# Patient Record
Sex: FEMALE | Race: WHITE | NOT HISPANIC OR LATINO | Employment: OTHER | URBAN - METROPOLITAN AREA
[De-identification: names, ages, dates, MRNs, and addresses within clinical notes are randomized per-mention and may not be internally consistent; named-entity substitution may affect disease eponyms.]

---

## 2017-01-27 DIAGNOSIS — E11.9 TYPE 2 DIABETES MELLITUS WITHOUT COMPLICATION: ICD-10-CM

## 2017-01-30 DIAGNOSIS — Z79.4 TYPE 2 DIABETES MELLITUS WITHOUT COMPLICATION, WITH LONG-TERM CURRENT USE OF INSULIN: ICD-10-CM

## 2017-01-30 DIAGNOSIS — E11.9 TYPE 2 DIABETES MELLITUS WITHOUT COMPLICATION, WITH LONG-TERM CURRENT USE OF INSULIN: ICD-10-CM

## 2017-01-30 RX ORDER — INSULIN GLARGINE 100 [IU]/ML
23 INJECTION, SOLUTION SUBCUTANEOUS NIGHTLY
Qty: 30 ML | Refills: 3
Start: 2017-01-30 | End: 2017-03-16 | Stop reason: SDUPTHER

## 2017-01-30 RX ORDER — INSULIN GLARGINE 100 [IU]/ML
22 INJECTION, SOLUTION SUBCUTANEOUS NIGHTLY
Qty: 30 ML | Refills: 3
Start: 2017-01-30 | End: 2017-01-30 | Stop reason: SDUPTHER

## 2017-01-30 NOTE — TELEPHONE ENCOUNTER
Glucose log starting 1/23/17    Am Pm  118  122 160  140 148  116 155  135 169  140 168  133 165  120 160

## 2017-01-31 NOTE — TELEPHONE ENCOUNTER
Patient says she is using 21u of Lantus-per verbal order  patient advised to increase to 23u and send in a log in one week. Please sign mar.

## 2017-02-11 DIAGNOSIS — E11.9 TYPE 2 DIABETES MELLITUS WITHOUT COMPLICATION: ICD-10-CM

## 2017-02-13 RX ORDER — SAXAGLIPTIN AND METFORMIN HYDROCHLORIDE 2.5; 1 MG/1; MG/1
TABLET, FILM COATED, EXTENDED RELEASE ORAL
Qty: 180 TABLET | Refills: 0 | Status: SHIPPED | OUTPATIENT
Start: 2017-02-13 | End: 2017-05-06 | Stop reason: SDUPTHER

## 2017-02-15 ENCOUNTER — LAB VISIT (OUTPATIENT)
Dept: LAB | Facility: HOSPITAL | Age: 82
End: 2017-02-15
Attending: FAMILY MEDICINE
Payer: MEDICARE

## 2017-02-15 DIAGNOSIS — E11.9 TYPE 2 DIABETES MELLITUS WITHOUT COMPLICATION: ICD-10-CM

## 2017-02-15 LAB
ANION GAP SERPL CALC-SCNC: 10 MMOL/L
BUN SERPL-MCNC: 26 MG/DL
CALCIUM SERPL-MCNC: 9.5 MG/DL
CHLORIDE SERPL-SCNC: 106 MMOL/L
CO2 SERPL-SCNC: 25 MMOL/L
CREAT SERPL-MCNC: 1 MG/DL
EST. GFR  (AFRICAN AMERICAN): >60 ML/MIN/1.73 M^2
EST. GFR  (NON AFRICAN AMERICAN): 52.6 ML/MIN/1.73 M^2
GLUCOSE SERPL-MCNC: 138 MG/DL
POTASSIUM SERPL-SCNC: 4.2 MMOL/L
SODIUM SERPL-SCNC: 141 MMOL/L

## 2017-02-15 PROCEDURE — 36415 COLL VENOUS BLD VENIPUNCTURE: CPT | Mod: PO

## 2017-02-15 PROCEDURE — 80048 BASIC METABOLIC PNL TOTAL CA: CPT

## 2017-02-15 PROCEDURE — 83036 HEMOGLOBIN GLYCOSYLATED A1C: CPT

## 2017-02-16 DIAGNOSIS — E11.9 TYPE 2 DIABETES MELLITUS WITHOUT COMPLICATION, WITH LONG-TERM CURRENT USE OF INSULIN: ICD-10-CM

## 2017-02-16 DIAGNOSIS — Z79.4 TYPE 2 DIABETES MELLITUS WITHOUT COMPLICATION, WITH LONG-TERM CURRENT USE OF INSULIN: ICD-10-CM

## 2017-02-16 LAB
ESTIMATED AVG GLUCOSE: 192 MG/DL
HBA1C MFR BLD HPLC: 8.3 %
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
## 2017-02-16 NOTE — TELEPHONE ENCOUNTER
----- Message from Chang Christianson MD sent at 2/16/2017  1:38 PM CST -----  Blood sugar control is not much better with an a1c of 8.3.  Is she doing better keeping up the the kombiglyze?  How about the insulin?  Recommend she keep a log for two weeks morning and evening before breakfast dinner and send it in to me.

## 2017-03-16 RX ORDER — INSULIN GLARGINE 100 [IU]/ML
25 INJECTION, SOLUTION SUBCUTANEOUS NIGHTLY
Qty: 30 ML | Refills: 3
Start: 2017-03-16 | End: 2017-04-19 | Stop reason: SDUPTHER

## 2017-03-16 NOTE — TELEPHONE ENCOUNTER
"bp log dropped off  Date Reading Pulse  Sugar  2-3 137/75  2-7 132/75  2-11 136/66  69  2-15 146/77  81  Am 91   147/72  73  Pm 140  2-17 130/65  73  Am 120/ pm 143  2-18     Am 134 / pm 150  2-19 118/64  86  Am 140 / pm 160  2-21 147/72  73  Am 133 / pm 155  2-22 156/75  71  2-23 132/69  71  2-24 119/57  78  3-15     Am 135 / pm 122           2nd page patient stated " I lost a paper from 2/21-March 14        "

## 2017-03-18 DIAGNOSIS — I15.2 HYPERTENSION ASSOCIATED WITH DIABETES: ICD-10-CM

## 2017-03-18 DIAGNOSIS — I10 HYPERTENSION: ICD-10-CM

## 2017-03-18 DIAGNOSIS — E11.59 HYPERTENSION ASSOCIATED WITH DIABETES: ICD-10-CM

## 2017-03-20 RX ORDER — CARVEDILOL 6.25 MG/1
TABLET ORAL
Qty: 180 TABLET | Refills: 0 | Status: SHIPPED | OUTPATIENT
Start: 2017-03-20 | End: 2017-05-25 | Stop reason: SDUPTHER

## 2017-04-04 ENCOUNTER — TELEPHONE (OUTPATIENT)
Dept: FAMILY MEDICINE | Facility: CLINIC | Age: 82
End: 2017-04-04

## 2017-04-04 NOTE — TELEPHONE ENCOUNTER
NO Voicemail for patient, calling to get more information regarding going back to original dosage of insulin because new dosage makes her sleepy.

## 2017-04-04 NOTE — TELEPHONE ENCOUNTER
----- Message from Remedios Hyman sent at 4/4/2017 10:28 AM CDT -----  Contact: self  Higher dosage of insulin is making her sleepy, went back to original dosage.  Please call back at after 4:00 at  [phone].

## 2017-04-04 NOTE — TELEPHONE ENCOUNTER
----- Message from Remedios Hyman sent at 4/4/2017 10:28 AM CDT -----  Contact: self  Higher dosage of insulin is making her sleepy, went back to original dosage.  Please call back at after 4:00 at  [phone].

## 2017-04-12 NOTE — TELEPHONE ENCOUNTER
Patient stated she could not take 25 units. She backed back to 23 units and feels better. She has 2 days of sugar readings. She will bring in log on Monday 4-17-17.

## 2017-04-18 NOTE — TELEPHONE ENCOUNTER
She either needs more lantus-possibly going to 24 as an intermediate step or we need to add some mealtime insulin like novolog.

## 2017-04-19 RX ORDER — INSULIN GLARGINE 100 [IU]/ML
24 INJECTION, SOLUTION SUBCUTANEOUS NIGHTLY
Qty: 30 ML | Refills: 3
Start: 2017-04-19 | End: 2018-02-12 | Stop reason: SDUPTHER

## 2017-04-19 NOTE — TELEPHONE ENCOUNTER
Patient will increase Lantus to 24u - she says she has been on Kombliglyze for years and wonders if it is still working for her.

## 2017-04-21 DIAGNOSIS — E11.9 TYPE 2 DIABETES MELLITUS WITHOUT COMPLICATION: ICD-10-CM

## 2017-04-25 ENCOUNTER — DOCUMENTATION ONLY (OUTPATIENT)
Dept: FAMILY MEDICINE | Facility: CLINIC | Age: 82
End: 2017-04-25

## 2017-04-25 NOTE — PROGRESS NOTES
Pre-Visit Chart Review  For Appointment Scheduled on 04/28/2017    Health Maintenance Due   Topic Date Due    Lipid Panel  04/18/2017    DEXA SCAN  04/17/2017    Foot Exam  05/25/2017

## 2017-04-28 ENCOUNTER — TELEPHONE (OUTPATIENT)
Dept: FAMILY MEDICINE | Facility: CLINIC | Age: 82
End: 2017-04-28

## 2017-04-28 NOTE — TELEPHONE ENCOUNTER
Looks fairly good.  Continue same and repeat a1c in two months.  Order in.  Due for foot exam soon

## 2017-04-28 NOTE — TELEPHONE ENCOUNTER
Glucose log-Lantus at 24u 4/20-4/27    Am Pm  123 124  119 128  116 126  92 132  80 133  116 128  107 140 after playing weekly card game and not eating diabetic diet!  116

## 2017-05-06 DIAGNOSIS — E11.9 TYPE 2 DIABETES MELLITUS WITHOUT COMPLICATION: ICD-10-CM

## 2017-05-08 RX ORDER — SAXAGLIPTIN AND METFORMIN HYDROCHLORIDE 2.5; 1 MG/1; MG/1
TABLET, FILM COATED, EXTENDED RELEASE ORAL
Qty: 180 TABLET | Refills: 0 | Status: SHIPPED | OUTPATIENT
Start: 2017-05-08 | End: 2017-08-16 | Stop reason: SDUPTHER

## 2017-05-12 NOTE — TELEPHONE ENCOUNTER
Lab appt made 7/11/17 with patient. Annual check up and foot exam scheduled 7/18/17. Patient states she has lost 7 lbs and is following strict diabetic diet.

## 2017-05-25 DIAGNOSIS — I15.2 HYPERTENSION ASSOCIATED WITH DIABETES: ICD-10-CM

## 2017-05-25 DIAGNOSIS — I10 HYPERTENSION: ICD-10-CM

## 2017-05-25 DIAGNOSIS — E11.59 HYPERTENSION ASSOCIATED WITH DIABETES: ICD-10-CM

## 2017-05-25 RX ORDER — CARVEDILOL 6.25 MG/1
TABLET ORAL
Qty: 180 TABLET | Refills: 0 | Status: SHIPPED | OUTPATIENT
Start: 2017-05-25 | End: 2018-02-12 | Stop reason: SDUPTHER

## 2017-06-22 ENCOUNTER — DOCUMENTATION ONLY (OUTPATIENT)
Dept: FAMILY MEDICINE | Facility: CLINIC | Age: 82
End: 2017-06-22

## 2017-06-22 NOTE — PROGRESS NOTES
Pre-Visit Chart Review  For Appointment Scheduled on 7-18-17    Health Maintenance Due   Topic Date Due    DEXA SCAN  04/17/2017    Lipid Panel  04/18/2017    Hemoglobin A1c  05/15/2017    Pneumococcal (65+) (2 of 2 - PPSV23) 05/25/2017    Foot Exam  05/25/2017    Eye Exam  06/20/2017

## 2017-06-23 DIAGNOSIS — I10 ESSENTIAL HYPERTENSION: ICD-10-CM

## 2017-06-23 RX ORDER — LISINOPRIL AND HYDROCHLOROTHIAZIDE 10; 12.5 MG/1; MG/1
TABLET ORAL
Qty: 90 TABLET | Refills: 0 | Status: SHIPPED | OUTPATIENT
Start: 2017-06-23 | End: 2017-10-05 | Stop reason: SDUPTHER

## 2017-07-06 RX ORDER — BLOOD SUGAR DIAGNOSTIC
STRIP MISCELLANEOUS
Qty: 100 EACH | Refills: 0 | Status: SHIPPED | OUTPATIENT
Start: 2017-07-06 | End: 2017-10-10 | Stop reason: SDUPTHER

## 2017-07-11 ENCOUNTER — LAB VISIT (OUTPATIENT)
Dept: LAB | Facility: HOSPITAL | Age: 82
End: 2017-07-11
Attending: FAMILY MEDICINE
Payer: MEDICARE

## 2017-07-11 LAB
ESTIMATED AVG GLUCOSE: 154 MG/DL
HBA1C MFR BLD HPLC: 7 %

## 2017-07-11 PROCEDURE — 36415 COLL VENOUS BLD VENIPUNCTURE: CPT | Mod: PO

## 2017-07-11 PROCEDURE — 83036 HEMOGLOBIN GLYCOSYLATED A1C: CPT

## 2017-07-18 ENCOUNTER — HOSPITAL ENCOUNTER (OUTPATIENT)
Dept: RADIOLOGY | Facility: CLINIC | Age: 82
Discharge: HOME | End: 2017-07-18
Attending: FAMILY MEDICINE
Payer: MEDICARE

## 2017-07-18 ENCOUNTER — OFFICE VISIT (OUTPATIENT)
Dept: FAMILY MEDICINE | Facility: CLINIC | Age: 82
End: 2017-07-18
Payer: MEDICARE

## 2017-07-18 VITALS
DIASTOLIC BLOOD PRESSURE: 59 MMHG | RESPIRATION RATE: 16 BRPM | TEMPERATURE: 98 F | SYSTOLIC BLOOD PRESSURE: 111 MMHG | HEART RATE: 80 BPM | WEIGHT: 190.06 LBS | HEIGHT: 67 IN | BODY MASS INDEX: 29.83 KG/M2 | OXYGEN SATURATION: 96 %

## 2017-07-18 DIAGNOSIS — E78.5 HYPERLIPIDEMIA, UNSPECIFIED HYPERLIPIDEMIA TYPE: ICD-10-CM

## 2017-07-18 DIAGNOSIS — Z23 IMMUNIZATION DUE: ICD-10-CM

## 2017-07-18 DIAGNOSIS — E11.69 HYPERLIPIDEMIA ASSOCIATED WITH TYPE 2 DIABETES MELLITUS: ICD-10-CM

## 2017-07-18 DIAGNOSIS — E11.40 TYPE 2 DIABETES, CONTROLLED, WITH NEUROPATHY: ICD-10-CM

## 2017-07-18 DIAGNOSIS — E11.59 HYPERTENSION ASSOCIATED WITH DIABETES: ICD-10-CM

## 2017-07-18 DIAGNOSIS — E03.9 HYPOTHYROIDISM, UNSPECIFIED TYPE: ICD-10-CM

## 2017-07-18 DIAGNOSIS — Z00.00 ANNUAL PHYSICAL EXAM: Primary | ICD-10-CM

## 2017-07-18 DIAGNOSIS — I10 GOOD HYPERTENSION CONTROL: ICD-10-CM

## 2017-07-18 DIAGNOSIS — Z12.31 ENCOUNTER FOR SCREENING MAMMOGRAM FOR MALIGNANT NEOPLASM OF BREAST: ICD-10-CM

## 2017-07-18 DIAGNOSIS — Z78.0 MENOPAUSE: ICD-10-CM

## 2017-07-18 DIAGNOSIS — E78.5 HYPERLIPIDEMIA ASSOCIATED WITH TYPE 2 DIABETES MELLITUS: ICD-10-CM

## 2017-07-18 DIAGNOSIS — I15.2 HYPERTENSION ASSOCIATED WITH DIABETES: ICD-10-CM

## 2017-07-18 PROCEDURE — 77067 SCR MAMMO BI INCL CAD: CPT | Mod: 26,,, | Performed by: RADIOLOGY

## 2017-07-18 PROCEDURE — 99397 PER PM REEVAL EST PAT 65+ YR: CPT | Mod: S$GLB,,, | Performed by: FAMILY MEDICINE

## 2017-07-18 PROCEDURE — 99499 UNLISTED E&M SERVICE: CPT | Mod: S$GLB,,, | Performed by: FAMILY MEDICINE

## 2017-07-18 PROCEDURE — 77080 DXA BONE DENSITY AXIAL: CPT | Mod: 26,,, | Performed by: RADIOLOGY

## 2017-07-18 PROCEDURE — 77063 BREAST TOMOSYNTHESIS BI: CPT | Mod: 26,,, | Performed by: RADIOLOGY

## 2017-07-18 PROCEDURE — 99999 PR PBB SHADOW E&M-EST. PATIENT-LVL IV: CPT | Mod: PBBFAC,,, | Performed by: FAMILY MEDICINE

## 2017-07-18 PROCEDURE — G0009 ADMIN PNEUMOCOCCAL VACCINE: HCPCS | Mod: S$GLB,,, | Performed by: FAMILY MEDICINE

## 2017-07-18 PROCEDURE — 90732 PPSV23 VACC 2 YRS+ SUBQ/IM: CPT | Mod: S$GLB,,, | Performed by: FAMILY MEDICINE

## 2017-07-18 PROCEDURE — 77080 DXA BONE DENSITY AXIAL: CPT | Mod: TC,PO

## 2017-07-18 PROCEDURE — 77067 SCR MAMMO BI INCL CAD: CPT | Mod: TC

## 2017-07-18 NOTE — PATIENT INSTRUCTIONS
Diabetes (General Information)  Diabetes is a long-term health problem. It means your body does not make enough insulin. Or it may mean that your body cannot use the insulin it makes. Insulin is a hormone in your body. It lets blood sugar (glucose) reach the cells in your body. All of your cells need glucose for fuel.  When you have diabetes, the glucose in your blood builds up because it cannot get into the cells. This buildup is called high blood sugar (hyperglycemia).  Your blood sugar level depends on several things. It depends on what kind of food you eat and how much of it you eat. It also depends on how much exercise you get, and how much insulin you have in your body. Eating too much of the wrong kinds of food or not taking diabetes medicine on time can cause high blood sugar. Infections can cause high blood sugar even if you are taking medicines correctly.  These things can also cause low blood sugar:  · Missing meals  · Not eating enough food  · Taking too much diabetes medicine  Diabetes can cause serious problems over time if you do not get treated. These problems include heart disease, stroke, kidney failure, and blindness. They also include nerve pain or loss of feeling in your legs and feet, and gangrene of the feet. By keeping your blood sugar under control you can prevent or delay these problems.  Normal blood sugar levels are 80 to 100 before a meal and less than 180 in the 1 to 2 hours after a meal.  Home care  Follow these guidelines when caring for yourself at home:  · Follow the diet your healthcare provider gives you. Take insulin or other diabetes medicine exactly as told to.  · Watch your blood sugar as you are told to. Keep a log of your results. This will help your provider change your medicines to keep your blood sugar under control.  · Try to reach your ideal weight. You may be able to cut back on or not have to take diabetes medicine if you eat the right foods and get exercise.  · Do  not smoke. Smoking worsens the effects of diabetes on your circulation. You are much more likely to have a heart attack if you have diabetes and you smoke.  · Take good care of your feet. If you have lost feeling in your feet, you may not see an injury or infection. Check your feet and between your toes at least once a week.  · Wear a medical alert bracelet or necklace, or carry a card in your wallet that says you have diabetes. This will help healthcare providers give you the right care if you get very ill and cannot tell them that you have diabetes.  Sick day plan  If you get a cold, the flu, or a bacterial or viral infection, take these steps:  · Look at your diabetes sick plan and call your healthcare provider as you were told to. You may need to call your provider right away if:  ¨ Your blood sugar is above 240 while taking your diabetes medicine  ¨ Your urine ketone levels are above normal or high  ¨ You have been vomiting more than 6 hours  ¨ You have trouble breathing or your breath ha s a fruity smell  ¨ You have a high fever  ¨ You have a fever for several days and you are not getting better  ¨ You get light-headed and are sleepier than usual  · Keep taking your diabetes pills (oral medicine) even if you have been vomiting and are feeling sick. Call your provider right away because you may need insulin to lower your blood sugar until you recover from your illness.  · Keep taking your insulin even if you have been vomiting and are feeling sick. Call your provider right away to ask if you need to change your insulin dose. This will depend on your blood sugar results.  · Check your blood sugar every 2 to 4 hours, or at least 4 times a day.  · Check your ketones often. If you are vomiting and having diarrhea, watch them more often.  · Do not skip meals. Try to eat small meals on a regular schedule. Do this even if you do not feel like eating.  · Drink water or other liquids that do not have caffeine or  calories. This will keep you from getting dehydrated. If you are nauseated or vomiting, takes small sips every 5 minutes. To prevent dehydration try to drink a cup (8 ounces) of fluids every hour while you are awake.  General care  Always bring a source of fast-acting sugar with you in case you have symptoms of low blood sugar (below 70). At the first sign of low blood sugar, eat or drink 15 to 20 grams of fast-acting sugar to raise your blood sugar. Examples are:  · 3 to 4 glucose tablets. You can buy these at most [employer].  · 4 ounces (1/2 cup) of regular (not diet) soft drinks  · 4 ounces (1/2 cup) of any fruit juice  · 8 ounces (1 cup) of milk  · 5 to 6 pieces of hard candy  · 1 tablespoon of honey  Check your blood sugar 15 minutes after treating yourself. If it is still below 70, take 15 to 20 more grams of fast-acting sugar. Test again in 15 minutes. If it returns to normal (70 or above), eat a snack or meal to keep your blood sugar in a safe range. If it stays low, call your doctor or go to an emergency room.  Follow-up care  Follow-up with your healthcare provider, or as advised. For more information about diabetes, visit the American Diabetes Association website at www.diabetes.org or call 080-405-6683.  When to seek medical advice  Call your healthcare provider right away if you have any of these symptoms of high blood sugar:  · Frequent urination  · Dizziness  · Drowsiness  · Thirst  · Headache  · Nausea or vomiting  · Abdominal pain  · Eyesight changes  · Fast breathing  · Confusion or loss of consciousness  Also call your provider right away if you have any of these signs of low blood sugar:  · Fatigue  · Headache  · Shakes  · Excess sweating  · Hunger  · Feeling anxious or restless  · Eyesight changes  · Drowsiness  · Weakness  · Confusion or loss of consciousness  Call 911  Call for emergency help right away if any of these occur:  · Chest pain or shortness of breath  · Dizziness or  fainting  · Weakness of an arm or leg or one side of the face  · Trouble speaking or seeing   Date Last Reviewed: 6/1/2016 © 2000-2016 [employer]. [address] [place] [address], [place] [address]. All rights reserved. This information is not intended as a substitute for professional medical care. Always follow your healthcare professional's instructions.        Weight Management: Getting Started  Healthy bodies come in all shapes and sizes. Not all bodies are made to be thin. For some people, a healthy weight is higher than the average weight listed on weight charts. Your healthcare provider can help you decide on a healthy weight for you.    Reasons to lose weight  Losing weight can help with some health problems, such as high blood pressure, heart disease, diabetes, sleep apnea, and arthritis. You may also feel more energy.  Set your long-term goal  Your goal doesn't even have to be a specific weight. You may decide on a fitness goal (such as being able to walk 10 miles a week), or a health goal (such as lowering your blood pressure). Choose a goal that is measurable and reasonable, so you know when you've reached it. A goal of reaching a BMI of less than 25 is not always reasonable (or possible).   Make an action plan  Habits dont change overnight. Setting your goals too high can leave you feeling discouraged if you cant reach them. Be realistic. Choose one or two small changes you can make now. Set an action plan for how you are going to make these changes. When you can stick to this plan, keep making a few more small changes. Taking small steps will help you stay on the path to success.  Track your progress  Write down your goals. Then, keep a daily record of your progress. Write down what you eat and how active you are. This record lets you look back on how much youve done. It may also help when youre feeling frustrated. Reward yourself for success. Even if you dont reach every goal, give  yourself credit for what you do get done.  Get support  Encouragement from others can help make losing weight easier. Ask your family members and friends for support. They may even want to join you. Also look to your healthcare provider, registered dietitian, and  for help. Your local hospital can give you more information about nutrition, exercise, and weight loss.  Date Last Reviewed: 1/31/2016  © 8337-7522 [employer]. [address] [place] [address], [place] [address]. All rights reserved. This information is not intended as a substitute for professional medical care. Always follow your healthcare professional's instructions.        Walking for Fitness  Fitness walking has something for everyone, even people who are already fit. Walking is one of the safest ways to condition your body aerobically. It can boost energy, help you lose weight, and reduce stress.    Physical benefits  · Walking strengthens your heart and lungs, and tones your muscles.  · When walking, your feet land with less impact than in other sports. This reduces chances of muscle, bone, and joint injury.  · Regular walking improves your cholesterol levels and lowers your risk of heart disease. And it helps you control your blood sugar if you have diabetes.  · Walking is a weight-bearing activity, which helps maintain bone density. This can help prevent osteoporosis.  Personal rewards  · Taking walks can help you relax and manage stress. And fitness walking may make you feel better about yourself.  · Walking can help you sleep better at night and make you less likely to be depressed.  · Regular walking may help maintain your memory as you get older.  · Walking is a great way to spend extra time with friends and family members. Be sure to invite your dog along!  Q&A about fitness walking  Q: Will walking keep me fit?  A: Yes. Regular walking at the right pace gives you all the benefits of other aerobic activities, such as  jogging and swimming.  Q: Will walking help me lose weight and keep it off?  A: Yes. Per mile, walking can burn as many calories as jogging. Your health care provider can help work walking into your weight-loss plan.  Q: Is walking safe for my health?  A: Yes. Walking is safe if you have high blood pressure, diabetes, heart disease, or other conditions. Talk to your health care provider before you start.  Date Last Reviewed: 5/9/2015 © 2000-2016 [employer]. [address] [place] [address], [place] [address]. All rights reserved. This information is not intended as a substitute for professional medical care. Always follow your healthcare professional's instructions.

## 2017-07-18 NOTE — PROGRESS NOTES
Subjective:       Patient ID: Leslie Tolliver is a 83 y.o. female.    Chief Complaint: Annual Exam    83-year-old female coming in for annual exam.  Most of her lab is up-to-date date but she does need a lipid panel and a thyroid level.  Her diabetic labs were recently done with good improvement.  She has a history of hypothyroidism, hypertension, diabetes, and hyperlipidemia along with GERD arthritis and colon polyps.  Colonoscopy is up-to-date with her next recheck due in 2019.  She does have some diabetic neuropathy but has adequate protective sensation.  She does examine her feet regularly.  She is due for an eye exam which she will book.  She is due for a foot exam a DEXA scan and a Pneumovax 23.    Past Medical History:  No date: Arthritis  No date: Colon polyps  No date: Diabetes mellitus type II  No date: GERD (gastroesophageal reflux disease)  No date: Hyperlipidemia  No date: Hypertension  No date: Hypothyroidism    Past Surgical History:  No date: ADENOIDECTOMY  No date: CHOLECYSTECTOMY  5-: COLONOSCOPY      Comment: Dr Holly, 5 year recheck  No date: HEMORRHOID SURGERY  No date: HERNIA REPAIR  No date: HYSTERECTOMY  No date: TONSILLECTOMY    Review of patient's family history indicates:    Diabetes                       Mother                    Heart disease                  Mother                    Glaucoma                       Mother                    Cancer                         Father                    Early death                    Son                         Comment: brain tumor age 3, age 25 mva     No Known Problems              Brother                   No Known Problems              Daughter                  No Known Problems              Son                       No Known Problems              Paternal Uncle            Macular degeneration           Neg Hx                    Retinal detachment             Neg Hx                    Social History    Marital status:      "         Spouse name:                       Years of education:                 Number of children:               Social History Main Topics    Smoking status: Former Smoker                                                                Packs/day: 0.00      Years: 0.00           Types: Cigarettes       Quit date: 3/30/1974    Smokeless tobacco: Never Used                        Alcohol use: No              Drug use: No                Current Outpatient Prescriptions:     aspirin 81 mg Tab, Take 81 mg by mouth once daily. Every day, Disp: , Rfl:     BD INSULIN PEN NEEDLE UF SHORT 31 gauge x 5/16" Ndle, USE WITH LANTUS SOLOSTAR PEN AS NEEDED, Disp: 90 each, Rfl: 3    carvedilol (COREG) 6.25 MG tablet, TAKE 1 TABLET TWICE DAILY WITH MEALS, Disp: 180 tablet, Rfl: 0    cholestyramine-aspartame (PREVALITE) 4 gram PwPk, Take 1 packet by mouth daily as needed., Disp: , Rfl:     CONTOUR TEST STRIPS Strp, USE ONE STRIP THREE TIMES DAILY., Disp: 100 each, Rfl: 0    insulin glargine (LANTUS SOLOSTAR) 100 unit/mL (3 mL) InPn pen, Inject 24 Units into the skin every evening., Disp: 30 mL, Rfl: 3    KOMBIGLYZE XR 2.5-1,000 mg TM24, TAKE 1 TABLET TWICE DAILY, Disp: 180 tablet, Rfl: 0    LANCETS & BLOOD GLUCOSE STRIPS MISC, No Sig Provided, Disp: , Rfl:     lancets (MICROLET LANCET) Misc, USE ONE LANCET AS DIRECTED THREE TIMES DAILY., Disp: 100 each, Rfl: 0    lisinopril-hydrochlorothiazide (PRINZIDE,ZESTORETIC) 10-12.5 mg per tablet, TAKE 1 TABLET EVERY DAY, Disp: 90 tablet, Rfl: 0    multivitamin (ONE DAILY MULTIVITAMIN) per tablet, Take 1 tablet by mouth once daily., Disp: , Rfl:     Zoster Vaccine due on 07/17/1994-declines but will consider doing it at the pharmacy  DEXA SCAN due on 04/17/2017  Lipid Panel due on 04/18/2017  Pneumococcal (65+)(2 of 2 - PPSV23) due on 05/25/2017  Foot Exam due on 05/25/2017  Eye Exam due on 06/20/2017          Review of Systems   Constitutional: Negative for chills, diaphoresis, " fatigue, fever and unexpected weight change.   HENT: Negative for congestion, ear pain, hearing loss, postnasal drip and sinus pressure.    Eyes: Negative for itching and visual disturbance.   Respiratory: Negative for cough, chest tightness, shortness of breath and wheezing.    Cardiovascular: Negative for chest pain, palpitations and leg swelling.   Gastrointestinal: Negative for abdominal pain, blood in stool, constipation, diarrhea, nausea and vomiting.   Genitourinary: Negative for dysuria, frequency, hematuria, menstrual problem, pelvic pain, vaginal bleeding and vaginal discharge.   Musculoskeletal: Negative for arthralgias, back pain, joint swelling and myalgias.   Neurological: Negative for dizziness and headaches.   Hematological: Negative for adenopathy.   Psychiatric/Behavioral: Negative for sleep disturbance. The patient is not nervous/anxious.        Objective:      Physical Exam   Constitutional: She is oriented to person, place, and time. She appears well-developed and well-nourished. No distress.   Good blood pressure control  Normal weight with BMI 29.8.  Her weight is down 5.1 pounds from her last visit with me May 25, 2016   HENT:   Head: Normocephalic and atraumatic.   Right Ear: External ear normal.   Left Ear: External ear normal.   Nose: Nose normal.   Mouth/Throat: Oropharynx is clear and moist. No oropharyngeal exudate.   Eyes: Conjunctivae are normal. Pupils are equal, round, and reactive to light. Right eye exhibits no discharge. Left eye exhibits no discharge. No scleral icterus.   Neck: Normal range of motion. Neck supple. No JVD present. No tracheal deviation present. No thyromegaly present.   Cardiovascular: Normal rate, regular rhythm and normal heart sounds.  Exam reveals no gallop and no friction rub.    No murmur heard.  Pulses:       Dorsalis pedis pulses are 2+ on the right side, and 2+ on the left side.        Posterior tibial pulses are 2+ on the right side, and 2+ on the left  side.   Carotid pulses 2+ no bruit   Pulmonary/Chest: Effort normal and breath sounds normal. No stridor. No respiratory distress. She has no wheezes. She has no rales. She exhibits no tenderness.   Abdominal: Soft. Bowel sounds are normal. She exhibits no distension and no mass. There is no tenderness. There is no rebound and no guarding.   Musculoskeletal: Normal range of motion. She exhibits no edema, tenderness or deformity.        Right foot: There is normal range of motion. Deformity: Mild bunion deformity without overriding.        Left foot: There is normal range of motion. Deformity: Mild bunion deformity without overriding.   Feet:   Right Foot:   Protective Sensation: 10 sites tested. 8 sites sensed.   Skin Integrity: Negative for ulcer, blister, skin breakdown, erythema, warmth, callus or dry skin.   Left Foot:   Protective Sensation: 10 sites tested. 9 sites sensed.   Skin Integrity: Negative for ulcer, blister, skin breakdown, erythema, warmth, callus or dry skin.   Lymphadenopathy:     She has no cervical adenopathy.   Neurological: She is alert and oriented to person, place, and time. She has normal reflexes. She displays normal reflexes. No cranial nerve deficit. She exhibits normal muscle tone.   Skin: Skin is warm and dry. No rash noted. She is not diaphoretic. No erythema.   Psychiatric: She has a normal mood and affect. Her behavior is normal.   Nursing note and vitals reviewed.      Assessment:       1. Annual physical exam    2. Type 2 diabetes, controlled, with neuropathy    3. Good hypertension control    4. Hyperlipidemia, unspecified hyperlipidemia type    5. Hypertension associated with diabetes    6. Hyperlipidemia associated with type 2 diabetes mellitus    7. Hypothyroidism, unspecified type    8. Menopause    9. Immunization due    10. BMI 29.0-29.9,adult        Plan:       1. Annual physical exam    2. Type 2 diabetes, controlled, with neuropathy  Lab Results   Component Value Date     HGBA1C 7.0 (H) 07/11/2017     No changes needed    3. Good hypertension control  No changes needed    4. Hyperlipidemia, unspecified hyperlipidemia type  - Lipid panel; Future    5. Hypertension associated with diabetes    6. Hyperlipidemia associated with type 2 diabetes mellitus    7. Hypothyroidism, unspecified type  - TSH; Future    8. Menopause  - DXA Bone Density Spine And Hip; Future    9. Immunization due  - (In Office Administered) Pneumococcal Polysaccharide Vaccine (23 Valent) (SQ/IM)    10. BMI 29.0-29.9,adult         Patient readiness: acceptance and barriers:none    During the course of the visit the patient was educated and counseled about the following:     Diabetes:  Discussed general issues about diabetes pathophysiology and management.  Addressed ADA diet.  Discussed foot care.  Reminded to get yearly retinal exam.  Hypertension:   Medication: no change.  Dietary sodium restriction.  Regular aerobic exercise.    Goals: Diabetes: Maintain Hemoglobin A1C below 7 and Hypertension: Reduce Blood Pressure    Did patient meet goals/outcomes: Yes    The following self management tools provided: blood pressure log  blood glucose log    Patient Instructions (the written plan) was given to the patient/family.     Time spent with patient: 45 minutes

## 2017-08-16 DIAGNOSIS — E11.9 TYPE 2 DIABETES MELLITUS WITHOUT COMPLICATION: ICD-10-CM

## 2017-08-16 RX ORDER — SAXAGLIPTIN AND METFORMIN HYDROCHLORIDE 2.5; 1 MG/1; MG/1
TABLET, FILM COATED, EXTENDED RELEASE ORAL
Qty: 180 TABLET | Refills: 1 | Status: SHIPPED | OUTPATIENT
Start: 2017-08-16 | End: 2018-01-20 | Stop reason: SDUPTHER

## 2017-09-14 ENCOUNTER — TELEPHONE (OUTPATIENT)
Dept: FAMILY MEDICINE | Facility: CLINIC | Age: 82
End: 2017-09-14

## 2017-09-14 NOTE — TELEPHONE ENCOUNTER
Patient works at [employer] and will have her flu shot there -she will bring documentation.

## 2017-09-14 NOTE — TELEPHONE ENCOUNTER
----- Message from Sarah Welsh sent at 9/14/2017  1:09 PM CDT -----  Contact: self  Patient called asking for advice about if she had a flu shot for this year.  Please call patient at [phone]. Thanks!

## 2017-10-05 DIAGNOSIS — I10 ESSENTIAL HYPERTENSION: ICD-10-CM

## 2017-10-05 RX ORDER — LISINOPRIL AND HYDROCHLOROTHIAZIDE 10; 12.5 MG/1; MG/1
1 TABLET ORAL DAILY
Qty: 90 TABLET | Refills: 2 | Status: SHIPPED | OUTPATIENT
Start: 2017-10-05 | End: 2018-07-07 | Stop reason: SDUPTHER

## 2017-10-10 RX ORDER — BLOOD SUGAR DIAGNOSTIC
STRIP MISCELLANEOUS
Qty: 100 EACH | Refills: 11 | Status: SHIPPED | OUTPATIENT
Start: 2017-10-10 | End: 2018-01-24 | Stop reason: CLARIF

## 2017-10-12 ENCOUNTER — TELEPHONE (OUTPATIENT)
Dept: FAMILY MEDICINE | Facility: CLINIC | Age: 82
End: 2017-10-12

## 2017-10-12 NOTE — TELEPHONE ENCOUNTER
Patient called- went to [facility] 10/10/17 for sinusitis,bronchitis and laryngopharyngitis- she is concerned that her glucose is elevated in the 200's and thinks the cause is Phenergan cough syrup- records obtained- patient advised more than likely its the Prednisone causing the glucose elevations- advised to finish meds given and maintain strict diabetic diet. RTC if symptoms are not improved.

## 2017-11-01 ENCOUNTER — TELEPHONE (OUTPATIENT)
Dept: FAMILY MEDICINE | Facility: CLINIC | Age: 82
End: 2017-11-01

## 2017-11-01 ENCOUNTER — DOCUMENTATION ONLY (OUTPATIENT)
Dept: FAMILY MEDICINE | Facility: CLINIC | Age: 82
End: 2017-11-01

## 2017-11-01 NOTE — PROGRESS NOTES
Pre-Visit Chart Review  For Appointment Scheduled on 11-8-17    Health Maintenance Due   Topic Date Due    Zoster Vaccine  07/17/1994    Lipid Panel  04/18/2017    Eye Exam  06/20/2017    Influenza Vaccine  08/01/2017    Hemoglobin A1c  10/11/2017
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
## 2017-11-03 DIAGNOSIS — E11.9 TYPE 2 DIABETES MELLITUS WITHOUT COMPLICATION: ICD-10-CM

## 2017-11-03 RX ORDER — PEN NEEDLE, DIABETIC 31 GX5/16"
NEEDLE, DISPOSABLE MISCELLANEOUS
Qty: 100 EACH | Refills: 3 | Status: SHIPPED | OUTPATIENT
Start: 2017-11-03 | End: 2018-08-27 | Stop reason: SDUPTHER

## 2017-11-08 ENCOUNTER — LAB VISIT (OUTPATIENT)
Dept: LAB | Facility: HOSPITAL | Age: 82
End: 2017-11-08
Attending: FAMILY MEDICINE
Payer: MEDICARE

## 2017-11-08 ENCOUNTER — OFFICE VISIT (OUTPATIENT)
Dept: FAMILY MEDICINE | Facility: CLINIC | Age: 82
End: 2017-11-08
Attending: FAMILY MEDICINE
Payer: MEDICARE

## 2017-11-08 VITALS
BODY MASS INDEX: 30.63 KG/M2 | OXYGEN SATURATION: 96 % | HEART RATE: 88 BPM | WEIGHT: 195.13 LBS | HEIGHT: 67 IN | DIASTOLIC BLOOD PRESSURE: 70 MMHG | TEMPERATURE: 98 F | RESPIRATION RATE: 12 BRPM | SYSTOLIC BLOOD PRESSURE: 126 MMHG

## 2017-11-08 DIAGNOSIS — I10 GOOD HYPERTENSION CONTROL: ICD-10-CM

## 2017-11-08 DIAGNOSIS — E11.9 CONTROLLED TYPE 2 DIABETES MELLITUS WITHOUT COMPLICATION, WITHOUT LONG-TERM CURRENT USE OF INSULIN: ICD-10-CM

## 2017-11-08 DIAGNOSIS — R41.3 MEMORY DEFICIT: ICD-10-CM

## 2017-11-08 DIAGNOSIS — R41.3 MEMORY DEFICIT: Primary | ICD-10-CM

## 2017-11-08 DIAGNOSIS — E03.9 HYPOTHYROIDISM, UNSPECIFIED TYPE: ICD-10-CM

## 2017-11-08 LAB
ANION GAP SERPL CALC-SCNC: 9 MMOL/L
BUN SERPL-MCNC: 30 MG/DL
CALCIUM SERPL-MCNC: 10.7 MG/DL
CHLORIDE SERPL-SCNC: 104 MMOL/L
CO2 SERPL-SCNC: 27 MMOL/L
CREAT SERPL-MCNC: 1.1 MG/DL
EST. GFR  (AFRICAN AMERICAN): 53.7 ML/MIN/1.73 M^2
EST. GFR  (NON AFRICAN AMERICAN): 46.5 ML/MIN/1.73 M^2
ESTIMATED AVG GLUCOSE: 169 MG/DL
FOLATE SERPL-MCNC: 18 NG/ML
GLUCOSE SERPL-MCNC: 112 MG/DL
HBA1C MFR BLD HPLC: 7.5 %
POTASSIUM SERPL-SCNC: 4.3 MMOL/L
SODIUM SERPL-SCNC: 140 MMOL/L
TSH SERPL DL<=0.005 MIU/L-ACNC: 2.02 UIU/ML
VIT B12 SERPL-MCNC: 253 PG/ML

## 2017-11-08 PROCEDURE — 82607 VITAMIN B-12: CPT

## 2017-11-08 PROCEDURE — 83036 HEMOGLOBIN GLYCOSYLATED A1C: CPT

## 2017-11-08 PROCEDURE — 80048 BASIC METABOLIC PNL TOTAL CA: CPT

## 2017-11-08 PROCEDURE — 84443 ASSAY THYROID STIM HORMONE: CPT

## 2017-11-08 PROCEDURE — 82746 ASSAY OF FOLIC ACID SERUM: CPT

## 2017-11-08 PROCEDURE — 99215 OFFICE O/P EST HI 40 MIN: CPT | Mod: S$GLB,,, | Performed by: FAMILY MEDICINE

## 2017-11-08 PROCEDURE — 82300 ASSAY OF CADMIUM: CPT

## 2017-11-08 PROCEDURE — 99999 PR PBB SHADOW E&M-EST. PATIENT-LVL III: CPT | Mod: PBBFAC,,, | Performed by: FAMILY MEDICINE

## 2017-11-08 PROCEDURE — 99499 UNLISTED E&M SERVICE: CPT | Mod: S$GLB,,, | Performed by: FAMILY MEDICINE

## 2017-11-08 RX ORDER — CEFDINIR 300 MG/1
600 CAPSULE ORAL DAILY
Refills: 0 | COMMUNITY
Start: 2017-10-10 | End: 2017-11-08 | Stop reason: ALTCHOICE

## 2017-11-08 RX ORDER — PREDNISONE 20 MG/1
TABLET ORAL
Refills: 0 | COMMUNITY
Start: 2017-10-10 | End: 2017-11-08 | Stop reason: ALTCHOICE

## 2017-11-08 RX ORDER — BENZONATATE 100 MG/1
CAPSULE ORAL
Refills: 0 | COMMUNITY
Start: 2017-10-10 | End: 2017-11-08 | Stop reason: ALTCHOICE

## 2017-11-08 RX ORDER — ALBUTEROL SULFATE 1.25 MG/3ML
SOLUTION RESPIRATORY (INHALATION)
Refills: 0 | COMMUNITY
Start: 2017-10-10 | End: 2017-11-08 | Stop reason: ALTCHOICE

## 2017-11-08 RX ORDER — FLUTICASONE PROPIONATE 50 MCG
SPRAY, SUSPENSION (ML) NASAL
Refills: 0 | COMMUNITY
Start: 2017-10-10 | End: 2017-11-08 | Stop reason: ALTCHOICE

## 2017-11-08 RX ORDER — PROMETHAZINE HYDROCHLORIDE 6.25 MG/5ML
SYRUP ORAL
Refills: 0 | COMMUNITY
Start: 2017-10-10 | End: 2017-11-08 | Stop reason: ALTCHOICE

## 2017-11-08 RX ORDER — ALBUTEROL SULFATE 90 UG/1
AEROSOL, METERED RESPIRATORY (INHALATION)
Refills: 0 | COMMUNITY
Start: 2017-10-10 | End: 2019-05-29

## 2017-11-08 NOTE — PATIENT INSTRUCTIONS
Diabetes (General Information)  Diabetes is a long-term health problem. It means your body does not make enough insulin. Or it may mean that your body cannot use the insulin it makes. Insulin is a hormone in your body. It lets blood sugar (glucose) reach the cells in your body. All of your cells need glucose for fuel.  When you have diabetes, the glucose in your blood builds up because it cannot get into the cells. This buildup is called high blood sugar (hyperglycemia).  Your blood sugar level depends on several things. It depends on what kind of food you eat and how much of it you eat. It also depends on how much exercise you get, and how much insulin you have in your body. Eating too much of the wrong kinds of food or not taking diabetes medicine on time can cause high blood sugar. Infections can cause high blood sugar even if you are taking medicines correctly.  These things can also cause low blood sugar:  · Missing meals  · Not eating enough food  · Taking too much diabetes medicine  Diabetes can cause serious problems over time if you do not get treated. These problems include heart disease, stroke, kidney failure, and blindness. They also include nerve pain or loss of feeling in your legs and feet, and gangrene of the feet. By keeping your blood sugar under control you can prevent or delay these problems.  Normal blood sugar levels are 80 to 100 before a meal and less than 180 in the 1 to 2 hours after a meal.  Home care  Follow these guidelines when caring for yourself at home:  · Follow the diet your healthcare provider gives you. Take insulin or other diabetes medicine exactly as told to.  · Watch your blood sugar as you are told to. Keep a log of your results. This will help your provider change your medicines to keep your blood sugar under control.  · Try to reach your ideal weight. You may be able to cut back on or not have to take diabetes medicine if you eat the right foods and get exercise.  · Do  not smoke. Smoking worsens the effects of diabetes on your circulation. You are much more likely to have a heart attack if you have diabetes and you smoke.  · Take good care of your feet. If you have lost feeling in your feet, you may not see an injury or infection. Check your feet and between your toes at least once a week.  · Wear a medical alert bracelet or necklace, or carry a card in your wallet that says you have diabetes. This will help healthcare providers give you the right care if you get very ill and cannot tell them that you have diabetes.  Sick day plan  If you get a cold, the flu, or a bacterial or viral infection, take these steps:  · Look at your diabetes sick plan and call your healthcare provider as you were told to. You may need to call your provider right away if:  ¨ Your blood sugar is above 240 while taking your diabetes medicine  ¨ Your urine ketone levels are above normal or high  ¨ You have been vomiting more than 6 hours  ¨ You have trouble breathing or your breath ha s a fruity smell  ¨ You have a high fever  ¨ You have a fever for several days and you are not getting better  ¨ You get light-headed and are sleepier than usual  · Keep taking your diabetes pills (oral medicine) even if you have been vomiting and are feeling sick. Call your provider right away because you may need insulin to lower your blood sugar until you recover from your illness.  · Keep taking your insulin even if you have been vomiting and are feeling sick. Call your provider right away to ask if you need to change your insulin dose. This will depend on your blood sugar results.  · Check your blood sugar every 2 to 4 hours, or at least 4 times a day.  · Check your ketones often. If you are vomiting and having diarrhea, watch them more often.  · Do not skip meals. Try to eat small meals on a regular schedule. Do this even if you do not feel like eating.  · Drink water or other liquids that do not have caffeine or  calories. This will keep you from getting dehydrated. If you are nauseated or vomiting, takes small sips every 5 minutes. To prevent dehydration try to drink a cup (8 ounces) of fluids every hour while you are awake.  General care  Always bring a source of fast-acting sugar with you in case you have symptoms of low blood sugar (below 70). At the first sign of low blood sugar, eat or drink 15 to 20 grams of fast-acting sugar to raise your blood sugar. Examples are:  · 3 to 4 glucose tablets. You can buy these at most [facility].  · 4 ounces (1/2 cup) of regular (not diet) soft drinks  · 4 ounces (1/2 cup) of any fruit juice  · 8 ounces (1 cup) of milk  · 5 to 6 pieces of hard candy  · 1 tablespoon of honey  Check your blood sugar 15 minutes after treating yourself. If it is still below 70, take 15 to 20 more grams of fast-acting sugar. Test again in 15 minutes. If it returns to normal (70 or above), eat a snack or meal to keep your blood sugar in a safe range. If it stays low, call your doctor or go to an emergency room.  Follow-up care  Follow-up with your healthcare provider, or as advised. For more information about diabetes, visit the American Diabetes Association website at www.diabetes.org or call 328-156-2154.  When to seek medical advice  Call your healthcare provider right away if you have any of these symptoms of high blood sugar:  · Frequent urination  · Dizziness  · Drowsiness  · Thirst  · Headache  · Nausea or vomiting  · Abdominal pain  · Eyesight changes  · Fast breathing  · Confusion or loss of consciousness  Also call your provider right away if you have any of these signs of low blood sugar:  · Fatigue  · Headache  · Shakes  · Excess sweating  · Hunger  · Feeling anxious or restless  · Eyesight changes  · Drowsiness  · Weakness  · Confusion or loss of consciousness  Call 911  Call for emergency help right away if any of these occur:  · Chest pain or shortness of breath  · Dizziness or  fainting  · Weakness of an arm or leg or one side of the face  · Trouble speaking or seeing   Date Last Reviewed: 6/1/2016  © 4175-2650 [employer]. [address] [place] [address], [place] [address]. All rights reserved. This information is not intended as a substitute for professional medical care. Always follow your healthcare professional's instructions.        Walking for Fitness  Fitness walking has something for everyone, even people who are already fit. Walking is one of the safest ways to condition your body aerobically. It can boost energy, help you lose weight, and reduce stress.    Physical benefits  · Walking strengthens your heart and lungs, and tones your muscles.  · When walking, your feet land with less impact than in other sports. This reduces chances of muscle, bone, and joint injury.  · Regular walking improves your cholesterol levels and lowers your risk of heart disease. And it helps you control your blood sugar if you have diabetes.  · Walking is a weight-bearing activity, which helps maintain bone density. This can help prevent osteoporosis.  Personal rewards  · Taking walks can help you relax and manage stress. And fitness walking may make you feel better about yourself.  · Walking can help you sleep better at night and make you less likely to be depressed.  · Regular walking may help maintain your memory as you get older.  · Walking is a great way to spend extra time with friends and family members. Be sure to invite your dog along!  Q&A about fitness walking  Q: Will walking keep me fit?  A: Yes. Regular walking at the right pace gives you all the benefits of other aerobic activities, such as jogging and swimming.  Q: Will walking help me lose weight and keep it off?  A: Yes. Per mile, walking can burn as many calories as jogging. Your health care provider can help work walking into your weight-loss plan.  Q: Is walking safe for my health?  A: Yes. Walking is safe if you have high  blood pressure, diabetes, heart disease, or other conditions. Talk to your healthcare provider before you start.  Date Last Reviewed: 4/1/2017 © 2000-2017 [employer], [employer]. [address] [place] [address], [place] [address]. All rights reserved. This information is not intended as a substitute for professional medical care. Always follow your healthcare professional's instructions.        Weight Management: Getting Started  Healthy bodies come in all shapes and sizes. Not all bodies are made to be thin. For some people, a healthy weight is higher than the average weight listed on weight charts. Your healthcare provider can help you decide on a healthy weight for you.    Reasons to lose weight  Losing weight can help with some health problems, such as high blood pressure, heart disease, diabetes, sleep apnea, and arthritis. You may also feel more energy.  Set your long-term goal  Your goal doesn't even have to be a specific weight. You may decide on a fitness goal (such as being able to walk 10 miles a week), or a health goal (such as lowering your blood pressure). Choose a goal that is measurable and reasonable, so you know when you've reached it. A goal of reaching a BMI of less than 25 is not always reasonable (or possible).   Make an action plan  Habits dont change overnight. Setting your goals too high can leave you feeling discouraged if you cant reach them. Be realistic. Choose one or two small changes you can make now. Set an action plan for how you are going to make these changes. When you can stick to this plan, keep making a few more small changes. Taking small steps will help you stay on the path to success.  Track your progress  Write down your goals. Then, keep a daily record of your progress. Write down what you eat and how active you are. This record lets you look back on how much youve done. It may also help when youre feeling frustrated. Reward yourself for success. Even if you dont reach  every goal, give yourself credit for what you do get done.  Get support  Encouragement from others can help make losing weight easier. Ask your family members and friends for support. They may even want to join you. Also look to your healthcare provider, registered dietitian, and  for help. Your local hospital can give you more information about nutrition, exercise, and weight loss.  Date Last Reviewed: 1/31/2016  © 8967-0835 [employer], [employer]. [address] [place] [address], [place] [address]. All rights reserved. This information is not intended as a substitute for professional medical care. Always follow your healthcare professional's instructions.
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
## 2017-11-10 LAB
ARSENIC BLD-MCNC: 1 NG/ML (ref 0–12)
CADMIUM BLD-MCNC: 0.3 NG/ML (ref 0–4.9)
CITY: NORMAL
COUNTY: NORMAL
GUARDIAN FIRST NAME: NORMAL
GUARDIAN LAST NAME: NORMAL
HOME PHONE: NORMAL
LEAD BLD-MCNC: <1 MCG/DL (ref 0–4.9)
MERCURY BLD-MCNC: <1 NG/ML (ref 0–9)
RACE: NORMAL
STATE: NORMAL
STREET ADDRESS: NORMAL
VENOUS/CAPILLARY: NORMAL
ZIP: NORMAL

## 2017-12-05 ENCOUNTER — DOCUMENTATION ONLY (OUTPATIENT)
Dept: FAMILY MEDICINE | Facility: CLINIC | Age: 82
End: 2017-12-05

## 2017-12-06 NOTE — PROGRESS NOTES
Pre-Visit Chart Review  For Appointment Scheduled on 12/08/2017      Health Maintenance Due   Topic Date Due    Zoster Vaccine  07/17/1994    Lipid Panel  04/18/2017    Eye Exam  06/20/2017

## 2018-01-05 ENCOUNTER — OFFICE VISIT (OUTPATIENT)
Dept: OPTOMETRY | Facility: CLINIC | Age: 83
End: 2018-01-05
Payer: MEDICARE

## 2018-01-05 DIAGNOSIS — H52.7 REFRACTIVE ERROR: ICD-10-CM

## 2018-01-05 DIAGNOSIS — H25.13 NUCLEAR SCLEROSIS, BILATERAL: ICD-10-CM

## 2018-01-05 DIAGNOSIS — E11.9 DIABETES MELLITUS WITHOUT OPHTHALMIC MANIFESTATIONS: Primary | ICD-10-CM

## 2018-01-05 PROCEDURE — 99999 PR PBB SHADOW E&M-EST. PATIENT-LVL II: CPT | Mod: PBBFAC,,, | Performed by: OPTOMETRIST

## 2018-01-05 PROCEDURE — 99499 UNLISTED E&M SERVICE: CPT | Mod: S$GLB,,, | Performed by: OPTOMETRIST

## 2018-01-05 PROCEDURE — 92015 DETERMINE REFRACTIVE STATE: CPT | Mod: S$GLB,,, | Performed by: OPTOMETRIST

## 2018-01-05 PROCEDURE — 92014 COMPRE OPH EXAM EST PT 1/>: CPT | Mod: S$GLB,,, | Performed by: OPTOMETRIST

## 2018-01-05 NOTE — PROGRESS NOTES
HPI     Presenting Complaint: Pt here today for yearly diabetic eye exam. Pt   states blood sugar levels have not been controlled. Pt states blood sugar   levels were 180 this morning.    Hemoglobin A1C       Date                     Value               Ref Range             Status                11/08/2017               7.5 (H)             4.0 - 5.6 %           Final                 07/11/2017               7.0 (H)             4.0 - 5.6 %           Final                 02/15/2017               8.3 (H)             4.5 - 6.2 %           Final              Pt states vision with current glasses blurry near and far.       Ophthalmic medication / drops: None    (-) headaches  (-) diplopia   (-) flashes / (-) floaters      Last edited by Figueroa Britton, OD on 1/5/2018  2:36 PM. (History)            Assessment /Plan     For exam results, see Encounter Report.    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 Discussed possible ocular affects of uncontrolled blood sugar with patient. Recommended continued strong blood sugar control and continued care with PCP. Monitor yearly.     Moderate cataracts OU, non-visually significant. Discussed possible ocular affects of cataracts. Acceptable BCVA OU. Discussed treatment options. Surgery not recommended at this time. Monitor yearly.     Dispensed updated spectacle Rx. Discussed various spectacle lens options. Discussed adaptation period to new specs.       RTC in  1 year for comprehensive eye exam, or sooner prn.

## 2018-01-20 DIAGNOSIS — E11.9 TYPE 2 DIABETES MELLITUS WITHOUT COMPLICATION: ICD-10-CM

## 2018-01-22 RX ORDER — SAXAGLIPTIN AND METFORMIN HYDROCHLORIDE 2.5; 1 MG/1; MG/1
TABLET, FILM COATED, EXTENDED RELEASE ORAL
Qty: 180 TABLET | Refills: 0 | Status: SHIPPED | OUTPATIENT
Start: 2018-01-22 | End: 2018-05-22 | Stop reason: SDUPTHER

## 2018-01-24 RX ORDER — INSULIN PUMP SYRINGE, 3 ML
EACH MISCELLANEOUS
Qty: 1 EACH | Refills: 0 | Status: SHIPPED | OUTPATIENT
Start: 2018-01-24 | End: 2023-08-30

## 2018-01-24 RX ORDER — LANCETS
EACH MISCELLANEOUS
Qty: 100 EACH | Refills: 0 | Status: SHIPPED | OUTPATIENT
Start: 2018-01-24 | End: 2018-08-23 | Stop reason: SDUPTHER

## 2018-02-01 DIAGNOSIS — E11.9 TYPE 2 DIABETES MELLITUS WITHOUT COMPLICATION: ICD-10-CM

## 2018-02-02 ENCOUNTER — TELEPHONE (OUTPATIENT)
Dept: FAMILY MEDICINE | Facility: CLINIC | Age: 83
End: 2018-02-02

## 2018-02-02 NOTE — TELEPHONE ENCOUNTER
----- Message from oBuchra Lord sent at 2/2/2018  8:31 AM CST -----  Patient requesting to speak with nurse (Karuna)concerning testing blood sugar/stated that she does not understand new machine/has not been able to test for a few days/please call patient back at [phone] to advise.

## 2018-02-12 DIAGNOSIS — Z79.4 TYPE 2 DIABETES MELLITUS WITHOUT COMPLICATION, WITH LONG-TERM CURRENT USE OF INSULIN: ICD-10-CM

## 2018-02-12 DIAGNOSIS — I15.2 HYPERTENSION ASSOCIATED WITH DIABETES: ICD-10-CM

## 2018-02-12 DIAGNOSIS — E11.9 TYPE 2 DIABETES MELLITUS WITHOUT COMPLICATION, WITH LONG-TERM CURRENT USE OF INSULIN: ICD-10-CM

## 2018-02-12 DIAGNOSIS — E11.59 HYPERTENSION ASSOCIATED WITH DIABETES: ICD-10-CM

## 2018-02-12 DIAGNOSIS — I10 HYPERTENSION: ICD-10-CM

## 2018-02-13 RX ORDER — CARVEDILOL 6.25 MG/1
TABLET ORAL
Qty: 180 TABLET | Refills: 2 | Status: SHIPPED | OUTPATIENT
Start: 2018-02-13 | End: 2018-10-05 | Stop reason: SDUPTHER

## 2018-02-13 RX ORDER — INSULIN GLARGINE 100 [IU]/ML
INJECTION, SOLUTION SUBCUTANEOUS
Qty: 30 ML | Refills: 3 | Status: SHIPPED | OUTPATIENT
Start: 2018-02-13 | End: 2019-07-11 | Stop reason: SDUPTHER

## 2018-05-15 ENCOUNTER — TELEPHONE (OUTPATIENT)
Dept: FAMILY MEDICINE | Facility: CLINIC | Age: 83
End: 2018-05-15

## 2018-05-15 NOTE — TELEPHONE ENCOUNTER
----- Message from Griselda Archibald sent at 5/15/2018  8:46 AM CDT -----  Contact: Patient  Patient is calling to speak with someone because she has had diarrhea since Friday and she is unable to eat anything because it just runs through her and she is wondering if something could be ordered for her.  Please call patient to discuss.  Call Back#[phone]  Thanks

## 2018-05-15 NOTE — TELEPHONE ENCOUNTER
----- Message from Ezra Martin sent at 5/15/2018  8:19 AM CDT -----  Contact: [phone]  Type: Needs Medical Advice    Who Called:  Self   Symptoms (please be specific):  Diarrhea How long has patient had these symptoms: off and on since Friday 05/11/2018  Pharmacy name and phone # [facility]  [facility] Call Back Number: [phone]  Additional Information: Patient called asking for a rx for diarrhea. Patient want to know if she can take Prevalite 4 gram.

## 2018-05-15 NOTE — TELEPHONE ENCOUNTER
Diarrhea since 5/11/18- mostly after meals- no blood or black stool- had collar greens on Friday when it started- advised on avoiding dairy and using clear fluids and bland diet- advised to use Imodium. Appt made next week for hernia check.

## 2018-05-15 NOTE — TELEPHONE ENCOUNTER
----- Message from Carlos Bagley sent at 5/15/2018 11:59 AM CDT -----  Contact: Leslie  Please call her back on her home number not cell number. She is not able to get to it. [phone] (home)   thanks

## 2018-05-22 DIAGNOSIS — E11.9 TYPE 2 DIABETES MELLITUS WITHOUT COMPLICATION: ICD-10-CM

## 2018-05-22 RX ORDER — SAXAGLIPTIN AND METFORMIN HYDROCHLORIDE 2.5; 1 MG/1; MG/1
TABLET, FILM COATED, EXTENDED RELEASE ORAL
Qty: 180 TABLET | Refills: 0 | Status: SHIPPED | OUTPATIENT
Start: 2018-05-22 | End: 2018-08-21 | Stop reason: SDUPTHER

## 2018-05-24 ENCOUNTER — OFFICE VISIT (OUTPATIENT)
Dept: FAMILY MEDICINE | Facility: CLINIC | Age: 83
End: 2018-05-24
Attending: FAMILY MEDICINE
Payer: MEDICARE

## 2018-05-24 ENCOUNTER — LAB VISIT (OUTPATIENT)
Dept: LAB | Facility: HOSPITAL | Age: 83
End: 2018-05-24
Attending: FAMILY MEDICINE
Payer: MEDICARE

## 2018-05-24 VITALS
RESPIRATION RATE: 16 BRPM | DIASTOLIC BLOOD PRESSURE: 68 MMHG | WEIGHT: 194 LBS | SYSTOLIC BLOOD PRESSURE: 118 MMHG | OXYGEN SATURATION: 98 % | HEART RATE: 73 BPM | HEIGHT: 67 IN | TEMPERATURE: 98 F | BODY MASS INDEX: 30.45 KG/M2

## 2018-05-24 DIAGNOSIS — E11.40 TYPE 2 DIABETES, CONTROLLED, WITH NEUROPATHY: ICD-10-CM

## 2018-05-24 DIAGNOSIS — R10.11 ABDOMINAL PAIN, RUQ: Primary | ICD-10-CM

## 2018-05-24 DIAGNOSIS — E11.69 HYPERLIPIDEMIA ASSOCIATED WITH TYPE 2 DIABETES MELLITUS: ICD-10-CM

## 2018-05-24 DIAGNOSIS — I15.2 HYPERTENSION ASSOCIATED WITH DIABETES: ICD-10-CM

## 2018-05-24 DIAGNOSIS — E11.59 HYPERTENSION ASSOCIATED WITH DIABETES: ICD-10-CM

## 2018-05-24 DIAGNOSIS — E78.5 HYPERLIPIDEMIA ASSOCIATED WITH TYPE 2 DIABETES MELLITUS: ICD-10-CM

## 2018-05-24 DIAGNOSIS — I10 GOOD HYPERTENSION CONTROL: ICD-10-CM

## 2018-05-24 LAB
ANION GAP SERPL CALC-SCNC: 8 MMOL/L
BUN SERPL-MCNC: 20 MG/DL
CALCIUM SERPL-MCNC: 10 MG/DL
CHLORIDE SERPL-SCNC: 105 MMOL/L
CHOLEST SERPL-MCNC: 120 MG/DL
CHOLEST/HDLC SERPL: 3.8 {RATIO}
CO2 SERPL-SCNC: 27 MMOL/L
CREAT SERPL-MCNC: 1.1 MG/DL
EST. GFR  (AFRICAN AMERICAN): 53.7 ML/MIN/1.73 M^2
EST. GFR  (NON AFRICAN AMERICAN): 46.5 ML/MIN/1.73 M^2
ESTIMATED AVG GLUCOSE: 171 MG/DL
GLUCOSE SERPL-MCNC: 154 MG/DL
HBA1C MFR BLD HPLC: 7.6 %
HDLC SERPL-MCNC: 32 MG/DL
HDLC SERPL: 26.7 %
LDLC SERPL CALC-MCNC: 56.6 MG/DL
NONHDLC SERPL-MCNC: 88 MG/DL
POTASSIUM SERPL-SCNC: 4.4 MMOL/L
SODIUM SERPL-SCNC: 140 MMOL/L
TRIGL SERPL-MCNC: 157 MG/DL

## 2018-05-24 PROCEDURE — 99999 PR PBB SHADOW E&M-EST. PATIENT-LVL III: CPT | Mod: PBBFAC,,, | Performed by: FAMILY MEDICINE

## 2018-05-24 PROCEDURE — 3074F SYST BP LT 130 MM HG: CPT | Mod: CPTII,S$GLB,, | Performed by: FAMILY MEDICINE

## 2018-05-24 PROCEDURE — 99214 OFFICE O/P EST MOD 30 MIN: CPT | Mod: S$GLB,,, | Performed by: FAMILY MEDICINE

## 2018-05-24 PROCEDURE — 83036 HEMOGLOBIN GLYCOSYLATED A1C: CPT

## 2018-05-24 PROCEDURE — 3078F DIAST BP <80 MM HG: CPT | Mod: CPTII,S$GLB,, | Performed by: FAMILY MEDICINE

## 2018-05-24 PROCEDURE — 80048 BASIC METABOLIC PNL TOTAL CA: CPT

## 2018-05-24 PROCEDURE — 99499 UNLISTED E&M SERVICE: CPT | Mod: S$PBB,,, | Performed by: FAMILY MEDICINE

## 2018-05-24 PROCEDURE — 36415 COLL VENOUS BLD VENIPUNCTURE: CPT | Mod: PO

## 2018-05-24 PROCEDURE — 80061 LIPID PANEL: CPT

## 2018-05-24 NOTE — PROGRESS NOTES
"Subjective:       Patient ID: Leslie Tolliver is a 83 y.o. female.    Chief Complaint: Abdominal Pain (right side )    83-year-old female coming in because of an episode of diarrhea that lasted about a week and resolved about a week ago.  She was having pain in the right upper quadrant with the diarrhea and came in because she was concerned about a possible hernia since it was in the area of her gallbladder surgery scar.  She has not noted a bulge or deformity and the symptoms resolved with the diarrhea.  She had eaten a large serving of judie greens shortly before onset and thought maybe the medial had irritated her stomach.  She had no fever or chills she had no blood in the stool and no melanotic stool.  She's had no urinary complaints either.  She had no nausea or vomiting.  It's all symptoms have now resolved.  She has a history of diabetes type 2 with neuropathy, hypertension, hyperlipidemia, hypothyroidism, arthritis, reflux, and colon polyps.  Her next colonoscopy is due in May 2019 and she is due for a lipid panel and A1c foot exam and microalbumin.    Past Medical History:  No date: Arthritis  No date: Colon polyps  No date: Diabetes mellitus type II  No date: GERD (gastroesophageal reflux disease)  No date: Hyperlipidemia  No date: Hypertension  No date: Hypothyroidism    Past Surgical History:  No date: ADENOIDECTOMY  20 yrs ago: BREAST BIOPSY Right      Comment: benign  No date: CHOLECYSTECTOMY  5-: COLONOSCOPY      Comment: Dr Holly, 5 year recheck  No date: HEMORRHOID SURGERY  No date: HERNIA REPAIR  No date: HYSTERECTOMY  No date: OOPHORECTOMY  No date: TONSILLECTOMY      Current Outpatient Prescriptions:     aspirin 81 mg Tab, Take 81 mg by mouth once daily. Every day, Disp: , Rfl:     BD INSULIN PEN NEEDLE UF SHORT 31 gauge x 5/16" Ndle, USE WITH LANTUS SOLOSTAR PEN AS NEEDED, Disp: 100 each, Rfl: 3    blood sugar diagnostic Strp, Accu-chek Marley check glucose three times daily " E11.65, Disp: 300 each, Rfl: 3    blood-glucose meter kit, Accu-chek Marley glucometer check glucose three times daily E11.65, Disp: 1 each, Rfl: 0    carvedilol (COREG) 6.25 MG tablet, TAKE 1 TABLET TWICE DAILY WITH MEALS, Disp: 180 tablet, Rfl: 2    cholestyramine-aspartame (PREVALITE) 4 gram PwPk, Take 1 packet by mouth daily as needed., Disp: , Rfl:     KOMBIGLYZE XR 2.5-1,000 mg TM24, TAKE 1 TABLET TWICE DAILY, Disp: 180 tablet, Rfl: 0    lancets (MICROLET LANCET) Misc, Accu-chek Marley lancets check glucose three times daily E11.65, Disp: 100 each, Rfl: 0    LANTUS SOLOSTAR 100 unit/mL (3 mL) InPn pen, INJECT 20 UNITS INTO THE SKIN EVERY EVENING., Disp: 30 mL, Rfl: 3    lisinopril-hydrochlorothiazide (PRINZIDE,ZESTORETIC) 10-12.5 mg per tablet, Take 1 tablet by mouth once daily., Disp: 90 tablet, Rfl: 2    multivitamin (ONE DAILY MULTIVITAMIN) per tablet, Take 1 tablet by mouth once daily., Disp: , Rfl:     VENTOLIN HFA 90 mcg/actuation inhaler, INHALE 2 PUFFS BY MOUTH EVERY 4-6 HOURS prn, Disp: , Rfl: 0    Zoster Vaccine due on 07/17/1994  Urine Microalbumin due on 02/10/2016  Lipid Panel due on 04/18/2017  Hemoglobin A1c due on 05/08/2018  Foot Exam due on 07/18/2018        Review of Systems   Constitutional: Negative for chills, diaphoresis and fever.   Respiratory: Negative for chest tightness and shortness of breath.    Cardiovascular: Negative for chest pain and palpitations.   Gastrointestinal: Positive for abdominal pain and diarrhea (Now resolved). Negative for abdominal distention, anal bleeding, blood in stool, constipation, nausea and vomiting.   Endocrine: Negative for polydipsia and polyuria.   Neurological: Positive for numbness.       Objective:      Physical Exam   Constitutional: She is oriented to person, place, and time. She appears well-developed. No distress.   Good blood pressure control  Obese with a BMI of 30.4.  She is down 1.1 pounds from her last visit with me November 8, 2017    HENT:   Head: Normocephalic and atraumatic.   Cardiovascular: Normal rate, regular rhythm and normal heart sounds.    Pulses:       Dorsalis pedis pulses are 2+ on the right side, and 2+ on the left side.        Posterior tibial pulses are 2+ on the right side, and 2+ on the left side.   Pulmonary/Chest: Effort normal and breath sounds normal.   Abdominal: Soft. Bowel sounds are normal. She exhibits no distension and no mass. There is no tenderness. There is no rebound and no guarding. No hernia.   Musculoskeletal:        Right foot: There is deformity (Mild bunion without crossover). There is normal range of motion.        Left foot: There is deformity (mild bunion without crossover). There is normal range of motion.   Feet:   Right Foot:   Protective Sensation: 8 sites tested. 7 sites sensed.   Skin Integrity: Negative for ulcer, blister, skin breakdown, erythema, warmth, callus or dry skin.   Left Foot:   Protective Sensation: 8 sites tested. 8 sites sensed.   Skin Integrity: Negative for ulcer, blister, skin breakdown, erythema, warmth, callus or dry skin.   Neurological: She is alert and oriented to person, place, and time.   Intact proprioception to all 10 toes   Skin: Capillary refill takes less than 2 seconds. She is not diaphoretic.   Psychiatric: She has a normal mood and affect. Her behavior is normal. Judgment and thought content normal.   Nursing note and vitals reviewed.      Assessment:       1. Abdominal pain, RUQ    2. Type 2 diabetes, controlled, with neuropathy    3. Good hypertension control    4. Hyperlipidemia associated with type 2 diabetes mellitus    5. Hypertension associated with diabetes    6. BMI 30.0-30.9,adult        Plan:       1. Abdominal pain, RUQ  Resolved, suspect it was probably intestinal gas.  Status post cholecystectomy    2. Type 2 diabetes, controlled, with neuropathy  Await lab  - Basic metabolic panel; Future  - Hemoglobin A1c; Future  - Lipid panel; Future  -  Microalbumin/creatinine urine ratio; Future    3. Good hypertension control  No changes needed    4. Hyperlipidemia associated with type 2 diabetes mellitus  Await lab  - Lipid panel; Future    5. Hypertension associated with diabetes    6. BMI 30.0-30.9,adult           Patient readiness: acceptance and barriers:none    During the course of the visit the patient was educated and counseled about the following:     Diabetes:  Discussed general issues about diabetes pathophysiology and management.  Addressed ADA diet.  Discussed foot care.  Reminded to get yearly retinal exam.  Hypertension:   Medication: no change.  Dietary sodium restriction.  Regular aerobic exercise.  Obesity:   General weight loss/lifestyle modification strategies discussed (elicit support from others; identify saboteurs; non-food rewards, etc).  Informal exercise measures discussed, e.g. taking stairs instead of elevator.  Regular aerobic exercise program discussed.    Goals: Diabetes: Maintain Hemoglobin A1C below 7, Hypertension: Reduce Blood Pressure and Obesity: Reduce calorie intake and BMI    Did patient meet goals/outcomes: Yes    The following self management tools provided: blood pressure log  blood glucose log  excercise log    Patient Instructions (the written plan) was given to the patient/family.     Time spent with patient: 30 minutes

## 2018-07-02 ENCOUNTER — OFFICE VISIT (OUTPATIENT)
Dept: FAMILY MEDICINE | Facility: CLINIC | Age: 83
End: 2018-07-02
Payer: MEDICARE

## 2018-07-02 VITALS
HEART RATE: 85 BPM | HEIGHT: 67 IN | WEIGHT: 196.88 LBS | DIASTOLIC BLOOD PRESSURE: 75 MMHG | BODY MASS INDEX: 30.9 KG/M2 | SYSTOLIC BLOOD PRESSURE: 143 MMHG

## 2018-07-02 DIAGNOSIS — E11.59 HYPERTENSION ASSOCIATED WITH DIABETES: ICD-10-CM

## 2018-07-02 DIAGNOSIS — E11.40 TYPE 2 DIABETES, CONTROLLED, WITH NEUROPATHY: ICD-10-CM

## 2018-07-02 DIAGNOSIS — K21.9 GASTROESOPHAGEAL REFLUX DISEASE, ESOPHAGITIS PRESENCE NOT SPECIFIED: ICD-10-CM

## 2018-07-02 DIAGNOSIS — E66.9 OBESITY, CLASS I, BMI 30-34.9: ICD-10-CM

## 2018-07-02 DIAGNOSIS — Z00.00 ENCOUNTER FOR PREVENTIVE HEALTH EXAMINATION: Primary | ICD-10-CM

## 2018-07-02 DIAGNOSIS — M19.90 ARTHRITIS: ICD-10-CM

## 2018-07-02 DIAGNOSIS — I15.2 HYPERTENSION ASSOCIATED WITH DIABETES: ICD-10-CM

## 2018-07-02 DIAGNOSIS — E11.69 HYPERLIPIDEMIA ASSOCIATED WITH TYPE 2 DIABETES MELLITUS: ICD-10-CM

## 2018-07-02 DIAGNOSIS — E78.5 HYPERLIPIDEMIA ASSOCIATED WITH TYPE 2 DIABETES MELLITUS: ICD-10-CM

## 2018-07-02 PROBLEM — E66.811 OBESITY, CLASS I, BMI 30-34.9: Status: ACTIVE | Noted: 2018-07-02

## 2018-07-02 PROCEDURE — 99999 PR PBB SHADOW E&M-EST. PATIENT-LVL IV: CPT | Mod: PBBFAC,,, | Performed by: NURSE PRACTITIONER

## 2018-07-02 PROCEDURE — 3078F DIAST BP <80 MM HG: CPT | Mod: CPTII,S$GLB,, | Performed by: NURSE PRACTITIONER

## 2018-07-02 PROCEDURE — 3077F SYST BP >= 140 MM HG: CPT | Mod: CPTII,S$GLB,, | Performed by: NURSE PRACTITIONER

## 2018-07-02 PROCEDURE — G0439 PPPS, SUBSEQ VISIT: HCPCS | Mod: S$GLB,,, | Performed by: NURSE PRACTITIONER

## 2018-07-02 NOTE — PATIENT INSTRUCTIONS
Counseling and Referral of Other Preventative  (Italic type indicates deductible and co-insurance are waived)    Patient Name: Leslie Tolliver  Today's Date: 7/2/2018    Health Maintenance       Date Due Completion Date    Zoster Vaccine 07/26/2019 (Originally 7/17/1994) ---    Influenza Vaccine 08/01/2018 10/2/2017    Hemoglobin A1c 11/24/2018 5/24/2018    Eye Exam 01/05/2019 1/5/2018    Foot Exam 05/24/2019 5/24/2018    Override on 4/21/2015: Done    Lipid Panel 05/24/2019 5/24/2018    Urine Microalbumin 05/24/2019 5/24/2018    DEXA SCAN 07/18/2020 7/18/2017    TETANUS VACCINE 05/25/2026 5/25/2016        No orders of the defined types were placed in this encounter.    The following information is provided to all patients.  This information is to help you find resources for any of the problems found today that may be affecting your health:                Living healthy guide: www.[facility].[place].[facility]      Understanding Diabetes: www.diabetes.org      Eating healthy: www.cdc.gov/healthyweight      CDC home safety checklist: www.cdc.gov/steadi/patient.html      Agency on Aging: www.goea.[place].gov      Alcoholics anonymous (AA): www.aa.org      Physical Activity: www.nicci.nih.gov/hh1emjy      Tobacco use: www.quitwithusla.org

## 2018-07-02 NOTE — PROGRESS NOTES
"Leslie Tolliver presented for a  Medicare AWV and comprehensive Health Risk Assessment today. The following components were reviewed and updated:    · Medical history  · Family History  · Social history  · Allergies and Current Medications  · Health Risk Assessment  · Health Maintenance  · Care Team     ** See Completed Assessments for Annual Wellness Visit within the encounter summary.**       The following assessments were completed:  · Living Situation  · CAGE  · Depression Screening  · Timed Get Up and Go  · Whisper Test  · Cognitive Function Screening  · Nutrition Screening  · ADL Screening  · PAQ Screening    Vitals:    07/02/18 1106   BP: (!) 143/75   Pulse: 85   Weight: 89.3 kg (196 lb 13.9 oz)   Height: 5' 7" (1.702 m)     Body mass index is 30.83 kg/m².  Physical Exam   Constitutional: She is oriented to person, place, and time. She appears well-developed.   HENT:   Head: Normocephalic and atraumatic.   Eyes: Pupils are equal, round, and reactive to light.   Neck: Normal range of motion.   Cardiovascular: Normal rate, regular rhythm and normal heart sounds.    No murmur heard.  Pulmonary/Chest: Effort normal and breath sounds normal.   Abdominal: Soft. Bowel sounds are normal. She exhibits no mass.   Musculoskeletal: Normal range of motion. She exhibits no edema.   Neurological: She is alert and oriented to person, place, and time. She exhibits normal muscle tone.   Skin: Skin is warm and dry.   Psychiatric: She has a normal mood and affect. Her behavior is normal.   Nursing note and vitals reviewed.            Diagnoses and health risks identified today and associated recommendations/orders:    Encounter for preventive health examination    Type 2 diabetes, controlled, with neuropathy  Comments:  uncontrolled; encouraged diet and exercise - continue to monitor    Hypertension associated with diabetes  Comments:  uncontrolled; encouraged diet and exercise    Hyperlipidemia associated with type 2 diabetes " mellitus  Comments:  controlled; continue medication regimen    Gastroesophageal reflux disease, esophagitis presence not specified  Comments:  controlled; continue to monitor    Arthritis  Comments:  stable; continue to monitor    Obesity, Class I, BMI 30-34.9  Comments:  uncontrolled; encouraged diet and exercise          Provided Leslie with a 5-10 year written screening schedule and personal prevention plan. Recommendations were developed using the USPSTF age appropriate recommendations. Education, counseling, and referrals were provided as needed. After Visit Summary printed and given to patient which includes a list of additional screenings\tests needed.    F/U with PCP in 3-4 months    Ml Chaparro NP     Patient readiness: acceptance and barriers:none    During the course of the visit the patient was educated and counseled about the following:     Diabetes:  Discussed general issues about diabetes pathophysiology and management.  Hypertension:   Regular aerobic exercise.  Obesity:   General weight loss/lifestyle modification strategies discussed (elicit support from others; identify saboteurs; non-food rewards, etc).    Goals: Diabetes: Maintain Hemoglobin A1C below 7, Hypertension: Reduce Blood Pressure and Obesity: Reduce calorie intake and BMI    Did patient meet goals/outcomes: No    The following self management tools provided: declined    Patient Instructions (the written plan) was given to the patient/family.     Time spent with patient: 55 minutes    Barriers to medications present (no )    Adverse reactions to current medications (no)    Over the counter medications reviewed (Yes)

## 2018-07-02 NOTE — Clinical Note
Primary Care Providers: Chang Christianson MD, MD (General)  Your patient was seen today for a HRA visit. Gap(s) in care (HEDIS gaps) have been identified during this visit that require additional testing and possible follow up.  No orders of the defined types were placed in this encounter.   These orders were placed using [facility] approved protocol and any results will be forwarded to your office for appropriate follow up. I have included a copy of my visit note; please review the note and feel free to contact me with any questions.   Thank you for allowing me to participate in the care of your patients. Ml Chaparro NP

## 2018-07-02 NOTE — PROGRESS NOTES
I offered to discuss end of life issues, including information on how to make advance directives that the patient could use to name someone who would make medical decisions on their behalf if they became too ill to make themselves.    ___Patient declined  __Patient is interested, I provided paper work and offered to discuss.    **Pt will bring in own copy**

## 2018-07-07 DIAGNOSIS — I10 ESSENTIAL HYPERTENSION: ICD-10-CM

## 2018-07-09 RX ORDER — LISINOPRIL AND HYDROCHLOROTHIAZIDE 10; 12.5 MG/1; MG/1
TABLET ORAL
Qty: 90 TABLET | Refills: 1 | Status: SHIPPED | OUTPATIENT
Start: 2018-07-09 | End: 2018-11-27 | Stop reason: SDUPTHER

## 2018-08-09 ENCOUNTER — HOSPITAL ENCOUNTER (OUTPATIENT)
Dept: RADIOLOGY | Facility: CLINIC | Age: 83
Discharge: HOME | End: 2018-08-09
Attending: FAMILY MEDICINE
Payer: MEDICARE

## 2018-08-09 DIAGNOSIS — Z12.39 ENCOUNTER FOR SPECIAL SCREENING EXAMINATION FOR NEOPLASM OF BREAST: ICD-10-CM

## 2018-08-09 PROCEDURE — 77067 SCR MAMMO BI INCL CAD: CPT | Mod: 26,,, | Performed by: RADIOLOGY

## 2018-08-09 PROCEDURE — 77063 BREAST TOMOSYNTHESIS BI: CPT | Mod: 26,,, | Performed by: RADIOLOGY

## 2018-08-09 PROCEDURE — 77063 BREAST TOMOSYNTHESIS BI: CPT | Mod: TC,PO

## 2018-08-21 DIAGNOSIS — E11.9 TYPE 2 DIABETES MELLITUS WITHOUT COMPLICATION: ICD-10-CM

## 2018-08-21 RX ORDER — SAXAGLIPTIN AND METFORMIN HYDROCHLORIDE 2.5; 1 MG/1; MG/1
TABLET, FILM COATED, EXTENDED RELEASE ORAL
Qty: 180 TABLET | Refills: 0 | Status: SHIPPED | OUTPATIENT
Start: 2018-08-21 | End: 2018-12-05 | Stop reason: SDUPTHER

## 2018-08-23 RX ORDER — LANCETS
EACH MISCELLANEOUS
Qty: 300 EACH | Refills: 3 | Status: SHIPPED | OUTPATIENT
Start: 2018-08-23 | End: 2020-03-27

## 2018-08-23 NOTE — TELEPHONE ENCOUNTER
----- Message from Earl Álvarez sent at 8/23/2018  3:50 PM CDT -----  Type:  RX Refill Request    Who Called:  Rhoda with [facility]  Refill or New Rx:  Refill  RX Name and Strength:    - James Fast Clicks  Preferred Pharmacy with phone number:    [facility] Phone: [phone] Fax: [phone]  Local or Mail Order:  Local  Ordering Provider:  Same  Best Call Back Number:  [phone]

## 2018-08-27 DIAGNOSIS — E11.9 TYPE 2 DIABETES MELLITUS WITHOUT COMPLICATION: ICD-10-CM

## 2018-08-27 RX ORDER — PEN NEEDLE, DIABETIC 31 GX5/16"
NEEDLE, DISPOSABLE MISCELLANEOUS
Qty: 90 EACH | Refills: 3 | Status: SHIPPED | OUTPATIENT
Start: 2018-08-27 | End: 2019-10-29 | Stop reason: SDUPTHER

## 2018-10-05 DIAGNOSIS — I10 HYPERTENSION: ICD-10-CM

## 2018-10-05 DIAGNOSIS — E11.59 HYPERTENSION ASSOCIATED WITH DIABETES: ICD-10-CM

## 2018-10-05 DIAGNOSIS — I15.2 HYPERTENSION ASSOCIATED WITH DIABETES: ICD-10-CM

## 2018-10-05 RX ORDER — CARVEDILOL 6.25 MG/1
TABLET ORAL
Qty: 180 TABLET | Refills: 2 | Status: SHIPPED | OUTPATIENT
Start: 2018-10-05 | End: 2019-09-10 | Stop reason: SDUPTHER

## 2018-11-27 DIAGNOSIS — I10 ESSENTIAL HYPERTENSION: ICD-10-CM

## 2018-11-27 RX ORDER — LISINOPRIL AND HYDROCHLOROTHIAZIDE 10; 12.5 MG/1; MG/1
TABLET ORAL
Qty: 90 TABLET | Refills: 0 | Status: SHIPPED | OUTPATIENT
Start: 2018-11-27 | End: 2019-04-03 | Stop reason: SDUPTHER

## 2018-11-28 ENCOUNTER — DOCUMENTATION ONLY (OUTPATIENT)
Dept: FAMILY MEDICINE | Facility: CLINIC | Age: 83
End: 2018-11-28

## 2018-11-28 NOTE — PROGRESS NOTES
Pre-Visit Chart Review  For Appointment Scheduled on 1-8-19    Health Maintenance Due   Topic Date Due    Influenza Vaccine  08/01/2018    Hemoglobin A1c  11/24/2018

## 2018-11-28 NOTE — TELEPHONE ENCOUNTER
Prescription filled, 90 days, no refills, needs 6 month appt scheduled, last visit May 2018.  Thanks.  Rylee

## 2018-12-05 DIAGNOSIS — E11.9 TYPE 2 DIABETES MELLITUS WITHOUT COMPLICATION: ICD-10-CM

## 2018-12-06 RX ORDER — SAXAGLIPTIN AND METFORMIN HYDROCHLORIDE 2.5; 1 MG/1; MG/1
TABLET, FILM COATED, EXTENDED RELEASE ORAL
Qty: 180 TABLET | Refills: 0 | Status: SHIPPED | OUTPATIENT
Start: 2018-12-06 | End: 2019-03-03 | Stop reason: SDUPTHER

## 2018-12-21 ENCOUNTER — PATIENT OUTREACH (OUTPATIENT)
Dept: ADMINISTRATIVE | Facility: HOSPITAL | Age: 83
End: 2018-12-21

## 2018-12-21 DIAGNOSIS — Z79.4 TYPE 2 DIABETES MELLITUS WITH COMPLICATION, WITH LONG-TERM CURRENT USE OF INSULIN: Primary | ICD-10-CM

## 2018-12-21 DIAGNOSIS — E11.8 TYPE 2 DIABETES MELLITUS WITH COMPLICATION, WITH LONG-TERM CURRENT USE OF INSULIN: Primary | ICD-10-CM

## 2018-12-21 NOTE — LETTER
December 21, 2018    Leslie [place] [id number]             [facility]  [address]  [facility] [address]  Phone: [phone] Dear Marylyn [facility] is committed to your overall health and would like to ensure that you are up to date on your recommended test and/or procedures.   Chang Christianson MD  has found that your chart shows you may be due for the following:    EYE EXAM  HEMOGLOBIN  A1C      If you have had any of the above done at another facility, please let us know so that we may obtain copies from that facility.  If you have a copy of these records, please provide a copy for us to scan into your chart.  You are welcome to request that the report be faxed to us at  ([phone]).     Otherwise, please schedule these appointments at your earliest convenience by calling [phone] or going to [email or web address].        Sincerely,  Your [facility] Team  MD Anjana Daniel LPN Clinical Care Coordinator  [facility] [address] [place] [address]  Phone [phone]  Fax [phone]

## 2019-01-08 ENCOUNTER — OFFICE VISIT (OUTPATIENT)
Dept: FAMILY MEDICINE | Facility: CLINIC | Age: 84
End: 2019-01-08
Attending: FAMILY MEDICINE
Payer: MEDICARE

## 2019-01-08 ENCOUNTER — LAB VISIT (OUTPATIENT)
Dept: LAB | Facility: HOSPITAL | Age: 84
End: 2019-01-08
Attending: FAMILY MEDICINE
Payer: MEDICARE

## 2019-01-08 VITALS
BODY MASS INDEX: 29.86 KG/M2 | WEIGHT: 190.25 LBS | TEMPERATURE: 98 F | OXYGEN SATURATION: 99 % | SYSTOLIC BLOOD PRESSURE: 102 MMHG | DIASTOLIC BLOOD PRESSURE: 64 MMHG | RESPIRATION RATE: 12 BRPM | HEIGHT: 67 IN | HEART RATE: 74 BPM

## 2019-01-08 DIAGNOSIS — E11.69 HYPERLIPIDEMIA ASSOCIATED WITH TYPE 2 DIABETES MELLITUS: ICD-10-CM

## 2019-01-08 DIAGNOSIS — E11.9 DIABETIC EYE EXAM: ICD-10-CM

## 2019-01-08 DIAGNOSIS — E11.40 TYPE 2 DIABETES, CONTROLLED, WITH NEUROPATHY: ICD-10-CM

## 2019-01-08 DIAGNOSIS — E78.5 HYPERLIPIDEMIA ASSOCIATED WITH TYPE 2 DIABETES MELLITUS: ICD-10-CM

## 2019-01-08 DIAGNOSIS — I15.2 HYPERTENSION ASSOCIATED WITH DIABETES: ICD-10-CM

## 2019-01-08 DIAGNOSIS — Z01.00 DIABETIC EYE EXAM: ICD-10-CM

## 2019-01-08 DIAGNOSIS — Z00.00 ENCOUNTER FOR PREVENTIVE HEALTH EXAMINATION: ICD-10-CM

## 2019-01-08 DIAGNOSIS — E11.59 HYPERTENSION ASSOCIATED WITH DIABETES: ICD-10-CM

## 2019-01-08 DIAGNOSIS — E03.9 ACQUIRED HYPOTHYROIDISM: ICD-10-CM

## 2019-01-08 DIAGNOSIS — Z00.00 ENCOUNTER FOR PREVENTIVE HEALTH EXAMINATION: Primary | ICD-10-CM

## 2019-01-08 DIAGNOSIS — R19.7 DIARRHEA, UNSPECIFIED TYPE: ICD-10-CM

## 2019-01-08 LAB
ALBUMIN SERPL BCP-MCNC: 3.6 G/DL
ALP SERPL-CCNC: 75 U/L
ALT SERPL W/O P-5'-P-CCNC: 18 U/L
ANION GAP SERPL CALC-SCNC: 11 MMOL/L
AST SERPL-CCNC: 21 U/L
BASOPHILS # BLD AUTO: 0.08 K/UL
BASOPHILS NFR BLD: 1.1 %
BILIRUB SERPL-MCNC: 0.6 MG/DL
BUN SERPL-MCNC: 34 MG/DL
CALCIUM SERPL-MCNC: 10.1 MG/DL
CHLORIDE SERPL-SCNC: 104 MMOL/L
CHOLEST SERPL-MCNC: 119 MG/DL
CHOLEST/HDLC SERPL: 4.3 {RATIO}
CO2 SERPL-SCNC: 24 MMOL/L
CREAT SERPL-MCNC: 1.1 MG/DL
DIFFERENTIAL METHOD: ABNORMAL
EOSINOPHIL # BLD AUTO: 0.3 K/UL
EOSINOPHIL NFR BLD: 4.5 %
ERYTHROCYTE [DISTWIDTH] IN BLOOD BY AUTOMATED COUNT: 12.7 %
EST. GFR  (AFRICAN AMERICAN): 53.3 ML/MIN/1.73 M^2
EST. GFR  (NON AFRICAN AMERICAN): 46.2 ML/MIN/1.73 M^2
ESTIMATED AVG GLUCOSE: 174 MG/DL
GLUCOSE SERPL-MCNC: 132 MG/DL
HBA1C MFR BLD HPLC: 7.7 %
HCT VFR BLD AUTO: 35.7 %
HDLC SERPL-MCNC: 28 MG/DL
HDLC SERPL: 23.5 %
HGB BLD-MCNC: 11.5 G/DL
IMM GRANULOCYTES # BLD AUTO: 0.04 K/UL
IMM GRANULOCYTES NFR BLD AUTO: 0.6 %
LDLC SERPL CALC-MCNC: 65.6 MG/DL
LYMPHOCYTES # BLD AUTO: 1.2 K/UL
LYMPHOCYTES NFR BLD: 16.5 %
MCH RBC QN AUTO: 30 PG
MCHC RBC AUTO-ENTMCNC: 32.2 G/DL
MCV RBC AUTO: 93 FL
MONOCYTES # BLD AUTO: 0.6 K/UL
MONOCYTES NFR BLD: 8.4 %
NEUTROPHILS # BLD AUTO: 5 K/UL
NEUTROPHILS NFR BLD: 68.9 %
NONHDLC SERPL-MCNC: 91 MG/DL
NRBC BLD-RTO: 0 /100 WBC
PLATELET # BLD AUTO: 274 K/UL
PMV BLD AUTO: 11.3 FL
POTASSIUM SERPL-SCNC: 4.7 MMOL/L
PROT SERPL-MCNC: 7.7 G/DL
RBC # BLD AUTO: 3.83 M/UL
SODIUM SERPL-SCNC: 139 MMOL/L
TRIGL SERPL-MCNC: 127 MG/DL
WBC # BLD AUTO: 7.27 K/UL

## 2019-01-08 PROCEDURE — 3074F PR MOST RECENT SYSTOLIC BLOOD PRESSURE < 130 MM HG: ICD-10-PCS | Mod: CPTII,S$GLB,, | Performed by: FAMILY MEDICINE

## 2019-01-08 PROCEDURE — 80061 LIPID PANEL: CPT

## 2019-01-08 PROCEDURE — 36415 COLL VENOUS BLD VENIPUNCTURE: CPT | Mod: PO

## 2019-01-08 PROCEDURE — 3078F DIAST BP <80 MM HG: CPT | Mod: CPTII,S$GLB,, | Performed by: FAMILY MEDICINE

## 2019-01-08 PROCEDURE — 99397 PER PM REEVAL EST PAT 65+ YR: CPT | Mod: S$GLB,,, | Performed by: FAMILY MEDICINE

## 2019-01-08 PROCEDURE — 3078F PR MOST RECENT DIASTOLIC BLOOD PRESSURE < 80 MM HG: ICD-10-PCS | Mod: CPTII,S$GLB,, | Performed by: FAMILY MEDICINE

## 2019-01-08 PROCEDURE — 99397 PR PREVENTIVE VISIT,EST,65 & OVER: ICD-10-PCS | Mod: S$GLB,,, | Performed by: FAMILY MEDICINE

## 2019-01-08 PROCEDURE — 80053 COMPREHEN METABOLIC PANEL: CPT

## 2019-01-08 PROCEDURE — 99999 PR PBB SHADOW E&M-EST. PATIENT-LVL IV: ICD-10-PCS | Mod: PBBFAC,,, | Performed by: FAMILY MEDICINE

## 2019-01-08 PROCEDURE — 85025 COMPLETE CBC W/AUTO DIFF WBC: CPT

## 2019-01-08 PROCEDURE — 99999 PR PBB SHADOW E&M-EST. PATIENT-LVL IV: CPT | Mod: PBBFAC,,, | Performed by: FAMILY MEDICINE

## 2019-01-08 PROCEDURE — 3074F SYST BP LT 130 MM HG: CPT | Mod: CPTII,S$GLB,, | Performed by: FAMILY MEDICINE

## 2019-01-08 PROCEDURE — 83036 HEMOGLOBIN GLYCOSYLATED A1C: CPT

## 2019-01-08 RX ORDER — CHOLESTYRAMINE 4 G/4.8G
1 POWDER, FOR SUSPENSION ORAL DAILY
Qty: 30 PACKET | Refills: 11 | Status: SHIPPED | OUTPATIENT
Start: 2019-01-08 | End: 2020-06-12 | Stop reason: SDUPTHER

## 2019-01-08 RX ORDER — MUPIROCIN 20 MG/G
OINTMENT TOPICAL
Refills: 0 | COMMUNITY
Start: 2018-10-26 | End: 2019-01-08 | Stop reason: ALTCHOICE

## 2019-01-08 RX ORDER — FLUTICASONE PROPIONATE 50 MCG
SPRAY, SUSPENSION (ML) NASAL
Refills: 0 | COMMUNITY
Start: 2018-12-24 | End: 2019-05-29

## 2019-01-08 NOTE — PROGRESS NOTES
Subjective:       Patient ID: Leslie Tolliver is a 84 y.o. female.    Chief Complaint: Follow-up (per Rylee )    84-year-old female coming in for diabetic checkup, hypertension and, hyperlipidemia check.  She is fasting for lab.  She has no active complaints at this time but is just recovering from a mild upper respiratory infection that she contracted just before [place].    Past Medical History:  No date: Arthritis  No date: Colon polyps  No date: Diabetes mellitus type II  No date: GERD (gastroesophageal reflux disease)  No date: Hyperlipidemia  No date: Hypertension  No date: Hypothyroidism  No date: Type 2 diabetes mellitus    Past Surgical History:  No date: ADENOIDECTOMY  20 yrs ago: BREAST BIOPSY; Right      Comment:  benign  No date: CHOLECYSTECTOMY  5-: COLONOSCOPY      Comment:  Dr Holly, 5 year recheck  5/26/2014: COLONOSCOPY; N/A      Comment:  Performed by Jose Juan Holly MD at [facility] ENDO  No date: HEMORRHOID SURGERY  No date: HYSTERECTOMY  No date: OOPHORECTOMY  No date: TONSILLECTOMY    Review of patient's family history indicates:  Problem: Diabetes      Relation: Mother          Age of Onset: (Not Specified)  Problem: Heart disease      Relation: Mother          Age of Onset: (Not Specified)  Problem: Glaucoma      Relation: Mother          Age of Onset: (Not Specified)  Problem: Cancer      Relation: Father          Age of Onset: (Not Specified)          Comment: lung cancer  Problem: Early death      Relation: Son          Age of Onset: (Not Specified)          Comment: brain tumor age 3  Problem: No Known Problems      Relation: Brother          Age of Onset: (Not Specified)  Problem: No Known Problems      Relation: Daughter          Age of Onset: (Not Specified)  Problem: Early death      Relation: Son          Age of Onset: (Not Specified)          Comment: MVA 25  Problem: Macular degeneration      Relation: Neg Hx          Age of Onset: (Not Specified)  Problem: Retinal  "detachment      Relation: Neg Hx          Age of Onset: (Not Specified)    Social History    Tobacco Use      Smoking status: Former Smoker        Packs/day: 0.25        Years: 15.00        Pack years: 3.75        Types: Cigarettes        Quit date: 3/30/1974        Years since quittin.8      Smokeless tobacco: Never Used    Alcohol use: No    Drug use: No    Current Outpatient Medications on File Prior to Visit:  aspirin 81 mg Tab, Take 81 mg by mouth once daily. Every day, Disp: , Rfl:   BD ULTRA-FINE SHORT PEN NEEDLE 31 gauge x 5/16" Ndle, USE WITH LANTUS SOLOSTAR PEN AS NEEDED, Disp: 90 each, Rfl: 3  blood sugar diagnostic Strp, Accu-chek Marley check glucose three times daily E11.65, Disp: 300 each, Rfl: 3  blood-glucose meter kit, Accu-chek Marley glucometer check glucose three times daily E11.65, Disp: 1 each, Rfl: 0  carvedilol (COREG) 6.25 MG tablet, TAKE 1 TABLET TWICE DAILY WITH MEALS, Disp: 180 tablet, Rfl: 2  fluticasone (FLONASE) 50 mcg/actuation nasal spray, USE 1 SPRAY NASALLY ONCE A DAY, Disp: , Rfl: 0  KOMBIGLYZE XR 2.5-1,000 mg TM24, TAKE 1 TABLET TWICE DAILY, Disp: 180 tablet, Rfl: 0  lancets (MICROLET LANCET) Misc, Accu-chek fast clix lancets check glucose three times daily E11.65, Disp: 300 each, Rfl: 3  LANTUS SOLOSTAR 100 unit/mL (3 mL) InPn pen, INJECT 20 UNITS INTO THE SKIN EVERY EVENING. (Patient taking differently: INJECT 24 UNITS INTO THE SKIN EVERY EVENING.), Disp: 30 mL, Rfl: 3  lisinopril-hydrochlorothiazide (PRINZIDE,ZESTORETIC) 10-12.5 mg per tablet, TAKE 1 TABLET EVERY DAY, Disp: 90 tablet, Rfl: 0  multivitamin (ONE DAILY MULTIVITAMIN) per tablet, Take 1 tablet by mouth once daily., Disp: , Rfl:   VENTOLIN HFA 90 mcg/actuation inhaler, INHALE 2 PUFFS BY MOUTH EVERY 4-6 HOURS prn, Disp: , Rfl: 0  (DISCONTINUED) cholestyramine-aspartame (PREVALITE) 4 gram PwPk, Take 1 packet by mouth daily as needed., Disp: , Rfl:   (DISCONTINUED) mupirocin (BACTROBAN) 2 % ointment, APPLY TO " INFECTED SKIN 2 X A DAY UNTIL HEALED, Disp: , Rfl: 0    No current facility-administered medications on file prior to visit.     Hemoglobin A1c due on 11/24/2018  Eye Exam due on 01/05/2019        Review of Systems   Constitutional: Negative for chills, diaphoresis, fatigue, fever and unexpected weight change.   HENT: Negative for congestion, ear pain, hearing loss, postnasal drip, sinus pressure, sneezing, sore throat, tinnitus and trouble swallowing.    Eyes: Negative for itching and visual disturbance.   Respiratory: Negative for cough, chest tightness, shortness of breath and wheezing.    Cardiovascular: Negative for chest pain, palpitations and leg swelling.   Gastrointestinal: Negative for abdominal pain, blood in stool, constipation, diarrhea, nausea and vomiting.   Genitourinary: Negative for dysuria, frequency, hematuria, menstrual problem, pelvic pain, vaginal bleeding and vaginal discharge.   Musculoskeletal: Negative for arthralgias, back pain, joint swelling and myalgias.   Neurological: Negative for dizziness and headaches.   Hematological: Negative for adenopathy.   Psychiatric/Behavioral: Negative for sleep disturbance. The patient is not nervous/anxious.        Objective:      Physical Exam   Constitutional: She is oriented to person, place, and time. She appears well-developed. No distress.   Fair weight control with a BMI of 29.8 she is down 3.7 lb from her May 24, 2018 visit.  Blood pressure is under good control.   HENT:   Head: Normocephalic and atraumatic.   Right Ear: External ear normal.   Left Ear: External ear normal.   Nose: Nose normal.   Mouth/Throat: Oropharynx is clear and moist. No oropharyngeal exudate.   Eyes: Conjunctivae and EOM are normal. Pupils are equal, round, and reactive to light. Right eye exhibits no discharge. Left eye exhibits no discharge. No scleral icterus.   Neck: Normal range of motion. Neck supple. No JVD present. No tracheal deviation present. No thyromegaly  present.   Cardiovascular: Normal rate, regular rhythm and normal heart sounds. Exam reveals no gallop and no friction rub.   No murmur heard.  Pulses:       Dorsalis pedis pulses are 1+ on the right side, and 1+ on the left side.        Posterior tibial pulses are 1+ on the right side, and 1+ on the left side.   Carotid pulses 2+ no bruit   Pulmonary/Chest: Effort normal and breath sounds normal. No stridor. No respiratory distress. She has no wheezes. She has no rales. She exhibits no tenderness.   Abdominal: Soft. Bowel sounds are normal. She exhibits no distension and no mass. There is no tenderness. There is no rebound and no guarding. No hernia.   Musculoskeletal: Normal range of motion. She exhibits no edema or tenderness.        Right foot: There is normal range of motion and no deformity.        Left foot: There is normal range of motion and no deformity.   Feet:   Right Foot:   Protective Sensation: 8 sites tested. 7 sites sensed.   Skin Integrity: Negative for ulcer, blister, skin breakdown, erythema, warmth, callus or dry skin.   Left Foot:   Protective Sensation: 8 sites tested. 8 sites sensed.   Skin Integrity: Negative for ulcer, blister, skin breakdown, erythema, warmth, callus or dry skin.   Lymphadenopathy:     She has no cervical adenopathy.   Neurological: She is alert and oriented to person, place, and time. She has normal reflexes. She displays normal reflexes. No cranial nerve deficit or sensory deficit. She exhibits normal muscle tone.   Proprioception intact all 10 toes   Skin: Skin is warm and dry. Capillary refill takes less than 2 seconds. No rash noted. She is not diaphoretic. No erythema.   Psychiatric: She has a normal mood and affect. Her behavior is normal. Judgment and thought content normal.   Nursing note and vitals reviewed.      Assessment:       1. Encounter for preventive health examination    2. Type 2 diabetes, controlled, with neuropathy    3. Hypertension associated with  diabetes    4. Hyperlipidemia associated with type 2 diabetes mellitus    5. Diarrhea, unspecified type    6. Diabetic eye exam    7. Acquired hypothyroidism    8. BMI 29.0-29.9,adult        Plan:       1. Encounter for preventive health examination  - Comprehensive metabolic panel; Future  - Lipid panel; Future  - CBC auto differential; Future    2. Type 2 diabetes, controlled, with neuropathy  Await lab results  - Comprehensive metabolic panel; Future  - Hemoglobin A1c; Future    3. Hypertension associated with diabetes  Good control with no changes needed  - Comprehensive metabolic panel; Future  - CBC auto differential; Future    4. Hyperlipidemia associated with type 2 diabetes mellitus  Await lab results  - Comprehensive metabolic panel; Future  - Lipid panel; Future    5. Diarrhea, unspecified type  Refill Prevalite  - cholestyramine-aspartame (PREVALITE) 4 gram PwPk; Take 1 packet (4 g total) by mouth once daily.  Dispense: 30 packet; Refill: 11    6. Diabetic eye exam  Appointment with Dr. Britton  - Ambulatory referral to Optometry    7. Acquired hypothyroidism  Await lab results  - TSH; Future    8. BMI 29.0-29.9,adult           Patient readiness: acceptance and barriers:none    During the course of the visit the patient was educated and counseled about the following:     Diabetes:  Discussed general issues about diabetes pathophysiology and management.  Addressed ADA diet.  Discussed foot care.  Reminded to get yearly retinal exam.  Hypertension:   Medication: no change.  Dietary sodium restriction.  Regular aerobic exercise.    Goals: Diabetes: Maintain Hemoglobin A1C below 7 and Hypertension: Reduce Blood Pressure    Did patient meet goals/outcomes: Yes    The following self management tools provided: blood pressure log  blood glucose log    Patient Instructions (the written plan) was given to the patient/family.     Time spent with patient: 45 minutes

## 2019-01-08 NOTE — PATIENT INSTRUCTIONS
Diabetes (General Information)  Diabetes is a long-term health problem. It means your body does not make enough insulin. Or it may mean that your body cannot use the insulin it makes. Insulin is a hormone in your body. It lets blood sugar (glucose) reach the cells in your body. All of your cells need glucose for fuel.  When you have diabetes, the glucose in your blood builds up because it cannot get into the cells. This buildup is called high blood sugar (hyperglycemia).  Your blood sugar level depends on several things. It depends on what kind of food you eat and how much of it you eat. It also depends on how much exercise you get, and how much insulin you have in your body. Eating too much of the wrong kinds of food or not taking diabetes medicine on time can cause high blood sugar. Infections can cause high blood sugar even if you are taking medicines correctly.  These things can also cause low blood sugar:  · Missing meals  · Not eating enough food  · Taking too much diabetes medicine  Diabetes can cause serious problems over time if you do not get treated. These problems include heart disease, stroke, kidney failure, and blindness. They also include nerve pain or loss of feeling in your legs and feet, and gangrene of the feet. By keeping your blood sugar under control you can prevent or delay these problems.  Normal blood sugar levels are 80 to 100 before a meal and less than 180 in the 1 to 2 hours after a meal.  Home care  Follow these guidelines when caring for yourself at home:  · Follow the diet your healthcare provider gives you. Take insulin or other diabetes medicine exactly as told to.  · Watch your blood sugar as you are told to. Keep a log of your results. This will help your provider change your medicines to keep your blood sugar under control.  · Try to reach your ideal weight. You may be able to cut back on or not have to take diabetes medicine if you eat the right foods and get exercise.  · Do  not smoke. Smoking worsens the effects of diabetes on your circulation. You are much more likely to have a heart attack if you have diabetes and you smoke.  · Take good care of your feet. If you have lost feeling in your feet, you may not see an injury or infection. Check your feet and between your toes at least once a week.  · Wear a medical alert bracelet or necklace, or carry a card in your wallet that says you have diabetes. This will help healthcare providers give you the right care if you get very ill and cannot tell them that you have diabetes.  Sick day plan  If you get a cold, the flu, or a bacterial or viral infection, take these steps:  · Look at your diabetes sick plan and call your healthcare provider as you were told to. You may need to call your provider right away if:  ¨ Your blood sugar is above 240 while taking your diabetes medicine  ¨ Your urine ketone levels are above normal or high  ¨ You have been vomiting more than 6 hours  ¨ You have trouble breathing or your breath ha s a fruity smell  ¨ You have a high fever  ¨ You have a fever for several days and you are not getting better  ¨ You get light-headed and are sleepier than usual  · Keep taking your diabetes pills (oral medicine) even if you have been vomiting and are feeling sick. Call your provider right away because you may need insulin to lower your blood sugar until you recover from your illness.  · Keep taking your insulin even if you have been vomiting and are feeling sick. Call your provider right away to ask if you need to change your insulin dose. This will depend on your blood sugar results.  · Check your blood sugar every 2 to 4 hours, or at least 4 times a day.  · Check your ketones often. If you are vomiting and having diarrhea, watch them more often.  · Do not skip meals. Try to eat small meals on a regular schedule. Do this even if you do not feel like eating.  · Drink water or other liquids that do not have caffeine or  calories. This will keep you from getting dehydrated. If you are nauseated or vomiting, takes small sips every 5 minutes. To prevent dehydration try to drink a cup (8 ounces) of fluids every hour while you are awake.  General care  Always bring a source of fast-acting sugar with you in case you have symptoms of low blood sugar (below 70). At the first sign of low blood sugar, eat or drink 15 to 20 grams of fast-acting sugar to raise your blood sugar. Examples are:  · 3 to 4 glucose tablets. You can buy these at most [employer].  · 4 ounces (1/2 cup) of regular (not diet) soft drinks  · 4 ounces (1/2 cup) of any fruit juice  · 8 ounces (1 cup) of milk  · 5 to 6 pieces of hard candy  · 1 tablespoon of honey  Check your blood sugar 15 minutes after treating yourself. If it is still below 70, take 15 to 20 more grams of fast-acting sugar. Test again in 15 minutes. If it returns to normal (70 or above), eat a snack or meal to keep your blood sugar in a safe range. If it stays low, call your doctor or go to an emergency room.  Follow-up care  Follow-up with your healthcare provider, or as advised. For more information about diabetes, visit the American Diabetes Association website at www.diabetes.org or call 735-419-0869.  When to seek medical advice  Call your healthcare provider right away if you have any of these symptoms of high blood sugar:  · Frequent urination  · Dizziness  · Drowsiness  · Thirst  · Headache  · Nausea or vomiting  · Abdominal pain  · Eyesight changes  · Fast breathing  · Confusion or loss of consciousness  Also call your provider right away if you have any of these signs of low blood sugar:  · Fatigue  · Headache  · Shakes  · Excess sweating  · Hunger  · Feeling anxious or restless  · Eyesight changes  · Drowsiness  · Weakness  · Confusion or loss of consciousness  Call 911  Call for emergency help right away if any of these occur:  · Chest pain or shortness of breath  · Dizziness or  fainting  · Weakness of an arm or leg or one side of the face  · Trouble speaking or seeing   Date Last Reviewed: 6/1/2016  © 3056-8260 [employer]. [address] [place] [address], [place] [address]. All rights reserved. This information is not intended as a substitute for professional medical care. Always follow your healthcare professional's instructions.

## 2019-01-15 ENCOUNTER — OFFICE VISIT (OUTPATIENT)
Dept: OPTOMETRY | Facility: CLINIC | Age: 84
End: 2019-01-15
Attending: FAMILY MEDICINE
Payer: MEDICARE

## 2019-01-15 DIAGNOSIS — H52.7 REFRACTIVE ERROR: ICD-10-CM

## 2019-01-15 DIAGNOSIS — H25.13 NUCLEAR SCLEROSIS, BILATERAL: ICD-10-CM

## 2019-01-15 DIAGNOSIS — E11.9 DIABETES MELLITUS WITHOUT OPHTHALMIC MANIFESTATIONS: Primary | ICD-10-CM

## 2019-01-15 PROCEDURE — 92014 COMPRE OPH EXAM EST PT 1/>: CPT | Mod: S$GLB,,, | Performed by: OPTOMETRIST

## 2019-01-15 PROCEDURE — 92014 PR EYE EXAM, EST PATIENT,COMPREHESV: ICD-10-PCS | Mod: S$GLB,,, | Performed by: OPTOMETRIST

## 2019-01-15 PROCEDURE — 99999 PR PBB SHADOW E&M-EST. PATIENT-LVL II: ICD-10-PCS | Mod: PBBFAC,,, | Performed by: OPTOMETRIST

## 2019-01-15 PROCEDURE — 99999 PR PBB SHADOW E&M-EST. PATIENT-LVL II: CPT | Mod: PBBFAC,,, | Performed by: OPTOMETRIST

## 2019-01-15 NOTE — LETTER
January 15, 2019      Chang Christianson MD  [address]  [place] [address]           [facility] [facility] - Optometry  [address] [place] [address] Drive [address]  [facility] [address]  Phone: [phone]          Patient: Leslie Tolliver   MR Number: [id number]   YOB: 1934   Date of Visit: 1/15/2019       Dear Dr. Chang Christianson:    Thank you for referring Leslie Tolliver to me for evaluation. Attached you will find relevant portions of my assessment and plan of care.    If you have questions, please do not hesitate to call me. I look forward to following Leslie Tolliver along with you.    Sincerely,    Figueroa Britton, OD    Enclosure  CC:  No Recipients    If you would like to receive this communication electronically, please contact [email or web address] or [phone] to request more information on [facility] access.    For providers and/or their staff who would like to refer a patient to [facility], please contact us through our one-stop-shop provider referral line, [facility] , at [phone].    If you feel you have received this communication in error or would no longer like to receive these types of communications, please e-mail [email or web address]

## 2019-01-15 NOTE — PROGRESS NOTES
HPI     Presenting Complaint: Pt here today for yearly diabetic eye exam. Pt   states vision has been stable.     Lab Results       Component                Value               Date                       HGBA1C                   7.7 (H)             01/08/2019                 HGBA1C                   7.6 (H)             05/24/2018                 HGBA1C                   7.5 (H)             11/08/2017              Ophthalmic medication / drops: None    (-) headaches  (-) diplopia   (-) flashes / (-) floaters      Last edited by Figueroa Britton, OD on 1/15/2019  3:56 PM. (History)            Assessment /Plan     For exam results, see Encounter Report.    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 Discussed possible ocular affects of uncontrolled blood sugar with patient. Recommended continued strong blood sugar control and continued care with PCP. Monitor yearly.     Moderate NS OU, not visually significant. Discussed possible ocular affects of cataracts. Acceptable BCVA OU. Discussed treatment options. Surgery not recommended at this time. Monitor yearly.     Patient doing well with current specs, denies refraction. Return as needed for updated spec Rx.      RTC in 1 year for comprehensive eye exam, or sooner prn.

## 2019-03-03 DIAGNOSIS — E11.9 TYPE 2 DIABETES MELLITUS WITHOUT COMPLICATION: ICD-10-CM

## 2019-03-04 RX ORDER — SAXAGLIPTIN AND METFORMIN HYDROCHLORIDE 2.5; 1 MG/1; MG/1
TABLET, FILM COATED, EXTENDED RELEASE ORAL
Qty: 180 TABLET | Refills: 1 | Status: SHIPPED | OUTPATIENT
Start: 2019-03-04 | End: 2019-09-10 | Stop reason: SDUPTHER

## 2019-04-03 DIAGNOSIS — I10 ESSENTIAL HYPERTENSION: ICD-10-CM

## 2019-04-03 RX ORDER — LISINOPRIL AND HYDROCHLOROTHIAZIDE 10; 12.5 MG/1; MG/1
1 TABLET ORAL DAILY
Qty: 90 TABLET | Refills: 0 | Status: SHIPPED | OUTPATIENT
Start: 2019-04-03 | End: 2019-06-06 | Stop reason: SDUPTHER

## 2019-04-26 RX ORDER — BLOOD SUGAR DIAGNOSTIC
STRIP MISCELLANEOUS
Qty: 1000 STRIP | Refills: 0 | Status: SHIPPED | OUTPATIENT
Start: 2019-04-26 | End: 2021-03-03

## 2019-05-10 ENCOUNTER — PES CALL (OUTPATIENT)
Dept: ADMINISTRATIVE | Facility: CLINIC | Age: 84
End: 2019-05-10

## 2019-05-24 ENCOUNTER — PES CALL (OUTPATIENT)
Dept: ADMINISTRATIVE | Facility: CLINIC | Age: 84
End: 2019-05-24

## 2019-05-29 ENCOUNTER — DOCUMENTATION ONLY (OUTPATIENT)
Dept: FAMILY MEDICINE | Facility: CLINIC | Age: 84
End: 2019-05-29

## 2019-05-29 ENCOUNTER — OFFICE VISIT (OUTPATIENT)
Dept: FAMILY MEDICINE | Facility: CLINIC | Age: 84
End: 2019-05-29
Payer: MEDICARE

## 2019-05-29 VITALS
DIASTOLIC BLOOD PRESSURE: 70 MMHG | TEMPERATURE: 98 F | BODY MASS INDEX: 30 KG/M2 | SYSTOLIC BLOOD PRESSURE: 123 MMHG | HEART RATE: 75 BPM | WEIGHT: 191.13 LBS | HEIGHT: 67 IN

## 2019-05-29 DIAGNOSIS — E78.5 HYPERLIPIDEMIA ASSOCIATED WITH TYPE 2 DIABETES MELLITUS: ICD-10-CM

## 2019-05-29 DIAGNOSIS — E11.59 HYPERTENSION ASSOCIATED WITH DIABETES: ICD-10-CM

## 2019-05-29 DIAGNOSIS — E11.69 HYPERLIPIDEMIA ASSOCIATED WITH TYPE 2 DIABETES MELLITUS: ICD-10-CM

## 2019-05-29 DIAGNOSIS — I15.2 HYPERTENSION ASSOCIATED WITH DIABETES: ICD-10-CM

## 2019-05-29 DIAGNOSIS — K21.9 GASTROESOPHAGEAL REFLUX DISEASE, ESOPHAGITIS PRESENCE NOT SPECIFIED: ICD-10-CM

## 2019-05-29 DIAGNOSIS — E03.9 HYPOTHYROIDISM, UNSPECIFIED TYPE: ICD-10-CM

## 2019-05-29 DIAGNOSIS — K63.5 POLYP OF COLON, UNSPECIFIED PART OF COLON, UNSPECIFIED TYPE: ICD-10-CM

## 2019-05-29 DIAGNOSIS — M19.90 ARTHRITIS: ICD-10-CM

## 2019-05-29 DIAGNOSIS — E11.40 TYPE 2 DIABETES, CONTROLLED, WITH NEUROPATHY: ICD-10-CM

## 2019-05-29 DIAGNOSIS — Z00.00 ENCOUNTER FOR PREVENTIVE HEALTH EXAMINATION: Primary | ICD-10-CM

## 2019-05-29 PROBLEM — E66.811 OBESITY, CLASS I, BMI 30-34.9: Status: RESOLVED | Noted: 2018-07-02 | Resolved: 2019-05-29

## 2019-05-29 PROBLEM — E66.9 OBESITY, CLASS I, BMI 30-34.9: Status: RESOLVED | Noted: 2018-07-02 | Resolved: 2019-05-29

## 2019-05-29 PROCEDURE — 99499 UNLISTED E&M SERVICE: CPT | Mod: HCNC,S$GLB,, | Performed by: PHYSICIAN ASSISTANT

## 2019-05-29 PROCEDURE — 3074F PR MOST RECENT SYSTOLIC BLOOD PRESSURE < 130 MM HG: ICD-10-PCS | Mod: HCNC,CPTII,S$GLB, | Performed by: PHYSICIAN ASSISTANT

## 2019-05-29 PROCEDURE — 99999 PR PBB SHADOW E&M-EST. PATIENT-LVL IV: CPT | Mod: PBBFAC,HCNC,, | Performed by: PHYSICIAN ASSISTANT

## 2019-05-29 PROCEDURE — 99999 PR PBB SHADOW E&M-EST. PATIENT-LVL IV: ICD-10-PCS | Mod: PBBFAC,HCNC,, | Performed by: PHYSICIAN ASSISTANT

## 2019-05-29 PROCEDURE — 3078F PR MOST RECENT DIASTOLIC BLOOD PRESSURE < 80 MM HG: ICD-10-PCS | Mod: HCNC,CPTII,S$GLB, | Performed by: PHYSICIAN ASSISTANT

## 2019-05-29 PROCEDURE — 3078F DIAST BP <80 MM HG: CPT | Mod: HCNC,CPTII,S$GLB, | Performed by: PHYSICIAN ASSISTANT

## 2019-05-29 PROCEDURE — 3074F SYST BP LT 130 MM HG: CPT | Mod: HCNC,CPTII,S$GLB, | Performed by: PHYSICIAN ASSISTANT

## 2019-05-29 PROCEDURE — G0439 PPPS, SUBSEQ VISIT: HCPCS | Mod: HCNC,S$GLB,, | Performed by: PHYSICIAN ASSISTANT

## 2019-05-29 PROCEDURE — G0439 PR MEDICARE ANNUAL WELLNESS SUBSEQUENT VISIT: ICD-10-PCS | Mod: HCNC,S$GLB,, | Performed by: PHYSICIAN ASSISTANT

## 2019-05-29 PROCEDURE — 99499 RISK ADDL DX/OHS AUDIT: ICD-10-PCS | Mod: HCNC,S$GLB,, | Performed by: PHYSICIAN ASSISTANT

## 2019-05-29 NOTE — Clinical Note
Primary Care Providers:Chang Christianson MD, MD (General)Your patient was seen today for a HRA visit. Gap(s) in care (HEDIS gaps) have been identified during this visit that require additional testing and possible follow up.No orders of the defined types were placed in this encounter.These orders were placed using [facility] approved protocol and any results will be forwarded to your office for appropriate follow up. I have included a copy of my visit note; please review the note and feel free to contact me with any questions. Thank you for allowing me to participate in the care of your patients.ENZO Carmona

## 2019-05-29 NOTE — PROGRESS NOTES
Pre-Visit Chart Review  For Appointment Scheduled on 05/29/2019    There are no preventive care reminders to display for this patient.

## 2019-05-29 NOTE — PROGRESS NOTES
"Leslie Tolliver presented for a  Medicare AWV and comprehensive Health Risk Assessment today. The following components were reviewed and updated:    · Medical history  · Family History  · Social history  · Allergies and Current Medications  · Health Risk Assessment  · Health Maintenance  · Care Team     ** See Completed Assessments for Annual Wellness Visit within the encounter summary.**       The following assessments were completed:  · Living Situation  · CAGE  · Depression Screening  · Timed Get Up and Go  · Whisper Test  · Cognitive Function Screening  · Nutrition Screening  · ADL Screening  · PAQ Screening    Vitals:    05/29/19 1157   BP: 123/70   BP Location: Left arm   Patient Position: Sitting   BP Method: Large (Automatic)   Pulse: 75   Temp: 98.2 °F (36.8 °C)   TempSrc: Oral   Weight: 86.7 kg (191 lb 2.2 oz)   Height: 5' 7" (1.702 m)     Body mass index is 29.94 kg/m².  Physical Exam   Constitutional: She is oriented to person, place, and time. She appears well-developed and well-nourished.   Pulmonary/Chest: Effort normal.   Musculoskeletal:        Right foot: There is no deformity.   Neurological: She is alert and oriented to person, place, and time.   Psychiatric: She has a normal mood and affect. Her behavior is normal. Judgment and thought content normal.                 Diagnoses and health risks identified today and associated recommendations/orders:    Leslie was seen today for medicare awv.    Diagnoses and all orders for this visit:    Encounter for preventive health examination    Hypertension associated with diabetes  Comments:  stable, continue to monitor.     Hyperlipidemia associated with type 2 diabetes mellitus  Comments:  controlled, continue to monitor.     Type 2 diabetes, controlled, with neuropathy  Comments:  uncontrolled, continue diet and exercise.     Gastroesophageal reflux disease, esophagitis presence not specified  Comments:  stable, continue to monitor. "     Arthritis  Comments:  stable, continue to monitor.     Hypothyroidism, unspecified type  Comments:  stable, continue to monitor    Polyp of colon, unspecified part of colon, unspecified type  Comments:  removed, colonscopy deferred due to patient age.         Provided Leslie with a 5-10 year written screening schedule and personal prevention plan. Recommendations were developed using the USPSTF age appropriate recommendations. Education, counseling, and referrals were provided as needed. After Visit Summary printed and given to patient which includes a list of additional screenings\tests needed.    No follow-ups on file.    ENZO Carmona  I offered to discuss end of life issues, including information on how to make advance directives that the patient could use to name someone who would make medical decisions on their behalf if they became too ill to make themselves.    ___Patient declined  _X_Patient is interested, I provided paper work and offered to discuss.      Patient readiness: acceptance and barriers:none    During the course of the visit the patient was educated and counseled about the following:     Hypertension:   Regular aerobic exercise.    Goals: Hypertension: Reduce Blood Pressure    Did patient meet goals/outcomes: No    The following self management tools provided: declined    Patient Instructions (the written plan) was given to the patient/family.     Time spent with patient: 55 minutes    Barriers to medications present (no )    Adverse reactions to current medications (no)    Over the counter medications reviewed (Yes)

## 2019-05-29 NOTE — PATIENT INSTRUCTIONS
Counseling and Referral of Other Preventative  (Italic type indicates deductible and co-insurance are waived)    Patient Name: Leslie Tolliver  Today's Date: 5/29/2019    Health Maintenance       Date Due Completion Date    Shingles Vaccine (1 of 2) 05/29/2020 (Originally 7/17/1984) ---    Hemoglobin A1c 07/08/2019 1/8/2019    Influenza Vaccine 08/01/2019 9/8/2018    Foot Exam 01/08/2020 1/8/2019    Override on 4/21/2015: Done    Lipid Panel 01/08/2020 1/8/2019    Eye Exam 01/15/2020 1/15/2019    DEXA SCAN 07/18/2020 7/18/2017    TETANUS VACCINE 05/25/2026 5/25/2016        No orders of the defined types were placed in this encounter.    The following information is provided to all patients.  This information is to help you find resources for any of the problems found today that may be affecting your health:                Living healthy guide: www.[facility]      Understanding Diabetes: www.diabetes.org      Eating healthy: www.cdc.gov/healthyweight      CDC home safety checklist: www.cdc.gov/steadi/patient.html      Agency on Aging: www.goea.[place].[facility]      Alcoholics anonymous (AA): www.aa.org      Physical Activity: www.nicci.nih.gov/ld8cwfg      Tobacco use: www.quitwithusla.org

## 2019-06-06 DIAGNOSIS — I10 ESSENTIAL HYPERTENSION: ICD-10-CM

## 2019-06-06 RX ORDER — LISINOPRIL AND HYDROCHLOROTHIAZIDE 10; 12.5 MG/1; MG/1
TABLET ORAL
Qty: 90 TABLET | Refills: 0 | Status: SHIPPED | OUTPATIENT
Start: 2019-06-06 | End: 2019-08-08 | Stop reason: SDUPTHER

## 2019-07-11 DIAGNOSIS — Z79.4 TYPE 2 DIABETES MELLITUS WITHOUT COMPLICATION, WITH LONG-TERM CURRENT USE OF INSULIN: ICD-10-CM

## 2019-07-11 DIAGNOSIS — E11.9 TYPE 2 DIABETES MELLITUS WITHOUT COMPLICATION, WITH LONG-TERM CURRENT USE OF INSULIN: ICD-10-CM

## 2019-07-11 RX ORDER — INSULIN GLARGINE 100 [IU]/ML
INJECTION, SOLUTION SUBCUTANEOUS
Qty: 30 ML | Refills: 0 | Status: SHIPPED | OUTPATIENT
Start: 2019-07-11 | End: 2019-11-26 | Stop reason: SDUPTHER

## 2019-07-23 ENCOUNTER — OFFICE VISIT (OUTPATIENT)
Dept: FAMILY MEDICINE | Facility: CLINIC | Age: 84
End: 2019-07-23
Payer: MEDICARE

## 2019-07-23 VITALS
HEART RATE: 86 BPM | DIASTOLIC BLOOD PRESSURE: 78 MMHG | TEMPERATURE: 98 F | HEIGHT: 67 IN | WEIGHT: 191 LBS | SYSTOLIC BLOOD PRESSURE: 124 MMHG | BODY MASS INDEX: 29.98 KG/M2

## 2019-07-23 DIAGNOSIS — I15.2 HYPERTENSION ASSOCIATED WITH DIABETES: ICD-10-CM

## 2019-07-23 DIAGNOSIS — G89.29 CHRONIC BILATERAL LOW BACK PAIN WITHOUT SCIATICA: ICD-10-CM

## 2019-07-23 DIAGNOSIS — E78.5 HYPERLIPIDEMIA ASSOCIATED WITH TYPE 2 DIABETES MELLITUS: ICD-10-CM

## 2019-07-23 DIAGNOSIS — N18.30 ANEMIA DUE TO STAGE 3 CHRONIC KIDNEY DISEASE: ICD-10-CM

## 2019-07-23 DIAGNOSIS — H61.21 HEARING LOSS DUE TO CERUMEN IMPACTION, RIGHT: ICD-10-CM

## 2019-07-23 DIAGNOSIS — N18.30 CKD (CHRONIC KIDNEY DISEASE) STAGE 3, GFR 30-59 ML/MIN: ICD-10-CM

## 2019-07-23 DIAGNOSIS — E03.9 ACQUIRED HYPOTHYROIDISM: ICD-10-CM

## 2019-07-23 DIAGNOSIS — Z79.4 TYPE 2 DIABETES MELLITUS WITH DIABETIC POLYNEUROPATHY, WITH LONG-TERM CURRENT USE OF INSULIN: Primary | ICD-10-CM

## 2019-07-23 DIAGNOSIS — E11.42 TYPE 2 DIABETES MELLITUS WITH DIABETIC POLYNEUROPATHY, WITH LONG-TERM CURRENT USE OF INSULIN: Primary | ICD-10-CM

## 2019-07-23 DIAGNOSIS — M16.0 ARTHRITIS OF BOTH HIPS: ICD-10-CM

## 2019-07-23 DIAGNOSIS — D63.1 ANEMIA DUE TO STAGE 3 CHRONIC KIDNEY DISEASE: ICD-10-CM

## 2019-07-23 DIAGNOSIS — E11.69 HYPERLIPIDEMIA ASSOCIATED WITH TYPE 2 DIABETES MELLITUS: ICD-10-CM

## 2019-07-23 DIAGNOSIS — M54.50 CHRONIC BILATERAL LOW BACK PAIN WITHOUT SCIATICA: ICD-10-CM

## 2019-07-23 DIAGNOSIS — E11.59 HYPERTENSION ASSOCIATED WITH DIABETES: ICD-10-CM

## 2019-07-23 PROCEDURE — 99999 PR PBB SHADOW E&M-EST. PATIENT-LVL IV: ICD-10-PCS | Mod: PBBFAC,HCNC,, | Performed by: NURSE PRACTITIONER

## 2019-07-23 PROCEDURE — 3074F SYST BP LT 130 MM HG: CPT | Mod: HCNC,CPTII,S$GLB, | Performed by: NURSE PRACTITIONER

## 2019-07-23 PROCEDURE — 99214 PR OFFICE/OUTPT VISIT, EST, LEVL IV, 30-39 MIN: ICD-10-PCS | Mod: HCNC,S$GLB,, | Performed by: NURSE PRACTITIONER

## 2019-07-23 PROCEDURE — 1101F PT FALLS ASSESS-DOCD LE1/YR: CPT | Mod: HCNC,CPTII,S$GLB, | Performed by: NURSE PRACTITIONER

## 2019-07-23 PROCEDURE — 3074F PR MOST RECENT SYSTOLIC BLOOD PRESSURE < 130 MM HG: ICD-10-PCS | Mod: HCNC,CPTII,S$GLB, | Performed by: NURSE PRACTITIONER

## 2019-07-23 PROCEDURE — 3078F DIAST BP <80 MM HG: CPT | Mod: HCNC,CPTII,S$GLB, | Performed by: NURSE PRACTITIONER

## 2019-07-23 PROCEDURE — 99214 OFFICE O/P EST MOD 30 MIN: CPT | Mod: HCNC,S$GLB,, | Performed by: NURSE PRACTITIONER

## 2019-07-23 PROCEDURE — 99999 PR PBB SHADOW E&M-EST. PATIENT-LVL IV: CPT | Mod: PBBFAC,HCNC,, | Performed by: NURSE PRACTITIONER

## 2019-07-23 PROCEDURE — 3078F PR MOST RECENT DIASTOLIC BLOOD PRESSURE < 80 MM HG: ICD-10-PCS | Mod: HCNC,CPTII,S$GLB, | Performed by: NURSE PRACTITIONER

## 2019-07-23 PROCEDURE — 1101F PR PT FALLS ASSESS DOC 0-1 FALLS W/OUT INJ PAST YR: ICD-10-PCS | Mod: HCNC,CPTII,S$GLB, | Performed by: NURSE PRACTITIONER

## 2019-07-23 RX ORDER — CHOLECALCIFEROL (VITAMIN D3) 125 MCG
220 CAPSULE ORAL 2 TIMES DAILY
Qty: 30 EACH | Refills: 11 | Status: ON HOLD | COMMUNITY
Start: 2019-07-23 | End: 2020-06-05 | Stop reason: HOSPADM

## 2019-07-23 NOTE — PROGRESS NOTES
Subjective:       Patient ID: Leslie Tolliver is a 85 y.o. female.    Chief Complaint: Diabetes    Chief Complaint  Chief Complaint   Patient presents with    Diabetes       HPI  Leslie Tolliver is a 85 y.o. female with medical diagnoses as listed in the medical history and problem list that presents for follow-up of type 2 diabetes, hypertension, hyperlipidemia, hypothyroidism, GERD, and arthritis.  Patient's most recent hemoglobin A1c was 7.7% on 1/8/2019.  Patient reports that she does check her blood sugars at home and has noted that they have been a bit elevated for the last couple of days.  She states her blood sugar this morning was 198 and this afternoon for her visit it was 200.  She did not bring her blood sugar log to the visit with her.  She states that she has been under a lot of stress at home.  The patient lives with her daughter.  Recently, a friend moved into the house and he has 2-year-old twins that come to the home on the weekends and patient is not able to tolerate having young children in the house. She is contemplating asking the friend to move out and this is worrying her.   Blood pressure today is 124/78.  BMI is 29.91 and patient's weight is stable at 191 lb since her last visit.  She is due for a hemoglobin A1c.  Established patient with last clinic appointment 5/29/2019.        PAST MEDICAL HISTORY:  Past Medical History:   Diagnosis Date    Anemia due to stage 3 chronic kidney disease 7/24/2019    Arthritis     Chronic bilateral low back pain without sciatica 7/24/2019    CKD (chronic kidney disease) stage 3, GFR 30-59 ml/min 7/24/2019    Colon polyps     Coronary artery disease     Diabetes mellitus type II     Diabetes with neurologic complications     GERD (gastroesophageal reflux disease)     Hyperlipidemia     Hypertension     Hypothyroidism     Type 2 diabetes mellitus        PAST SURGICAL HISTORY:  Past Surgical History:   Procedure Laterality Date     ADENOIDECTOMY      BREAST BIOPSY Right 20 yrs ago    benign    CHOLECYSTECTOMY      COLONOSCOPY  2014    Dr Holly, 5 year recheck    COLONOSCOPY N/A 2014    [facility]    HEMORRHOID SURGERY      HYSTERECTOMY      OOPHORECTOMY      TONSILLECTOMY         SOCIAL HISTORY:  Social History     Socioeconomic History    Marital status:      Spouse name: Not on file    Number of children: Not on file    Years of education: Not on file    Highest education level: Not on file   Occupational History    Not on file   Social Needs    Financial resource strain: Not on file    Food insecurity:     Worry: Not on file     Inability: Not on file    Transportation needs:     Medical: Not on file     Non-medical: Not on file   Tobacco Use    Smoking status: Former Smoker     Packs/day: 0.25     Years: 15.00     Pack years: 3.75     Types: Cigarettes     Last attempt to quit: 3/30/1974     Years since quittin.3    Smokeless tobacco: Never Used   Substance and Sexual Activity    Alcohol use: No    Drug use: No    Sexual activity: Never   Lifestyle    Physical activity:     Days per week: Not on file     Minutes per session: Not on file    Stress: Not on file   Relationships    Social connections:     Talks on phone: Not on file     Gets together: Not on file     Attends Protestant service: Not on file     Active member of club or organization: Not on file     Attends meetings of clubs or organizations: Not on file     Relationship status: Not on file   Other Topics Concern    Not on file   Social History Narrative    Not on file       FAMILY HISTORY:  Family History   Problem Relation Age of Onset    Diabetes Mother     Heart disease Mother     Glaucoma Mother     Cancer Father         lung cancer    Early death Son         brain tumor age 3    Diabetes Brother     No Known Problems Daughter     Early death Son         MVA 25    Macular  "degeneration Neg Hx     Retinal detachment Neg Hx        ALLERGIES AND MEDICATIONS: updated and reviewed.  Review of patient's allergies indicates:   Allergen Reactions    Fenofibric acid (choline)      Other reaction(s): Vomiting    Iodine      Other reaction(s): lips swollen    Rosuvastatin      Other reaction(s): muscle pain     Current Outpatient Medications   Medication Sig Dispense Refill    ACCU-CHEK SMARTVIEW TEST STRIP Strp USE TO TEST THREE TIMES DAILY 1000 strip 0    aspirin 81 mg Tab Take 81 mg by mouth once daily. Every day      BD ULTRA-FINE SHORT PEN NEEDLE 31 gauge x 5/16" Ndle USE WITH LANTUS SOLOSTAR PEN AS NEEDED 90 each 3    blood-glucose meter kit Accu-chek Marley glucometer check glucose three times daily E11.65 1 each 0    carvedilol (COREG) 6.25 MG tablet TAKE 1 TABLET TWICE DAILY WITH MEALS 180 tablet 2    cholestyramine-aspartame (PREVALITE) 4 gram PwPk Take 1 packet (4 g total) by mouth once daily. 30 packet 11    insulin (LANTUS SOLOSTAR U-100 INSULIN) glargine 100 units/mL (3mL) SubQ pen INJECT 24 UNITS INTO THE SKIN EVERY EVENING. 30 mL 0    KOMBIGLYZE XR 2.5-1,000 mg TM24 TAKE 1 TABLET TWICE DAILY 180 tablet 1    lancets (MICROLET LANCET) Misc Accu-chek fast clix lancets check glucose three times daily E11.65 300 each 3    lisinopril-hydrochlorothiazide (PRINZIDE,ZESTORETIC) 10-12.5 mg per tablet TAKE 1 TABLET EVERY DAY 90 tablet 0    multivitamin (ONE DAILY MULTIVITAMIN) per tablet Take 1 tablet by mouth once daily.      naproxen sodium (ALEVE) 220 mg Cap Take 220 mg by mouth 2 (two) times daily. As needed for arthritic pain 30 each 11     No current facility-administered medications for this visit.        I have reviewed the patient's medical, family, and social history in detail and updated the computerized patient record.    Review of Systems   Constitutional: Negative for activity change, appetite change, chills, fatigue, fever and unexpected weight change.        " "Appetite is good. 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    HENT: Negative for congestion, ear pain, postnasal drip, rhinorrhea, sinus pressure, sinus pain and sore throat.    Eyes: Negative for visual disturbance.        Vision is good with glasses.   Respiratory: Negative for cough and shortness of breath.    Cardiovascular: Negative for chest pain, palpitations and leg swelling.   Gastrointestinal: Negative for abdominal pain, constipation, diarrhea, nausea and vomiting.        No heartburn. Does have some diarrhea and takes prevalite as needed with effect.    Genitourinary: Positive for frequency. Negative for difficulty urinating and dysuria.        Wakes 2-3 times at night to urinate   Musculoskeletal: Positive for arthralgias.        Patient has some arthritic pain to low back and hips.    Skin: Negative for rash and wound.   Neurological: Positive for numbness. Negative for dizziness and headaches.        Patient does have neuropathy to feet, painful, numbness, tingling, burning.   Hematological: Does not bruise/bleed easily.        Has noticed that her skin is thinner as she ages.    Psychiatric/Behavioral: Negative for dysphoric mood and sleep disturbance. The patient is nervous/anxious.         Admits to some recent stress in her life, however, normally she does not experience anxiety.  She is sleeping well at night.         Objective:      Vitals:    07/23/19 1704   BP: 124/78   BP Location: Right arm   Patient Position: Sitting   BP Method: Medium (Manual)   Pulse: 86   Temp: 98.4 °F (36.9 °C)   TempSrc: Oral   Weight: 86.6 kg (191 lb)   Height: 5' 7" (1.702 m)     Physical Exam   Constitutional: She is oriented to person, place, and time. Vital signs are normal. She appears well-developed and well-nourished. No distress.   Blood pressure 124/78   HENT:   Head: Normocephalic and atraumatic.   Right Ear: External ear normal.   Left Ear: Tympanic membrane, external ear and ear canal normal. "   Nose: Nose normal. No mucosal edema.   Mouth/Throat: Oropharynx is clear and moist and mucous membranes are normal. No oropharyngeal exudate.   Cerumen impaction to right ear, unable to visualize TM   Eyes: Pupils are equal, round, and reactive to light. Conjunctivae, EOM and lids are normal. Right eye exhibits no discharge. Left eye exhibits no discharge. Right conjunctiva is not injected. Left conjunctiva is not injected.   Neck: Normal range of motion. Neck supple. Normal carotid pulses present. Carotid bruit is not present.   Cardiovascular: Normal rate, regular rhythm, S1 normal, S2 normal, normal heart sounds, intact distal pulses and normal pulses.   No murmur heard.  Pulses:       Carotid pulses are 2+ on the right side, and 2+ on the left side.       Radial pulses are 2+ on the right side, and 2+ on the left side.        Posterior tibial pulses are 2+ on the right side, and 2+ on the left side.   No edema   Pulmonary/Chest: Effort normal and breath sounds normal. No respiratory distress. She has no decreased breath sounds. She has no wheezes. She has no rhonchi. She has no rales.   Abdominal: Soft. Normal appearance, normal aorta and bowel sounds are normal. She exhibits no distension, no abdominal bruit, no pulsatile midline mass and no mass. There is no hepatosplenomegaly. There is no tenderness. No hernia.   Musculoskeletal: Normal range of motion. She exhibits no edema, tenderness or deformity.   Lymphadenopathy:        Head (right side): No submental, no submandibular and no tonsillar adenopathy present.        Head (left side): No submental, no submandibular and no tonsillar adenopathy present.     She has no cervical adenopathy.        Right: No supraclavicular adenopathy present.        Left: No supraclavicular adenopathy present.   Neurological: She is alert and oriented to person, place, and time. She has normal strength. Gait normal.   Skin: Skin is warm, dry and intact. Bruising noted. No rash  noted. She is not diaphoretic. No erythema. No pallor.   Bruising to right forearm   Psychiatric: She has a normal mood and affect. Her speech is normal and behavior is normal. Judgment and thought content normal. Her mood appears not anxious. Cognition and memory are normal. She does not exhibit a depressed mood.   Nursing note and vitals reviewed.        Assessment:       1. Type 2 diabetes mellitus with diabetic polyneuropathy, with long-term current use of insulin    2. CKD (chronic kidney disease) stage 3, GFR 30-59 ml/min    3. Anemia due to stage 3 chronic kidney disease    4. Hyperlipidemia associated with type 2 diabetes mellitus    5. Hypertension associated with diabetes    6. Acquired hypothyroidism    7. Chronic bilateral low back pain without sciatica    8. Arthritis of both hips    9. Hearing loss due to cerumen impaction, right    10. BMI 29.0-29.9,adult          Plan:       Leslie was seen today for diabetes.    Diagnoses and all orders for this visit:    Type 2 diabetes mellitus with diabetic polyneuropathy, with long-term current use of insulin  -     Ambulatory consult to Diabetic Education  -     Hemoglobin A1c; Future  -   Lab Results   Component Value Date    HGBA1C 7.7 (H) 01/08/2019   - hemoglobin A1c is in goal range for this patient due to patient's age of 85 years old  - if repeat hemoglobin A1c is greater than 8%, changes to medications will be made  - patient with multiple questions about meal management with diabetes, questions answered and suggested referral to meet with diabetes educator for further discussion which patient is interested in, referral made    CKD (chronic kidney disease) stage 3, GFR 30-59 ml/min  -     Ambulatory consult to Diabetic Education  -     CBC auto differential; Future  -     Basic metabolic panel; Future  -   BMP  Lab Results   Component Value Date     01/08/2019    K 4.7 01/08/2019     01/08/2019    CO2 24 01/08/2019    BUN 34 (H) 01/08/2019     CREATININE 1.1 01/08/2019    CALCIUM 10.1 01/08/2019    ANIONGAP 11 01/08/2019    ESTGFRAFRICA 53.3 (A) 01/08/2019    EGFRNONAA 46.2 (A) 01/08/2019   - discussed with patient recommendation to avoid nonsteroidal anti-inflammatory medications due to decreased kidney function, patient does currently take Aleve for arthritic pain but states that she uses the medication infrequently  - Advised no OTC NSAIDs, tylenol only as needed for pain or fever.  Patient agrees to use Tylenol and to only use Aleve for pain not relieved by Tylenol.  At this time, infrequent use of Aleve over-the-counter is probably safe.    Anemia due to stage 3 chronic kidney disease  -     CBC auto differential; Future  -   Lab Results   Component Value Date    WBC 7.27 01/08/2019    HGB 11.5 (L) 01/08/2019    HCT 35.7 (L) 01/08/2019    MCV 93 01/08/2019     01/08/2019   - stable, will continue to monitor    Hyperlipidemia associated with type 2 diabetes mellitus     Lab Results   Component Value Date    CHOL 119 (L) 01/08/2019    CHOL 120 05/24/2018    CHOL 129 04/18/2016     Lab Results   Component Value Date    HDL 28 (L) 01/08/2019    HDL 32 (L) 05/24/2018    HDL 31 (L) 04/18/2016     Lab Results   Component Value Date    LDLCALC 65.6 01/08/2019    LDLCALC 56.6 (L) 05/24/2018    LDLCALC 69.0 04/18/2016     Lab Results   Component Value Date    TRIG 127 01/08/2019    TRIG 157 (H) 05/24/2018    TRIG 145 04/18/2016     Lab Results   Component Value Date    CHOLHDL 23.5 01/08/2019    CHOLHDL 26.7 05/24/2018    CHOLHDL 24.0 04/18/2016    patient is currently not taking a statin medication and no statin medication is recommended due to patient's age   LDL cholesterol is at goal of less than 70 in a patient with type 2 diabetes    Hypertension associated with diabetes  -     Basic metabolic panel; Future  - Blood pressure controlled 124/78, continue current medication without change    Acquired hypothyroidism  -     TSH; Future  -   Lab  Results   Component Value Date    TSH 2.019 11/08/2017   - patient is currently not taking any levothyroxine or Synthroid, will assess need for thyroid hormone replacement when new TSH results are available    Chronic bilateral low back pain without sciatica  -     naproxen sodium (ALEVE) 220 mg Cap; Take 220 mg by mouth 2 (two) times daily. As needed for arthritic pain if tylenol is ineffective, use infrequently due to decreased GFR    Arthritis of both hips  -     naproxen sodium (ALEVE) 220 mg Cap; Take 220 mg by mouth 2 (two) times daily. As needed for arthritic pain if tylenol is ineffective, use infrequently due to decreased GFR    Hearing loss due to cerumen impaction, right  -     Ear wax removal  - Ceruminosis is noted to right ear with hearing loss per patient and inability to visualizeTM.    Wax is removed by syringing with warm water by Sury Alegre MA after procedure explained to patient and verbal consent received from the patient.   Using a syringe and warm water the right external ear canal was irrigated.  A large amount of light brown cerumen was removed without difficulty.  The patient tolerated the procedure without complications.  TM visualized by provider with otoscope exam following irrigation, normal.    Instructions for home care to prevent wax buildup are given.    BMI 29.0-29.9,adult   BMI today is 29.91 with a weight of 191 lb which is unchanged since last visit on 5/29/2019   Maintain healthy weight with heathy diet and exercise/active lifestyle.  Weight loss not recommended due to patient's advanced age.    Follow up in about 6 months (around 1/23/2020) for Dr. Christianson, 40 minutes, schedule lab fasting.      Patient readiness: acceptance and barriers:none    During the course of the visit the patient was educated and counseled about the following:     Diabetes:  Discussed general issues about diabetes pathophysiology and management.  Addressed ADA diet.  Discussed foot care.  Reminded  to get yearly retinal exam.  Labs: fasting blood sugar, fasting lipid panel and hemoglobin A1C.  Diabetes educator referral.  Reminded to bring in blood sugar diary at next visit.  Follow up in 6 months or as needed.  Hypertension:   Medication: no change.  Dietary sodium restriction.  Regular aerobic exercise.  Follow up: 6 months and as needed.    Goals: Diabetes: Maintain Hemoglobin A1C below 7 and Hypertension: Reduce Blood Pressure    Did patient meet goals/outcomes: Yes for hypertension, recommended A1c goal for patient <8%, goal met    The following self management tools provided: declined    Patient Instructions (the written plan) was given to the patient/family.     Time spent with patient: 45 minutes    Barriers to medications present (no )    Adverse reactions to current medications (no)    Over the counter medications reviewed (Yes)

## 2019-07-24 PROBLEM — M54.50 CHRONIC BILATERAL LOW BACK PAIN WITHOUT SCIATICA: Status: ACTIVE | Noted: 2019-07-24

## 2019-07-24 PROBLEM — N18.30 ANEMIA DUE TO STAGE 3 CHRONIC KIDNEY DISEASE: Status: ACTIVE | Noted: 2019-07-24

## 2019-07-24 PROBLEM — N18.30 CKD (CHRONIC KIDNEY DISEASE) STAGE 3, GFR 30-59 ML/MIN: Status: ACTIVE | Noted: 2019-07-24

## 2019-07-24 PROBLEM — G89.29 CHRONIC BILATERAL LOW BACK PAIN WITHOUT SCIATICA: Status: ACTIVE | Noted: 2019-07-24

## 2019-07-24 PROBLEM — D63.1 ANEMIA DUE TO STAGE 3 CHRONIC KIDNEY DISEASE: Status: ACTIVE | Noted: 2019-07-24

## 2019-08-01 ENCOUNTER — LAB VISIT (OUTPATIENT)
Dept: LAB | Facility: HOSPITAL | Age: 84
End: 2019-08-01
Attending: FAMILY MEDICINE
Payer: MEDICARE

## 2019-08-01 DIAGNOSIS — I15.2 HYPERTENSION ASSOCIATED WITH DIABETES: ICD-10-CM

## 2019-08-01 DIAGNOSIS — E03.9 ACQUIRED HYPOTHYROIDISM: ICD-10-CM

## 2019-08-01 DIAGNOSIS — E11.42 TYPE 2 DIABETES MELLITUS WITH DIABETIC POLYNEUROPATHY, WITH LONG-TERM CURRENT USE OF INSULIN: ICD-10-CM

## 2019-08-01 DIAGNOSIS — N18.30 ANEMIA DUE TO STAGE 3 CHRONIC KIDNEY DISEASE: ICD-10-CM

## 2019-08-01 DIAGNOSIS — E11.59 HYPERTENSION ASSOCIATED WITH DIABETES: ICD-10-CM

## 2019-08-01 DIAGNOSIS — D63.1 ANEMIA DUE TO STAGE 3 CHRONIC KIDNEY DISEASE: ICD-10-CM

## 2019-08-01 DIAGNOSIS — Z79.4 TYPE 2 DIABETES MELLITUS WITH DIABETIC POLYNEUROPATHY, WITH LONG-TERM CURRENT USE OF INSULIN: ICD-10-CM

## 2019-08-01 DIAGNOSIS — N18.30 CKD (CHRONIC KIDNEY DISEASE) STAGE 3, GFR 30-59 ML/MIN: ICD-10-CM

## 2019-08-01 LAB
ANION GAP SERPL CALC-SCNC: 10 MMOL/L (ref 8–16)
BASOPHILS # BLD AUTO: 0.05 K/UL (ref 0–0.2)
BASOPHILS NFR BLD: 0.9 % (ref 0–1.9)
BUN SERPL-MCNC: 24 MG/DL (ref 8–23)
CALCIUM SERPL-MCNC: 9.4 MG/DL (ref 8.7–10.5)
CHLORIDE SERPL-SCNC: 107 MMOL/L (ref 95–110)
CO2 SERPL-SCNC: 24 MMOL/L (ref 23–29)
CREAT SERPL-MCNC: 1 MG/DL (ref 0.5–1.4)
DIFFERENTIAL METHOD: ABNORMAL
EOSINOPHIL # BLD AUTO: 0.4 K/UL (ref 0–0.5)
EOSINOPHIL NFR BLD: 6.4 % (ref 0–8)
ERYTHROCYTE [DISTWIDTH] IN BLOOD BY AUTOMATED COUNT: 14.6 % (ref 11.5–14.5)
EST. GFR  (AFRICAN AMERICAN): 59.4 ML/MIN/1.73 M^2
EST. GFR  (NON AFRICAN AMERICAN): 51.5 ML/MIN/1.73 M^2
ESTIMATED AVG GLUCOSE: 163 MG/DL (ref 68–131)
GLUCOSE SERPL-MCNC: 127 MG/DL (ref 70–110)
HBA1C MFR BLD HPLC: 7.3 % (ref 4–5.6)
HCT VFR BLD AUTO: 37.5 % (ref 37–48.5)
HGB BLD-MCNC: 11.6 G/DL (ref 12–16)
IMM GRANULOCYTES # BLD AUTO: 0.03 K/UL (ref 0–0.04)
IMM GRANULOCYTES NFR BLD AUTO: 0.5 % (ref 0–0.5)
LYMPHOCYTES # BLD AUTO: 0.9 K/UL (ref 1–4.8)
LYMPHOCYTES NFR BLD: 15.6 % (ref 18–48)
MCH RBC QN AUTO: 29.7 PG (ref 27–31)
MCHC RBC AUTO-ENTMCNC: 30.9 G/DL (ref 32–36)
MCV RBC AUTO: 96 FL (ref 82–98)
MONOCYTES # BLD AUTO: 0.4 K/UL (ref 0.3–1)
MONOCYTES NFR BLD: 7.2 % (ref 4–15)
NEUTROPHILS # BLD AUTO: 4 K/UL (ref 1.8–7.7)
NEUTROPHILS NFR BLD: 69.4 % (ref 38–73)
NRBC BLD-RTO: 0 /100 WBC
PLATELET # BLD AUTO: 158 K/UL (ref 150–350)
PMV BLD AUTO: 11.7 FL (ref 9.2–12.9)
POTASSIUM SERPL-SCNC: 4.3 MMOL/L (ref 3.5–5.1)
RBC # BLD AUTO: 3.9 M/UL (ref 4–5.4)
SODIUM SERPL-SCNC: 141 MMOL/L (ref 136–145)
TSH SERPL DL<=0.005 MIU/L-ACNC: 2.3 UIU/ML (ref 0.4–4)
WBC # BLD AUTO: 5.82 K/UL (ref 3.9–12.7)

## 2019-08-01 PROCEDURE — 84443 ASSAY THYROID STIM HORMONE: CPT | Mod: HCNC

## 2019-08-01 PROCEDURE — 85025 COMPLETE CBC W/AUTO DIFF WBC: CPT | Mod: HCNC

## 2019-08-01 PROCEDURE — 83036 HEMOGLOBIN GLYCOSYLATED A1C: CPT | Mod: HCNC

## 2019-08-01 PROCEDURE — 36415 COLL VENOUS BLD VENIPUNCTURE: CPT | Mod: HCNC,PO

## 2019-08-01 PROCEDURE — 80048 BASIC METABOLIC PNL TOTAL CA: CPT | Mod: HCNC

## 2019-08-08 DIAGNOSIS — I10 ESSENTIAL HYPERTENSION: ICD-10-CM

## 2019-08-08 RX ORDER — LISINOPRIL AND HYDROCHLOROTHIAZIDE 10; 12.5 MG/1; MG/1
TABLET ORAL
Qty: 90 TABLET | Refills: 1 | Status: SHIPPED | OUTPATIENT
Start: 2019-08-08 | End: 2020-04-01 | Stop reason: SDUPTHER

## 2019-09-03 ENCOUNTER — CLINICAL SUPPORT (OUTPATIENT)
Dept: DIABETES | Facility: CLINIC | Age: 84
End: 2019-09-03
Payer: MEDICARE

## 2019-09-03 VITALS — WEIGHT: 190.94 LBS | BODY MASS INDEX: 29.97 KG/M2 | HEIGHT: 67 IN

## 2019-09-03 DIAGNOSIS — E11.69 HYPERLIPIDEMIA ASSOCIATED WITH TYPE 2 DIABETES MELLITUS: ICD-10-CM

## 2019-09-03 DIAGNOSIS — E78.5 HYPERLIPIDEMIA ASSOCIATED WITH TYPE 2 DIABETES MELLITUS: ICD-10-CM

## 2019-09-03 DIAGNOSIS — Z79.4 TYPE 2 DIABETES MELLITUS WITH DIABETIC POLYNEUROPATHY, WITH LONG-TERM CURRENT USE OF INSULIN: ICD-10-CM

## 2019-09-03 DIAGNOSIS — E11.42 TYPE 2 DIABETES MELLITUS WITH DIABETIC POLYNEUROPATHY, WITH LONG-TERM CURRENT USE OF INSULIN: ICD-10-CM

## 2019-09-03 DIAGNOSIS — E11.59 HYPERTENSION ASSOCIATED WITH DIABETES: ICD-10-CM

## 2019-09-03 DIAGNOSIS — I15.2 HYPERTENSION ASSOCIATED WITH DIABETES: ICD-10-CM

## 2019-09-03 PROCEDURE — G0108 PR DIAB MANAGE TRN  PER INDIV: ICD-10-PCS | Mod: HCNC,S$GLB,, | Performed by: DIETITIAN, REGISTERED

## 2019-09-03 PROCEDURE — G0108 DIAB MANAGE TRN  PER INDIV: HCPCS | Mod: HCNC,S$GLB,, | Performed by: DIETITIAN, REGISTERED

## 2019-09-03 PROCEDURE — 99999 PR PBB SHADOW E&M-EST. PATIENT-LVL III: ICD-10-PCS | Mod: PBBFAC,HCNC,,

## 2019-09-03 PROCEDURE — 99999 PR PBB SHADOW E&M-EST. PATIENT-LVL III: CPT | Mod: PBBFAC,HCNC,,

## 2019-09-03 NOTE — PROGRESS NOTES
Diabetes Education  Author: Tammie Melo RD, CDE  Date: 9/3/2019    Diabetes Care Management Summary  Diabetes Education Record Assessment/Progress: Initial  Current Diabetes Risk Level: Moderate     Last A1c:   Lab Results   Component Value Date    HGBA1C 7.3 (H) 08/01/2019     Last visit with Diabetes Educator: : 09/03/2019      Diabetes Type  Diabetes Type : Type II    Diabetes History  Diabetes Diagnosis: >10 years  Current Treatment: Oral Medication, Insulin(Kombiglize XR 2.5-1000mg in am with 24 Units of Lantis at 8-9pm)  Reviewed Problem List with Patient: Yes    Health Maintenance was reviewed today with patient. Discussed with patient importance of routine eye exams, foot exams/foot care, blood work (i.e.: A1c, microalbumin, and lipid), dental visits, yearly flu vaccine, and pneumonia vaccine as indicated by PCP. Patient verbalized understanding.     Health Maintenance Topics with due status: Not Due       Topic Last Completion Date    TETANUS VACCINE 05/25/2016    DEXA SCAN 07/18/2017    Foot Exam 01/08/2019    Lipid Panel 01/08/2019    Eye Exam 01/15/2019    Hemoglobin A1c 08/01/2019     There are no preventive care reminders to display for this patient.    Nutrition  Meal Planning: skipping meals, diet drinks, eats out often  What type of sweetener do you use?: none  What type of beverages do you drink?: diet soda/tea  Meal Plan 24 Hour Recall - Breakfast: (Had  a cup of coffee with creamer and two cracker this am)  Meal Plan 24 Hour Recall - Lunch: (Yesterday she had a [employer] sandwich with diet coke)  Meal Plan 24 Hour Recall - Dinner: (Last night had Tacos )  Meal Plan 24 Hour Recall - Snack: (Reports eating grapes with 3 y/o grand daughter Lindsay)    Monitoring   Monitoring: Other  Self Monitoring : (This am fasting was 225)  Blood Glucose Logs: No  Do you use a personal continuous glucose monitor?: No  In the last month, how often have you had a low blood sugar reaction?: never  Can you tell  when your blood sugar is too high?: sometimes    Exercise   Exercise Type: none    Current Diabetes Treatment   Current Treatment: Oral Medication, Insulin(Kombiglize XR 2.5-1000mg in am with 24 Units of Lantis at 8-9pm)    Social History  Preferred Learning Method: Face to Face  Primary Support: Self, Daughter  Educational Level: Some College  Smoking Status: Never a Smoker  Alcohol Use: Never    Barriers to Change  Barriers to Change: None  Learning Challenges : None    Readiness to Learn   Readiness to Learn : Acceptance    Cultural Influences  Cultural Influences: None    Diabetes Education Assessment/Progress  Diabetes Disease Process (diabetes disease process and treatment options): Discussion  Nutrition (Incorporating nutritional management into one's lifestyle): Discussion, Instructed, Demonstrates Understanding/Competency (verbalizes/demonstrates), Comprehends Key Points, Written Materials Provided  Physical Activity (incorporating physical activity into one's lifestyle): Discussion, Instructed, Demonstrates Understanding/Competency (verbalizes/demonstrates), Comprehends Key Points  Medications (states correct name, dose, onset, peak, duration, side effects & timing of meds): Discussion, Instructed, Demonstrates Understanding/Competency(verbalizes/demonstrates), Comprehends Key Points  Monitoring (monitoring blood glucose/other parameters & using results): Discussion, Instructed, Demonstrates Understanding/Competency (verbalizes/demonstrates), Comprehends Key Points  Acute Complications (preventing, detecting, and treating acute complications): Discussion  Chronic Complications (preventing, detecting, and treating chronic complications): Discussion  Clinical (diabetes, other pertinent medical history, and relevant comorbidities reviewed during visit): Discussion  Cognitive (knowledge of self-management skills, functional health literacy): Discussion  Psychosocial (emotional response to diabetes):  Discussion  Diabetes Distress and Support Systems: Discussion  Behavioral (readiness for change, lifestyle practices, self-care behaviors): Discussion    Goals  Patient has selected/evaluated goals during today's session: Yes, selected  Healthy Eating: Set  Start Date: 09/03/19  Target Date: 12/03/19  Physical Activity: Set  Start Date: 09/03/19  Target Date: 12/03/19  Monitoring: In Progress(Log sheet provided to record blood sugars and then to determine if insulin titration needed)  Medications: In Progress  Problem Solving: In Progress  Healthy Coping: In Progress  Reducing Risks: In Progress  Other: In Progress    Diabetes Care Plan/Intervention  Education Plan/Intervention: Individual Follow-Up DSMT    Diabetes Meal Plan  Restrictions: Restricted Carbohydrate  Calories: 1400  Carbohydrate Per Meal: 20-30g  Carbohydrate Per Snack : 7-15g    Today's Self-Management Care Plan was developed with the patient's input and is based on barriers identified during today's assessment.    The long and short-term goals in the care plan were written with the patient/caregiver's input. The patient has agreed to work toward these goals to improve her overall diabetes control.      The patient received a copy of today's self-management plan and verbalized understanding of the care plan, goals, and all of today's instructions.      The patient was encouraged to communicate with her physician and care team regarding her condition(s) and treatment.  I provided the patient with my contact information today and encouraged her to contact me via phone or patient portal as needed.     Education Units of Time   Time Spent: 60 min

## 2019-09-10 ENCOUNTER — HOSPITAL ENCOUNTER (OUTPATIENT)
Dept: RADIOLOGY | Facility: CLINIC | Age: 84
Discharge: HOME | End: 2019-09-10
Attending: FAMILY MEDICINE
Payer: MEDICARE

## 2019-09-10 DIAGNOSIS — E11.59 HYPERTENSION ASSOCIATED WITH DIABETES: ICD-10-CM

## 2019-09-10 DIAGNOSIS — I15.2 HYPERTENSION ASSOCIATED WITH DIABETES: ICD-10-CM

## 2019-09-10 DIAGNOSIS — E11.9 TYPE 2 DIABETES MELLITUS WITHOUT COMPLICATION: ICD-10-CM

## 2019-09-10 DIAGNOSIS — Z12.39 ENCOUNTER FOR SPECIAL SCREENING EXAMINATION FOR NEOPLASM OF BREAST: ICD-10-CM

## 2019-09-10 DIAGNOSIS — I10 HYPERTENSION: ICD-10-CM

## 2019-09-10 PROCEDURE — 77067 SCR MAMMO BI INCL CAD: CPT | Mod: 26,HCNC,, | Performed by: RADIOLOGY

## 2019-09-10 PROCEDURE — 77067 SCR MAMMO BI INCL CAD: CPT | Mod: TC,HCNC,PO

## 2019-09-10 PROCEDURE — 77063 MAMMO DIGITAL SCREENING BILAT WITH TOMOSYNTHESIS_CAD: ICD-10-PCS | Mod: 26,HCNC,, | Performed by: RADIOLOGY

## 2019-09-10 PROCEDURE — 77067 MAMMO DIGITAL SCREENING BILAT WITH TOMOSYNTHESIS_CAD: ICD-10-PCS | Mod: 26,HCNC,, | Performed by: RADIOLOGY

## 2019-09-10 PROCEDURE — 77063 BREAST TOMOSYNTHESIS BI: CPT | Mod: 26,HCNC,, | Performed by: RADIOLOGY

## 2019-09-10 RX ORDER — CARVEDILOL 6.25 MG/1
6.25 TABLET ORAL 2 TIMES DAILY WITH MEALS
Qty: 180 TABLET | Refills: 3 | Status: SHIPPED | OUTPATIENT
Start: 2019-09-10 | End: 2020-08-05 | Stop reason: SDUPTHER

## 2019-09-10 RX ORDER — SAXAGLIPTIN AND METFORMIN HYDROCHLORIDE 1000; 2.5 MG/1; MG/1
TABLET, FILM COATED, EXTENDED RELEASE ORAL
Qty: 180 TABLET | Refills: 1 | Status: SHIPPED | OUTPATIENT
Start: 2019-09-10 | End: 2020-03-20 | Stop reason: CLARIF

## 2019-10-29 DIAGNOSIS — E11.9 TYPE 2 DIABETES MELLITUS WITHOUT COMPLICATION: ICD-10-CM

## 2019-10-29 RX ORDER — PEN NEEDLE, DIABETIC 31 GX5/16"
NEEDLE, DISPOSABLE MISCELLANEOUS
Qty: 100 EACH | Refills: 3 | Status: SHIPPED | OUTPATIENT
Start: 2019-10-29 | End: 2020-11-03

## 2019-11-26 DIAGNOSIS — Z79.4 TYPE 2 DIABETES MELLITUS WITHOUT COMPLICATION, WITH LONG-TERM CURRENT USE OF INSULIN: ICD-10-CM

## 2019-11-26 DIAGNOSIS — E11.9 TYPE 2 DIABETES MELLITUS WITHOUT COMPLICATION, WITH LONG-TERM CURRENT USE OF INSULIN: ICD-10-CM

## 2019-11-26 RX ORDER — INSULIN GLARGINE 100 [IU]/ML
INJECTION, SOLUTION SUBCUTANEOUS
Qty: 30 ML | Refills: 0 | Status: SHIPPED | OUTPATIENT
Start: 2019-11-26 | End: 2020-04-01 | Stop reason: SDUPTHER

## 2020-01-09 ENCOUNTER — TELEPHONE (OUTPATIENT)
Dept: FAMILY MEDICINE | Facility: CLINIC | Age: 85
End: 2020-01-09

## 2020-01-13 ENCOUNTER — NURSE TRIAGE (OUTPATIENT)
Dept: ADMINISTRATIVE | Facility: CLINIC | Age: 85
End: 2020-01-13

## 2020-01-13 NOTE — TELEPHONE ENCOUNTER
Reason for Disposition   Message left on identified voicemail    Additional Information   Negative: Caller has already spoken with the PCP (or office), and has no further questions   Negative: Caller has already spoken with another triager and has no further questions   Negative: Caller has already spoken with another triager or PCP (or office), and has further questions and triager able to answer questions.   Negative: Busy signal.  First attempt to contact caller.  Follow-up call scheduled within 15 minutes.   Negative: No answer.  First attempt to contact caller.  Follow-up call scheduled within 15 minutes.    Protocols used: NO CONTACT OR DUPLICATE CONTACT CALL-A-OH

## 2020-01-14 ENCOUNTER — HOSPITAL ENCOUNTER (EMERGENCY)
Facility: HOSPITAL | Age: 85
Discharge: HOME | End: 2020-01-14
Attending: EMERGENCY MEDICINE
Payer: MEDICARE

## 2020-01-14 VITALS
TEMPERATURE: 98 F | DIASTOLIC BLOOD PRESSURE: 69 MMHG | WEIGHT: 190 LBS | BODY MASS INDEX: 29.82 KG/M2 | HEART RATE: 74 BPM | RESPIRATION RATE: 16 BRPM | HEIGHT: 67 IN | SYSTOLIC BLOOD PRESSURE: 139 MMHG | OXYGEN SATURATION: 97 %

## 2020-01-14 DIAGNOSIS — R47.81 SLURRED SPEECH: Primary | ICD-10-CM

## 2020-01-14 LAB
ALBUMIN SERPL BCP-MCNC: 3.8 G/DL (ref 3.5–5.2)
ALP SERPL-CCNC: 68 U/L (ref 55–135)
ALT SERPL W/O P-5'-P-CCNC: 25 U/L (ref 10–44)
ANION GAP SERPL CALC-SCNC: 11 MMOL/L (ref 8–16)
AST SERPL-CCNC: 24 U/L (ref 10–40)
BASOPHILS # BLD AUTO: 0.04 K/UL (ref 0–0.2)
BASOPHILS NFR BLD: 0.9 % (ref 0–1.9)
BILIRUB SERPL-MCNC: 1.3 MG/DL (ref 0.1–1)
BUN SERPL-MCNC: 22 MG/DL (ref 8–23)
CALCIUM SERPL-MCNC: 9.5 MG/DL (ref 8.7–10.5)
CHLORIDE SERPL-SCNC: 106 MMOL/L (ref 95–110)
CHOLEST SERPL-MCNC: 132 MG/DL (ref 120–199)
CHOLEST/HDLC SERPL: 4.6 {RATIO} (ref 2–5)
CO2 SERPL-SCNC: 22 MMOL/L (ref 23–29)
CREAT SERPL-MCNC: 1 MG/DL (ref 0.5–1.4)
DIFFERENTIAL METHOD: ABNORMAL
EOSINOPHIL # BLD AUTO: 0.2 K/UL (ref 0–0.5)
EOSINOPHIL NFR BLD: 3.4 % (ref 0–8)
ERYTHROCYTE [DISTWIDTH] IN BLOOD BY AUTOMATED COUNT: 13.7 % (ref 11.5–14.5)
EST. GFR  (AFRICAN AMERICAN): 59 ML/MIN/1.73 M^2
EST. GFR  (NON AFRICAN AMERICAN): 51 ML/MIN/1.73 M^2
GLUCOSE SERPL-MCNC: 219 MG/DL (ref 70–110)
HCT VFR BLD AUTO: 36.1 % (ref 37–48.5)
HDLC SERPL-MCNC: 29 MG/DL (ref 40–75)
HDLC SERPL: 22 % (ref 20–50)
HGB BLD-MCNC: 11.7 G/DL (ref 12–16)
IMM GRANULOCYTES # BLD AUTO: 0.02 K/UL (ref 0–0.04)
LDLC SERPL CALC-MCNC: 69.8 MG/DL (ref 63–159)
LYMPHOCYTES # BLD AUTO: 0.7 K/UL (ref 1–4.8)
LYMPHOCYTES NFR BLD: 15.5 % (ref 18–48)
MCH RBC QN AUTO: 29.9 PG (ref 27–31)
MCHC RBC AUTO-ENTMCNC: 32.4 G/DL (ref 32–36)
MCV RBC AUTO: 92 FL (ref 82–98)
MONOCYTES # BLD AUTO: 0.4 K/UL (ref 0.3–1)
MONOCYTES NFR BLD: 9.2 % (ref 4–15)
NEUTROPHILS # BLD AUTO: 3.1 K/UL (ref 1.8–7.7)
NEUTROPHILS NFR BLD: 70.5 % (ref 38–73)
NONHDLC SERPL-MCNC: 103 MG/DL
NRBC BLD-RTO: 0 /100 WBC
PLATELET # BLD AUTO: 167 K/UL (ref 150–350)
PMV BLD AUTO: 11.2 FL (ref 9.2–12.9)
POTASSIUM SERPL-SCNC: 3.8 MMOL/L (ref 3.5–5.1)
PROT SERPL-MCNC: 6.8 G/DL (ref 6–8.4)
RBC # BLD AUTO: 3.91 M/UL (ref 4–5.4)
SODIUM SERPL-SCNC: 139 MMOL/L (ref 136–145)
TRIGL SERPL-MCNC: 166 MG/DL (ref 30–150)
TSH SERPL DL<=0.005 MIU/L-ACNC: 2.15 UIU/ML (ref 0.4–4)
WBC # BLD AUTO: 4.44 K/UL (ref 3.9–12.7)

## 2020-01-14 PROCEDURE — 84443 ASSAY THYROID STIM HORMONE: CPT | Mod: HCNC

## 2020-01-14 PROCEDURE — 80061 LIPID PANEL: CPT | Mod: HCNC

## 2020-01-14 PROCEDURE — 36415 COLL VENOUS BLD VENIPUNCTURE: CPT | Mod: HCNC

## 2020-01-14 PROCEDURE — 80053 COMPREHEN METABOLIC PANEL: CPT | Mod: HCNC

## 2020-01-14 PROCEDURE — 93005 ELECTROCARDIOGRAM TRACING: CPT | Mod: HCNC

## 2020-01-14 PROCEDURE — 85025 COMPLETE CBC W/AUTO DIFF WBC: CPT | Mod: HCNC

## 2020-01-14 PROCEDURE — 99285 EMERGENCY DEPT VISIT HI MDM: CPT | Mod: 25,HCNC

## 2020-01-14 NOTE — ED NOTES
Patient presents to ED POV from home with c/o slurred speech 10 days ago. The patient reports that her speech has resolved since then. She currently denies symptoms. 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 She is AAOx4. Skin warm, dry to touch. Respirations even, nonlabored. NAD noted. Speech is clear at this time. Patient ambulates with steady gait unassisted.

## 2020-01-14 NOTE — ED NOTES
Patient ready for discharge per Dr. Marie. Pt voices understanding to follow up with PCP and neurology. Pt is AAOx4. Ambulatory with steady gait unassisted. Respirations even, nonlabored.

## 2020-01-14 NOTE — TELEPHONE ENCOUNTER
10 days ago realized her speech was slurring. Thinks she may have had a mini stroke.    Reason for Disposition   Patient sounds very sick or weak to the triager    Additional Information   Negative: [1] SEVERE weakness (i.e., unable to walk or barely able to walk, requires support) AND [2] new onset or worsening   Negative: [1] Weakness (i.e., paralysis, loss of muscle strength) of the face, arm / hand, or leg / foot on one side of the body AND [2] sudden onset AND [3] present now   Negative: [1] Numbness (i.e., loss of sensation) of the face, arm / hand, or leg / foot on one side of the body AND [2] sudden onset AND [3] present now   Negative: [1] Loss of speech or garbled speech AND [2] sudden onset AND [3] present now   Negative: Difficult to awaken or acting confused (e.g., disoriented, slurred speech)   Negative: Sounds like a life-threatening emergency to the triager   Negative: Confusion, disorientation, or hallucinations is main symptom   Negative: Neck pain is main symptom (and having weakness, numbness, or tingling in arm / hand because of neck pain)   Negative: Back pain is main symptom (and having weakness, numbness, or tingling in leg because of back pain)   Negative: Hand pain is main symptom (and having mild weakness, numbness, or tingling in hand related to hand pain)   Negative: Dizziness is main symptom   Negative: Vision loss or change is main symptom   Negative: Followed a head injury within last 3 days   Negative: Followed a neck injury within last 3 days   Negative: [1] Tingling in both hands and/or feet AND [2] breathing faster than normal AND [3] feels similar to prior panic attack or hyperventilation episode   Negative: Weakness in both sides of the body or weakness all over   Negative: Headache  (and neurologic deficit)   Negative: [1] Back pain AND [2] numbness (loss of sensation) in groin or rectal area   Negative: [1] Unable to urinate (or only a few drops) > 4 hours  AND [2] bladder feels very full (e.g., palpable bladder or strong urge to urinate)   Negative: [1] Loss of control of bowel or bladder (i.e., incontinence) AND [2] new onset   Negative: Bell's palsy suspected (i.e., weakness on only one side of the face, developing over hours to days, no other symptoms)   Negative: [1] Loss of speech or garbled speech AND [2] sudden onset AND [3] brief (now gone)   Negative: [1] Numbness (i.e., loss of sensation) of the face, arm / hand, or leg / foot on one side of the body AND [2] sudden onset AND [3] brief (now gone)   Negative: [1] Weakness (i.e., paralysis, loss of muscle strength) of the face, arm / hand, or leg / foot on one side of the body AND [2] sudden onset AND [3] brief (now gone)    Protocols used: NEUROLOGIC DEFICIT-A-AH

## 2020-01-14 NOTE — DISCHARGE INSTRUCTIONS
It is unclear why you developed slurred speech but your MRI of brain did not show evidence of a stroke.  If you develop any slurred speech, numbness, weakness, vision changes or any other concerns please return to the emergency department immediately for re-evaluation.   pt came to the ER today with c/o chest pain that radiates to back area. pt states she was working and felt her vertigo and her pressure being high. pt states she recently relocated from [place] to [place] so has not been able to see a primary medical doctor.

## 2020-01-14 NOTE — ED PROVIDER NOTES
Encounter Date: 1/14/2020    SCRIBE #1 NOTE: I, Gwen Diaz, am scribing for, and in the presence of, Bravo Marie MD.       History     Chief Complaint   Patient presents with    Aphasia     started 10 days ago / resolved after 4 days / recent hyperglycemia        Time seen by provider: 9:25 AM on 01/14/2020    Leslie Tolliver is a 85 y.o. female with DMII, HTN, CAD, CKD stage III, and anemia who presents to the ED concerned for a mini stroke with an onset of slurred speech. She states she noticed her speech was slurred 10 days ago while singing in the car and reports no one else noticed it. Her speech has since resolved. The patient was referred to the ER after speaking to the triage nurse on the phone last night but came this morning. She is on a daily Aspirin 81 mg regimen. The patient denies facial drooping, difficulty walking, or any other symptoms at this time. No cardiopulmonary PSHx.     The history is provided by the patient.     Review of patient's allergies indicates:   Allergen Reactions    Fenofibric acid (choline)      Other reaction(s): Vomiting    Iodine      Other reaction(s): lips swollen    Rosuvastatin      Other reaction(s): muscle pain     Past Medical History:   Diagnosis Date    Anemia due to stage 3 chronic kidney disease 7/24/2019    Arthritis     Chronic bilateral low back pain without sciatica 7/24/2019    CKD (chronic kidney disease) stage 3, GFR 30-59 ml/min 7/24/2019    Colon polyps     Coronary artery disease     Diabetes mellitus type II     Diabetes with neurologic complications     GERD (gastroesophageal reflux disease)     Hyperlipidemia     Hypertension     Hypothyroidism     Type 2 diabetes mellitus      Past Surgical History:   Procedure Laterality Date    ADENOIDECTOMY      BREAST BIOPSY Right 20 yrs ago    benign    CHOLECYSTECTOMY      COLONOSCOPY  5-    Dr Holly, 5 year recheck    HEMORRHOID SURGERY      HYSTERECTOMY      OOPHORECTOMY       TONSILLECTOMY       Family History   Problem Relation Age of Onset    Diabetes Mother     Heart disease Mother     Glaucoma Mother     Cancer Father         lung cancer    Early death Son         brain tumor age 3    Diabetes Brother     No Known Problems Daughter     Early death Son         MVA 25    Macular degeneration Neg Hx     Retinal detachment Neg Hx      Social History     Tobacco Use    Smoking status: Former Smoker     Packs/day: 0.25     Years: 15.00     Pack years: 3.75     Types: Cigarettes     Last attempt to quit: 3/30/1974     Years since quittin.8    Smokeless tobacco: Never Used   Substance Use Topics    Alcohol use: No    Drug use: No     Review of Systems   Constitutional: Negative for chills and fever.   HENT: Negative for nosebleeds.    Eyes: Negative for visual disturbance.   Respiratory: Negative for cough and shortness of breath.    Cardiovascular: Negative for chest pain and palpitations.   Gastrointestinal: Negative for abdominal pain, diarrhea, nausea and vomiting.   Genitourinary: Negative for dysuria and hematuria.   Musculoskeletal: Negative for back pain and neck pain.   Skin: Negative for rash.   Neurological: Positive for speech difficulty (resolved). Negative for facial asymmetry.        Negative for dysequilibrium.      Physical Exam     Initial Vitals [20 0856]   BP Pulse Resp Temp SpO2   (!) 160/75 88 18 98.2 °F (36.8 °C) 96 %      MAP       --         Physical Exam    Nursing note and vitals reviewed.  Constitutional: She appears well-developed and well-nourished.  Non-toxic appearance. No distress.   HENT:   Head: Normocephalic and atraumatic.   Eyes: EOM are normal. Pupils are equal, round, and reactive to light.   Neck: Normal range of motion. Neck supple. No neck rigidity. Carotid bruit is present. No JVD present.   Bilateral carotid bruits versus radiation from murmur.    Cardiovascular: Normal rate, regular rhythm and intact distal pulses.  Exam reveals no gallop and no friction rub.    Murmur heard.   Systolic murmur is present.  Systolic ejection murmur.    Pulmonary/Chest: Breath sounds normal. She has no wheezes. She has no rhonchi. She has no rales.   Abdominal: Soft. Bowel sounds are normal. She exhibits no distension. There is no tenderness. There is no rebound and no guarding.   Musculoskeletal: Normal range of motion.   Neurological: She is alert and oriented to person, place, and time. She has normal strength and normal reflexes. No cranial nerve deficit or sensory deficit. She exhibits normal muscle tone. Coordination normal. GCS score is 15. GCS eye subscore is 4. GCS verbal subscore is 5. GCS motor subscore is 6.   Skin: Skin is warm and dry.   Psychiatric: She has a normal mood and affect. Her speech is normal and behavior is normal. She is not actively hallucinating.       ED Course   Procedures  Labs Reviewed   CBC W/ AUTO DIFFERENTIAL - Abnormal; Notable for the following components:       Result Value    RBC 3.91 (*)     Hemoglobin 11.7 (*)     Hematocrit 36.1 (*)     Lymph # 0.7 (*)     Lymph% 15.5 (*)     All other components within normal limits   COMPREHENSIVE METABOLIC PANEL - Abnormal; Notable for the following components:    CO2 22 (*)     Glucose 219 (*)     Total Bilirubin 1.3 (*)     eGFR if  59 (*)     eGFR if non  51 (*)     All other components within normal limits   LIPID PANEL - Abnormal; Notable for the following components:    Triglycerides 166 (*)     HDL 29 (*)     All other components within normal limits   TSH     EKG Readings: (Independently Interpreted)   Initial Reading: No STEMI.   Normal sinus rhythm at a rate of 82 bpm with an inferior infarct, which is more prominent compared to previous EKG on June 4, 2009, normal ST segments, and normal T-waves.       Imaging Results          MRI Brain Without Contrast (Final result)  Result time 01/14/20 12:32:16    Final result by Michael  ARUN Alonso MD (01/14/20 12:32:16)                 Impression:      1. There is no acute or significant abnormality.  There is mild nonspecific white matter change.  There is no hemorrhage, mass, mass effect or acute infarction.      Electronically signed by: Michael Alonso MD  Date:    01/14/2020  Time:    12:32             Narrative:    EXAM:  MRI BRAIN WITHOUT CONTRAST    CLINICAL HISTORY:  Focal neuro deficit, new, fixed or worsening, >6 hours; .    COMPARISON:  Head CT performed at 09:55 hours today    FINDINGS:  Intracranial contents:There is no acute abnormality.  Specifically, there are no regions of restricted diffusion to suggest acute infarction.  There is no hydrocephalus or midline shift.  There is mild nonspecific white matter change with a patchy focus of FLAIR hyperintense signal in the left parietal white matter.  These changes correspond to subtle regions of decreased attenuation on head CT and likely reflect sequelae of chronic small vessel disease.  There is no intracranial hemorrhage or mass.  There is no mass effect.  There is no abnormal extra-axial fluid collection.  The basilar cisterns are open.  Flow voids suggesting patency are present in the major vessels at the base of the brain.  The cerebellar tonsils are just at the level of the foramen magnum.  The sellar structures are normal.  The orbits are grossly normal.    Extracranial contents, calvarium, soft tissues:Baseline marrow signal is normal.  There is only trace scattered mucosal thickening in the paranasal sinuses.  The mastoid air cells are clear.                               CT Head Without Contrast (Final result)  Result time 01/14/20 10:13:56    Final result by Michael Alonso MD (01/14/20 10:13:56)                 Impression:      1. There is no obvious acute abnormality.  There is nonspecific white matter change but there is no intracranial hemorrhage, mass, mass effect or obvious acute edema or ischemia.  It should  be noted that MRI is more sensitive in the detection of subtle or acute nonhemorrhagic ischemic disease.      Electronically signed by: Michael Alonso MD  Date:    01/14/2020  Time:    10:13             Narrative:    EXAMINATION:  CT HEAD WITHOUT CONTRAST    CLINICAL HISTORY:  Focal neuro deficit, new, fixed or worsening, >6 hours;    TECHNIQUE:  Routine unenhanced axial images were obtained through the head.  Sagittal and coronal reformatted images were created.  The study is reviewed in bone and soft tissue windows.    COMPARISON:  Head CT dated 11/02/2017    FINDINGS:  Intracranial contents: There is no obvious acute abnormality or significant change in the appearance of the brain when compared to the prior study.  There is no intracranial hemorrhage.  There is no mass or mass effect.  There is no hydrocephalus or midline shift.  There is mild nonspecific white matter change.  The gray-white interface is preserved without obvious acute infarction.  There is no abnormal extra-axial fluid collection.  No dense vessel is identified.  The basilar cisterns are open.  Cerebellar tonsils are just at the level of the foramen magnum.  Sellar structures are normal.    Extracranial contents, calvarium, soft tissues: The calvarium is normal.  There is moderate atherosclerosis.  There are changes of hyperostosis frontalis interna.  The included paranasal sinuses and mastoid air cells are clear.                                 Medical Decision Making:   History:   Old Medical Records: I decided to obtain old medical records.  Initial Assessment:   Patient is 85-year-old woman who presents emergency department for several days of slurred speech that was only noticed by herself and has resolved.  Patient states she works in a [employer] shop but her slurred speech was not called to her attention.  Her speech returned to normal approximately 5 days ago.  Stroke workup was performed in the emergency department including MRI brain  which was negative. Discussed with neurology,  Dr. Hightower, who suggests outpatient follow-up given negative MRI.  She will be referred to her primary care physician and Neurology.  Unsure of the etiology of her episode but it seems to have resolved.  At 85 multiple sclerosis could be an etiology although highly unlikely.  She is advised to continue taking her daily aspirin and return for any worsening symptoms.  Independently Interpreted Test(s):   I have ordered and independently interpreted EKG Reading(s) - see prior notes  Clinical Tests:   Lab Tests: Reviewed and Ordered  Radiological Study: Ordered and Reviewed  Medical Tests: Reviewed and Ordered            Scribe Attestation:   Scribe #1: I performed the above scribed service and the documentation accurately describes the services I performed. I attest to the accuracy of the note.     I, Frank Neves, personally performed the services described in this documentation. All medical record entries made by the scribe were at my direction and in my presence.  I have reviewed the chart and agree that the record reflects my personal performance and is accurate and complete. Bravo Marie MD.              Clinical Impression:       ICD-10-CM ICD-9-CM   1. Slurred speech R47.81 784.59                             Bravo Marie MD  01/14/20 1301       Bravo Marie MD  01/14/20 1301       Bravo Marie MD  01/14/20 1305

## 2020-01-21 ENCOUNTER — DOCUMENTATION ONLY (OUTPATIENT)
Dept: FAMILY MEDICINE | Facility: CLINIC | Age: 85
End: 2020-01-21

## 2020-01-21 NOTE — PROGRESS NOTES
Pre-Visit Chart Review  For Appointment Scheduled on 1-30-20    Health Maintenance Due   Topic Date Due    Foot Exam  01/08/2020    Eye Exam  01/15/2020

## 2020-01-30 ENCOUNTER — OFFICE VISIT (OUTPATIENT)
Dept: FAMILY MEDICINE | Facility: CLINIC | Age: 85
End: 2020-01-30
Attending: FAMILY MEDICINE
Payer: MEDICARE

## 2020-01-30 VITALS
TEMPERATURE: 98 F | RESPIRATION RATE: 16 BRPM | BODY MASS INDEX: 30.83 KG/M2 | OXYGEN SATURATION: 98 % | HEART RATE: 72 BPM | HEIGHT: 67 IN | WEIGHT: 196.44 LBS | DIASTOLIC BLOOD PRESSURE: 62 MMHG | SYSTOLIC BLOOD PRESSURE: 130 MMHG

## 2020-01-30 DIAGNOSIS — E78.5 HYPERLIPIDEMIA ASSOCIATED WITH TYPE 2 DIABETES MELLITUS: ICD-10-CM

## 2020-01-30 DIAGNOSIS — J30.2 SEASONAL ALLERGIC RHINITIS, UNSPECIFIED TRIGGER: ICD-10-CM

## 2020-01-30 DIAGNOSIS — R47.81 SLURRED SPEECH: Primary | ICD-10-CM

## 2020-01-30 DIAGNOSIS — E11.59 HYPERTENSION ASSOCIATED WITH DIABETES: ICD-10-CM

## 2020-01-30 DIAGNOSIS — H69.93 EUSTACHIAN TUBE DYSFUNCTION, BILATERAL: ICD-10-CM

## 2020-01-30 DIAGNOSIS — E11.69 HYPERLIPIDEMIA ASSOCIATED WITH TYPE 2 DIABETES MELLITUS: ICD-10-CM

## 2020-01-30 DIAGNOSIS — I15.2 HYPERTENSION ASSOCIATED WITH DIABETES: ICD-10-CM

## 2020-01-30 DIAGNOSIS — Z79.4 TYPE 2 DIABETES MELLITUS WITH DIABETIC POLYNEUROPATHY, WITH LONG-TERM CURRENT USE OF INSULIN: ICD-10-CM

## 2020-01-30 DIAGNOSIS — E11.42 TYPE 2 DIABETES MELLITUS WITH DIABETIC POLYNEUROPATHY, WITH LONG-TERM CURRENT USE OF INSULIN: ICD-10-CM

## 2020-01-30 DIAGNOSIS — N18.30 CKD (CHRONIC KIDNEY DISEASE) STAGE 3, GFR 30-59 ML/MIN: ICD-10-CM

## 2020-01-30 PROCEDURE — 99499 UNLISTED E&M SERVICE: CPT | Mod: HCNC,S$GLB,, | Performed by: FAMILY MEDICINE

## 2020-01-30 PROCEDURE — 99999 PR PBB SHADOW E&M-EST. PATIENT-LVL III: CPT | Mod: PBBFAC,HCNC,, | Performed by: FAMILY MEDICINE

## 2020-01-30 PROCEDURE — 99214 OFFICE O/P EST MOD 30 MIN: CPT | Mod: HCNC,S$GLB,, | Performed by: FAMILY MEDICINE

## 2020-01-30 PROCEDURE — 1126F PR PAIN SEVERITY QUANTIFIED, NO PAIN PRESENT: ICD-10-PCS | Mod: HCNC,S$GLB,, | Performed by: FAMILY MEDICINE

## 2020-01-30 PROCEDURE — 3075F SYST BP GE 130 - 139MM HG: CPT | Mod: HCNC,CPTII,S$GLB, | Performed by: FAMILY MEDICINE

## 2020-01-30 PROCEDURE — 1101F PT FALLS ASSESS-DOCD LE1/YR: CPT | Mod: HCNC,CPTII,S$GLB, | Performed by: FAMILY MEDICINE

## 2020-01-30 PROCEDURE — 99999 PR PBB SHADOW E&M-EST. PATIENT-LVL III: ICD-10-PCS | Mod: PBBFAC,HCNC,, | Performed by: FAMILY MEDICINE

## 2020-01-30 PROCEDURE — 99499 RISK ADDL DX/OHS AUDIT: ICD-10-PCS | Mod: HCNC,S$GLB,, | Performed by: FAMILY MEDICINE

## 2020-01-30 PROCEDURE — 99214 PR OFFICE/OUTPT VISIT, EST, LEVL IV, 30-39 MIN: ICD-10-PCS | Mod: HCNC,S$GLB,, | Performed by: FAMILY MEDICINE

## 2020-01-30 PROCEDURE — 1159F MED LIST DOCD IN RCRD: CPT | Mod: HCNC,S$GLB,, | Performed by: FAMILY MEDICINE

## 2020-01-30 PROCEDURE — 3078F PR MOST RECENT DIASTOLIC BLOOD PRESSURE < 80 MM HG: ICD-10-PCS | Mod: HCNC,CPTII,S$GLB, | Performed by: FAMILY MEDICINE

## 2020-01-30 PROCEDURE — 1101F PR PT FALLS ASSESS DOC 0-1 FALLS W/OUT INJ PAST YR: ICD-10-PCS | Mod: HCNC,CPTII,S$GLB, | Performed by: FAMILY MEDICINE

## 2020-01-30 PROCEDURE — 1126F AMNT PAIN NOTED NONE PRSNT: CPT | Mod: HCNC,S$GLB,, | Performed by: FAMILY MEDICINE

## 2020-01-30 PROCEDURE — 1159F PR MEDICATION LIST DOCUMENTED IN MEDICAL RECORD: ICD-10-PCS | Mod: HCNC,S$GLB,, | Performed by: FAMILY MEDICINE

## 2020-01-30 PROCEDURE — 3075F PR MOST RECENT SYSTOLIC BLOOD PRESS GE 130-139MM HG: ICD-10-PCS | Mod: HCNC,CPTII,S$GLB, | Performed by: FAMILY MEDICINE

## 2020-01-30 PROCEDURE — 3078F DIAST BP <80 MM HG: CPT | Mod: HCNC,CPTII,S$GLB, | Performed by: FAMILY MEDICINE

## 2020-01-30 NOTE — PATIENT INSTRUCTIONS
Diabetes (General Information)  Diabetes is a long-term health problem. It means your body does not make enough insulin. Or it may mean that your body cannot use the insulin it makes. Insulin is a hormone in your body. It lets blood sugar (glucose) reach the cells in your body. All of your cells need glucose for fuel.  When you have diabetes, the glucose in your blood builds up because it cannot get into the cells. This buildup is called high blood sugar (hyperglycemia).  Your blood sugar level depends on several things. It depends on what kind of food you eat and how much of it you eat. It also depends on how much exercise you get, and how much insulin you have in your body. Eating too much of the wrong kinds of food or not taking diabetes medicine on time can cause high blood sugar. Infections can cause high blood sugar even if you are taking medicines correctly.  These things can also cause low blood sugar:  · Missing meals  · Not eating enough food  · Taking too much diabetes medicine  Diabetes can cause serious problems over time if you do not get treated. These problems include heart disease, stroke, kidney failure, and blindness. They also include nerve pain or loss of feeling in your legs and feet, and gangrene of the feet. By keeping your blood sugar under control you can prevent or delay these problems.  Normal blood sugar levels are 80 to 100 before a meal and less than 180 in the 1 to 2 hours after a meal.  Home care  Follow these guidelines when caring for yourself at home:  · Follow the diet your healthcare provider gives you. Take insulin or other diabetes medicine exactly as told to.  · Watch your blood sugar as you are told to. Keep a log of your results. This will help your provider change your medicines to keep your blood sugar under control.  · Try to reach your ideal weight. You may be able to cut back on or not have to take diabetes medicine if you eat the right foods and get exercise.  · Do  not smoke. Smoking worsens the effects of diabetes on your circulation. You are much more likely to have a heart attack if you have diabetes and you smoke.  · Take good care of your feet. If you have lost feeling in your feet, you may not see an injury or infection. Check your feet and between your toes at least once a week.  · Wear a medical alert bracelet or necklace, or carry a card in your wallet that says you have diabetes. This will help healthcare providers give you the right care if you get very ill and cannot tell them that you have diabetes.  Sick day plan  If you get a cold, the flu, or a bacterial or viral infection, take these steps:  · Look at your diabetes sick plan and call your healthcare provider as you were told to. You may need to call your provider right away if:  ¨ Your blood sugar is above 240 while taking your diabetes medicine  ¨ Your urine ketone levels are above normal or high  ¨ You have been vomiting more than 6 hours  ¨ You have trouble breathing or your breath ha s a fruity smell  ¨ You have a high fever  ¨ You have a fever for several days and you are not getting better  ¨ You get light-headed and are sleepier than usual  · Keep taking your diabetes pills (oral medicine) even if you have been vomiting and are feeling sick. Call your provider right away because you may need insulin to lower your blood sugar until you recover from your illness.  · Keep taking your insulin even if you have been vomiting and are feeling sick. Call your provider right away to ask if you need to change your insulin dose. This will depend on your blood sugar results.  · Check your blood sugar every 2 to 4 hours, or at least 4 times a day.  · Check your ketones often. If you are vomiting and having diarrhea, watch them more often.  · Do not skip meals. Try to eat small meals on a regular schedule. Do this even if you do not feel like eating.  · Drink water or other liquids that do not have caffeine or  calories. This will keep you from getting dehydrated. If you are nauseated or vomiting, takes small sips every 5 minutes. To prevent dehydration try to drink a cup (8 ounces) of fluids every hour while you are awake.  General care  Always bring a source of fast-acting sugar with you in case you have symptoms of low blood sugar (below 70). At the first sign of low blood sugar, eat or drink 15 to 20 grams of fast-acting sugar to raise your blood sugar. Examples are:  · 3 to 4 glucose tablets. You can buy these at most [employer].  · 4 ounces (1/2 cup) of regular (not diet) soft drinks  · 4 ounces (1/2 cup) of any fruit juice  · 8 ounces (1 cup) of milk  · 5 to 6 pieces of hard candy  · 1 tablespoon of honey  Check your blood sugar 15 minutes after treating yourself. If it is still below 70, take 15 to 20 more grams of fast-acting sugar. Test again in 15 minutes. If it returns to normal (70 or above), eat a snack or meal to keep your blood sugar in a safe range. If it stays low, call your doctor or go to an emergency room.  Follow-up care  Follow-up with your healthcare provider, or as advised. For more information about diabetes, visit the American Diabetes Association website at www.diabetes.org or call 322-099-3014.  When to seek medical advice  Call your healthcare provider right away if you have any of these symptoms of high blood sugar:  · Frequent urination  · Dizziness  · Drowsiness  · Thirst  · Headache  · Nausea or vomiting  · Abdominal pain  · Eyesight changes  · Fast breathing  · Confusion or loss of consciousness  Also call your provider right away if you have any of these signs of low blood sugar:  · Fatigue  · Headache  · Shakes  · Excess sweating  · Hunger  · Feeling anxious or restless  · Eyesight changes  · Drowsiness  · Weakness  · Confusion or loss of consciousness  Call 911  Call for emergency help right away if any of these occur:  · Chest pain or shortness of breath  · Dizziness or  fainting  · Weakness of an arm or leg or one side of the face  · Trouble speaking or seeing   Date Last Reviewed: 6/1/2016  © 3815-0974 [facility] [employer]`. [address] [place] [address], [place] [address]. All rights reserved. This information is not intended as a substitute for professional medical care. Always follow your healthcare professional's instructions.

## 2020-01-30 NOTE — PROGRESS NOTES
Subjective:       Patient ID: Leslie Tolliver is a 85 y.o. female.    Chief Complaint: Hospital Follow Up    85-year-old female with a history of coronary artery disease, stage 3 chronic kidney disease, diabetes, hypertension, hyperlipidemia, hypothyroidism and other problems presented to the emergency room on January 14, 2020 at [facility] complaining of slurred speech on set 10 days prior to admission and lasting the possibly 4 to 5 days.  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  She had no new medications, she had no lateralizing symptoms, no swallowing difficulties, no visual impairment.  Her blood sugar control was unchanged, she had no headaches or other senses affected.  She does have some chronic rhinitis with some nasal congestion.  The workup at the hospital was very complete with extensive lab work, a CT scan of the brain that showed some nonspecific white matter changes that was followed by an MRI of the brain that also showed nonspecific white matter changes compatible with mild chronic microvascular disease but no evidence of stroke, hemorrhage, tumor or displacement.  The lab showed good control of her cholesterol with the LDL of 68, no significant uremia, no electrolyte problems and a nonfasting sugar that was elevated.  She has no symptoms at this time.    Past Medical History:  7/24/2019: Anemia due to stage 3 chronic kidney disease  No date: Arthritis  7/24/2019: Chronic bilateral low back pain without sciatica  7/24/2019: CKD (chronic kidney disease) stage 3, GFR 30-59 ml/min  No date: Colon polyps  No date: Coronary artery disease  No date: Diabetes mellitus type II  No date: Diabetes with neurologic complications  No date: GERD  "(gastroesophageal reflux disease)  No date: Hyperlipidemia  No date: Hypertension  No date: Hypothyroidism  No date: Type 2 diabetes mellitus    Past Surgical History:  No date: ADENOIDECTOMY  20 yrs ago: BREAST BIOPSY; Right      Comment:  benign  No date: CHOLECYSTECTOMY  5-: COLONOSCOPY      Comment:  Dr Holly, 5 year recheck  No date: HEMORRHOID SURGERY  No date: HYSTERECTOMY  No date: OOPHORECTOMY  No date: TONSILLECTOMY    Current Outpatient Medications on File Prior to Visit:  ACCU-CHEK SMARTVIEW TEST STRIP Strp, USE TO TEST THREE TIMES DAILY, Disp: 1000 strip, Rfl: 0  aspirin 81 mg Tab, Take 81 mg by mouth once daily. Every day, Disp: , Rfl:   calcium carbonate/vitamin D3 (CALCIUM+D ORAL), Take 1 tablet by mouth once daily., Disp: , Rfl:   carvedilol (COREG) 6.25 MG tablet, Take 1 tablet (6.25 mg total) by mouth 2 (two) times daily with meals., Disp: 180 tablet, Rfl: 3  cholestyramine-aspartame (PREVALITE) 4 gram PwPk, Take 1 packet (4 g total) by mouth once daily. (Patient taking differently: Take 1 packet by mouth daily as needed. ), Disp: 30 packet, Rfl: 11  DROPLET PEN NEEDLE 31 gauge x 5/16" Ndle, USE WITH LANTUS SOLOSTAR PEN AS NEEDED, Disp: 100 each, Rfl: 3  insulin (LANTUS SOLOSTAR U-100 INSULIN) glargine 100 units/mL (3mL) SubQ pen, INJECT 24 UNITS INTO THE SKIN EVERY EVENING., Disp: 30 mL, Rfl: 0  lancets (MICROLET LANCET) Misc, Accu-chek fast clix lancets check glucose three times daily E11.65, Disp: 300 each, Rfl: 3  lisinopril-hydrochlorothiazide (PRINZIDE,ZESTORETIC) 10-12.5 mg per tablet, TAKE 1 TABLET EVERY DAY, Disp: 90 tablet, Rfl: 1  multivitamin (ONE DAILY MULTIVITAMIN) per tablet, Take 1 tablet by mouth once daily., Disp: , Rfl:   naproxen sodium (ALEVE) 220 mg Cap, Take 220 mg by mouth 2 (two) times daily. As needed for arthritic pain, Disp: 30 each, Rfl: 11  SAXagliptin-metformin (KOMBIGLYZE XR) 2.5-1,000 mg TM24, TAKE 1 TABLET TWICE DAILY, Disp: 180 tablet, Rfl: " 1  blood-glucose meter kit, Accu-chek Marley glucometer check glucose three times daily E11.65, Disp: 1 each, Rfl: 0    No current facility-administered medications on file prior to visit.         Review of Systems   HENT: Positive for congestion and postnasal drip. Negative for sinus pressure and sinus pain.    Neurological: Positive for speech difficulty. Negative for dizziness, weakness, light-headedness, numbness and headaches.       Objective:      Physical Exam   Constitutional: She appears well-developed. No distress.   Good blood pressure control  Normal oxygenation  Obese with a BMI of 30.8 she is up 5.4 lb since her last visit with Rylee July 23, 2019   HENT:   Head: Normocephalic and atraumatic.   Right Ear: External ear and ear canal normal. Tympanic membrane is injected and bulging. Tympanic membrane mobility is abnormal. A middle ear effusion is present.   Left Ear: External ear and ear canal normal. Tympanic membrane is injected and bulging. Tympanic membrane mobility is abnormal. A middle ear effusion is present.   Eyes: Pupils are equal, round, and reactive to light. EOM are normal.   Cardiovascular: Normal rate, regular rhythm and normal heart sounds. Exam reveals no gallop and no friction rub.   No murmur heard.  Pulmonary/Chest: Effort normal and breath sounds normal. No stridor. No respiratory distress. She has no wheezes. She has no rales. She exhibits no tenderness.   Skin: She is not diaphoretic.   Psychiatric: She has a normal mood and affect. Her behavior is normal. Judgment and thought content normal.   Nursing note and vitals reviewed.      Assessment:       1. Slurred speech    2. Eustachian tube dysfunction, bilateral    3. Seasonal allergic rhinitis, unspecified trigger    4. Type 2 diabetes mellitus with diabetic polyneuropathy, with long-term current use of insulin    5. Hypertension associated with diabetes    6. Hyperlipidemia associated with type 2 diabetes mellitus    7. CKD  (chronic kidney disease) stage 3, GFR 30-59 ml/min        Plan:       1. Slurred speech  It appears as if her speech was probably normal since multiple people who knew her well some of whom worked in a medical environment did not notice anything.  I suspect she actually had altered speech perception associated with chronic rhinitis and you stage in dysfunction    2. Eustachian tube dysfunction, bilateral  See above, recommend start using Flonase two sprays each nostril daily    3. Seasonal allergic rhinitis, unspecified trigger  See above    4. Type 2 diabetes mellitus with diabetic polyneuropathy, with long-term current use of insulin  BMP  Lab Results   Component Value Date     01/14/2020    K 3.8 01/14/2020     01/14/2020    CO2 22 (L) 01/14/2020    BUN 22 01/14/2020    CREATININE 1.0 01/14/2020    CALCIUM 9.5 01/14/2020    ANIONGAP 11 01/14/2020    ESTGFRAFRICA 59 (A) 01/14/2020    EGFRNONAA 51 (A) 01/14/2020     Lab Results   Component Value Date    HGBA1C 7.3 (H) 08/01/2019         5. Hypertension associated with diabetes  Good control with no changes    6. Hyperlipidemia associated with type 2 diabetes mellitus  Lab Results   Component Value Date    CHOL 132 01/14/2020    CHOL 119 (L) 01/08/2019    CHOL 120 05/24/2018     Lab Results   Component Value Date    HDL 29 (L) 01/14/2020    HDL 28 (L) 01/08/2019    HDL 32 (L) 05/24/2018     Lab Results   Component Value Date    LDLCALC 69.8 01/14/2020    LDLCALC 65.6 01/08/2019    LDLCALC 56.6 (L) 05/24/2018     Lab Results   Component Value Date    TRIG 166 (H) 01/14/2020    TRIG 127 01/08/2019    TRIG 157 (H) 05/24/2018     Lab Results   Component Value Date    CHOLHDL 22.0 01/14/2020    CHOLHDL 23.5 01/08/2019    CHOLHDL 26.7 05/24/2018         7. CKD (chronic kidney disease) stage 3, GFR 30-59 ml/min  BMP  Lab Results   Component Value Date     01/14/2020    K 3.8 01/14/2020     01/14/2020    CO2 22 (L) 01/14/2020    BUN 22 01/14/2020     CREATININE 1.0 01/14/2020    CALCIUM 9.5 01/14/2020    ANIONGAP 11 01/14/2020    ESTGFRAFRICA 59 (A) 01/14/2020    EGFRNONAA 51 (A) 01/14/2020

## 2020-02-12 ENCOUNTER — HOSPITAL ENCOUNTER (EMERGENCY)
Facility: HOSPITAL | Age: 85
Discharge: HOME | End: 2020-02-12
Attending: EMERGENCY MEDICINE
Payer: MEDICARE

## 2020-02-12 VITALS
OXYGEN SATURATION: 99 % | DIASTOLIC BLOOD PRESSURE: 70 MMHG | BODY MASS INDEX: 29.82 KG/M2 | WEIGHT: 190 LBS | RESPIRATION RATE: 16 BRPM | SYSTOLIC BLOOD PRESSURE: 151 MMHG | TEMPERATURE: 98 F | HEIGHT: 67 IN | HEART RATE: 70 BPM

## 2020-02-12 DIAGNOSIS — S61.411A LACERATION OF RIGHT HAND WITHOUT FOREIGN BODY, INITIAL ENCOUNTER: Primary | ICD-10-CM

## 2020-02-12 PROCEDURE — 99284 EMERGENCY DEPT VISIT MOD MDM: CPT

## 2020-02-12 PROCEDURE — 25000003 PHARM REV CODE 250: Performed by: PHYSICIAN ASSISTANT

## 2020-02-12 PROCEDURE — 12001 RPR S/N/AX/GEN/TRNK 2.5CM/<: CPT | Mod: F7

## 2020-02-12 RX ORDER — LIDOCAINE HYDROCHLORIDE 10 MG/ML
5 INJECTION, SOLUTION EPIDURAL; INFILTRATION; INTRACAUDAL; PERINEURAL
Status: COMPLETED | OUTPATIENT
Start: 2020-02-12 | End: 2020-02-12

## 2020-02-12 RX ORDER — CEPHALEXIN 500 MG/1
500 CAPSULE ORAL EVERY 8 HOURS
Qty: 15 CAPSULE | Refills: 0 | Status: SHIPPED | OUTPATIENT
Start: 2020-02-12 | End: 2020-02-17

## 2020-02-12 RX ORDER — MUPIROCIN 20 MG/G
1 OINTMENT TOPICAL
Status: COMPLETED | OUTPATIENT
Start: 2020-02-12 | End: 2020-02-12

## 2020-02-12 RX ADMIN — MUPIROCIN 22 G: 20 OINTMENT TOPICAL at 10:02

## 2020-02-12 RX ADMIN — LIDOCAINE HYDROCHLORIDE 50 MG: 10 INJECTION, SOLUTION EPIDURAL; INFILTRATION; INTRACAUDAL; PERINEURAL at 10:02

## 2020-02-12 NOTE — ED NOTES
"Pt to ED c/o laceration to the right hand. Pt stated, "I cut my hand on a mixer blade."  +laceration to the right hand, middle finger. No active bleeding at this time.   "

## 2020-02-12 NOTE — ED PROVIDER NOTES
Encounter Date: 2/12/2020       History     Chief Complaint   Patient presents with    Laceration     Laceration to L hand on a  blade this am     25-year-old female presenting with complaints of laceration to the right hand middle digit.  Patient states in closet putting something away and  blade fell from above and sliced her finger.  Pt states pain to Right middle finger, states last tetanus more then 10 years ago.    Patient denies any difficulty with ROM or sensation pt is right handed.  Pt denies any other in jury.        Review of patient's allergies indicates:   Allergen Reactions    Fenofibric acid (choline)      Other reaction(s): Vomiting    Iodine      Other reaction(s): lips swollen    Rosuvastatin      Other reaction(s): muscle pain     Past Medical History:   Diagnosis Date    Anemia due to stage 3 chronic kidney disease 7/24/2019    Arthritis     Chronic bilateral low back pain without sciatica 7/24/2019    CKD (chronic kidney disease) stage 3, GFR 30-59 ml/min 7/24/2019    Colon polyps     Coronary artery disease     Diabetes mellitus type II     Diabetes with neurologic complications     GERD (gastroesophageal reflux disease)     Hyperlipidemia     Hypertension     Hypothyroidism     Type 2 diabetes mellitus      Past Surgical History:   Procedure Laterality Date    ADENOIDECTOMY      BREAST BIOPSY Right 20 yrs ago    benign    CHOLECYSTECTOMY      COLONOSCOPY  5-    Dr Holly, 5 year recheck    HEMORRHOID SURGERY      HYSTERECTOMY      OOPHORECTOMY      TONSILLECTOMY       Family History   Problem Relation Age of Onset    Diabetes Mother     Heart disease Mother     Glaucoma Mother     Cancer Father         lung cancer    Early death Son         brain tumor age 3    Diabetes Brother     No Known Problems Daughter     Early death Son         MVA 25    Macular degeneration Neg Hx     Retinal detachment Neg Hx      Social History     Tobacco  Use    Smoking status: Former Smoker     Packs/day: 0.25     Years: 15.00     Pack years: 3.75     Types: Cigarettes     Last attempt to quit: 3/30/1974     Years since quittin.9    Smokeless tobacco: Never Used   Substance Use Topics    Alcohol use: No    Drug use: No     Review of Systems   Cardiovascular: Negative.    Gastrointestinal: Negative.    Musculoskeletal: Negative.    Skin: Positive for wound.   All other systems reviewed and are negative.      Physical Exam     Initial Vitals [20 0906]   BP Pulse Resp Temp SpO2   (!) 176/94 72 20 98.3 °F (36.8 °C) 99 %      MAP       --         Physical Exam    Nursing note and vitals reviewed.  Constitutional: She appears well-developed and well-nourished.   HENT:   Head: Normocephalic.   Eyes: EOM are normal. Pupils are equal, round, and reactive to light.   Neck: Normal range of motion. Neck supple.   Cardiovascular: Normal rate and regular rhythm.   Pulmonary/Chest: Breath sounds normal.   Abdominal: Soft. Bowel sounds are normal.   Musculoskeletal: Normal range of motion.   Neurological: She is alert and oriented to person, place, and time.   Skin: Skin is warm and dry.   Laceration 1.5cm dorsal right 3rd digit.  FROM of digit, deep laceration tendon sheath exposed,examined no visible tendon laceration.     Psychiatric: She has a normal mood and affect. Thought content normal.         ED Course   Lac Repair  Date/Time: 2020 10:00 AM  Performed by: Sara Michaels PA-C  Authorized by: Anna Cortez MD   Body area: upper extremity  Location details: right long finger  Laceration length: 1.5 cm  Foreign bodies: no foreign bodies  Tendon involvement: superficial  Nerve involvement: none  Vascular damage: no  Anesthesia: digital block    Anesthesia:  Local anesthesia used: yes  Local Anesthetic: lidocaine 1% without epinephrine  Irrigation solution: saline  Amount of cleaning: standard  Debridement: none  Degree of undermining: none  Skin closure:  4-0 nylon and Ethilon  Number of sutures: 4  Technique: simple  Approximation: close  Approximation difficulty: simple  Dressing: antibiotic ointment and non-stick sterile dressing        Labs Reviewed - No data to display       Imaging Results    None                                          Clinical Impression:       ICD-10-CM ICD-9-CM   1. Laceration of right hand without foreign body, initial encounter S61.411A 882.0         Disposition:   Disposition: Discharged  Condition: Stable                     Sara Michaels PA-C  02/12/20 1343

## 2020-02-12 NOTE — DISCHARGE INSTRUCTIONS
Keep wound clean and dry no water on it for 24hrs, Mr. that wash dry thoroughly return for any signs of infection wound check in 2 days suture removal in 12-14 days extremity with the primary doctor or return to the ER.

## 2020-02-27 ENCOUNTER — OFFICE VISIT (OUTPATIENT)
Dept: FAMILY MEDICINE | Facility: CLINIC | Age: 85
End: 2020-02-27
Payer: MEDICARE

## 2020-02-27 VITALS
BODY MASS INDEX: 31.11 KG/M2 | DIASTOLIC BLOOD PRESSURE: 74 MMHG | WEIGHT: 198.19 LBS | TEMPERATURE: 99 F | HEART RATE: 76 BPM | HEIGHT: 67 IN | OXYGEN SATURATION: 98 % | SYSTOLIC BLOOD PRESSURE: 128 MMHG

## 2020-02-27 DIAGNOSIS — Z48.02 VISIT FOR SUTURE REMOVAL: Primary | ICD-10-CM

## 2020-02-27 PROCEDURE — 99213 PR OFFICE/OUTPT VISIT, EST, LEVL III, 20-29 MIN: ICD-10-PCS | Mod: HCNC,S$GLB,, | Performed by: FAMILY MEDICINE

## 2020-02-27 PROCEDURE — 3078F DIAST BP <80 MM HG: CPT | Mod: HCNC,CPTII,S$GLB, | Performed by: FAMILY MEDICINE

## 2020-02-27 PROCEDURE — 3078F PR MOST RECENT DIASTOLIC BLOOD PRESSURE < 80 MM HG: ICD-10-PCS | Mod: HCNC,CPTII,S$GLB, | Performed by: FAMILY MEDICINE

## 2020-02-27 PROCEDURE — 3074F PR MOST RECENT SYSTOLIC BLOOD PRESSURE < 130 MM HG: ICD-10-PCS | Mod: HCNC,CPTII,S$GLB, | Performed by: FAMILY MEDICINE

## 2020-02-27 PROCEDURE — 1101F PR PT FALLS ASSESS DOC 0-1 FALLS W/OUT INJ PAST YR: ICD-10-PCS | Mod: HCNC,CPTII,S$GLB, | Performed by: FAMILY MEDICINE

## 2020-02-27 PROCEDURE — 99999 PR PBB SHADOW E&M-EST. PATIENT-LVL III: ICD-10-PCS | Mod: PBBFAC,HCNC,, | Performed by: FAMILY MEDICINE

## 2020-02-27 PROCEDURE — 1101F PT FALLS ASSESS-DOCD LE1/YR: CPT | Mod: HCNC,CPTII,S$GLB, | Performed by: FAMILY MEDICINE

## 2020-02-27 PROCEDURE — 99213 OFFICE O/P EST LOW 20 MIN: CPT | Mod: HCNC,S$GLB,, | Performed by: FAMILY MEDICINE

## 2020-02-27 PROCEDURE — 1159F PR MEDICATION LIST DOCUMENTED IN MEDICAL RECORD: ICD-10-PCS | Mod: HCNC,S$GLB,, | Performed by: FAMILY MEDICINE

## 2020-02-27 PROCEDURE — 99999 PR PBB SHADOW E&M-EST. PATIENT-LVL III: CPT | Mod: PBBFAC,HCNC,, | Performed by: FAMILY MEDICINE

## 2020-02-27 PROCEDURE — 3074F SYST BP LT 130 MM HG: CPT | Mod: HCNC,CPTII,S$GLB, | Performed by: FAMILY MEDICINE

## 2020-02-27 PROCEDURE — 1159F MED LIST DOCD IN RCRD: CPT | Mod: HCNC,S$GLB,, | Performed by: FAMILY MEDICINE

## 2020-02-27 PROCEDURE — 1126F AMNT PAIN NOTED NONE PRSNT: CPT | Mod: HCNC,S$GLB,, | Performed by: FAMILY MEDICINE

## 2020-02-27 PROCEDURE — 1126F PR PAIN SEVERITY QUANTIFIED, NO PAIN PRESENT: ICD-10-PCS | Mod: HCNC,S$GLB,, | Performed by: FAMILY MEDICINE

## 2020-02-27 NOTE — PATIENT INSTRUCTIONS
"  Suture or Staple Removal  You were seen today for a suture or staple removal. Your wound is healing as expected. The wound has healed well enough that the sutures or staples can be removed. The wound will continue to heal for the next few months.  At this time there is no sign of infection.   Home care  · If you have pain, take pain medicine as advised by your healthcare provider.   · Keep your wound clean and protected by covering it with a bandage for the next week or so.   · If the area gets wet, gently pat it dry with a clean cloth.   · Check the wound daily for signs of infection. (These are listed under "When to seek medical advice" below.)  · You may shower and bathe as usual. Swimming is now permitted.  Follow-up care  Follow up with your healthcare provider as advised.  When to seek medical advice   Call your healthcare provider if any of the following occur:  · Wound reopens or bleeds  · Signs of an infection, such as:  ¨ Increasing redness or swelling around the wound  ¨ Increased warmth from the wound  ¨ Worsening pain  ¨ Red streaking lines away from the wound  ¨ Fluid draining from the wound  · Fever of 100.4°F (38°C) or higher, or as directed by your child's healthcare provider  Date Last Reviewed: 9/27/2015  © 2853-6787 [employer]. [address] [place] [address], [place] [address]. All rights reserved. This information is not intended as a substitute for professional medical care. Always follow your healthcare professional's instructions.        "

## 2020-02-27 NOTE — PROGRESS NOTES
"Subjective:       Patient ID: Leslie Tolliver is a 85 y.o. female.    Chief Complaint: Suture / Staple Removal    New to me patient here for UC visit.    Suture / Staple Removal   The sutures were placed 11 to 14 days ago. She tried regular soap and water washings and antibiotic ointment use since the wound repair. The treatment provided significant relief. There has been no drainage from the wound. The redness has improved. There is no swelling present. There is no pain present. She has no difficulty moving the affected extremity or digit.     Review of Systems   Constitutional: Negative for fever.   Respiratory: Negative for shortness of breath.    Cardiovascular: Negative for chest pain.   Gastrointestinal: Negative for abdominal pain and nausea.   Skin: Negative for rash.   All other systems reviewed and are negative.      Objective:      Physical Exam   Cardiovascular: Normal rate and regular rhythm.   Pulses:       Radial pulses are 2+ on the right side.   Musculoskeletal:        Hands:  Skin: Skin is warm and dry. Capillary refill takes less than 2 seconds. Laceration (healing well) noted.       Assessment:       1. Visit for suture removal        Plan:       Visit for suture removal      Patient Instructions     Suture or Staple Removal  You were seen today for a suture or staple removal. Your wound is healing as expected. The wound has healed well enough that the sutures or staples can be removed. The wound will continue to heal for the next few months.  At this time there is no sign of infection.   Home care  · If you have pain, take pain medicine as advised by your healthcare provider.   · Keep your wound clean and protected by covering it with a bandage for the next week or so.   · If the area gets wet, gently pat it dry with a clean cloth.   · Check the wound daily for signs of infection. (These are listed under "When to seek medical advice" below.)  · You may shower and bathe as usual. Swimming is " now permitted.  Follow-up care  Follow up with your healthcare provider as advised.  When to seek medical advice   Call your healthcare provider if any of the following occur:  · Wound reopens or bleeds  · Signs of an infection, such as:  ¨ Increasing redness or swelling around the wound  ¨ Increased warmth from the wound  ¨ Worsening pain  ¨ Red streaking lines away from the wound  ¨ Fluid draining from the wound  · Fever of 100.4°F (38°C) or higher, or as directed by your child's healthcare provider  Date Last Reviewed: 9/27/2015  © 7496-8158 [employer]. [address] [place] [address]. All rights reserved. This information is not intended as a substitute for professional medical care. Always follow your healthcare professional's instructions.

## 2020-03-17 NOTE — TELEPHONE ENCOUNTER
According to epic it still covered although it says quantities are limited.  Januvia is under the same restrictions.  I do not know what they cover.  She is going to have to call her insurance and find out what we can use

## 2020-03-19 ENCOUNTER — PES CALL (OUTPATIENT)
Dept: ADMINISTRATIVE | Facility: CLINIC | Age: 85
End: 2020-03-19

## 2020-03-19 NOTE — TELEPHONE ENCOUNTER
The equivalent would be Januvia 100mg daily with separate Rx for metformin 1000mg twice daily    Is that okay with her?

## 2020-03-20 RX ORDER — METFORMIN HYDROCHLORIDE 1000 MG/1
1000 TABLET ORAL 2 TIMES DAILY WITH MEALS
Qty: 180 TABLET | Refills: 1 | Status: SHIPPED | OUTPATIENT
Start: 2020-03-20 | End: 2020-11-12

## 2020-03-27 RX ORDER — LANCETS
EACH MISCELLANEOUS
Qty: 300 EACH | Refills: 4 | Status: SHIPPED | OUTPATIENT
Start: 2020-03-27 | End: 2021-03-03

## 2020-03-30 ENCOUNTER — TELEPHONE (OUTPATIENT)
Dept: FAMILY MEDICINE | Facility: CLINIC | Age: 85
End: 2020-03-30

## 2020-03-30 NOTE — TELEPHONE ENCOUNTER
----- Message from Pascual Osman sent at 3/30/2020 12:11 PM CDT -----  Contact: pt  Type: Needs Medical Advice    Who Called:  pt    Best Call Back Number: [phone]  Additional Information: Would like to discuss her new medication she was prescribed. She is not sure if she needs to finish her old medication before starting her new medication.

## 2020-03-30 NOTE — TELEPHONE ENCOUNTER
Spoke with patient and she received her new medication of Januvia today and she wanted to know did she have to throw away the kombiglyze cause it cost her a lot of money.  Advised that she could continue the kombiglyze until they are gone and then she can startt the Januvia.  Patient verbalized understanding.

## 2020-04-01 DIAGNOSIS — I10 ESSENTIAL HYPERTENSION: ICD-10-CM

## 2020-04-01 DIAGNOSIS — Z79.4 TYPE 2 DIABETES MELLITUS WITHOUT COMPLICATION, WITH LONG-TERM CURRENT USE OF INSULIN: ICD-10-CM

## 2020-04-01 DIAGNOSIS — E11.9 TYPE 2 DIABETES MELLITUS WITHOUT COMPLICATION, WITH LONG-TERM CURRENT USE OF INSULIN: ICD-10-CM

## 2020-04-01 RX ORDER — INSULIN GLARGINE 100 [IU]/ML
INJECTION, SOLUTION SUBCUTANEOUS
Qty: 30 ML | Refills: 0 | Status: SHIPPED | OUTPATIENT
Start: 2020-04-01 | End: 2020-07-17 | Stop reason: SDUPTHER

## 2020-04-01 RX ORDER — LISINOPRIL AND HYDROCHLOROTHIAZIDE 10; 12.5 MG/1; MG/1
1 TABLET ORAL DAILY
Qty: 90 TABLET | Refills: 0 | Status: SHIPPED | OUTPATIENT
Start: 2020-04-01 | End: 2020-07-29 | Stop reason: SDUPTHER

## 2020-04-27 ENCOUNTER — LAB VISIT (OUTPATIENT)
Dept: LAB | Facility: HOSPITAL | Age: 85
End: 2020-04-27
Attending: FAMILY MEDICINE
Payer: MEDICARE

## 2020-04-27 DIAGNOSIS — Z79.4 TYPE 2 DIABETES MELLITUS WITHOUT COMPLICATION, WITH LONG-TERM CURRENT USE OF INSULIN: ICD-10-CM

## 2020-04-27 DIAGNOSIS — I10 ESSENTIAL HYPERTENSION: ICD-10-CM

## 2020-04-27 DIAGNOSIS — E11.9 TYPE 2 DIABETES MELLITUS WITHOUT COMPLICATION, WITH LONG-TERM CURRENT USE OF INSULIN: ICD-10-CM

## 2020-04-27 LAB
ANION GAP SERPL CALC-SCNC: 12 MMOL/L (ref 8–16)
BUN SERPL-MCNC: 27 MG/DL (ref 8–23)
CALCIUM SERPL-MCNC: 9.6 MG/DL (ref 8.7–10.5)
CHLORIDE SERPL-SCNC: 102 MMOL/L (ref 95–110)
CO2 SERPL-SCNC: 26 MMOL/L (ref 23–29)
CREAT SERPL-MCNC: 1.1 MG/DL (ref 0.5–1.4)
EST. GFR  (AFRICAN AMERICAN): 52.9 ML/MIN/1.73 M^2
EST. GFR  (NON AFRICAN AMERICAN): 45.9 ML/MIN/1.73 M^2
ESTIMATED AVG GLUCOSE: 166 MG/DL (ref 68–131)
GLUCOSE SERPL-MCNC: 151 MG/DL (ref 70–110)
HBA1C MFR BLD HPLC: 7.4 % (ref 4–5.6)
POTASSIUM SERPL-SCNC: 4.2 MMOL/L (ref 3.5–5.1)
SODIUM SERPL-SCNC: 140 MMOL/L (ref 136–145)

## 2020-04-27 PROCEDURE — 36415 COLL VENOUS BLD VENIPUNCTURE: CPT | Mod: HCNC,PO

## 2020-04-27 PROCEDURE — 80048 BASIC METABOLIC PNL TOTAL CA: CPT | Mod: HCNC

## 2020-04-27 PROCEDURE — 83036 HEMOGLOBIN GLYCOSYLATED A1C: CPT | Mod: HCNC

## 2020-05-27 ENCOUNTER — PES CALL (OUTPATIENT)
Dept: ADMINISTRATIVE | Facility: CLINIC | Age: 85
End: 2020-05-27

## 2020-06-03 ENCOUNTER — HOSPITAL ENCOUNTER (OUTPATIENT)
Facility: HOSPITAL | Age: 85
Discharge: HOME | End: 2020-06-05
Attending: EMERGENCY MEDICINE | Admitting: INTERNAL MEDICINE
Payer: MEDICARE
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
## 2020-06-03 DIAGNOSIS — D64.9 ANEMIA, UNSPECIFIED TYPE: ICD-10-CM

## 2020-06-03 DIAGNOSIS — K92.1 MELENA: ICD-10-CM

## 2020-06-03 DIAGNOSIS — Z98.890 POST-OPERATIVE STATE: ICD-10-CM

## 2020-06-03 DIAGNOSIS — R07.9 CHEST PAIN: ICD-10-CM

## 2020-06-03 DIAGNOSIS — K92.2 ACUTE UPPER GI BLEED: Primary | ICD-10-CM

## 2020-06-03 DIAGNOSIS — Z95.3 STATUS POST AORTIC VALVE REPLACEMENT WITH BIOPROSTHETIC VALVE: ICD-10-CM

## 2020-06-03 DIAGNOSIS — N18.30 CKD (CHRONIC KIDNEY DISEASE) STAGE 3, GFR 30-59 ML/MIN: ICD-10-CM

## 2020-06-03 PROBLEM — R79.89 ELEVATED TROPONIN: Status: ACTIVE | Noted: 2020-06-03

## 2020-06-03 PROBLEM — Z95.1 S/P CABG (CORONARY ARTERY BYPASS GRAFT): Status: ACTIVE | Noted: 2020-06-03

## 2020-06-03 LAB
ABO + RH BLD: NORMAL
ALBUMIN SERPL BCP-MCNC: 3.3 G/DL (ref 3.5–5.2)
ALP SERPL-CCNC: 53 U/L (ref 55–135)
ALT SERPL W/O P-5'-P-CCNC: 13 U/L (ref 10–44)
ANION GAP SERPL CALC-SCNC: 11 MMOL/L (ref 8–16)
APTT PPP: 30.9 SEC (ref 23.6–33.3)
AST SERPL-CCNC: 14 U/L (ref 10–40)
BASOPHILS # BLD AUTO: 0.02 K/UL (ref 0–0.2)
BASOPHILS # BLD AUTO: 0.04 K/UL (ref 0–0.2)
BASOPHILS NFR BLD: 0.2 % (ref 0–1.9)
BASOPHILS NFR BLD: 0.5 % (ref 0–1.9)
BILIRUB SERPL-MCNC: 1.3 MG/DL (ref 0.1–1)
BLD GP AB SCN CELLS X3 SERPL QL: NORMAL
BUN SERPL-MCNC: 24 MG/DL (ref 8–23)
CALCIUM SERPL-MCNC: 8.3 MG/DL (ref 8.7–10.5)
CHLORIDE SERPL-SCNC: 103 MMOL/L (ref 95–110)
CO2 SERPL-SCNC: 23 MMOL/L (ref 23–29)
CREAT SERPL-MCNC: 1 MG/DL (ref 0.5–1.4)
DIFFERENTIAL METHOD: ABNORMAL
DIFFERENTIAL METHOD: ABNORMAL
EOSINOPHIL # BLD AUTO: 0.2 K/UL (ref 0–0.5)
EOSINOPHIL # BLD AUTO: 0.2 K/UL (ref 0–0.5)
EOSINOPHIL NFR BLD: 1.7 % (ref 0–8)
EOSINOPHIL NFR BLD: 1.9 % (ref 0–8)
ERYTHROCYTE [DISTWIDTH] IN BLOOD BY AUTOMATED COUNT: 15.2 % (ref 11.5–14.5)
ERYTHROCYTE [DISTWIDTH] IN BLOOD BY AUTOMATED COUNT: 15.3 % (ref 11.5–14.5)
EST. GFR  (AFRICAN AMERICAN): 59.4 ML/MIN/1.73 M^2
EST. GFR  (NON AFRICAN AMERICAN): 51.5 ML/MIN/1.73 M^2
GLUCOSE SERPL-MCNC: 147 MG/DL (ref 70–110)
GLUCOSE SERPL-MCNC: 212 MG/DL (ref 70–110)
HCT VFR BLD AUTO: 25.3 % (ref 37–48.5)
HCT VFR BLD AUTO: 25.3 % (ref 37–48.5)
HGB BLD-MCNC: 8.2 G/DL (ref 12–16)
HGB BLD-MCNC: 8.2 G/DL (ref 12–16)
IMM GRANULOCYTES # BLD AUTO: 0.1 K/UL (ref 0–0.04)
IMM GRANULOCYTES # BLD AUTO: 0.12 K/UL (ref 0–0.04)
IMM GRANULOCYTES NFR BLD AUTO: 1.1 % (ref 0–0.5)
IMM GRANULOCYTES NFR BLD AUTO: 1.4 % (ref 0–0.5)
INR PPP: 1.4
LYMPHOCYTES # BLD AUTO: 1 K/UL (ref 1–4.8)
LYMPHOCYTES # BLD AUTO: 1.2 K/UL (ref 1–4.8)
LYMPHOCYTES NFR BLD: 11.5 % (ref 18–48)
LYMPHOCYTES NFR BLD: 13.9 % (ref 18–48)
MCH RBC QN AUTO: 30.6 PG (ref 27–31)
MCH RBC QN AUTO: 30.8 PG (ref 27–31)
MCHC RBC AUTO-ENTMCNC: 32.4 G/DL (ref 32–36)
MCHC RBC AUTO-ENTMCNC: 32.4 G/DL (ref 32–36)
MCV RBC AUTO: 94 FL (ref 82–98)
MCV RBC AUTO: 95 FL (ref 82–98)
MONOCYTES # BLD AUTO: 0.8 K/UL (ref 0.3–1)
MONOCYTES # BLD AUTO: 0.8 K/UL (ref 0.3–1)
MONOCYTES NFR BLD: 8.8 % (ref 4–15)
MONOCYTES NFR BLD: 9.8 % (ref 4–15)
NEUTROPHILS # BLD AUTO: 6.2 K/UL (ref 1.8–7.7)
NEUTROPHILS # BLD AUTO: 6.8 K/UL (ref 1.8–7.7)
NEUTROPHILS NFR BLD: 72.8 % (ref 38–73)
NEUTROPHILS NFR BLD: 76.4 % (ref 38–73)
NRBC BLD-RTO: 0 /100 WBC
NRBC BLD-RTO: 0 /100 WBC
OB PNL STL: POSITIVE
PLATELET # BLD AUTO: 262 K/UL (ref 150–350)
PLATELET # BLD AUTO: 272 K/UL (ref 150–350)
PMV BLD AUTO: 10.7 FL (ref 9.2–12.9)
PMV BLD AUTO: 10.9 FL (ref 9.2–12.9)
POTASSIUM SERPL-SCNC: 3.8 MMOL/L (ref 3.5–5.1)
PROT SERPL-MCNC: 6 G/DL (ref 6–8.4)
PROTHROMBIN TIME: 16.8 SEC (ref 10.6–14.8)
RBC # BLD AUTO: 2.66 M/UL (ref 4–5.4)
RBC # BLD AUTO: 2.68 M/UL (ref 4–5.4)
SARS-COV-2 RDRP RESP QL NAA+PROBE: NEGATIVE
SODIUM SERPL-SCNC: 137 MMOL/L (ref 136–145)
TROPONIN I SERPL DL<=0.01 NG/ML-MCNC: 0.09 NG/ML
TROPONIN I SERPL DL<=0.01 NG/ML-MCNC: 0.09 NG/ML
WBC # BLD AUTO: 8.56 K/UL (ref 3.9–12.7)
WBC # BLD AUTO: 8.86 K/UL (ref 3.9–12.7)

## 2020-06-03 PROCEDURE — U0002 COVID-19 LAB TEST NON-CDC: HCPCS

## 2020-06-03 PROCEDURE — 94761 N-INVAS EAR/PLS OXIMETRY MLT: CPT | Mod: GZ

## 2020-06-03 PROCEDURE — 86850 RBC ANTIBODY SCREEN: CPT

## 2020-06-03 PROCEDURE — 36415 COLL VENOUS BLD VENIPUNCTURE: CPT

## 2020-06-03 PROCEDURE — 85025 COMPLETE CBC W/AUTO DIFF WBC: CPT

## 2020-06-03 PROCEDURE — 80053 COMPREHEN METABOLIC PANEL: CPT

## 2020-06-03 PROCEDURE — G0378 HOSPITAL OBSERVATION PER HR: HCPCS

## 2020-06-03 PROCEDURE — 99285 EMERGENCY DEPT VISIT HI MDM: CPT | Mod: 25

## 2020-06-03 PROCEDURE — 96374 THER/PROPH/DIAG INJ IV PUSH: CPT

## 2020-06-03 PROCEDURE — 12000002 HC ACUTE/MED SURGE SEMI-PRIVATE ROOM

## 2020-06-03 PROCEDURE — 85730 THROMBOPLASTIN TIME PARTIAL: CPT

## 2020-06-03 PROCEDURE — C9399 UNCLASSIFIED DRUGS OR BIOLOG: HCPCS | Performed by: INTERNAL MEDICINE

## 2020-06-03 PROCEDURE — 84484 ASSAY OF TROPONIN QUANT: CPT

## 2020-06-03 PROCEDURE — 82272 OCCULT BLD FECES 1-3 TESTS: CPT

## 2020-06-03 PROCEDURE — 84484 ASSAY OF TROPONIN QUANT: CPT | Mod: 91

## 2020-06-03 PROCEDURE — 63600175 PHARM REV CODE 636 W HCPCS: Performed by: PHYSICIAN ASSISTANT

## 2020-06-03 PROCEDURE — C9113 INJ PANTOPRAZOLE SODIUM, VIA: HCPCS | Performed by: PHYSICIAN ASSISTANT

## 2020-06-03 PROCEDURE — 93005 ELECTROCARDIOGRAM TRACING: CPT | Performed by: INTERNAL MEDICINE

## 2020-06-03 PROCEDURE — 25000003 PHARM REV CODE 250: Performed by: INTERNAL MEDICINE

## 2020-06-03 PROCEDURE — 85610 PROTHROMBIN TIME: CPT

## 2020-06-03 RX ORDER — AMIODARONE HYDROCHLORIDE 200 MG/1
200 TABLET ORAL 2 TIMES DAILY
Status: DISCONTINUED | OUTPATIENT
Start: 2020-06-03 | End: 2020-06-05 | Stop reason: HOSPADM

## 2020-06-03 RX ORDER — INSULIN ASPART 100 [IU]/ML
0-5 INJECTION, SOLUTION INTRAVENOUS; SUBCUTANEOUS
Status: DISCONTINUED | OUTPATIENT
Start: 2020-06-03 | End: 2020-06-05 | Stop reason: HOSPADM

## 2020-06-03 RX ORDER — ACETAMINOPHEN 325 MG/1
650 TABLET ORAL EVERY 4 HOURS PRN
Status: DISCONTINUED | OUTPATIENT
Start: 2020-06-03 | End: 2020-06-05 | Stop reason: HOSPADM

## 2020-06-03 RX ORDER — PANTOPRAZOLE SODIUM 40 MG/10ML
40 INJECTION, POWDER, LYOPHILIZED, FOR SOLUTION INTRAVENOUS 2 TIMES DAILY
Status: DISCONTINUED | OUTPATIENT
Start: 2020-06-04 | End: 2020-06-05 | Stop reason: HOSPADM

## 2020-06-03 RX ORDER — PANTOPRAZOLE SODIUM 40 MG/10ML
80 INJECTION, POWDER, LYOPHILIZED, FOR SOLUTION INTRAVENOUS ONCE
Status: COMPLETED | OUTPATIENT
Start: 2020-06-03 | End: 2020-06-03

## 2020-06-03 RX ORDER — IBUPROFEN 200 MG
24 TABLET ORAL
Status: DISCONTINUED | OUTPATIENT
Start: 2020-06-03 | End: 2020-06-05 | Stop reason: HOSPADM

## 2020-06-03 RX ORDER — ACETAMINOPHEN 325 MG/1
650 TABLET ORAL EVERY 8 HOURS PRN
Status: DISCONTINUED | OUTPATIENT
Start: 2020-06-03 | End: 2020-06-05 | Stop reason: HOSPADM

## 2020-06-03 RX ORDER — AMIODARONE HYDROCHLORIDE 200 MG/1
200 TABLET ORAL 2 TIMES DAILY
Status: ON HOLD | COMMUNITY
End: 2021-03-25

## 2020-06-03 RX ORDER — FAMOTIDINE 20 MG/1
20 TABLET, FILM COATED ORAL 2 TIMES DAILY
Status: ON HOLD | COMMUNITY
End: 2020-06-05 | Stop reason: HOSPADM

## 2020-06-03 RX ORDER — IBUPROFEN 200 MG
16 TABLET ORAL
Status: DISCONTINUED | OUTPATIENT
Start: 2020-06-03 | End: 2020-06-05 | Stop reason: HOSPADM

## 2020-06-03 RX ORDER — ONDANSETRON 2 MG/ML
4 INJECTION INTRAMUSCULAR; INTRAVENOUS EVERY 8 HOURS PRN
Status: DISCONTINUED | OUTPATIENT
Start: 2020-06-03 | End: 2020-06-05 | Stop reason: HOSPADM

## 2020-06-03 RX ORDER — GLUCAGON 1 MG
1 KIT INJECTION
Status: DISCONTINUED | OUTPATIENT
Start: 2020-06-03 | End: 2020-06-05 | Stop reason: HOSPADM

## 2020-06-03 RX ORDER — CARVEDILOL 6.25 MG/1
6.25 TABLET ORAL 2 TIMES DAILY WITH MEALS
Status: DISCONTINUED | OUTPATIENT
Start: 2020-06-03 | End: 2020-06-05 | Stop reason: HOSPADM

## 2020-06-03 RX ORDER — FERROUS SULFATE 325(65) MG
325 TABLET ORAL
Status: ON HOLD | COMMUNITY
End: 2021-03-25

## 2020-06-03 RX ORDER — ONDANSETRON 4 MG/1
4 TABLET, ORALLY DISINTEGRATING ORAL EVERY 8 HOURS PRN
Status: ON HOLD | COMMUNITY
End: 2021-03-25

## 2020-06-03 RX ADMIN — PANTOPRAZOLE SODIUM 80 MG: 40 INJECTION, POWDER, FOR SOLUTION INTRAVENOUS at 02:06

## 2020-06-03 RX ADMIN — AMIODARONE HYDROCHLORIDE 200 MG: 200 TABLET ORAL at 10:06

## 2020-06-03 RX ADMIN — CARVEDILOL 6.25 MG: 6.25 TABLET, FILM COATED ORAL at 10:06

## 2020-06-03 RX ADMIN — INSULIN DETEMIR 12 UNITS: 100 INJECTION, SOLUTION SUBCUTANEOUS at 10:06

## 2020-06-03 NOTE — ED PROVIDER NOTES
Encounter Date: 6/3/2020       History     Chief Complaint   Patient presents with    Fall     states lost balance fell into chair, states hit head on chair, no loc, denies pain     85 year old female presenting with complaint of trip and fall and bumping head.  States trip and fall last night and bumped head on sofa.  Patient denies any LOC, states on blood thinners so concerned.  Patient states just had open heart surgery and not having any chest pain or any shortness of breath.   Patient is on Eliquis.  Patient had valve valve replacement with Dr Doshi.          Review of patient's allergies indicates:   Allergen Reactions    Fenofibric acid (choline)      Other reaction(s): Vomiting    Iodine      Other reaction(s): lips swollen    Rosuvastatin      Other reaction(s): muscle pain     Past Medical History:   Diagnosis Date    Anemia due to stage 3 chronic kidney disease 7/24/2019    Arthritis     Chronic bilateral low back pain without sciatica 7/24/2019    CKD (chronic kidney disease) stage 3, GFR 30-59 ml/min 7/24/2019    Colon polyps     Coronary artery disease     Diabetes mellitus type II     Diabetes with neurologic complications     GERD (gastroesophageal reflux disease)     Hyperlipidemia     Hypertension     Hypothyroidism     Type 2 diabetes mellitus      Past Surgical History:   Procedure Laterality Date    ADENOIDECTOMY      BREAST BIOPSY Right 20 yrs ago    benign    CHOLECYSTECTOMY      COLONOSCOPY  5-    Dr Holly, 5 year recheck    HEMORRHOID SURGERY      HYSTERECTOMY      OOPHORECTOMY      TONSILLECTOMY       Family History   Problem Relation Age of Onset    Diabetes Mother     Heart disease Mother     Glaucoma Mother     Cancer Father         lung cancer    Early death Son         brain tumor age 3    Diabetes Brother     No Known Problems Daughter     Early death Son         MVA 25    Macular degeneration Neg Hx     Retinal detachment Neg Hx       Social History     Tobacco Use    Smoking status: Former Smoker     Packs/day: 0.25     Years: 15.00     Pack years: 3.75     Types: Cigarettes     Last attempt to quit: 3/30/1974     Years since quittin.2    Smokeless tobacco: Never Used   Substance Use Topics    Alcohol use: No    Drug use: No     Review of Systems   Constitutional: Negative.    HENT: Negative.    Respiratory: Negative.    Cardiovascular: Negative.    Gastrointestinal:        Dark stools noted later    Genitourinary: Negative.    Skin: Negative.    Neurological: Positive for headaches.        Head contusion but no pain now   All other systems reviewed and are negative.      Physical Exam     Initial Vitals [20 1130]   BP Pulse Resp Temp SpO2   (!) 145/69 92 18 98.1 °F (36.7 °C) 99 %      MAP       --         Physical Exam    Nursing note and vitals reviewed.  Constitutional: She appears well-developed and well-nourished.   HENT:   Head: Normocephalic and atraumatic.   Right Ear: External ear normal.   Left Ear: External ear normal.   Nose: Nose normal.   Eyes: EOM are normal. Pupils are equal, round, and reactive to light.   Neck: Normal range of motion. Neck supple.   No midline tednerness   Cardiovascular: Normal rate.   Pulmonary/Chest: No respiratory distress. She exhibits tenderness.   Mild midsternal chest tenderness    Abdominal: Soft. Bowel sounds are normal.   Musculoskeletal: Normal range of motion.   Neurological: She is alert and oriented to person, place, and time. She has normal strength. GCS score is 15. GCS eye subscore is 4. GCS verbal subscore is 5. GCS motor subscore is 6.   Skin: Skin is warm. Capillary refill takes less than 2 seconds.   Resolving Ecchymosis to chest s/p recent sx  Chest dressings present no signs of redness surrounding    Psychiatric: She has a normal mood and affect. Her behavior is normal. Thought content normal.         ED Course   Procedures  Labs Reviewed   CBC W/ AUTO DIFFERENTIAL -  Abnormal; Notable for the following components:       Result Value    RBC 2.68 (*)     Hemoglobin 8.2 (*)     Hematocrit 25.3 (*)     RDW 15.2 (*)     Immature Granulocytes 1.1 (*)     Immature Grans (Abs) 0.10 (*)     Gran% 76.4 (*)     Lymph% 11.5 (*)     All other components within normal limits   COMPREHENSIVE METABOLIC PANEL - Abnormal; Notable for the following components:    Glucose 212 (*)     BUN, Bld 24 (*)     Calcium 8.3 (*)     Albumin 3.3 (*)     Total Bilirubin 1.3 (*)     Alkaline Phosphatase 53 (*)     eGFR if  59.4 (*)     eGFR if non  51.5 (*)     All other components within normal limits   PROTIME-INR - Abnormal; Notable for the following components:    PT 16.8 (*)     All other components within normal limits   TROPONIN I - Abnormal; Notable for the following components:    Troponin I 0.093 (*)     All other components within normal limits    Narrative:      Troponin critical result(s) repeated. Called and verbal readback   obtained from Rui Bishop RN/ED by JBSuraj 06/03/2020 13:35   OCCULT BLOOD X 1, STOOL - Abnormal; Notable for the following components:    Occult Blood Positive (*)     All other components within normal limits   APTT   SARS-COV-2 RNA AMPLIFICATION, QUAL   TYPE & SCREEN        ECG Results          EKG 12-lead (In process)  Result time 06/03/20 12:28:41    In process by Interface, Lab In [facility] (06/03/20 12:28:41)                 Narrative:    Test Reason : W19.XXXA,    Vent. Rate : 086 BPM     Atrial Rate : 084 BPM     P-R Int : 000 ms          QRS Dur : 094 ms      QT Int : 376 ms       P-R-T Axes : 000 035 050 degrees     QTc Int : 449 ms    Atrial fibrillation with a competing junctional pacemaker  Abnormal ECG  When compared with ECG of 14-JAN-2020 09:44,  Atrial fibrillation has replaced Sinus rhythm  Criteria for Inferior infarct are no longer Present  Nonspecific T wave abnormality has replaced inverted T waves in  Inferior  leads  Nonspecific T wave abnormality now evident in Lateral leads    Referred By: [email or web address]   SELF           Confirmed By:                             Imaging Results          CT Cervical Spine Without Contrast (Final result)  Result time 06/03/20 13:01:09    Final result by Jeremiah Will MD (06/03/20 13:01:09)                 Narrative:    CMS MANDATED QUALITY DATA - CT RADIATION - 436    All CT scans at this facility utilize dose modulation, iterative  reconstruction, and/or weight based dosing when appropriate to reduce  radiation dose to as low as reasonably achievable.        Reason: Neck pain, first study trauma    TECHNIQUE: Cervical spine CT without IV contrast obtained with coronal  and sagittal reformations.    COMPARISON:None    FINDINGS:  Negative for fracture. No epidural hematoma or prevertebral soft  tissue swelling.    Cervical soft tissues are unremarkable. Visualized lung apices are  clear.    At C2-C3, moderate right facet joint osteoarthrosis results in minor  right neural foramen narrowing.    At C3-C4, mild left facet joint osteoarthrosis.    At C4-C5, moderate right and mild left facet joint osteoarthrosis  cause no significant narrowing.    At C5-C6, broad posterior osteophytic ridge results in mild central  canal and moderate right and mild left neural foramen narrowing.    At C6-C7, broad posterior osteophytic ridge results in mild central  canal narrowing.    At C7-T1, moderate left and mild right facet joint osteoarthrosis.    Coronal and sagittal reformations show normal alignment without  abnormal facet widening.    IMPRESSION:  Cervical spine degenerative changes, without acute abnormality.    Electronically Signed by Jeremiah Will M.D. on 6/3/2020 1:09 PM                             CT Head Without Contrast (Final result)  Result time 06/03/20 13:00:09    Final result by Kamar Sanches MD (06/03/20 13:00:09)                 Narrative:    CMS MANDATED QUALITY DATA - CT  RADIATION 436    All CT scans at this facility utilize dose modulation, iterative  reconstruction, and/or weight based dosing when appropriate to reduce  radiation dose to as low as reasonably achievable.    CLINICAL HISTORY:  85 years (7/17/1934) Female Headache, post trauma    TECHNIQUE:  CT HEAD WITHOUT CONTRAST. 106 images obtained. Axial CT of the brain  without contrast using soft tissue and bone algorithm. .    COMPARISON:  Most recent study from January 14, 2020.    FINDINGS:  No acute intracranial hemorrhage, edema or mass effect, and no acute  parenchymal abnormality. There is no hydrocephalus, evidence of  herniation or midline shift. The basal and suprasellar cisterns are  within normal limits. The osseous structures show no acute skull  fracture.    Mild periventricular deep cerebral white matter low attenuation,  nonspecific findings which can be seen in any diffuse white matter  process but most commonly associated with chronic microvascular  ischemic disease. With relative hyperostosis frontalis There are  scattered atheromatous calcifications in the intracranial internal  carotid arteries.    Orbital contents appear within normal limits. External auditory  canals are unremarkable. The visualized paranasal sinuses and mastoid  air cells are essentially clear.    IMPRESSION:  No acute intracranial process                  .    Electronically Signed by ARUN Hinojosa on 6/3/2020 1:04 PM                                            Attending Attestation:     Physician Attestation Statement for NP/PA:   I have conducted a face to face encounter with this patient in addition to the NP/PA, due to Medical Complexity    Other NP/PA Attestation Additions:    History of Present Illness: Patient complaining to me of black sticky stools, patient found to be occult blood positive from below and anemic, patient consulted to GI and will be admitted to Internal Medicine for further evaluation and management of  GI bleed in this patient on blood thinners.                   ED Course as of Jun 03 1458   Wed Jun 03, 2020   1347 Dr Vizcaino spoke to cardiothoracic Dr Doshi and states had a hct 23 and so actually improved.  Did rectal exam dark stool no blood visible.  Patient has a hx of anemia and on iron.  Sent to lab for hemoccult testing. Awaiting result for possible admit.      [ES]      ED Course User Index  [ES] Sara Michaels PA-C                Clinical Impression:       ICD-10-CM ICD-9-CM   1. Melena K92.1 578.1   2. Fall W19.XXXA E888.9   3. Anemia, unspecified type D64.9 285.9   4. Post-operative state Z98.890 V45.89         Disposition:   Disposition: Admitted  Condition: Stable     ED Disposition Condition    Admit                           Sara Michaels PA-C  06/03/20 1501       Sara Michaels PA-C  06/03/20 1502       Ian Vizcaino MD  06/03/20 1510

## 2020-06-03 NOTE — CONSULTS
GASTROENTEROLOGY INPATIENT CONSULT NOTE  Patient Name: Leslie Tolliver  Patient MRN: [id number]  Patient : 1934    Admit Date: 6/3/2020  Service date: 6/3/2020    Reason for Consult: anemia w/ questionable melena    PCP: Chang Christianson MD    Chief Complaint   Patient presents with    Fall     states lost balance fell into chair, states hit head on chair, no loc, denies pain       HPI: Patient is a 85 y.o. female with PMHx anemia, CKD, CAD (ASA), DM, HTN, HLD, hypothyroid, cholecystectomy, ROXANNA, tobacco use, afib (Eliquis),  AVR / Maze (20) presents for evaluation of weakness. Acute onset, intermittent, progressive today. States got weak and collapsed against soft part of chair but head hit arm rest. States over past couple days has noticed dark stools but denies NSAID use or other signs of bleeding. Just ate [employer] from family.     CHART REVIEW:   Hg 8.2 (8.3 on d/c on  @ [place]; Was 11.7 on  at time of admit at [place];  Baseline 12ish)   Colon '14 - roids; tics; small polyps    Past Medical History:  Past Medical History:   Diagnosis Date    Anemia due to stage 3 chronic kidney disease 2019    Arthritis     Chronic bilateral low back pain without sciatica 2019    CKD (chronic kidney disease) stage 3, GFR 30-59 ml/min 2019    Colon polyps     Coronary artery disease     Diabetes mellitus type II     Diabetes with neurologic complications     GERD (gastroesophageal reflux disease)     Hyperlipidemia     Hypertension     Hypothyroidism     Type 2 diabetes mellitus         Past Surgical History:  Past Surgical History:   Procedure Laterality Date    ADENOIDECTOMY      BREAST BIOPSY Right 20 yrs ago    benign    CHOLECYSTECTOMY      COLONOSCOPY  2014    Dr Holly, 5 year recheck    HEMORRHOID SURGERY      HYSTERECTOMY      OOPHORECTOMY      TONSILLECTOMY          Home Medications:    (Not in a hospital admission)    Inpatient  "Medications:        Review of patient's allergies indicates:   Allergen Reactions    Fenofibric acid (choline)      Other reaction(s): Vomiting    Iodine      Other reaction(s): lips swollen    Rosuvastatin      Other reaction(s): muscle pain       Social History:   Social History     Occupational History    Not on file   Tobacco Use    Smoking status: Former Smoker     Packs/day: 0.25     Years: 15.00     Pack years: 3.75     Types: Cigarettes     Last attempt to quit: 3/30/1974     Years since quittin.2    Smokeless tobacco: Never Used   Substance and Sexual Activity    Alcohol use: No    Drug use: No    Sexual activity: Never       Family History:   Family History   Problem Relation Age of Onset    Diabetes Mother     Heart disease Mother     Glaucoma Mother     Cancer Father         lung cancer    Early death Son         brain tumor age 3    Diabetes Brother     No Known Problems Daughter     Early death Son         MVA 25    Macular degeneration Neg Hx     Retinal detachment Neg Hx        Review of Systems:  A 10 point review of systems was performed and was normal, except as mentioned in the HPI, including constitutional, HEENT, heme, lymph, cardiovascular, respiratory, gastrointestinal, genitourinary, neurologic, endocrine, psychiatric and musculoskeletal.      OBJECTIVE:    Physical Exam:  24 Hour Vital Sign Ranges: Temp:  [98.1 °F (36.7 °C)] 98.1 °F (36.7 °C)  Pulse:  [] 100  Resp:  [16-24] 19  SpO2:  [99 %-100 %] 100 %  BP: (127-151)/(57-79) 127/60  Most recent vitals: /60   Pulse 100   Temp 98.1 °F (36.7 °C) (Oral)   Resp 19   Ht 5' 7" (1.702 m)   Wt 86.2 kg (190 lb)   SpO2 100%   BMI 29.76 kg/m²    GEN: well-developed, well-nourished, awake and alert, non-toxic appearing adult  HEENT: PERRL, sclera anicteric, oral mucosa pink and moist without lesion  NECK: trachea midline; Good ROM  CV: irregular rate and rhythm, no murmurs or gallops  RESP: clear to " auscultation bilaterally, no wheezes, rhonci or rales  ABD: soft, non-tender, non-distended, normal bowel sounds  EXT: no swelling or edema, 2+ pulses distally  SKIN: no rashes or jaundice; bandages to L lower chest  PSYCH: normal affect    Labs:   Recent Labs     06/03/20  1226   WBC 8.86   MCV 94        Recent Labs     06/03/20  1226      K 3.8      CO2 23   BUN 24*   *     No results for input(s): ALB in the last 72 hours.    Invalid input(s): ALKP, SGOT, SGPT, TBIL, DBIL, TPRO  Recent Labs     06/03/20  1226   INR 1.4         Radiology Review:  CT Cervical Spine Without Contrast   Final Result      CT Head Without Contrast   Final Result            IMPRESSION / RECOMMENDATIONS:   85 y.o. female with PMHx anemia, CKD, CAD (ASA), DM, HTN, HLD, hypothyroid, cholecystectomy, ROXANNA, tobacco use, afib (Eliquis),  AVR / Maze (5/27/20) presents for evaluation of weakness w/ mild progression of weakness s/p AVR/MAZE. Now w/ suspected melena in setting of progressive anemia. Favor chronic disease / surgery as cause of recent decline but will evaluate w/ EGD nathan to r/o PUD.     -EGD to r/o PUD in AM  -Clear liquid diet today; NPO after midnight  -Consider VCE as outpatient given chronic anemia.     Thank you for this consult.    Michael Nugent III  6/3/2020  2:34 PM

## 2020-06-04 LAB
ALBUMIN SERPL BCP-MCNC: 3.1 G/DL (ref 3.5–5.2)
ALP SERPL-CCNC: 46 U/L (ref 55–135)
ALT SERPL W/O P-5'-P-CCNC: 13 U/L (ref 10–44)
ANION GAP SERPL CALC-SCNC: 9 MMOL/L (ref 8–16)
AST SERPL-CCNC: 16 U/L (ref 10–40)
BASOPHILS # BLD AUTO: 0.02 K/UL (ref 0–0.2)
BASOPHILS # BLD AUTO: 0.04 K/UL (ref 0–0.2)
BASOPHILS NFR BLD: 0.3 % (ref 0–1.9)
BASOPHILS NFR BLD: 0.6 % (ref 0–1.9)
BILIRUB SERPL-MCNC: 1.3 MG/DL (ref 0.1–1)
BNP SERPL-MCNC: 497 PG/ML (ref 0–99)
BUN SERPL-MCNC: 18 MG/DL (ref 8–23)
CALCIUM SERPL-MCNC: 8.3 MG/DL (ref 8.7–10.5)
CHLORIDE SERPL-SCNC: 103 MMOL/L (ref 95–110)
CO2 SERPL-SCNC: 25 MMOL/L (ref 23–29)
CREAT SERPL-MCNC: 0.9 MG/DL (ref 0.5–1.4)
DIFFERENTIAL METHOD: ABNORMAL
DIFFERENTIAL METHOD: ABNORMAL
EOSINOPHIL # BLD AUTO: 0.2 K/UL (ref 0–0.5)
EOSINOPHIL # BLD AUTO: 0.2 K/UL (ref 0–0.5)
EOSINOPHIL NFR BLD: 2 % (ref 0–8)
EOSINOPHIL NFR BLD: 2.7 % (ref 0–8)
ERYTHROCYTE [DISTWIDTH] IN BLOOD BY AUTOMATED COUNT: 15.3 % (ref 11.5–14.5)
ERYTHROCYTE [DISTWIDTH] IN BLOOD BY AUTOMATED COUNT: 15.4 % (ref 11.5–14.5)
EST. GFR  (AFRICAN AMERICAN): >60 ML/MIN/1.73 M^2
EST. GFR  (NON AFRICAN AMERICAN): 58.5 ML/MIN/1.73 M^2
GLUCOSE SERPL-MCNC: 127 MG/DL (ref 70–110)
GLUCOSE SERPL-MCNC: 132 MG/DL (ref 70–110)
GLUCOSE SERPL-MCNC: 146 MG/DL (ref 70–110)
GLUCOSE SERPL-MCNC: 149 MG/DL (ref 70–110)
GLUCOSE SERPL-MCNC: 247 MG/DL (ref 70–110)
HCT VFR BLD AUTO: 24.3 % (ref 37–48.5)
HCT VFR BLD AUTO: 24.5 % (ref 37–48.5)
HGB BLD-MCNC: 7.8 G/DL (ref 12–16)
HGB BLD-MCNC: 7.8 G/DL (ref 12–16)
IMM GRANULOCYTES # BLD AUTO: 0.09 K/UL (ref 0–0.04)
IMM GRANULOCYTES # BLD AUTO: 0.1 K/UL (ref 0–0.04)
IMM GRANULOCYTES NFR BLD AUTO: 1.3 % (ref 0–0.5)
IMM GRANULOCYTES NFR BLD AUTO: 1.3 % (ref 0–0.5)
LYMPHOCYTES # BLD AUTO: 1 K/UL (ref 1–4.8)
LYMPHOCYTES # BLD AUTO: 1.1 K/UL (ref 1–4.8)
LYMPHOCYTES NFR BLD: 15 % (ref 18–48)
LYMPHOCYTES NFR BLD: 15.2 % (ref 18–48)
MAGNESIUM SERPL-MCNC: 1.7 MG/DL (ref 1.6–2.6)
MCH RBC QN AUTO: 30.5 PG (ref 27–31)
MCH RBC QN AUTO: 30.8 PG (ref 27–31)
MCHC RBC AUTO-ENTMCNC: 31.8 G/DL (ref 32–36)
MCHC RBC AUTO-ENTMCNC: 32.1 G/DL (ref 32–36)
MCV RBC AUTO: 95 FL (ref 82–98)
MCV RBC AUTO: 97 FL (ref 82–98)
MONOCYTES # BLD AUTO: 0.7 K/UL (ref 0.3–1)
MONOCYTES # BLD AUTO: 0.8 K/UL (ref 0.3–1)
MONOCYTES NFR BLD: 10.1 % (ref 4–15)
MONOCYTES NFR BLD: 10.5 % (ref 4–15)
NEUTROPHILS # BLD AUTO: 4.7 K/UL (ref 1.8–7.7)
NEUTROPHILS # BLD AUTO: 5.4 K/UL (ref 1.8–7.7)
NEUTROPHILS NFR BLD: 69.7 % (ref 38–73)
NEUTROPHILS NFR BLD: 71.3 % (ref 38–73)
NRBC BLD-RTO: 0 /100 WBC
NRBC BLD-RTO: 0 /100 WBC
PLATELET # BLD AUTO: 250 K/UL (ref 150–350)
PLATELET # BLD AUTO: 254 K/UL (ref 150–350)
PMV BLD AUTO: 10.8 FL (ref 9.2–12.9)
PMV BLD AUTO: 11.1 FL (ref 9.2–12.9)
POTASSIUM SERPL-SCNC: 3.6 MMOL/L (ref 3.5–5.1)
PROT SERPL-MCNC: 5.7 G/DL (ref 6–8.4)
RBC # BLD AUTO: 2.53 M/UL (ref 4–5.4)
RBC # BLD AUTO: 2.56 M/UL (ref 4–5.4)
SODIUM SERPL-SCNC: 137 MMOL/L (ref 136–145)
WBC # BLD AUTO: 6.78 K/UL (ref 3.9–12.7)
WBC # BLD AUTO: 7.59 K/UL (ref 3.9–12.7)

## 2020-06-04 PROCEDURE — 83880 ASSAY OF NATRIURETIC PEPTIDE: CPT

## 2020-06-04 PROCEDURE — 12000002 HC ACUTE/MED SURGE SEMI-PRIVATE ROOM

## 2020-06-04 PROCEDURE — 63600175 PHARM REV CODE 636 W HCPCS: Performed by: INTERNAL MEDICINE

## 2020-06-04 PROCEDURE — G0378 HOSPITAL OBSERVATION PER HR: HCPCS | Mod: CS

## 2020-06-04 PROCEDURE — 80053 COMPREHEN METABOLIC PANEL: CPT

## 2020-06-04 PROCEDURE — G0378 HOSPITAL OBSERVATION PER HR: HCPCS

## 2020-06-04 PROCEDURE — 99152 MOD SED SAME PHYS/QHP 5/>YRS: CPT | Mod: 59 | Performed by: INTERNAL MEDICINE

## 2020-06-04 PROCEDURE — 43235 EGD DIAGNOSTIC BRUSH WASH: CPT | Performed by: INTERNAL MEDICINE

## 2020-06-04 PROCEDURE — 94761 N-INVAS EAR/PLS OXIMETRY MLT: CPT

## 2020-06-04 PROCEDURE — C9113 INJ PANTOPRAZOLE SODIUM, VIA: HCPCS | Performed by: INTERNAL MEDICINE

## 2020-06-04 PROCEDURE — 36415 COLL VENOUS BLD VENIPUNCTURE: CPT

## 2020-06-04 PROCEDURE — 25000003 PHARM REV CODE 250: Performed by: INTERNAL MEDICINE

## 2020-06-04 PROCEDURE — 99153 MOD SED SAME PHYS/QHP EA: CPT | Performed by: INTERNAL MEDICINE

## 2020-06-04 PROCEDURE — 99900035 HC TECH TIME PER 15 MIN (STAT)

## 2020-06-04 PROCEDURE — 83735 ASSAY OF MAGNESIUM: CPT

## 2020-06-04 PROCEDURE — 85025 COMPLETE CBC W/AUTO DIFF WBC: CPT

## 2020-06-04 RX ORDER — FENTANYL CITRATE 50 UG/ML
INJECTION, SOLUTION INTRAMUSCULAR; INTRAVENOUS
Status: DISCONTINUED | OUTPATIENT
Start: 2020-06-04 | End: 2020-06-05 | Stop reason: HOSPADM

## 2020-06-04 RX ORDER — MIDAZOLAM HYDROCHLORIDE 1 MG/ML
INJECTION INTRAMUSCULAR; INTRAVENOUS
Status: DISCONTINUED | OUTPATIENT
Start: 2020-06-04 | End: 2020-06-05 | Stop reason: HOSPADM

## 2020-06-04 RX ADMIN — MIDAZOLAM HYDROCHLORIDE 2 MG: 1 INJECTION, SOLUTION INTRAMUSCULAR; INTRAVENOUS at 02:06

## 2020-06-04 RX ADMIN — PANTOPRAZOLE SODIUM 40 MG: 40 INJECTION, POWDER, FOR SOLUTION INTRAVENOUS at 09:06

## 2020-06-04 RX ADMIN — MIDAZOLAM HYDROCHLORIDE 1 MG: 1 INJECTION, SOLUTION INTRAMUSCULAR; INTRAVENOUS at 02:06

## 2020-06-04 RX ADMIN — INSULIN DETEMIR 12 UNITS: 100 INJECTION, SOLUTION SUBCUTANEOUS at 09:06

## 2020-06-04 RX ADMIN — CARVEDILOL 6.25 MG: 6.25 TABLET, FILM COATED ORAL at 06:06

## 2020-06-04 RX ADMIN — FENTANYL CITRATE 25 MCG: 50 INJECTION INTRAMUSCULAR; INTRAVENOUS at 02:06

## 2020-06-04 RX ADMIN — INSULIN ASPART 1 UNITS: 100 INJECTION, SOLUTION INTRAVENOUS; SUBCUTANEOUS at 09:06

## 2020-06-04 RX ADMIN — FENTANYL CITRATE 50 MCG: 50 INJECTION INTRAMUSCULAR; INTRAVENOUS at 02:06

## 2020-06-04 RX ADMIN — ACETAMINOPHEN 650 MG: 325 TABLET ORAL at 10:06

## 2020-06-04 RX ADMIN — AMIODARONE HYDROCHLORIDE 200 MG: 200 TABLET ORAL at 09:06

## 2020-06-04 NOTE — H&P
[facility]  History & Physical    Patient Name: Leslie Tolliver  MRN: [id number]  Admission Date: 6/3/2020  Attending Physician: Bg Mike MD  Primary Care Provider: Chang Christianson MD         Patient information was obtained from patient, relative(s) and ER records.     Subjective:     Principal Problem:Acute upper GI bleed    Chief Complaint:   Chief Complaint   Patient presents with    Fall     states lost balance fell into chair, states hit head on chair, no loc, denies pain        HPI: Patient is a 85-year-old  female with known history of paroxysmal atrial fibrillation, TIA, type 2 diabetes mellitus, benign essential hypertension and recent aortic valve replacement using bioprosthetic valve was discharged from outside facility yesterday presents to the ED after suffering a mechanical fall.    She underwent elective aortic valve replacement using bioprosthetic valve by Dr. Steven Doshi at [facility] in [place] and was discharged yesterday.  She suffered a mechanical fall earlier this morning and presented to the ED.  Review of systems in the ED was positive for melena.  She initially noticed melena about 1 week ago while in the hospital however she did not report it. Today she had a follow-up phone call from [place] who advised to present to the ED upon knowing about the fall.  However patient chose to come here for further evaluation.  Over the last 1 week she reports at least 3 episodes of dark tarry stools. She has been on apixaban since 2 weeks. Prior to that she has been on aspirin.  She has filled iron supplements today. She admits to taking ibuprofen and naproxen once or twice a month for chronic aches and pains.  Reports never having had upper or lower GI endoscopy.  She denies abdominal pain, nausea/vomiting or diarrhea/constipation.  She admits to generalized weakness but denies shortness of breath. No exacerbating or  relieving factors.  She admits to mild incisional pain/discomfort at the chest wall.  Patient denies recent fever, chills or cough.    In the ED:  Hemodynamically stable.  Patient was noted to anemia (as per report hemoglobin lower than baseline however similar to postoperative values from yesterday).  Found to mildly elevated troponin.  EKG with atrial fibrillation without rapid ventricular response and nonspecific T-wave changes. CT head and cervical spine negative for acute process.      Rest of the 10 point review of systems is negative except as mentioned above.    Past Medical History:   Diagnosis Date    Anemia due to stage 3 chronic kidney disease 7/24/2019    Arthritis     Chronic bilateral low back pain without sciatica 7/24/2019    CKD (chronic kidney disease) stage 3, GFR 30-59 ml/min 7/24/2019    Colon polyps     Coronary artery disease     Diabetes mellitus type II     Diabetes with neurologic complications     GERD (gastroesophageal reflux disease)     Hyperlipidemia     Hypertension     Hypothyroidism     Type 2 diabetes mellitus        Past Surgical History:   Procedure Laterality Date    ADENOIDECTOMY      BREAST BIOPSY Right 20 yrs ago    benign    CHOLECYSTECTOMY      COLONOSCOPY  5-    Dr Holly, 5 year recheck    HEMORRHOID SURGERY      HYSTERECTOMY      OOPHORECTOMY      TONSILLECTOMY         Review of patient's allergies indicates:   Allergen Reactions    Fenofibric acid (choline)      Other reaction(s): Vomiting    Iodine      Other reaction(s): lips swollen    Rosuvastatin      Other reaction(s): muscle pain       No current facility-administered medications on file prior to encounter.      Current Outpatient Medications on File Prior to Encounter   Medication Sig    amiodarone (PACERONE) 200 MG Tab Take 200 mg by mouth 2 (two) times daily.    aspirin 81 mg Tab Take 81 mg by mouth once daily. Every day    calcium carbonate/vitamin D3 (CALCIUM+D ORAL) Take  "1 tablet by mouth once daily.    carvedilol (COREG) 6.25 MG tablet Take 1 tablet (6.25 mg total) by mouth 2 (two) times daily with meals.    ferrous sulfate (FEOSOL) 325 mg (65 mg iron) Tab tablet Take 325 mg by mouth daily with breakfast.    insulin (LANTUS SOLOSTAR U-100 INSULIN) glargine 100 units/mL (3mL) SubQ pen INJECT 24 UNITS INTO THE SKIN EVERY EVENING. (Patient taking differently: Inject 24 Units into the skin every evening. INJECT 24 UNITS INTO THE SKIN EVERY EVENING.)    linaGLIPtin (TRADJENTA) 5 mg Tab tablet Take 5 mg by mouth once daily.    lisinopriL-hydrochlorothiazide (PRINZIDE,ZESTORETIC) 10-12.5 mg per tablet Take 1 tablet by mouth once daily.    metFORMIN (GLUCOPHAGE) 1000 MG tablet Take 1 tablet (1,000 mg total) by mouth 2 (two) times daily with meals.    multivitamin (ONE DAILY MULTIVITAMIN) per tablet Take 1 tablet by mouth once daily.    SITagliptin (JANUVIA) 100 MG Tab Take 1 tablet (100 mg total) by mouth once daily.    ACCU-CHEK FASTCLIX LANCET DRUM Misc USE TO CHECK SUGAR THREE TIMES DAILY    ACCU-CHEK SMARTVIEW TEST STRIP Strp USE TO TEST THREE TIMES DAILY    blood-glucose meter kit Accu-chek Marley glucometer check glucose three times daily E11.65    cholestyramine-aspartame (PREVALITE) 4 gram PwPk Take 1 packet (4 g total) by mouth once daily. (Patient taking differently: Take 1 packet by mouth daily as needed. )    DROPLET PEN NEEDLE 31 gauge x 5/16" Ndle USE WITH LANTUS SOLOSTAR PEN AS NEEDED    naproxen sodium (ALEVE) 220 mg Cap Take 220 mg by mouth 2 (two) times daily. As needed for arthritic pain    ondansetron (ZOFRAN-ODT) 4 MG TbDL Take 4 mg by mouth every 8 (eight) hours as needed.     Family History     Problem Relation (Age of Onset)    Cancer Father    Diabetes Mother, Brother    Early death Son, Son    Glaucoma Mother    Heart disease Mother    No Known Problems Daughter        Tobacco Use    Smoking status: Former Smoker     Packs/day: 0.25     Years: 15.00 "     Pack years: 3.75     Types: Cigarettes     Last attempt to quit: 3/30/1974     Years since quittin.2    Smokeless tobacco: Never Used   Substance and Sexual Activity    Alcohol use: No    Drug use: No    Sexual activity: Never       Objective:     Vital Signs (Most Recent):  Temp: 98.1 °F (36.7 °C) (20 1614)  Pulse: 96 (20 1607)  Resp: 18 (20 1614)  BP: (!) 130/59 (20 1530)  SpO2: 100 % (20 1546) Vital Signs (24h Range):  Temp:  [98.1 °F (36.7 °C)] 98.1 °F (36.7 °C)  Pulse:  [] 96  Resp:  [16-24] 18  SpO2:  [99 %-100 %] 100 %  BP: (110-151)/(57-79) 130/59     Weight: 86.2 kg (190 lb)  Body mass index is 29.76 kg/m².    Physical Exam   Constitutional: She is oriented to person, place, and time. She appears well-developed and well-nourished. No distress.   HENT:   Head: Normocephalic and atraumatic.   Eyes: Pupils are equal, round, and reactive to light. No scleral icterus.   Pale conjunctiva   Neck: Neck supple. No thyromegaly present.   Cardiovascular: Normal rate. An irregularly irregular rhythm present.   Murmur heard.  Pulmonary/Chest: Effort normal and breath sounds normal. No respiratory distress.   Midline dressing is clean, dry and intact   Abdominal: Soft. Bowel sounds are normal. She exhibits no distension. There is no tenderness.   Musculoskeletal: She exhibits no edema or deformity.   Neurological: She is alert and oriented to person, place, and time. She has normal strength. No sensory deficit.   Skin: Skin is warm and dry. Capillary refill takes less than 2 seconds. No erythema. There is pallor.   Psychiatric: She has a normal mood and affect. Her behavior is normal.   Nursing note and vitals reviewed.        CRANIAL NERVES     CN III, IV, VI   Pupils are equal, round, and reactive to light.       Significant Labs:   CBC:   Recent Labs   Lab 20  1226 20  1614   WBC 8.86 8.56   HGB 8.2* 8.2*   HCT 25.3* 25.3*    262     CMP:   Recent  Labs   Lab 06/03/20  1226      K 3.8      CO2 23   *   BUN 24*   CREATININE 1.0   CALCIUM 8.3*   PROT 6.0   ALBUMIN 3.3*   BILITOT 1.3*   ALKPHOS 53*   AST 14   ALT 13   ANIONGAP 11   EGFRNONAA 51.5*       Significant Imaging: I have reviewed all pertinent imaging results/findings within the past 24 hours.    Imaging Results          CT Cervical Spine Without Contrast (Final result)  Result time 06/03/20 13:01:09    Final result by Jeremiah Will MD (06/03/20 13:01:09)                 Narrative:    CMS MANDATED QUALITY DATA - CT RADIATION - 436    All CT scans at this facility utilize dose modulation, iterative  reconstruction, and/or weight based dosing when appropriate to reduce  radiation dose to as low as reasonably achievable.        Reason: Neck pain, first study trauma    TECHNIQUE: Cervical spine CT without IV contrast obtained with coronal  and sagittal reformations.    COMPARISON:None    FINDINGS:  Negative for fracture. No epidural hematoma or prevertebral soft  tissue swelling.    Cervical soft tissues are unremarkable. Visualized lung apices are  clear.    At C2-C3, moderate right facet joint osteoarthrosis results in minor  right neural foramen narrowing.    At C3-C4, mild left facet joint osteoarthrosis.    At C4-C5, moderate right and mild left facet joint osteoarthrosis  cause no significant narrowing.    At C5-C6, broad posterior osteophytic ridge results in mild central  canal and moderate right and mild left neural foramen narrowing.    At C6-C7, broad posterior osteophytic ridge results in mild central  canal narrowing.    At C7-T1, moderate left and mild right facet joint osteoarthrosis.    Coronal and sagittal reformations show normal alignment without  abnormal facet widening.    IMPRESSION:  Cervical spine degenerative changes, without acute abnormality.    Electronically Signed by Jeremiah Will M.D. on 6/3/2020 1:09 PM                             CT Head Without Contrast  (Final result)  Result time 06/03/20 13:00:09    Final result by Kamar Sanches MD (06/03/20 13:00:09)                 Narrative:    CMS MANDATED QUALITY DATA - CT RADIATION 436    All CT scans at this facility utilize dose modulation, iterative  reconstruction, and/or weight based dosing when appropriate to reduce  radiation dose to as low as reasonably achievable.    CLINICAL HISTORY:  85 years (7/17/1934) Female Headache, post trauma    TECHNIQUE:  CT HEAD WITHOUT CONTRAST. 106 images obtained. Axial CT of the brain  without contrast using soft tissue and bone algorithm. .    COMPARISON:  Most recent study from January 14, 2020.    FINDINGS:  No acute intracranial hemorrhage, edema or mass effect, and no acute  parenchymal abnormality. There is no hydrocephalus, evidence of  herniation or midline shift. The basal and suprasellar cisterns are  within normal limits. The osseous structures show no acute skull  fracture.    Mild periventricular deep cerebral white matter low attenuation,  nonspecific findings which can be seen in any diffuse white matter  process but most commonly associated with chronic microvascular  ischemic disease. With relative hyperostosis frontalis There are  scattered atheromatous calcifications in the intracranial internal  carotid arteries.    Orbital contents appear within normal limits. External auditory  canals are unremarkable. The visualized paranasal sinuses and mastoid  air cells are essentially clear.    IMPRESSION:  No acute intracranial process                  .    Electronically Signed by ARUN Hinojosa on 6/3/2020 1:04 PM                              Results for orders placed or performed during the hospital encounter of 06/03/20   EKG 12-lead    Collection Time: 06/03/20 11:54 AM    Narrative    Test Reason : W19.XXXA,    Vent. Rate : 086 BPM     Atrial Rate : 084 BPM     P-R Int : 000 ms          QRS Dur : 094 ms      QT Int : 376 ms       P-R-T Axes : 000 035  050 degrees     QTc Int : 449 ms    Atrial fibrillation with a competing junctional pacemaker  Abnormal ECG  When compared with ECG of 14-JAN-2020 09:44,  Atrial fibrillation has replaced Sinus rhythm  Criteria for Inferior infarct are no longer Present  Nonspecific T wave abnormality has replaced inverted T waves in Inferior  leads  Nonspecific T wave abnormality now evident in Lateral leads    Referred By: PASCUAL   SELF           Confirmed By:          Assessment/Plan:     Active Hospital Problems    Diagnosis  POA    *Acute upper GI bleed [K92.2]  Yes     Priority: 1 - High    Acute on chronic anemia [D64.9]  Yes     Priority: 2     Status post aortic valve replacement with bioprosthetic valve [Z95.3]  Not Applicable     Priority: 3     Type 2 diabetes mellitus with diabetic polyneuropathy, with long-term current use of insulin [E11.42, Z79.4]  Not Applicable     Priority: 4     Hypertension associated with diabetes [E11.59, I10]  Yes     Priority: 4     CKD (chronic kidney disease) stage 3, GFR 30-59 ml/min [N18.3]  Yes     Priority: 5     Elevated troponin [R79.89]  Yes      Resolved Hospital Problems   No resolved problems to display.       Plan:  Admit to med-surg with telemetry   Ddx - Heyde's syndrome given hx of aortic stenosis vs peptic ulcer disease  2 large bore IVs and IV PPI BID   Seen by GI in the ED - planning for EGD tomorrow a.m.  Clears for now and NPO from midnight   Hold aspirin and apixaban; trend CBC q.8 hours  Elevated troponin likely secondary to demand ischemia in the setting of recent valve replacement as well as anemia  EKG without any ischemic changes; reveals atrial fibrillation with nonspecific T-wave changes  Obtain BNP  Continue home medications including amiodarone and carvedilol  Basal insulin along with sliding scale    Discussed plan of care with granddaughter at bedside    VTE Risk Mitigation (From admission, onward)         Ordered     IP VTE HIGH RISK PATIENT  Once       06/03/20 1508     Place sequential compression device  Until discontinued      06/03/20 1508                   Bg Mike MD  Department of Hospital Medicine   [facility]

## 2020-06-04 NOTE — PLAN OF CARE
Problem: Fall Injury Risk  Goal: Absence of Fall and Fall-Related Injury  Outcome: Ongoing, Progressing     Problem: Adult Inpatient Plan of Care  Goal: Plan of Care Review  Outcome: Ongoing, Progressing  Goal: Patient-Specific Goal (Individualization)  Outcome: Ongoing, Progressing  Goal: Absence of Hospital-Acquired Illness or Injury  Outcome: Ongoing, Progressing  Goal: Optimal Comfort and Wellbeing  Outcome: Ongoing, Progressing  Goal: Readiness for Transition of Care  Outcome: Ongoing, Progressing  Goal: Rounds/Family Conference  Outcome: Ongoing, Progressing     Problem: Diabetes Comorbidity  Goal: Blood Glucose Level Within Desired Range  Outcome: Ongoing, Progressing     Problem: Skin Injury Risk Increased  Goal: Skin Health and Integrity  Outcome: Ongoing, Progressing     Problem: Bleeding (Gastrointestinal Bleeding)  Goal: Hemostasis  Outcome: Ongoing, Progressing

## 2020-06-04 NOTE — HPI
Patient is a 85-year-old  female with known history of paroxysmal atrial fibrillation, TIA, type 2 diabetes mellitus, benign essential hypertension and recent aortic valve replacement using bioprosthetic valve was discharged from outside facility yesterday presents to the ED after suffering a mechanical fall.    She underwent elective aortic valve replacement using bioprosthetic valve by Dr. Steven Doshi at [facility] in [place] and was discharged yesterday.  She suffered a mechanical fall earlier this morning and presented to the ED.  Review of systems in the ED was positive for melena.  She initially noticed melena about 1 week ago while in the hospital however she did not report it. Today she had a follow-up phone call from [place] who advised to present to the ED upon knowing about the fall.  However patient chose to come here for further evaluation.  Over the last 1 week she reports at least 3 episodes of dark tarry stools. She has been on apixaban since 2 weeks. Prior to that she has been on aspirin.  She has filled iron supplements today. She admits to taking ibuprofen and naproxen once or twice a month for chronic aches and pains.  Reports never having had upper or lower GI endoscopy.  She denies abdominal pain, nausea/vomiting or diarrhea/constipation.  She admits to generalized weakness but denies shortness of breath. No exacerbating or relieving factors.  She admits to mild incisional pain/discomfort at the chest wall.  Patient denies recent fever, chills or cough.    In the ED:  Hemodynamically stable.  Patient was noted to anemia (as per report hemoglobin lower than baseline however similar to postoperative values from yesterday).  Found to mildly elevated troponin.  EKG with atrial fibrillation without rapid ventricular response and nonspecific T-wave changes. CT head and cervical spine negative for acute process.      Rest of the 10 point review of systems is negative  except as mentioned above.

## 2020-06-04 NOTE — PLAN OF CARE
06/04/20 0748   Patient Assessment/Suction   Level of Consciousness (AVPU) alert   Respiratory Effort Normal;Unlabored   Expansion/Accessory Muscles/Retractions no retractions;no use of accessory muscles   Rhythm/Pattern, Respiratory unlabored;pattern regular;depth regular   PRE-TX-O2   O2 Device (Oxygen Therapy) room air   SpO2 97 %   Pulse Oximetry Type Intermittent   $ Pulse Oximetry - Multiple Charge Pulse Oximetry - Multiple   Pulse 96   Resp 16   Respiratory Evaluation   $ Care Plan Tech Time 15 min   Evaluation For   (care plan)

## 2020-06-04 NOTE — SUBJECTIVE & OBJECTIVE
Past Medical History:   Diagnosis Date    Anemia due to stage 3 chronic kidney disease 7/24/2019    Arthritis     Chronic bilateral low back pain without sciatica 7/24/2019    CKD (chronic kidney disease) stage 3, GFR 30-59 ml/min 7/24/2019    Colon polyps     Coronary artery disease     Diabetes mellitus type II     Diabetes with neurologic complications     GERD (gastroesophageal reflux disease)     Hyperlipidemia     Hypertension     Hypothyroidism     Type 2 diabetes mellitus        Past Surgical History:   Procedure Laterality Date    ADENOIDECTOMY      BREAST BIOPSY Right 20 yrs ago    benign    CHOLECYSTECTOMY      COLONOSCOPY  5-    Dr Holly, 5 year recheck    HEMORRHOID SURGERY      HYSTERECTOMY      OOPHORECTOMY      TONSILLECTOMY         Review of patient's allergies indicates:   Allergen Reactions    Fenofibric acid (choline)      Other reaction(s): Vomiting    Iodine      Other reaction(s): lips swollen    Rosuvastatin      Other reaction(s): muscle pain       No current facility-administered medications on file prior to encounter.      Current Outpatient Medications on File Prior to Encounter   Medication Sig    amiodarone (PACERONE) 200 MG Tab Take 200 mg by mouth 2 (two) times daily.    aspirin 81 mg Tab Take 81 mg by mouth once daily. Every day    calcium carbonate/vitamin D3 (CALCIUM+D ORAL) Take 1 tablet by mouth once daily.    carvedilol (COREG) 6.25 MG tablet Take 1 tablet (6.25 mg total) by mouth 2 (two) times daily with meals.    ferrous sulfate (FEOSOL) 325 mg (65 mg iron) Tab tablet Take 325 mg by mouth daily with breakfast.    insulin (LANTUS SOLOSTAR U-100 INSULIN) glargine 100 units/mL (3mL) SubQ pen INJECT 24 UNITS INTO THE SKIN EVERY EVENING. (Patient taking differently: Inject 24 Units into the skin every evening. INJECT 24 UNITS INTO THE SKIN EVERY EVENING.)    linaGLIPtin (TRADJENTA) 5 mg Tab tablet Take 5 mg by mouth once daily.     "lisinopriL-hydrochlorothiazide (PRINZIDE,ZESTORETIC) 10-12.5 mg per tablet Take 1 tablet by mouth once daily.    metFORMIN (GLUCOPHAGE) 1000 MG tablet Take 1 tablet (1,000 mg total) by mouth 2 (two) times daily with meals.    multivitamin (ONE DAILY MULTIVITAMIN) per tablet Take 1 tablet by mouth once daily.    SITagliptin (JANUVIA) 100 MG Tab Take 1 tablet (100 mg total) by mouth once daily.    ACCU-CHEK FASTCLIX LANCET DRUM Misc USE TO CHECK SUGAR THREE TIMES DAILY    ACCU-CHEK SMARTVIEW TEST STRIP Strp USE TO TEST THREE TIMES DAILY    blood-glucose meter kit Accu-chek Marley glucometer check glucose three times daily E11.65    cholestyramine-aspartame (PREVALITE) 4 gram PwPk Take 1 packet (4 g total) by mouth once daily. (Patient taking differently: Take 1 packet by mouth daily as needed. )    DROPLET PEN NEEDLE 31 gauge x 5/16" Ndle USE WITH LANTUS SOLOSTAR PEN AS NEEDED    naproxen sodium (ALEVE) 220 mg Cap Take 220 mg by mouth 2 (two) times daily. As needed for arthritic pain    ondansetron (ZOFRAN-ODT) 4 MG TbDL Take 4 mg by mouth every 8 (eight) hours as needed.     Family History     Problem Relation (Age of Onset)    Cancer Father    Diabetes Mother, Brother    Early death Son, Son    Glaucoma Mother    Heart disease Mother    No Known Problems Daughter        Tobacco Use    Smoking status: Former Smoker     Packs/day: 0.25     Years: 15.00     Pack years: 3.75     Types: Cigarettes     Last attempt to quit: 3/30/1974     Years since quittin.2    Smokeless tobacco: Never Used   Substance and Sexual Activity    Alcohol use: No    Drug use: No    Sexual activity: Never       Objective:     Vital Signs (Most Recent):  Temp: 98.1 °F (36.7 °C) (20 1614)  Pulse: 96 (20 1607)  Resp: 18 (20 1614)  BP: (!) 130/59 (20 1530)  SpO2: 100 % (20 1546) Vital Signs (24h Range):  Temp:  [98.1 °F (36.7 °C)] 98.1 °F (36.7 °C)  Pulse:  [] 96  Resp:  [16-24] 18  SpO2:  [99 " %-100 %] 100 %  BP: (110-151)/(57-79) 130/59     Weight: 86.2 kg (190 lb)  Body mass index is 29.76 kg/m².    Physical Exam   Constitutional: She is oriented to person, place, and time. She appears well-developed and well-nourished. No distress.   HENT:   Head: Normocephalic and atraumatic.   Eyes: Pupils are equal, round, and reactive to light. No scleral icterus.   Pale conjunctiva   Neck: Neck supple. No thyromegaly present.   Cardiovascular: Normal rate. An irregularly irregular rhythm present.   Murmur heard.  Pulmonary/Chest: Effort normal and breath sounds normal. No respiratory distress.   Midline dressing is clean, dry and intact   Abdominal: Soft. Bowel sounds are normal. She exhibits no distension. There is no tenderness.   Musculoskeletal: She exhibits no edema or deformity.   Neurological: She is alert and oriented to person, place, and time. She has normal strength. No sensory deficit.   Skin: Skin is warm and dry. Capillary refill takes less than 2 seconds. No erythema. There is pallor.   Psychiatric: She has a normal mood and affect. Her behavior is normal.   Nursing note and vitals reviewed.        CRANIAL NERVES     CN III, IV, VI   Pupils are equal, round, and reactive to light.       Significant Labs:   CBC:   Recent Labs   Lab 06/03/20  1226 06/03/20  1614   WBC 8.86 8.56   HGB 8.2* 8.2*   HCT 25.3* 25.3*    262     CMP:   Recent Labs   Lab 06/03/20  1226      K 3.8      CO2 23   *   BUN 24*   CREATININE 1.0   CALCIUM 8.3*   PROT 6.0   ALBUMIN 3.3*   BILITOT 1.3*   ALKPHOS 53*   AST 14   ALT 13   ANIONGAP 11   EGFRNONAA 51.5*       Significant Imaging: I have reviewed all pertinent imaging results/findings within the past 24 hours.    Imaging Results          CT Cervical Spine Without Contrast (Final result)  Result time 06/03/20 13:01:09    Final result by Jeremiah Will MD (06/03/20 13:01:09)                 Narrative:    CMS MANDATED QUALITY DATA - CT RADIATION -  436    All CT scans at this facility utilize dose modulation, iterative  reconstruction, and/or weight based dosing when appropriate to reduce  radiation dose to as low as reasonably achievable.        Reason: Neck pain, first study trauma    TECHNIQUE: Cervical spine CT without IV contrast obtained with coronal  and sagittal reformations.    COMPARISON:None    FINDINGS:  Negative for fracture. No epidural hematoma or prevertebral soft  tissue swelling.    Cervical soft tissues are unremarkable. Visualized lung apices are  clear.    At C2-C3, moderate right facet joint osteoarthrosis results in minor  right neural foramen narrowing.    At C3-C4, mild left facet joint osteoarthrosis.    At C4-C5, moderate right and mild left facet joint osteoarthrosis  cause no significant narrowing.    At C5-C6, broad posterior osteophytic ridge results in mild central  canal and moderate right and mild left neural foramen narrowing.    At C6-C7, broad posterior osteophytic ridge results in mild central  canal narrowing.    At C7-T1, moderate left and mild right facet joint osteoarthrosis.    Coronal and sagittal reformations show normal alignment without  abnormal facet widening.    IMPRESSION:  Cervical spine degenerative changes, without acute abnormality.    Electronically Signed by Jeremiah Will M.D. on 6/3/2020 1:09 PM                             CT Head Without Contrast (Final result)  Result time 06/03/20 13:00:09    Final result by Kamar Sanches MD (06/03/20 13:00:09)                 Narrative:    CMS MANDATED QUALITY DATA - CT RADIATION 436    All CT scans at this facility utilize dose modulation, iterative  reconstruction, and/or weight based dosing when appropriate to reduce  radiation dose to as low as reasonably achievable.    CLINICAL HISTORY:  85 years (7/17/1934) Female Headache, post trauma    TECHNIQUE:  CT HEAD WITHOUT CONTRAST. 106 images obtained. Axial CT of the brain  without contrast using soft tissue  and bone algorithm. .    COMPARISON:  Most recent study from January 14, 2020.    FINDINGS:  No acute intracranial hemorrhage, edema or mass effect, and no acute  parenchymal abnormality. There is no hydrocephalus, evidence of  herniation or midline shift. The basal and suprasellar cisterns are  within normal limits. The osseous structures show no acute skull  fracture.    Mild periventricular deep cerebral white matter low attenuation,  nonspecific findings which can be seen in any diffuse white matter  process but most commonly associated with chronic microvascular  ischemic disease. With relative hyperostosis frontalis There are  scattered atheromatous calcifications in the intracranial internal  carotid arteries.    Orbital contents appear within normal limits. External auditory  canals are unremarkable. The visualized paranasal sinuses and mastoid  air cells are essentially clear.    IMPRESSION:  No acute intracranial process                  .    Electronically Signed by ARUN Hinojosa on 6/3/2020 1:04 PM                              Results for orders placed or performed during the hospital encounter of 06/03/20   EKG 12-lead    Collection Time: 06/03/20 11:54 AM    Narrative    Test Reason : W19.XXXA,    Vent. Rate : 086 BPM     Atrial Rate : 084 BPM     P-R Int : 000 ms          QRS Dur : 094 ms      QT Int : 376 ms       P-R-T Axes : 000 035 050 degrees     QTc Int : 449 ms    Atrial fibrillation with a competing junctional pacemaker  Abnormal ECG  When compared with ECG of 14-JAN-2020 09:44,  Atrial fibrillation has replaced Sinus rhythm  Criteria for Inferior infarct are no longer Present  Nonspecific T wave abnormality has replaced inverted T waves in Inferior  leads  Nonspecific T wave abnormality now evident in Lateral leads    Referred By: [email or web address]   SELF           Confirmed By:

## 2020-06-04 NOTE — PROGRESS NOTES
[facility]  Progress Note    Patient name: Leslie Tolliver  MRN: [id number]  Admit Date: 6/3/2020   LOS: 1 day     SUBJECTIVE:     Principal problem: Acute upper GI bleed    Interval History:  Overnight patient had 2 episodes of minimal melanotic stool.  She denies hematochezia or hematemesis.  She also denies abdominal pain, nausea/vomiting or lightheadedness/dizziness.  Seen this morning prior to EGD.    Scheduled Meds:   amiodarone  200 mg Oral BID    carvediloL  6.25 mg Oral BID WM    insulin detemir U-100  12 Units Subcutaneous QHS    pantoprazole  40 mg Intravenous BID     Continuous Infusions:  PRN Meds:acetaminophen, acetaminophen, dextrose 50%, dextrose 50%, fentaNYL, glucagon (human recombinant), glucose, glucose, insulin aspart U-100, midazolam, ondansetron    Review of patient's allergies indicates:   Allergen Reactions    Fenofibric acid (choline)      Other reaction(s): Vomiting    Iodine      Other reaction(s): lips swollen    Rosuvastatin      Other reaction(s): muscle pain       Review of Systems: As per interval history    OBJECTIVE:     Vital Signs (Most Recent)  Temp: 97.7 °F (36.5 °C) (06/04/20 1540)  Pulse: 62 (06/04/20 1540)  Resp: 17 (06/04/20 1540)  BP: 115/61 (06/04/20 1540)  SpO2: 97 % (06/04/20 1540)    Vital Signs Range (Last 24H):  Temp:  [97.7 °F (36.5 °C)-98.9 °F (37.2 °C)]   Pulse:  []   Resp:  [15-19]   BP: ()/(54-97)   SpO2:  [92 %-100 %]     I & O (Last 24H):    Intake/Output Summary (Last 24 hours) at 6/4/2020 1617  Last data filed at 6/3/2020 2330  Gross per 24 hour   Intake 450 ml   Output --   Net 450 ml       Physical Exam:  General: Patient resting comfortably in no acute distress. Appears as stated age. Calm  Eyes: Conjunctival pallor noted. No scleral icterus.  ENT: Hearing grossly intact. No discharge from ears. No nasal discharge.   Neck: Supple, trachea midline. No JVD  CVS: RRR. No LE edema BL  Lungs: CTA BL, no  wheezing or crackles. Good breath sounds. No accessory muscle use. No acute respiratory distress  Abdomen:  Soft, nontender and nondistended.  No organomegaly  Neuro: AOx3. Moves all extremities. Follows commands. Responds appropriately   Skin:  No rash or erythema noted    Laboratory:  CBC:   Recent Labs   Lab 06/04/20  0531   WBC 6.78   RBC 2.53*   HGB 7.8*   HCT 24.5*      MCV 97   MCH 30.8   MCHC 31.8*     CMP:   Recent Labs   Lab 06/04/20  0531   *   CALCIUM 8.3*   ALBUMIN 3.1*   PROT 5.7*      K 3.6   CO2 25      BUN 18   CREATININE 0.9   ALKPHOS 46*   ALT 13   AST 16   BILITOT 1.3*       Diagnostic Results:  Labs: Reviewed  Upper GI: Reviewed     Procedure:            Upper GI endoscopy  Indications:          Suspected gastrointestinal bleeding    Findings:       The esophagus was normal. GEJ ring at 35 cm       Patchy mild inflammation characterized by congestion (edema) and        erythema was found in the gastric fundus. Mild contact oozing       The stomach was otherwise normal.       Two non-bleeding cratered duodenal ulcers with no stigmata of        bleeding were found in the duodenal bulb. The largest lesion was 12        mm in largest dimension.  Moderate Sedation:       Moderate (conscious) sedation was administered by the endoscopy        nurse and supervised by the endoscopist. The patient's oxygen        saturation, heart rate, blood pressure and response to care were        monitored. Total physician intraservice time was 17 minutes.  Impression:           - Normal esophagus.                        - Gastritis.                        - Normal stomach.                        - Non-bleeding duodenal ulcers with no stigmata of                         bleeding.                        - No specimens collected.  Recommendation:       - Return patient to hospital [facility] for ongoing care.                        - PPI BID x 6 weeks then daily indefinitely; Avoid                          NSAIDs over next month; Ok to continue                         anticoagulation given no stigmata of bleeding and                         Hg stable over past 5 days.  Michael Nugent III, MD  6/4/2020 2:36:56 PM    ASSESSMENT/PLAN:       Active Hospital Problems    Diagnosis  POA    *Acute upper GI bleed [K92.2]  Yes     Priority: 1 - High    Acute on chronic anemia [D64.9]  Yes     Priority: 2     Status post aortic valve replacement with bioprosthetic valve [Z95.3]  Not Applicable     Priority: 3     Type 2 diabetes mellitus with diabetic polyneuropathy, with long-term current use of insulin [E11.42, Z79.4]  Not Applicable     Priority: 4     Hypertension associated with diabetes [E11.59, I10]  Yes     Priority: 4     CKD (chronic kidney disease) stage 3, GFR 30-59 ml/min [N18.3]  Yes     Priority: 5     Elevated troponin [R79.89]  Yes      Resolved Hospital Problems   No resolved problems to display.       Plan:   Status post EGD which revealed nonbleeding duodenal ulcers with no stigmata of bleeding  Continue PPI b.i.d. for 6 weeks and indefinitely thereafter  Appreciate GI input  Will monitor overnight for any further episodes of bleeding and repeat CBC next a.m.  Elevated troponin likely secondary to demand ischemia in the setting of recent valve replacement as well as anemia  EKG without any ischemic changes; reveals atrial fibrillation with nonspecific T-wave changes  Continue home medications including amiodarone and carvedilol  Basal insulin along with sliding scale       VTE Risk Mitigation (From admission, onward)         Ordered     IP VTE HIGH RISK PATIENT  Once      06/03/20 1508     Place sequential compression device  Until discontinued      06/03/20 1508                  Department Hospital Medicine  [facility]  Bg Mike MD  Date of service: 06/04/2020

## 2020-06-04 NOTE — [employer] PATIENT INSTRUCTIONS
Discharge Summary/Instructions after an Endoscopic Procedure  Patient Name: Leslie Tolliver  Patient MRN: [id number]  Patient YOB: 1934 Thursday, June 4, 2020  Michael Nugent III, MD  RESTRICTIONS:  During your procedure today, you received medications for sedation.  These   medications may affect your judgment, balance and coordination.  Therefore,   for 24 hours, you have the following restrictions:   - DO NOT drive a car, operate machinery, make legal/financial decisions,   sign important papers or drink alcohol.    ACTIVITY:  Today: no heavy lifting, straining or running due to procedural   sedation/anesthesia.  The following day: return to full activity including work.  DIET:  Eat and drink normally unless instructed otherwise.     TREATMENT FOR COMMON SIDE EFFECTS:  - Mild abdominal pain, nausea, belching, bloating or excessive gas:  rest,   eat lightly and use a heating pad.  - Sore Throat: treat with throat lozenges and/or gargle with warm salt   water.  - Because air was used during the procedure, expelling large amounts of air   from your rectum or belching is normal.  - If a bowel prep was taken, you may not have a bowel movement for 1-3 days.    This is normal.  SYMPTOMS TO WATCH FOR AND REPORT TO YOUR PHYSICIAN:  1. Abdominal pain or bloating, other than gas cramps.  2. Chest pain.  3. Back pain.  4. Signs of infection such as: chills or fever occurring within 24 hours   after the procedure.  5. Rectal bleeding, which would show as bright red, maroon, or black stools.   (A tablespoon of blood from the rectum is not serious, especially if   hemorrhoids are present.)  6. Vomiting.  7. Weakness or dizziness.  GO DIRECTLY TO THE NEAREST EMERGENCY ROOM IF YOU HAVE ANY OF THE FOLLOWING:      Difficulty breathing              Chills and/or fever over 101 F   Persistent vomiting and/or vomiting blood   Severe abdominal pain   Severe chest pain   Black, tarry stools   Bleeding- more than one  tablespoon   Any other symptom or condition that you feel may need urgent attention  Your doctor recommends these additional instructions:  If any biopsies were taken, your doctors clinic will contact you in 1 to 2   weeks with any results.  - Return patient to hospital [facility] for ongoing care.   - PPI BID x 6 weeks then daily indefinitely; Avoid NSAIDs over next month;   Ok to continue anticoagulation given no stigmata of bleeding and Hg stable   over past 5 days.  For questions, problems or results please call your physician - Michael Nugent III, MD at Work:  [phone].  [facility], EMERGENCY ROOM PHONE NUMBER: [phone]  IF A COMPLICATION OR EMERGENCY SITUATION ARISES AND YOU ARE UNABLE TO REACH   YOUR PHYSICIAN - GO DIRECTLY TO THE EMERGENCY ROOM.  Michael Nugent III, MD  6/4/2020 2:36:56 PM  This report has been verified and signed electronically.  [employer]

## 2020-06-05 VITALS
DIASTOLIC BLOOD PRESSURE: 78 MMHG | BODY MASS INDEX: 32.01 KG/M2 | RESPIRATION RATE: 18 BRPM | SYSTOLIC BLOOD PRESSURE: 133 MMHG | OXYGEN SATURATION: 100 % | WEIGHT: 203.94 LBS | TEMPERATURE: 98 F | HEIGHT: 67 IN | HEART RATE: 102 BPM

## 2020-06-05 PROBLEM — D50.9 IRON DEFICIENCY ANEMIA: Status: ACTIVE | Noted: 2020-06-05

## 2020-06-05 PROBLEM — K92.1 MELENA: Status: ACTIVE | Noted: 2020-06-05

## 2020-06-05 PROBLEM — K92.1 MELENA: Status: RESOLVED | Noted: 2020-06-05 | Resolved: 2020-06-05

## 2020-06-05 PROBLEM — R79.89 ELEVATED TROPONIN: Status: RESOLVED | Noted: 2020-06-03 | Resolved: 2020-06-05

## 2020-06-05 PROBLEM — K92.2 ACUTE UPPER GI BLEED: Status: RESOLVED | Noted: 2020-06-03 | Resolved: 2020-06-05

## 2020-06-05 LAB
ALBUMIN SERPL BCP-MCNC: 3.1 G/DL (ref 3.5–5.2)
ALP SERPL-CCNC: 53 U/L (ref 55–135)
ALT SERPL W/O P-5'-P-CCNC: 13 U/L (ref 10–44)
ANION GAP SERPL CALC-SCNC: 8 MMOL/L (ref 8–16)
AST SERPL-CCNC: 15 U/L (ref 10–40)
BASOPHILS # BLD AUTO: 0.03 K/UL (ref 0–0.2)
BASOPHILS NFR BLD: 0.4 % (ref 0–1.9)
BILIRUB SERPL-MCNC: 1.1 MG/DL (ref 0.1–1)
BUN SERPL-MCNC: 24 MG/DL (ref 8–23)
CALCIUM SERPL-MCNC: 8.3 MG/DL (ref 8.7–10.5)
CHLORIDE SERPL-SCNC: 102 MMOL/L (ref 95–110)
CO2 SERPL-SCNC: 27 MMOL/L (ref 23–29)
CREAT SERPL-MCNC: 1.1 MG/DL (ref 0.5–1.4)
DIFFERENTIAL METHOD: ABNORMAL
EOSINOPHIL # BLD AUTO: 0.2 K/UL (ref 0–0.5)
EOSINOPHIL NFR BLD: 2.4 % (ref 0–8)
ERYTHROCYTE [DISTWIDTH] IN BLOOD BY AUTOMATED COUNT: 15.6 % (ref 11.5–14.5)
EST. GFR  (AFRICAN AMERICAN): 52.9 ML/MIN/1.73 M^2
EST. GFR  (NON AFRICAN AMERICAN): 45.9 ML/MIN/1.73 M^2
FERRITIN SERPL-MCNC: 82 NG/ML (ref 20–300)
GLUCOSE SERPL-MCNC: 190 MG/DL (ref 70–110)
GLUCOSE SERPL-MCNC: 216 MG/DL (ref 70–110)
GLUCOSE SERPL-MCNC: 217 MG/DL (ref 70–110)
HCT VFR BLD AUTO: 24.6 % (ref 37–48.5)
HGB BLD-MCNC: 7.7 G/DL (ref 12–16)
IMM GRANULOCYTES # BLD AUTO: 0.07 K/UL (ref 0–0.04)
IMM GRANULOCYTES NFR BLD AUTO: 0.9 % (ref 0–0.5)
IRON SERPL-MCNC: 24 UG/DL (ref 30–160)
LYMPHOCYTES # BLD AUTO: 0.9 K/UL (ref 1–4.8)
LYMPHOCYTES NFR BLD: 12.3 % (ref 18–48)
MAGNESIUM SERPL-MCNC: 1.6 MG/DL (ref 1.6–2.6)
MCH RBC QN AUTO: 30.2 PG (ref 27–31)
MCHC RBC AUTO-ENTMCNC: 31.3 G/DL (ref 32–36)
MCV RBC AUTO: 97 FL (ref 82–98)
MONOCYTES # BLD AUTO: 0.8 K/UL (ref 0.3–1)
MONOCYTES NFR BLD: 10.6 % (ref 4–15)
NEUTROPHILS # BLD AUTO: 5.6 K/UL (ref 1.8–7.7)
NEUTROPHILS NFR BLD: 73.4 % (ref 38–73)
NRBC BLD-RTO: 0 /100 WBC
PLATELET # BLD AUTO: 273 K/UL (ref 150–350)
PMV BLD AUTO: 10.7 FL (ref 9.2–12.9)
POTASSIUM SERPL-SCNC: 3.9 MMOL/L (ref 3.5–5.1)
PROT SERPL-MCNC: 5.9 G/DL (ref 6–8.4)
RBC # BLD AUTO: 2.55 M/UL (ref 4–5.4)
SATURATED IRON: 10 % (ref 20–50)
SODIUM SERPL-SCNC: 137 MMOL/L (ref 136–145)
TOTAL IRON BINDING CAPACITY: 249 UG/DL (ref 250–450)
TRANSFERRIN SERPL-MCNC: 178 MG/DL (ref 200–375)
TRANSFERRIN SERPL-MCNC: 178 MG/DL (ref 200–375)
WBC # BLD AUTO: 7.57 K/UL (ref 3.9–12.7)

## 2020-06-05 PROCEDURE — 83735 ASSAY OF MAGNESIUM: CPT

## 2020-06-05 PROCEDURE — G0378 HOSPITAL OBSERVATION PER HR: HCPCS

## 2020-06-05 PROCEDURE — 25000003 PHARM REV CODE 250: Performed by: INTERNAL MEDICINE

## 2020-06-05 PROCEDURE — 94761 N-INVAS EAR/PLS OXIMETRY MLT: CPT

## 2020-06-05 PROCEDURE — C9113 INJ PANTOPRAZOLE SODIUM, VIA: HCPCS | Performed by: INTERNAL MEDICINE

## 2020-06-05 PROCEDURE — 85025 COMPLETE CBC W/AUTO DIFF WBC: CPT

## 2020-06-05 PROCEDURE — 80053 COMPREHEN METABOLIC PANEL: CPT

## 2020-06-05 PROCEDURE — 63600175 PHARM REV CODE 636 W HCPCS: Performed by: INTERNAL MEDICINE

## 2020-06-05 PROCEDURE — 83540 ASSAY OF IRON: CPT

## 2020-06-05 PROCEDURE — 99900035 HC TECH TIME PER 15 MIN (STAT)

## 2020-06-05 PROCEDURE — 36415 COLL VENOUS BLD VENIPUNCTURE: CPT

## 2020-06-05 PROCEDURE — 82728 ASSAY OF FERRITIN: CPT

## 2020-06-05 RX ORDER — PANTOPRAZOLE SODIUM 40 MG/1
TABLET, DELAYED RELEASE ORAL
Qty: 90 TABLET | Refills: 2 | Status: ON HOLD | OUTPATIENT
Start: 2020-06-05 | End: 2021-03-25

## 2020-06-05 RX ADMIN — AMIODARONE HYDROCHLORIDE 200 MG: 200 TABLET ORAL at 09:06

## 2020-06-05 RX ADMIN — PANTOPRAZOLE SODIUM 40 MG: 40 INJECTION, POWDER, FOR SOLUTION INTRAVENOUS at 08:06

## 2020-06-05 RX ADMIN — CARVEDILOL 6.25 MG: 6.25 TABLET, FILM COATED ORAL at 09:06

## 2020-06-05 NOTE — PLAN OF CARE
06/05/20 1216   Final Note   Assessment Type Final Discharge Note   Anticipated Discharge Disposition Home

## 2020-06-05 NOTE — PLAN OF CARE
06/05/20 1149   Medicare Message   Time IMM was signed 1140   HARGROVE Message   Medicare Outpatient and Observation Notification regarding financial responsibility Given to patient/caregiver;Explained to patient/caregiver;Signed/date by patient/caregiver   Date HARGROVE was signed 06/05/20   Time HARGROVE was signed 1140

## 2020-06-05 NOTE — HOSPITAL COURSE
85-year-old  female who recently underwent aortic valve replacement and has been initiated on apixaban 2 weeks ago presented to the ED with chief complaint of mechanical fall.  Upon admission she was found to have anemia and admitted to melanotic stools.  She was seen by GI and underwent EGD which revealed nonbleeding duodenal ulcers with no stigmata of bleeding.  Her hemoglobin has been stable around 8 grams/deciliter broderick.  Iron profile with significant deficiency and has been initiated recently on iron supplements which I encouraged to continue.  She has been advised to initiate PPI therapy for 6 weeks and indefinitely thereafter.  Encouraged to continue aspirin.  In terms of her apixaban she has been advised to follow-up with her cardiologist Dr. Lemon and discuss the same.  She has been advised to discontinue NSAIDs.  She voiced understanding of the plan.  Return precautions have been provided.  Deemed medically stable for discharge to home.    Instructions provided to follow up with primary care physician as outpatient. Patient verbalized understanding and is aware to contact primary care physician or return to ED if new or worsening symptoms.    Physical exam on the day of discharge:  General: Patient resting comfortably in no acute distress.  Lungs: CTA. Good air entry.  Cor: Regular rate and rhythm. Trace pedal edema.  Abd: Soft. Nontender. Non-distended.  Neuro: A&O x3. Moving all 4 extremities equally  Ext: No clubbing. No cyanosis.

## 2020-06-05 NOTE — DISCHARGE SUMMARY
[facility] Medicine  Discharge Summary      Patient Name: Leslie Tolliver  MRN: [id number]  Admission Date: 6/3/2020  Hospital Length of Stay: 2 days  Discharge Date and Time: 6/5/2020  1:29 PM  Attending Physician: Chantale att. providers found   Discharging Provider: Bg Mike MD  Primary Care Provider: Chang Christianson MD      HPI:   Patient is a 85-year-old  female with known history of paroxysmal atrial fibrillation, TIA, type 2 diabetes mellitus, benign essential hypertension and recent aortic valve replacement using bioprosthetic valve was discharged from outside facility yesterday presents to the ED after suffering a mechanical fall.    She underwent elective aortic valve replacement using bioprosthetic valve by Dr. Steven Doshi at [facility] in [place] and was discharged yesterday.  She suffered a mechanical fall earlier this morning and presented to the ED.  Review of systems in the ED was positive for melena.  She initially noticed melena about 1 week ago while in the hospital however she did not report it. Today she had a follow-up phone call from [place] who advised to present to the ED upon knowing about the fall.  However patient chose to come here for further evaluation.  Over the last 1 week she reports at least 3 episodes of dark tarry stools. She has been on apixaban since 2 weeks. Prior to that she has been on aspirin.  She has filled iron supplements today. She admits to taking ibuprofen and naproxen once or twice a month for chronic aches and pains.  Reports never having had upper or lower GI endoscopy.  She denies abdominal pain, nausea/vomiting or diarrhea/constipation.  She admits to generalized weakness but denies shortness of breath. No exacerbating or relieving factors.  She admits to mild incisional pain/discomfort at the chest wall.  Patient denies recent fever, chills or cough.    In the ED:  Hemodynamically stable.  Patient  was noted to anemia (as per report hemoglobin lower than baseline however similar to postoperative values from yesterday).  Found to mildly elevated troponin.  EKG with atrial fibrillation without rapid ventricular response and nonspecific T-wave changes. CT head and cervical spine negative for acute process.      Rest of the 10 point review of systems is negative except as mentioned above.    Procedure(s) (LRB):  EGD (ESOPHAGOGASTRODUODENOSCOPY) (N/A)      Hospital Course:   85-year-old  female who recently underwent aortic valve replacement and has been initiated on apixaban 2 weeks ago presented to the ED with chief complaint of mechanical fall.  Upon admission she was found to have anemia and admitted to [facility].  She was seen by GI and underwent EGD which revealed nonbleeding duodenal ulcers with no stigmata of bleeding.  Her hemoglobin has been stable around 8 grams/deciliter broderick.  Iron profile with significant deficiency and has been initiated recently on iron supplements which I encouraged to continue.  She has been advised to initiate PPI therapy for 6 weeks and indefinitely thereafter.  Encouraged to continue aspirin.  In terms of her apixaban she has been advised to follow-up with her cardiologist Dr. Lemon and discuss the same.  She has been advised to discontinue NSAIDs.  She voiced understanding of the plan.  Return precautions have been provided.  Deemed medically stable for discharge to home.    Instructions provided to follow up with primary care physician as outpatient. Patient verbalized understanding and is aware to contact primary care physician or return to ED if new or worsening symptoms.    Physical exam on the day of discharge:  General: Patient resting comfortably in no acute distress.  Lungs: CTA. Good air entry.  Cor: Regular rate and rhythm. Trace pedal edema.  Abd: Soft. Nontender. Non-distended.  Neuro: A&O x3. Moving all 4 extremities equally  Ext: No clubbing. No  cyanosis.        Consults:   Consults (From admission, onward)        Status Ordering Provider     Inpatient consult to Gastroenterology  Once     Provider:  Michael Nugent III, MD    Completed DEA NGUYEN          No new Assessment & Plan notes have been filed under this hospital service since the last note was generated.  Service: Hospital Medicine    Final Active Diagnoses:    Diagnosis Date Noted POA    Acute on chronic anemia [D64.9] 07/24/2019 Yes    Status post aortic valve replacement with bioprosthetic valve [Z95.3] 06/03/2020 Not Applicable    Type 2 diabetes mellitus with diabetic polyneuropathy, with long-term current use of insulin [E11.42, Z79.4]  Not Applicable    Hypertension associated with diabetes [E11.59, I10]  Yes    CKD (chronic kidney disease) stage 3, GFR 30-59 ml/min [N18.3] 07/24/2019 Yes    Iron deficiency anemia [D50.9] 06/05/2020 Yes      Problems Resolved During this Admission:    Diagnosis Date Noted Date Resolved POA    PRINCIPAL PROBLEM:  Acute upper GI bleed [K92.2] 06/03/2020 06/05/2020 Yes    Melena [K92.1] 06/05/2020 06/05/2020 Yes    Elevated troponin [R79.89] 06/03/2020 06/05/2020 Yes       Discharged Condition: fair    Disposition: Home or Self Care    Follow Up:  Follow-up Information     Chang Christianson MD In 2 weeks.    Specialty:  Family Medicine  Why:  Hospital discharge follow-up , As needed  Contact information:  [address]  [place] [address]  [phone]             Arslan Lemon Jr, MD. Schedule an appointment as soon as possible for a visit in 1 week.    Specialty:  Cardiology  Why:  Status post aortic valve replacment follow-up. Discuss about restarting Eliquis   Contact information:  [address]  [place] [address]  [phone]                 Patient Instructions:      Diet Cardiac     Notify your health care provider if you experience any of the following:  temperature >100.4     Notify your health care provider if you experience  any of the following:  persistent nausea and vomiting or diarrhea     Notify your health care provider if you experience any of the following:  persistent dizziness, light-headedness, or visual disturbances     Activity as tolerated       Significant Diagnostic Studies: Labs:   CMP   Recent Labs   Lab 06/04/20  0531 06/05/20  0518    137   K 3.6 3.9    102   CO2 25 27   * 216*   BUN 18 24*   CREATININE 0.9 1.1   CALCIUM 8.3* 8.3*   PROT 5.7* 5.9*   ALBUMIN 3.1* 3.1*   BILITOT 1.3* 1.1*   ALKPHOS 46* 53*   AST 16 15   ALT 13 13   ANIONGAP 9 8   ESTGFRAFRICA >60.0 52.9*   EGFRNONAA 58.5* 45.9*   , CBC   Recent Labs   Lab 06/04/20  0023 06/04/20  0531 06/05/20  0517   WBC 7.59 6.78 7.57   HGB 7.8* 7.8* 7.7*   HCT 24.3* 24.5* 24.6*    250 273    and INR   Lab Results   Component Value Date    INR 1.4 06/03/2020     Endoscopy:     Upper GI: Reviewed      Procedure:            Upper GI endoscopy  Indications:          Suspected gastrointestinal bleeding    Findings:       The esophagus was normal. GEJ ring at 35 cm       Patchy mild inflammation characterized by congestion (edema) and        erythema was found in the gastric fundus. Mild contact oozing       The stomach was otherwise normal.       Two non-bleeding cratered duodenal ulcers with no stigmata of        bleeding were found in the duodenal bulb. The largest lesion was 12        mm in largest dimension.  Moderate Sedation:       Moderate (conscious) sedation was administered by the endoscopy        nurse and supervised by the endoscopist. The patient's oxygen        saturation, heart rate, blood pressure and response to care were        monitored. Total physician intraservice time was 17 minutes.  Impression:           - Normal esophagus.                        - Gastritis.                        - Normal stomach.                        - Non-bleeding duodenal ulcers with no stigmata of                         bleeding.                         - No specimens collected.  Recommendation:       - Return patient to hospital [facility] for ongoing care.                        - PPI BID x 6 weeks then daily indefinitely; Avoid                         NSAIDs over next month; Ok to continue                         anticoagulation given no stigmata of bleeding and                         Hg stable over past 5 days.  Michael Nugent III, MD  6/4/2020 2:36:56 PM    Pending Diagnostic Studies:     None         Medications:  Reconciled Home Medications:      Medication List      START taking these medications    pantoprazole 40 MG tablet  Commonly known as:  PROTONIX  Take 1 tablet (40 mg total) by mouth 2 (two) times daily for 30 days, THEN 1 tablet (40 mg total) once daily.  Start taking on:  June 5, 2020        CHANGE how you take these medications    cholestyramine-aspartame 4 gram Pwpk  Commonly known as:  PREVALITE  Take 1 packet (4 g total) by mouth once daily.  What changed:    · when to take this  · reasons to take this     insulin glargine 100 units/mL (3mL) SubQ pen  Commonly known as:  LANTUS SOLOSTAR U-100 INSULIN  INJECT 24 UNITS INTO THE SKIN EVERY EVENING.  What changed:    · how much to take  · how to take this  · when to take this        CONTINUE taking these medications    ACCU-CHEK FASTCLIX LANCET DRUM Misc  Generic drug:  lancets  USE TO CHECK SUGAR THREE TIMES DAILY     ACCU-CHEK SMARTVIEW TEST STRIP Strp  Generic drug:  blood sugar diagnostic  USE TO TEST THREE TIMES DAILY     amiodarone 200 MG Tab  Commonly known as:  PACERONE  Take 200 mg by mouth 2 (two) times daily.     aspirin 81 mg Tab  Take 81 mg by mouth once daily. Every day     blood-glucose meter kit  Accu-chek Marley glucometer check glucose three times daily E11.65     CALCIUM+D ORAL  Take 1 tablet by mouth once daily.     carvediloL 6.25 MG tablet  Commonly known as:  COREG  Take 1 tablet (6.25 mg total) by mouth 2 (two) times daily with meals.     DROPLET PEN NEEDLE 31 gauge x  "5/16" Ndle  Generic drug:  pen needle, diabetic  USE WITH LANTUS SOLOSTAR PEN AS NEEDED     ferrous sulfate 325 mg (65 mg iron) Tab tablet  Commonly known as:  FEOSOL  Take 325 mg by mouth daily with breakfast.     lisinopriL-hydrochlorothiazide 10-12.5 mg per tablet  Commonly known as:  PRINZIDE,ZESTORETIC  Take 1 tablet by mouth once daily.     metFORMIN 1000 MG tablet  Commonly known as:  GLUCOPHAGE  Take 1 tablet (1,000 mg total) by mouth 2 (two) times daily with meals.     ondansetron 4 MG Tbdl  Commonly known as:  ZOFRAN-ODT  Take 4 mg by mouth every 8 (eight) hours as needed.     ONE DAILY MULTIVITAMIN per tablet  Generic drug:  multivitamin  Take 1 tablet by mouth once daily.     SITagliptin 100 MG Tab  Commonly known as:  JANUVIA  Take 1 tablet (100 mg total) by mouth once daily.     TRADJENTA 5 mg Tab tablet  Generic drug:  linaGLIPtin  Take 5 mg by mouth once daily.        STOP taking these medications    famotidine 20 MG tablet  Commonly known as:  PEPCID     naproxen sodium 220 mg Cap  Commonly known as:  ALEVE            Indwelling Lines/Drains at time of discharge:   Lines/Drains/Airways     None                 Time spent on the discharge of patient: 32 minutes  Patient was seen and examined on the date of discharge and determined to be suitable for discharge.         Bg Mike MD  Department of Hospital Medicine  [facility]  "

## 2020-06-05 NOTE — PLAN OF CARE
06/04/20 2200   PRE-TX-O2   O2 Device (Oxygen Therapy) room air   SpO2 99 %   Pulse Oximetry Type Continuous   $ Pulse Oximetry - Multiple Charge Pulse Oximetry - Multiple   Pulse 100   Resp 18   Respiratory Evaluation   $ Care Plan Tech Time 15 min   Evaluation For   (careplan)

## 2020-06-12 DIAGNOSIS — R19.7 DIARRHEA, UNSPECIFIED TYPE: ICD-10-CM

## 2020-06-12 RX ORDER — CHOLESTYRAMINE 4 G/4.8G
1 POWDER, FOR SUSPENSION ORAL DAILY
Qty: 30 PACKET | Refills: 11 | Status: SHIPPED | OUTPATIENT
Start: 2020-06-12 | End: 2022-03-06

## 2020-06-12 NOTE — TELEPHONE ENCOUNTER
Patient calling- says the Januvia is giving her uncontrollable diarrhea with no warning. Has had 2 accidents today and 2 showers already. Her family has treated her and others to vacation next week to the [facility]. She is very worried about taking the medication and this happening in front of others. Patient crying. Says the [employer] didn't do this. Had heart valve replacement end of May. The day after she got home from that she fell and went to er. Diagnosed with gi bleed- says egd showed stomach ulcer.

## 2020-06-12 NOTE — TELEPHONE ENCOUNTER
----- Message from Jelena Gray sent at 6/12/2020 12:23 PM CDT -----  Contact: patient  Type: Needs Medical Advice  Who Called:  patient  Symptoms (please be specific):    How long has patient had these symptoms:    Pharmacy name and phone #:    Best Call Back Number: [phone]  Additional Information: requesting a call back regarding diabetes medication,stated continuing giver her loose bowels Janunvia 100mg

## 2020-06-12 NOTE — TELEPHONE ENCOUNTER
I noticed Prevalite on her med list. Spoke to daughter and she remembers she used to use it but is not now. Please send it to [facility].

## 2020-07-07 ENCOUNTER — PES CALL (OUTPATIENT)
Dept: ADMINISTRATIVE | Facility: CLINIC | Age: 85
End: 2020-07-07

## 2020-07-09 ENCOUNTER — CLINICAL SUPPORT (OUTPATIENT)
Dept: CARDIAC REHAB | Facility: HOSPITAL | Age: 85
End: 2020-07-09
Attending: SPECIALIST
Payer: MEDICARE

## 2020-07-09 DIAGNOSIS — Z95.2 S/P AVR (AORTIC VALVE REPLACEMENT): Primary | ICD-10-CM

## 2020-07-09 PROCEDURE — 93797 PHYS/QHP OP CAR RHAB WO ECG: CPT

## 2020-07-14 ENCOUNTER — CLINICAL SUPPORT (OUTPATIENT)
Dept: CARDIAC REHAB | Facility: HOSPITAL | Age: 85
End: 2020-07-14
Payer: MEDICARE

## 2020-07-14 DIAGNOSIS — Z95.2 S/P AVR (AORTIC VALVE REPLACEMENT): ICD-10-CM

## 2020-07-14 PROCEDURE — 93798 PHYS/QHP OP CAR RHAB W/ECG: CPT

## 2020-07-16 ENCOUNTER — CLINICAL SUPPORT (OUTPATIENT)
Dept: CARDIAC REHAB | Facility: HOSPITAL | Age: 85
End: 2020-07-16
Payer: MEDICARE

## 2020-07-16 DIAGNOSIS — Z95.2 S/P AVR (AORTIC VALVE REPLACEMENT): Primary | ICD-10-CM

## 2020-07-16 PROCEDURE — 93798 PHYS/QHP OP CAR RHAB W/ECG: CPT

## 2020-07-17 DIAGNOSIS — Z79.4 TYPE 2 DIABETES MELLITUS WITHOUT COMPLICATION, WITH LONG-TERM CURRENT USE OF INSULIN: ICD-10-CM

## 2020-07-17 DIAGNOSIS — E11.9 TYPE 2 DIABETES MELLITUS WITHOUT COMPLICATION, WITH LONG-TERM CURRENT USE OF INSULIN: ICD-10-CM

## 2020-07-17 RX ORDER — INSULIN GLARGINE 100 [IU]/ML
INJECTION, SOLUTION SUBCUTANEOUS
Qty: 30 ML | Refills: 1 | Status: SHIPPED | OUTPATIENT
Start: 2020-07-17 | End: 2021-05-17

## 2020-07-17 NOTE — TELEPHONE ENCOUNTER
----- Message from Sarah Welsh sent at 7/17/2020  8:34 AM CDT -----  Regarding: refill  Contact: [place]  Type:  RX Refill Request    Who Called:  self  Refill or New Rx:  refill  RX Name and Strength:  insulin (LANTUS SOLOSTAR U-100 INSULIN) glargine 100 units/mL (3mL) SubQ pen  How is the patient currently taking it? (ex. 1XDay):    Is this a 30 day or 90 day RX:  90  Preferred Pharmacy with phone number:    [facility] - [place], [place] - [address]  [address] [address]  Phone: [phone] Fax: [phone]    Local or Mail Order:  mail order  Ordering Provider:  Dr Meghan Mary Call Back Number:  [phone] (home)   Additional Information:  Patient states the insurance sent her a letter the doctor did not approve the prescription. Please call patient to advise. Thanks!

## 2020-07-17 NOTE — TELEPHONE ENCOUNTER
Patient notified [employer] did not send a request. Patient says she called earlier this week. No message received.

## 2020-07-21 ENCOUNTER — CLINICAL SUPPORT (OUTPATIENT)
Dept: CARDIAC REHAB | Facility: HOSPITAL | Age: 85
End: 2020-07-21
Payer: MEDICARE

## 2020-07-21 DIAGNOSIS — Z95.2 S/P AVR (AORTIC VALVE REPLACEMENT): ICD-10-CM

## 2020-07-21 PROCEDURE — 93798 PHYS/QHP OP CAR RHAB W/ECG: CPT

## 2020-07-23 ENCOUNTER — CLINICAL SUPPORT (OUTPATIENT)
Dept: CARDIAC REHAB | Facility: HOSPITAL | Age: 85
End: 2020-07-23
Payer: MEDICARE

## 2020-07-23 DIAGNOSIS — Z95.2 S/P AVR (AORTIC VALVE REPLACEMENT): ICD-10-CM

## 2020-07-23 PROCEDURE — 93798 PHYS/QHP OP CAR RHAB W/ECG: CPT

## 2020-07-24 ENCOUNTER — LAB VISIT (OUTPATIENT)
Dept: LAB | Facility: HOSPITAL | Age: 85
End: 2020-07-24
Attending: SPECIALIST
Payer: MEDICARE

## 2020-07-24 DIAGNOSIS — I10 ESSENTIAL HYPERTENSION, MALIGNANT: ICD-10-CM

## 2020-07-24 DIAGNOSIS — Z95.3 STATUS POST AORTIC VALVE REPLACEMENT WITH BIOPROSTHETIC VALVE: Primary | ICD-10-CM

## 2020-07-24 LAB
ALBUMIN SERPL BCP-MCNC: 4.1 G/DL (ref 3.5–5.2)
ALP SERPL-CCNC: 71 U/L (ref 55–135)
ALT SERPL W/O P-5'-P-CCNC: 17 U/L (ref 10–44)
ANION GAP SERPL CALC-SCNC: 9 MMOL/L (ref 8–16)
AST SERPL-CCNC: 23 U/L (ref 10–40)
BASOPHILS # BLD AUTO: 0.05 K/UL (ref 0–0.2)
BASOPHILS NFR BLD: 0.8 % (ref 0–1.9)
BILIRUB SERPL-MCNC: 1.1 MG/DL (ref 0.1–1)
BUN SERPL-MCNC: 28 MG/DL (ref 8–23)
CALCIUM SERPL-MCNC: 9.1 MG/DL (ref 8.7–10.5)
CHLORIDE SERPL-SCNC: 104 MMOL/L (ref 95–110)
CO2 SERPL-SCNC: 27 MMOL/L (ref 23–29)
CREAT SERPL-MCNC: 1.2 MG/DL (ref 0.5–1.4)
DIFFERENTIAL METHOD: ABNORMAL
EOSINOPHIL # BLD AUTO: 0.2 K/UL (ref 0–0.5)
EOSINOPHIL NFR BLD: 3.3 % (ref 0–8)
ERYTHROCYTE [DISTWIDTH] IN BLOOD BY AUTOMATED COUNT: 13.5 % (ref 11.5–14.5)
EST. GFR  (AFRICAN AMERICAN): 47.3 ML/MIN/1.73 M^2
EST. GFR  (NON AFRICAN AMERICAN): 41 ML/MIN/1.73 M^2
GLUCOSE SERPL-MCNC: 157 MG/DL (ref 70–110)
HCT VFR BLD AUTO: 36.8 % (ref 37–48.5)
HGB BLD-MCNC: 11.1 G/DL (ref 12–16)
IMM GRANULOCYTES # BLD AUTO: 0.03 K/UL (ref 0–0.04)
IMM GRANULOCYTES NFR BLD AUTO: 0.5 % (ref 0–0.5)
LYMPHOCYTES # BLD AUTO: 1 K/UL (ref 1–4.8)
LYMPHOCYTES NFR BLD: 16.7 % (ref 18–48)
MAGNESIUM SERPL-MCNC: 1.6 MG/DL (ref 1.6–2.6)
MCH RBC QN AUTO: 28.2 PG (ref 27–31)
MCHC RBC AUTO-ENTMCNC: 30.2 G/DL (ref 32–36)
MCV RBC AUTO: 93 FL (ref 82–98)
MONOCYTES # BLD AUTO: 0.6 K/UL (ref 0.3–1)
MONOCYTES NFR BLD: 9.3 % (ref 4–15)
NEUTROPHILS # BLD AUTO: 4.2 K/UL (ref 1.8–7.7)
NEUTROPHILS NFR BLD: 69.4 % (ref 38–73)
NRBC BLD-RTO: 0 /100 WBC
PLATELET # BLD AUTO: 183 K/UL (ref 150–350)
PMV BLD AUTO: 12 FL (ref 9.2–12.9)
POTASSIUM SERPL-SCNC: 4.3 MMOL/L (ref 3.5–5.1)
PROT SERPL-MCNC: 7.3 G/DL (ref 6–8.4)
RBC # BLD AUTO: 3.94 M/UL (ref 4–5.4)
SODIUM SERPL-SCNC: 140 MMOL/L (ref 136–145)
WBC # BLD AUTO: 6.11 K/UL (ref 3.9–12.7)

## 2020-07-24 PROCEDURE — 85025 COMPLETE CBC W/AUTO DIFF WBC: CPT

## 2020-07-24 PROCEDURE — 80053 COMPREHEN METABOLIC PANEL: CPT

## 2020-07-24 PROCEDURE — 83735 ASSAY OF MAGNESIUM: CPT

## 2020-07-24 PROCEDURE — 36415 COLL VENOUS BLD VENIPUNCTURE: CPT

## 2020-07-28 ENCOUNTER — CLINICAL SUPPORT (OUTPATIENT)
Dept: CARDIAC REHAB | Facility: HOSPITAL | Age: 85
End: 2020-07-28
Payer: MEDICARE

## 2020-07-28 DIAGNOSIS — Z95.2 S/P AVR (AORTIC VALVE REPLACEMENT): ICD-10-CM

## 2020-07-28 PROCEDURE — 93798 PHYS/QHP OP CAR RHAB W/ECG: CPT

## 2020-07-29 DIAGNOSIS — I10 ESSENTIAL HYPERTENSION: ICD-10-CM

## 2020-07-29 RX ORDER — LISINOPRIL AND HYDROCHLOROTHIAZIDE 10; 12.5 MG/1; MG/1
1 TABLET ORAL DAILY
Qty: 90 TABLET | Refills: 0 | Status: SHIPPED | OUTPATIENT
Start: 2020-07-29 | End: 2020-10-28

## 2020-07-29 NOTE — TELEPHONE ENCOUNTER
----- Message from Rody Perez sent at 7/29/2020 10:39 AM CDT -----  Contact: pt  Type:  RX Refill Request    Who Called:  pt    Refill or New Rx:  refill  RX Name and Strength:  lisinopriL-hydrochlorothiazide (PRINZIDE,ZESTORETIC) 10-12.5 mg per tablet  How is the patient currently taking it? (ex. 1XDay):  As Directed  Is this a 30 day or 90 day RX:  as Directed  Preferred Pharmacy with phone number:    [id number] - [place] - [address]  [address] [address]  Phone: [phone] Fax: [phone]  Best Call Back Number:  [phone]  Additional Information:  Please Advise ---Thank you

## 2020-07-30 ENCOUNTER — CLINICAL SUPPORT (OUTPATIENT)
Dept: CARDIAC REHAB | Facility: HOSPITAL | Age: 85
End: 2020-07-30
Payer: MEDICARE

## 2020-07-30 DIAGNOSIS — Z95.2 S/P AVR (AORTIC VALVE REPLACEMENT): ICD-10-CM

## 2020-07-30 PROCEDURE — 93798 PHYS/QHP OP CAR RHAB W/ECG: CPT

## 2020-08-04 ENCOUNTER — CLINICAL SUPPORT (OUTPATIENT)
Dept: CARDIAC REHAB | Facility: HOSPITAL | Age: 85
End: 2020-08-04
Payer: MEDICARE

## 2020-08-04 DIAGNOSIS — Z95.2 S/P AVR (AORTIC VALVE REPLACEMENT): ICD-10-CM

## 2020-08-04 PROCEDURE — 93798 PHYS/QHP OP CAR RHAB W/ECG: CPT

## 2020-08-05 DIAGNOSIS — E11.59 HYPERTENSION ASSOCIATED WITH DIABETES: ICD-10-CM

## 2020-08-05 DIAGNOSIS — I10 HYPERTENSION: ICD-10-CM

## 2020-08-05 DIAGNOSIS — I15.2 HYPERTENSION ASSOCIATED WITH DIABETES: ICD-10-CM

## 2020-08-05 RX ORDER — CARVEDILOL 6.25 MG/1
6.25 TABLET ORAL 2 TIMES DAILY WITH MEALS
Qty: 180 TABLET | Refills: 3 | Status: SHIPPED | OUTPATIENT
Start: 2020-08-05 | End: 2021-02-18 | Stop reason: SDUPTHER

## 2020-08-05 NOTE — TELEPHONE ENCOUNTER
lov 1-20-20 with Dr Christianson   Last seen by Dr Sam 2-27-20  Last lab 7-24-20 by Dr Ion arredondo, cmp, cbc

## 2020-08-06 ENCOUNTER — CLINICAL SUPPORT (OUTPATIENT)
Dept: CARDIAC REHAB | Facility: HOSPITAL | Age: 85
End: 2020-08-06
Payer: MEDICARE

## 2020-08-06 DIAGNOSIS — Z95.2 S/P AVR (AORTIC VALVE REPLACEMENT): ICD-10-CM

## 2020-08-06 PROCEDURE — 93798 PHYS/QHP OP CAR RHAB W/ECG: CPT

## 2020-08-13 ENCOUNTER — CLINICAL SUPPORT (OUTPATIENT)
Dept: CARDIAC REHAB | Facility: HOSPITAL | Age: 85
End: 2020-08-13
Payer: MEDICARE

## 2020-08-13 DIAGNOSIS — Z95.2 S/P AVR (AORTIC VALVE REPLACEMENT): ICD-10-CM

## 2020-08-13 PROCEDURE — 93798 PHYS/QHP OP CAR RHAB W/ECG: CPT

## 2020-08-18 ENCOUNTER — CLINICAL SUPPORT (OUTPATIENT)
Dept: CARDIAC REHAB | Facility: HOSPITAL | Age: 85
End: 2020-08-18
Payer: MEDICARE

## 2020-08-18 DIAGNOSIS — Z95.2 S/P AVR (AORTIC VALVE REPLACEMENT): ICD-10-CM

## 2020-08-18 PROCEDURE — 93798 PHYS/QHP OP CAR RHAB W/ECG: CPT

## 2020-08-20 ENCOUNTER — CLINICAL SUPPORT (OUTPATIENT)
Dept: CARDIAC REHAB | Facility: HOSPITAL | Age: 85
End: 2020-08-20
Payer: MEDICARE

## 2020-08-20 DIAGNOSIS — Z95.2 S/P AVR (AORTIC VALVE REPLACEMENT): ICD-10-CM

## 2020-08-20 PROCEDURE — 93798 PHYS/QHP OP CAR RHAB W/ECG: CPT

## 2020-09-01 ENCOUNTER — CLINICAL SUPPORT (OUTPATIENT)
Dept: CARDIAC REHAB | Facility: HOSPITAL | Age: 85
End: 2020-09-01
Payer: MEDICARE

## 2020-09-01 DIAGNOSIS — Z95.2 S/P AVR (AORTIC VALVE REPLACEMENT): ICD-10-CM

## 2020-09-01 PROCEDURE — 93798 PHYS/QHP OP CAR RHAB W/ECG: CPT

## 2020-09-03 ENCOUNTER — CLINICAL SUPPORT (OUTPATIENT)
Dept: CARDIAC REHAB | Facility: HOSPITAL | Age: 85
End: 2020-09-03
Payer: MEDICARE

## 2020-09-03 DIAGNOSIS — Z95.2 S/P AVR (AORTIC VALVE REPLACEMENT): Primary | ICD-10-CM

## 2020-09-03 PROCEDURE — 93798 PHYS/QHP OP CAR RHAB W/ECG: CPT

## 2020-09-07 ENCOUNTER — HOSPITAL ENCOUNTER (EMERGENCY)
Facility: HOSPITAL | Age: 85
Discharge: HOME | End: 2020-09-07
Attending: EMERGENCY MEDICINE
Payer: MEDICARE

## 2020-09-07 VITALS
OXYGEN SATURATION: 100 % | WEIGHT: 189 LBS | TEMPERATURE: 98 F | HEIGHT: 67 IN | RESPIRATION RATE: 18 BRPM | SYSTOLIC BLOOD PRESSURE: 176 MMHG | BODY MASS INDEX: 29.66 KG/M2 | DIASTOLIC BLOOD PRESSURE: 82 MMHG | HEART RATE: 92 BPM

## 2020-09-07 DIAGNOSIS — M54.50 ACUTE LEFT-SIDED LOW BACK PAIN WITHOUT SCIATICA: Primary | ICD-10-CM

## 2020-09-07 PROCEDURE — 96372 THER/PROPH/DIAG INJ SC/IM: CPT | Mod: 59

## 2020-09-07 PROCEDURE — 99284 EMERGENCY DEPT VISIT MOD MDM: CPT | Mod: 25

## 2020-09-07 PROCEDURE — 63600175 PHARM REV CODE 636 W HCPCS: Performed by: EMERGENCY MEDICINE

## 2020-09-07 RX ORDER — METHYLPREDNISOLONE ACETATE 40 MG/ML
40 INJECTION, SUSPENSION INTRA-ARTICULAR; INTRALESIONAL; INTRAMUSCULAR; SOFT TISSUE
Status: COMPLETED | OUTPATIENT
Start: 2020-09-07 | End: 2020-09-07

## 2020-09-07 RX ORDER — HYDROCODONE BITARTRATE AND ACETAMINOPHEN 5; 325 MG/1; MG/1
1 TABLET ORAL EVERY 4 HOURS PRN
Qty: 18 TABLET | Refills: 0 | Status: SHIPPED | OUTPATIENT
Start: 2020-09-07 | End: 2021-09-13 | Stop reason: ALTCHOICE

## 2020-09-07 RX ORDER — PREDNISONE 20 MG/1
40 TABLET ORAL DAILY
Qty: 10 TABLET | Refills: 0 | Status: SHIPPED | OUTPATIENT
Start: 2020-09-07 | End: 2020-09-12

## 2020-09-07 RX ADMIN — METHYLPREDNISOLONE ACETATE 40 MG: 40 INJECTION, SUSPENSION INTRA-ARTICULAR; INTRALESIONAL; INTRAMUSCULAR; SOFT TISSUE at 12:09

## 2020-09-07 NOTE — ED PROVIDER NOTES
Encounter Date: 9/7/2020       History     Chief Complaint   Patient presents with    Back Pain     low back for several days, denies trauma states has had back pain for long time, gait steady     This an 86-year-old female has a history of type 1 diabetes, hypertension, hyperlipidemia, hypothyroidism, coronary artery disease and CKD, presents with complaints of having left-sided lumbar back pain unrelated to any recent trauma.  The patient states that has been bothering her for a week but became much worse within last day or so.  No pain into the buttock or leg.  No sensory changes that are new but she does have a history of diabetic neuropathy.  No history any fever or chills.  No difficulty urinating or having bowel movements.  She readily admits that the pain is worse with movement.        Review of patient's allergies indicates:   Allergen Reactions    Fenofibric acid (choline)      Other reaction(s): Vomiting    Iodine      Other reaction(s): lips swollen    Rosuvastatin      Other reaction(s): muscle pain     Past Medical History:   Diagnosis Date    Anemia due to stage 3 chronic kidney disease 7/24/2019    Arthritis     Chronic bilateral low back pain without sciatica 7/24/2019    CKD (chronic kidney disease) stage 3, GFR 30-59 ml/min 7/24/2019    Colon polyps     Coronary artery disease     Diabetes mellitus type II     Diabetes with neurologic complications     GERD (gastroesophageal reflux disease)     Hyperlipidemia     Hypertension     Hypothyroidism     Type 2 diabetes mellitus      Past Surgical History:   Procedure Laterality Date    ADENOIDECTOMY      BREAST BIOPSY Right 20 yrs ago    benign    CHOLECYSTECTOMY      COLONOSCOPY  5-    Dr Holly, 5 year recheck    ESOPHAGOGASTRODUODENOSCOPY N/A 6/4/2020    Procedure: EGD (ESOPHAGOGASTRODUODENOSCOPY);  Surgeon: Michael Nugent III, MD;  Location: [facility];  Service: Endoscopy;  Laterality: N/A;    HEMORRHOID SURGERY       HYSTERECTOMY      OOPHORECTOMY      TONSILLECTOMY       Family History   Problem Relation Age of Onset    Diabetes Mother     Heart disease Mother     Glaucoma Mother     Cancer Father         lung cancer    Early death Son         brain tumor age 3    Diabetes Brother     No Known Problems Daughter     Early death Son         MVA 25    Macular degeneration Neg Hx     Retinal detachment Neg Hx      Social History     Tobacco Use    Smoking status: Former Smoker     Packs/day: 0.25     Years: 15.00     Pack years: 3.75     Types: Cigarettes     Quit date: 3/30/1974     Years since quittin.4    Smokeless tobacco: Never Used   Substance Use Topics    Alcohol use: No    Drug use: No     Review of Systems   Constitutional: Positive for activity change. Negative for chills and fever.   HENT: Negative for trouble swallowing.    Respiratory: Negative for shortness of breath.    Cardiovascular: Negative for chest pain.   Gastrointestinal: Negative for nausea.   Genitourinary: Negative for dysuria.   Musculoskeletal: Positive for back pain.   Skin: Negative for pallor and rash.   Neurological: Positive for numbness. Negative for weakness.   Hematological: Does not bruise/bleed easily.   All other systems reviewed and are negative.      Physical Exam     Initial Vitals [20 1135]   BP Pulse Resp Temp SpO2   (!) 180/89 94 18 98.4 °F (36.9 °C) 100 %      MAP       --         Physical Exam    Vitals reviewed.  Constitutional: She appears well-developed and well-nourished. She is not diaphoretic. No distress.   Appearing uncomfortable   HENT:   Head: Normocephalic and atraumatic.   Nose: Nose normal.   Mouth/Throat: Oropharynx is clear and moist. No oropharyngeal exudate.   Eyes: Conjunctivae are normal. Pupils are equal, round, and reactive to light. Right eye exhibits no discharge. Left eye exhibits no discharge.   Neck: Normal range of motion. Neck supple. No JVD present.   Cardiovascular: Normal  rate, regular rhythm, normal heart sounds and intact distal pulses. Exam reveals no gallop and no friction rub.    No murmur heard.  Pulmonary/Chest: Breath sounds normal. No respiratory distress. She has no wheezes. She has no rhonchi. She has no rales. She exhibits no tenderness.   Abdominal: Soft. Bowel sounds are normal. She exhibits no distension. There is no abdominal tenderness. There is no rebound and no guarding.   Musculoskeletal: No tenderness.      Comments: Examination of back reveals a loss of the normal lordotic curve.  No true sciatic notch tenderness.  There is sensation is symmetrical.  No midline spinal tenderness.  Straight leg raise is negative bilaterally.  DTRs are symmetrical.   Lymphadenopathy:     She has no cervical adenopathy.   Neurological: She is alert and oriented to person, place, and time. She has normal strength. She displays normal reflexes. No cranial nerve deficit or sensory deficit. GCS score is 15. GCS eye subscore is 4. GCS verbal subscore is 5. GCS motor subscore is 6.   Skin: Skin is warm and dry. Capillary refill takes less than 2 seconds. No rash noted. No erythema. No pallor.   Psychiatric: She has a normal mood and affect. Her behavior is normal. Judgment and thought content normal.         ED Course   Procedures  Labs Reviewed - No data to display       Imaging Results          X-Ray Lumbar Spine Complete 5 View (In process)    Procedure changed from X-Ray Lumbar Spine Ap And Lateral                               Attending Attestation:             Attending ED Notes:   X-rays lumbar spine showed osteoporotic changes with degenerative changes noted in the upper lumbar region.  Aortic calcification is appreciated.  No obvious fractures are noted.  During the ED course the patient received Depo-Medrol.  She will be discharged and provided analgesics and continued steroids but will also be given regular insulin to sliding scale her blood sugar.  She will be recommended to  follow up with Orthopedic surgery                        Clinical Impression:     1.  Left-sided lumbar back pain without sciatica                         Kavon Ellsworth Jr., MD  09/07/20 [id number]

## 2020-09-14 ENCOUNTER — OFFICE VISIT (OUTPATIENT)
Dept: ORTHOPEDICS | Facility: CLINIC | Age: 85
End: 2020-09-14
Payer: MEDICARE

## 2020-09-14 VITALS — DIASTOLIC BLOOD PRESSURE: 60 MMHG | SYSTOLIC BLOOD PRESSURE: 102 MMHG | WEIGHT: 189 LBS | BODY MASS INDEX: 29.6 KG/M2

## 2020-09-14 DIAGNOSIS — M47.816 LUMBAR FACET ARTHROPATHY: Primary | ICD-10-CM

## 2020-09-14 DIAGNOSIS — M47.896 OTHER SPONDYLOSIS, LUMBAR REGION: ICD-10-CM

## 2020-09-14 PROCEDURE — 99203 PR OFFICE/OUTPT VISIT, NEW, LEVL III, 30-44 MIN: ICD-10-PCS | Mod: S$GLB,,, | Performed by: ORTHOPAEDIC SURGERY

## 2020-09-14 PROCEDURE — 99203 OFFICE O/P NEW LOW 30 MIN: CPT | Mod: S$GLB,,, | Performed by: ORTHOPAEDIC SURGERY

## 2020-09-14 PROCEDURE — 1159F MED LIST DOCD IN RCRD: CPT | Mod: S$GLB,,, | Performed by: ORTHOPAEDIC SURGERY

## 2020-09-14 PROCEDURE — 1101F PT FALLS ASSESS-DOCD LE1/YR: CPT | Mod: S$GLB,,, | Performed by: ORTHOPAEDIC SURGERY

## 2020-09-14 PROCEDURE — 1125F AMNT PAIN NOTED PAIN PRSNT: CPT | Mod: S$GLB,,, | Performed by: ORTHOPAEDIC SURGERY

## 2020-09-14 PROCEDURE — 1101F PR PT FALLS ASSESS DOC 0-1 FALLS W/OUT INJ PAST YR: ICD-10-PCS | Mod: S$GLB,,, | Performed by: ORTHOPAEDIC SURGERY

## 2020-09-14 PROCEDURE — 1125F PR PAIN SEVERITY QUANTIFIED, PAIN PRESENT: ICD-10-PCS | Mod: S$GLB,,, | Performed by: ORTHOPAEDIC SURGERY

## 2020-09-14 PROCEDURE — 1159F PR MEDICATION LIST DOCUMENTED IN MEDICAL RECORD: ICD-10-PCS | Mod: S$GLB,,, | Performed by: ORTHOPAEDIC SURGERY

## 2020-09-14 RX ORDER — HUMAN INSULIN 100 [IU]/ML
INJECTION, SOLUTION SUBCUTANEOUS
Status: ON HOLD | COMMUNITY
Start: 2020-09-07 | End: 2021-03-25

## 2020-09-14 RX ORDER — APIXABAN 2.5 MG/1
TABLET, FILM COATED ORAL
COMMUNITY
Start: 2020-08-25 | End: 2021-03-23

## 2020-09-14 NOTE — PROGRESS NOTES
"Subjective:       Patient ID: Leslie Tolliver is a 86 y.o. female.    Chief Complaint: Pain of the Lumbar Spine (Lumbar pain x 2 weeks. She was seen at [facility] ER 2 weeks ago. They did xray and gave her Norco and that helped. She still has minor pain that she can tolerate. She does have neuropathy in both feet.)      History of Present Illness  Acute onset of left-sided back pain severe enough to make this lady go to the emergency room.  His now completely resolved but she wanted to have it evaluated.  She denies any history of trauma.    Current Medications  Current Outpatient Medications   Medication Sig Dispense Refill    ACCU-CHEK FASTCLIX LANCET DRUM Misc USE TO CHECK SUGAR THREE TIMES DAILY 300 each 4    ACCU-CHEK SMARTVIEW TEST STRIP Strp USE TO TEST THREE TIMES DAILY 1000 strip 0    amiodarone (PACERONE) 200 MG Tab Take 200 mg by mouth 2 (two) times daily.      aspirin 81 mg Tab Take 81 mg by mouth once daily. Every day      calcium carbonate/vitamin D3 (CALCIUM+D ORAL) Take 1 tablet by mouth once daily.      carvediloL (COREG) 6.25 MG tablet Take 1 tablet (6.25 mg total) by mouth 2 (two) times daily with meals. 180 tablet 3    cholestyramine-aspartame (PREVALITE) 4 gram PwPk Take 1 packet (4 g total) by mouth once daily. 30 packet 11    DROPLET PEN NEEDLE 31 gauge x 5/16" Ndle USE WITH LANTUS SOLOSTAR PEN AS NEEDED 100 each 3    ELIQUIS 2.5 mg Tab       ferrous sulfate (FEOSOL) 325 mg (65 mg iron) Tab tablet Take 325 mg by mouth daily with breakfast.      insulin (LANTUS SOLOSTAR U-100 INSULIN) glargine 100 units/mL (3mL) SubQ pen INJECT 24 UNITS INTO THE SKIN EVERY EVENING. 30 mL 1    linaGLIPtin (TRADJENTA) 5 mg Tab tablet Take 5 mg by mouth once daily.      lisinopriL-hydrochlorothiazide (PRINZIDE,ZESTORETIC) 10-12.5 mg per tablet Take 1 tablet by mouth once daily. 90 tablet 0    metFORMIN (GLUCOPHAGE) 1000 MG tablet Take 1 tablet (1,000 mg total) by mouth 2 (two) times daily with meals. 180 " tablet 1    multivitamin (ONE DAILY MULTIVITAMIN) per tablet Take 1 tablet by mouth once daily.      NOVOLIN R REGULAR U-100 INSULN 100 unit/mL injection USE PER SLIDING SCALE PROVIDED BY DOCTOR      ondansetron (ZOFRAN-ODT) 4 MG TbDL Take 4 mg by mouth every 8 (eight) hours as needed.      SITagliptin (JANUVIA) 100 MG Tab Take 1 tablet (100 mg total) by mouth once daily. 90 tablet 1    blood-glucose meter kit Accu-chek Marley glucometer check glucose three times daily E11.65 1 each 0    HYDROcodone-acetaminophen (NORCO) 5-325 mg per tablet Take 1 tablet by mouth every 4 (four) hours as needed. (Patient not taking: Reported on 9/14/2020) 18 tablet 0    pantoprazole (PROTONIX) 40 MG tablet Take 1 tablet (40 mg total) by mouth 2 (two) times daily for 30 days, THEN 1 tablet (40 mg total) once daily. 90 tablet 2     No current facility-administered medications for this visit.        Allergies  Review of patient's allergies indicates:   Allergen Reactions    Fenofibric acid (choline)      Other reaction(s): Vomiting    Iodine      Other reaction(s): lips swollen    Rosuvastatin      Other reaction(s): muscle pain       Past Medical History  Past Medical History:   Diagnosis Date    Anemia due to stage 3 chronic kidney disease 7/24/2019    Arthritis     Chronic bilateral low back pain without sciatica 7/24/2019    CKD (chronic kidney disease) stage 3, GFR 30-59 ml/min 7/24/2019    Colon polyps     Coronary artery disease     Diabetes mellitus type II     Diabetes with neurologic complications     GERD (gastroesophageal reflux disease)     Hyperlipidemia     Hypertension     Hypothyroidism     Type 2 diabetes mellitus        Surgical History  Past Surgical History:   Procedure Laterality Date    ADENOIDECTOMY      AORTIC VALVE REPLACEMENT      BREAST BIOPSY Right 20 yrs ago    benign    CARDIAC SURGERY      CHOLECYSTECTOMY      COLONOSCOPY  5-    Dr Holly, 5 year recheck     ESOPHAGOGASTRODUODENOSCOPY N/A 2020    Procedure: EGD (ESOPHAGOGASTRODUODENOSCOPY);  Surgeon: Michael Nugent III, MD;  Location: [facility];  Service: Endoscopy;  Laterality: N/A;    HEMORRHOID SURGERY      HYSTERECTOMY      OOPHORECTOMY      TONSILLECTOMY         Family History:   Family History   Problem Relation Age of Onset    Diabetes Mother     Heart disease Mother     Glaucoma Mother     Cancer Father         lung cancer    Early death Son         brain tumor age 3    Diabetes Brother     No Known Problems Daughter     Early death Son         MVA 25    Macular degeneration Neg Hx     Retinal detachment Neg Hx        Social History:   Social History     Socioeconomic History    Marital status:      Spouse name: Not on file    Number of children: Not on file    Years of education: Not on file    Highest education level: Not on file   Occupational History    Not on file   Social Needs    Financial resource strain: Not on file    Food insecurity     Worry: Not on file     Inability: Not on file    Transportation needs     Medical: Not on file     Non-medical: Not on file   Tobacco Use    Smoking status: Former Smoker     Packs/day: 0.25     Years: 15.00     Pack years: 3.75     Types: Cigarettes     Quit date: 3/30/1974     Years since quittin.4    Smokeless tobacco: Never Used   Substance and Sexual Activity    Alcohol use: No    Drug use: No    Sexual activity: Never   Lifestyle    Physical activity     Days per week: Not on file     Minutes per session: Not on file    Stress: Not on file   Relationships    Social connections     Talks on phone: Not on file     Gets together: Not on file     Attends [employer] service: Not on file     Active member of club or organization: Not on file     Attends meetings of clubs or organizations: Not on file     Relationship status: Not on file   Other Topics Concern    Not on file   Social History Narrative    Not on file        Hospitalization/Major Diagnostic Procedure:     Review of Systems     General/Constitutional:  Chills denies. Fatigue denies. Fever denies. Weight gain denies. Weight loss denies.    Respiratory:  Shortness of breath denies.    Cardiovascular:  Chest pain denies.    Gastrointestinal:  Constipation denies. Diarrhea denies. Nausea denies. Vomiting denies.     Hematology:  Easy bruising denies. Prolonged bleeding denies.     Genitourinary:  Frequent urination denies. Pain in lower back denies. Painful urination denies.     Musculoskeletal:  See HPI for details    Skin:  Rash denies.    Neurologic:  Dizziness denies. Gait abnormalities denies. Seizures denies. Tingling/Numbess denies.    Psychiatric:  Anxiety denies. Depressed mood denies.     Objective:   Vital Signs:   Vitals:    09/14/20 1254   BP: 102/60        Physical Exam      General Examination:     Constitutional: The patient is alert and oriented to lace person and time. Mood is pleasant.     Head/Face: Normal facial features normal eyebrows    Eyes: Normal extraocular motion bilaterally    Lungs: Respirations are equal and unlabored    Gait is coordinated.    Cardiovascular: There are no swelling or varicosities present.    Lymphatic: Negative for adenopathy    Skin: Normal    Neurological: Level of consciousness normal. Oriented to place person and time and situation    Psychiatric: Oriented to time place person and situation    Moderate tenderness left posterior iliac spine left buttock lumbar range of motion mildly restricted no spasm hip and knee range of motion normal straight-leg-raising negative motor exam normal    XRAY Report/ Interpretation :  Emergency room x-rays reviewed mild degenerative spondylolisthesis L4-5 moderate narrowing L5-S1 level no acute changes      Assessment:       1. Lumbar facet arthropathy    2. Other spondylosis, lumbar region        Plan:       Leslie was seen today for pain.    Diagnoses and all orders for this  visit:    Lumbar facet arthropathy    Other spondylosis, lumbar region         Follow up if symptoms worsen or fail to improve.    Observation advised activity as tolerated fortunately symptoms are resolving with observation return as needed    This note was created using Dragon voice recognition software that occasionally misinterpreted phrases or words.

## 2020-09-17 ENCOUNTER — CLINICAL SUPPORT (OUTPATIENT)
Dept: CARDIAC REHAB | Facility: HOSPITAL | Age: 85
End: 2020-09-17
Payer: MEDICARE

## 2020-09-17 DIAGNOSIS — Z79.4 TYPE 2 DIABETES MELLITUS WITH DIABETIC POLYNEUROPATHY, WITH LONG-TERM CURRENT USE OF INSULIN: Primary | ICD-10-CM

## 2020-09-17 DIAGNOSIS — Z95.2 S/P AVR (AORTIC VALVE REPLACEMENT): Primary | ICD-10-CM

## 2020-09-17 DIAGNOSIS — E11.42 TYPE 2 DIABETES MELLITUS WITH DIABETIC POLYNEUROPATHY, WITH LONG-TERM CURRENT USE OF INSULIN: Primary | ICD-10-CM

## 2020-09-17 PROCEDURE — 93798 PHYS/QHP OP CAR RHAB W/ECG: CPT

## 2020-09-17 NOTE — TELEPHONE ENCOUNTER
Care Due:                  Date            Visit Type   Department     Provider  --------------------------------------------------------------------------------                                ESTABLISHED                              PATIENT      [facility]  Last Visit: 01-      [facility]       Chang Christianson  Next Visit: None Scheduled  None         None Found                                                            Last  Test          Frequency    Reason                     Performed    Due Date  --------------------------------------------------------------------------------    HBA1C.......  6 months...  SITagliptin, insulin,      04-   10-                             metFORMIN................    Powered by [facility]. Reference number: [id number]. 9/17/2020 4:48:34 PM CDT

## 2020-09-18 NOTE — PROGRESS NOTES
Refill Routing Note   Medication(s) are not appropriate for processing by [facility]:       - Patient has been seen in the Emergency Room/Department since the last PCP visit  - Drug-Disease Interaction (Januvia and CKD3)     Medication-related problems identified:   Drug-disease interaction  Requires labs  Medication Therapy Plan: CDMR; NTBS (A1c); If eGFR sustained <45mL/min, recommended dose for Januvia is 50 mg QD; Pt visited ED in 9/20 for lower back pain; Defer to you   Will follow up with your staff to schedule labs after your decision.   Medication reconciliation completed: No      Automatic Epic Generated Protocol Data:        Requested Prescriptions   Pending Prescriptions Disp Refills    SITagliptin (JANUVIA) 100 MG Tab 90 tablet 1     Sig: Take 1 tablet (100 mg total) by mouth once daily.       Endocrinology:  Diabetes - DPP-4 Inhibitors Failed - 9/17/2020  4:48 PM        Failed - eGFR is 60 or above and within 360 days     eGFR if non    Date Value Ref Range Status   07/24/2020 41.0 (A) >60 mL/min/1.73 m^2 Final     Comment:     Calculation used to obtain the estimated glomerular filtration  rate (eGFR) is the CKD-EPI equation.      06/05/2020 45.9 (A) >60 mL/min/1.73 m^2 Final     Comment:     Calculation used to obtain the estimated glomerular filtration  rate (eGFR) is the CKD-EPI equation.      06/04/2020 58.5 (A) >60 mL/min/1.73 m^2 Final     Comment:     Calculation used to obtain the estimated glomerular filtration  rate (eGFR) is the CKD-EPI equation.        eGFR if    Date Value Ref Range Status   07/24/2020 47.3 (A) >60 mL/min/1.73 m^2 Final   06/05/2020 52.9 (A) >60 mL/min/1.73 m^2 Final   06/04/2020 >60.0 >60 mL/min/1.73 m^2 Final              Passed - Patient is at least 18 years old        Passed - Office visit in past 12 months or future 90 days.     Recent Outpatient Visits            4 days ago Lumbar facet arthropathy    [facility] Taras Martinez MD    6 months ago Visit for suture removal    [facility] Chang Sam MD    7 months ago Slurred speech    [facility] Chang Christianson MD    1 year ago Type 2 diabetes mellitus with diabetic polyneuropathy, with long-term current use of insulin    [facility] Rylee Solorzano, ANNI    1 year ago Encounter for preventive health examination    [facility] ENZO Page          Future Appointments              In 4 days [id number]    In 6 days [id number]    In 1 week [id number]    In 1 week [id number]    In 2 weeks [id number]    In 2 weeks [id number]    In 3 weeks [id number]    In 3 weeks [facility]    In 1 month [facility]    In 1 month [facility]    In 1 month [facility]    In 1 month [facility]    In 1 month [id number]    In 1 month [id number]    In 1 month [id number]                Passed - HBA1C is 7.9 or below and within 180 days     Hemoglobin A1C   Date Value Ref Range Status   04/27/2020 7.4 (H) 4.0 - 5.6 % Final     Comment:     ADA Screening Guidelines:  5.7-6.4%  Consistent with prediabetes  >or=6.5%  Consistent with diabetes  High levels of fetal hemoglobin interfere with the HbA1C  assay.  Heterozygous hemoglobin variants (HbS, HgC, etc)do  not significantly interfere with this assay.   However, presence of multiple variants may affect accuracy.     08/01/2019 7.3 (H) 4.0 - 5.6 % Final     Comment:     ADA Screening Guidelines:  5.7-6.4%  Consistent with prediabetes  >or=6.5%  Consistent with diabetes  High levels of fetal hemoglobin interfere with the HbA1C  assay. Heterozygous hemoglobin variants (HbS, HgC, etc)do  not significantly interfere with this assay.   However, presence of multiple variants may affect accuracy.     01/08/2019 7.7 (H) 4.0 - 5.6 % Final     Comment:     ADA Screening Guidelines:  5.7-6.4%  Consistent with prediabetes  >or=6.5%  Consistent with diabetes  High levels of fetal hemoglobin interfere with the HbA1C  assay. Heterozygous hemoglobin variants (HbS, HgC, etc)do  not significantly interfere with this assay.   However, presence of multiple variants may affect accuracy.                Passed - Cr is 1.3 or below and within 360 days     Creatinine   Date Value Ref Range Status   07/24/2020 1.2 0.5 - 1.4 mg/dL Final   06/05/2020 1.1 0.5 - 1.4 mg/dL Final   06/04/2020 0.9 0.5 - 1.4 mg/dL Final                    Appointments  past 12m or future 3m with PCP    Date Provider   Last Visit   1/30/2020 Chang Christianson MD   Next Visit   Visit date not found Chang Christianson MD   ED visits in past 90 days: 1     Note composed:10:15 AM 09/18/2020

## 2020-09-24 ENCOUNTER — CLINICAL SUPPORT (OUTPATIENT)
Dept: CARDIAC REHAB | Facility: HOSPITAL | Age: 85
End: 2020-09-24
Payer: MEDICARE

## 2020-09-24 DIAGNOSIS — Z95.2 S/P AVR (AORTIC VALVE REPLACEMENT): Primary | ICD-10-CM

## 2020-09-24 PROCEDURE — 93798 PHYS/QHP OP CAR RHAB W/ECG: CPT

## 2020-09-25 ENCOUNTER — TELEPHONE (OUTPATIENT)
Dept: FAMILY MEDICINE | Facility: CLINIC | Age: 85
End: 2020-09-25

## 2020-09-25 DIAGNOSIS — Z12.31 VISIT FOR SCREENING MAMMOGRAM: Primary | ICD-10-CM

## 2020-09-25 NOTE — TELEPHONE ENCOUNTER
Provider Staff:     Please schedule patient for Labs (A1C)    Thanks!  [facility]     Appointments  past 12m or future 3m with PCP    Date Provider   Last Visit   1/30/2020 Chang Christianson MD   Next Visit   Visit date not found Chang Christianson MD     Note composed:10:18 AM 09/25/2020

## 2020-09-25 NOTE — TELEPHONE ENCOUNTER
Called patient to let her know the Januvia prescription was approved and sent to her pharmacy however, we need to get her scheduled for an A1c lab. Pt did not have VM set up so I was not able to leave a message

## 2020-09-25 NOTE — TELEPHONE ENCOUNTER
----- Message from Bouchra Han sent at 9/25/2020 11:54 AM CDT -----  Contact: self  Type:  Patient Returning Call    Who Called:  patient   Who Left Message for Patient:  did not know  Does the patient know what this is regarding?:  mammogram order  Best Call Back Number:  [phone]    Additional Information:

## 2020-09-25 NOTE — TELEPHONE ENCOUNTER
----- Message from Juan Ortega sent at 9/25/2020 10:50 AM CDT -----  Type:  Mammogram    Caller is requesting to schedule their annual mammogram appointment.  Order is not listed in EPIC.  Please enter order and contact patient to schedule.    Name of Caller:  Patient  Where would they like the mammogram performed?  [facility]  [facility] Call Back Number:  [phone]  Additional Information:

## 2020-10-06 ENCOUNTER — CLINICAL SUPPORT (OUTPATIENT)
Dept: CARDIAC REHAB | Facility: HOSPITAL | Age: 85
End: 2020-10-06
Payer: MEDICARE

## 2020-10-06 DIAGNOSIS — Z95.2 S/P AVR (AORTIC VALVE REPLACEMENT): ICD-10-CM

## 2020-10-06 PROCEDURE — 93798 PHYS/QHP OP CAR RHAB W/ECG: CPT

## 2020-10-08 ENCOUNTER — CLINICAL SUPPORT (OUTPATIENT)
Dept: CARDIAC REHAB | Facility: HOSPITAL | Age: 85
End: 2020-10-08
Payer: MEDICARE

## 2020-10-08 DIAGNOSIS — Z95.2 S/P AVR (AORTIC VALVE REPLACEMENT): ICD-10-CM

## 2020-10-08 PROCEDURE — 93798 PHYS/QHP OP CAR RHAB W/ECG: CPT

## 2020-10-15 ENCOUNTER — CLINICAL SUPPORT (OUTPATIENT)
Dept: CARDIAC REHAB | Facility: HOSPITAL | Age: 85
End: 2020-10-15
Payer: MEDICARE

## 2020-10-15 DIAGNOSIS — Z95.2 S/P AVR (AORTIC VALVE REPLACEMENT): ICD-10-CM

## 2020-10-15 PROCEDURE — 93798 PHYS/QHP OP CAR RHAB W/ECG: CPT

## 2020-10-22 ENCOUNTER — CLINICAL SUPPORT (OUTPATIENT)
Dept: CARDIAC REHAB | Facility: HOSPITAL | Age: 85
End: 2020-10-22
Attending: SPECIALIST
Payer: MEDICARE

## 2020-10-22 DIAGNOSIS — M47.896 OTHER SPONDYLOSIS, LUMBAR REGION: ICD-10-CM

## 2020-10-22 DIAGNOSIS — M47.27 SPONDYLOSIS OF LUMBOSACRAL SPINE WITH RADICULOPATHY: ICD-10-CM

## 2020-10-22 DIAGNOSIS — M47.897 OTHER SPONDYLOSIS, LUMBOSACRAL REGION: ICD-10-CM

## 2020-10-22 DIAGNOSIS — M47.897 OTHER OSTEOARTHRITIS OF SPINE, LUMBOSACRAL REGION: ICD-10-CM

## 2020-10-22 DIAGNOSIS — Z95.2 S/P AVR (AORTIC VALVE REPLACEMENT): Primary | ICD-10-CM

## 2020-10-22 DIAGNOSIS — M47.26 OTHER SPONDYLOSIS WITH RADICULOPATHY, LUMBAR REGION: ICD-10-CM

## 2020-10-22 DIAGNOSIS — M47.896 OTHER OSTEOARTHRITIS OF SPINE, LUMBAR REGION: ICD-10-CM

## 2020-10-22 DIAGNOSIS — M47.26 OTHER SPONDYLOSIS WITH RADICULOPATHY, LUMBAR REGION: Primary | ICD-10-CM

## 2020-10-22 DIAGNOSIS — Z79.891 ENCOUNTER FOR LONG-TERM USE OF OPIATE ANALGESIC: Primary | ICD-10-CM

## 2020-10-22 DIAGNOSIS — M47.27 OTHER SPONDYLOSIS WITH RADICULOPATHY, LUMBOSACRAL REGION: ICD-10-CM

## 2020-10-22 PROCEDURE — G0424 PULMONARY REHAB W EXER: HCPCS

## 2020-10-30 ENCOUNTER — HOSPITAL ENCOUNTER (OUTPATIENT)
Dept: RADIOLOGY | Facility: CLINIC | Age: 85
Discharge: HOME | End: 2020-10-30
Attending: FAMILY MEDICINE
Payer: MEDICARE

## 2020-10-30 VITALS — BODY MASS INDEX: 29.65 KG/M2 | HEIGHT: 67 IN | WEIGHT: 188.94 LBS

## 2020-10-30 DIAGNOSIS — Z12.31 VISIT FOR SCREENING MAMMOGRAM: ICD-10-CM

## 2020-10-30 PROCEDURE — 77063 BREAST TOMOSYNTHESIS BI: CPT | Mod: 26,HCNC,, | Performed by: RADIOLOGY

## 2020-10-30 PROCEDURE — 77063 MAMMO DIGITAL SCREENING BILAT WITH TOMO: ICD-10-PCS | Mod: 26,HCNC,, | Performed by: RADIOLOGY

## 2020-10-30 PROCEDURE — 77067 MAMMO DIGITAL SCREENING BILAT WITH TOMO: ICD-10-PCS | Mod: 26,HCNC,, | Performed by: RADIOLOGY

## 2020-10-30 PROCEDURE — 77067 SCR MAMMO BI INCL CAD: CPT | Mod: TC,HCNC,PO

## 2020-10-30 PROCEDURE — 77067 SCR MAMMO BI INCL CAD: CPT | Mod: 26,HCNC,, | Performed by: RADIOLOGY

## 2020-10-31 ENCOUNTER — HOSPITAL ENCOUNTER (OUTPATIENT)
Dept: RADIOLOGY | Facility: HOSPITAL | Age: 85
Discharge: HOME | End: 2020-10-31
Attending: PAIN MEDICINE
Payer: MEDICARE

## 2020-10-31 DIAGNOSIS — M47.897 OTHER SPONDYLOSIS, LUMBOSACRAL REGION: ICD-10-CM

## 2020-10-31 DIAGNOSIS — M47.896 OTHER SPONDYLOSIS, LUMBAR REGION: ICD-10-CM

## 2020-10-31 DIAGNOSIS — M47.897 OTHER OSTEOARTHRITIS OF SPINE, LUMBOSACRAL REGION: ICD-10-CM

## 2020-10-31 DIAGNOSIS — M47.27 OTHER SPONDYLOSIS WITH RADICULOPATHY, LUMBOSACRAL REGION: ICD-10-CM

## 2020-10-31 DIAGNOSIS — M47.27 SPONDYLOSIS OF LUMBOSACRAL SPINE WITH RADICULOPATHY: ICD-10-CM

## 2020-10-31 DIAGNOSIS — M47.896 OTHER OSTEOARTHRITIS OF SPINE, LUMBAR REGION: ICD-10-CM

## 2020-10-31 DIAGNOSIS — Z79.891 ENCOUNTER FOR LONG-TERM USE OF OPIATE ANALGESIC: ICD-10-CM

## 2020-10-31 DIAGNOSIS — M47.26 OTHER SPONDYLOSIS WITH RADICULOPATHY, LUMBAR REGION: ICD-10-CM

## 2020-10-31 PROCEDURE — 72148 MRI LUMBAR SPINE W/O DYE: CPT | Mod: TC

## 2020-10-31 PROCEDURE — 72120 X-RAY BEND ONLY L-S SPINE: CPT | Mod: TC

## 2020-11-02 DIAGNOSIS — E78.5 HYPERLIPIDEMIA ASSOCIATED WITH TYPE 2 DIABETES MELLITUS: Primary | ICD-10-CM

## 2020-11-02 DIAGNOSIS — E11.9 TYPE 2 DIABETES MELLITUS WITHOUT COMPLICATION: ICD-10-CM

## 2020-11-02 DIAGNOSIS — E11.69 HYPERLIPIDEMIA ASSOCIATED WITH TYPE 2 DIABETES MELLITUS: Primary | ICD-10-CM

## 2020-11-02 NOTE — TELEPHONE ENCOUNTER
Care Due:                  Date            Visit Type   Department     Provider  --------------------------------------------------------------------------------                                ESTABLISHED                              PATIENT      [facility]  Last Visit: 01-      [facility]       Chang Christianson                                           [facility]  Next Visit: 12-      [facility]       Chang Christianson                                                            Last  Test          Frequency    Reason                     Performed    Due Date  --------------------------------------------------------------------------------    Lipid Panel.  12 months..  cholestyramine-aspartame.  01- 01-    Powered by [employer]. Reference number: [id number]. 11/02/2020 1:43:10 PM   CST

## 2020-11-03 RX ORDER — PEN NEEDLE, DIABETIC 31 GX5/16"
NEEDLE, DISPOSABLE MISCELLANEOUS
Qty: 100 EACH | Refills: 3 | Status: SHIPPED | OUTPATIENT
Start: 2020-11-03 | End: 2021-12-30

## 2020-11-03 NOTE — PROGRESS NOTES
"Refill Authorization Note   Leslie Tolliver is requesting a refill authorization.  Brief assessment and rationale for refill: Approve    -Medication-related problems identified: Requires labs  Medication Therapy Plan: CDMR: Lab (Lipid)    Medication reconciliation completed: No   Comments:   Orders Placed This Encounter    Lipid Panel    DROPLET PEN NEEDLE 31 gauge x 5/16" Ndle      Requested Prescriptions   Signed Prescriptions Disp Refills    DROPLET PEN NEEDLE 31 gauge x 5/16" Ndle 100 each 3     Sig: USE WITH LANTUS SOLOSTAR PEN AS NEEDED       Endocrinology: Diabetes - Supplies Passed - 11/2/2020  1:42 PM        Passed - Patient is at least 18 years old        Passed - Office visit in past 12 months or future 90 days     Recent Outpatient Visits            1 month ago Lumbar facet arthropathy    [facility] Taras Martinez MD    8 months ago Visit for suture removal    [facility] Chang Sam MD    9 months ago Slurred speech    [facility] Chang Christianson MD    1 year ago Type 2 diabetes mellitus with diabetic polyneuropathy, with long-term current use of insulin    [facility] Rylee Solorzano, ANNI    1 year ago Encounter for preventive health examination    [facility] ENZO Page          Future Appointments              [facility] 2 [facility]    [facility]    In 1 month Chang [facility]on [facility]                Passed - HBA1C is 8 or below and within 1080 days     Hemoglobin A1C   Date Value Ref Range Status   04/27/2020 7.4 (H) 4.0 - 5.6 % Final     Comment:     ADA Screening Guidelines:  5.7-6.4%  Consistent with prediabetes  >or=6.5%  Consistent with diabetes  High levels of fetal hemoglobin interfere with the HbA1C  assay. Heterozygous hemoglobin variants (HbS, HgC, " etc)do  not significantly interfere with this assay.   However, presence of multiple variants may affect accuracy.     08/01/2019 7.3 (H) 4.0 - 5.6 % Final     Comment:     ADA Screening Guidelines:  5.7-6.4%  Consistent with prediabetes  >or=6.5%  Consistent with diabetes  High levels of fetal hemoglobin interfere with the HbA1C  assay. Heterozygous hemoglobin variants (HbS, HgC, etc)do  not significantly interfere with this assay.   However, presence of multiple variants may affect accuracy.     01/08/2019 7.7 (H) 4.0 - 5.6 % Final     Comment:     ADA Screening Guidelines:  5.7-6.4%  Consistent with prediabetes  >or=6.5%  Consistent with diabetes  High levels of fetal hemoglobin interfere with the HbA1C  assay. Heterozygous hemoglobin variants (HbS, HgC, etc)do  not significantly interfere with this assay.   However, presence of multiple variants may affect accuracy.                    Appointments  past 12m or future 3m with PCP    Date Provider   Last Visit   1/30/2020 Chang Christianson MD   Next Visit   12/21/2020 Chang Christianson MD   ED visits in past 90 days: 1     Note composed:10:45 AM 11/03/2020

## 2020-11-05 ENCOUNTER — CLINICAL SUPPORT (OUTPATIENT)
Dept: CARDIAC REHAB | Facility: HOSPITAL | Age: 85
End: 2020-11-05
Payer: MEDICARE

## 2020-11-05 DIAGNOSIS — Z95.2 MITRAL VALVE REPLACED: ICD-10-CM

## 2020-11-05 DIAGNOSIS — Z95.2 S/P AVR (AORTIC VALVE REPLACEMENT): ICD-10-CM

## 2020-11-05 PROCEDURE — 93798 PHYS/QHP OP CAR RHAB W/ECG: CPT

## 2020-11-06 ENCOUNTER — PES CALL (OUTPATIENT)
Dept: ADMINISTRATIVE | Facility: CLINIC | Age: 85
End: 2020-11-06

## 2020-11-11 ENCOUNTER — TELEPHONE (OUTPATIENT)
Dept: CARDIAC REHAB | Facility: HOSPITAL | Age: 85
End: 2020-11-11

## 2020-11-16 ENCOUNTER — TELEPHONE (OUTPATIENT)
Dept: CARDIAC REHAB | Facility: HOSPITAL | Age: 85
End: 2020-11-16

## 2020-11-17 ENCOUNTER — CLINICAL SUPPORT (OUTPATIENT)
Dept: CARDIAC REHAB | Facility: HOSPITAL | Age: 85
End: 2020-11-17
Payer: MEDICARE

## 2020-11-17 DIAGNOSIS — Z95.2 S/P AVR (AORTIC VALVE REPLACEMENT): ICD-10-CM

## 2020-11-17 PROCEDURE — 93798 PHYS/QHP OP CAR RHAB W/ECG: CPT

## 2020-12-07 ENCOUNTER — TELEPHONE (OUTPATIENT)
Dept: CARDIAC REHAB | Facility: HOSPITAL | Age: 85
End: 2020-12-07

## 2020-12-10 ENCOUNTER — TELEPHONE (OUTPATIENT)
Dept: CARDIAC REHAB | Facility: HOSPITAL | Age: 85
End: 2020-12-10

## 2021-01-20 ENCOUNTER — PATIENT OUTREACH (OUTPATIENT)
Dept: ADMINISTRATIVE | Facility: OTHER | Age: 86
End: 2021-01-20

## 2021-01-20 ENCOUNTER — IMMUNIZATION (OUTPATIENT)
Dept: PRIMARY CARE CLINIC | Facility: CLINIC | Age: 86
End: 2021-01-20
Payer: MEDICARE

## 2021-01-20 DIAGNOSIS — Z23 NEED FOR VACCINATION: Primary | ICD-10-CM

## 2021-01-20 PROCEDURE — 91300 COVID-19, MRNA, LNP-S, PF, 30 MCG/0.3 ML DOSE VACCINE: CPT | Mod: S$GLB,,, | Performed by: FAMILY MEDICINE

## 2021-01-20 PROCEDURE — 0001A COVID-19, MRNA, LNP-S, PF, 30 MCG/0.3 ML DOSE VACCINE: CPT | Mod: S$GLB,,, | Performed by: FAMILY MEDICINE

## 2021-01-20 PROCEDURE — 91300 COVID-19, MRNA, LNP-S, PF, 30 MCG/0.3 ML DOSE VACCINE: ICD-10-PCS | Mod: S$GLB,,, | Performed by: FAMILY MEDICINE

## 2021-01-20 PROCEDURE — 0001A COVID-19, MRNA, LNP-S, PF, 30 MCG/0.3 ML DOSE VACCINE: ICD-10-PCS | Mod: S$GLB,,, | Performed by: FAMILY MEDICINE

## 2021-02-11 ENCOUNTER — IMMUNIZATION (OUTPATIENT)
Dept: PRIMARY CARE CLINIC | Facility: CLINIC | Age: 86
End: 2021-02-11
Payer: MEDICARE

## 2021-02-11 DIAGNOSIS — Z23 NEED FOR VACCINATION: Primary | ICD-10-CM

## 2021-02-11 PROCEDURE — 91300 COVID-19, MRNA, LNP-S, PF, 30 MCG/0.3 ML DOSE VACCINE: CPT | Mod: S$GLB,,, | Performed by: FAMILY MEDICINE

## 2021-02-11 PROCEDURE — 0002A COVID-19, MRNA, LNP-S, PF, 30 MCG/0.3 ML DOSE VACCINE: CPT | Mod: S$GLB,,, | Performed by: FAMILY MEDICINE

## 2021-02-11 PROCEDURE — 0002A COVID-19, MRNA, LNP-S, PF, 30 MCG/0.3 ML DOSE VACCINE: ICD-10-PCS | Mod: S$GLB,,, | Performed by: FAMILY MEDICINE

## 2021-02-11 PROCEDURE — 91300 COVID-19, MRNA, LNP-S, PF, 30 MCG/0.3 ML DOSE VACCINE: ICD-10-PCS | Mod: S$GLB,,, | Performed by: FAMILY MEDICINE

## 2021-02-15 ENCOUNTER — PES CALL (OUTPATIENT)
Dept: ADMINISTRATIVE | Facility: CLINIC | Age: 86
End: 2021-02-15

## 2021-02-18 ENCOUNTER — HOSPITAL ENCOUNTER (OUTPATIENT)
Dept: RADIOLOGY | Facility: CLINIC | Age: 86
Discharge: HOME | End: 2021-02-18
Attending: FAMILY MEDICINE
Payer: MEDICARE

## 2021-02-18 ENCOUNTER — OFFICE VISIT (OUTPATIENT)
Dept: FAMILY MEDICINE | Facility: CLINIC | Age: 86
End: 2021-02-18
Attending: FAMILY MEDICINE
Payer: MEDICARE

## 2021-02-18 VITALS
WEIGHT: 193.31 LBS | HEART RATE: 88 BPM | BODY MASS INDEX: 30.34 KG/M2 | DIASTOLIC BLOOD PRESSURE: 76 MMHG | TEMPERATURE: 98 F | SYSTOLIC BLOOD PRESSURE: 120 MMHG | HEIGHT: 67 IN

## 2021-02-18 DIAGNOSIS — E11.59 HYPERTENSION ASSOCIATED WITH DIABETES: ICD-10-CM

## 2021-02-18 DIAGNOSIS — Z95.3 STATUS POST AORTIC VALVE REPLACEMENT WITH BIOPROSTHETIC VALVE: ICD-10-CM

## 2021-02-18 DIAGNOSIS — Z79.4 TYPE 2 DIABETES MELLITUS WITH DIABETIC POLYNEUROPATHY, WITH LONG-TERM CURRENT USE OF INSULIN: ICD-10-CM

## 2021-02-18 DIAGNOSIS — Z95.2 S/P AVR (AORTIC VALVE REPLACEMENT): ICD-10-CM

## 2021-02-18 DIAGNOSIS — G57.02 PIRIFORMIS SYNDROME OF LEFT SIDE: ICD-10-CM

## 2021-02-18 DIAGNOSIS — E11.69 HYPERLIPIDEMIA ASSOCIATED WITH TYPE 2 DIABETES MELLITUS: ICD-10-CM

## 2021-02-18 DIAGNOSIS — E11.42 TYPE 2 DIABETES MELLITUS WITH DIABETIC POLYNEUROPATHY, WITH LONG-TERM CURRENT USE OF INSULIN: ICD-10-CM

## 2021-02-18 DIAGNOSIS — M25.552 LEFT HIP PAIN: Primary | ICD-10-CM

## 2021-02-18 DIAGNOSIS — M25.552 LEFT HIP PAIN: ICD-10-CM

## 2021-02-18 DIAGNOSIS — D50.9 IRON DEFICIENCY ANEMIA, UNSPECIFIED IRON DEFICIENCY ANEMIA TYPE: ICD-10-CM

## 2021-02-18 DIAGNOSIS — I10 ESSENTIAL HYPERTENSION: ICD-10-CM

## 2021-02-18 DIAGNOSIS — N18.30 STAGE 3 CHRONIC KIDNEY DISEASE, UNSPECIFIED WHETHER STAGE 3A OR 3B CKD: ICD-10-CM

## 2021-02-18 DIAGNOSIS — E78.5 HYPERLIPIDEMIA ASSOCIATED WITH TYPE 2 DIABETES MELLITUS: ICD-10-CM

## 2021-02-18 DIAGNOSIS — E03.9 ACQUIRED HYPOTHYROIDISM: ICD-10-CM

## 2021-02-18 DIAGNOSIS — I15.2 HYPERTENSION ASSOCIATED WITH DIABETES: ICD-10-CM

## 2021-02-18 PROCEDURE — 3288F FALL RISK ASSESSMENT DOCD: CPT | Mod: CPTII,S$GLB,, | Performed by: FAMILY MEDICINE

## 2021-02-18 PROCEDURE — 3051F PR MOST RECENT HEMOGLOBIN A1C LEVEL 7.0 - < 8.0%: ICD-10-PCS | Mod: CPTII,S$GLB,, | Performed by: FAMILY MEDICINE

## 2021-02-18 PROCEDURE — 73502 X-RAY EXAM HIP UNI 2-3 VIEWS: CPT | Mod: 26,LT,S$GLB, | Performed by: RADIOLOGY

## 2021-02-18 PROCEDURE — 1101F PR PT FALLS ASSESS DOC 0-1 FALLS W/OUT INJ PAST YR: ICD-10-PCS | Mod: CPTII,S$GLB,, | Performed by: FAMILY MEDICINE

## 2021-02-18 PROCEDURE — 73502 X-RAY EXAM HIP UNI 2-3 VIEWS: CPT | Mod: TC,FY,PO,LT

## 2021-02-18 PROCEDURE — 1159F PR MEDICATION LIST DOCUMENTED IN MEDICAL RECORD: ICD-10-PCS | Mod: S$GLB,,, | Performed by: FAMILY MEDICINE

## 2021-02-18 PROCEDURE — 1159F MED LIST DOCD IN RCRD: CPT | Mod: S$GLB,,, | Performed by: FAMILY MEDICINE

## 2021-02-18 PROCEDURE — 99214 PR OFFICE/OUTPT VISIT, EST, LEVL IV, 30-39 MIN: ICD-10-PCS | Mod: S$GLB,,, | Performed by: FAMILY MEDICINE

## 2021-02-18 PROCEDURE — 3288F PR FALLS RISK ASSESSMENT DOCUMENTED: ICD-10-PCS | Mod: CPTII,S$GLB,, | Performed by: FAMILY MEDICINE

## 2021-02-18 PROCEDURE — 3051F HG A1C>EQUAL 7.0%<8.0%: CPT | Mod: CPTII,S$GLB,, | Performed by: FAMILY MEDICINE

## 2021-02-18 PROCEDURE — 99214 OFFICE O/P EST MOD 30 MIN: CPT | Mod: S$GLB,,, | Performed by: FAMILY MEDICINE

## 2021-02-18 PROCEDURE — 99999 PR PBB SHADOW E&M-EST. PATIENT-LVL V: CPT | Mod: PBBFAC,,, | Performed by: FAMILY MEDICINE

## 2021-02-18 PROCEDURE — 73502 XR HIP 2 VIEW LEFT: ICD-10-PCS | Mod: 26,LT,S$GLB, | Performed by: RADIOLOGY

## 2021-02-18 PROCEDURE — 99999 PR PBB SHADOW E&M-EST. PATIENT-LVL V: ICD-10-PCS | Mod: PBBFAC,,, | Performed by: FAMILY MEDICINE

## 2021-02-18 PROCEDURE — 1125F AMNT PAIN NOTED PAIN PRSNT: CPT | Mod: S$GLB,,, | Performed by: FAMILY MEDICINE

## 2021-02-18 PROCEDURE — 1101F PT FALLS ASSESS-DOCD LE1/YR: CPT | Mod: CPTII,S$GLB,, | Performed by: FAMILY MEDICINE

## 2021-02-18 PROCEDURE — 1125F PR PAIN SEVERITY QUANTIFIED, PAIN PRESENT: ICD-10-PCS | Mod: S$GLB,,, | Performed by: FAMILY MEDICINE

## 2021-02-18 RX ORDER — CARVEDILOL 6.25 MG/1
6.25 TABLET ORAL 2 TIMES DAILY WITH MEALS
Qty: 180 TABLET | Refills: 3 | Status: SHIPPED | OUTPATIENT
Start: 2021-02-18 | End: 2022-03-06

## 2021-02-19 ENCOUNTER — TELEPHONE (OUTPATIENT)
Dept: FAMILY MEDICINE | Facility: CLINIC | Age: 86
End: 2021-02-19

## 2021-02-19 DIAGNOSIS — E11.9 DIABETES MELLITUS WITHOUT COMPLICATION: ICD-10-CM

## 2021-02-19 DIAGNOSIS — E78.5 HYPERLIPIDEMIA, UNSPECIFIED HYPERLIPIDEMIA TYPE: ICD-10-CM

## 2021-02-19 DIAGNOSIS — E03.9 ACQUIRED HYPOTHYROIDISM: ICD-10-CM

## 2021-02-19 DIAGNOSIS — D50.9 IRON DEFICIENCY ANEMIA, UNSPECIFIED IRON DEFICIENCY ANEMIA TYPE: Primary | ICD-10-CM

## 2021-02-25 ENCOUNTER — LAB VISIT (OUTPATIENT)
Dept: LAB | Facility: HOSPITAL | Age: 86
End: 2021-02-25
Attending: FAMILY MEDICINE
Payer: MEDICARE

## 2021-02-25 DIAGNOSIS — E11.9 DIABETES MELLITUS WITHOUT COMPLICATION: ICD-10-CM

## 2021-02-25 DIAGNOSIS — E78.5 HYPERLIPIDEMIA, UNSPECIFIED HYPERLIPIDEMIA TYPE: ICD-10-CM

## 2021-02-25 DIAGNOSIS — E03.9 ACQUIRED HYPOTHYROIDISM: ICD-10-CM

## 2021-02-25 DIAGNOSIS — D50.9 IRON DEFICIENCY ANEMIA, UNSPECIFIED IRON DEFICIENCY ANEMIA TYPE: ICD-10-CM

## 2021-02-25 LAB
BASOPHILS # BLD AUTO: 0.06 K/UL (ref 0–0.2)
BASOPHILS NFR BLD: 0.9 % (ref 0–1.9)
DIFFERENTIAL METHOD: ABNORMAL
EOSINOPHIL # BLD AUTO: 0.2 K/UL (ref 0–0.5)
EOSINOPHIL NFR BLD: 3.6 % (ref 0–8)
ERYTHROCYTE [DISTWIDTH] IN BLOOD BY AUTOMATED COUNT: 14.1 % (ref 11.5–14.5)
HCT VFR BLD AUTO: 36.2 % (ref 37–48.5)
HGB BLD-MCNC: 11.8 G/DL (ref 12–16)
IMM GRANULOCYTES # BLD AUTO: 0.01 K/UL (ref 0–0.04)
IMM GRANULOCYTES NFR BLD AUTO: 0.2 % (ref 0–0.5)
LYMPHOCYTES # BLD AUTO: 1 K/UL (ref 1–4.8)
LYMPHOCYTES NFR BLD: 16.1 % (ref 18–48)
MCH RBC QN AUTO: 30.2 PG (ref 27–31)
MCHC RBC AUTO-ENTMCNC: 32.6 G/DL (ref 32–36)
MCV RBC AUTO: 93 FL (ref 82–98)
MONOCYTES # BLD AUTO: 0.6 K/UL (ref 0.3–1)
MONOCYTES NFR BLD: 8.8 % (ref 4–15)
NEUTROPHILS # BLD AUTO: 4.5 K/UL (ref 1.8–7.7)
NEUTROPHILS NFR BLD: 70.4 % (ref 38–73)
NRBC BLD-RTO: 0 /100 WBC
PLATELET # BLD AUTO: 235 K/UL (ref 150–350)
PMV BLD AUTO: 11.3 FL (ref 9.2–12.9)
RBC # BLD AUTO: 3.91 M/UL (ref 4–5.4)
WBC # BLD AUTO: 6.39 K/UL (ref 3.9–12.7)

## 2021-02-25 PROCEDURE — 85025 COMPLETE CBC W/AUTO DIFF WBC: CPT

## 2021-02-25 PROCEDURE — 84443 ASSAY THYROID STIM HORMONE: CPT

## 2021-02-25 PROCEDURE — 80053 COMPREHEN METABOLIC PANEL: CPT

## 2021-02-25 PROCEDURE — 80061 LIPID PANEL: CPT

## 2021-02-25 PROCEDURE — 83036 HEMOGLOBIN GLYCOSYLATED A1C: CPT

## 2021-02-25 PROCEDURE — 36415 COLL VENOUS BLD VENIPUNCTURE: CPT | Mod: PO

## 2021-02-25 PROCEDURE — 83540 ASSAY OF IRON: CPT

## 2021-02-26 ENCOUNTER — OFFICE VISIT (OUTPATIENT)
Dept: PHYSICAL MEDICINE AND REHAB | Facility: CLINIC | Age: 86
End: 2021-02-26
Attending: FAMILY MEDICINE
Payer: MEDICARE

## 2021-02-26 VITALS
HEART RATE: 117 BPM | DIASTOLIC BLOOD PRESSURE: 75 MMHG | SYSTOLIC BLOOD PRESSURE: 134 MMHG | HEIGHT: 67 IN | WEIGHT: 193 LBS | BODY MASS INDEX: 30.29 KG/M2

## 2021-02-26 DIAGNOSIS — M43.10 ANTEROLISTHESIS: ICD-10-CM

## 2021-02-26 DIAGNOSIS — N18.30 STAGE 3 CHRONIC KIDNEY DISEASE, UNSPECIFIED WHETHER STAGE 3A OR 3B CKD: ICD-10-CM

## 2021-02-26 DIAGNOSIS — Z79.01 CHRONIC ANTICOAGULATION: ICD-10-CM

## 2021-02-26 DIAGNOSIS — M47.816 LUMBAR FACET ARTHROPATHY: ICD-10-CM

## 2021-02-26 DIAGNOSIS — M54.16 LEFT LUMBAR RADICULOPATHY: ICD-10-CM

## 2021-02-26 DIAGNOSIS — M51.36 DDD (DEGENERATIVE DISC DISEASE), LUMBAR: ICD-10-CM

## 2021-02-26 DIAGNOSIS — M25.552 LEFT HIP PAIN: ICD-10-CM

## 2021-02-26 DIAGNOSIS — Z74.09 IMPAIRED FUNCTIONAL MOBILITY AND ACTIVITY TOLERANCE: ICD-10-CM

## 2021-02-26 DIAGNOSIS — G89.29 OTHER CHRONIC PAIN: ICD-10-CM

## 2021-02-26 DIAGNOSIS — M48.062 SPINAL STENOSIS OF LUMBAR REGION WITH NEUROGENIC CLAUDICATION: Primary | ICD-10-CM

## 2021-02-26 DIAGNOSIS — Z86.39 HISTORY OF DIABETES MELLITUS, TYPE II: ICD-10-CM

## 2021-02-26 DIAGNOSIS — G57.02 PIRIFORMIS SYNDROME OF LEFT SIDE: ICD-10-CM

## 2021-02-26 LAB
ALBUMIN SERPL BCP-MCNC: 4.1 G/DL (ref 3.5–5.2)
ALP SERPL-CCNC: 84 U/L (ref 55–135)
ALT SERPL W/O P-5'-P-CCNC: 19 U/L (ref 10–44)
ANION GAP SERPL CALC-SCNC: 15 MMOL/L (ref 8–16)
AST SERPL-CCNC: 20 U/L (ref 10–40)
BILIRUB SERPL-MCNC: 1 MG/DL (ref 0.1–1)
BUN SERPL-MCNC: 29 MG/DL (ref 8–23)
CALCIUM SERPL-MCNC: 9.4 MG/DL (ref 8.7–10.5)
CHLORIDE SERPL-SCNC: 106 MMOL/L (ref 95–110)
CHOLEST SERPL-MCNC: 130 MG/DL (ref 120–199)
CHOLEST/HDLC SERPL: 4.2 {RATIO} (ref 2–5)
CO2 SERPL-SCNC: 17 MMOL/L (ref 23–29)
CREAT SERPL-MCNC: 1.2 MG/DL (ref 0.5–1.4)
EST. GFR  (AFRICAN AMERICAN): 47.3 ML/MIN/1.73 M^2
EST. GFR  (NON AFRICAN AMERICAN): 41 ML/MIN/1.73 M^2
ESTIMATED AVG GLUCOSE: 157 MG/DL (ref 68–131)
GLUCOSE SERPL-MCNC: 165 MG/DL (ref 70–110)
HBA1C MFR BLD: 7.1 % (ref 4–5.6)
HDLC SERPL-MCNC: 31 MG/DL (ref 40–75)
HDLC SERPL: 23.8 % (ref 20–50)
IRON SERPL-MCNC: 64 UG/DL (ref 30–160)
LDLC SERPL CALC-MCNC: 37.2 MG/DL (ref 63–159)
NONHDLC SERPL-MCNC: 99 MG/DL
POTASSIUM SERPL-SCNC: 4.3 MMOL/L (ref 3.5–5.1)
PROT SERPL-MCNC: 7.2 G/DL (ref 6–8.4)
SATURATED IRON: 16 % (ref 20–50)
SODIUM SERPL-SCNC: 138 MMOL/L (ref 136–145)
TOTAL IRON BINDING CAPACITY: 406 UG/DL (ref 250–450)
TRANSFERRIN SERPL-MCNC: 274 MG/DL (ref 200–375)
TRIGL SERPL-MCNC: 309 MG/DL (ref 30–150)
TSH SERPL DL<=0.005 MIU/L-ACNC: 2.07 UIU/ML (ref 0.4–4)

## 2021-02-26 PROCEDURE — 1101F PT FALLS ASSESS-DOCD LE1/YR: CPT | Mod: CPTII,S$GLB,, | Performed by: PHYSICAL MEDICINE & REHABILITATION

## 2021-02-26 PROCEDURE — 3288F FALL RISK ASSESSMENT DOCD: CPT | Mod: CPTII,S$GLB,, | Performed by: PHYSICAL MEDICINE & REHABILITATION

## 2021-02-26 PROCEDURE — 1101F PR PT FALLS ASSESS DOC 0-1 FALLS W/OUT INJ PAST YR: ICD-10-PCS | Mod: CPTII,S$GLB,, | Performed by: PHYSICAL MEDICINE & REHABILITATION

## 2021-02-26 PROCEDURE — 1125F AMNT PAIN NOTED PAIN PRSNT: CPT | Mod: S$GLB,,, | Performed by: PHYSICAL MEDICINE & REHABILITATION

## 2021-02-26 PROCEDURE — 99999 PR PBB SHADOW E&M-EST. PATIENT-LVL III: ICD-10-PCS | Mod: PBBFAC,,, | Performed by: PHYSICAL MEDICINE & REHABILITATION

## 2021-02-26 PROCEDURE — 99204 OFFICE O/P NEW MOD 45 MIN: CPT | Mod: S$GLB,,, | Performed by: PHYSICAL MEDICINE & REHABILITATION

## 2021-02-26 PROCEDURE — 1159F PR MEDICATION LIST DOCUMENTED IN MEDICAL RECORD: ICD-10-PCS | Mod: S$GLB,,, | Performed by: PHYSICAL MEDICINE & REHABILITATION

## 2021-02-26 PROCEDURE — 3288F PR FALLS RISK ASSESSMENT DOCUMENTED: ICD-10-PCS | Mod: CPTII,S$GLB,, | Performed by: PHYSICAL MEDICINE & REHABILITATION

## 2021-02-26 PROCEDURE — 1159F MED LIST DOCD IN RCRD: CPT | Mod: S$GLB,,, | Performed by: PHYSICAL MEDICINE & REHABILITATION

## 2021-02-26 PROCEDURE — 99999 PR PBB SHADOW E&M-EST. PATIENT-LVL III: CPT | Mod: PBBFAC,,, | Performed by: PHYSICAL MEDICINE & REHABILITATION

## 2021-02-26 PROCEDURE — 1125F PR PAIN SEVERITY QUANTIFIED, PAIN PRESENT: ICD-10-PCS | Mod: S$GLB,,, | Performed by: PHYSICAL MEDICINE & REHABILITATION

## 2021-02-26 PROCEDURE — 99204 PR OFFICE/OUTPT VISIT, NEW, LEVL IV, 45-59 MIN: ICD-10-PCS | Mod: S$GLB,,, | Performed by: PHYSICAL MEDICINE & REHABILITATION

## 2021-03-01 ENCOUNTER — PATIENT OUTREACH (OUTPATIENT)
Dept: ADMINISTRATIVE | Facility: OTHER | Age: 86
End: 2021-03-01

## 2021-03-01 DIAGNOSIS — M54.16 LEFT LUMBAR RADICULOPATHY: Primary | ICD-10-CM

## 2021-03-01 DIAGNOSIS — Z01.818 PRE-OP TESTING: ICD-10-CM

## 2021-03-02 ENCOUNTER — OFFICE VISIT (OUTPATIENT)
Dept: OPTOMETRY | Facility: CLINIC | Age: 86
End: 2021-03-02
Payer: MEDICARE

## 2021-03-02 DIAGNOSIS — H25.13 NUCLEAR SCLEROSIS, BILATERAL: ICD-10-CM

## 2021-03-02 DIAGNOSIS — H52.7 REFRACTIVE ERROR: ICD-10-CM

## 2021-03-02 DIAGNOSIS — E11.36 DIABETIC CATARACT: ICD-10-CM

## 2021-03-02 DIAGNOSIS — E11.9 DIABETES MELLITUS TYPE 2 WITHOUT RETINOPATHY: Primary | ICD-10-CM

## 2021-03-02 DIAGNOSIS — H04.123 DRY EYE SYNDROME, BILATERAL: ICD-10-CM

## 2021-03-02 PROCEDURE — 92015 PR REFRACTION: ICD-10-PCS | Mod: S$GLB,,, | Performed by: OPTOMETRIST

## 2021-03-02 PROCEDURE — 92014 PR EYE EXAM, EST PATIENT,COMPREHESV: ICD-10-PCS | Mod: S$GLB,,, | Performed by: OPTOMETRIST

## 2021-03-02 PROCEDURE — 3288F PR FALLS RISK ASSESSMENT DOCUMENTED: ICD-10-PCS | Mod: CPTII,S$GLB,, | Performed by: OPTOMETRIST

## 2021-03-02 PROCEDURE — 1101F PR PT FALLS ASSESS DOC 0-1 FALLS W/OUT INJ PAST YR: ICD-10-PCS | Mod: CPTII,S$GLB,, | Performed by: OPTOMETRIST

## 2021-03-02 PROCEDURE — 99999 PR PBB SHADOW E&M-EST. PATIENT-LVL III: CPT | Mod: PBBFAC,,, | Performed by: OPTOMETRIST

## 2021-03-02 PROCEDURE — 2023F PR DILATED RETINAL EXAM W/O EVID OF RETINOPATHY: ICD-10-PCS | Mod: S$GLB,,, | Performed by: OPTOMETRIST

## 2021-03-02 PROCEDURE — 3288F FALL RISK ASSESSMENT DOCD: CPT | Mod: CPTII,S$GLB,, | Performed by: OPTOMETRIST

## 2021-03-02 PROCEDURE — 1101F PT FALLS ASSESS-DOCD LE1/YR: CPT | Mod: CPTII,S$GLB,, | Performed by: OPTOMETRIST

## 2021-03-02 PROCEDURE — 99999 PR PBB SHADOW E&M-EST. PATIENT-LVL III: ICD-10-PCS | Mod: PBBFAC,,, | Performed by: OPTOMETRIST

## 2021-03-02 PROCEDURE — 1126F AMNT PAIN NOTED NONE PRSNT: CPT | Mod: S$GLB,,, | Performed by: OPTOMETRIST

## 2021-03-02 PROCEDURE — 92014 COMPRE OPH EXAM EST PT 1/>: CPT | Mod: S$GLB,,, | Performed by: OPTOMETRIST

## 2021-03-02 PROCEDURE — 92015 DETERMINE REFRACTIVE STATE: CPT | Mod: S$GLB,,, | Performed by: OPTOMETRIST

## 2021-03-02 PROCEDURE — 1126F PR PAIN SEVERITY QUANTIFIED, NO PAIN PRESENT: ICD-10-PCS | Mod: S$GLB,,, | Performed by: OPTOMETRIST

## 2021-03-02 PROCEDURE — 2023F DILAT RTA XM W/O RTNOPTHY: CPT | Mod: S$GLB,,, | Performed by: OPTOMETRIST

## 2021-03-03 DIAGNOSIS — E11.42 TYPE 2 DIABETES MELLITUS WITH DIABETIC POLYNEUROPATHY, WITH LONG-TERM CURRENT USE OF INSULIN: Primary | ICD-10-CM

## 2021-03-03 DIAGNOSIS — Z79.4 TYPE 2 DIABETES MELLITUS WITH DIABETIC POLYNEUROPATHY, WITH LONG-TERM CURRENT USE OF INSULIN: Primary | ICD-10-CM

## 2021-03-03 RX ORDER — BLOOD SUGAR DIAGNOSTIC
300 STRIP MISCELLANEOUS 3 TIMES DAILY
Qty: 300 EACH | Refills: 3 | Status: SHIPPED | OUTPATIENT
Start: 2021-03-03

## 2021-03-03 RX ORDER — BLOOD SUGAR DIAGNOSTIC
STRIP MISCELLANEOUS
Refills: 0 | OUTPATIENT
Start: 2021-03-03

## 2021-03-03 RX ORDER — METFORMIN HYDROCHLORIDE 1000 MG/1
TABLET ORAL
Qty: 180 TABLET | Refills: 1 | Status: SHIPPED | OUTPATIENT
Start: 2021-03-03 | End: 2021-10-24

## 2021-03-03 RX ORDER — LANCETS
EACH MISCELLANEOUS
Qty: 300 EACH | Refills: 3 | Status: SHIPPED | OUTPATIENT
Start: 2021-03-03

## 2021-03-18 ENCOUNTER — PES CALL (OUTPATIENT)
Dept: ADMINISTRATIVE | Facility: CLINIC | Age: 86
End: 2021-03-18

## 2021-03-22 ENCOUNTER — LAB VISIT (OUTPATIENT)
Dept: PRIMARY CARE CLINIC | Facility: CLINIC | Age: 86
End: 2021-03-22
Payer: MEDICARE

## 2021-03-22 ENCOUNTER — TELEPHONE (OUTPATIENT)
Dept: FAMILY MEDICINE | Facility: CLINIC | Age: 86
End: 2021-03-22

## 2021-03-22 DIAGNOSIS — Z01.818 PRE-OP TESTING: ICD-10-CM

## 2021-03-22 PROCEDURE — U0005 INFEC AGEN DETEC AMPLI PROBE: HCPCS | Performed by: PHYSICAL MEDICINE & REHABILITATION

## 2021-03-22 PROCEDURE — U0003 INFECTIOUS AGENT DETECTION BY NUCLEIC ACID (DNA OR RNA); SEVERE ACUTE RESPIRATORY SYNDROME CORONAVIRUS 2 (SARS-COV-2) (CORONAVIRUS DISEASE [COVID-19]), AMPLIFIED PROBE TECHNIQUE, MAKING USE OF HIGH THROUGHPUT TECHNOLOGIES AS DESCRIBED BY CMS-2020-01-R: HCPCS | Performed by: PHYSICAL MEDICINE & REHABILITATION

## 2021-03-24 LAB — SARS-COV-2 RNA RESP QL NAA+PROBE: NOT DETECTED

## 2021-03-24 RX ORDER — SODIUM CHLORIDE 9 MG/ML
INJECTION, SOLUTION INTRAVENOUS CONTINUOUS
Status: CANCELLED | OUTPATIENT
Start: 2021-03-24

## 2021-03-25 ENCOUNTER — HOSPITAL ENCOUNTER (OUTPATIENT)
Facility: AMBULARY SURGERY CENTER | Age: 86
Discharge: HOME | End: 2021-03-25
Attending: PHYSICAL MEDICINE & REHABILITATION | Admitting: PHYSICAL MEDICINE & REHABILITATION
Payer: MEDICARE

## 2021-03-25 DIAGNOSIS — M54.16 LUMBAR RADICULITIS: Primary | ICD-10-CM

## 2021-03-25 DIAGNOSIS — M54.16 LUMBAR RADICULOPATHY: ICD-10-CM

## 2021-03-25 LAB — POCT GLUCOSE: 198 MG/DL (ref 70–110)

## 2021-03-25 PROCEDURE — 62323 NJX INTERLAMINAR LMBR/SAC: CPT | Mod: ,,, | Performed by: PHYSICAL MEDICINE & REHABILITATION

## 2021-03-25 PROCEDURE — 62323 NJX INTERLAMINAR LMBR/SAC: CPT | Performed by: PHYSICAL MEDICINE & REHABILITATION

## 2021-03-25 PROCEDURE — 62323 PR INJ LUMBAR/SACRAL, W/IMAGING GUIDANCE: ICD-10-PCS | Mod: ,,, | Performed by: PHYSICAL MEDICINE & REHABILITATION

## 2021-03-25 RX ORDER — SODIUM CHLORIDE 9 MG/ML
INJECTION, SOLUTION INTRAMUSCULAR; INTRAVENOUS; SUBCUTANEOUS
Status: DISCONTINUED | OUTPATIENT
Start: 2021-03-25 | End: 2021-03-25 | Stop reason: HOSPADM

## 2021-03-25 RX ORDER — LIDOCAINE HYDROCHLORIDE 10 MG/ML
1 INJECTION, SOLUTION EPIDURAL; INFILTRATION; INTRACAUDAL; PERINEURAL ONCE
Status: DISCONTINUED | OUTPATIENT
Start: 2021-03-25 | End: 2021-03-25 | Stop reason: HOSPADM

## 2021-03-25 RX ORDER — BETAMETHASONE SODIUM PHOSPHATE AND BETAMETHASONE ACETATE 3; 3 MG/ML; MG/ML
INJECTION, SUSPENSION INTRA-ARTICULAR; INTRALESIONAL; INTRAMUSCULAR; SOFT TISSUE
Status: DISCONTINUED | OUTPATIENT
Start: 2021-03-25 | End: 2021-03-25 | Stop reason: HOSPADM

## 2021-03-25 RX ORDER — ALPRAZOLAM 0.5 MG/1
0.5 TABLET, ORALLY DISINTEGRATING ORAL ONCE
Status: COMPLETED | OUTPATIENT
Start: 2021-03-25 | End: 2021-03-25

## 2021-03-25 RX ORDER — LIDOCAINE HYDROCHLORIDE 10 MG/ML
INJECTION, SOLUTION EPIDURAL; INFILTRATION; INTRACAUDAL; PERINEURAL
Status: DISCONTINUED | OUTPATIENT
Start: 2021-03-25 | End: 2021-03-25 | Stop reason: HOSPADM

## 2021-03-25 RX ADMIN — ALPRAZOLAM 0.5 MG: 0.5 TABLET, ORALLY DISINTEGRATING ORAL at 07:03

## 2021-03-29 VITALS
SYSTOLIC BLOOD PRESSURE: 112 MMHG | RESPIRATION RATE: 18 BRPM | TEMPERATURE: 98 F | BODY MASS INDEX: 30.21 KG/M2 | OXYGEN SATURATION: 96 % | HEART RATE: 82 BPM | WEIGHT: 192.88 LBS | DIASTOLIC BLOOD PRESSURE: 60 MMHG

## 2021-04-15 ENCOUNTER — PATIENT OUTREACH (OUTPATIENT)
Dept: ADMINISTRATIVE | Facility: OTHER | Age: 86
End: 2021-04-15

## 2021-04-19 ENCOUNTER — PES CALL (OUTPATIENT)
Dept: ADMINISTRATIVE | Facility: CLINIC | Age: 86
End: 2021-04-19

## 2021-04-30 ENCOUNTER — OFFICE VISIT (OUTPATIENT)
Dept: PHYSICAL MEDICINE AND REHAB | Facility: CLINIC | Age: 86
End: 2021-04-30
Payer: MEDICARE

## 2021-04-30 VITALS — RESPIRATION RATE: 20 BRPM | BODY MASS INDEX: 30.13 KG/M2 | HEIGHT: 67 IN | WEIGHT: 192 LBS

## 2021-04-30 DIAGNOSIS — G89.29 CHRONIC BILATERAL LOW BACK PAIN WITH BILATERAL SCIATICA: ICD-10-CM

## 2021-04-30 DIAGNOSIS — M54.42 CHRONIC BILATERAL LOW BACK PAIN WITH BILATERAL SCIATICA: ICD-10-CM

## 2021-04-30 DIAGNOSIS — M48.062 SPINAL STENOSIS OF LUMBAR REGION WITH NEUROGENIC CLAUDICATION: Primary | ICD-10-CM

## 2021-04-30 DIAGNOSIS — G89.29 OTHER CHRONIC PAIN: ICD-10-CM

## 2021-04-30 DIAGNOSIS — M47.816 LUMBAR FACET ARTHROPATHY: ICD-10-CM

## 2021-04-30 DIAGNOSIS — M54.41 CHRONIC BILATERAL LOW BACK PAIN WITH BILATERAL SCIATICA: ICD-10-CM

## 2021-04-30 DIAGNOSIS — Z74.09 IMPAIRED FUNCTIONAL MOBILITY AND ACTIVITY TOLERANCE: ICD-10-CM

## 2021-04-30 PROCEDURE — 99213 PR OFFICE/OUTPT VISIT, EST, LEVL III, 20-29 MIN: ICD-10-PCS | Mod: HCNC,S$GLB,, | Performed by: PHYSICAL MEDICINE & REHABILITATION

## 2021-04-30 PROCEDURE — 1101F PR PT FALLS ASSESS DOC 0-1 FALLS W/OUT INJ PAST YR: ICD-10-PCS | Mod: HCNC,CPTII,S$GLB, | Performed by: PHYSICAL MEDICINE & REHABILITATION

## 2021-04-30 PROCEDURE — 99999 PR PBB SHADOW E&M-EST. PATIENT-LVL III: CPT | Mod: PBBFAC,HCNC,, | Performed by: PHYSICAL MEDICINE & REHABILITATION

## 2021-04-30 PROCEDURE — 99999 PR PBB SHADOW E&M-EST. PATIENT-LVL III: ICD-10-PCS | Mod: PBBFAC,HCNC,, | Performed by: PHYSICAL MEDICINE & REHABILITATION

## 2021-04-30 PROCEDURE — 1159F MED LIST DOCD IN RCRD: CPT | Mod: HCNC,S$GLB,, | Performed by: PHYSICAL MEDICINE & REHABILITATION

## 2021-04-30 PROCEDURE — 1125F AMNT PAIN NOTED PAIN PRSNT: CPT | Mod: HCNC,S$GLB,, | Performed by: PHYSICAL MEDICINE & REHABILITATION

## 2021-04-30 PROCEDURE — 1101F PT FALLS ASSESS-DOCD LE1/YR: CPT | Mod: HCNC,CPTII,S$GLB, | Performed by: PHYSICAL MEDICINE & REHABILITATION

## 2021-04-30 PROCEDURE — 3288F PR FALLS RISK ASSESSMENT DOCUMENTED: ICD-10-PCS | Mod: HCNC,CPTII,S$GLB, | Performed by: PHYSICAL MEDICINE & REHABILITATION

## 2021-04-30 PROCEDURE — 1159F PR MEDICATION LIST DOCUMENTED IN MEDICAL RECORD: ICD-10-PCS | Mod: HCNC,S$GLB,, | Performed by: PHYSICAL MEDICINE & REHABILITATION

## 2021-04-30 PROCEDURE — 1125F PR PAIN SEVERITY QUANTIFIED, PAIN PRESENT: ICD-10-PCS | Mod: HCNC,S$GLB,, | Performed by: PHYSICAL MEDICINE & REHABILITATION

## 2021-04-30 PROCEDURE — 99213 OFFICE O/P EST LOW 20 MIN: CPT | Mod: HCNC,S$GLB,, | Performed by: PHYSICAL MEDICINE & REHABILITATION

## 2021-04-30 PROCEDURE — 3288F FALL RISK ASSESSMENT DOCD: CPT | Mod: HCNC,CPTII,S$GLB, | Performed by: PHYSICAL MEDICINE & REHABILITATION

## 2021-05-03 DIAGNOSIS — M47.816 LUMBAR FACET ARTHROPATHY: Primary | ICD-10-CM

## 2021-05-03 DIAGNOSIS — Z01.818 PRE-OP TESTING: ICD-10-CM

## 2021-05-13 DIAGNOSIS — Z79.4 TYPE 2 DIABETES MELLITUS WITHOUT COMPLICATION, WITH LONG-TERM CURRENT USE OF INSULIN: ICD-10-CM

## 2021-05-13 DIAGNOSIS — E11.9 TYPE 2 DIABETES MELLITUS WITHOUT COMPLICATION, WITH LONG-TERM CURRENT USE OF INSULIN: ICD-10-CM

## 2021-05-17 RX ORDER — LISINOPRIL AND HYDROCHLOROTHIAZIDE 10; 12.5 MG/1; MG/1
1 TABLET ORAL DAILY
Qty: 90 TABLET | Refills: 1 | Status: SHIPPED | OUTPATIENT
Start: 2021-05-17 | End: 2021-11-09

## 2021-05-17 RX ORDER — INSULIN GLARGINE 100 [IU]/ML
INJECTION, SOLUTION SUBCUTANEOUS
Qty: 30 ML | Refills: 1 | Status: SHIPPED | OUTPATIENT
Start: 2021-05-17 | End: 2022-01-19

## 2021-05-17 RX ORDER — LISINOPRIL AND HYDROCHLOROTHIAZIDE 10; 12.5 MG/1; MG/1
TABLET ORAL
Qty: 90 TABLET | Refills: 3 | OUTPATIENT
Start: 2021-05-17

## 2021-05-20 ENCOUNTER — PES CALL (OUTPATIENT)
Dept: ADMINISTRATIVE | Facility: CLINIC | Age: 86
End: 2021-05-20

## 2021-05-20 ENCOUNTER — HOSPITAL ENCOUNTER (OUTPATIENT)
Facility: AMBULARY SURGERY CENTER | Age: 86
Discharge: HOME | End: 2021-05-20
Attending: PHYSICAL MEDICINE & REHABILITATION | Admitting: PHYSICAL MEDICINE & REHABILITATION
Payer: MEDICARE

## 2021-05-20 DIAGNOSIS — M54.16 LUMBAR RADICULOPATHY: Primary | ICD-10-CM

## 2021-05-20 DIAGNOSIS — M48.062 SPINAL STENOSIS OF LUMBAR REGION WITH NEUROGENIC CLAUDICATION: ICD-10-CM

## 2021-05-20 LAB — POCT GLUCOSE: 155 MG/DL (ref 70–110)

## 2021-05-20 PROCEDURE — 62323 NJX INTERLAMINAR LMBR/SAC: CPT | Performed by: PHYSICAL MEDICINE & REHABILITATION

## 2021-05-20 PROCEDURE — 62323 NJX INTERLAMINAR LMBR/SAC: CPT | Mod: HCNC,,, | Performed by: PHYSICAL MEDICINE & REHABILITATION

## 2021-05-20 PROCEDURE — 62323 PR INJ LUMBAR/SACRAL, W/IMAGING GUIDANCE: ICD-10-PCS | Mod: HCNC,,, | Performed by: PHYSICAL MEDICINE & REHABILITATION

## 2021-05-20 RX ORDER — ALPRAZOLAM 0.5 MG/1
0.5 TABLET, ORALLY DISINTEGRATING ORAL
Status: COMPLETED | OUTPATIENT
Start: 2021-05-20 | End: 2021-05-20

## 2021-05-20 RX ORDER — LIDOCAINE HYDROCHLORIDE 10 MG/ML
INJECTION, SOLUTION EPIDURAL; INFILTRATION; INTRACAUDAL; PERINEURAL
Status: DISCONTINUED | OUTPATIENT
Start: 2021-05-20 | End: 2021-05-20 | Stop reason: HOSPADM

## 2021-05-20 RX ORDER — SODIUM CHLORIDE 9 MG/ML
INJECTION, SOLUTION INTRAMUSCULAR; INTRAVENOUS; SUBCUTANEOUS
Status: DISCONTINUED | OUTPATIENT
Start: 2021-05-20 | End: 2021-05-20 | Stop reason: HOSPADM

## 2021-05-20 RX ORDER — LIDOCAINE HYDROCHLORIDE 10 MG/ML
1 INJECTION, SOLUTION EPIDURAL; INFILTRATION; INTRACAUDAL; PERINEURAL ONCE
Status: DISCONTINUED | OUTPATIENT
Start: 2021-05-20 | End: 2021-05-20 | Stop reason: HOSPADM

## 2021-05-20 RX ORDER — ALPRAZOLAM 0.5 MG/1
TABLET, ORALLY DISINTEGRATING ORAL
Status: COMPLETED
Start: 2021-05-20 | End: 2021-05-20

## 2021-05-20 RX ORDER — SODIUM CHLORIDE, SODIUM LACTATE, POTASSIUM CHLORIDE, CALCIUM CHLORIDE 600; 310; 30; 20 MG/100ML; MG/100ML; MG/100ML; MG/100ML
INJECTION, SOLUTION INTRAVENOUS CONTINUOUS
Status: DISCONTINUED | OUTPATIENT
Start: 2021-05-20 | End: 2021-05-20 | Stop reason: HOSPADM

## 2021-05-20 RX ORDER — DEXAMETHASONE SODIUM PHOSPHATE 100 MG/10ML
INJECTION INTRAMUSCULAR; INTRAVENOUS
Status: DISCONTINUED | OUTPATIENT
Start: 2021-05-20 | End: 2021-05-20 | Stop reason: HOSPADM

## 2021-05-20 RX ADMIN — ALPRAZOLAM 0.5 MG: 0.5 TABLET, ORALLY DISINTEGRATING ORAL at 07:05

## 2021-05-21 VITALS
HEART RATE: 85 BPM | TEMPERATURE: 98 F | OXYGEN SATURATION: 98 % | BODY MASS INDEX: 30.29 KG/M2 | RESPIRATION RATE: 18 BRPM | HEIGHT: 67 IN | SYSTOLIC BLOOD PRESSURE: 166 MMHG | DIASTOLIC BLOOD PRESSURE: 79 MMHG | WEIGHT: 193 LBS

## 2021-05-27 ENCOUNTER — TELEPHONE (OUTPATIENT)
Dept: PHYSICAL MEDICINE AND REHAB | Facility: CLINIC | Age: 86
End: 2021-05-27

## 2021-06-03 ENCOUNTER — OFFICE VISIT (OUTPATIENT)
Dept: PHYSICAL MEDICINE AND REHAB | Facility: CLINIC | Age: 86
End: 2021-06-03
Payer: MEDICARE

## 2021-06-03 VITALS — HEIGHT: 67 IN | WEIGHT: 193 LBS | BODY MASS INDEX: 30.29 KG/M2

## 2021-06-03 DIAGNOSIS — M47.816 LUMBAR SPONDYLOSIS: ICD-10-CM

## 2021-06-03 DIAGNOSIS — M48.062 SPINAL STENOSIS OF LUMBAR REGION WITH NEUROGENIC CLAUDICATION: Primary | ICD-10-CM

## 2021-06-03 DIAGNOSIS — M54.50 CHRONIC BILATERAL LOW BACK PAIN, UNSPECIFIED WHETHER SCIATICA PRESENT: ICD-10-CM

## 2021-06-03 DIAGNOSIS — M51.36 LUMBAR ADJACENT SEGMENT DISEASE WITH SPONDYLOLISTHESIS: ICD-10-CM

## 2021-06-03 DIAGNOSIS — M43.16 LUMBAR ADJACENT SEGMENT DISEASE WITH SPONDYLOLISTHESIS: ICD-10-CM

## 2021-06-03 DIAGNOSIS — G89.29 CHRONIC BILATERAL LOW BACK PAIN, UNSPECIFIED WHETHER SCIATICA PRESENT: ICD-10-CM

## 2021-06-03 PROCEDURE — 1125F AMNT PAIN NOTED PAIN PRSNT: CPT | Mod: HCNC,S$GLB,, | Performed by: PHYSICAL MEDICINE & REHABILITATION

## 2021-06-03 PROCEDURE — 99999 PR PBB SHADOW E&M-EST. PATIENT-LVL II: CPT | Mod: PBBFAC,HCNC,, | Performed by: PHYSICAL MEDICINE & REHABILITATION

## 2021-06-03 PROCEDURE — 1125F PR PAIN SEVERITY QUANTIFIED, PAIN PRESENT: ICD-10-PCS | Mod: HCNC,S$GLB,, | Performed by: PHYSICAL MEDICINE & REHABILITATION

## 2021-06-03 PROCEDURE — 99999 PR PBB SHADOW E&M-EST. PATIENT-LVL II: ICD-10-PCS | Mod: PBBFAC,HCNC,, | Performed by: PHYSICAL MEDICINE & REHABILITATION

## 2021-06-03 PROCEDURE — 99499 UNLISTED E&M SERVICE: CPT | Mod: HCNC,S$GLB,, | Performed by: PHYSICAL MEDICINE & REHABILITATION

## 2021-06-03 PROCEDURE — 99499 NO LOS: ICD-10-PCS | Mod: HCNC,S$GLB,, | Performed by: PHYSICAL MEDICINE & REHABILITATION

## 2021-06-03 PROCEDURE — 3288F FALL RISK ASSESSMENT DOCD: CPT | Mod: HCNC,CPTII,S$GLB, | Performed by: PHYSICAL MEDICINE & REHABILITATION

## 2021-06-03 PROCEDURE — 1101F PT FALLS ASSESS-DOCD LE1/YR: CPT | Mod: HCNC,CPTII,S$GLB, | Performed by: PHYSICAL MEDICINE & REHABILITATION

## 2021-06-03 PROCEDURE — 1101F PR PT FALLS ASSESS DOC 0-1 FALLS W/OUT INJ PAST YR: ICD-10-PCS | Mod: HCNC,CPTII,S$GLB, | Performed by: PHYSICAL MEDICINE & REHABILITATION

## 2021-06-03 PROCEDURE — 3288F PR FALLS RISK ASSESSMENT DOCUMENTED: ICD-10-PCS | Mod: HCNC,CPTII,S$GLB, | Performed by: PHYSICAL MEDICINE & REHABILITATION

## 2021-08-06 DIAGNOSIS — N18.30 STAGE 3 CHRONIC KIDNEY DISEASE, UNSPECIFIED WHETHER STAGE 3A OR 3B CKD: ICD-10-CM

## 2021-08-06 DIAGNOSIS — Z79.4 TYPE 2 DIABETES MELLITUS WITH DIABETIC POLYNEUROPATHY, WITH LONG-TERM CURRENT USE OF INSULIN: Primary | ICD-10-CM

## 2021-08-06 DIAGNOSIS — E11.59 HYPERTENSION ASSOCIATED WITH DIABETES: ICD-10-CM

## 2021-08-06 DIAGNOSIS — E11.42 TYPE 2 DIABETES MELLITUS WITH DIABETIC POLYNEUROPATHY, WITH LONG-TERM CURRENT USE OF INSULIN: Primary | ICD-10-CM

## 2021-08-06 DIAGNOSIS — I15.2 HYPERTENSION ASSOCIATED WITH DIABETES: ICD-10-CM

## 2021-08-13 ENCOUNTER — TELEPHONE (OUTPATIENT)
Dept: FAMILY MEDICINE | Facility: CLINIC | Age: 86
End: 2021-08-13

## 2021-09-11 ENCOUNTER — LAB VISIT (OUTPATIENT)
Dept: LAB | Facility: HOSPITAL | Age: 86
End: 2021-09-11
Attending: FAMILY MEDICINE
Payer: MEDICARE

## 2021-09-11 DIAGNOSIS — E11.59 HYPERTENSION ASSOCIATED WITH DIABETES: ICD-10-CM

## 2021-09-11 DIAGNOSIS — Z79.4 TYPE 2 DIABETES MELLITUS WITH DIABETIC POLYNEUROPATHY, WITH LONG-TERM CURRENT USE OF INSULIN: ICD-10-CM

## 2021-09-11 DIAGNOSIS — N18.30 STAGE 3 CHRONIC KIDNEY DISEASE, UNSPECIFIED WHETHER STAGE 3A OR 3B CKD: ICD-10-CM

## 2021-09-11 DIAGNOSIS — I15.2 HYPERTENSION ASSOCIATED WITH DIABETES: ICD-10-CM

## 2021-09-11 DIAGNOSIS — E11.42 TYPE 2 DIABETES MELLITUS WITH DIABETIC POLYNEUROPATHY, WITH LONG-TERM CURRENT USE OF INSULIN: ICD-10-CM

## 2021-09-11 LAB
ANION GAP SERPL CALC-SCNC: 11 MMOL/L (ref 8–16)
BUN SERPL-MCNC: 29 MG/DL (ref 8–23)
CALCIUM SERPL-MCNC: 9.5 MG/DL (ref 8.7–10.5)
CHLORIDE SERPL-SCNC: 105 MMOL/L (ref 95–110)
CO2 SERPL-SCNC: 22 MMOL/L (ref 23–29)
CREAT SERPL-MCNC: 1.1 MG/DL (ref 0.5–1.4)
EST. GFR  (AFRICAN AMERICAN): 52.2 ML/MIN/1.73 M^2
EST. GFR  (NON AFRICAN AMERICAN): 45.3 ML/MIN/1.73 M^2
ESTIMATED AVG GLUCOSE: 180 MG/DL (ref 68–131)
GLUCOSE SERPL-MCNC: 153 MG/DL (ref 70–110)
HBA1C MFR BLD: 7.9 % (ref 4–5.6)
POTASSIUM SERPL-SCNC: 4.1 MMOL/L (ref 3.5–5.1)
SODIUM SERPL-SCNC: 138 MMOL/L (ref 136–145)

## 2021-09-11 PROCEDURE — 36415 COLL VENOUS BLD VENIPUNCTURE: CPT | Mod: HCNC,PO | Performed by: FAMILY MEDICINE

## 2021-09-11 PROCEDURE — 80048 BASIC METABOLIC PNL TOTAL CA: CPT | Mod: HCNC | Performed by: FAMILY MEDICINE

## 2021-09-11 PROCEDURE — 83036 HEMOGLOBIN GLYCOSYLATED A1C: CPT | Mod: HCNC | Performed by: FAMILY MEDICINE

## 2021-09-13 ENCOUNTER — OFFICE VISIT (OUTPATIENT)
Dept: FAMILY MEDICINE | Facility: CLINIC | Age: 86
End: 2021-09-13
Attending: FAMILY MEDICINE
Payer: MEDICARE

## 2021-09-13 VITALS
HEART RATE: 85 BPM | OXYGEN SATURATION: 98 % | DIASTOLIC BLOOD PRESSURE: 70 MMHG | TEMPERATURE: 98 F | SYSTOLIC BLOOD PRESSURE: 110 MMHG | WEIGHT: 192 LBS | BODY MASS INDEX: 30.13 KG/M2 | HEIGHT: 67 IN

## 2021-09-13 DIAGNOSIS — G89.29 CHRONIC BILATERAL LOW BACK PAIN WITHOUT SCIATICA: ICD-10-CM

## 2021-09-13 DIAGNOSIS — E11.69 HYPERLIPIDEMIA ASSOCIATED WITH TYPE 2 DIABETES MELLITUS: ICD-10-CM

## 2021-09-13 DIAGNOSIS — M54.50 CHRONIC BILATERAL LOW BACK PAIN WITHOUT SCIATICA: ICD-10-CM

## 2021-09-13 DIAGNOSIS — Z79.4 TYPE 2 DIABETES MELLITUS WITH DIABETIC POLYNEUROPATHY, WITH LONG-TERM CURRENT USE OF INSULIN: ICD-10-CM

## 2021-09-13 DIAGNOSIS — Z00.00 ENCOUNTER FOR PREVENTIVE HEALTH EXAMINATION: Primary | ICD-10-CM

## 2021-09-13 DIAGNOSIS — E11.42 TYPE 2 DIABETES MELLITUS WITH DIABETIC POLYNEUROPATHY, WITH LONG-TERM CURRENT USE OF INSULIN: ICD-10-CM

## 2021-09-13 DIAGNOSIS — E78.5 HYPERLIPIDEMIA ASSOCIATED WITH TYPE 2 DIABETES MELLITUS: ICD-10-CM

## 2021-09-13 DIAGNOSIS — Z95.3 STATUS POST AORTIC VALVE REPLACEMENT WITH BIOPROSTHETIC VALVE: ICD-10-CM

## 2021-09-13 DIAGNOSIS — D50.9 IRON DEFICIENCY ANEMIA, UNSPECIFIED IRON DEFICIENCY ANEMIA TYPE: ICD-10-CM

## 2021-09-13 DIAGNOSIS — M48.062 SPINAL STENOSIS OF LUMBAR REGION WITH NEUROGENIC CLAUDICATION: ICD-10-CM

## 2021-09-13 DIAGNOSIS — N18.30 STAGE 3 CHRONIC KIDNEY DISEASE, UNSPECIFIED WHETHER STAGE 3A OR 3B CKD: ICD-10-CM

## 2021-09-13 DIAGNOSIS — I15.2 HYPERTENSION ASSOCIATED WITH DIABETES: ICD-10-CM

## 2021-09-13 DIAGNOSIS — D64.9 ACUTE ON CHRONIC ANEMIA: ICD-10-CM

## 2021-09-13 DIAGNOSIS — E03.9 ACQUIRED HYPOTHYROIDISM: ICD-10-CM

## 2021-09-13 DIAGNOSIS — E11.59 HYPERTENSION ASSOCIATED WITH DIABETES: ICD-10-CM

## 2021-09-13 PROCEDURE — 1160F PR REVIEW ALL MEDS BY PRESCRIBER/CLIN PHARMACIST DOCUMENTED: ICD-10-PCS | Mod: HCNC,CPTII,S$GLB, | Performed by: FAMILY MEDICINE

## 2021-09-13 PROCEDURE — 1125F AMNT PAIN NOTED PAIN PRSNT: CPT | Mod: HCNC,CPTII,S$GLB, | Performed by: FAMILY MEDICINE

## 2021-09-13 PROCEDURE — 99397 PR PREVENTIVE VISIT,EST,65 & OVER: ICD-10-PCS | Mod: HCNC,S$GLB,, | Performed by: FAMILY MEDICINE

## 2021-09-13 PROCEDURE — 99999 PR PBB SHADOW E&M-EST. PATIENT-LVL III: ICD-10-PCS | Mod: PBBFAC,HCNC,, | Performed by: FAMILY MEDICINE

## 2021-09-13 PROCEDURE — 99397 PER PM REEVAL EST PAT 65+ YR: CPT | Mod: HCNC,S$GLB,, | Performed by: FAMILY MEDICINE

## 2021-09-13 PROCEDURE — 3288F FALL RISK ASSESSMENT DOCD: CPT | Mod: HCNC,CPTII,S$GLB, | Performed by: FAMILY MEDICINE

## 2021-09-13 PROCEDURE — 1159F PR MEDICATION LIST DOCUMENTED IN MEDICAL RECORD: ICD-10-PCS | Mod: HCNC,CPTII,S$GLB, | Performed by: FAMILY MEDICINE

## 2021-09-13 PROCEDURE — 99999 PR PBB SHADOW E&M-EST. PATIENT-LVL III: CPT | Mod: PBBFAC,HCNC,, | Performed by: FAMILY MEDICINE

## 2021-09-13 PROCEDURE — 1159F MED LIST DOCD IN RCRD: CPT | Mod: HCNC,CPTII,S$GLB, | Performed by: FAMILY MEDICINE

## 2021-09-13 PROCEDURE — 1101F PR PT FALLS ASSESS DOC 0-1 FALLS W/OUT INJ PAST YR: ICD-10-PCS | Mod: HCNC,CPTII,S$GLB, | Performed by: FAMILY MEDICINE

## 2021-09-13 PROCEDURE — 3288F PR FALLS RISK ASSESSMENT DOCUMENTED: ICD-10-PCS | Mod: HCNC,CPTII,S$GLB, | Performed by: FAMILY MEDICINE

## 2021-09-13 PROCEDURE — 99499 UNLISTED E&M SERVICE: CPT | Mod: HCNC,S$GLB,, | Performed by: FAMILY MEDICINE

## 2021-09-13 PROCEDURE — 3051F PR MOST RECENT HEMOGLOBIN A1C LEVEL 7.0 - < 8.0%: ICD-10-PCS | Mod: HCNC,CPTII,S$GLB, | Performed by: FAMILY MEDICINE

## 2021-09-13 PROCEDURE — 1101F PT FALLS ASSESS-DOCD LE1/YR: CPT | Mod: HCNC,CPTII,S$GLB, | Performed by: FAMILY MEDICINE

## 2021-09-13 PROCEDURE — 3051F HG A1C>EQUAL 7.0%<8.0%: CPT | Mod: HCNC,CPTII,S$GLB, | Performed by: FAMILY MEDICINE

## 2021-09-13 PROCEDURE — 1125F PR PAIN SEVERITY QUANTIFIED, PAIN PRESENT: ICD-10-PCS | Mod: HCNC,CPTII,S$GLB, | Performed by: FAMILY MEDICINE

## 2021-09-13 PROCEDURE — 99499 RISK ADDL DX/OHS AUDIT: ICD-10-PCS | Mod: HCNC,S$GLB,, | Performed by: FAMILY MEDICINE

## 2021-09-13 PROCEDURE — 1160F RVW MEDS BY RX/DR IN RCRD: CPT | Mod: HCNC,CPTII,S$GLB, | Performed by: FAMILY MEDICINE

## 2021-10-24 RX ORDER — METFORMIN HYDROCHLORIDE 1000 MG/1
TABLET ORAL
Qty: 180 TABLET | Refills: 1 | Status: SHIPPED | OUTPATIENT
Start: 2021-10-24 | End: 2022-06-13

## 2021-11-09 ENCOUNTER — TELEPHONE (OUTPATIENT)
Dept: FAMILY MEDICINE | Facility: CLINIC | Age: 86
End: 2021-11-09
Payer: MEDICARE

## 2021-11-09 DIAGNOSIS — Z12.31 BREAST CANCER SCREENING BY MAMMOGRAM: Primary | ICD-10-CM

## 2021-11-09 RX ORDER — LISINOPRIL AND HYDROCHLOROTHIAZIDE 10; 12.5 MG/1; MG/1
TABLET ORAL
Qty: 90 TABLET | Refills: 3 | Status: SHIPPED | OUTPATIENT
Start: 2021-11-09 | End: 2022-03-14 | Stop reason: SINTOL

## 2021-12-01 ENCOUNTER — HOSPITAL ENCOUNTER (OUTPATIENT)
Dept: RADIOLOGY | Facility: CLINIC | Age: 86
Discharge: HOME | End: 2021-12-01
Attending: FAMILY MEDICINE
Payer: MEDICARE

## 2021-12-01 DIAGNOSIS — Z12.31 BREAST CANCER SCREENING BY MAMMOGRAM: ICD-10-CM

## 2021-12-01 PROCEDURE — 77067 MAMMO DIGITAL SCREENING BILAT WITH TOMO: ICD-10-PCS | Mod: 26,HCNC,, | Performed by: RADIOLOGY

## 2021-12-01 PROCEDURE — 77063 MAMMO DIGITAL SCREENING BILAT WITH TOMO: ICD-10-PCS | Mod: 26,HCNC,, | Performed by: RADIOLOGY

## 2021-12-01 PROCEDURE — 77063 BREAST TOMOSYNTHESIS BI: CPT | Mod: 26,HCNC,, | Performed by: RADIOLOGY

## 2021-12-01 PROCEDURE — 77067 SCR MAMMO BI INCL CAD: CPT | Mod: TC,HCNC,PO

## 2021-12-01 PROCEDURE — 77067 SCR MAMMO BI INCL CAD: CPT | Mod: 26,HCNC,, | Performed by: RADIOLOGY

## 2021-12-14 ENCOUNTER — TELEPHONE (OUTPATIENT)
Dept: FAMILY MEDICINE | Facility: CLINIC | Age: 86
End: 2021-12-14
Payer: MEDICARE

## 2021-12-22 DIAGNOSIS — E11.9 TYPE 2 DIABETES MELLITUS WITHOUT COMPLICATION: ICD-10-CM

## 2021-12-30 RX ORDER — PEN NEEDLE, DIABETIC 31 GX5/16"
NEEDLE, DISPOSABLE MISCELLANEOUS
Qty: 100 EACH | Refills: 3 | Status: SHIPPED | OUTPATIENT
Start: 2021-12-30 | End: 2023-02-09

## 2022-01-10 ENCOUNTER — TELEPHONE (OUTPATIENT)
Dept: FAMILY MEDICINE | Facility: CLINIC | Age: 87
End: 2022-01-10
Payer: MEDICARE

## 2022-01-10 NOTE — TELEPHONE ENCOUNTER
----- Message from Juan Ortega sent at 1/10/2022  9:09 AM CST -----  Type:  Sooner Apoointment Request    Caller is requesting a sooner appointment.  Caller declined first available appointment listed below.  Caller will not accept being placed on the waitlist and is requesting a message be sent to doctor.    Name of Caller:  Patient  When is the first available appointment? 03/27/22  Symptoms:  Chronic itching  Best Call Back Number:  [phone]  Additional Information:

## 2022-01-10 NOTE — TELEPHONE ENCOUNTER
Patient states she received the flu shot and Covid Booster on the same day in late November. States two weeks later she started itching all over. States she has been using Benadryl and Zyrtec to no avail. Appointment scheduled for tomorrow's date 1-. Patient agreed to appointment date, time, and location. Location confirmed at the time of call.

## 2022-01-11 ENCOUNTER — OFFICE VISIT (OUTPATIENT)
Dept: FAMILY MEDICINE | Facility: CLINIC | Age: 87
End: 2022-01-11
Payer: MEDICARE

## 2022-01-11 VITALS
OXYGEN SATURATION: 98 % | BODY MASS INDEX: 29.93 KG/M2 | HEIGHT: 67 IN | HEART RATE: 73 BPM | DIASTOLIC BLOOD PRESSURE: 70 MMHG | SYSTOLIC BLOOD PRESSURE: 124 MMHG | WEIGHT: 190.69 LBS

## 2022-01-11 DIAGNOSIS — Z79.4 TYPE 2 DIABETES MELLITUS WITH DIABETIC POLYNEUROPATHY, WITH LONG-TERM CURRENT USE OF INSULIN: ICD-10-CM

## 2022-01-11 DIAGNOSIS — L27.0 DERMATITIS DUE TO DRUG: Primary | ICD-10-CM

## 2022-01-11 DIAGNOSIS — E11.42 TYPE 2 DIABETES MELLITUS WITH DIABETIC POLYNEUROPATHY, WITH LONG-TERM CURRENT USE OF INSULIN: ICD-10-CM

## 2022-01-11 DIAGNOSIS — E11.59 HYPERTENSION ASSOCIATED WITH DIABETES: ICD-10-CM

## 2022-01-11 DIAGNOSIS — I15.2 HYPERTENSION ASSOCIATED WITH DIABETES: ICD-10-CM

## 2022-01-11 PROCEDURE — 99214 OFFICE O/P EST MOD 30 MIN: CPT | Mod: HCNC,S$GLB,, | Performed by: FAMILY MEDICINE

## 2022-01-11 PROCEDURE — 3051F HG A1C>EQUAL 7.0%<8.0%: CPT | Mod: HCNC,CPTII,S$GLB, | Performed by: FAMILY MEDICINE

## 2022-01-11 PROCEDURE — 1101F PT FALLS ASSESS-DOCD LE1/YR: CPT | Mod: HCNC,CPTII,S$GLB, | Performed by: FAMILY MEDICINE

## 2022-01-11 PROCEDURE — 1160F PR REVIEW ALL MEDS BY PRESCRIBER/CLIN PHARMACIST DOCUMENTED: ICD-10-PCS | Mod: HCNC,CPTII,S$GLB, | Performed by: FAMILY MEDICINE

## 2022-01-11 PROCEDURE — 99999 PR PBB SHADOW E&M-EST. PATIENT-LVL IV: CPT | Mod: PBBFAC,HCNC,, | Performed by: FAMILY MEDICINE

## 2022-01-11 PROCEDURE — 1159F PR MEDICATION LIST DOCUMENTED IN MEDICAL RECORD: ICD-10-PCS | Mod: HCNC,CPTII,S$GLB, | Performed by: FAMILY MEDICINE

## 2022-01-11 PROCEDURE — 3072F LOW RISK FOR RETINOPATHY: CPT | Mod: HCNC,CPTII,S$GLB, | Performed by: FAMILY MEDICINE

## 2022-01-11 PROCEDURE — 1160F RVW MEDS BY RX/DR IN RCRD: CPT | Mod: HCNC,CPTII,S$GLB, | Performed by: FAMILY MEDICINE

## 2022-01-11 PROCEDURE — 3051F PR MOST RECENT HEMOGLOBIN A1C LEVEL 7.0 - < 8.0%: ICD-10-PCS | Mod: HCNC,CPTII,S$GLB, | Performed by: FAMILY MEDICINE

## 2022-01-11 PROCEDURE — 1159F MED LIST DOCD IN RCRD: CPT | Mod: HCNC,CPTII,S$GLB, | Performed by: FAMILY MEDICINE

## 2022-01-11 PROCEDURE — 99214 PR OFFICE/OUTPT VISIT, EST, LEVL IV, 30-39 MIN: ICD-10-PCS | Mod: HCNC,S$GLB,, | Performed by: FAMILY MEDICINE

## 2022-01-11 PROCEDURE — 1101F PR PT FALLS ASSESS DOC 0-1 FALLS W/OUT INJ PAST YR: ICD-10-PCS | Mod: HCNC,CPTII,S$GLB, | Performed by: FAMILY MEDICINE

## 2022-01-11 PROCEDURE — 3072F PR LOW RISK FOR RETINOPATHY: ICD-10-PCS | Mod: HCNC,CPTII,S$GLB, | Performed by: FAMILY MEDICINE

## 2022-01-11 PROCEDURE — 3288F PR FALLS RISK ASSESSMENT DOCUMENTED: ICD-10-PCS | Mod: HCNC,CPTII,S$GLB, | Performed by: FAMILY MEDICINE

## 2022-01-11 PROCEDURE — 3288F FALL RISK ASSESSMENT DOCD: CPT | Mod: HCNC,CPTII,S$GLB, | Performed by: FAMILY MEDICINE

## 2022-01-11 PROCEDURE — 99999 PR PBB SHADOW E&M-EST. PATIENT-LVL IV: ICD-10-PCS | Mod: PBBFAC,HCNC,, | Performed by: FAMILY MEDICINE

## 2022-01-11 RX ORDER — PREDNISONE 20 MG/1
TABLET ORAL
Qty: 10 TABLET | Refills: 0 | Status: SHIPPED | OUTPATIENT
Start: 2022-01-11 | End: 2022-03-01 | Stop reason: SDUPTHER

## 2022-01-11 NOTE — PROGRESS NOTES
Subjective:       Patient ID: Leslie Tolliver is a 87 y.o. female.    Chief Complaint: No chief complaint on file.    New to me patient here for UC visit.  CC- rash and itching for 6 weeks.  Onset a couple weeks after getting Flu and COVID vaccine on 11/16/21.  Does not recall any other new skin or bath or laundry products or meds changes.  No throat swelling or SOB.  Rash on trunk and arms mostly.    Review of Systems   Constitutional: Negative for fever.   Respiratory: Negative for shortness of breath.    Cardiovascular: Negative for chest pain.   Gastrointestinal: Negative for abdominal pain and nausea.   Endocrine:        Glucoses vary from 90's to 180's    Skin: Negative for rash.   All other systems reviewed and are negative.      Objective:      Physical Exam  Constitutional:       Appearance: She is well-developed and well-nourished.   Eyes:      General: No scleral icterus.     Pupils: Pupils are equal, round, and reactive to light.   Cardiovascular:      Rate and Rhythm: Normal rate and regular rhythm.      Heart sounds: No murmur heard.      Pulmonary:      Effort: Pulmonary effort is normal.      Breath sounds: Normal breath sounds.   Musculoskeletal:         General: No tenderness or edema.      Cervical back: Neck supple.   Lymphadenopathy:      Cervical: No cervical adenopathy.   Skin:     General: Skin is warm and dry.      Comments: Scattered papular lesion on upper arms and trunk with sparing of her back area.         Assessment:       1. Dermatitis due to drug    2. Hypertension associated with diabetes    3. Type 2 diabetes mellitus with diabetic polyneuropathy, with long-term current use of insulin        Plan:       Dermatitis due to drug  -     predniSONE (DELTASONE) 20 MG tablet; One BID for 3 days then once a day orally  Dispense: 10 tablet; Refill: 0    Hypertension associated with diabetes    Type 2 diabetes mellitus with diabetic polyneuropathy, with long-term current use of  insulin      Patient Instructions   Take both Benadryl 25 mg at bedtime and either Zyrtec or Claritin once a day.  Continue for several days AFTER the rash and itching clears.

## 2022-01-11 NOTE — PATIENT INSTRUCTIONS
Take both Benadryl 25 mg at bedtime and either Zyrtec or Claritin once a day.  Continue for several days AFTER the rash and itching clears.

## 2022-01-14 DIAGNOSIS — Z79.4 TYPE 2 DIABETES MELLITUS WITHOUT COMPLICATION, WITH LONG-TERM CURRENT USE OF INSULIN: ICD-10-CM

## 2022-01-14 DIAGNOSIS — Z79.4 TYPE 2 DIABETES MELLITUS WITH DIABETIC POLYNEUROPATHY, WITH LONG-TERM CURRENT USE OF INSULIN: ICD-10-CM

## 2022-01-14 DIAGNOSIS — E11.9 TYPE 2 DIABETES MELLITUS WITHOUT COMPLICATION, WITH LONG-TERM CURRENT USE OF INSULIN: ICD-10-CM

## 2022-01-14 DIAGNOSIS — E11.42 TYPE 2 DIABETES MELLITUS WITH DIABETIC POLYNEUROPATHY, WITH LONG-TERM CURRENT USE OF INSULIN: ICD-10-CM

## 2022-01-14 NOTE — TELEPHONE ENCOUNTER
No new care gaps identified.  Powered by [employer]. Reference number: [id number].   1/14/2022 5:54:45 PM CST

## 2022-01-19 ENCOUNTER — TELEPHONE (OUTPATIENT)
Dept: FAMILY MEDICINE | Facility: CLINIC | Age: 87
End: 2022-01-19
Payer: MEDICARE

## 2022-01-19 RX ORDER — INSULIN GLARGINE 100 [IU]/ML
INJECTION, SOLUTION SUBCUTANEOUS
Qty: 30 ML | Refills: 1 | Status: SHIPPED | OUTPATIENT
Start: 2022-01-19 | End: 2022-10-21

## 2022-01-19 RX ORDER — SITAGLIPTIN 100 MG/1
TABLET, FILM COATED ORAL
Qty: 90 TABLET | Refills: 0 | Status: SHIPPED | OUTPATIENT
Start: 2022-01-19 | End: 2022-05-16

## 2022-01-19 NOTE — TELEPHONE ENCOUNTER
Lantus--LR--5-17-21  Januvia--LR--8-6-21  LOV--1-11-22  FOV--3-14-22    Order for Lantus pended in this encounter to e-scribe to [facility]. (Normal, Disp-30 ml, R-1).   Order for Januvia pended in this encounter to e-scribe to [facility]. (Normal, Disp-90 tablets, R-0). Please advise. Thank you.

## 2022-01-19 NOTE — TELEPHONE ENCOUNTER
----- Message from Felicitas Atkins sent at 1/19/2022 11:16 AM CST -----  Regarding: refill  Contact: Patient  Type:  RX Refill Request    Who Called:  Patient   Refill or New Rx:  Refill  RX Name and Strength:  JANUVIA) 100 MG and LANTUS SOLOSTAR U-100 INSULIN   Is this a 30 day or 90 day RX:  30day  Preferred Pharmacy with phone number:      [facility] - [place], [place] - [address]  [address] [address]  Phone: [phone] Fax: [phone]    Local or Mail Order:  Local  Ordering Provider:  Dr Meghan Mary Call Back Number:  [phone] (home)

## 2022-02-14 ENCOUNTER — TELEPHONE (OUTPATIENT)
Dept: FAMILY MEDICINE | Facility: CLINIC | Age: 87
End: 2022-02-14
Payer: MEDICARE

## 2022-02-14 DIAGNOSIS — R21 GENERALIZED PAPULAR RASH: Primary | ICD-10-CM

## 2022-02-14 NOTE — TELEPHONE ENCOUNTER
Dermatology appointment scheduled for the date of 4-29-22; and placed on wait list for earlier access. Patient agreed to appointment date, time, and location. Location confirmed at the time of call.

## 2022-02-14 NOTE — TELEPHONE ENCOUNTER
Patient has had rash and itching since getting booster and flu shot same day in November. Was seen by Dr. Sam 1/11/22- medications didn't help- has red spots on upper back, arms and legs. Says she is using creams without effectiveness. Worse when she gets under the covers. Sheets are clean. Mattress is old but says hasn't seen any bedbugs. Some fabrics make it worse such as her recliner material and long under wear fabric. Do you want to refer to derm?

## 2022-02-14 NOTE — TELEPHONE ENCOUNTER
----- Message from Sherrie Truong sent at 2/14/2022 10:33 AM CST -----  Contact: pt at [phone]  Type: Needs Medical Advice  Who Called: pt  Best Call Back Number: [phone]  Additional Information: pt is calling the office to speak with someone about terrible itching she has been having since she got her booster shot in November 2021. Please call back and advise

## 2022-03-01 ENCOUNTER — HOSPITAL ENCOUNTER (EMERGENCY)
Facility: HOSPITAL | Age: 87
Discharge: HOME | End: 2022-03-01
Attending: EMERGENCY MEDICINE
Payer: MEDICARE

## 2022-03-01 VITALS
SYSTOLIC BLOOD PRESSURE: 131 MMHG | WEIGHT: 190 LBS | HEART RATE: 83 BPM | HEIGHT: 67 IN | RESPIRATION RATE: 18 BRPM | OXYGEN SATURATION: 100 % | DIASTOLIC BLOOD PRESSURE: 63 MMHG | TEMPERATURE: 98 F | BODY MASS INDEX: 29.82 KG/M2

## 2022-03-01 DIAGNOSIS — R21 RASH: Primary | ICD-10-CM

## 2022-03-01 DIAGNOSIS — L27.0 DERMATITIS DUE TO DRUG: ICD-10-CM

## 2022-03-01 PROCEDURE — 99282 EMERGENCY DEPT VISIT SF MDM: CPT

## 2022-03-01 RX ORDER — PREDNISONE 50 MG/1
TABLET ORAL
Qty: 5 TABLET | Refills: 0 | Status: SHIPPED | OUTPATIENT
Start: 2022-03-01 | End: 2022-04-20

## 2022-03-01 RX ORDER — DIPHENHYDRAMINE HCL 25 MG
50 CAPSULE ORAL
Status: DISCONTINUED | OUTPATIENT
Start: 2022-03-01 | End: 2022-03-01 | Stop reason: HOSPADM

## 2022-03-01 RX ORDER — PREDNISONE 20 MG/1
60 TABLET ORAL
Status: DISCONTINUED | OUTPATIENT
Start: 2022-03-01 | End: 2022-03-01 | Stop reason: HOSPADM

## 2022-03-01 NOTE — DISCHARGE INSTRUCTIONS
Please call the dermatologist and see if you can get an earlier appointment if there has been any cancellations.  Take Benadryl 50 mg every 6 hours for itching take the prednisone as directed.  Return for hives difficulty breathing shortness of breath.

## 2022-03-01 NOTE — ED PROVIDER NOTES
Encounter Date: 3/1/2022       History     Chief Complaint   Patient presents with    Rash     Pt presents to ED with c/o itchy rash all over body that has gotten progressively worse.      Chief complaint is rash.  This patient states she has had a rash that started on November 16 after she got a COVID shots.  She actually states it started 2 weeks after her COVID shots.  He was a pink raised rash that she has been treated for recent with prednisone Zyrtec and Benadryl which helped with the medicines have been completed and now the symptoms are continuing and has returned.  We went through a broad of questions to try to find out what this could be as far as sheets soaps new clothes etc.  and we really cannot find a good reason why she is having this rash.        Review of patient's allergies indicates:   Allergen Reactions    Fenofibric acid (choline)      Other reaction(s): Vomiting    Iodine      Other reaction(s): lips swollen ivp dye    Rosuvastatin      Other reaction(s): muscle pain     Past Medical History:   Diagnosis Date    Anemia due to stage 3 chronic kidney disease 7/24/2019    Arthritis     Chronic bilateral low back pain without sciatica 7/24/2019    CKD (chronic kidney disease) stage 3, GFR 30-59 ml/min 7/24/2019    Colon polyps     Coronary artery disease     Diabetes mellitus type II     Diabetes with neurologic complications     GERD (gastroesophageal reflux disease)     Hyperlipidemia     Hypertension     Hypothyroidism     Type 2 diabetes mellitus      Past Surgical History:   Procedure Laterality Date    ADENOIDECTOMY      AORTIC VALVE REPLACEMENT      BREAST BIOPSY Right 20 yrs ago    benign    CARDIAC SURGERY      CHOLECYSTECTOMY      COLONOSCOPY  5-    Dr Holly, 5 year recheck    EPIDURAL STEROID INJECTION N/A 3/25/2021    Procedure: Injection, Steroid, Epidural L3-4;  Surgeon: Sky Love MD;  Location: [facility] OR;  Service: Pain Management;  Laterality: N/A;   Injection, Steroid, Epidural L3-4    EPIDURAL STEROID INJECTION N/A 2021    Procedure: Injection, Steroid, Epidural L3-4;  Surgeon: Sky Love MD;  Location: [facility] OR;  Service: Pain Management;  Laterality: N/A;  Injection, Steroid, Epidural L3-4    ESOPHAGOGASTRODUODENOSCOPY N/A 2020    Procedure: EGD (ESOPHAGOGASTRODUODENOSCOPY);  Surgeon: Michael Nugent III, MD;  Location: [facility] ENDO;  Service: Endoscopy;  Laterality: N/A;    HEMORRHOID SURGERY      HYSTERECTOMY      OOPHORECTOMY      TONSILLECTOMY       Family History   Problem Relation Age of Onset    Diabetes Mother     Heart disease Mother     Glaucoma Mother     Cancer Father         lung cancer    Early death Son         brain tumor age 3    Diabetes Brother     No Known Problems Daughter     Early death Son         MVA 25    Macular degeneration Neg Hx     Retinal detachment Neg Hx      Social History     Tobacco Use    Smoking status: Former Smoker     Packs/day: 0.25     Years: 15.00     Pack years: 3.75     Types: Cigarettes     Quit date: 3/30/1974     Years since quittin.9    Smokeless tobacco: Never Used   Substance Use Topics    Alcohol use: No    Drug use: No     Review of Systems   Constitutional: Negative for chills and fever.   HENT: Negative for ear pain, rhinorrhea and sore throat.    Eyes: Negative for pain and visual disturbance.   Respiratory: Negative for cough and shortness of breath.    Cardiovascular: Negative for chest pain and palpitations.   Gastrointestinal: Negative for abdominal pain, constipation, diarrhea, nausea and vomiting.   Genitourinary: Negative for dysuria, frequency, hematuria and urgency.   Musculoskeletal: Negative for back pain, joint swelling and myalgias.   Skin: Positive for rash.   Neurological: Negative for dizziness, seizures, weakness and headaches.   Psychiatric/Behavioral: Negative for dysphoric mood. The patient is not nervous/anxious.        Physical Exam     Initial  Vitals [03/01/22 0248]   BP Pulse Resp Temp SpO2   (!) 142/89 83 16 98.2 °F (36.8 °C) 98 %      MAP       --         Physical Exam    Nursing note and vitals reviewed.  Constitutional: She appears well-developed and well-nourished.   HENT:   Head: Normocephalic and atraumatic.   Nose: Nose normal.   Eyes: Conjunctivae, EOM and lids are normal. Pupils are equal, round, and reactive to light.   Neck: Trachea normal. Neck supple. No thyroid mass and no thyromegaly present.   Normal range of motion.  Cardiovascular: Normal rate, regular rhythm and normal heart sounds.   Pulmonary/Chest: Effort normal and breath sounds normal.   Abdominal: Abdomen is soft. There is no abdominal tenderness.   Musculoskeletal:         General: Normal range of motion.      Cervical back: Normal range of motion and neck supple.     Neurological: She is alert and oriented to person, place, and time. She has normal strength and normal reflexes. No cranial nerve deficit or sensory deficit.   Skin: Skin is warm and dry.   Patient with slightly raised pink rash to the right anterior shoulder upper chest in the neck area forearms and thighs.  None to the legs abdomen back.  It is pruritic in nature.  No hives noted no respiratory distress   Psychiatric: She has a normal mood and affect. Her speech is normal and behavior is normal. Judgment and thought content normal.         ED Course   Procedures  Labs Reviewed - No data to display       Imaging Results    None          Medications   diphenhydrAMINE capsule 50 mg (has no administration in time range)   predniSONE tablet 60 mg (has no administration in time range)     Medical Decision Making:   ED Management:  The patient has a chronic rash.  It did improve with steroids in the past.  I will give her another dose of steroids and have the patient referred to the dermatologist.  I will instruct the patient to take Benadryl 50 mg every 6 hours as well                      Clinical Impression:   Final  diagnoses:  [R21] Rash (Primary)          ED Disposition Condition    Discharge Stable        ED Prescriptions     Medication Sig Dispense Start Date End Date Auth. Provider    predniSONE (DELTASONE) 50 MG Tab One BID for 3 days then once a day orally 5 tablet 3/1/2022  Elba Simpson MD        Follow-up Information     Follow up With Specialties Details Why Contact Info    Chang Christianson MD Family Medicine Schedule an appointment as soon as possible for a visit in 3 days  [address]  [place] [address]  [phone]             Elba Simpson MD  03/01/22 0329

## 2022-03-04 DIAGNOSIS — E11.59 HYPERTENSION ASSOCIATED WITH DIABETES: ICD-10-CM

## 2022-03-04 DIAGNOSIS — I10 ESSENTIAL HYPERTENSION: ICD-10-CM

## 2022-03-04 DIAGNOSIS — R19.7 DIARRHEA, UNSPECIFIED TYPE: ICD-10-CM

## 2022-03-04 DIAGNOSIS — I15.2 HYPERTENSION ASSOCIATED WITH DIABETES: ICD-10-CM

## 2022-03-04 NOTE — TELEPHONE ENCOUNTER
Care Due:                  Date            Visit Type   Department     Provider  --------------------------------------------------------------------------------                                SAME DAY -                              ESTABLISHED   [facility]  Last Visit: 01-      PATIENT      MEDICINE       Chang Sam                              EP -                              PRIMARY      [facility]  Next Visit: 03-      CARE ([facility])   PRACTICE       Chang Christianson                                                            Last  Test          Frequency    Reason                     Performed    Due Date  --------------------------------------------------------------------------------    CMP.........  12 months..  cholestyramine-aspartame.  02- 02-    HBA1C.......  6 months...  JANUVIA, insulin,          09-   03-                             metFORMIN................    Lipid Panel.  12 months..  cholestyramine-aspartame.  02- 02-    Powered by [employer]. Reference number: [id number].   3/04/2022 3:30:44 PM CST

## 2022-03-06 RX ORDER — CHOLESTYRAMINE 4 G/5.5G
POWDER, FOR SUSPENSION ORAL
Qty: 30 PACKET | Refills: 11 | Status: SHIPPED | OUTPATIENT
Start: 2022-03-06 | End: 2023-10-09 | Stop reason: SDUPTHER

## 2022-03-06 RX ORDER — CARVEDILOL 6.25 MG/1
TABLET ORAL
Qty: 180 TABLET | Refills: 3 | Status: SHIPPED | OUTPATIENT
Start: 2022-03-06 | End: 2022-03-14 | Stop reason: SDUPTHER

## 2022-03-11 ENCOUNTER — LAB VISIT (OUTPATIENT)
Dept: LAB | Facility: HOSPITAL | Age: 87
End: 2022-03-11
Attending: FAMILY MEDICINE
Payer: MEDICARE

## 2022-03-11 DIAGNOSIS — E11.59 HYPERTENSION ASSOCIATED WITH DIABETES: ICD-10-CM

## 2022-03-11 DIAGNOSIS — D64.9 ACUTE ON CHRONIC ANEMIA: ICD-10-CM

## 2022-03-11 DIAGNOSIS — D50.9 IRON DEFICIENCY ANEMIA, UNSPECIFIED IRON DEFICIENCY ANEMIA TYPE: ICD-10-CM

## 2022-03-11 DIAGNOSIS — E11.69 HYPERLIPIDEMIA ASSOCIATED WITH TYPE 2 DIABETES MELLITUS: ICD-10-CM

## 2022-03-11 DIAGNOSIS — I15.2 HYPERTENSION ASSOCIATED WITH DIABETES: ICD-10-CM

## 2022-03-11 DIAGNOSIS — E11.42 TYPE 2 DIABETES MELLITUS WITH DIABETIC POLYNEUROPATHY, WITH LONG-TERM CURRENT USE OF INSULIN: ICD-10-CM

## 2022-03-11 DIAGNOSIS — Z79.4 TYPE 2 DIABETES MELLITUS WITH DIABETIC POLYNEUROPATHY, WITH LONG-TERM CURRENT USE OF INSULIN: ICD-10-CM

## 2022-03-11 DIAGNOSIS — N18.30 STAGE 3 CHRONIC KIDNEY DISEASE, UNSPECIFIED WHETHER STAGE 3A OR 3B CKD: ICD-10-CM

## 2022-03-11 DIAGNOSIS — E03.9 ACQUIRED HYPOTHYROIDISM: ICD-10-CM

## 2022-03-11 DIAGNOSIS — E78.5 HYPERLIPIDEMIA ASSOCIATED WITH TYPE 2 DIABETES MELLITUS: ICD-10-CM

## 2022-03-11 DIAGNOSIS — Z00.00 ENCOUNTER FOR PREVENTIVE HEALTH EXAMINATION: ICD-10-CM

## 2022-03-11 LAB
ALBUMIN SERPL BCP-MCNC: 3.8 G/DL (ref 3.5–5.2)
ALP SERPL-CCNC: 82 U/L (ref 55–135)
ALT SERPL W/O P-5'-P-CCNC: 22 U/L (ref 10–44)
ANION GAP SERPL CALC-SCNC: 11 MMOL/L (ref 8–16)
AST SERPL-CCNC: 17 U/L (ref 10–40)
BASOPHILS # BLD AUTO: 0.07 K/UL (ref 0–0.2)
BASOPHILS NFR BLD: 1 % (ref 0–1.9)
BILIRUB SERPL-MCNC: 1.1 MG/DL (ref 0.1–1)
BUN SERPL-MCNC: 25 MG/DL (ref 8–23)
CALCIUM SERPL-MCNC: 9.3 MG/DL (ref 8.7–10.5)
CHLORIDE SERPL-SCNC: 105 MMOL/L (ref 95–110)
CHOLEST SERPL-MCNC: 132 MG/DL (ref 120–199)
CHOLEST/HDLC SERPL: 4 {RATIO} (ref 2–5)
CO2 SERPL-SCNC: 24 MMOL/L (ref 23–29)
CREAT SERPL-MCNC: 1.2 MG/DL (ref 0.5–1.4)
DIFFERENTIAL METHOD: ABNORMAL
EOSINOPHIL # BLD AUTO: 0.6 K/UL (ref 0–0.5)
EOSINOPHIL NFR BLD: 8.3 % (ref 0–8)
ERYTHROCYTE [DISTWIDTH] IN BLOOD BY AUTOMATED COUNT: 14.1 % (ref 11.5–14.5)
EST. GFR  (AFRICAN AMERICAN): 47 ML/MIN/1.73 M^2
EST. GFR  (NON AFRICAN AMERICAN): 40.7 ML/MIN/1.73 M^2
ESTIMATED AVG GLUCOSE: 232 MG/DL (ref 68–131)
GLUCOSE SERPL-MCNC: 183 MG/DL (ref 70–110)
HBA1C MFR BLD: 9.7 % (ref 4–5.6)
HCT VFR BLD AUTO: 39.9 % (ref 37–48.5)
HDLC SERPL-MCNC: 33 MG/DL (ref 40–75)
HDLC SERPL: 25 % (ref 20–50)
HGB BLD-MCNC: 12.3 G/DL (ref 12–16)
IMM GRANULOCYTES # BLD AUTO: 0.02 K/UL (ref 0–0.04)
IMM GRANULOCYTES NFR BLD AUTO: 0.3 % (ref 0–0.5)
LDLC SERPL CALC-MCNC: 54.4 MG/DL (ref 63–159)
LYMPHOCYTES # BLD AUTO: 1.1 K/UL (ref 1–4.8)
LYMPHOCYTES NFR BLD: 15.8 % (ref 18–48)
MCH RBC QN AUTO: 29.8 PG (ref 27–31)
MCHC RBC AUTO-ENTMCNC: 30.8 G/DL (ref 32–36)
MCV RBC AUTO: 97 FL (ref 82–98)
MONOCYTES # BLD AUTO: 0.6 K/UL (ref 0.3–1)
MONOCYTES NFR BLD: 8.5 % (ref 4–15)
NEUTROPHILS # BLD AUTO: 4.7 K/UL (ref 1.8–7.7)
NEUTROPHILS NFR BLD: 66.1 % (ref 38–73)
NONHDLC SERPL-MCNC: 99 MG/DL
NRBC BLD-RTO: 0 /100 WBC
PLATELET # BLD AUTO: 222 K/UL (ref 150–450)
PMV BLD AUTO: 11.3 FL (ref 9.2–12.9)
POTASSIUM SERPL-SCNC: 4.1 MMOL/L (ref 3.5–5.1)
PROT SERPL-MCNC: 6.7 G/DL (ref 6–8.4)
RBC # BLD AUTO: 4.13 M/UL (ref 4–5.4)
SODIUM SERPL-SCNC: 140 MMOL/L (ref 136–145)
TRIGL SERPL-MCNC: 223 MG/DL (ref 30–150)
TSH SERPL DL<=0.005 MIU/L-ACNC: 2.47 UIU/ML (ref 0.4–4)
WBC # BLD AUTO: 7.08 K/UL (ref 3.9–12.7)

## 2022-03-11 PROCEDURE — 80061 LIPID PANEL: CPT | Performed by: FAMILY MEDICINE

## 2022-03-11 PROCEDURE — 84443 ASSAY THYROID STIM HORMONE: CPT | Performed by: FAMILY MEDICINE

## 2022-03-11 PROCEDURE — 36415 COLL VENOUS BLD VENIPUNCTURE: CPT | Mod: PO | Performed by: FAMILY MEDICINE

## 2022-03-11 PROCEDURE — 85025 COMPLETE CBC W/AUTO DIFF WBC: CPT | Performed by: FAMILY MEDICINE

## 2022-03-11 PROCEDURE — 83036 HEMOGLOBIN GLYCOSYLATED A1C: CPT | Performed by: FAMILY MEDICINE

## 2022-03-11 PROCEDURE — 80053 COMPREHEN METABOLIC PANEL: CPT | Performed by: FAMILY MEDICINE

## 2022-03-14 ENCOUNTER — TELEPHONE (OUTPATIENT)
Dept: FAMILY MEDICINE | Facility: CLINIC | Age: 87
End: 2022-03-14

## 2022-03-14 ENCOUNTER — OFFICE VISIT (OUTPATIENT)
Dept: FAMILY MEDICINE | Facility: CLINIC | Age: 87
End: 2022-03-14
Attending: FAMILY MEDICINE
Payer: MEDICARE

## 2022-03-14 VITALS
TEMPERATURE: 98 F | RESPIRATION RATE: 18 BRPM | BODY MASS INDEX: 30.42 KG/M2 | OXYGEN SATURATION: 97 % | HEIGHT: 67 IN | SYSTOLIC BLOOD PRESSURE: 123 MMHG | DIASTOLIC BLOOD PRESSURE: 72 MMHG | WEIGHT: 193.81 LBS | HEART RATE: 101 BPM

## 2022-03-14 DIAGNOSIS — E11.59 HYPERTENSION ASSOCIATED WITH DIABETES: ICD-10-CM

## 2022-03-14 DIAGNOSIS — Z79.4 CONTROLLED TYPE 2 DIABETES MELLITUS WITH HYPERGLYCEMIA, WITH LONG-TERM CURRENT USE OF INSULIN: ICD-10-CM

## 2022-03-14 DIAGNOSIS — N18.32 STAGE 3B CHRONIC KIDNEY DISEASE: ICD-10-CM

## 2022-03-14 DIAGNOSIS — E11.65 CONTROLLED TYPE 2 DIABETES MELLITUS WITH HYPERGLYCEMIA, WITH LONG-TERM CURRENT USE OF INSULIN: ICD-10-CM

## 2022-03-14 DIAGNOSIS — I15.2 HYPERTENSION ASSOCIATED WITH DIABETES: ICD-10-CM

## 2022-03-14 DIAGNOSIS — L30.9 DERMATITIS: Primary | ICD-10-CM

## 2022-03-14 PROCEDURE — 99214 PR OFFICE/OUTPT VISIT, EST, LEVL IV, 30-39 MIN: ICD-10-PCS | Mod: S$GLB,,, | Performed by: FAMILY MEDICINE

## 2022-03-14 PROCEDURE — 3288F PR FALLS RISK ASSESSMENT DOCUMENTED: ICD-10-PCS | Mod: CPTII,S$GLB,, | Performed by: FAMILY MEDICINE

## 2022-03-14 PROCEDURE — 99999 PR PBB SHADOW E&M-EST. PATIENT-LVL V: ICD-10-PCS | Mod: PBBFAC,,, | Performed by: FAMILY MEDICINE

## 2022-03-14 PROCEDURE — 1160F PR REVIEW ALL MEDS BY PRESCRIBER/CLIN PHARMACIST DOCUMENTED: ICD-10-PCS | Mod: CPTII,S$GLB,, | Performed by: FAMILY MEDICINE

## 2022-03-14 PROCEDURE — 1125F PR PAIN SEVERITY QUANTIFIED, PAIN PRESENT: ICD-10-PCS | Mod: CPTII,S$GLB,, | Performed by: FAMILY MEDICINE

## 2022-03-14 PROCEDURE — 1125F AMNT PAIN NOTED PAIN PRSNT: CPT | Mod: CPTII,S$GLB,, | Performed by: FAMILY MEDICINE

## 2022-03-14 PROCEDURE — 3288F FALL RISK ASSESSMENT DOCD: CPT | Mod: CPTII,S$GLB,, | Performed by: FAMILY MEDICINE

## 2022-03-14 PROCEDURE — 99214 OFFICE O/P EST MOD 30 MIN: CPT | Mod: S$GLB,,, | Performed by: FAMILY MEDICINE

## 2022-03-14 PROCEDURE — 99999 PR PBB SHADOW E&M-EST. PATIENT-LVL V: CPT | Mod: PBBFAC,,, | Performed by: FAMILY MEDICINE

## 2022-03-14 PROCEDURE — 1160F RVW MEDS BY RX/DR IN RCRD: CPT | Mod: CPTII,S$GLB,, | Performed by: FAMILY MEDICINE

## 2022-03-14 PROCEDURE — 1101F PT FALLS ASSESS-DOCD LE1/YR: CPT | Mod: CPTII,S$GLB,, | Performed by: FAMILY MEDICINE

## 2022-03-14 PROCEDURE — 3072F LOW RISK FOR RETINOPATHY: CPT | Mod: CPTII,S$GLB,, | Performed by: FAMILY MEDICINE

## 2022-03-14 PROCEDURE — 3072F PR LOW RISK FOR RETINOPATHY: ICD-10-PCS | Mod: CPTII,S$GLB,, | Performed by: FAMILY MEDICINE

## 2022-03-14 PROCEDURE — 1159F PR MEDICATION LIST DOCUMENTED IN MEDICAL RECORD: ICD-10-PCS | Mod: CPTII,S$GLB,, | Performed by: FAMILY MEDICINE

## 2022-03-14 PROCEDURE — 1101F PR PT FALLS ASSESS DOC 0-1 FALLS W/OUT INJ PAST YR: ICD-10-PCS | Mod: CPTII,S$GLB,, | Performed by: FAMILY MEDICINE

## 2022-03-14 PROCEDURE — 1159F MED LIST DOCD IN RCRD: CPT | Mod: CPTII,S$GLB,, | Performed by: FAMILY MEDICINE

## 2022-03-14 RX ORDER — LISINOPRIL 20 MG/1
20 TABLET ORAL DAILY
Qty: 90 TABLET | Refills: 1 | Status: SHIPPED | OUTPATIENT
Start: 2022-03-14 | End: 2022-09-14

## 2022-03-14 RX ORDER — CARVEDILOL 6.25 MG/1
6.25 TABLET ORAL 2 TIMES DAILY WITH MEALS
Qty: 180 TABLET | Refills: 3 | Status: SHIPPED | OUTPATIENT
Start: 2022-03-14 | End: 2023-06-27 | Stop reason: SDUPTHER

## 2022-03-14 NOTE — TELEPHONE ENCOUNTER
referred patient to derm for rash-onset 2 months ago- any way to get patient in for appt sooner than 4/29 with . Patient can see any provider.

## 2022-03-14 NOTE — PROGRESS NOTES
Subjective:       Patient ID: Leslie Tolliver is a 87 y.o. female.    Chief Complaint: Diabetes (Pt states that she is here for her 6 mo follow up ) and Rash (Pt states that she has a rash all over her body, pt states that  she received her booster/flu shot on same day different arms, pt states that 2 weeks later she began to itch, patient states that the rash is painful and she had already went to hospital and they ruled out (shingles, food allergy, they told her it was a drug allergy)treated with prednislone)    87-year-old female comes in because of an elevated A1c of 9.7 on her recent lab work.  She reports that she had her flu shot and her 3rd Pfizer COVID booster 2021. Approximately two weeks later she broke out with generalized rash particularly bad over the arms that was very pruritic.  She saw Dr. Sam and 2022 who put her on prednisone 20 mg for three days which gave her relief of the rash but it recurred after the prednisone wore off.  She later went to the emergency room at [facility] on 2022 where Dr. Simpson put her on prednisone 50 mg daily for three days which again gave her temporary relief.  She has an appointment coming up with Dr. Mills at the end of April but could not get in any earlier.  She has continued taking her metformin, Januvia, and Lantus insulin in spite of being suspicious that the rash was due to the Lantus.  It seems to have come out a little late to be related to the vaccinations but she has no other new medications or change in diet or exposures to skin care products that would explain the rash.  She is taking lisinopril with HCT so this could be a sulfa related photo dermatitis.  She needs a refill on her Coreg 6.25 mg b.i.d., her former prescription has .    Past Medical History:  2019: Anemia due to stage 3 chronic kidney disease  No date: Arthritis  2019: Chronic bilateral low back pain without sciatica  2019: CKD  "(chronic kidney disease) stage 3, GFR 30-59 ml/min  No date: Colon polyps  No date: Coronary artery disease  No date: Diabetes mellitus type II  No date: Diabetes with neurologic complications  No date: GERD (gastroesophageal reflux disease)  No date: Hyperlipidemia  No date: Hypertension  No date: Hypothyroidism  No date: Type 2 diabetes mellitus    Past Surgical History:  No date: ADENOIDECTOMY  No date: AORTIC VALVE REPLACEMENT  20 yrs ago: BREAST BIOPSY; Right      Comment:  benign  No date: CARDIAC SURGERY  No date: CHOLECYSTECTOMY  5-: COLONOSCOPY      Comment:  Dr Holly, 5 year recheck  3/25/2021: EPIDURAL STEROID INJECTION; N/A      Comment:  Procedure: Injection, Steroid, Epidural L3-4;  Surgeon:                Sky Love MD;  Location: [facility];  Service: Pain                Management;  Laterality: N/A;  Injection, Steroid,                Epidural L3-4  5/20/2021: EPIDURAL STEROID INJECTION; N/A      Comment:  Procedure: Injection, Steroid, Epidural L3-4;  Surgeon:                Sky Love MD;  [facility];  Service: Pain                Management;  Laterality: N/A;  Injection, Steroid,                Epidural L3-4  6/4/2020: ESOPHAGOGASTRODUODENOSCOPY; N/A      Comment:  Procedure: EGD (ESOPHAGOGASTRODUODENOSCOPY);  Surgeon:                Michael Nugent III, MD;  Location: [facility];                 Service: Endoscopy;  Laterality: N/A;  No date: HEMORRHOID SURGERY  No date: HYSTERECTOMY  No date: OOPHORECTOMY  No date: TONSILLECTOMY    Current Outpatient Medications on File Prior to Visit:  aspirin 81 mg Tab, Take 81 mg by mouth once daily. Every day, Disp: , Rfl:   blood sugar diagnostic (ACCU-CHEK GUIDE TEST STRIPS) Strp, 300 each by Misc.(Non-Drug; Combo Route) route 3 (three) times daily., Disp: 300 each, Rfl: 3  blood-glucose meter kit, Accu-chek Marley glucometer check glucose three times daily E11.65, Disp: 1 each, Rfl: 0  DROPLET PEN NEEDLE 31 gauge x 5/16" Ndle, USE WITH " LANTUS SOLOSTAR PEN AS NEEDED, Disp: 100 each, Rfl: 3  insulin (LANTUS SOLOSTAR U-100 INSULIN) glargine 100 units/mL (3mL) SubQ pen, INJECT 24 UNITS SUBCUTANEOUSLY EVERY EVENING, Disp: 30 mL, Rfl: 1  JANUVIA 100 mg Tab, TAKE 1 TABLET EVERY DAY, Disp: 90 tablet, Rfl: 0  lancets (ACCU-CHEK SOFTCLIX LANCETS) Misc, New machine Guide Me Accuchek, Disp: 300 each, Rfl: 3  metFORMIN (GLUCOPHAGE) 1000 MG tablet, TAKE ONE TABLET BY MOUTH TWICE DAILY WITH MEALS., Disp: 180 tablet, Rfl: 1  multivitamin (THERAGRAN) per tablet, Take 1 tablet by mouth once daily., Disp: , Rfl:   predniSONE (DELTASONE) 50 MG Tab, One BID for 3 days then once a day orally, Disp: 5 tablet, Rfl: 0  PREVALITE 4 gram PwPk, TAKE 1 PACKET (4 G TOTAL) BY MOUTH ONCE DAILY., Disp: 30 packet, Rfl: 11  carvediloL (COREG) 6.25 MG tablet, TAKE ONE TABLET BY MOUTH TWICE DAILY WITH MEALS., Disp: 180 tablet, Rfl: 3  lisinopriL-hydrochlorothiazide (PRINZIDE,ZESTORETIC) 10-12.5 mg per tablet, TAKE ONE TABLET BY MOUTH ONCE DAILY, Disp: 90 tablet, Rfl: 3  calcium carbonate/vitamin D3 (CALCIUM+D ORAL), Take 1 tablet by mouth once daily., Disp: , Rfl:     No current facility-administered medications on file prior to visit.        Review of Systems   Constitutional: Negative for activity change, appetite change, chills, fever and unexpected weight change.   Skin: Positive for color change and rash.       Objective:      Physical Exam  Vitals and nursing note reviewed.   Constitutional:       General: She is not in acute distress.     Appearance: Normal appearance. She is obese. She is not ill-appearing, toxic-appearing or diaphoretic.      Comments: Good blood pressure control  Obese with a BMI of 30.4 she is up 1.8 lb since her last visit September 13, 2021   Skin:     Findings: Rash (Erythematous macular papular rash with frequent excoriations predominantly over the upper extremities with occasional lesions over the chest and abdomen, back, and lower extremities.  There  is some mild erythema around the neck as well.) present.   Neurological:      Mental Status: She is alert.         Assessment:       1. Dermatitis    2. Controlled type 2 diabetes mellitus with hyperglycemia, with long-term current use of insulin    3. Hypertension associated with diabetes    4. Stage 3b chronic kidney disease    5. BMI 30.0-30.9,adult        Plan:       1. Dermatitis  Suspected drug related, possibly due to one or both of the vaccines both of which were preservative-free.  Will discontinue the HCTZ from her lisinopril HCT 10-12.5 and replace it with lisinopril 20 mg daily.  Recheck blood pressure and rash in four weeks  Will attempt to get her dermatology appointment moved up to an earlier date, call is in to the department  - Ambulatory referral/consult to Dermatology; Future    2. Controlled type 2 diabetes mellitus with hyperglycemia, with long-term current use of insulin  Poorly controlled at present, the prednisone undoubtedly plate a small part but should not of made that much of a difference in the A1c even with the higher 50 mg dose for three days.  I suspect the rash itself may be plain more of a part.  No changes on medications until response to holding the HCT is seen and consult with dermatology.  I do not want to increase a medication that might be causing the rash    3. Hypertension associated with diabetes  See above  - carvediloL (COREG) 6.25 MG tablet; Take 1 tablet (6.25 mg total) by mouth 2 (two) times daily with meals.  Dispense: 180 tablet; Refill: 3  - lisinopriL (PRINIVIL,ZESTRIL) 20 MG tablet; Take 1 tablet (20 mg total) by mouth once daily.  Dispense: 90 tablet; Refill: 1    4. Stage 3b chronic kidney disease  BMP  Lab Results   Component Value Date     03/11/2022    K 4.1 03/11/2022     03/11/2022    CO2 24 03/11/2022    BUN 25 (H) 03/11/2022    CREATININE 1.2 03/11/2022    CALCIUM 9.3 03/11/2022    ANIONGAP 11 03/11/2022    ESTGFRAFRICA 47.0 (A) 03/11/2022     EGFRNONAA 40.7 (A) 03/11/2022         5. BMI 30.0-30.9,adult

## 2022-03-15 ENCOUNTER — TELEPHONE (OUTPATIENT)
Dept: FAMILY MEDICINE | Facility: CLINIC | Age: 87
End: 2022-03-15
Payer: MEDICARE

## 2022-03-15 NOTE — TELEPHONE ENCOUNTER
----- Message from Ruth Mathis sent at 3/15/2022  2:17 PM CDT -----  Type: Needs Medical Advice  Who Called:  Janett lior Mares  Best Call Back Number:   Additional Information: Calling to  additional information faxed over to them regarding the patient's orders. Fax number is . They are requesting that the patient's name and , providers's npi number and the reference number [id number] be included on the cover sheet.

## 2022-03-15 NOTE — TELEPHONE ENCOUNTER
Called number and it was not [employer] it was about a promotional order. I do not know what orders they are talking about.

## 2022-03-21 ENCOUNTER — TELEPHONE (OUTPATIENT)
Dept: FAMILY MEDICINE | Facility: CLINIC | Age: 87
End: 2022-03-21
Payer: MEDICARE

## 2022-03-21 NOTE — TELEPHONE ENCOUNTER
----- Message from Taylor Blancas MA sent at 3/21/2022 12:29 PM CDT -----  Type: Needs Medical Advice  Who Called:  pt  Best Call Back Number: [phone] (home)     Additional Information: pt would like to speak with Sophia regarding itching--please advise--thank you

## 2022-03-22 ENCOUNTER — TELEPHONE (OUTPATIENT)
Dept: FAMILY MEDICINE | Facility: CLINIC | Age: 87
End: 2022-03-22
Payer: MEDICARE

## 2022-03-22 NOTE — TELEPHONE ENCOUNTER
----- Message from Inésbryce Liz sent at 3/22/2022 12:55 PM CDT -----  Contact: pt at [phone]  Type: Needs Medical Advice  Who Called:  pt  Best Call Back Number: [phone]  Additional Information: pt is calling the office to speak with Sophia in regards to the Rx of lisinopriL (PRINIVIL,ZESTRIL) 20 MG tablet as the pt has realized she is allergic to the medication. The pt has been having rashes and itching and wants to know what else she can take. Please call back and advise.

## 2022-03-23 NOTE — TELEPHONE ENCOUNTER
We just started the lisinopril because I thought she was probably reacting to the hydrochlorothiazide that was in the lisinopril HCT.  She really has not had time for that rash to have cleared but if she wants to get off the lisinopril just stop it.  Keep an eye on the blood pressure if it goes up we will have to try something new but if the rash from this has not gone away she can not blame it on the new medicine

## 2022-03-30 ENCOUNTER — TELEPHONE (OUTPATIENT)
Dept: FAMILY MEDICINE | Facility: CLINIC | Age: 87
End: 2022-03-30
Payer: MEDICARE

## 2022-03-30 NOTE — TELEPHONE ENCOUNTER
----- Message from Colleen Cintron sent at 3/30/2022  1:18 PM CDT -----  Contact: Pt  Type: Needs Medical Advice    Who Called:  Pt    Best Call Back Number: [phone]    Additional Information: Please call back regarding derm appt.  Thanks.

## 2022-04-02 ENCOUNTER — HOSPITAL ENCOUNTER (EMERGENCY)
Facility: HOSPITAL | Age: 87
Discharge: HOME | End: 2022-04-02
Attending: EMERGENCY MEDICINE
Payer: MEDICARE

## 2022-04-02 VITALS
OXYGEN SATURATION: 96 % | BODY MASS INDEX: 28.98 KG/M2 | RESPIRATION RATE: 18 BRPM | TEMPERATURE: 98 F | DIASTOLIC BLOOD PRESSURE: 77 MMHG | HEART RATE: 90 BPM | SYSTOLIC BLOOD PRESSURE: 158 MMHG | WEIGHT: 185 LBS

## 2022-04-02 DIAGNOSIS — R21 RASH: Primary | ICD-10-CM

## 2022-04-02 PROCEDURE — 63600175 PHARM REV CODE 636 W HCPCS: Performed by: STUDENT IN AN ORGANIZED HEALTH CARE EDUCATION/TRAINING PROGRAM

## 2022-04-02 PROCEDURE — 99283 EMERGENCY DEPT VISIT LOW MDM: CPT

## 2022-04-02 PROCEDURE — 25000003 PHARM REV CODE 250: Performed by: STUDENT IN AN ORGANIZED HEALTH CARE EDUCATION/TRAINING PROGRAM

## 2022-04-02 RX ORDER — CEPHALEXIN 250 MG/1
500 CAPSULE ORAL
Status: COMPLETED | OUTPATIENT
Start: 2022-04-02 | End: 2022-04-02

## 2022-04-02 RX ORDER — NAPROXEN 250 MG/1
500 TABLET ORAL
Status: COMPLETED | OUTPATIENT
Start: 2022-04-02 | End: 2022-04-02

## 2022-04-02 RX ORDER — NAPROXEN 500 MG/1
500 TABLET ORAL 2 TIMES DAILY WITH MEALS
Qty: 14 TABLET | Refills: 0 | Status: SHIPPED | OUTPATIENT
Start: 2022-04-02 | End: 2022-04-02 | Stop reason: CLARIF

## 2022-04-02 RX ORDER — PREDNISONE 20 MG/1
20 TABLET ORAL DAILY
Qty: 5 TABLET | Refills: 0 | Status: SHIPPED | OUTPATIENT
Start: 2022-04-03 | End: 2022-04-08

## 2022-04-02 RX ORDER — PREDNISONE 20 MG/1
20 TABLET ORAL
Status: COMPLETED | OUTPATIENT
Start: 2022-04-02 | End: 2022-04-02

## 2022-04-02 RX ORDER — PREDNISONE 20 MG/1
20 TABLET ORAL DAILY
Qty: 5 TABLET | Refills: 0 | Status: SHIPPED | OUTPATIENT
Start: 2022-04-03 | End: 2022-04-02 | Stop reason: SDUPTHER

## 2022-04-02 RX ADMIN — CEPHALEXIN 500 MG: 250 CAPSULE ORAL at 04:04

## 2022-04-02 RX ADMIN — PREDNISONE 20 MG: 20 TABLET ORAL at 03:04

## 2022-04-02 RX ADMIN — NAPROXEN 500 MG: 250 TABLET ORAL at 03:04

## 2022-04-02 NOTE — ED PROVIDER NOTES
Encounter Date: 4/2/2022       History     Chief Complaint   Patient presents with    Rash     Painful rash x 4 months.      HPI   87-year-old presenting with a rash for 4 months. Hx of ckd, dm, htn, hld, hypothyroidism. Patient reports she is presenting today because she woke up out of sleep with severe itching and pain and previously in the ER was given steroids that did not improve the rash but did decrease the pain and the itching.  Patient reports she has a dermatology appointment in 3 days and has followed up with her primary care doctor but is on shore with the rashes or what is causing it.  She does not have any fevers, chills, nausea, vomiting, shortness breath, wheezing.  She reports the rash has been present on her bilateral arms and she noticed a few weeks ago that there is a small patch on her medial left upper thigh.  Review of patient's allergies indicates:   Allergen Reactions    Fenofibric acid (choline)      Other reaction(s): Vomiting    Iodine      Other reaction(s): lips swollen ivp dye    Rosuvastatin      Other reaction(s): muscle pain     Past Medical History:   Diagnosis Date    Anemia due to stage 3 chronic kidney disease 7/24/2019    Arthritis     Chronic bilateral low back pain without sciatica 7/24/2019    CKD (chronic kidney disease) stage 3, GFR 30-59 ml/min 7/24/2019    Colon polyps     Coronary artery disease     Diabetes mellitus type II     Diabetes with neurologic complications     GERD (gastroesophageal reflux disease)     Hyperlipidemia     Hypertension     Hypothyroidism     Type 2 diabetes mellitus      Past Surgical History:   Procedure Laterality Date    ADENOIDECTOMY      AORTIC VALVE REPLACEMENT      BREAST BIOPSY Right 20 yrs ago    benign    CARDIAC SURGERY      CHOLECYSTECTOMY      COLONOSCOPY  5-    Dr Holly, 5 year recheck    EPIDURAL STEROID INJECTION N/A 3/25/2021    Procedure: Injection, Steroid, Epidural L3-4;  Surgeon: Sky HANDLEY  MD Ivan;  Location: [facility] OR;  Service: Pain Management;  Laterality: N/A;  Injection, Steroid, Epidural L3-4    EPIDURAL STEROID INJECTION N/A 2021    Procedure: Injection, Steroid, Epidural L3-4;  Surgeon: Sky Love MD;  Location: [facility] OR;  Service: Pain Management;  Laterality: N/A;  Injection, Steroid, Epidural L3-4    ESOPHAGOGASTRODUODENOSCOPY N/A 2020    Procedure: EGD (ESOPHAGOGASTRODUODENOSCOPY);  Surgeon: Michael Nugent III, MD;  Location: [facility] ENDO;  Service: Endoscopy;  Laterality: N/A;    HEMORRHOID SURGERY      HYSTERECTOMY      OOPHORECTOMY      TONSILLECTOMY       Family History   Problem Relation Age of Onset    Diabetes Mother     Heart disease Mother     Glaucoma Mother     Cancer Father         lung cancer    Early death Son         brain tumor age 3    Diabetes Brother     No Known Problems Daughter     Early death Son         MVA 25    Macular degeneration Neg Hx     Retinal detachment Neg Hx      Social History     Tobacco Use    Smoking status: Former Smoker     Packs/day: 0.25     Years: 15.00     Pack years: 3.75     Types: Cigarettes     Quit date: 3/30/1974     Years since quittin.0    Smokeless tobacco: Never Used   Substance Use Topics    Alcohol use: No    Drug use: No     Review of Systems   Constitutional: Negative for fever.   HENT: Negative for sore throat.    Respiratory: Negative for shortness of breath.    Cardiovascular: Negative for chest pain.   Gastrointestinal: Negative for nausea.   Genitourinary: Negative for dysuria.   Musculoskeletal: Negative for back pain.   Skin: Positive for rash.   Neurological: Negative for weakness.   Hematological: Does not bruise/bleed easily.       Physical Exam     Initial Vitals [22 0319]   BP Pulse Resp Temp SpO2   (!) 164/103 95 16 98.1 °F (36.7 °C) 96 %      MAP       --         Physical Exam    Nursing note and vitals reviewed.  Constitutional: She appears well-developed and well-nourished.  She is not diaphoretic.   HENT:   Head: Normocephalic.   Eyes: Right eye exhibits no discharge. Left eye exhibits no discharge. No scleral icterus.   Neck: Neck supple. No tracheal deviation present.   Cardiovascular: Normal rate and regular rhythm. Exam reveals no gallop and no friction rub.    No murmur heard.  Pulmonary/Chest: Breath sounds normal. No stridor. No respiratory distress. She has no wheezes. She has no rhonchi. She has no rales.   Abdominal: Abdomen is soft. She exhibits no distension. There is no abdominal tenderness. There is no rebound and no guarding.   Musculoskeletal:         General: No edema.      Cervical back: Neck supple.     Neurological: She is alert and oriented to person, place, and time.   Skin: Skin is warm and dry. Rash noted.   Rash on bilat UE and L medial thigh. Conisistent with vasculitis appearance, maroon, circular and linear flat lesions. Non sloughing. No bullae. No erythema. Not warm to the tough. Non blanching. Not raised. No rash, lesions seen intraorally. Many excoriations. 2 pustules, L underarm. No abscess. No induration or fluctuance.          ED Course   Procedures  Labs Reviewed - No data to display       Imaging Results    None          Medications   cephALEXin capsule 500 mg (has no administration in time range)   naproxen tablet 500 mg (500 mg Oral Given 4/2/22 0350)   predniSONE tablet 20 mg (20 mg Oral Given 4/2/22 0350)                         MDM  88 yo with rash x4 months.     Differential: anaphylaxis, hives, itp, ttp, abscess, cellulitis, ecchymosis, vasculitis, sjs, ten     Vitals all within acceptable limits on presentation     Low suspicion for emergent etiology of rash x 4 months with no recent changes. No mucosal involvement. 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 Due to pt with severe itching, excoriations, and now pustules with surrounding erythema but no abscess will discharge with keflex and given first dose here for possible developing superinfection. Will also  give steroids, 20mg daily until pt is able to follow up with derm in 3 days. Also recommended topical benadryl for itching. Pt non toxic. Strict return precautions discussed. Pt also plans to follow up with her pcp. Advised against NSAID's as pt has ckd 3.   Caterina Mahan MD  [facility] Emergency Medicine HO-3  3:50 AM 4/2/22       Clinical Impression:   Final diagnoses:  [R21] Rash (Primary)          ED Disposition Condition    Discharge Stable        ED Prescriptions     Medication Sig Dispense Start Date End Date Auth. Provider    naproxen (NAPROSYN) 500 MG tablet  (Status: Discontinued) Take 1 tablet (500 mg total) by mouth 2 (two) times daily with meals. for 7 days 14 tablet 4/2/2022 4/2/2022 Caterina Mahan MD    predniSONE (DELTASONE) 20 MG tablet Take 1 tablet (20 mg total) by mouth once daily. for 5 days 5 tablet 4/3/2022 4/8/2022 Caterina Mahan MD        Follow-up Information     Follow up With Specialties Details Why Contact Info Additional Information    [facility] - Emergency Dept Emergency Medicine Go to  Return to an emergency department immediately if you develop persisting or worsening symptoms or with any new symptoms such as fevers, chills, inability to eat or drink, uncontrollable pain, headaches, chagnes in vision, nausea, vomiting, stomach pain [address] [facility] [address]  [phone] 1st floor           Caterina Mahan MD  Resident  04/02/22 0356

## 2022-04-05 ENCOUNTER — OFFICE VISIT (OUTPATIENT)
Dept: DERMATOLOGY | Facility: CLINIC | Age: 87
End: 2022-04-05
Attending: FAMILY MEDICINE
Payer: MEDICARE

## 2022-04-05 ENCOUNTER — LAB VISIT (OUTPATIENT)
Dept: LAB | Facility: HOSPITAL | Age: 87
End: 2022-04-05
Attending: DERMATOLOGY
Payer: MEDICARE

## 2022-04-05 VITALS — BODY MASS INDEX: 29.03 KG/M2 | WEIGHT: 185 LBS | HEIGHT: 67 IN

## 2022-04-05 DIAGNOSIS — L98.9 DISEASE OF SKIN AND SUBCUTANEOUS TISSUE: ICD-10-CM

## 2022-04-05 DIAGNOSIS — L82.1 SEBORRHEIC KERATOSES: Primary | ICD-10-CM

## 2022-04-05 LAB
ALBUMIN SERPL BCP-MCNC: 3.8 G/DL (ref 3.5–5.2)
ALP SERPL-CCNC: 73 U/L (ref 55–135)
ALT SERPL W/O P-5'-P-CCNC: 19 U/L (ref 10–44)
ANION GAP SERPL CALC-SCNC: 14 MMOL/L (ref 8–16)
AST SERPL-CCNC: 17 U/L (ref 10–40)
BASOPHILS # BLD AUTO: 0.03 K/UL (ref 0–0.2)
BASOPHILS NFR BLD: 0.5 % (ref 0–1.9)
BILIRUB SERPL-MCNC: 1.1 MG/DL (ref 0.1–1)
BUN SERPL-MCNC: 31 MG/DL (ref 8–23)
CALCIUM SERPL-MCNC: 9.3 MG/DL (ref 8.7–10.5)
CHLORIDE SERPL-SCNC: 106 MMOL/L (ref 95–110)
CO2 SERPL-SCNC: 18 MMOL/L (ref 23–29)
CREAT SERPL-MCNC: 1.1 MG/DL (ref 0.5–1.4)
DIFFERENTIAL METHOD: ABNORMAL
EOSINOPHIL # BLD AUTO: 0 K/UL (ref 0–0.5)
EOSINOPHIL NFR BLD: 0.5 % (ref 0–8)
ERYTHROCYTE [DISTWIDTH] IN BLOOD BY AUTOMATED COUNT: 14.4 % (ref 11.5–14.5)
EST. GFR  (AFRICAN AMERICAN): 52 ML/MIN/1.73 M^2
EST. GFR  (NON AFRICAN AMERICAN): 45 ML/MIN/1.73 M^2
GLUCOSE SERPL-MCNC: 339 MG/DL (ref 70–110)
HCT VFR BLD AUTO: 34.8 % (ref 37–48.5)
HGB BLD-MCNC: 10.9 G/DL (ref 12–16)
IMM GRANULOCYTES # BLD AUTO: 0.1 K/UL (ref 0–0.04)
IMM GRANULOCYTES NFR BLD AUTO: 1.5 % (ref 0–0.5)
LYMPHOCYTES # BLD AUTO: 0.7 K/UL (ref 1–4.8)
LYMPHOCYTES NFR BLD: 10.1 % (ref 18–48)
MCH RBC QN AUTO: 29.9 PG (ref 27–31)
MCHC RBC AUTO-ENTMCNC: 31.3 G/DL (ref 32–36)
MCV RBC AUTO: 96 FL (ref 82–98)
MONOCYTES # BLD AUTO: 0.2 K/UL (ref 0.3–1)
MONOCYTES NFR BLD: 2.9 % (ref 4–15)
NEUTROPHILS # BLD AUTO: 5.5 K/UL (ref 1.8–7.7)
NEUTROPHILS NFR BLD: 84.5 % (ref 38–73)
NRBC BLD-RTO: 0 /100 WBC
PLATELET # BLD AUTO: 247 K/UL (ref 150–450)
PMV BLD AUTO: 11.4 FL (ref 9.2–12.9)
POTASSIUM SERPL-SCNC: 4.3 MMOL/L (ref 3.5–5.1)
PROT SERPL-MCNC: 6.9 G/DL (ref 6–8.4)
RBC # BLD AUTO: 3.64 M/UL (ref 4–5.4)
SODIUM SERPL-SCNC: 138 MMOL/L (ref 136–145)
WBC # BLD AUTO: 6.53 K/UL (ref 3.9–12.7)

## 2022-04-05 PROCEDURE — 1160F RVW MEDS BY RX/DR IN RCRD: CPT | Mod: CPTII,S$GLB,, | Performed by: DERMATOLOGY

## 2022-04-05 PROCEDURE — 87340 HEPATITIS B SURFACE AG IA: CPT | Performed by: DERMATOLOGY

## 2022-04-05 PROCEDURE — 1101F PR PT FALLS ASSESS DOC 0-1 FALLS W/OUT INJ PAST YR: ICD-10-PCS | Mod: CPTII,S$GLB,, | Performed by: DERMATOLOGY

## 2022-04-05 PROCEDURE — 3288F PR FALLS RISK ASSESSMENT DOCUMENTED: ICD-10-PCS | Mod: CPTII,S$GLB,, | Performed by: DERMATOLOGY

## 2022-04-05 PROCEDURE — 1159F PR MEDICATION LIST DOCUMENTED IN MEDICAL RECORD: ICD-10-PCS | Mod: CPTII,S$GLB,, | Performed by: DERMATOLOGY

## 2022-04-05 PROCEDURE — 1126F PR PAIN SEVERITY QUANTIFIED, NO PAIN PRESENT: ICD-10-PCS | Mod: CPTII,S$GLB,, | Performed by: DERMATOLOGY

## 2022-04-05 PROCEDURE — 83516 IMMUNOASSAY NONANTIBODY: CPT | Performed by: DERMATOLOGY

## 2022-04-05 PROCEDURE — 3072F LOW RISK FOR RETINOPATHY: CPT | Mod: CPTII,S$GLB,, | Performed by: DERMATOLOGY

## 2022-04-05 PROCEDURE — 99204 PR OFFICE/OUTPT VISIT, NEW, LEVL IV, 45-59 MIN: ICD-10-PCS | Mod: 25,S$GLB,, | Performed by: DERMATOLOGY

## 2022-04-05 PROCEDURE — 86704 HEP B CORE ANTIBODY TOTAL: CPT | Performed by: DERMATOLOGY

## 2022-04-05 PROCEDURE — 1101F PT FALLS ASSESS-DOCD LE1/YR: CPT | Mod: CPTII,S$GLB,, | Performed by: DERMATOLOGY

## 2022-04-05 PROCEDURE — 80053 COMPREHEN METABOLIC PANEL: CPT | Performed by: DERMATOLOGY

## 2022-04-05 PROCEDURE — 3072F PR LOW RISK FOR RETINOPATHY: ICD-10-PCS | Mod: CPTII,S$GLB,, | Performed by: DERMATOLOGY

## 2022-04-05 PROCEDURE — 99204 OFFICE O/P NEW MOD 45 MIN: CPT | Mod: 25,S$GLB,, | Performed by: DERMATOLOGY

## 2022-04-05 PROCEDURE — 85025 COMPLETE CBC W/AUTO DIFF WBC: CPT | Performed by: DERMATOLOGY

## 2022-04-05 PROCEDURE — 99999 PR PBB SHADOW E&M-EST. PATIENT-LVL IV: ICD-10-PCS | Mod: PBBFAC,,, | Performed by: DERMATOLOGY

## 2022-04-05 PROCEDURE — 88305 TISSUE EXAM BY PATHOLOGIST: CPT | Mod: 59 | Performed by: PATHOLOGY

## 2022-04-05 PROCEDURE — 1126F AMNT PAIN NOTED NONE PRSNT: CPT | Mod: CPTII,S$GLB,, | Performed by: DERMATOLOGY

## 2022-04-05 PROCEDURE — 88305 TISSUE EXAM BY PATHOLOGIST: ICD-10-PCS | Mod: 26,,, | Performed by: PATHOLOGY

## 2022-04-05 PROCEDURE — 99999 PR PBB SHADOW E&M-EST. PATIENT-LVL IV: CPT | Mod: PBBFAC,,, | Performed by: DERMATOLOGY

## 2022-04-05 PROCEDURE — 86706 HEP B SURFACE ANTIBODY: CPT | Performed by: DERMATOLOGY

## 2022-04-05 PROCEDURE — 86803 HEPATITIS C AB TEST: CPT | Performed by: DERMATOLOGY

## 2022-04-05 PROCEDURE — 1159F MED LIST DOCD IN RCRD: CPT | Mod: CPTII,S$GLB,, | Performed by: DERMATOLOGY

## 2022-04-05 PROCEDURE — 88305 TISSUE EXAM BY PATHOLOGIST: CPT | Mod: 26,,, | Performed by: PATHOLOGY

## 2022-04-05 PROCEDURE — 1160F PR REVIEW ALL MEDS BY PRESCRIBER/CLIN PHARMACIST DOCUMENTED: ICD-10-PCS | Mod: CPTII,S$GLB,, | Performed by: DERMATOLOGY

## 2022-04-05 PROCEDURE — 3288F FALL RISK ASSESSMENT DOCD: CPT | Mod: CPTII,S$GLB,, | Performed by: DERMATOLOGY

## 2022-04-05 RX ORDER — KETOCONAZOLE 20 MG/G
CREAM TOPICAL
Qty: 60 G | Refills: 3 | Status: SHIPPED | OUTPATIENT
Start: 2022-04-05 | End: 2022-08-01 | Stop reason: SDUPTHER

## 2022-04-05 RX ORDER — DOXYCYCLINE 100 MG/1
TABLET ORAL
Qty: 60 TABLET | Refills: 2 | Status: SHIPPED | OUTPATIENT
Start: 2022-04-05 | End: 2022-04-20

## 2022-04-05 RX ORDER — DOXYCYCLINE 100 MG/1
TABLET ORAL
Qty: 30 TABLET | Refills: 2 | Status: SHIPPED | OUTPATIENT
Start: 2022-04-05 | End: 2022-04-05

## 2022-04-05 RX ORDER — TRIAMCINOLONE ACETONIDE 1 MG/G
CREAM TOPICAL
Qty: 454 G | Refills: 3 | Status: SHIPPED | OUTPATIENT
Start: 2022-04-05 | End: 2022-08-01 | Stop reason: SDUPTHER

## 2022-04-05 NOTE — PROGRESS NOTES
"  Subjective:       Patient ID:  Leslie Tolliver is a 87 y.o. female who presents for   Chief Complaint   Patient presents with    Rash     New Patient    C/o rash on the body, widespread  Itching since December  Had flu shot and COVID shot on the same day, then starting itching 2 weeks later  Patient stated she also itches after taking her insulin   Patient stated her skin gets hot at night, and leaves blister like sores    Currently on prednisone 20mg    2 weeks ago, Dr Christianson switched lisinopril-HCTZ combo to lisinopril only    Washes with dove sensitive, All free and clear laundry soap - no relief     Derm Hx:  Denies phx NMSC/MM  Denies fhx MM    Current Outpatient Medications:     aspirin 81 mg Tab, Take 81 mg by mouth once daily. Every day, Disp: , Rfl:     blood sugar diagnostic (ACCU-CHEK GUIDE TEST STRIPS) Strp, 300 each by Misc.(Non-Drug; Combo Route) route 3 (three) times daily., Disp: 300 each, Rfl: 3    calcium carbonate/vitamin D3 (CALCIUM+D ORAL), Take 1 tablet by mouth once daily., Disp: , Rfl:     carvediloL (COREG) 6.25 MG tablet, Take 1 tablet (6.25 mg total) by mouth 2 (two) times daily with meals., Disp: 180 tablet, Rfl: 3    DROPLET PEN NEEDLE 31 gauge x 5/16" Ndle, USE WITH LANTUS SOLOSTAR PEN AS NEEDED, Disp: 100 each, Rfl: 3    insulin (LANTUS SOLOSTAR U-100 INSULIN) glargine 100 units/mL (3mL) SubQ pen, INJECT 24 UNITS SUBCUTANEOUSLY EVERY EVENING, Disp: 30 mL, Rfl: 1    JANUVIA 100 mg Tab, TAKE 1 TABLET EVERY DAY, Disp: 90 tablet, Rfl: 0    lancets (ACCU-CHEK SOFTCLIX LANCETS) Misc, New machine Guide Me Accuchek, Disp: 300 each, Rfl: 3    lisinopriL (PRINIVIL,ZESTRIL) 20 MG tablet, Take 1 tablet (20 mg total) by mouth once daily., Disp: 90 tablet, Rfl: 1    metFORMIN (GLUCOPHAGE) 1000 MG tablet, TAKE ONE TABLET BY MOUTH TWICE DAILY WITH MEALS., Disp: 180 tablet, Rfl: 1    multivitamin (THERAGRAN) per tablet, Take 1 tablet by mouth once daily., Disp: , Rfl:     predniSONE " (DELTASONE) 20 MG tablet, Take 1 tablet (20 mg total) by mouth once daily. for 5 days, Disp: 5 tablet, Rfl: 0    predniSONE (DELTASONE) 50 MG Tab, One BID for 3 days then once a day orally, Disp: 5 tablet, Rfl: 0    PREVALITE 4 gram PwPk, TAKE 1 PACKET (4 G TOTAL) BY MOUTH ONCE DAILY., Disp: 30 packet, Rfl: 11    blood-glucose meter kit, Accu-chek Marley glucometer check glucose three times daily E11.65, Disp: 1 each, Rfl: 0        Review of Systems   Constitutional: Negative for fever, chills, weight loss, fatigue and night sweats.   HENT: Negative for mouth sores.    Respiratory: Negative for cough and shortness of breath.    Skin: Positive for itching, rash and dry skin.   Hematologic/Lymphatic: Bruises/bleeds easily.        Objective:    Physical Exam   Constitutional: She appears well-developed and well-nourished. No distress.   Neurological: She is alert and oriented to person, place, and time. She is not disoriented.   Psychiatric: She has a normal mood and affect.   Skin:   Areas Examined (abnormalities noted in diagram):   Scalp / Hair Palpated and Inspected  Head / Face Inspection Performed  Neck Inspection Performed  Chest / Axilla Inspection Performed  Abdomen Inspection Performed  Genitals / Buttocks / Groin Inspection Performed  Back Inspection Performed  RUE Inspected  LUE Inspection Performed  RLE Inspected  LLE Inspection Performed  Nails and Digits Inspection Performed              Diagram Legend     Erythematous scaling macule/papule c/w actinic keratosis       Vascular papule c/w angioma      Pigmented verrucoid papule/plaque c/w seborrheic keratosis      Yellow umbilicated papule c/w sebaceous hyperplasia      Irregularly shaped tan macule c/w lentigo     1-2 mm smooth white papules consistent with Milia      Movable subcutaneous cyst with punctum c/w epidermal inclusion cyst      Subcutaneous movable cyst c/w pilar cyst      Firm pink to brown papule c/w dermatofibroma      Pedunculated fleshy  papule(s) c/w skin tag(s)      Evenly pigmented macule c/w junctional nevus     Mildly variegated pigmented, slightly irregular-bordered macule c/w mildly atypical nevus      Flesh colored to evenly pigmented papule c/w intradermal nevus       Pink pearly papule/plaque c/w basal cell carcinoma      Erythematous hyperkeratotic cursted plaque c/w SCC      Surgical scar with no sign of skin cancer recurrence      Open and closed comedones      Inflammatory papules and pustules      Verrucoid papule consistent consistent with wart     Erythematous eczematous patches and plaques     Dystrophic onycholytic nail with subungual debris c/w onychomycosis     Umbilicated papule    Erythematous-base heme-crusted tan verrucoid plaque consistent with inflamed seborrheic keratosis     Erythematous Silvery Scaling Plaque c/w Psoriasis     See annotation                                          Assessment / Plan:      Pathology Orders:     Normal Orders This Visit    Specimen to Pathology, Dermatology     Questions:    Procedure Type: Dermatology and skin neoplasms    Number of Specimens: 4    ------------------------: -------------------------    Spec 1 Procedure: Biopsy    Spec 1 Clinical Impression: Scattered erythematous pruritic papules and plaques, one intact vesicle on knee, favor BP, DIF obtained    Spec 1 Source: R upper arm    ------------------------: -------------------------    Spec 2 Procedure: Biopsy    Spec 2 Clinical Impression: Scattered erythematous pruritic papules and plaques, one intact vesicle on knee, favor BP, DIF obtained    Spec 2 Source: left knee- intact vesicle    ------------------------: -------------------------    Spec 3 Procedure: Biopsy    Spec 3 Clinical Impression: Scattered erythematous pruritic papules and plaques, one intact vesicle on knee, favor BP, DIF obtained    Spec 3 Source: left pannus fold    ------------------------: -------------------------    Spec 4 Procedure: Immunofluorescence  (No formalin)    Spec 4 Clinical Impression: Scattered erythematous pruritic papules and plaques, one intact vesicle on knee, favor BP, DIF obtained    Spec 4 Source: left knee perilesional    Release to patient:         Seborrheic keratoses  These are benign inherited growths without a malignant potential. Reassurance given to patient. No treatment is necessary.     Disease of skin and subcutaneous tissue  -     Ambulatory referral/consult to Dermatology  -     Specimen to Pathology, Dermatology  -     Hepatitis B Core Antibody, Total; Future; Expected date: 04/05/2022  -     Hepatitis B Surface Ab, Qualitative; Future; Expected date: 04/05/2022  -     Hepatitis B Surface Antigen; Future; Expected date: 04/05/2022  -     Hepatitis C Antibody; Future; Expected date: 04/05/2022  -     CBC Auto Differential; Future; Expected date: 04/05/2022  -     Comprehensive Metabolic Panel; Future; Expected date: 04/05/2022  -     Bullous Pemphigoid, , , IgG, Serum; Future; Expected date: 04/05/2022  -     Quantiferon Gold TB; Future; Expected date: 04/05/2022  -     Discontinue: doxycycline monohydrate 100 mg Tab; Take 1 po qday  Dispense: 30 tablet; Refill: 2  -     triamcinolone acetonide 0.1% (KENALOG) 0.1 % cream; AAA bid  Dispense: 454 g; Refill: 3  -     ketoconazole (NIZORAL) 2 % cream; AAA pannus fold bid  Dispense: 60 g; Refill: 3  -     doxycycline monohydrate 100 mg Tab; Take 1 po BID with food  Dispense: 60 tablet; Refill: 2    Punch biopsy procedure note:x4  Punch biopsy performed after verbal consent obtained. Area marked and prepped with alcohol. Approximately 1cc of 1% lidocaine with epinephrine injected. 4 mm disposable punch used to remove lesion. Hemostasis obtained and biopsy site closed with 1 - 2 Prolene sutures. Wound care instructions reviewed with patient and handout given.      Strongly favor BP  Start doxy BID and NIACINAMIDE 500mg BID  TAC cream to all itchy areas BID  Ok to use calamine    May add cetirizine 10mg twice daily    Candida intertrigo in pannus fold, start keto cream  May need to add PO fluconazole    Discussed benefits and risks of doxycyline therapy including but not limited to GI discomfort, esophageal irritation/ulceration, and increased sun sensitivity. Patient was counseled to take medicine with meals and at least 1 hour before lying down.              Follow up in about 2 weeks (around 4/19/2022) for suture removal.

## 2022-04-05 NOTE — PATIENT INSTRUCTIONS
Punch Biopsy Wound Care    Your doctor has performed a punch biopsy today.  A band aid and antibiotic ointment has been placed over the site.  This should remain in place for 24 hours.  It is recommended that you keep the area dry for the first 24 hours.  After 24 hours, you may remove the band aid and wash the area with warm soap and water and apply Vaseline jelly.  Many patients prefer to use Neosporin or Bacitracin ointment.  This is acceptable; however know that you can develop an allergy to this medication even if you have used it safely for years.  It is important to keep the area moist.  Letting it dry out and get air slows healing time, will worsen the scar, and make it more difficult to remove the stitches if they were placed.  Band aid is optional after first 24 hours.      If you notice increasing redness, tenderness, pain, or yellow drainage at the biopsy or surgical site, please notify your doctor.  These are signs of an infection.    If your biopsy/surgical site is bleeding, apply firm pressure for 15 minutes straight.  Repeat for another 15 minutes, if it is still bleeding.   If the surgical site continues to bleed, then please contact your doctor.     [facility]  [facility] - DERMATOLOGY  [address] [place] [address], [address] [address]  Dept: [phone]

## 2022-04-06 LAB
HBV CORE AB SERPL QL IA: NEGATIVE
HBV SURFACE AB SER-ACNC: NEGATIVE M[IU]/ML
HBV SURFACE AG SERPL QL IA: NEGATIVE
HCV AB SERPL QL IA: NEGATIVE

## 2022-04-07 LAB
BMZ BP180 IGG SERPL-ACNC: 159 RU/ML
BMZ BP230 IGG SERPL-ACNC: 20 RU/ML

## 2022-04-13 ENCOUNTER — TELEPHONE (OUTPATIENT)
Dept: FAMILY MEDICINE | Facility: CLINIC | Age: 87
End: 2022-04-13
Payer: MEDICARE

## 2022-04-13 NOTE — TELEPHONE ENCOUNTER
I called the patient to confirm her appointment that was scheduled with Dr. Christianson for 04/14/2022 at 1:40pm. No answer left voicemail to return our call.  I will send the patient a portal message as well.

## 2022-04-20 ENCOUNTER — OFFICE VISIT (OUTPATIENT)
Dept: DERMATOLOGY | Facility: CLINIC | Age: 87
End: 2022-04-20
Payer: MEDICARE

## 2022-04-20 VITALS — BODY MASS INDEX: 29.03 KG/M2 | HEIGHT: 67 IN | WEIGHT: 185 LBS

## 2022-04-20 DIAGNOSIS — L12.0 BULLOUS PEMPHIGOID: Primary | ICD-10-CM

## 2022-04-20 PROCEDURE — 1101F PT FALLS ASSESS-DOCD LE1/YR: CPT | Mod: CPTII,S$GLB,, | Performed by: DERMATOLOGY

## 2022-04-20 PROCEDURE — 3288F PR FALLS RISK ASSESSMENT DOCUMENTED: ICD-10-PCS | Mod: CPTII,S$GLB,, | Performed by: DERMATOLOGY

## 2022-04-20 PROCEDURE — 1160F RVW MEDS BY RX/DR IN RCRD: CPT | Mod: CPTII,S$GLB,, | Performed by: DERMATOLOGY

## 2022-04-20 PROCEDURE — 3072F LOW RISK FOR RETINOPATHY: CPT | Mod: CPTII,S$GLB,, | Performed by: DERMATOLOGY

## 2022-04-20 PROCEDURE — 99214 PR OFFICE/OUTPT VISIT, EST, LEVL IV, 30-39 MIN: ICD-10-PCS | Mod: S$GLB,,, | Performed by: DERMATOLOGY

## 2022-04-20 PROCEDURE — 99214 OFFICE O/P EST MOD 30 MIN: CPT | Mod: S$GLB,,, | Performed by: DERMATOLOGY

## 2022-04-20 PROCEDURE — 99999 PR PBB SHADOW E&M-EST. PATIENT-LVL III: ICD-10-PCS | Mod: PBBFAC,,, | Performed by: DERMATOLOGY

## 2022-04-20 PROCEDURE — 1159F PR MEDICATION LIST DOCUMENTED IN MEDICAL RECORD: ICD-10-PCS | Mod: CPTII,S$GLB,, | Performed by: DERMATOLOGY

## 2022-04-20 PROCEDURE — 1159F MED LIST DOCD IN RCRD: CPT | Mod: CPTII,S$GLB,, | Performed by: DERMATOLOGY

## 2022-04-20 PROCEDURE — 99999 PR PBB SHADOW E&M-EST. PATIENT-LVL III: CPT | Mod: PBBFAC,,, | Performed by: DERMATOLOGY

## 2022-04-20 PROCEDURE — 3288F FALL RISK ASSESSMENT DOCD: CPT | Mod: CPTII,S$GLB,, | Performed by: DERMATOLOGY

## 2022-04-20 PROCEDURE — 1101F PR PT FALLS ASSESS DOC 0-1 FALLS W/OUT INJ PAST YR: ICD-10-PCS | Mod: CPTII,S$GLB,, | Performed by: DERMATOLOGY

## 2022-04-20 PROCEDURE — 1160F PR REVIEW ALL MEDS BY PRESCRIBER/CLIN PHARMACIST DOCUMENTED: ICD-10-PCS | Mod: CPTII,S$GLB,, | Performed by: DERMATOLOGY

## 2022-04-20 PROCEDURE — 1126F AMNT PAIN NOTED NONE PRSNT: CPT | Mod: CPTII,S$GLB,, | Performed by: DERMATOLOGY

## 2022-04-20 PROCEDURE — 3072F PR LOW RISK FOR RETINOPATHY: ICD-10-PCS | Mod: CPTII,S$GLB,, | Performed by: DERMATOLOGY

## 2022-04-20 PROCEDURE — 1126F PR PAIN SEVERITY QUANTIFIED, NO PAIN PRESENT: ICD-10-PCS | Mod: CPTII,S$GLB,, | Performed by: DERMATOLOGY

## 2022-04-20 RX ORDER — MYCOPHENOLATE MOFETIL 500 MG/1
TABLET ORAL
Qty: 60 TABLET | Refills: 1 | Status: SHIPPED | OUTPATIENT
Start: 2022-04-20 | End: 2022-05-16 | Stop reason: SDUPTHER

## 2022-04-20 RX ORDER — PREDNISONE 20 MG/1
TABLET ORAL
Qty: 24 TABLET | Refills: 0 | Status: SHIPPED | OUTPATIENT
Start: 2022-04-20 | End: 2022-05-16

## 2022-04-20 NOTE — PROGRESS NOTES
"  Subjective:       Patient ID:  Leslie Tolliver is a 87 y.o. female who presents for   Chief Complaint   Patient presents with    Rash     Follow up     LOV-4/5/22-SK  Final Pathologic Diagnosis 1.  Skin, right upper arm, punch biopsy:   - SUPERFICIAL PERIVASCULAR DERMATITIS WITH MINIMAL EPIDERMAL SPONGIOSIS (SEE   COMMENT)   Skin, left knee, intact vesicle, punch biopsy:   - SUBEPIDERMAL VESICLE ASSOCIATED WITH PROMINENT EOSINOPHILS, CONSISTENT WITH   BULLOUS PEMPHIGOID   Skin, left pannus fold, punch biopsy:   - MILD SPONGIOTIC DERMATITIS WITH NUMEROUS EOSINOPHILS AND INTRACORNEAL   PUSTULE FORMATION     Here today for follow up and suture removal  Does not feel any improvement  Very itchy at night    No hx of NSMC  No fhx of MM      Current Outpatient Medications:     aspirin 81 mg Tab, Take 81 mg by mouth once daily. Every day, Disp: , Rfl:     blood sugar diagnostic (ACCU-CHEK GUIDE TEST STRIPS) Strp, 300 each by Misc.(Non-Drug; Combo Route) route 3 (three) times daily., Disp: 300 each, Rfl: 3    blood-glucose meter kit, Accu-chek Marley glucometer check glucose three times daily E11.65, Disp: 1 each, Rfl: 0    calcium carbonate/vitamin D3 (CALCIUM+D ORAL), Take 1 tablet by mouth once daily., Disp: , Rfl:     carvediloL (COREG) 6.25 MG tablet, Take 1 tablet (6.25 mg total) by mouth 2 (two) times daily with meals., Disp: 180 tablet, Rfl: 3    doxycycline monohydrate 100 mg Tab, Take 1 po BID with food, Disp: 60 tablet, Rfl: 2    DROPLET PEN NEEDLE 31 gauge x 5/16" Ndle, USE WITH LANTUS SOLOSTAR PEN AS NEEDED, Disp: 100 each, Rfl: 3    insulin (LANTUS SOLOSTAR U-100 INSULIN) glargine 100 units/mL (3mL) SubQ pen, INJECT 24 UNITS SUBCUTANEOUSLY EVERY EVENING, Disp: 30 mL, Rfl: 1    JANUVIA 100 mg Tab, TAKE 1 TABLET EVERY DAY, Disp: 90 tablet, Rfl: 0    ketoconazole (NIZORAL) 2 % cream, AAA pannus fold bid, Disp: 60 g, Rfl: 3    lancets (ACCU-CHEK SOFTCLIX LANCETS) Misc, New machine Guide Me Accuchek, " Disp: 300 each, Rfl: 3    lisinopriL (PRINIVIL,ZESTRIL) 20 MG tablet, Take 1 tablet (20 mg total) by mouth once daily., Disp: 90 tablet, Rfl: 1    metFORMIN (GLUCOPHAGE) 1000 MG tablet, TAKE ONE TABLET BY MOUTH TWICE DAILY WITH MEALS., Disp: 180 tablet, Rfl: 1    multivitamin (THERAGRAN) per tablet, Take 1 tablet by mouth once daily., Disp: , Rfl:     predniSONE (DELTASONE) 50 MG Tab, One BID for 3 days then once a day orally, Disp: 5 tablet, Rfl: 0    PREVALITE 4 gram PwPk, TAKE 1 PACKET (4 G TOTAL) BY MOUTH ONCE DAILY., Disp: 30 packet, Rfl: 11    triamcinolone acetonide 0.1% (KENALOG) 0.1 % cream, AAA bid, Disp: 454 g, Rfl: 3          Review of Systems   Constitutional: Negative for fever, chills, weight loss, fatigue and night sweats.   HENT: Negative for mouth sores.    Respiratory: Negative for cough and shortness of breath.    Skin: Positive for itching, rash and dry skin.   Hematologic/Lymphatic: Bruises/bleeds easily.        Objective:    Physical Exam   Constitutional: She appears well-developed and well-nourished. No distress.   Neurological: She is alert and oriented to person, place, and time. She is not disoriented.   Psychiatric: She has a normal mood and affect.   Skin:   Areas Examined (abnormalities noted in diagram):   Scalp / Hair Palpated and Inspected  Head / Face Inspection Performed  Neck Inspection Performed  Chest / Axilla Inspection Performed  Abdomen Inspection Performed  Genitals / Buttocks / Groin Inspection Performed  Back Inspection Performed  RUE Inspected  LUE Inspection Performed  RLE Inspected  LLE Inspection Performed  Nails and Digits Inspection Performed              Diagram Legend     Erythematous scaling macule/papule c/w actinic keratosis       Vascular papule c/w angioma      Pigmented verrucoid papule/plaque c/w seborrheic keratosis      Yellow umbilicated papule c/w sebaceous hyperplasia      Irregularly shaped tan macule c/w lentigo     1-2 mm smooth white papules  consistent with Milia      Movable subcutaneous cyst with punctum c/w epidermal inclusion cyst      Subcutaneous movable cyst c/w pilar cyst      Firm pink to brown papule c/w dermatofibroma      Pedunculated fleshy papule(s) c/w skin tag(s)      Evenly pigmented macule c/w junctional nevus     Mildly variegated pigmented, slightly irregular-bordered macule c/w mildly atypical nevus      Flesh colored to evenly pigmented papule c/w intradermal nevus       Pink pearly papule/plaque c/w basal cell carcinoma      Erythematous hyperkeratotic cursted plaque c/w SCC      Surgical scar with no sign of skin cancer recurrence      Open and closed comedones      Inflammatory papules and pustules      Verrucoid papule consistent consistent with wart     Erythematous eczematous patches and plaques     Dystrophic onycholytic nail with subungual debris c/w onychomycosis     Umbilicated papule    Erythematous-base heme-crusted tan verrucoid plaque consistent with inflamed seborrheic keratosis     Erythematous Silvery Scaling Plaque c/w Psoriasis     See annotation     Latest Reference Range & Units 04/05/22 09:34   Hep B Core Total Ab  Negative   Hep B S Ab  Negative [1]   Hepatitis B Surface Ag Negative  Negative   Hepatitis C Ab Negative  Negative   , Serum <20 RU/mL 159 (H) [2]   , Serum <20 RU/mL 20 (H) [3]      Latest Reference Range & Units 04/05/22 09:34   WBC 3.90 - 12.70 K/uL 6.53   RBC 4.00 - 5.40 M/uL 3.64 (L)   Hemoglobin 12.0 - 16.0 g/dL 10.9 (L)   Hematocrit 37.0 - 48.5 % 34.8 (L)   MCV 82 - 98 fL 96   MCH 27.0 - 31.0 pg 29.9   MCHC 32.0 - 36.0 g/dL 31.3 (L)   RDW 11.5 - 14.5 % 14.4   Platelets 150 - 450 K/uL 247   MPV 9.2 - 12.9 fL 11.4   Gran % 38.0 - 73.0 % 84.5 (H)   Lymph % 18.0 - 48.0 % 10.1 (L)   Mono % 4.0 - 15.0 % 2.9 (L)   Eosinophil % 0.0 - 8.0 % 0.5   Basophil % 0.0 - 1.9 % 0.5   Immature Granulocytes 0.0 - 0.5 % 1.5 (H)   Gran # (ANC) 1.8 - 7.7 K/uL 5.5   Lymph # 1.0 - 4.8 K/uL 0.7 (L)   Mono  # 0.3 - 1.0 K/uL 0.2 (L)   Eos # 0.0 - 0.5 K/uL 0.0   Baso # 0.00 - 0.20 K/uL 0.03   Immature Grans (Abs) 0.00 - 0.04 K/uL 0.10 (H) [1]   NRBC 0 /100 WBC 0   Differential Method  Automated      Latest Reference Range & Units 04/05/22 09:34   Sodium 136 - 145 mmol/L 138   Potassium 3.5 - 5.1 mmol/L 4.3   Chloride 95 - 110 mmol/L 106   CO2 23 - 29 mmol/L 18 (L)   Anion Gap 8 - 16 mmol/L 14   BUN 8 - 23 mg/dL 31 (H)   Creatinine 0.5 - 1.4 mg/dL 1.1   EGFR if non African American >60 mL/min/1.73 m^2 45 ! [1]   EGFR if African American >60 mL/min/1.73 m^2 52 !   Glucose 70 - 110 mg/dL 339 (H)   Calcium 8.7 - 10.5 mg/dL 9.3   Alkaline Phosphatase 55 - 135 U/L 73   PROTEIN TOTAL 6.0 - 8.4 g/dL 6.9   Albumin 3.5 - 5.2 g/dL 3.8   BILIRUBIN TOTAL 0.1 - 1.0 mg/dL 1.1 (H) [2]   AST 10 - 40 U/L 17   ALT 10 - 44 U/L 19      Quant gold ordered, was not drawn...    Assessment / Plan:        Bullous pemphigoid  -     predniSONE (DELTASONE) 20 MG tablet; Take 2 pills po qam with breakfast x 1 wk then take 1 po qam with breakfast x 1 wk then take 1/2 po qam with breakfast x 6 days  Dispense: 24 tablet; Refill: 0  -     mycophenolate (CELLCEPT) 500 mg Tab; One tab PO nightly for 2 weeks then increase to BID  Dispense: 60 tablet; Refill: 1    minimal improvement on doxy/niacinamide  New tense vesicles abdomen, inguinal folds, R upper arm    Stop doxy  Continue niacinamide 500 twice daily  Triamcinolone 0.1% cream as needed  Start cellcept 500 nighlty, plan to titer up to results  Will call in 2 weeks to assess  FU in one month  Has trip planned 5/17/2022 to [place]    - reviewed that mycophenolate causes immune suppression. Risks reviewed including increased risk of infections, malignancies such lymphoma and skin malignancies, bone marrow suppression, fetal malformations in women of child bearing age, GI symptoms (including nausea, vomiting, diarrhea) which are often dose related and often improve over time. Need to avoid live vaccines.    - plan to start at 500mg daily for 2 weeks then increase to 500mg BID. Will recheck CBC, CMP. Then increase in 500mg increments every 2-4 weeks until max of 1.5 g BID is reached. Reviewed that cellcept typically takes 6-8 weeks for improvement  - labs: baseline TB test, Hep C, Hep B, CBC, CMP. Will recheck in 4 weeks and q 4 weeks after dose escalation and Q3 months after dose is stable     Still needs quant gold    Discussed benefits and risks of treatment with prednisone including but not limited to increased appetite, weight gain, irritability, insomnia, fluid retention, GI upset, increased blood pressure, and increased blood sugars. If Prednisone is needed long term (>4 weeks), additional side effects such as kidney stones, gastric ulceration, avascular necrosis of femoral head may be encountered.    Recommend taking daily Vit D and Ca++           No follow-ups on file.

## 2022-04-21 LAB
FINAL PATHOLOGIC DIAGNOSIS: NORMAL
GROSS: NORMAL
Lab: NORMAL
MICROSCOPIC EXAM: NORMAL
SUPPLEMENTAL DIAGNOSIS: NORMAL

## 2022-04-21 NOTE — PROGRESS NOTES
Immunofluorescence further supports diagnosis of bullous pemphigoid. Treatment implemented.    Cutaneous Direct IFA, Biopsy   Interpretation A. Left knee, Skin biopsy, Immunofluorescence:    IgG: Continuous strong linear deposition along the basement membrane zone    IgG4: Discontinuous weak linear deposition along the basement membrane zone   IgM: Negative IgA: Negative   C3: Continuous strong linear deposition along the basement membrane zone   Fibrinogen: Negative   Impression: Consistent with a subepidermal autoimmune mucocutaneous   blistering disorder (see comment)   Comment: The pattern of linear basement membrane zone deposition with   multiple conjugates is consistent with a subepidermal autoimmunemucocutaneous   blistering disorder including bullous pemphigoid and its variants,   epidermolysis bullosa acquisita, bullous systemic lupuserythematosus and   other rare variants (anti-p200 pemphigoid, anti-p105 pemphigoid and   anti-collagen IV pemphigoid). The finaldefinitive diagnosis should however be   based on correlation of the direct immunofluorescence findings with the   clinical presentation,histopathological findings on examination of sections   from formalin-fixed, paraffin-embedded tissue and serum testing for (a)   indirectimmunofluorescence using human salt split skin and monkey esophagus   substrates with IgG conjugates ((English Test Code: CIFS;   CutaneousImmunofluorescence Antibodies (IgG)); (b) ALECIA testing for ID40U-MC   180 and  IgG antibodies (English Test Unit Code BPAB: BR84T- and BP   230 IgG antibodies); and (c) autoimmune connective tissue disease serologies

## 2022-04-28 ENCOUNTER — OFFICE VISIT (OUTPATIENT)
Dept: OPTOMETRY | Facility: CLINIC | Age: 87
End: 2022-04-28
Payer: MEDICARE

## 2022-04-28 DIAGNOSIS — E11.36 DIABETIC CATARACT: ICD-10-CM

## 2022-04-28 DIAGNOSIS — H25.13 NUCLEAR SCLEROSIS, BILATERAL: ICD-10-CM

## 2022-04-28 DIAGNOSIS — H04.123 DRY EYE SYNDROME, BILATERAL: ICD-10-CM

## 2022-04-28 DIAGNOSIS — E11.9 DIABETES MELLITUS TYPE 2 WITHOUT RETINOPATHY: Primary | ICD-10-CM

## 2022-04-28 DIAGNOSIS — H52.7 REFRACTIVE ERROR: ICD-10-CM

## 2022-04-28 PROCEDURE — 1101F PR PT FALLS ASSESS DOC 0-1 FALLS W/OUT INJ PAST YR: ICD-10-PCS | Mod: CPTII,S$GLB,, | Performed by: OPTOMETRIST

## 2022-04-28 PROCEDURE — 1160F RVW MEDS BY RX/DR IN RCRD: CPT | Mod: CPTII,S$GLB,, | Performed by: OPTOMETRIST

## 2022-04-28 PROCEDURE — 1159F PR MEDICATION LIST DOCUMENTED IN MEDICAL RECORD: ICD-10-PCS | Mod: CPTII,S$GLB,, | Performed by: OPTOMETRIST

## 2022-04-28 PROCEDURE — 1126F PR PAIN SEVERITY QUANTIFIED, NO PAIN PRESENT: ICD-10-PCS | Mod: CPTII,S$GLB,, | Performed by: OPTOMETRIST

## 2022-04-28 PROCEDURE — 99999 PR PBB SHADOW E&M-EST. PATIENT-LVL III: CPT | Mod: PBBFAC,,, | Performed by: OPTOMETRIST

## 2022-04-28 PROCEDURE — 1160F PR REVIEW ALL MEDS BY PRESCRIBER/CLIN PHARMACIST DOCUMENTED: ICD-10-PCS | Mod: CPTII,S$GLB,, | Performed by: OPTOMETRIST

## 2022-04-28 PROCEDURE — 3288F FALL RISK ASSESSMENT DOCD: CPT | Mod: CPTII,S$GLB,, | Performed by: OPTOMETRIST

## 2022-04-28 PROCEDURE — 1101F PT FALLS ASSESS-DOCD LE1/YR: CPT | Mod: CPTII,S$GLB,, | Performed by: OPTOMETRIST

## 2022-04-28 PROCEDURE — 92014 COMPRE OPH EXAM EST PT 1/>: CPT | Mod: S$GLB,,, | Performed by: OPTOMETRIST

## 2022-04-28 PROCEDURE — 2023F DILAT RTA XM W/O RTNOPTHY: CPT | Mod: CPTII,S$GLB,, | Performed by: OPTOMETRIST

## 2022-04-28 PROCEDURE — 1159F MED LIST DOCD IN RCRD: CPT | Mod: CPTII,S$GLB,, | Performed by: OPTOMETRIST

## 2022-04-28 PROCEDURE — 99999 PR PBB SHADOW E&M-EST. PATIENT-LVL III: ICD-10-PCS | Mod: PBBFAC,,, | Performed by: OPTOMETRIST

## 2022-04-28 PROCEDURE — 3288F PR FALLS RISK ASSESSMENT DOCUMENTED: ICD-10-PCS | Mod: CPTII,S$GLB,, | Performed by: OPTOMETRIST

## 2022-04-28 PROCEDURE — 1126F AMNT PAIN NOTED NONE PRSNT: CPT | Mod: CPTII,S$GLB,, | Performed by: OPTOMETRIST

## 2022-04-28 PROCEDURE — 2023F PR DILATED RETINAL EXAM W/O EVID OF RETINOPATHY: ICD-10-PCS | Mod: CPTII,S$GLB,, | Performed by: OPTOMETRIST

## 2022-04-28 PROCEDURE — 92014 PR EYE EXAM, EST PATIENT,COMPREHESV: ICD-10-PCS | Mod: S$GLB,,, | Performed by: OPTOMETRIST

## 2022-04-28 PROCEDURE — 99499 UNLISTED E&M SERVICE: CPT | Mod: S$GLB,,, | Performed by: OPTOMETRIST

## 2022-04-28 PROCEDURE — 99499 RISK ADDL DX/OHS AUDIT: ICD-10-PCS | Mod: S$GLB,,, | Performed by: OPTOMETRIST

## 2022-04-28 NOTE — PROGRESS NOTES
HPI     Pt here for annual diabetic exam. LDE- 03/02/2021    Pt sts VA OU is stable with current glasses. Pt sts she is happy with   current glasses. Pt sts eyes itch. Pt was dx with Bullous Pemphigoid   (autoimmune disease) and thinks it has to do with this.Pt denies   flashes/floaters/pain. Pt uses AT's PRN.    Hemoglobin A1C       Date                     Value               Ref Range             Status                03/11/2022               9.7 (H)             4.0 - 5.6 %           Final                   09/11/2021               7.9 (H)             4.0 - 5.6 %           Final                   02/25/2021               7.1 (H)             4.0 - 5.6 %           Final                  Last edited by Figueroa Britton, OD on 4/28/2022  2:00 PM. (History)            Assessment /Plan     For exam results, see Encounter Report.    Diabetes mellitus type 2 without retinopathy    Diabetic cataract    Nuclear sclerosis, bilateral    Refractive error    Dry eye syndrome, bilateral      1. Diabetes mellitus type 2 without retinopathy  No retinopathy, no csme ou. Discussed possible ocular affects of uncontrolled blood sugar with patient. Recommended continued strong blood sugar control and continued care with PCP. Monitor yearly.     2. Diabetic cataract  3. Nuclear sclerosis, bilateral  Moderate cataracts OU, pt asymptomatic. Discussed possible ocular affects of cataracts. Acceptable BCVA OU. Discussed treatment options. Surgery not recommended at this time. Monitor yearly.     4. Refractive error  Pt declines refraction, happy with current specs    5. Dry eye syndrome, bilateral  Mild OU. Discussed ocular affects of dry eyes. Recommend OTC artificial tears 2-4 times a day in both eyes. Discussed chronicity of NADINE. RTC if symptoms not alleviated by continued use of artificial tears.       RTC in 1 year for comprehensive eye exam, or sooner prn.

## 2022-05-16 ENCOUNTER — OFFICE VISIT (OUTPATIENT)
Dept: DERMATOLOGY | Facility: CLINIC | Age: 87
End: 2022-05-16
Payer: MEDICARE

## 2022-05-16 ENCOUNTER — TELEPHONE (OUTPATIENT)
Dept: FAMILY MEDICINE | Facility: CLINIC | Age: 87
End: 2022-05-16
Payer: MEDICARE

## 2022-05-16 ENCOUNTER — LAB VISIT (OUTPATIENT)
Dept: LAB | Facility: HOSPITAL | Age: 87
End: 2022-05-16
Attending: DERMATOLOGY
Payer: MEDICARE

## 2022-05-16 VITALS — HEIGHT: 67 IN | WEIGHT: 185 LBS | BODY MASS INDEX: 29.03 KG/M2

## 2022-05-16 DIAGNOSIS — L12.0 BULLOUS PEMPHIGOID: ICD-10-CM

## 2022-05-16 DIAGNOSIS — Z79.899 ON CELLCEPT THERAPY: ICD-10-CM

## 2022-05-16 DIAGNOSIS — Z79.899 ON CELLCEPT THERAPY: Primary | ICD-10-CM

## 2022-05-16 DIAGNOSIS — E11.42 TYPE 2 DIABETES MELLITUS WITH DIABETIC POLYNEUROPATHY, WITH LONG-TERM CURRENT USE OF INSULIN: Primary | ICD-10-CM

## 2022-05-16 DIAGNOSIS — Z79.4 TYPE 2 DIABETES MELLITUS WITH DIABETIC POLYNEUROPATHY, WITH LONG-TERM CURRENT USE OF INSULIN: Primary | ICD-10-CM

## 2022-05-16 LAB
ALBUMIN SERPL BCP-MCNC: 3.5 G/DL (ref 3.5–5.2)
ALP SERPL-CCNC: 60 U/L (ref 55–135)
ALT SERPL W/O P-5'-P-CCNC: 16 U/L (ref 10–44)
ANION GAP SERPL CALC-SCNC: 10 MMOL/L (ref 8–16)
AST SERPL-CCNC: 18 U/L (ref 10–40)
BASOPHILS # BLD AUTO: 0.03 K/UL (ref 0–0.2)
BASOPHILS NFR BLD: 0.5 % (ref 0–1.9)
BILIRUB SERPL-MCNC: 1.3 MG/DL (ref 0.1–1)
BUN SERPL-MCNC: 20 MG/DL (ref 8–23)
CALCIUM SERPL-MCNC: 9.1 MG/DL (ref 8.7–10.5)
CHLORIDE SERPL-SCNC: 107 MMOL/L (ref 95–110)
CO2 SERPL-SCNC: 24 MMOL/L (ref 23–29)
CREAT SERPL-MCNC: 1.1 MG/DL (ref 0.5–1.4)
DIFFERENTIAL METHOD: ABNORMAL
EOSINOPHIL # BLD AUTO: 0.8 K/UL (ref 0–0.5)
EOSINOPHIL NFR BLD: 13.1 % (ref 0–8)
ERYTHROCYTE [DISTWIDTH] IN BLOOD BY AUTOMATED COUNT: 13.4 % (ref 11.5–14.5)
EST. GFR  (AFRICAN AMERICAN): 52 ML/MIN/1.73 M^2
EST. GFR  (NON AFRICAN AMERICAN): 45 ML/MIN/1.73 M^2
GLUCOSE SERPL-MCNC: 293 MG/DL (ref 70–110)
HCT VFR BLD AUTO: 36.6 % (ref 37–48.5)
HGB BLD-MCNC: 11.8 G/DL (ref 12–16)
IMM GRANULOCYTES # BLD AUTO: 0.02 K/UL (ref 0–0.04)
IMM GRANULOCYTES NFR BLD AUTO: 0.3 % (ref 0–0.5)
LYMPHOCYTES # BLD AUTO: 0.6 K/UL (ref 1–4.8)
LYMPHOCYTES NFR BLD: 10.4 % (ref 18–48)
MCH RBC QN AUTO: 30 PG (ref 27–31)
MCHC RBC AUTO-ENTMCNC: 32.2 G/DL (ref 32–36)
MCV RBC AUTO: 93 FL (ref 82–98)
MONOCYTES # BLD AUTO: 0.5 K/UL (ref 0.3–1)
MONOCYTES NFR BLD: 8.5 % (ref 4–15)
NEUTROPHILS # BLD AUTO: 3.9 K/UL (ref 1.8–7.7)
NEUTROPHILS NFR BLD: 67.2 % (ref 38–73)
NRBC BLD-RTO: 0 /100 WBC
PLATELET # BLD AUTO: 188 K/UL (ref 150–450)
PMV BLD AUTO: 10.4 FL (ref 9.2–12.9)
POTASSIUM SERPL-SCNC: 4.1 MMOL/L (ref 3.5–5.1)
PROT SERPL-MCNC: 6.6 G/DL (ref 6–8.4)
RBC # BLD AUTO: 3.93 M/UL (ref 4–5.4)
SODIUM SERPL-SCNC: 141 MMOL/L (ref 136–145)
WBC # BLD AUTO: 5.87 K/UL (ref 3.9–12.7)

## 2022-05-16 PROCEDURE — 3072F PR LOW RISK FOR RETINOPATHY: ICD-10-PCS | Mod: CPTII,S$GLB,, | Performed by: DERMATOLOGY

## 2022-05-16 PROCEDURE — 99214 PR OFFICE/OUTPT VISIT, EST, LEVL IV, 30-39 MIN: ICD-10-PCS | Mod: S$GLB,,, | Performed by: DERMATOLOGY

## 2022-05-16 PROCEDURE — 99999 PR PBB SHADOW E&M-EST. PATIENT-LVL IV: CPT | Mod: PBBFAC,,, | Performed by: DERMATOLOGY

## 2022-05-16 PROCEDURE — 99214 OFFICE O/P EST MOD 30 MIN: CPT | Mod: S$GLB,,, | Performed by: DERMATOLOGY

## 2022-05-16 PROCEDURE — 3072F LOW RISK FOR RETINOPATHY: CPT | Mod: CPTII,S$GLB,, | Performed by: DERMATOLOGY

## 2022-05-16 PROCEDURE — 3288F FALL RISK ASSESSMENT DOCD: CPT | Mod: CPTII,S$GLB,, | Performed by: DERMATOLOGY

## 2022-05-16 PROCEDURE — 1101F PR PT FALLS ASSESS DOC 0-1 FALLS W/OUT INJ PAST YR: ICD-10-PCS | Mod: CPTII,S$GLB,, | Performed by: DERMATOLOGY

## 2022-05-16 PROCEDURE — 85025 COMPLETE CBC W/AUTO DIFF WBC: CPT | Performed by: DERMATOLOGY

## 2022-05-16 PROCEDURE — 1126F PR PAIN SEVERITY QUANTIFIED, NO PAIN PRESENT: ICD-10-PCS | Mod: CPTII,S$GLB,, | Performed by: DERMATOLOGY

## 2022-05-16 PROCEDURE — 1126F AMNT PAIN NOTED NONE PRSNT: CPT | Mod: CPTII,S$GLB,, | Performed by: DERMATOLOGY

## 2022-05-16 PROCEDURE — 1101F PT FALLS ASSESS-DOCD LE1/YR: CPT | Mod: CPTII,S$GLB,, | Performed by: DERMATOLOGY

## 2022-05-16 PROCEDURE — 36415 COLL VENOUS BLD VENIPUNCTURE: CPT | Performed by: DERMATOLOGY

## 2022-05-16 PROCEDURE — 80053 COMPREHEN METABOLIC PANEL: CPT | Performed by: DERMATOLOGY

## 2022-05-16 PROCEDURE — 3288F PR FALLS RISK ASSESSMENT DOCUMENTED: ICD-10-PCS | Mod: CPTII,S$GLB,, | Performed by: DERMATOLOGY

## 2022-05-16 PROCEDURE — 1159F MED LIST DOCD IN RCRD: CPT | Mod: CPTII,S$GLB,, | Performed by: DERMATOLOGY

## 2022-05-16 PROCEDURE — 1159F PR MEDICATION LIST DOCUMENTED IN MEDICAL RECORD: ICD-10-PCS | Mod: CPTII,S$GLB,, | Performed by: DERMATOLOGY

## 2022-05-16 PROCEDURE — 99999 PR PBB SHADOW E&M-EST. PATIENT-LVL IV: ICD-10-PCS | Mod: PBBFAC,,, | Performed by: DERMATOLOGY

## 2022-05-16 RX ORDER — PREDNISONE 10 MG/1
TABLET ORAL
Qty: 21 TABLET | Refills: 0 | Status: SHIPPED | OUTPATIENT
Start: 2022-05-16 | End: 2023-11-16

## 2022-05-16 RX ORDER — MYCOPHENOLATE MOFETIL 500 MG/1
TABLET ORAL
Qty: 90 TABLET | Refills: 1 | Status: ON HOLD | OUTPATIENT
Start: 2022-05-16 | End: 2023-11-21 | Stop reason: HOSPADM

## 2022-05-16 NOTE — Clinical Note
Keenan Christianson Ms Garrison's BP is proving difficult to control (in part because she discontinued cellcept when it was starting to help). I just realized that she is on a gliptin. It seems to have been started more than a year ago but could still be culprit for drug induced BP. Any way you could change to a non -gliptin class of meds? Thank you Yue Mills

## 2022-05-16 NOTE — TELEPHONE ENCOUNTER
----- Message from Yue Mills MD sent at 5/16/2022  1:02 PM CDT -----  Keenan Christianson  Ms Garrison's BP is proving difficult to control (in part because she discontinued cellcept when it was starting to help). I just realized that she is on a gliptin. It seems to have been started more than a year ago but could still be culprit for drug induced BP. Any way you could change to a non -gliptin class of meds?  Thank you  Yue Mills

## 2022-05-16 NOTE — TELEPHONE ENCOUNTER
Patient agrees to stop the Januvia and start Jardiance. Discussed side effects with her. She is leaving to go out of state tomorrow evening. She asked if Jardiance causes diarrhea and if she should bring her Prevalite. Send Jardiance to [facility].

## 2022-05-16 NOTE — TELEPHONE ENCOUNTER
Dr. Mills thinks that the Januvia she is taking may be responsible for her rash and it is an uncommon side effect.  She is asking if we can take her off of the Januvia and substitute something else.  Ozempic can cause the same rash, Trulicity is not reported to cause the bullous pemphigoid but has a similar rash as a possibility.  I was going to suggest that we stop the Januvia and put her on Jardiance.  Jardiance will flush excess glucose out through the kidneys, it does tend to make her urinate more frequently and there is a small chance of a yeast infection.  We will start her off at a low dose 1st to make sure she tolerates it.  We may also have to adjust her insulin to get better control.

## 2022-05-16 NOTE — PROGRESS NOTES
"  Subjective:       Patient ID:  Leslie Tolliver is a 87 y.o. female who presents for   Chief Complaint   Patient presents with    Blister     BP     LOV=-4/20/22-bullous pemphigoid    Here today for a follow up on BP  Stopped taking medication 3 days ago d/t blisters, read package insert that said to stop if rash and blisters develop, and pharmacist told her she may have shingles. Admits that the medication was helping  Very itchy -using calamine lotion and episodic triamcinolone    Has no hx of NMSC  Has no fhx of MM      Current Outpatient Medications:     aspirin 81 mg Tab, Take 81 mg by mouth once daily. Every day, Disp: , Rfl:     blood sugar diagnostic (ACCU-CHEK GUIDE TEST STRIPS) Strp, 300 each by Misc.(Non-Drug; Combo Route) route 3 (three) times daily., Disp: 300 each, Rfl: 3    blood-glucose meter kit, Accu-chek Marley glucometer check glucose three times daily E11.65, Disp: 1 each, Rfl: 0    calcium carbonate/vitamin D3 (CALCIUM+D ORAL), Take 1 tablet by mouth once daily., Disp: , Rfl:     carvediloL (COREG) 6.25 MG tablet, Take 1 tablet (6.25 mg total) by mouth 2 (two) times daily with meals., Disp: 180 tablet, Rfl: 3    DROPLET PEN NEEDLE 31 gauge x 5/16" Ndle, USE WITH LANTUS SOLOSTAR PEN AS NEEDED, Disp: 100 each, Rfl: 3    insulin (LANTUS SOLOSTAR U-100 INSULIN) glargine 100 units/mL (3mL) SubQ pen, INJECT 24 UNITS SUBCUTANEOUSLY EVERY EVENING, Disp: 30 mL, Rfl: 1    JANUVIA 100 mg Tab, TAKE 1 TABLET EVERY DAY, Disp: 90 tablet, Rfl: 0    ketoconazole (NIZORAL) 2 % cream, AAA pannus fold bid, Disp: 60 g, Rfl: 3    lancets (ACCU-CHEK SOFTCLIX LANCETS) Misc, New machine Guide Me Accuchek, Disp: 300 each, Rfl: 3    lisinopriL (PRINIVIL,ZESTRIL) 20 MG tablet, Take 1 tablet (20 mg total) by mouth once daily., Disp: 90 tablet, Rfl: 1    metFORMIN (GLUCOPHAGE) 1000 MG tablet, TAKE ONE TABLET BY MOUTH TWICE DAILY WITH MEALS., Disp: 180 tablet, Rfl: 1    multivitamin (THERAGRAN) per tablet, " Take 1 tablet by mouth once daily., Disp: , Rfl:     mycophenolate (CELLCEPT) 500 mg Tab, One tab PO nightly for 2 weeks then increase to BID, Disp: 60 tablet, Rfl: 1    predniSONE (DELTASONE) 20 MG tablet, Take 2 pills po qam with breakfast x 1 wk then take 1 po qam with breakfast x 1 wk then take 1/2 po qam with breakfast x 6 days, Disp: 24 tablet, Rfl: 0    PREVALITE 4 gram PwPk, TAKE 1 PACKET (4 G TOTAL) BY MOUTH ONCE DAILY., Disp: 30 packet, Rfl: 11    triamcinolone acetonide 0.1% (KENALOG) 0.1 % cream, AAA bid, Disp: 454 g, Rfl: 3        Review of Systems   Constitutional: Negative for fever, chills, weight loss, fatigue and night sweats.   HENT: Negative for mouth sores.    Respiratory: Negative for cough and shortness of breath.    Skin: Positive for itching, rash and dry skin.   Hematologic/Lymphatic: Bruises/bleeds easily.        Objective:    Physical Exam   Constitutional: She appears well-developed and well-nourished. No distress.   Neurological: She is alert and oriented to person, place, and time. She is not disoriented.   Psychiatric: She has a normal mood and affect.   Skin:   Areas Examined (abnormalities noted in diagram):   Head / Face Inspection Performed  Neck Inspection Performed  Chest / Axilla Inspection Performed  Abdomen Inspection Performed  Back Inspection Performed  RUE Inspected  LUE Inspection Performed  RLE Inspected  LLE Inspection Performed              Diagram Legend     Erythematous scaling macule/papule c/w actinic keratosis       Vascular papule c/w angioma      Pigmented verrucoid papule/plaque c/w seborrheic keratosis      Yellow umbilicated papule c/w sebaceous hyperplasia      Irregularly shaped tan macule c/w lentigo     1-2 mm smooth white papules consistent with Milia      Movable subcutaneous cyst with punctum c/w epidermal inclusion cyst      Subcutaneous movable cyst c/w pilar cyst      Firm pink to brown papule c/w dermatofibroma      Pedunculated fleshy  papule(s) c/w skin tag(s)      Evenly pigmented macule c/w junctional nevus     Mildly variegated pigmented, slightly irregular-bordered macule c/w mildly atypical nevus      Flesh colored to evenly pigmented papule c/w intradermal nevus       Pink pearly papule/plaque c/w basal cell carcinoma      Erythematous hyperkeratotic cursted plaque c/w SCC      Surgical scar with no sign of skin cancer recurrence      Open and closed comedones      Inflammatory papules and pustules      Verrucoid papule consistent consistent with wart     Erythematous eczematous patches and plaques     Dystrophic onycholytic nail with subungual debris c/w onychomycosis     Umbilicated papule    Erythematous-base heme-crusted tan verrucoid plaque consistent with inflamed seborrheic keratosis     Erythematous Silvery Scaling Plaque c/w Psoriasis     See annotation                     Latest Reference Range & Units 05/16/22 09:06   WBC 3.90 - 12.70 K/uL 5.87   RBC 4.00 - 5.40 M/uL 3.93 (L)   Hemoglobin 12.0 - 16.0 g/dL 11.8 (L)   Hematocrit 37.0 - 48.5 % 36.6 (L)   MCV 82 - 98 fL 93   MCH 27.0 - 31.0 pg 30.0   MCHC 32.0 - 36.0 g/dL 32.2   RDW 11.5 - 14.5 % 13.4   Platelets 150 - 450 K/uL 188   MPV 9.2 - 12.9 fL 10.4   Gran % 38.0 - 73.0 % 67.2   Lymph % 18.0 - 48.0 % 10.4 (L)   Mono % 4.0 - 15.0 % 8.5   Eosinophil % 0.0 - 8.0 % 13.1 (H)   Basophil % 0.0 - 1.9 % 0.5   Immature Granulocytes 0.0 - 0.5 % 0.3   Gran # (ANC) 1.8 - 7.7 K/uL 3.9   Lymph # 1.0 - 4.8 K/uL 0.6 (L)   Mono # 0.3 - 1.0 K/uL 0.5   Eos # 0.0 - 0.5 K/uL 0.8 (H)   Baso # 0.00 - 0.20 K/uL 0.03   Immature Grans (Abs) 0.00 - 0.04 K/uL 0.02 [1]   NRBC 0 /100 WBC 0   Differential Method  Automated   Sodium 136 - 145 mmol/L 141   Potassium 3.5 - 5.1 mmol/L 4.1   Chloride 95 - 110 mmol/L 107   CO2 23 - 29 mmol/L 24   Anion Gap 8 - 16 mmol/L 10   BUN 8 - 23 mg/dL 20   Creatinine 0.5 - 1.4 mg/dL 1.1   EGFR if non African American >60 mL/min/1.73 m^2 45 ! [2]   EGFR if   ">60 mL/min/1.73 m^2 52 !   Glucose 70 - 110 mg/dL 293 (H)   Calcium 8.7 - 10.5 mg/dL 9.1   Alkaline Phosphatase 55 - 135 U/L 60   PROTEIN TOTAL 6.0 - 8.4 g/dL 6.6   Albumin 3.5 - 5.2 g/dL 3.5   BILIRUBIN TOTAL 0.1 - 1.0 mg/dL 1.3 (H) [3]   AST 10 - 40 U/L 18   ALT 10 - 44 U/L 16     Assessment / Plan:        On Cellcept therapy  -     CBC Auto Differential; Future; Expected date: 05/16/2022  -     Comprehensive Metabolic Panel; Future; Expected date: 05/16/2022    Bullous pemphigoid  -     mycophenolate (CELLCEPT) 500 mg Tab; One tab PO QAM and 2 tabs PO QHS  Dispense: 90 tablet; Refill: 1  -     predniSONE (DELTASONE) 10 MG tablet; Take 2 tabs PO QAM x 1 week then 1 tab PO QAM x1 week  Dispense: 21 tablet; Refill: 0    flaring severely  Stopped cellcept as we were titering up for control  Resume, increase to 1500mg daily (one in AM two in PM).   Check CBC CMP today, stable  Low pred taper to provide rapid control- patient with diabetes, monitors BG   Leaving for [place] for a week, zeynep with son at [facility]  TAC cream daily to BID PRN  BSA approx 25%, no mucosal involvement    Continue niacinamide 500mg BID  Unable to control with doxy-niacinamide combo in the past  Was improving on cellcept  Patient on a glitpin (Januvia), unknown start date but definite possible culprit for drug induced BP. First entry for gliptin in 8/2020  Will message Dr Christianson to inquire whether he could discontinue  Uptodate:  "Drug-induced bullous pemphigoid may develop several months or more than one year after ingestion of the inciting medication [2]. Although drug-induced disease likely accounts for a small proportion of cases of bullous pemphigoid, a wide variety of drugs have been associated with the development of this disease"            No follow-ups on file.  "

## 2022-05-16 NOTE — PATIENT INSTRUCTIONS
On Cellcept therapy  -     CBC Auto Differential; Future; Expected date: 05/16/2022  -     Comprehensive Metabolic Panel; Future; Expected date: 05/16/2022    Bullous pemphigoid  -     mycophenolate (CELLCEPT) 500 mg Tab; One tab PO QAM and 2 tabs PO QHS  Dispense: 90 tablet; Refill: 1  -     predniSONE (DELTASONE) 10 MG tablet; Take 2 tabs PO QAM x 1 week then 1 tab PO QAM x1 week  Dispense: 21 tablet; Refill: 0    flaring severely today  Blisters are due to autoimmune condition BULLOUS PEMPHIGOID, not a reaction to the medication used to treat it    Patient stopped cellcept as we were titering up for control  Resume mycophenolate, increase to 1500mg daily (one tab in AM two tabsin PM)  Low prednisone taper to provide rapid control (take 2 with breakfast for one week then one with breakfast for one week)    Use triamcinolone cream to all itchy rash areas once to twice daily

## 2022-05-17 NOTE — TELEPHONE ENCOUNTER
Patient given this information. Lab appt made for 3 months. Patient will  the Jardiance today and get started on it.

## 2022-06-11 NOTE — TELEPHONE ENCOUNTER
Care Due:                  Date            Visit Type   Department     Provider  --------------------------------------------------------------------------------                                [place] -                              [facility]  Last Visit: 03-      CARE ([facility])   PRACTICE       Chang Christianson  Next Visit: None Scheduled  None         None Found                                                            Last  Test          Frequency    Reason                     Performed    Due Date  --------------------------------------------------------------------------------    HBA1C.......  6 months...  empagliflozin, insulin,    03- 09-                             metFORMIN................    [employer] Embedded Care Gaps. Reference number: [id number]. 6/11/2022   3:35:50 PM CDT

## 2022-06-13 RX ORDER — METFORMIN HYDROCHLORIDE 1000 MG/1
TABLET ORAL
Qty: 180 TABLET | Refills: 0 | Status: SHIPPED | OUTPATIENT
Start: 2022-06-13 | End: 2022-08-29 | Stop reason: SDUPTHER

## 2022-06-14 NOTE — TELEPHONE ENCOUNTER
Refill Routing Note   Medication(s) are not appropriate for processing by [facility] for the following reason(s):      - Patient has been seen in the ED/Hospital since the last PCP visit    [facility] action(s):  Defer Medication-related problems identified: Requires labs        Medication reconciliation completed: No     Appointments  past 12m or future 3m with PCP    Date Provider   Last Visit   3/14/2022 Chang Christianson MD   Next Visit   Visit date not found Chang Christianson MD   ED visits in past 90 days: 1        Note composed:8:30 PM 06/13/2022

## 2022-08-01 ENCOUNTER — OFFICE VISIT (OUTPATIENT)
Dept: PODIATRY | Facility: CLINIC | Age: 87
End: 2022-08-01
Payer: MEDICARE

## 2022-08-01 ENCOUNTER — HOSPITAL ENCOUNTER (OUTPATIENT)
Dept: RADIOLOGY | Facility: CLINIC | Age: 87
Discharge: HOME | End: 2022-08-01
Attending: PODIATRIST
Payer: MEDICARE

## 2022-08-01 VITALS
RESPIRATION RATE: 16 BRPM | HEIGHT: 67 IN | OXYGEN SATURATION: 97 % | HEART RATE: 80 BPM | BODY MASS INDEX: 29.03 KG/M2 | WEIGHT: 185 LBS

## 2022-08-01 DIAGNOSIS — M20.12 VALGUS DEFORMITY OF BOTH GREAT TOES: ICD-10-CM

## 2022-08-01 DIAGNOSIS — M20.11 VALGUS DEFORMITY OF BOTH GREAT TOES: ICD-10-CM

## 2022-08-01 DIAGNOSIS — E11.42 TYPE 2 DIABETES MELLITUS WITH DIABETIC POLYNEUROPATHY, WITH LONG-TERM CURRENT USE OF INSULIN: ICD-10-CM

## 2022-08-01 DIAGNOSIS — S93.602A SPRAIN OF LEFT FOOT, INITIAL ENCOUNTER: Primary | ICD-10-CM

## 2022-08-01 DIAGNOSIS — L98.9 DISEASE OF SKIN AND SUBCUTANEOUS TISSUE: ICD-10-CM

## 2022-08-01 DIAGNOSIS — Z79.4 TYPE 2 DIABETES MELLITUS WITH DIABETIC POLYNEUROPATHY, WITH LONG-TERM CURRENT USE OF INSULIN: ICD-10-CM

## 2022-08-01 DIAGNOSIS — M79.672 LEFT FOOT PAIN: ICD-10-CM

## 2022-08-01 PROCEDURE — 3288F PR FALLS RISK ASSESSMENT DOCUMENTED: ICD-10-PCS | Mod: CPTII,S$GLB,, | Performed by: PODIATRIST

## 2022-08-01 PROCEDURE — 3288F FALL RISK ASSESSMENT DOCD: CPT | Mod: CPTII,S$GLB,, | Performed by: PODIATRIST

## 2022-08-01 PROCEDURE — 1160F PR REVIEW ALL MEDS BY PRESCRIBER/CLIN PHARMACIST DOCUMENTED: ICD-10-PCS | Mod: CPTII,S$GLB,, | Performed by: PODIATRIST

## 2022-08-01 PROCEDURE — 73630 X-RAY EXAM OF FOOT: CPT | Mod: LT,S$GLB,, | Performed by: RADIOLOGY

## 2022-08-01 PROCEDURE — 1100F PR PT FALLS ASSESS DOC 2+ FALLS/FALL W/INJURY/YR: ICD-10-PCS | Mod: CPTII,S$GLB,, | Performed by: PODIATRIST

## 2022-08-01 PROCEDURE — 99203 PR OFFICE/OUTPT VISIT, NEW, LEVL III, 30-44 MIN: ICD-10-PCS | Mod: S$GLB,,, | Performed by: PODIATRIST

## 2022-08-01 PROCEDURE — 1125F AMNT PAIN NOTED PAIN PRSNT: CPT | Mod: CPTII,S$GLB,, | Performed by: PODIATRIST

## 2022-08-01 PROCEDURE — 3072F LOW RISK FOR RETINOPATHY: CPT | Mod: CPTII,S$GLB,, | Performed by: PODIATRIST

## 2022-08-01 PROCEDURE — 73630 XR FOOT COMPLETE 3 VIEW LEFT: ICD-10-PCS | Mod: LT,S$GLB,, | Performed by: RADIOLOGY

## 2022-08-01 PROCEDURE — 1125F PR PAIN SEVERITY QUANTIFIED, PAIN PRESENT: ICD-10-PCS | Mod: CPTII,S$GLB,, | Performed by: PODIATRIST

## 2022-08-01 PROCEDURE — 1159F MED LIST DOCD IN RCRD: CPT | Mod: CPTII,S$GLB,, | Performed by: PODIATRIST

## 2022-08-01 PROCEDURE — 1100F PTFALLS ASSESS-DOCD GE2>/YR: CPT | Mod: CPTII,S$GLB,, | Performed by: PODIATRIST

## 2022-08-01 PROCEDURE — 1159F PR MEDICATION LIST DOCUMENTED IN MEDICAL RECORD: ICD-10-PCS | Mod: CPTII,S$GLB,, | Performed by: PODIATRIST

## 2022-08-01 PROCEDURE — 3072F PR LOW RISK FOR RETINOPATHY: ICD-10-PCS | Mod: CPTII,S$GLB,, | Performed by: PODIATRIST

## 2022-08-01 PROCEDURE — 99203 OFFICE O/P NEW LOW 30 MIN: CPT | Mod: S$GLB,,, | Performed by: PODIATRIST

## 2022-08-01 PROCEDURE — 1160F RVW MEDS BY RX/DR IN RCRD: CPT | Mod: CPTII,S$GLB,, | Performed by: PODIATRIST

## 2022-08-01 RX ORDER — TRIAMCINOLONE ACETONIDE 1 MG/G
CREAM TOPICAL
Qty: 454 G | Refills: 3 | Status: SHIPPED | OUTPATIENT
Start: 2022-08-01

## 2022-08-01 RX ORDER — KETOCONAZOLE 20 MG/G
CREAM TOPICAL
Qty: 60 G | Refills: 3 | Status: SHIPPED | OUTPATIENT
Start: 2022-08-01

## 2022-08-01 RX ORDER — PHENYLPROPANOLAMINE/CLEMASTINE 75-1.34MG
TABLET, EXTENDED RELEASE ORAL
Status: ON HOLD | COMMUNITY
End: 2022-10-18 | Stop reason: HOSPADM

## 2022-08-01 NOTE — PATIENT INSTRUCTIONS
Bunion    You have a bunion. This is a bony bump at the base of your big toe, along the inside edge of your foot. As the bump gets bigger, it can become red, swollen, and painful with shoe wear.  Bunions may occur if you wear shoes that are too tight and pinch your toes together. High heels may make this worse. In some cases a bunion is due to poor alignment of the foot and ankle. This puts extra weight on the instep of each foot.  Once a bunion forms, it changes the way weight is spread all across your foot. This causes the bunion to get worse over time. The big toe will bend more and more toward the other toes.  A minor bunion can be treated by:  Wearing properly fitting shoes  Using bunion pads  Wearing shoe inserts, called orthotics, to better align the foot and ankle  Physical therapy with ultrasound or whirlpool baths can ease pain, redness, and swelling. Severe cases may require surgery. If you dont treat what is causing the bunion, it may get larger and more painful.  Home care  Limit high heels. These shoes force your foot forward, crowding the toes together.  Switch to comfortable shoes with a wide toe area. Or have your existing shoes stretched by a shoe repair shop.  Avoid shoes that are tight, narrow, or pointed.  If you are flat-footed, using arch supports may help prevent further deformity. The best shoe inserts are the ones custom made by a foot specialist, called a podiatrist, or other healthcare provider.  Put a bunion pad over the bunion to ease pressure of your shoe against the bunion. You can buy these pads at most pharmacies without a prescription  To reduce pain and swelling, apply an ice pack over the injured area for 15 to 20 minutes. Do this every 1 to 2 hours the first day. Keep using ice 3 to 4 times a day until the pain and swelling goes away.  To make an ice pack, put ice cubes in a sealed zip-lock plastic bag. Wrap the bag in a clean, thin towel or cloth. Never put ice or an ice pack  directly on the skin.  You may use over-the-counter pain medicine to control pain, unless another medicine was prescribed. Talk with your provider before using these medicines if you have chronic liver or kidney disease, or ever had a stomach ulcer or GI (gastrointestinal) bleeding.  Follow-up care  Follow up with a podiatrist or foot doctor, or as advised.  If X-rays were taken, you will be notified of any new findings that may affect your care.  When to seek medical care  Contact your healthcare provider if any of the following occur:  Increasing pain or redness around the base of the big toe  Painful ingrown toenail, with redness and swelling or pus around the nail  Date Last Reviewed: 11/21/2015  © 0029-0922 [employer]. [address] [place] [address], [place] [address]. All rights reserved. This information is not intended as a substitute for professional medical care. Always follow your healthcare professional's instructions.

## 2022-08-01 NOTE — PROGRESS NOTES
"  [address]  [place] [address]  Phone: [phone]   [phone]    Patient's PCP:Chang Christianson MD  Referring Provider: Aaareferral Self    Subjective:      Chief Complaint:: Foot Pain (Left lateral hindfoot pain)    GARRICK Tolliver is a 88 y.o. female who presents today with a complaint of left lateral hindfoot lasting for three months. Onset of symptoms intermittent sharp pain after twisting foot in sons years.  Current symptoms include intermittent sharp pain.  Aggravating factors are walking and weightbearing. Symptoms have waxed and weaned. Treatment to date have included motrin or tylenol.    Systemic Doctor: Chang Christianson MD  Date Last Seen: 3/14/2022  Blood Sugar: has not checked today  Hemoglobin A1c: 9.7 3/11/2022    Vitals:    08/01/22 1528   Pulse: 80   Resp: 16   SpO2: 97%   Weight: 83.9 kg (185 lb)   Height: 5' 7" (1.702 m)   PainSc:   5      Shoe Size: 10 M    Past Surgical History:   Procedure Laterality Date    ADENOIDECTOMY      AORTIC VALVE REPLACEMENT      BREAST BIOPSY Right 20 yrs ago    benign    CARDIAC SURGERY      CHOLECYSTECTOMY      COLONOSCOPY  5-    Dr Holly, 5 year recheck    EPIDURAL STEROID INJECTION N/A 3/25/2021    Procedure: Injection, Steroid, Epidural L3-4;  Surgeon: Sky Love MD;  Location: [facility] OR;  Service: Pain Management;  Laterality: N/A;  Injection, Steroid, Epidural L3-4    EPIDURAL STEROID INJECTION N/A 5/20/2021    Procedure: Injection, Steroid, Epidural L3-4;  Surgeon: Sky Love MD;  Location: [facility] OR;  Service: Pain Management;  Laterality: N/A;  Injection, Steroid, Epidural L3-4    ESOPHAGOGASTRODUODENOSCOPY N/A 6/4/2020    Procedure: EGD (ESOPHAGOGASTRODUODENOSCOPY);  Surgeon: Michael Nugent III, MD;  Location: [facility];  Service: Endoscopy;  Laterality: N/A;    HEMORRHOID SURGERY      HYSTERECTOMY      OOPHORECTOMY      TONSILLECTOMY       Past Medical History:   Diagnosis Date    Anemia " due to stage 3 chronic kidney disease 07/24/2019    Arthritis     Chronic bilateral low back pain without sciatica 07/24/2019    CKD (chronic kidney disease) stage 3, GFR 30-59 ml/min 07/24/2019    Colon polyps     Coronary artery disease     Diabetes mellitus type II     Diabetes with neurologic complications     GERD (gastroesophageal reflux disease)     Hyperlipidemia     Hypertension     Hypothyroidism     Type 2 diabetes mellitus      Family History   Problem Relation Age of Onset    Diabetes Mother     Heart disease Mother     Glaucoma Mother     Cancer Father         lung cancer    Early death Son         brain tumor age 3    Diabetes Brother     No Known Problems Daughter     Early death Son         MVA 25    Macular degeneration Neg Hx     Retinal detachment Neg Hx         Social History:   Marital Status:   Alcohol History:  reports no history of alcohol use.  Tobacco History:  reports that she quit smoking about 48 years ago. Her smoking use included cigarettes. She has a 3.75 pack-year smoking history. She has never used smokeless tobacco.  Drug History:  reports no history of drug use.    Review of patient's allergies indicates:   Allergen Reactions    Fenofibric acid (choline)      Other reaction(s): Vomiting    Iodine      Other reaction(s): lips swollen ivp dye    Rosuvastatin      Other reaction(s): muscle pain       Current Outpatient Medications   Medication Sig Dispense Refill    aspirin 81 mg Tab Take 81 mg by mouth once daily. Every day      blood sugar diagnostic (ACCU-CHEK GUIDE TEST STRIPS) Strp 300 each by Misc.(Non-Drug; Combo Route) route 3 (three) times daily. 300 each 3    blood-glucose meter kit Accu-chek Marley glucometer check glucose three times daily E11.65 1 each 0    calcium carbonate/vitamin D3 (CALCIUM+D ORAL) Take 1 tablet by mouth once daily.      carvediloL (COREG) 6.25 MG tablet Take 1 tablet (6.25 mg total) by mouth 2 (two) times daily  "with meals. 180 tablet 3    DROPLET PEN NEEDLE 31 gauge x 5/16" Ndle USE WITH LANTUS SOLOSTAR PEN AS NEEDED 100 each 3    empagliflozin (JARDIANCE) 10 mg tablet Take 1 tablet (10 mg total) by mouth once daily. 30 tablet 6    ibuprofen (MOTRIN IB) 200 mg Cap       insulin (LANTUS SOLOSTAR U-100 INSULIN) glargine 100 units/mL (3mL) SubQ pen INJECT 24 UNITS SUBCUTANEOUSLY EVERY EVENING 30 mL 1    ketoconazole (NIZORAL) 2 % cream AAA pannus fold bid 60 g 3    lancets (ACCU-CHEK SOFTCLIX LANCETS) Misc New machine Guide Me Accuchek 300 each 3    lisinopriL (PRINIVIL,ZESTRIL) 20 MG tablet Take 1 tablet (20 mg total) by mouth once daily. 90 tablet 1    metFORMIN (GLUCOPHAGE) 1000 MG tablet TAKE ONE TABLET BY MOUTH TWICE DAILY WITH MEALS. 180 tablet 0    multivitamin (THERAGRAN) per tablet Take 1 tablet by mouth once daily.      mycophenolate (CELLCEPT) 500 mg Tab One tab PO QAM and 2 tabs PO QHS 90 tablet 1    predniSONE (DELTASONE) 10 MG tablet Take 2 tabs PO QAM x 1 week then 1 tab PO QAM x1 week 21 tablet 0    PREVALITE 4 gram PwPk TAKE 1 PACKET (4 G TOTAL) BY MOUTH ONCE DAILY. 30 packet 11    triamcinolone acetonide 0.1% (KENALOG) 0.1 % cream AAA bid 454 g 3     No current facility-administered medications for this visit.       Review of Systems      Objective:        Physical Exam:   Foot Exam    General  General Appearance: appears stated age and healthy   Orientation: alert and oriented to person, place, and time   Affect: appropriate   Gait: antalgic       Left Foot/Ankle      Inspection and Palpation  Ecchymosis: none  Tenderness: bony tenderness and fifth metatarsal base tenderness   Swelling: none   Arch: normal  Skin Exam: skin intact;   Neurovascular  Dorsalis pedis: 1+  Posterior tibial: 2+  Capillary refill: 2+  Varicose veins: present  Saphenous nerve sensation: normal  Tibial nerve sensation: normal  Superficial peroneal nerve sensation: normal  Deep peroneal nerve sensation: normal  Sural " nerve sensation: normal    Muscle Strength  Ankle dorsiflexion: 5  Ankle plantar flexion: 5  Ankle inversion: 5  Ankle eversion: 5  Great toe extension: 5  Great toe flexion: 5    Range of Motion    Normal left ankle ROM          Physical Exam  Cardiovascular:      Pulses:           Dorsalis pedis pulses are 1+ on the left side.        Posterior tibial pulses are 2+ on the left side.   Musculoskeletal:      Left foot: Bony tenderness present.        Feet:                Left Ankle/Foot Exam     Range of Motion   The patient has normal left ankle ROM.     Muscle Strength   The patient has normal left ankle strength.      Muscle Strength   Left Lower Extremity   Ankle Dorsiflexion:  5   Plantar flexion:  5/5     Vascular Exam       Left Pulses  Dorsalis Pedis:      1+  Posterior Tibial:      2+             Imaging:   AP, lateral, lateral oblique weight-bearing x-rays left foot:  Hole slightly displaced fracture head of proximal phalanx 3rd toe.  Patient has no symptoms at this site.  Area of symptomology lateral foot by 5th metatarsal base and cuboid show no obvious fracture, no obvious stress fracture, mild degenerative joint disease and no malalignment.  Bunion deformity present.         Assessment:       1. Sprain of left foot, initial encounter    2. Left foot pain    3. Valgus deformity of both great toes    4. Type 2 diabetes mellitus with diabetic polyneuropathy, with long-term current use of insulin      Plan:   Sprain of left foot, initial encounter    Left foot pain  -     X-Ray Foot Complete Left    Valgus deformity of both great toes    Type 2 diabetes mellitus with diabetic polyneuropathy, with long-term current use of insulin    I evaluated patient had discussion with her about the clinical radiological findings of no fracture in the lateral side of the foot but probable inflammation either a sprain or tendonitis.  I also discussed with the possibility of stress fracture at this time I do not see any  signs of 1.  Discussed with her that she has good range of motion good strength in all tendons appear to be intact which she may have stretched them and I could only see that on MRI.  I recommending she use stable shoes no heels she ice the area and use topical anti-inflammatory gel.  If the pain resolved in 4 weeks she will not return to see me.  She continues to have pain will consider ordering an MRI of the left hindfoot for evaluation of the tendons.    Return in 4-6 weeks if needed    Procedures          Counseling:     I provided patient education verbally regarding:   Patient diagnosis, treatment options, as well as alternatives, risks, and benefits.      I discussed ankle sprain with pt and the different grades/severity of sprain and different ligaments that can be torn.  I also discussed that most ankle sprains are treated conservatively and the pt does well.  Occasionally some sprains do not heal normally and there is insatbility of the joint leading to pain and possible arthritis.  these are usually evaluated by MRI, stress test.    I discussed stress fracture of bone, mechanism, delay in radiological findings, possible MRI, use of removable cast for 6 to 8 weeks and rarely surgery.    Treatment of tendonitis with rest, ice, oral NSAID, topical antiinflammatory creams, cam walker boot if needed, and MRI if needed.    I discussed conservative treatment of minor tendon tear with cast for 6 to 8 weeks, MRI, ultrasound if needed for evaluation of the rupture and possible need for surgical repair.  This note was created using Dragon voice recognition software that occasionally misinterpreted phrases or words.

## 2022-08-08 ENCOUNTER — TELEPHONE (OUTPATIENT)
Dept: FAMILY MEDICINE | Facility: CLINIC | Age: 87
End: 2022-08-08
Payer: MEDICARE

## 2022-08-08 NOTE — TELEPHONE ENCOUNTER
----- Message from Nomi Guadalupe Patient Care Assistant sent at 8/6/2022 10:47 AM CDT -----  Contact: Pt  Type:  Sooner Appointment Request    Caller is requesting a sooner appointment.  Caller declined first available appointment listed below.  Caller will not accept being placed on the waitlist and is requesting a message be sent to doctor.    Name of Caller:  Pt  When is the first available appointment?  11/10/2022  Symptoms:  Diabetes/Auto-Immune Disease  Best Call Back Number:  [phone]  Additional Information:  Pt is calling to schedule her appt for a follow up from her labs and also to talk with the provider about her auto-immune disease. Please call back and advise.

## 2022-08-12 ENCOUNTER — HOSPITAL ENCOUNTER (EMERGENCY)
Facility: HOSPITAL | Age: 87
Discharge: HOME | End: 2022-08-12
Attending: EMERGENCY MEDICINE
Payer: MEDICARE

## 2022-08-12 VITALS
HEART RATE: 95 BPM | RESPIRATION RATE: 20 BRPM | OXYGEN SATURATION: 99 % | WEIGHT: 190 LBS | TEMPERATURE: 98 F | HEIGHT: 67 IN | BODY MASS INDEX: 29.82 KG/M2 | SYSTOLIC BLOOD PRESSURE: 110 MMHG | DIASTOLIC BLOOD PRESSURE: 62 MMHG

## 2022-08-12 DIAGNOSIS — L12.0 BULLOUS PEMPHIGOID: Primary | ICD-10-CM

## 2022-08-12 PROCEDURE — 99282 EMERGENCY DEPT VISIT SF MDM: CPT

## 2022-08-13 NOTE — ED PROVIDER NOTES
"Encounter Date: 8/12/2022    SCRIBE #1 NOTE: I, Valeria Turner, am scribing for, and in the presence of, Andrez Banerjee MD.       History     Chief Complaint   Patient presents with    Rash     Pt comes in with c/o blister type rash      Time seen by provider: 9:16 PM on 08/12/2022    Leslie Tolliver is a 88 y.o. female with a PMHx of bullous pemphigoid presents to the ED with an onset of an "itchy" blister-like rash which has been present since 12/2021 and intermittent in severity. Patient notes the rash is present to her entire body but more predominant to her L lower extremity and bilateral upper extremities. She reports following with Dr. Mills (Dermatology) in 05/2022 who prescribed Triamcinolone Cream and advised the patient to take Benadryl as needed. The patient denies fever, N/V, SOB, or any other symptoms at this time. The patient reports taking Tylenol as needed for pain. PMHx of HTN, HLD, DM-2, CAD, CKD, and anemia. PSHx of hysterectomy, cholecystectomy, and aortic valve replacement.       The history is provided by the patient.     Review of patient's allergies indicates:   Allergen Reactions    Fenofibric acid (choline)      Other reaction(s): Vomiting    Iodine      Other reaction(s): lips swollen ivp dye    Rosuvastatin      Other reaction(s): muscle pain     Past Medical History:   Diagnosis Date    Anemia due to stage 3 chronic kidney disease 07/24/2019    Arthritis     Chronic bilateral low back pain without sciatica 07/24/2019    CKD (chronic kidney disease) stage 3, GFR 30-59 ml/min 07/24/2019    Colon polyps     Coronary artery disease     Diabetes mellitus type II     Diabetes with neurologic complications     GERD (gastroesophageal reflux disease)     Hyperlipidemia     Hypertension     Hypothyroidism     Type 2 diabetes mellitus      Past Surgical History:   Procedure Laterality Date    ADENOIDECTOMY      AORTIC VALVE REPLACEMENT      BREAST BIOPSY Right 20 yrs ago "    benign    CARDIAC SURGERY      CHOLECYSTECTOMY      COLONOSCOPY  2014    Dr Holly, 5 year recheck    EPIDURAL STEROID INJECTION N/A 3/25/2021    Procedure: Injection, Steroid, Epidural L3-4;  Surgeon: Sky Love MD;  Location: [facility] OR;  Service: Pain Management;  Laterality: N/A;  Injection, Steroid, Epidural L3-4    EPIDURAL STEROID INJECTION N/A 2021    Procedure: Injection, Steroid, Epidural L3-4;  Surgeon: Sky Love MD;  Location: [facility] OR;  Service: Pain Management;  Laterality: N/A;  Injection, Steroid, Epidural L3-4    ESOPHAGOGASTRODUODENOSCOPY N/A 2020    Procedure: EGD (ESOPHAGOGASTRODUODENOSCOPY);  Surgeon: Michael Nugent III, MD;  Location: [facility];  Service: Endoscopy;  Laterality: N/A;    HEMORRHOID SURGERY      HYSTERECTOMY      OOPHORECTOMY      TONSILLECTOMY       Family History   Problem Relation Age of Onset    Diabetes Mother     Heart disease Mother     Glaucoma Mother     Cancer Father         lung cancer    Early death Son         brain tumor age 3    Diabetes Brother     No Known Problems Daughter     Early death Son         MVA 25    Macular degeneration Neg Hx     Retinal detachment Neg Hx      Social History     Tobacco Use    Smoking status: Former Smoker     Packs/day: 0.25     Years: 15.00     Pack years: 3.75     Types: Cigarettes     Quit date: 3/30/1974     Years since quittin.4    Smokeless tobacco: Never Used   Substance Use Topics    Alcohol use: No    Drug use: No     Review of Systems   Constitutional: Negative for activity change, diaphoresis and fever.   HENT: Negative for ear pain, rhinorrhea, sore throat and trouble swallowing.    Eyes: Negative for pain and visual disturbance.   Respiratory: Negative for cough, shortness of breath and stridor.    Cardiovascular: Negative for chest pain.   Gastrointestinal: Negative for abdominal pain, blood in stool, diarrhea, nausea and vomiting.   Genitourinary: Negative for  dysuria, hematuria, vaginal bleeding and vaginal discharge.   Musculoskeletal: Negative for gait problem.   Skin: Positive for rash. Negative for wound.   Neurological: Negative for seizures and headaches.   Psychiatric/Behavioral: Negative for hallucinations and suicidal ideas.       Physical Exam     Initial Vitals [08/12/22 2059]   BP Pulse Resp Temp SpO2   137/88 99 18 98.3 °F (36.8 °C) 98 %      MAP       --         Physical Exam    Nursing note and vitals reviewed.  Constitutional: She appears well-developed. She is not diaphoretic.  Non-toxic appearance. No distress.   HENT:   Head: Normocephalic and atraumatic.   Nose: Nose normal.   Eyes: EOM are normal. No scleral icterus.   Neck: Neck supple.   Normal range of motion.  Cardiovascular: Normal rate, regular rhythm, normal heart sounds and intact distal pulses. Exam reveals no gallop and no friction rub.    No murmur heard.  Pulmonary/Chest: Breath sounds normal. No stridor. No respiratory distress. She has no wheezes. She has no rhonchi. She has no rales.   Abdominal: Abdomen is soft. Bowel sounds are normal. She exhibits no distension. There is no abdominal tenderness. There is no rebound and no guarding.   Musculoskeletal:         General: Normal range of motion.      Cervical back: Normal range of motion and neck supple.     Neurological: She is alert and oriented to person, place, and time. She has normal strength. No cranial nerve deficit or sensory deficit.   Skin: Skin is warm and dry. Capillary refill takes less than 2 seconds.   Multiple areas of erythema and bullae noted most extensional to the L lower extremity and bilateral upper extremities. There is no oral involvement present. No rash noted to the palms of her hands or soles of feet. There is to petechia or purpura noted on exam. There are no vesicles. Non-toxic appearing on exam.    Psychiatric: She has a normal mood and affect.         ED Course   Procedures  Labs Reviewed - No data to  display       Imaging Results    None          Medications - No data to display  Medical Decision Making:   History:   Old Medical Records: I decided to obtain old medical records.  ED Management:  Patient with no bullous pemphigoid comes for advice regarding medications. Had long discussion regarding treatment options with patient.    No vesicles.  No oral involvement.  Does not involve palms or soles.  No petechia or purpura.   Patient has currently been stabilized in the emergency department. Patient is non-toxic appearing and well hydrated.    Patient's symptoms not typical for other emergent causes of rash such as cellulitis, necrotizing fasciitis, vasculitis, anaphylaxis, SJS or TENS. Patient will be discharged with strict return precautions and follow up with primary MD within 24 hours for further evaluation. Pt already has dermatology fu scheduled for next week with Terell.             Vanessaibe Attestation:   Scribe #1: I performed the above scribed service and the documentation accurately describes the services I performed. I attest to the accuracy of the note.                 Attending Attestation:     Physician Attestation for Scribe:    I, Dr. Andrez Banerjee, personally performed the services described in this documentation.   All medical record entries made by the scribe were at my direction and in my presence.   I have reviewed the chart and agree that the record is accurate and complete.   Andrez Banerjee MD  12:11 AM 08/13/2022     DISCLAIMER: This note was prepared with [employer] voice recognition transcription software. Garbled syntax, mangled pronouns, and other bizarre constructions may be attributed to that software system.    Clinical Impression:   Final diagnoses:  [L12.0] Bullous pemphigoid (Primary)          ED Disposition Condition    Discharge Stable        ED Prescriptions     None        Follow-up Information     Follow up With Specialties Details Why Contact Negrito Turner  ARUN Mills MD Dermatology Call   [address]  [facility] [address]  [phone]      Chang Christianson MD Family Medicine Schedule an appointment as soon as possible for a visit   [address] [facility]  [address]  [facility] [address]  [phone]      [facility] Emergency Dept Emergency Medicine Go to  As needed, If symptoms worsen [address]  [facility] [address]  [phone]           Andrez Banerjee MD  08/13/22 0012

## 2022-08-20 ENCOUNTER — LAB VISIT (OUTPATIENT)
Dept: LAB | Facility: HOSPITAL | Age: 87
End: 2022-08-20
Attending: FAMILY MEDICINE
Payer: MEDICARE

## 2022-08-20 DIAGNOSIS — E11.42 TYPE 2 DIABETES MELLITUS WITH DIABETIC POLYNEUROPATHY, WITH LONG-TERM CURRENT USE OF INSULIN: ICD-10-CM

## 2022-08-20 DIAGNOSIS — Z79.4 TYPE 2 DIABETES MELLITUS WITH DIABETIC POLYNEUROPATHY, WITH LONG-TERM CURRENT USE OF INSULIN: ICD-10-CM

## 2022-08-20 LAB
ESTIMATED AVG GLUCOSE: 166 MG/DL (ref 68–131)
HBA1C MFR BLD: 7.4 % (ref 4–5.6)

## 2022-08-20 PROCEDURE — 83036 HEMOGLOBIN GLYCOSYLATED A1C: CPT | Performed by: FAMILY MEDICINE

## 2022-08-20 PROCEDURE — 36415 COLL VENOUS BLD VENIPUNCTURE: CPT | Mod: PO | Performed by: FAMILY MEDICINE

## 2022-08-22 DIAGNOSIS — E11.59 HYPERTENSION ASSOCIATED WITH DIABETES: Primary | ICD-10-CM

## 2022-08-22 DIAGNOSIS — E11.42 TYPE 2 DIABETES MELLITUS WITH DIABETIC POLYNEUROPATHY, WITH LONG-TERM CURRENT USE OF INSULIN: ICD-10-CM

## 2022-08-22 DIAGNOSIS — N18.32 STAGE 3B CHRONIC KIDNEY DISEASE: ICD-10-CM

## 2022-08-22 DIAGNOSIS — I15.2 HYPERTENSION ASSOCIATED WITH DIABETES: Primary | ICD-10-CM

## 2022-08-22 DIAGNOSIS — Z79.4 TYPE 2 DIABETES MELLITUS WITH DIABETIC POLYNEUROPATHY, WITH LONG-TERM CURRENT USE OF INSULIN: ICD-10-CM

## 2022-08-29 ENCOUNTER — OFFICE VISIT (OUTPATIENT)
Dept: FAMILY MEDICINE | Facility: CLINIC | Age: 87
End: 2022-08-29
Attending: FAMILY MEDICINE
Payer: MEDICARE

## 2022-08-29 VITALS
TEMPERATURE: 98 F | HEIGHT: 67 IN | DIASTOLIC BLOOD PRESSURE: 69 MMHG | WEIGHT: 175.5 LBS | RESPIRATION RATE: 18 BRPM | OXYGEN SATURATION: 97 % | SYSTOLIC BLOOD PRESSURE: 126 MMHG | BODY MASS INDEX: 27.55 KG/M2 | HEART RATE: 102 BPM

## 2022-08-29 DIAGNOSIS — E78.5 HYPERLIPIDEMIA ASSOCIATED WITH TYPE 2 DIABETES MELLITUS: ICD-10-CM

## 2022-08-29 DIAGNOSIS — E11.42 TYPE 2 DIABETES MELLITUS WITH DIABETIC POLYNEUROPATHY, WITH LONG-TERM CURRENT USE OF INSULIN: ICD-10-CM

## 2022-08-29 DIAGNOSIS — Z79.4 TYPE 2 DIABETES MELLITUS WITH DIABETIC POLYNEUROPATHY, WITH LONG-TERM CURRENT USE OF INSULIN: ICD-10-CM

## 2022-08-29 DIAGNOSIS — L12.0 BULLOUS PEMPHIGOID: ICD-10-CM

## 2022-08-29 DIAGNOSIS — I15.2 HYPERTENSION ASSOCIATED WITH DIABETES: Primary | ICD-10-CM

## 2022-08-29 DIAGNOSIS — E11.69 HYPERLIPIDEMIA ASSOCIATED WITH TYPE 2 DIABETES MELLITUS: ICD-10-CM

## 2022-08-29 DIAGNOSIS — E11.59 HYPERTENSION ASSOCIATED WITH DIABETES: Primary | ICD-10-CM

## 2022-08-29 DIAGNOSIS — E03.9 ACQUIRED HYPOTHYROIDISM: ICD-10-CM

## 2022-08-29 DIAGNOSIS — N18.32 STAGE 3B CHRONIC KIDNEY DISEASE: ICD-10-CM

## 2022-08-29 PROCEDURE — 99214 OFFICE O/P EST MOD 30 MIN: CPT | Mod: S$GLB,,, | Performed by: FAMILY MEDICINE

## 2022-08-29 PROCEDURE — 1160F PR REVIEW ALL MEDS BY PRESCRIBER/CLIN PHARMACIST DOCUMENTED: ICD-10-PCS | Mod: CPTII,S$GLB,, | Performed by: FAMILY MEDICINE

## 2022-08-29 PROCEDURE — 99499 RISK ADDL DX/OHS AUDIT: ICD-10-PCS | Mod: S$GLB,,, | Performed by: FAMILY MEDICINE

## 2022-08-29 PROCEDURE — 1159F MED LIST DOCD IN RCRD: CPT | Mod: CPTII,S$GLB,, | Performed by: FAMILY MEDICINE

## 2022-08-29 PROCEDURE — 99499 UNLISTED E&M SERVICE: CPT | Mod: S$GLB,,, | Performed by: FAMILY MEDICINE

## 2022-08-29 PROCEDURE — 1125F PR PAIN SEVERITY QUANTIFIED, PAIN PRESENT: ICD-10-PCS | Mod: CPTII,S$GLB,, | Performed by: FAMILY MEDICINE

## 2022-08-29 PROCEDURE — 99999 PR PBB SHADOW E&M-EST. PATIENT-LVL V: ICD-10-PCS | Mod: PBBFAC,,, | Performed by: FAMILY MEDICINE

## 2022-08-29 PROCEDURE — 3288F FALL RISK ASSESSMENT DOCD: CPT | Mod: CPTII,S$GLB,, | Performed by: FAMILY MEDICINE

## 2022-08-29 PROCEDURE — 1125F AMNT PAIN NOTED PAIN PRSNT: CPT | Mod: CPTII,S$GLB,, | Performed by: FAMILY MEDICINE

## 2022-08-29 PROCEDURE — 99999 PR PBB SHADOW E&M-EST. PATIENT-LVL V: CPT | Mod: PBBFAC,,, | Performed by: FAMILY MEDICINE

## 2022-08-29 PROCEDURE — 1159F PR MEDICATION LIST DOCUMENTED IN MEDICAL RECORD: ICD-10-PCS | Mod: CPTII,S$GLB,, | Performed by: FAMILY MEDICINE

## 2022-08-29 PROCEDURE — 1101F PR PT FALLS ASSESS DOC 0-1 FALLS W/OUT INJ PAST YR: ICD-10-PCS | Mod: CPTII,S$GLB,, | Performed by: FAMILY MEDICINE

## 2022-08-29 PROCEDURE — 1101F PT FALLS ASSESS-DOCD LE1/YR: CPT | Mod: CPTII,S$GLB,, | Performed by: FAMILY MEDICINE

## 2022-08-29 PROCEDURE — 3072F PR LOW RISK FOR RETINOPATHY: ICD-10-PCS | Mod: CPTII,S$GLB,, | Performed by: FAMILY MEDICINE

## 2022-08-29 PROCEDURE — 3072F LOW RISK FOR RETINOPATHY: CPT | Mod: CPTII,S$GLB,, | Performed by: FAMILY MEDICINE

## 2022-08-29 PROCEDURE — 3288F PR FALLS RISK ASSESSMENT DOCUMENTED: ICD-10-PCS | Mod: CPTII,S$GLB,, | Performed by: FAMILY MEDICINE

## 2022-08-29 PROCEDURE — 1160F RVW MEDS BY RX/DR IN RCRD: CPT | Mod: CPTII,S$GLB,, | Performed by: FAMILY MEDICINE

## 2022-08-29 PROCEDURE — 99214 PR OFFICE/OUTPT VISIT, EST, LEVL IV, 30-39 MIN: ICD-10-PCS | Mod: S$GLB,,, | Performed by: FAMILY MEDICINE

## 2022-08-29 RX ORDER — METFORMIN HYDROCHLORIDE 1000 MG/1
1000 TABLET ORAL 2 TIMES DAILY WITH MEALS
Qty: 180 TABLET | Refills: 3 | Status: SHIPPED | OUTPATIENT
Start: 2022-08-29 | End: 2023-08-25 | Stop reason: SDUPTHER

## 2022-08-29 RX ORDER — LEVOCETIRIZINE DIHYDROCHLORIDE 5 MG/1
5 TABLET, FILM COATED ORAL NIGHTLY
COMMUNITY

## 2022-08-29 NOTE — PROGRESS NOTES
Subjective:       Patient ID: Leslie Tolliver is a 88 y.o. female.    Chief Complaint: Diabetes (Pt states that she is here for her follow up)    88-year-old female diagnosed with bullous pemphigoid about nine months ago currently under care of [facility].  She is just completing a course of 20 mg of prednisone and will be reducing it to 10 mg after one more day.  She also is on CellCept 500 mg two b.i.d. and using Xyzal for itching.  Her lower body is covered with large serosanguineous bullae with smaller lesions and generally more lesions in the healing stages in the upper body.  Her blood sugar has gotten up to 280 while she was on the 20 mg of prednisone but more recently she has down into the 170s.  Her blood pressure has remain good.  Through diet and exercise with the assistance of Jardiance she has lost 18.3 lb from her last visit with me in March.  Her main complaint is itching and pain from lesions but they are improving.    Past Medical History:  07/24/2019: Anemia due to stage 3 chronic kidney disease  No date: Arthritis  8/29/2022: Bullous pemphigoid  07/24/2019: Chronic bilateral low back pain without sciatica  07/24/2019: CKD (chronic kidney disease) stage 3, GFR 30-59 ml/min  No date: Colon polyps  No date: Coronary artery disease  No date: Diabetes mellitus type II  No date: Diabetes with neurologic complications  No date: GERD (gastroesophageal reflux disease)  No date: Hyperlipidemia  No date: Hypertension  No date: Hypothyroidism  No date: Type 2 diabetes mellitus    Past Surgical History:  No date: ADENOIDECTOMY  No date: AORTIC VALVE REPLACEMENT  20 yrs ago: BREAST BIOPSY; Right      Comment:  benign  No date: CARDIAC SURGERY  No date: CHOLECYSTECTOMY  5-: COLONOSCOPY      Comment:  Dr Holly, 5 year recheck  3/25/2021: EPIDURAL STEROID INJECTION; N/A      Comment:  Procedure: Injection, Steroid, Epidural L3-4;  Surgeon:                Sky Love MD;  Location: [facility] OR;   "Service: Pain                Management;  Laterality: N/A;  Injection, Steroid,                Epidural L3-4  5/20/2021: EPIDURAL STEROID INJECTION; N/A      Comment:  Procedure: Injection, Steroid, Epidural L3-4;  Surgeon:                Sky Love MD;  Location: [facility] OR;  Service: Pain                Management;  Laterality: N/A;  Injection, Steroid,                Epidural L3-4  6/4/2020: ESOPHAGOGASTRODUODENOSCOPY; N/A      Comment:  Procedure: EGD (ESOPHAGOGASTRODUODENOSCOPY);  Surgeon:                Michael Nugent III, MD;  Location: [facility] ENDO;                 Service: Endoscopy;  Laterality: N/A;  No date: HEMORRHOID SURGERY  No date: HYSTERECTOMY  No date: OOPHORECTOMY  No date: TONSILLECTOMY    Current Outpatient Medications on File Prior to Visit:  aspirin 81 mg Tab, Take 81 mg by mouth once daily. Every day, Disp: , Rfl:   blood sugar diagnostic (ACCU-CHEK GUIDE TEST STRIPS) Strp, 300 each by Misc.(Non-Drug; Combo Route) route 3 (three) times daily., Disp: 300 each, Rfl: 3  blood-glucose meter kit, Accu-chek Marley glucometer check glucose three times daily E11.65, Disp: 1 each, Rfl: 0  calcium carbonate/vitamin D3 (CALCIUM+D ORAL), Take 1 tablet by mouth once daily., Disp: , Rfl:   carvediloL (COREG) 6.25 MG tablet, Take 1 tablet (6.25 mg total) by mouth 2 (two) times daily with meals., Disp: 180 tablet, Rfl: 3  DROPLET PEN NEEDLE 31 gauge x 5/16" Ndle, USE WITH LANTUS SOLOSTAR PEN AS NEEDED, Disp: 100 each, Rfl: 3  empagliflozin (JARDIANCE) 10 mg tablet, Take 1 tablet (10 mg total) by mouth once daily., Disp: 30 tablet, Rfl: 6  insulin (LANTUS SOLOSTAR U-100 INSULIN) glargine 100 units/mL (3mL) SubQ pen, INJECT 24 UNITS SUBCUTANEOUSLY EVERY EVENING, Disp: 30 mL, Rfl: 1  ketoconazole (NIZORAL) 2 % cream, AAA pannus fold bid, Disp: 60 g, Rfl: 3  lancets (ACCU-CHEK SOFTCLIX LANCETS) Misc, New machine Guide Me Accuchek, Disp: 300 each, Rfl: 3  levocetirizine (XYZAL) 5 MG tablet, Take 5 mg by mouth " every evening., Disp: , Rfl:   lisinopriL (PRINIVIL,ZESTRIL) 20 MG tablet, Take 1 tablet (20 mg total) by mouth once daily., Disp: 90 tablet, Rfl: 1  multivitamin (THERAGRAN) per tablet, Take 1 tablet by mouth once daily., Disp: , Rfl:   mycophenolate (CELLCEPT) 500 mg Tab, One tab PO QAM and 2 tabs PO QHS, Disp: 90 tablet, Rfl: 1  predniSONE (DELTASONE) 10 MG tablet, Take 2 tabs PO QAM x 1 week then 1 tab PO QAM x1 week, Disp: 21 tablet, Rfl: 0  PREVALITE 4 gram PwPk, TAKE 1 PACKET (4 G TOTAL) BY MOUTH ONCE DAILY., Disp: 30 packet, Rfl: 11  triamcinolone acetonide 0.1% (KENALOG) 0.1 % cream, AAA bid, Disp: 454 g, Rfl: 3  [DISCONTINUED] metFORMIN (GLUCOPHAGE) 1000 MG tablet, TAKE ONE TABLET BY MOUTH TWICE DAILY WITH MEALS., Disp: 180 tablet, Rfl: 0  ibuprofen 200 mg Cap, , Disp: , Rfl:     No current facility-administered medications on file prior to visit.        Review of Systems   Constitutional:  Negative for chills, diaphoresis and fever.   Endocrine: Negative for polydipsia and polyuria.   Skin:  Positive for rash and wound.     Objective:      Physical Exam  Vitals and nursing note reviewed.   Constitutional:       Comments: Good blood pressure control   Normal weight for age with a BMI of 27.5 she is down 18.3 lb from March 14, 2022   Cardiovascular:      Pulses:           Dorsalis pedis pulses are 2+ on the right side and 2+ on the left side.        Posterior tibial pulses are 2+ on the right side and 2+ on the left side.   Musculoskeletal:      Right foot: Normal range of motion. Bunion (Slight bunion deformity with no override) present. No deformity, Charcot foot, foot drop or prominent metatarsal heads.      Left foot: Normal range of motion. Bunion (Slight bunion deformity with no override) present. No deformity, Charcot foot, foot drop or prominent metatarsal heads.   Feet:      Right foot:      Protective Sensation: 9 sites tested.  9 sites sensed.      Skin integrity: No ulcer, blister, skin  breakdown, erythema, warmth, callus, dry skin or fissure.      Toenail Condition: Fungal disease present.     Left foot:      Protective Sensation: 9 sites tested.  9 sites sensed.      Skin integrity: No ulcer, blister, skin breakdown, erythema, warmth, callus, dry skin or fissure.      Toenail Condition: Fungal disease present.  Skin:     Capillary Refill: Capillary refill takes less than 2 seconds.      Findings: Erythema, lesion and rash present.          Neurological:      Comments: Proprioception intact all 10 toes       Assessment:       1. Hypertension associated with diabetes    2. Bullous pemphigoid    3. Hyperlipidemia associated with type 2 diabetes mellitus    4. Stage 3b chronic kidney disease    5. Type 2 diabetes mellitus with diabetic polyneuropathy, with long-term current use of insulin    6. Acquired hypothyroidism          Plan:       1. Bullous pemphigoid  Followed by Dermatology, slowly improving    2. Hypertension associated with diabetes  Good control with no changes needed    3. Hyperlipidemia associated with type 2 diabetes mellitus  Lab Results   Component Value Date    CHOL 132 03/11/2022    CHOL 130 02/25/2021    CHOL 132 01/14/2020     Lab Results   Component Value Date    HDL 33 (L) 03/11/2022    HDL 31 (L) 02/25/2021    HDL 29 (L) 01/14/2020     Lab Results   Component Value Date    LDLCALC 54.4 (L) 03/11/2022    LDLCALC 37.2 (L) 02/25/2021    LDLCALC 69.8 01/14/2020     Lab Results   Component Value Date    TRIG 223 (H) 03/11/2022    TRIG 309 (H) 02/25/2021    TRIG 166 (H) 01/14/2020     Lab Results   Component Value Date    CHOLHDL 25.0 03/11/2022    CHOLHDL 23.8 02/25/2021    CHOLHDL 22.0 01/14/2020     Good control, recheck due in March 4. Stage 3b chronic kidney disease     BMP  Lab Results   Component Value Date     05/16/2022    K 4.1 05/16/2022     05/16/2022    CO2 24 05/16/2022    BUN 20 05/16/2022    CREATININE 1.1 05/16/2022    CALCIUM 9.1 05/16/2022     ANIONGAP 10 05/16/2022    ESTGFRAFRICA 52 (A) 05/16/2022    EGFRNONAA 45 (A) 05/16/2022     Stable    5. Type 2 diabetes mellitus with diabetic polyneuropathy, with long-term current use of insulin  Lab Results   Component Value Date    HGBA1C 7.4 (H) 08/20/2022     Adequate control, should improve with reduction in steroid dosage.  Patient requested to send me a report on her blood sugars at the end of the week    6. Acquired hypothyroidism  Lab Results   Component Value Date    TSH 2.469 03/11/2022     Well controlled no changes needed    7. BMI 27.0-27.9,adult  Patient trying to lose about another 10 lb

## 2022-09-14 DIAGNOSIS — I15.2 HYPERTENSION ASSOCIATED WITH DIABETES: ICD-10-CM

## 2022-09-14 DIAGNOSIS — E11.59 HYPERTENSION ASSOCIATED WITH DIABETES: ICD-10-CM

## 2022-09-14 RX ORDER — LISINOPRIL 20 MG/1
TABLET ORAL
Qty: 90 TABLET | Refills: 2 | Status: SHIPPED | OUTPATIENT
Start: 2022-09-14 | End: 2023-04-25 | Stop reason: SDUPTHER

## 2022-09-14 NOTE — TELEPHONE ENCOUNTER
No new care gaps identified.  [facility] Embedded Care Gaps. Reference number: [id number]. 9/14/2022   4:12:05 PM CDT

## 2022-09-14 NOTE — TELEPHONE ENCOUNTER
Refill Decision Note   Leslie Tolliver  is requesting a refill authorization.  Brief Assessment and Rationale for Refill:  Approve     Medication Therapy Plan:       Medication Reconciliation Completed: No   Comments:     No Care Gaps recommended.     Note composed:4:54 PM 09/14/2022

## 2022-10-14 ENCOUNTER — HOSPITAL ENCOUNTER (OUTPATIENT)
Facility: HOSPITAL | Age: 87
Discharge: HOME | End: 2022-10-18
Attending: EMERGENCY MEDICINE | Admitting: INTERNAL MEDICINE
Payer: MEDICARE

## 2022-10-14 ENCOUNTER — CLINICAL SUPPORT (OUTPATIENT)
Dept: CARDIOLOGY | Facility: HOSPITAL | Age: 87
End: 2022-10-14
Payer: MEDICARE

## 2022-10-14 VITALS — WEIGHT: 175 LBS | BODY MASS INDEX: 27.47 KG/M2 | HEIGHT: 67 IN

## 2022-10-14 DIAGNOSIS — I63.9 STROKE: ICD-10-CM

## 2022-10-14 DIAGNOSIS — R47.81 SLURRED SPEECH: ICD-10-CM

## 2022-10-14 DIAGNOSIS — R42 DIZZINESS: Primary | ICD-10-CM

## 2022-10-14 DIAGNOSIS — I63.9 CVA (CEREBRAL VASCULAR ACCIDENT): ICD-10-CM

## 2022-10-14 DIAGNOSIS — G45.9 TIA (TRANSIENT ISCHEMIC ATTACK): ICD-10-CM

## 2022-10-14 DIAGNOSIS — R29.90 STROKE-LIKE SYMPTOMS: ICD-10-CM

## 2022-10-14 LAB
ABO + RH BLD: NORMAL
ALBUMIN SERPL BCP-MCNC: 3.3 G/DL (ref 3.5–5.2)
ALP SERPL-CCNC: 66 U/L (ref 55–135)
ALT SERPL W/O P-5'-P-CCNC: 11 U/L (ref 10–44)
ANION GAP SERPL CALC-SCNC: 9 MMOL/L (ref 8–16)
AST SERPL-CCNC: 17 U/L (ref 10–40)
AV INDEX (PROSTH): 0.46
AV MEAN GRADIENT: 21 MMHG
AV VALVE AREA: 1.79 CM2
BACTERIA #/AREA URNS HPF: NEGATIVE /HPF
BASOPHILS # BLD AUTO: 0.04 K/UL (ref 0–0.2)
BASOPHILS NFR BLD: 0.9 % (ref 0–1.9)
BILIRUB SERPL-MCNC: 1.1 MG/DL (ref 0.1–1)
BILIRUB UR QL STRIP: NEGATIVE
BLD GP AB SCN CELLS X3 SERPL QL: NORMAL
BSA FOR ECHO PROCEDURE: 1.94 M2
BUN SERPL-MCNC: 18 MG/DL (ref 8–23)
CALCIUM SERPL-MCNC: 9 MG/DL (ref 8.7–10.5)
CHLORIDE SERPL-SCNC: 106 MMOL/L (ref 95–110)
CHOLEST SERPL-MCNC: 126 MG/DL (ref 120–199)
CHOLEST/HDLC SERPL: 3.8 {RATIO} (ref 2–5)
CLARITY UR: CLEAR
CO2 SERPL-SCNC: 27 MMOL/L (ref 23–29)
COLOR UR: YELLOW
CREAT SERPL-MCNC: 0.8 MG/DL (ref 0.5–1.4)
CV ECHO LV RWT: 0.57 CM
DIFFERENTIAL METHOD: ABNORMAL
DOP CALC AO VTI: 64 CM
DOP CALC LVOT AREA: 3.9 CM2
DOP CALC LVOT DIAMETER: 2.22 CM
DOP CALC LVOT PEAK VEL: 128.58 M/S
DOP CALC LVOT STROKE VOLUME: 114.4 CM3
DOP CALCLVOT PEAK VEL VTI: 29.57 CM
E WAVE DECELERATION TIME: 236.2 MSEC
E/A RATIO: 1.63
E/E' RATIO: 24.77 M/S
ECHO LV POSTERIOR WALL: 1.13 CM (ref 0.6–1.1)
EJECTION FRACTION: 70 %
EOSINOPHIL # BLD AUTO: 0.2 K/UL (ref 0–0.5)
EOSINOPHIL NFR BLD: 5.1 % (ref 0–8)
ERYTHROCYTE [DISTWIDTH] IN BLOOD BY AUTOMATED COUNT: 16 % (ref 11.5–14.5)
EST. GFR  (NO RACE VARIABLE): >60 ML/MIN/1.73 M^2
ESTIMATED AVG GLUCOSE: 180 MG/DL (ref 68–131)
FRACTIONAL SHORTENING: 28 % (ref 28–44)
GLUCOSE SERPL-MCNC: 197 MG/DL (ref 70–110)
GLUCOSE SERPL-MCNC: 208 MG/DL (ref 70–110)
GLUCOSE SERPL-MCNC: 286 MG/DL (ref 70–110)
GLUCOSE UR QL STRIP: ABNORMAL
HBA1C MFR BLD: 7.9 % (ref 4.5–6.2)
HCT VFR BLD AUTO: 32.8 % (ref 37–48.5)
HDLC SERPL-MCNC: 33 MG/DL (ref 40–75)
HDLC SERPL: 26.2 % (ref 20–50)
HGB BLD-MCNC: 10.2 G/DL (ref 12–16)
HGB UR QL STRIP: NEGATIVE
HR MV ECHO: 78 BPM
HYALINE CASTS #/AREA URNS LPF: 7 /LPF
IMM GRANULOCYTES # BLD AUTO: 0.02 K/UL (ref 0–0.04)
IMM GRANULOCYTES NFR BLD AUTO: 0.4 % (ref 0–0.5)
INR PPP: 1.2
INTERVENTRICULAR SEPTUM: 1.11 CM (ref 0.6–1.1)
KETONES UR QL STRIP: NEGATIVE
LDLC SERPL CALC-MCNC: 64.6 MG/DL (ref 63–159)
LEFT ATRIUM SIZE: 4.85 CM
LEFT ATRIUM VOLUME INDEX MOD: 62.9 ML/M2
LEFT ATRIUM VOLUME MOD: 120.16 CM3
LEFT INTERNAL DIMENSION IN SYSTOLE: 2.9 CM (ref 2.1–4)
LEFT VENTRICLE DIASTOLIC VOLUME INDEX: 33.51 ML/M2
LEFT VENTRICLE DIASTOLIC VOLUME: 64 ML
LEFT VENTRICLE MASS INDEX: 78 G/M2
LEFT VENTRICLE SYSTOLIC VOLUME INDEX: 12.8 ML/M2
LEFT VENTRICLE SYSTOLIC VOLUME: 24.38 ML
LEFT VENTRICULAR INTERNAL DIMENSION IN DIASTOLE: 4 CM (ref 3.5–6)
LEFT VENTRICULAR MASS: 149.5 G
LEUKOCYTE ESTERASE UR QL STRIP: ABNORMAL
LV LATERAL E/E' RATIO: 20.13 M/S
LV SEPTAL E/E' RATIO: 32.2 M/S
LYMPHOCYTES # BLD AUTO: 0.5 K/UL (ref 1–4.8)
LYMPHOCYTES NFR BLD: 11.3 % (ref 18–48)
MCH RBC QN AUTO: 28.8 PG (ref 27–31)
MCHC RBC AUTO-ENTMCNC: 31.1 G/DL (ref 32–36)
MCV RBC AUTO: 93 FL (ref 82–98)
MICROSCOPIC COMMENT: ABNORMAL
MONOCYTES # BLD AUTO: 0.5 K/UL (ref 0.3–1)
MONOCYTES NFR BLD: 10.3 % (ref 4–15)
MV MEAN GRADIENT: 6 MMHG
MV PEAK A VEL: 0.99 M/S
MV PEAK E VEL: 1.61 M/S
NEUTROPHILS # BLD AUTO: 3.4 K/UL (ref 1.8–7.7)
NEUTROPHILS NFR BLD: 72 % (ref 38–73)
NITRITE UR QL STRIP: NEGATIVE
NONHDLC SERPL-MCNC: 93 MG/DL
NRBC BLD-RTO: 0 /100 WBC
PH UR STRIP: 6 [PH] (ref 5–8)
PISA MRMAX VEL: 580.15 M/S
PISA TR MAX VEL: 1.64 M/S
PLATELET # BLD AUTO: 194 K/UL (ref 150–450)
PMV BLD AUTO: 10.9 FL (ref 9.2–12.9)
POTASSIUM SERPL-SCNC: 3.6 MMOL/L (ref 3.5–5.1)
PROT SERPL-MCNC: 6.1 G/DL (ref 6–8.4)
PROT UR QL STRIP: ABNORMAL
PROTHROMBIN TIME: 14 SEC (ref 11.4–13.7)
RA PRESSURE: 3 MMHG
RBC # BLD AUTO: 3.54 M/UL (ref 4–5.4)
RBC #/AREA URNS HPF: 1 /HPF (ref 0–4)
SODIUM SERPL-SCNC: 142 MMOL/L (ref 136–145)
SP GR UR STRIP: 1.02 (ref 1–1.03)
SQUAMOUS #/AREA URNS HPF: 2 /HPF
TDI LATERAL: 0.08 M/S
TDI SEPTAL: 0.05 M/S
TDI: 0.07 M/S
TR MAX PG: 11 MMHG
TRICUSPID ANNULAR PLANE SYSTOLIC EXCURSION: 1.64 CM
TRIGL SERPL-MCNC: 142 MG/DL (ref 30–150)
TSH SERPL DL<=0.005 MIU/L-ACNC: 1.27 UIU/ML (ref 0.34–5.6)
TV REST PULMONARY ARTERY PRESSURE: 14 MMHG
URN SPEC COLLECT METH UR: ABNORMAL
UROBILINOGEN UR STRIP-ACNC: NEGATIVE EU/DL
WBC # BLD AUTO: 4.68 K/UL (ref 3.9–12.7)
WBC #/AREA URNS HPF: 6 /HPF (ref 0–5)
YEAST URNS QL MICRO: ABNORMAL

## 2022-10-14 PROCEDURE — 93306 TTE W/DOPPLER COMPLETE: CPT | Mod: 26,,, | Performed by: INTERNAL MEDICINE

## 2022-10-14 PROCEDURE — 93306 ECHO (CUPID ONLY): ICD-10-PCS | Mod: 26,,, | Performed by: INTERNAL MEDICINE

## 2022-10-14 PROCEDURE — 96372 THER/PROPH/DIAG INJ SC/IM: CPT | Performed by: INTERNAL MEDICINE

## 2022-10-14 PROCEDURE — 93010 EKG 12-LEAD: ICD-10-PCS | Mod: ,,, | Performed by: SPECIALIST

## 2022-10-14 PROCEDURE — G0426 PR INPT TELEHEALTH CONSULT 50M: ICD-10-PCS | Mod: 95,,, | Performed by: PSYCHIATRY & NEUROLOGY

## 2022-10-14 PROCEDURE — 97535 SELF CARE MNGMENT TRAINING: CPT

## 2022-10-14 PROCEDURE — 97116 GAIT TRAINING THERAPY: CPT

## 2022-10-14 PROCEDURE — 85610 PROTHROMBIN TIME: CPT | Performed by: EMERGENCY MEDICINE

## 2022-10-14 PROCEDURE — 25000003 PHARM REV CODE 250: Performed by: INTERNAL MEDICINE

## 2022-10-14 PROCEDURE — 97161 PT EVAL LOW COMPLEX 20 MIN: CPT

## 2022-10-14 PROCEDURE — 81001 URINALYSIS AUTO W/SCOPE: CPT | Performed by: INTERNAL MEDICINE

## 2022-10-14 PROCEDURE — 93005 ELECTROCARDIOGRAM TRACING: CPT | Performed by: SPECIALIST

## 2022-10-14 PROCEDURE — G0426 INPT/ED TELECONSULT50: HCPCS | Mod: 95,,, | Performed by: PSYCHIATRY & NEUROLOGY

## 2022-10-14 PROCEDURE — 63600175 PHARM REV CODE 636 W HCPCS: Performed by: INTERNAL MEDICINE

## 2022-10-14 PROCEDURE — G0378 HOSPITAL OBSERVATION PER HR: HCPCS

## 2022-10-14 PROCEDURE — 99285 EMERGENCY DEPT VISIT HI MDM: CPT | Mod: 25

## 2022-10-14 PROCEDURE — 80053 COMPREHEN METABOLIC PANEL: CPT | Performed by: EMERGENCY MEDICINE

## 2022-10-14 PROCEDURE — 97165 OT EVAL LOW COMPLEX 30 MIN: CPT

## 2022-10-14 PROCEDURE — 93306 TTE W/DOPPLER COMPLETE: CPT

## 2022-10-14 PROCEDURE — 83036 HEMOGLOBIN GLYCOSYLATED A1C: CPT | Performed by: EMERGENCY MEDICINE

## 2022-10-14 PROCEDURE — 86850 RBC ANTIBODY SCREEN: CPT | Performed by: EMERGENCY MEDICINE

## 2022-10-14 PROCEDURE — 36415 COLL VENOUS BLD VENIPUNCTURE: CPT | Performed by: EMERGENCY MEDICINE

## 2022-10-14 PROCEDURE — 80061 LIPID PANEL: CPT | Performed by: EMERGENCY MEDICINE

## 2022-10-14 PROCEDURE — 97530 THERAPEUTIC ACTIVITIES: CPT

## 2022-10-14 PROCEDURE — 93010 ELECTROCARDIOGRAM REPORT: CPT | Mod: ,,, | Performed by: SPECIALIST

## 2022-10-14 PROCEDURE — 85025 COMPLETE CBC W/AUTO DIFF WBC: CPT | Performed by: EMERGENCY MEDICINE

## 2022-10-14 PROCEDURE — 82962 GLUCOSE BLOOD TEST: CPT

## 2022-10-14 PROCEDURE — 84443 ASSAY THYROID STIM HORMONE: CPT | Performed by: EMERGENCY MEDICINE

## 2022-10-14 RX ORDER — IBUPROFEN 200 MG
24 TABLET ORAL
Status: DISCONTINUED | OUTPATIENT
Start: 2022-10-14 | End: 2022-10-17

## 2022-10-14 RX ORDER — SODIUM CHLORIDE 0.9 % (FLUSH) 0.9 %
10 SYRINGE (ML) INJECTION
Status: DISCONTINUED | OUTPATIENT
Start: 2022-10-14 | End: 2022-10-18 | Stop reason: HOSPADM

## 2022-10-14 RX ORDER — IBUPROFEN 200 MG
16 TABLET ORAL
Status: DISCONTINUED | OUTPATIENT
Start: 2022-10-14 | End: 2022-10-17

## 2022-10-14 RX ORDER — DIPHENHYDRAMINE HCL 25 MG
25 CAPSULE ORAL EVERY 6 HOURS PRN
Status: DISCONTINUED | OUTPATIENT
Start: 2022-10-14 | End: 2022-10-18 | Stop reason: HOSPADM

## 2022-10-14 RX ORDER — NAPROXEN SODIUM 220 MG/1
81 TABLET, FILM COATED ORAL DAILY
Status: DISCONTINUED | OUTPATIENT
Start: 2022-10-14 | End: 2022-10-18 | Stop reason: HOSPADM

## 2022-10-14 RX ORDER — LISINOPRIL 20 MG/1
20 TABLET ORAL DAILY
Status: DISCONTINUED | OUTPATIENT
Start: 2022-10-14 | End: 2022-10-18 | Stop reason: HOSPADM

## 2022-10-14 RX ORDER — CARVEDILOL 6.25 MG/1
6.25 TABLET ORAL 2 TIMES DAILY WITH MEALS
Status: DISCONTINUED | OUTPATIENT
Start: 2022-10-14 | End: 2022-10-18 | Stop reason: HOSPADM

## 2022-10-14 RX ORDER — CHOLESTYRAMINE 4 G/4.8G
1 POWDER, FOR SUSPENSION ORAL DAILY
Status: DISCONTINUED | OUTPATIENT
Start: 2022-10-14 | End: 2022-10-18 | Stop reason: HOSPADM

## 2022-10-14 RX ORDER — ENOXAPARIN SODIUM 100 MG/ML
40 INJECTION SUBCUTANEOUS EVERY 24 HOURS
Status: DISCONTINUED | OUTPATIENT
Start: 2022-10-14 | End: 2022-10-18 | Stop reason: HOSPADM

## 2022-10-14 RX ORDER — INSULIN ASPART 100 [IU]/ML
0-5 INJECTION, SOLUTION INTRAVENOUS; SUBCUTANEOUS
Status: DISCONTINUED | OUTPATIENT
Start: 2022-10-14 | End: 2022-10-17

## 2022-10-14 RX ORDER — NAPROXEN SODIUM 220 MG/1
81 TABLET, FILM COATED ORAL DAILY
Status: DISCONTINUED | OUTPATIENT
Start: 2022-10-14 | End: 2022-10-14

## 2022-10-14 RX ORDER — PREDNISONE 5 MG/1
10 TABLET ORAL DAILY
Status: DISCONTINUED | OUTPATIENT
Start: 2022-10-14 | End: 2022-10-18 | Stop reason: HOSPADM

## 2022-10-14 RX ORDER — GLUCAGON 1 MG
1 KIT INJECTION
Status: DISCONTINUED | OUTPATIENT
Start: 2022-10-14 | End: 2022-10-17

## 2022-10-14 RX ORDER — MYCOPHENOLATE MOFETIL 250 MG/1
500 CAPSULE ORAL 2 TIMES DAILY
Status: DISCONTINUED | OUTPATIENT
Start: 2022-10-14 | End: 2022-10-18 | Stop reason: HOSPADM

## 2022-10-14 RX ADMIN — MYCOPHENOLATE MOFETIL 500 MG: 250 CAPSULE ORAL at 09:10

## 2022-10-14 RX ADMIN — CARVEDILOL 6.25 MG: 6.25 TABLET, FILM COATED ORAL at 09:10

## 2022-10-14 RX ADMIN — ENOXAPARIN SODIUM 40 MG: 100 INJECTION SUBCUTANEOUS at 07:10

## 2022-10-14 RX ADMIN — PREDNISONE 10 MG: 5 TABLET ORAL at 09:10

## 2022-10-14 RX ADMIN — INSULIN ASPART 1 UNITS: 100 INJECTION, SOLUTION INTRAVENOUS; SUBCUTANEOUS at 09:10

## 2022-10-14 RX ADMIN — LISINOPRIL 20 MG: 20 TABLET ORAL at 09:10

## 2022-10-14 RX ADMIN — ASPIRIN 81 MG CHEWABLE TABLET 81 MG: 81 TABLET CHEWABLE at 09:10

## 2022-10-14 RX ADMIN — DIPHENHYDRAMINE HYDROCHLORIDE 25 MG: 25 CAPSULE ORAL at 09:10

## 2022-10-14 RX ADMIN — CHOLESTYRAMINE 4 G: 4 POWDER, FOR SUSPENSION ORAL at 12:10

## 2022-10-14 NOTE — CONSULTS
[facility] - Emergency Dept  Neurology  Consult Note    Patient Name: Leslie Tolliver  MRN: [id number]  Admission Date: 10/14/2022  Hospital Length of Stay: 0 days  Code Status: Full Code   Attending Provider: Taras Bynum MD   Consulting Provider: Pricila Dominguez DNP  Primary Care Physician: Chang Christianson MD  Principal Problem:TIA (transient ischemic attack)    Inpatient consult to Neurology  Consult performed by: Pricila Dominguez DNP  Consult ordered by: Taras Bynum MD      Subjective:     Chief Complaint:   slurred speech, dizziness, difficulty ambulating, LKN today at 0000    HPI: as per EMR: [age] year female with past medical history significant diabetes, CAD, CKD, GERD, hypertension hyperlipidemia presents secondary to complain of dizziness.  Patient feels as if she is off balance and developed an episode of slurred speech.  Patient was last seen normal approximately midnight which was greater than 4 hour time frame from symptom onset.  She denies any nausea, vomiting, fever, chills, sweats or changes sensations.  She is otherwise stable and has no other complaints    NEUROLOGY CONSULT NOTE: Patient seen and examined, POC discussed with Dr. Solomon Padilla via phone. At the time of my exam this morning, patient continued to have slurred speech but not other focal neurological deficits appreciated. She is alert and oriented x 3.     Past Medical History:   Diagnosis Date    Anemia due to stage 3 chronic kidney disease 07/24/2019    Arthritis     Bullous pemphigoid 8/29/2022    Chronic bilateral low back pain without sciatica 07/24/2019    CKD (chronic kidney disease) stage 3, GFR 30-59 ml/min 07/24/2019    Colon polyps     Coronary artery disease     Diabetes mellitus type II     Diabetes with neurologic complications     GERD (gastroesophageal reflux disease)     Hyperlipidemia     Hypertension     Hypothyroidism     Type 2 diabetes mellitus        Past Surgical History:   Procedure  Laterality Date    ADENOIDECTOMY      AORTIC VALVE REPLACEMENT      BREAST BIOPSY Right 20 yrs ago    benign    CARDIAC SURGERY      CHOLECYSTECTOMY      COLONOSCOPY  5-    Dr Holly, 5 year recheck    EPIDURAL STEROID INJECTION N/A 3/25/2021    Procedure: Injection, Steroid, Epidural L3-4;  Surgeon: Sky Love MD;  Location: [facility] OR;  Service: Pain Management;  Laterality: N/A;  Injection, Steroid, Epidural L3-4    EPIDURAL STEROID INJECTION N/A 5/20/2021    Procedure: Injection, Steroid, Epidural L3-4;  Surgeon: Sky Love MD;  Location: [facility] OR;  Service: Pain Management;  Laterality: N/A;  Injection, Steroid, Epidural L3-4    ESOPHAGOGASTRODUODENOSCOPY N/A 6/4/2020    Procedure: EGD (ESOPHAGOGASTRODUODENOSCOPY);  Surgeon: Michael Nugent III, MD;  Location: [facility];  Service: Endoscopy;  Laterality: N/A;    HEMORRHOID SURGERY      HYSTERECTOMY      OOPHORECTOMY      TONSILLECTOMY         Review of patient's allergies indicates:   Allergen Reactions    Fenofibric acid (choline)      Other reaction(s): Vomiting    Iodine      Other reaction(s): lips swollen ivp dye    Rosuvastatin      Other reaction(s): muscle pain       Current Neurological Medications: aspirin     No current facility-administered medications on file prior to encounter.     Current Outpatient Medications on File Prior to Encounter   Medication Sig    lisinopriL (PRINIVIL,ZESTRIL) 20 MG tablet TAKE ONE TABLET BY MOUTH ONCE DAILY    aspirin 81 mg Tab Take 81 mg by mouth once daily. Every day    blood sugar diagnostic (ACCU-CHEK GUIDE TEST STRIPS) Strp 300 each by Misc.(Non-Drug; Combo Route) route 3 (three) times daily.    blood-glucose meter kit Accu-chek Marley glucometer check glucose three times daily E11.65    calcium carbonate/vitamin D3 (CALCIUM+D ORAL) Take 1 tablet by mouth once daily.    carvediloL (COREG) 6.25 MG tablet Take 1 tablet (6.25 mg total) by mouth 2 (two) times daily with meals.    DROPLET PEN NEEDLE 31 gauge  "x " Ndle USE WITH LANTUS SOLOSTAR PEN AS NEEDED    empagliflozin (JARDIANCE) 10 mg tablet Take 1 tablet (10 mg total) by mouth once daily.    ibuprofen 200 mg Cap     insulin (LANTUS SOLOSTAR U-100 INSULIN) glargine 100 units/mL (3mL) SubQ pen INJECT 24 UNITS SUBCUTANEOUSLY EVERY EVENING    ketoconazole (NIZORAL) 2 % cream AAA pannus fold bid    lancets (ACCU-CHEK SOFTCLIX LANCETS) Misc New machine Guide Me Accuchek    levocetirizine (XYZAL) 5 MG tablet Take 5 mg by mouth every evening.    metFORMIN (GLUCOPHAGE) 1000 MG tablet Take 1 tablet (1,000 mg total) by mouth 2 (two) times daily with meals.    multivitamin (THERAGRAN) per tablet Take 1 tablet by mouth once daily.    mycophenolate (CELLCEPT) 500 mg Tab One tab PO QAM and 2 tabs PO QHS    predniSONE (DELTASONE) 10 MG tablet Take 2 tabs PO QAM x 1 week then 1 tab PO QAM x1 week    PREVALITE 4 gram PwPk TAKE 1 PACKET (4 G TOTAL) BY MOUTH ONCE DAILY.    triamcinolone acetonide 0.1% (KENALOG) 0.1 % cream AAA bid      Family History       Problem Relation (Age of Onset)    Cancer Father    Diabetes Mother, Brother    Early death Son, Son    Glaucoma Mother    Heart disease Mother    No Known Problems Daughter          Tobacco Use    Smoking status: Former     Packs/day: 0.25     Years: 15.00     Pack years: 3.75     Types: Cigarettes     Quit date: 3/30/1974     Years since quittin.5    Smokeless tobacco: Never   Substance and Sexual Activity    Alcohol use: No    Drug use: No    Sexual activity: Never     Review of Systems   Constitutional: Negative.    HENT: Negative.     Eyes: Negative.    Respiratory: Negative.     Gastrointestinal: Negative.    Musculoskeletal: Negative.    Neurological:  Positive for speech difficulty. Negative for facial asymmetry and numbness.   Objective:     Vital Signs (Most Recent):  Temp: 97.2 °F (36.2 °C) (10/14/22 0430)  Pulse: 81 (10/14/22 0601)  Resp: 19 (10/14/22 0601)  BP: (!) 152/67 (10/14/22 0530)  SpO2: 97 % (10/14/22 " 0601)   Vital Signs (24h Range):  Temp:  [97.2 °F (36.2 °C)] 97.2 °F (36.2 °C)  Pulse:  [81-84] 81  Resp:  [16-19] 19  SpO2:  [97 %-99 %] 97 %  BP: (152)/(67-80) 152/67     Weight: 79.4 kg (175 lb)  Body mass index is 27.41 kg/m².    Physical Exam  Vitals and nursing note reviewed.   HENT:      Head: Normocephalic and atraumatic.      Nose: Nose normal.      Mouth/Throat:      Mouth: Mucous membranes are moist.      Pharynx: Oropharynx is clear.   Eyes:      Extraocular Movements: EOM normal.      Pupils: Pupils are equal, round, and reactive to light.   Cardiovascular:      Rate and Rhythm: Normal rate.   Pulmonary:      Effort: Pulmonary effort is normal.   Musculoskeletal:         General: Normal range of motion.   Skin:     General: Skin is warm and dry.   Neurological:      Mental Status: She is alert and oriented to person, place, and time.      Sensory: No sensory deficit.      Motor: Motor strength is normal. No weakness.      Coordination: Coordination normal. Finger-Nose-Finger Test normal.      Deep Tendon Reflexes:      Reflex Scores:       Tricep reflexes are 2+ on the right side and 2+ on the left side.       Bicep reflexes are 2+ on the right side and 2+ on the left side.       Brachioradialis reflexes are 2+ on the right side and 2+ on the left side.       Patellar reflexes are 2+ on the right side and 2+ on the left side.  Psychiatric:         Mood and Affect: Mood normal.         Speech: Speech is slurred.       NEUROLOGICAL EXAMINATION:     MENTAL STATUS   Oriented to person, place, and time.   Follows 1 step commands.   Attention: normal.   Speech: slurred   Level of consciousness: alert  Knowledge: good.   Able to name object. Normal comprehension.     CRANIAL NERVES     CN II   Visual acuity: (not tested)    CN III, IV, VI   Pupils are equal, round, and reactive to light.  Extraocular motions are normal.   Right pupil: Size: 3 mm. Shape: regular. Reactivity: brisk.   Left pupil: Size: 3 mm.  Shape: regular. Reactivity: brisk.     CN V   Facial sensation intact.     CN VII   Facial expression full, symmetric.     CN VIII   CN VIII normal.     CN IX, X   CN IX normal.     CN XI   CN XI normal.     CN XII   CN XII normal.     MOTOR EXAM   Muscle bulk: normal  Overall muscle tone: normal  Right arm pronator drift: absent  Left arm pronator drift: absent    Strength   Strength 5/5 throughout.     REFLEXES     Reflexes   Right brachioradialis: 2+  Left brachioradialis: 2+  Right biceps: 2+  Left biceps: 2+  Right triceps: 2+  Left triceps: 2+  Right patellar: 2+  Left patellar: 2+  Right : 2+  Left : 2+  Right plantar: normal  Left plantar: normal    SENSORY EXAM   Light touch normal.     GAIT AND COORDINATION     Gait  Gait: (not tested)     Coordination   Finger to nose coordination: normal    Tremor   Resting tremor: absent  Current NIH Stroke Score Values      Flowsheet Row Most Recent Value   Interval initial 15 minute assessment from arrival   1a. Level of Consciousness 0-->Alert, keenly responsive   1b. LOC Questions 0-->Answers both questions correctly   1c. LOC Commands 0-->Performs both tasks correctly   2. Best Gaze 0-->Normal   3. Visual 0-->No visual loss   4. Facial Palsy 0-->Normal symmetrical movements   5a. Motor Arm, Left 0-->No drift, limb holds 90 (or 45) degrees for full 10 secs   5b. Motor Arm, Right 0-->No drift, limb holds 90 (or 45) degrees for full 10 secs   6a. Motor Leg, Left 0-->No drift, leg holds 30 degree position for full 5 secs   6b. Motor Leg, Right 0-->No drift, leg holds 30 degree position for full 5 secs   7. Limb Ataxia 0-->Absent   8. Sensory 0-->Normal, no sensory loss   9. Best Language 0-->No aphasia, normal   10. Dysarthria 1-->Normal   11. Extinction and Inattention (formerly Neglect) 0-->No abnormality   Total (NIH Stroke Scale) 1              Significant Labs: Hemoglobin A1c:   Recent Labs   Lab 10/14/22  0501   HGBA1C 7.9*     BMP:   Recent Labs   Lab  10/14/22  0501   *      K 3.6      CO2 27   BUN 18   CREATININE 0.8   CALCIUM 9.0     CBC:   Recent Labs   Lab 10/14/22  0501   WBC 4.68   HGB 10.2*   HCT 32.8*        CMP:   Recent Labs   Lab 10/14/22  0501   *      K 3.6      CO2 27   BUN 18   CREATININE 0.8   CALCIUM 9.0   PROT 6.1   ALBUMIN 3.3*   BILITOT 1.1*   ALKPHOS 66   AST 17   ALT 11   ANIONGAP 9     Urine Culture: No results for input(s): LABURIN in the last 48 hours.  Urine Studies:   Recent Labs   Lab 10/14/22  0925   COLORU Yellow   APPEARANCEUA Clear   PHUR 6.0   SPECGRAV 1.025   PROTEINUA Trace*   GLUCUA 4+*   KETONESU Negative   BILIRUBINUA Negative   OCCULTUA Negative   NITRITE Negative   UROBILINOGEN Negative   LEUKOCYTESUR Trace*   RBCUA 1   WBCUA 6*   BACTERIA Negative   SQUAMEPITHEL 2   HYALINECASTS 7*     All pertinent lab results from the past 24 hours have been reviewed.    Significant Imaging: I have reviewed all pertinent imaging results/findings within the past 24 hours.    CT HEAD FOR STROKE  Order: [id number]  Status: Edited Result - FINAL     Visible to patient: Yes (seen)     Next appt: 12/05/2022 at 02:20 PM in Family Medicine (Chang Christianson MD)     0 Result Notes  Details    Reading Physician Reading Date Result Priority   Yaquelin Morin DO  [phone] 10/14/2022      Narrative & Impression         ADDENDUM #1         THIS REPORT CONTAINS FINDINGS THAT MAY BE CRITICAL TO PATIENT CARE: Pertinent results were discussed via telephone with Dr. Raphael Balbuena 10/14/2022 4:56 AM CST. The results were acknowledged and understood.     Electronically signed by:  Colleen Prakash MD  10/14/2022 5:29 AM CDT Workstation: [id number]      ORIGINAL REPORT         EXAM:  CT Head Without Intravenous Contrast     CLINICAL HISTORY:  Neuro deficit, acute, stroke suspected.     TECHNIQUE:  CT images of the head/brain without intravenous contrast. Axial, coronal, and sagittal images.  This CT exam was  performed using one or more of the following dose reduction techniques:  automated exposure control, adjustment of the mA and/or kV according to patient size, and/or use of iterative reconstruction technique.     COMPARISON:  No relevant prior studies available.     FINDINGS:  Brain:  No acute hemorrhage. No evidence of acute infarct; MRI more sensitive. Periventricular and subcortical white matter hypodensities are nonspecific but most commonly due to chronic small vessel ischemic changes in a patient of this age.  Ventricles:  No hydrocephalus.  Bones/joints:  No skull fracture.  Soft tissues:  No acute findings.  Sinuses:  No paranasal sinus air-fluid level. Mild mucosal thickening.  Mastoid air cells:  No mastoid effusion.     IMPRESSION:  No acute intracranial abnormality.     Electronically signed by:  Colleen Prakash MD  10/14/2022 4:49 AM CDT Workstation: [id number]       MRI Brain Without Contrast  Order: [id number]  Status: Final result     Visible to patient: Yes (seen)     Next appt: 12/05/2022 at 02:20 PM in Family Medicine (Chang Christianson MD)     0 Result Notes  Details    Reading Physician Reading Date Result Priority   Ida Wei MD  [phone] 10/14/2022      Narrative & Impression  MRI of the brain     Clinical history is dizziness, slurred speech     Multiplanar images of the brain were obtained.     The ventricles and sulci are normal in size and configuration for age. There is no hemorrhage, mass or midline shift. There are no extra-axial fluid collections.     There is increased FLAIR and T2 signal within the periventricular white matter and corona radiata compatible with small vessel disease.     There is no abnormality on the diffusion-weighted images to suggest acute infarct. Cerebellum and brainstem are normal.     There are flow voids in the cavernous portions of the internal carotid arteries and basilar artery indicating patency.     The corpus callosum, cerebellar tonsils  and midline structures are normal.     IMPRESSION: Mild periventricular small vessel disease with no acute intracranial process     Electronically signed by:  Ida Wei MD  10/14/2022 4:31 PM CDT Workstation: [id number]     MRA Brain without contrast  Order: [id number]  Status: Final result     Visible to patient: Yes (seen)     Next appt: 12/05/2022 at 02:20 PM in Family Medicine (Chang Christianson MD)     0 Result Notes  Details    Reading Physician Reading Date Result Priority   Ida Wei MD  [phone] 10/14/2022      Narrative & Impression  MRA of the brain     3-D time-of-flight images of the [place] were obtained.     The left vertebral artery is dominant. The right distal vertebral artery is small in size.     The basilar artery and cavernous portion of the internal carotid arteries are unremarkable.     The anterior cerebral arteries, middle cerebral arteries and posterior cerebral arteries are patent without significant stenosis or large vessel occlusion. There is no aneurysm or AVM. There is persistent fetal origin of the posterior cerebral arteries from the internal carotid arteries.     IMPRESSION: Normal MRA of the brain        US Carotid Bilateral  Order: [id number]  Status: Final result     Visible to patient: Yes (seen)     Next appt: 12/05/2022 at 02:20 PM in [facility] (Chang Christianson MD)     0 Result Notes  Details    Reading Physician Reading Date Result Priority   Agustin Henley MD  [phone] 10/14/2022      Narrative & Impression  CMS MANDATED QUALITY DATA - CAROTID - 195     All measurements and percent stenosis described below were determined using NASCET criteria or criteria similar to NASCET, as defined by the Society of Radiologists in Ultrasound Consensus Conference, Radiology, 2003     US CAROTID DOPPLER BILATERAL     CLINICAL HISTORY:  88 years Female CVA     COMPARISON: None     FINDINGS: Grayscale, color Doppler, and spectral Doppler analysis  of the carotid arteries was performed. Mild bilateral atherosclerotic plaque.     Peak systolic velocity in the right CCA is 46.1 cm/sec, and peak systolic velocity in the right ICA is 79.7 cm/sec, with ICA/CCA ratio of less than 2.     Peak systolic velocity in the left CCA is 50.3 cm/sec, and peak systolic velocity in the left ICA is 61.4 cm/sec, with ICA/CCA ratio of less than 2.     The vertebral arteries are patent, with normal waveforms, and normal antegrade flow bilaterally.     IMPRESSION:     1. No Doppler evidence of carotid artery occlusion or hemodynamically significant stenosis.  2. Patent vertebral arteries with antegrade flow bilaterally.        Transthoracic echo (TTE) complete  Order# [id number]  Reading physician: Bennett Esquivel MD Ordering physician: Pricila Dominguez DNP Study date: 10/14/22     Reason for Exam  Priority: Routine  Dx: CVA (cerebral vascular accident) [I63.9 (ICD-10-CM)]     Result Image Hyperlink     Show images for Echo Saline Bubble? Yes  Summary    There is no evidence of intracardiac shunting.  The left ventricle is normal in size with mild concentric hypertrophy and hyperdynamic systolic function.  The estimated ejection fraction is 70%.  Indeterminate left ventricular diastolic function.  Severe left atrial enlargement.  Severe mitral regurgitation.  Mild to moderate tricuspid regurgitation.  There is moderate aortic valve stenosis.  Aortic valve area is 1.79 cm2; peak velocity is m/s; mean gradient is 21 mmHg.  Mild aortic regurgitation.  Normal central venous pressure (3 mmHg).  The estimated PA systolic pressure is 14 mmHg.  Assessment and Plan:    Slurred speech (resolving)  CVA vs TIA    LSN 0000 10/14  No tPA given due to patient preseing OOW and symptoms rapidly resolving     Workup   CTH: negative for acute abnormalities   CTA head and neck unable to be obtained due to iodine allergy   MRI brain wo contrast: negative for acute intracranial abnormalityy; chronic changes  noted above   MRA head wo contrast: normal   CUS: negative for occlusion. Stenosis   ECHOL LVEF 70%, no PFO, severe left atirla enlargement   LDL 64.6  Hgb A1c pending     PLAN   Continue aspirin for stroke prevention   PT/OT/ST eval and treat   Recommend outpatient cardiology referral for 30 day cardiac monitoring to rule out afib due to severe LAE as noted by ECHO results above   Neuro checks as per floor protocol   Neurology team will follow up in the morning.     Patient to follow up with [facility] at [phone] within 2 weeks from discharge.  Stroke education was provided including stroke risk factors modification and any acute neurological changes including weakness, confusion, visual changes to come straight to the ER.  All side effects of new medications were discussed with patient and/or next of kin and all questions were answered.       Active Diagnoses:    Diagnosis Date Noted POA    PRINCIPAL PROBLEM:  TIA (transient ischemic attack) [G45.9] 10/14/2022 Yes      Problems Resolved During this Admission:       VTE Risk Mitigation (From admission, onward)           Ordered     enoxaparin injection 40 mg  Daily         10/14/22 0828     IP VTE HIGH RISK PATIENT  Once         10/14/22 0828     Place sequential compression device  Until discontinued         10/14/22 0828                    Thank you for your consult. I will follow-up with patient. Please contact us if you have any additional questions.    Pricila Dominguez, LIONEL  Neurology  [facility] - Emergency Dept

## 2022-10-14 NOTE — ED PROVIDER NOTES
Encounter Date: 10/14/2022       History     Chief Complaint   Patient presents with    Dizziness     Started at midnight with slurred speech.      [age] year female with past medical history significant diabetes, CAD, CKD, GERD, hypertension hyperlipidemia presents secondary to complain of dizziness.  Patient feels as if she is off balance and developed an episode of slurred speech.  Patient was last seen normal approximately midnight which was greater than 4 hour time frame from symptom onset.  She denies any nausea, vomiting, fever, chills, sweats or changes sensations.  She is otherwise stable and has no other complaints.    Review of patient's allergies indicates:   Allergen Reactions    Fenofibric acid (choline)      Other reaction(s): Vomiting    Iodine      Other reaction(s): lips swollen ivp dye    Rosuvastatin      Other reaction(s): muscle pain     Past Medical History:   Diagnosis Date    Anemia due to stage 3 chronic kidney disease 07/24/2019    Arthritis     Bullous pemphigoid 8/29/2022    Chronic bilateral low back pain without sciatica 07/24/2019    CKD (chronic kidney disease) stage 3, GFR 30-59 ml/min 07/24/2019    Colon polyps     Coronary artery disease     Diabetes mellitus type II     Diabetes with neurologic complications     GERD (gastroesophageal reflux disease)     Hyperlipidemia     Hypertension     Hypothyroidism     Type 2 diabetes mellitus      Past Surgical History:   Procedure Laterality Date    ADENOIDECTOMY      AORTIC VALVE REPLACEMENT      BREAST BIOPSY Right 20 yrs ago    benign    CARDIAC SURGERY      CHOLECYSTECTOMY      COLONOSCOPY  5-    Dr Holly, 5 year recheck    EPIDURAL STEROID INJECTION N/A 3/25/2021    Procedure: Injection, Steroid, Epidural L3-4;  Surgeon: Sky Love MD;  Location: [facility] OR;  Service: Pain Management;  Laterality: N/A;  Injection, Steroid, Epidural L3-4    EPIDURAL STEROID INJECTION N/A 5/20/2021    Procedure: Injection, Steroid,  Epidural L3-4;  Surgeon: Sky Love MD;  Location: [facility] OR;  Service: Pain Management;  Laterality: N/A;  Injection, Steroid, Epidural L3-4    ESOPHAGOGASTRODUODENOSCOPY N/A 2020    Procedure: EGD (ESOPHAGOGASTRODUODENOSCOPY);  Surgeon: Michael Nugent III, MD;  Location: [facility];  Service: Endoscopy;  Laterality: N/A;    HEMORRHOID SURGERY      HYSTERECTOMY      OOPHORECTOMY      TONSILLECTOMY       Family History   Problem Relation Age of Onset    Diabetes Mother     Heart disease Mother     Glaucoma Mother     Cancer Father         lung cancer    Early death Son         brain tumor age 3    Diabetes Brother     No Known Problems Daughter     Early death Son         MVA 25    Macular degeneration Neg Hx     Retinal detachment Neg Hx      Social History     Tobacco Use    Smoking status: Former     Packs/day: 0.25     Years: 15.00     Pack years: 3.75     Types: Cigarettes     Quit date: 3/30/1974     Years since quittin.5    Smokeless tobacco: Never   Substance Use Topics    Alcohol use: No    Drug use: No     Review of Systems   Neurological:  Positive for dizziness and speech difficulty.   All other systems reviewed and are negative.    Physical Exam     Initial Vitals [10/14/22 0430]   BP Pulse Resp Temp SpO2   (!) 152/80 84 16 97.2 °F (36.2 °C) 97 %      MAP       --         Physical Exam    Nursing note and vitals reviewed.  Constitutional: She appears well-developed and well-nourished. No distress.   HENT:   Head: Normocephalic and atraumatic.   Mouth/Throat: Oropharynx is clear and moist.   Eyes: Conjunctivae and EOM are normal. Pupils are equal, round, and reactive to light.   Neck: No tracheal deviation present. No JVD present.   Normal range of motion.  Cardiovascular:  Normal rate, regular rhythm, normal heart sounds and intact distal pulses.     Exam reveals no gallop and no friction rub.       No murmur heard.  Pulmonary/Chest: Breath sounds normal. No respiratory distress. She has no  wheezes. She exhibits no tenderness.   Abdominal: Abdomen is soft. Bowel sounds are normal. She exhibits no distension. There is no abdominal tenderness. There is no rebound and no guarding.   Musculoskeletal:         General: No tenderness or edema. Normal range of motion.      Cervical back: Normal range of motion.     Neurological: She is alert and oriented to person, place, and time. She has normal strength. No cranial nerve deficit or sensory deficit.   Skin: Skin is warm and dry. Capillary refill takes less than 2 seconds. No erythema.   Psychiatric: She has a normal mood and affect.       ED Course   Procedures  Labs Reviewed   CBC W/ AUTO DIFFERENTIAL - Abnormal; Notable for the following components:       Result Value    RBC 3.54 (*)     Hemoglobin 10.2 (*)     Hematocrit 32.8 (*)     MCHC 31.1 (*)     RDW 16.0 (*)     Lymph # 0.5 (*)     Lymph % 11.3 (*)     All other components within normal limits   COMPREHENSIVE METABOLIC PANEL - Abnormal; Notable for the following components:    Glucose 208 (*)     Albumin 3.3 (*)     Total Bilirubin 1.1 (*)     All other components within normal limits   PROTIME-INR - Abnormal; Notable for the following components:    PT 14.0 (*)     All other components within normal limits   LIPID PANEL - Abnormal; Notable for the following components:    HDL 33 (*)     All other components within normal limits   URINALYSIS, REFLEX TO URINE CULTURE - Abnormal; Notable for the following components:    Protein, UA Trace (*)     Glucose, UA 4+ (*)     Leukocytes, UA Trace (*)     All other components within normal limits    Narrative:     Specimen Source->Urine   HEMOGLOBIN A1C - Abnormal; Notable for the following components:    Hemoglobin A1C 7.9 (*)     Estimated Avg Glucose 180 (*)     All other components within normal limits   URINALYSIS MICROSCOPIC - Abnormal; Notable for the following components:    WBC, UA 6 (*)     Hyaline Casts, UA 7 (*)     All other components within  normal limits    Narrative:     Specimen Source->Urine   POCT GLUCOSE - Abnormal; Notable for the following components:    POC Glucose 197 (*)     All other components within normal limits   TSH   HEMOGLOBIN A1C   TYPE & SCREEN   POCT GLUCOSE MONITORING CONTINUOUS   POCT GLUCOSE MONITORING CONTINUOUS        ECG Results              ECG 12 lead (In process)  Result time 10/14/22 05:09:18      In process by Interface, Lab In [facility] (10/14/22 05:09:18)                   Narrative:    Test Reason : I63.9,    Vent. Rate : 079 BPM     Atrial Rate : 079 BPM     P-R Int : 196 ms          QRS Dur : 090 ms      QT Int : 380 ms       P-R-T Axes : 052 047 030 degrees     QTc Int : 435 ms    Normal sinus rhythm  Normal ECG  When compared with ECG of 03-JUN-2020 11:54,  Sinus rhythm has replaced Atrial fibrillation    Referred By: [email or web address]   SELF           Confirmed By:                                   Imaging Results              MRI Brain Without Contrast (Final result)  Result time 10/14/22 16:31:36      Final result by Ida Wei MD (10/14/22 16:31:36)                   Narrative:    MRI of the brain    Clinical history is dizziness, slurred speech    Multiplanar images of the brain were obtained.    The ventricles and sulci are normal in size and configuration for age. There is no hemorrhage, mass or midline shift. There are no extra-axial fluid collections.    There is increased FLAIR and T2 signal within the periventricular white matter and corona radiata compatible with small vessel disease.    There is no abnormality on the diffusion-weighted images to suggest acute infarct. Cerebellum and brainstem are normal.    There are flow voids in the cavernous portions of the internal carotid arteries and basilar artery indicating patency.    The corpus callosum, cerebellar tonsils and midline structures are normal.    IMPRESSION: Mild periventricular small vessel disease with no acute intracranial  process    Electronically signed by:  Ida Wei MD  10/14/2022 4:31 PM CDT Workstation: [id number]                                     MRA Brain without contrast (Final result)  Result time 10/14/22 16:16:51      Final result by Ida Wei MD (10/14/22 16:16:51)                   Narrative:    MRA of the brain    3-D time-of-flight images of the [place] were obtained.    The left vertebral artery is dominant. The right distal vertebral artery is small in size.    The basilar artery and cavernous portion of the internal carotid arteries are unremarkable.    The anterior cerebral arteries, middle cerebral arteries and posterior cerebral arteries are patent without significant stenosis or large vessel occlusion. There is no aneurysm or AVM. There is persistent fetal origin of the posterior cerebral arteries from the internal carotid arteries.    IMPRESSION: Normal MRA of the brain    Electronically signed by:  Ida Wei MD  10/14/2022 4:16 PM CDT Workstation: [id number]                                     US Carotid Bilateral (Final result)  Result time 10/14/22 12:56:48      Final result by Agustin Henley MD (10/14/22 12:56:48)                   Narrative:    CMS MANDATED QUALITY DATA - CAROTID - 195    All measurements and percent stenosis described below were determined using NASCET criteria or criteria similar to NASCET, as defined by the Society of Radiologists in Ultrasound Consensus Conference, Radiology, 2003    US CAROTID DOPPLER BILATERAL    CLINICAL HISTORY:  88 years Female CVA    COMPARISON: None    FINDINGS: Grayscale, color Doppler, and spectral Doppler analysis of the carotid arteries was performed. Mild bilateral atherosclerotic plaque.    Peak systolic velocity in the right CCA is 46.1 cm/sec, and peak systolic velocity in the right ICA is 79.7 cm/sec, with ICA/CCA ratio of less than 2.    Peak systolic velocity in the left CCA is 50.3 cm/sec, and peak systolic  velocity in the left ICA is 61.4 cm/sec, with ICA/CCA ratio of less than 2.    The vertebral arteries are patent, with normal waveforms, and normal antegrade flow bilaterally.    IMPRESSION:    1. No Doppler evidence of carotid artery occlusion or hemodynamically significant stenosis.  2. Patent vertebral arteries with antegrade flow bilaterally.    Electronically signed by:  Agustin Henley MD  10/14/2022 12:56 PM CDT Workstation: [id number]                                     X-Ray Chest AP Portable (Final result)  Result time 10/14/22 06:29:48      Final result by Neto Bautista MD (10/14/22 06:29:48)                   Narrative:    Reason: Stroke    FINDINGS:    Portable chest with comparison chest x-ray November 2, 2017 show normal cardiomediastinal silhouette. Median sternotomy wires noted.  Lungs are clear. Pulmonary vasculature is normal. No acute osseous abnormality.    IMPRESSION:    No acute cardiopulmonary abnormality.    Electronically signed by:  Neto Bautista DO  10/14/2022 6:29 AM CDT Workstation: [id number]                                     CT HEAD FOR STROKE (Edited Result - FINAL)  Result time 10/14/22 05:29:15   Procedure changed from CT Head Without Contrast     Edited Result - FINAL by Yaquelin Morin DO (10/14/22 05:29:15)                   Narrative:         ADDENDUM #1       THIS REPORT CONTAINS FINDINGS THAT MAY BE CRITICAL TO PATIENT CARE: Pertinent results were discussed via telephone with Dr. Raphael Balbunea 10/14/2022 4:56 AM CST. The results were acknowledged and understood.    Electronically signed by:  Colleen Prakash MD  10/14/2022 5:29 AM CDT Workstation: [id number]     ORIGINAL REPORT       EXAM:  CT Head Without Intravenous Contrast    CLINICAL HISTORY:  Neuro deficit, acute, stroke suspected.    TECHNIQUE:  CT images of the head/brain without intravenous contrast. Axial, coronal, and sagittal images.  This CT exam was performed using one or more of the following dose  reduction techniques:  automated exposure control, adjustment of the mA and/or kV according to patient size, and/or use of iterative reconstruction technique.    COMPARISON:  No relevant prior studies available.    FINDINGS:  Brain:  No acute hemorrhage. No evidence of acute infarct; MRI more sensitive. Periventricular and subcortical white matter hypodensities are nonspecific but most commonly due to chronic small vessel ischemic changes in a patient of this age.  Ventricles:  No hydrocephalus.  Bones/joints:  No skull fracture.  Soft tissues:  No acute findings.  Sinuses:  No paranasal sinus air-fluid level. Mild mucosal thickening.  Mastoid air cells:  No mastoid effusion.    IMPRESSION:  No acute intracranial abnormality.    Electronically signed by:  Colleen Prakash MD  10/14/2022 4:49 AM CDT Workstation: [id number]                      Final result by Yaquelin Morin DO (10/14/22 04:49:45)                   Narrative:    EXAM:  CT Head Without Intravenous Contrast    CLINICAL HISTORY:  Neuro deficit, acute, stroke suspected.    TECHNIQUE:  CT images of the head/brain without intravenous contrast. Axial, coronal, and sagittal images.  This CT exam was performed using one or more of the following dose reduction techniques:  automated exposure control, adjustment of the mA and/or kV according to patient size, and/or use of iterative reconstruction technique.    COMPARISON:  No relevant prior studies available.    FINDINGS:  Brain:  No acute hemorrhage. No evidence of acute infarct; MRI more sensitive. Periventricular and subcortical white matter hypodensities are nonspecific but most commonly due to chronic small vessel ischemic changes in a patient of this age.  Ventricles:  No hydrocephalus.  Bones/joints:  No skull fracture.  Soft tissues:  No acute findings.  Sinuses:  No paranasal sinus air-fluid level. Mild mucosal thickening.  Mastoid air cells:  No mastoid effusion.    IMPRESSION:  No acute  intracranial abnormality.    Electronically signed by:  Colleen Prakash MD  10/14/2022 4:49 AM CDT Workstation: [id number]                                     Medications   aspirin chewable tablet 81 mg (81 mg Oral Given 10/14/22 0953)   carvediloL tablet 6.25 mg (6.25 mg Oral Given 10/14/22 2136)   lisinopriL tablet 20 mg (20 mg Oral Given 10/14/22 0953)   mycophenolate capsule 500 mg (500 mg Oral Given 10/14/22 2136)   predniSONE tablet 10 mg (10 mg Oral Given 10/14/22 0953)   cholestyramine-aspartame 4 gram packet 4 g (4 g Oral Given 10/14/22 1201)   sodium chloride 0.9% flush 10 mL (has no administration in time range)   enoxaparin injection 40 mg (40 mg Subcutaneous Given 10/14/22 1952)   glucose chewable tablet 16 g (has no administration in time range)   glucose chewable tablet 24 g (has no administration in time range)   dextrose 50% injection 12.5 g (has no administration in time range)   dextrose 50% injection 25 g (has no administration in time range)   glucagon (human recombinant) injection 1 mg (has no administration in time range)   dextrose 10% bolus 125 mL (has no administration in time range)   dextrose 10% bolus 250 mL (has no administration in time range)   insulin aspart U-100 pen 0-5 Units (1 Units Subcutaneous Given 10/14/22 2137)   diphenhydrAMINE capsule 25 mg (25 mg Oral Given 10/14/22 2140)   hydrOXYzine HCL tablet 25 mg (25 mg Oral Given 10/15/22 0456)     Medical Decision Making:   Initial Assessment:   88-year-old female on initial assessment in mild distress secondary to stroke-like symptoms.  Patient is alert with NIH score of 1 for for slurred speech.  She is nontoxic-appearing and vitals stable at this time.  Differential Diagnosis:   TIA, stroke, seizure, electrolyte abnormality, sepsis  Independently Interpreted Test(s):   I have ordered and independently interpreted X-rays - see prior notes.  I have ordered and independently interpreted EKG Reading(s) - see prior  notes  Clinical Tests:   Lab Tests: Ordered and Reviewed  The following lab test(s) were unremarkable: CBC, CMP, Troponin and Urinalysis  Radiological Study: Ordered and Reviewed  Medical Tests: Ordered and Reviewed  ED Management:  Patient has been reassessed noted to have no acute changes in her condition.  CT is negative for any acute intracranial hemorrhage.  Patient has been consult tele stroke neurologist who agrees patient is out of window for tPA and should be admitted for further MRI images to rule out TIA or any ischemic event.  Patient is allergic to contrast dye and did not receive CTA but will require steroids and Benadryl if deemed necessary.  She has remained stable while in the ED and will be consult to hospitalist for admission and further studies and treatment options.  Patient is aware of the plan and in agreement with admission.    Laboratory results and radiology imaging results reviewed.  Based on the patient's history, physical, and workup here in the emergency department I believe the patient requires admission for a diagnosis of stroke-like symptoms.  I discussed the patient's case with the on-call hospitalist who has agreed to evaluate the patient for admission.  In addition, I discussed the results with the patient and they have verbalized understanding of the results, plan, and need for admission.    ________________________  MARY Balbuena MD   Emergency Medicine                          Clinical Impression:   Final diagnoses:  [R42] Dizziness (Primary)  [R29.90] Stroke-like symptoms  [R47.81] Slurred speech      ED Disposition Condition    Observation Stable                Raphael Balbuena MD  10/15/22 2901

## 2022-10-14 NOTE — HPI
"symptoms include slurred speech, dizziness, difficulty ambulating, LKN today at 0000, located at [facility]     No current facility-administered medications for this encounter.    Current Outpatient Medications:     lisinopriL (PRINIVIL,ZESTRIL) 20 MG tablet, TAKE ONE TABLET BY MOUTH ONCE DAILY, Disp: 90 tablet, Rfl: 2    aspirin 81 mg Tab, Take 81 mg by mouth once daily. Every day, Disp: , Rfl:     blood sugar diagnostic (ACCU-CHEK GUIDE TEST STRIPS) Strp, 300 each by Misc.(Non-Drug; Combo Route) route 3 (three) times daily., Disp: 300 each, Rfl: 3    blood-glucose meter kit, Accu-chek Marley glucometer check glucose three times daily E11.65, Disp: 1 each, Rfl: 0    calcium carbonate/vitamin D3 (CALCIUM+D ORAL), Take 1 tablet by mouth once daily., Disp: , Rfl:     carvediloL (COREG) 6.25 MG tablet, Take 1 tablet (6.25 mg total) by mouth 2 (two) times daily with meals., Disp: 180 tablet, Rfl: 3    DROPLET PEN NEEDLE 31 gauge x 5/16" Ndle, USE WITH LANTUS SOLOSTAR PEN AS NEEDED, Disp: 100 each, Rfl: 3    empagliflozin (JARDIANCE) 10 mg tablet, Take 1 tablet (10 mg total) by mouth once daily., Disp: 30 tablet, Rfl: 6    ibuprofen 200 mg Cap, , Disp: , Rfl:     insulin (LANTUS SOLOSTAR U-100 INSULIN) glargine 100 units/mL (3mL) SubQ pen, INJECT 24 UNITS SUBCUTANEOUSLY EVERY EVENING, Disp: 30 mL, Rfl: 1    ketoconazole (NIZORAL) 2 % cream, AAA pannus fold bid, Disp: 60 g, Rfl: 3    lancets (ACCU-CHEK SOFTCLIX LANCETS) Misc, New machine Guide Me Accuchek, Disp: 300 each, Rfl: 3    levocetirizine (XYZAL) 5 MG tablet, Take 5 mg by mouth every evening., Disp: , Rfl:     metFORMIN (GLUCOPHAGE) 1000 MG tablet, Take 1 tablet (1,000 mg total) by mouth 2 (two) times daily with meals., Disp: 180 tablet, Rfl: 3    multivitamin (THERAGRAN) per tablet, Take 1 tablet by mouth once daily., Disp: , Rfl:     mycophenolate (CELLCEPT) 500 mg Tab, One tab PO QAM and 2 tabs PO QHS, Disp: 90 tablet, Rfl: 1    predniSONE (DELTASONE) 10 MG " tablet, Take 2 tabs PO QAM x 1 week then 1 tab PO QAM x1 week, Disp: 21 tablet, Rfl: 0    PREVALITE 4 gram PwPk, TAKE 1 PACKET (4 G TOTAL) BY MOUTH ONCE DAILY., Disp: 30 packet, Rfl: 11    triamcinolone acetonide 0.1% (KENALOG) 0.1 % cream, AAA bid, Disp: 454 g, Rfl: 3

## 2022-10-14 NOTE — SUBJECTIVE & OBJECTIVE
"  Woke up with symptoms?: no    Recent bleeding noted: no  Does the patient take any Blood Thinners? yes  Medications: Antiplatelets:  aspirin  No current facility-administered medications for this encounter.    Current Outpatient Medications:     lisinopriL (PRINIVIL,ZESTRIL) 20 MG tablet, TAKE ONE TABLET BY MOUTH ONCE DAILY, Disp: 90 tablet, Rfl: 2    aspirin 81 mg Tab, Take 81 mg by mouth once daily. Every day, Disp: , Rfl:     blood sugar diagnostic (ACCU-CHEK GUIDE TEST STRIPS) Strp, 300 each by Misc.(Non-Drug; Combo Route) route 3 (three) times daily., Disp: 300 each, Rfl: 3    blood-glucose meter kit, Accu-chek Marley glucometer check glucose three times daily E11.65, Disp: 1 each, Rfl: 0    calcium carbonate/vitamin D3 (CALCIUM+D ORAL), Take 1 tablet by mouth once daily., Disp: , Rfl:     carvediloL (COREG) 6.25 MG tablet, Take 1 tablet (6.25 mg total) by mouth 2 (two) times daily with meals., Disp: 180 tablet, Rfl: 3    DROPLET PEN NEEDLE 31 gauge x 5/16" Ndle, USE WITH LANTUS SOLOSTAR PEN AS NEEDED, Disp: 100 each, Rfl: 3    empagliflozin (JARDIANCE) 10 mg tablet, Take 1 tablet (10 mg total) by mouth once daily., Disp: 30 tablet, Rfl: 6    ibuprofen 200 mg Cap, , Disp: , Rfl:     insulin (LANTUS SOLOSTAR U-100 INSULIN) glargine 100 units/mL (3mL) SubQ pen, INJECT 24 UNITS SUBCUTANEOUSLY EVERY EVENING, Disp: 30 mL, Rfl: 1    ketoconazole (NIZORAL) 2 % cream, AAA pannus fold bid, Disp: 60 g, Rfl: 3    lancets (ACCU-CHEK SOFTCLIX LANCETS) Misc, New machine Guide Me Accuchek, Disp: 300 each, Rfl: 3    levocetirizine (XYZAL) 5 MG tablet, Take 5 mg by mouth every evening., Disp: , Rfl:     metFORMIN (GLUCOPHAGE) 1000 MG tablet, Take 1 tablet (1,000 mg total) by mouth 2 (two) times daily with meals., Disp: 180 tablet, Rfl: 3    multivitamin (THERAGRAN) per tablet, Take 1 tablet by mouth once daily., Disp: , Rfl:     mycophenolate (CELLCEPT) 500 mg Tab, One tab PO QAM and 2 tabs PO QHS, Disp: 90 " tablet, Rfl: 1    predniSONE (DELTASONE) 10 MG tablet, Take 2 tabs PO QAM x 1 week then 1 tab PO QAM x1 week, Disp: 21 tablet, Rfl: 0    PREVALITE 4 gram PwPk, TAKE 1 PACKET (4 G TOTAL) BY MOUTH ONCE DAILY., Disp: 30 packet, Rfl: 11    triamcinolone acetonide 0.1% (KENALOG) 0.1 % cream, AAA bid, Disp: 454 g, Rfl: 3      Past Medical History: hypertension    Past Surgical History: no major surgeries within the last 2 weeks    Family History: no relevant history    Social History: no smoking, no drinking, no drugs    Allergies: Fenofibric Acid (Choline)  Iodine  Rosuvastatin No relevant allergies    Review of Systems  Objective:   Vitals: Blood pressure (!) 152/67, pulse 81, temperature 97.2 °F (36.2 °C), resp. rate 19, weight 79.4 kg (175 lb), SpO2 97 %. BP: 152/67    CT READ: Yes  No hemmorhage. No mass effect. No early infarct signs.     Physical Exam

## 2022-10-14 NOTE — SUBJECTIVE & OBJECTIVE
Past Medical History:   Diagnosis Date    Anemia due to stage 3 chronic kidney disease 07/24/2019    Arthritis     Bullous pemphigoid 8/29/2022    Chronic bilateral low back pain without sciatica 07/24/2019    CKD (chronic kidney disease) stage 3, GFR 30-59 ml/min 07/24/2019    Colon polyps     Coronary artery disease     Diabetes mellitus type II     Diabetes with neurologic complications     GERD (gastroesophageal reflux disease)     Hyperlipidemia     Hypertension     Hypothyroidism     Type 2 diabetes mellitus        Past Surgical History:   Procedure Laterality Date    ADENOIDECTOMY      AORTIC VALVE REPLACEMENT      BREAST BIOPSY Right 20 yrs ago    benign    CARDIAC SURGERY      CHOLECYSTECTOMY      COLONOSCOPY  5-    Dr Holly, 5 year recheck    EPIDURAL STEROID INJECTION N/A 3/25/2021    Procedure: Injection, Steroid, Epidural L3-4;  Surgeon: Sky Love MD;  Location: [facility] OR;  Service: Pain Management;  Laterality: N/A;  Injection, Steroid, Epidural L3-4    EPIDURAL STEROID INJECTION N/A 5/20/2021    Procedure: Injection, Steroid, Epidural L3-4;  Surgeon: Sky Love MD;  Location: [facility] OR;  Service: Pain Management;  Laterality: N/A;  Injection, Steroid, Epidural L3-4    ESOPHAGOGASTRODUODENOSCOPY N/A 6/4/2020    Procedure: EGD (ESOPHAGOGASTRODUODENOSCOPY);  Surgeon: Michael Nugent III, MD;  Location: [facility];  Service: Endoscopy;  Laterality: N/A;    HEMORRHOID SURGERY      HYSTERECTOMY      OOPHORECTOMY      TONSILLECTOMY         Review of patient's allergies indicates:   Allergen Reactions    Fenofibric acid (choline)      Other reaction(s): Vomiting    Iodine      Other reaction(s): lips swollen ivp dye    Rosuvastatin      Other reaction(s): muscle pain       No current facility-administered medications on file prior to encounter.     Current Outpatient Medications on File Prior to Encounter   Medication Sig    lisinopriL (PRINIVIL,ZESTRIL) 20 MG tablet TAKE ONE TABLET BY MOUTH ONCE  "DAILY    aspirin 81 mg Tab Take 81 mg by mouth once daily. Every day    blood sugar diagnostic (ACCU-CHEK GUIDE TEST STRIPS) Strp 300 each by Misc.(Non-Drug; Combo Route) route 3 (three) times daily.    blood-glucose meter kit Accu-chek Marley glucometer check glucose three times daily E11.65    calcium carbonate/vitamin D3 (CALCIUM+D ORAL) Take 1 tablet by mouth once daily.    carvediloL (COREG) 6.25 MG tablet Take 1 tablet (6.25 mg total) by mouth 2 (two) times daily with meals.    DROPLET PEN NEEDLE 31 gauge x 5/16" Ndle USE WITH LANTUS SOLOSTAR PEN AS NEEDED    empagliflozin (JARDIANCE) 10 mg tablet Take 1 tablet (10 mg total) by mouth once daily.    ibuprofen 200 mg Cap     insulin (LANTUS SOLOSTAR U-100 INSULIN) glargine 100 units/mL (3mL) SubQ pen INJECT 24 UNITS SUBCUTANEOUSLY EVERY EVENING    ketoconazole (NIZORAL) 2 % cream AAA pannus fold bid    lancets (ACCU-CHEK SOFTCLIX LANCETS) Misc New machine Guide Me Accuchek    levocetirizine (XYZAL) 5 MG tablet Take 5 mg by mouth every evening.    metFORMIN (GLUCOPHAGE) 1000 MG tablet Take 1 tablet (1,000 mg total) by mouth 2 (two) times daily with meals.    multivitamin (THERAGRAN) per tablet Take 1 tablet by mouth once daily.    mycophenolate (CELLCEPT) 500 mg Tab One tab PO QAM and 2 tabs PO QHS    predniSONE (DELTASONE) 10 MG tablet Take 2 tabs PO QAM x 1 week then 1 tab PO QAM x1 week    PREVALITE 4 gram PwPk TAKE 1 PACKET (4 G TOTAL) BY MOUTH ONCE DAILY.    triamcinolone acetonide 0.1% (KENALOG) 0.1 % cream AAA bid     Family History       Problem Relation (Age of Onset)    Cancer Father    Diabetes Mother, Brother    Early death Son, Son    Glaucoma Mother    Heart disease Mother    No Known Problems Daughter          Tobacco Use    Smoking status: Former     Packs/day: 0.25     Years: 15.00     Pack years: 3.75     Types: Cigarettes     Quit date: 3/30/1974     Years since quittin.5    Smokeless tobacco: Never   Substance and Sexual Activity    Alcohol " use: No    Drug use: No    Sexual activity: Never     Review of Systems   Constitutional:  Positive for fatigue.   HENT: Negative.     Eyes: Negative.    Respiratory: Negative.     Cardiovascular: Negative.    Gastrointestinal: Negative.    Endocrine: Positive for heat intolerance.   Genitourinary: Negative.    Musculoskeletal:  Positive for arthralgias and myalgias.   Skin: Negative.    Allergic/Immunologic: Negative.    Neurological:  Positive for weakness.   Hematological: Negative.    Psychiatric/Behavioral:  The patient is nervous/anxious.    Objective:     Vital Signs (Most Recent):  Temp: 97.2 °F (36.2 °C) (10/14/22 0430)  Pulse: 73 (10/14/22 1500)  Resp: (!) 21 (10/14/22 1150)  BP: (!) 166/69 (10/14/22 1500)  SpO2: 99 % (10/14/22 1500)   Vital Signs (24h Range):  Temp:  [97.2 °F (36.2 °C)] 97.2 °F (36.2 °C)  Pulse:  [73-84] 73  Resp:  [16-21] 21  SpO2:  [97 %-100 %] 99 %  BP: (141-173)/() 166/69     Weight: 79.4 kg (175 lb)  Body mass index is 27.41 kg/m².    Physical Exam  Vitals and nursing note reviewed.   Constitutional:       General: She is not in acute distress.  HENT:      Head: Normocephalic and atraumatic.      Nose: Nose normal.      Mouth/Throat:      Mouth: Mucous membranes are moist.   Eyes:      Extraocular Movements: Extraocular movements intact.      Pupils: Pupils are equal, round, and reactive to light.   Cardiovascular:      Rate and Rhythm: Normal rate and regular rhythm.   Pulmonary:      Effort: Pulmonary effort is normal.      Breath sounds: Normal breath sounds.   Abdominal:      General: Bowel sounds are normal. There is no distension.      Tenderness: There is abdominal tenderness. There is rebound. There is no guarding.   Musculoskeletal:         General: Normal range of motion.      Cervical back: Normal range of motion and neck supple.   Skin:     General: Skin is warm.   Neurological:      Mental Status: She is alert and oriented to person, place, and time.   Psychiatric:       Comments: Very anxious         CRANIAL NERVES     CN III, IV, VI   Pupils are equal, round, and reactive to light.     Significant Labs: All pertinent labs within the past 24 hours have been reviewed.  Recent Lab Results         10/14/22  1351   10/14/22  0925   10/14/22  0501   10/14/22  0432        Albumin     3.3         Alkaline Phosphatase     66         ALT     11         Anion Gap     9         Ao VTI 64.00             Appearance, UA   Clear           AST     17         AV valve area 1.79             AV mean gradient 21             AV index (prosthetic) 0.46             Bacteria, UA   Negative           Baso #     0.04         Basophil %     0.9         Bilirubin (UA)   Negative           BILIRUBIN TOTAL     1.1  Comment: For infants and newborns, interpretation of results should be based  on gestational age, weight and in agreement with clinical  observations.    Premature Infant recommended reference ranges:  Up to 24 hours.............<8.0 mg/dL  Up to 48 hours............<12.0 mg/dL  3-5 days..................<15.0 mg/dL  6-29 days.................<15.0 mg/dL           BSA 1.94             BUN     18         Calcium     9.0         Chloride     106         Cholesterol     126  Comment: The National Cholesterol Education Program (NCEP) has set the  following guidelines (reference ranges) for Cholesterol:  Optimal.....................<200 mg/dL  Borderline High.............200-239 mg/dL  High........................> or = 240 mg/dL           CO2     27         Color, UA   Yellow           Creatinine     0.8         Left Ventricle Relative Wall Thickness 0.57             Differential Method     Automated         E/A ratio 1.63             E/E' ratio 24.77             eGFR     >60.0         EF 70             Eos #     0.2         Eosinophil %     5.1         Estimated Avg Glucose     180         E wave deceleration time 236.20             FS 28             Glucose     208         Glucose, UA   4+            Gran # (ANC)     3.4         Gran %     72.0         Group & Rh     O POS         HDL     33  Comment: The National Cholesterol Education Program (NCEP) has set the  following guidelines (reference values) for HDL Cholesterol:  Low...............<40 mg/dL  Optimal...........>60 mg/dL           HDL/Cholesterol Ratio     26.2         Hematocrit     32.8         Hemoglobin     10.2         Hemoglobin A1C External     7.9  Comment: According to ADA guidelines, hemoglobin A1C <7.0% represents  optimal control in non-pregnant diabetic patients.  Different  metrics may apply to specific populations.   Standards of Medical Care in Diabetes - 2016.    For the purpose of screening for the presence of diabetes:  <5.7%     Consistent with the absence of diabetes  5.7-6.4%  Consistent with increasing risk for diabetes   (prediabetes)  >or=6.5%  Consistent with diabetes    Currently no consensus exists for use of hemoglobin A1C  for diagnosis of diabetes for children.           Mitral Valve Heart Rate 78             Hyaline Casts, UA   7           Immature Grans (Abs)     0.02  Comment: Mild elevation in immature granulocytes is non specific and   can be seen in a variety of conditions including stress response,   acute inflammation, trauma and pregnancy. Correlation with other   laboratory and clinical findings is essential.           Immature Granulocytes     0.4         INDIRECT LYLE     NEG         INR     1.2  Comment: Coumadin Therapy:  INR: 2.0-3.0 conventional anticoagulation    INR: 2.5-3.5 intensive anticoagulation           IVSd 1.11             Ketones, UA   Negative           LA size 4.85             LA volume 120.16             LA Volume Index (Mod) 62.9             LVOT area 3.9             LDL Cholesterol External     64.6  Comment: The National Cholesterol Education Program (NCEP) has set the  following guidelines (reference values) for LDL Cholesterol:  Optimal.......................<130 mg/dL  Borderline  High...............130-159 mg/dL  High..........................160-189 mg/dL  Very High.....................>190 mg/dL           Leukocytes, UA   Trace           LV LATERAL E/E' RATIO 20.13             LV SEPTAL E/E' RATIO 32.20             LV EDV BP 64.00             LV Diastolic Volume Index 33.51             LVIDd 4.00             LVIDs 2.90             LV mass 149.50             LV Mass Index 78             LVOT diameter 2.22             LVOT peak bruno 128.58             LVOT stroke volume 114.40             LVOT peak VTI 29.57             LV ESV BP 24.38             LV Systolic Volume Index 12.8             Lymph #     0.5         Lymph %     11.3         MCH     28.8         MCHC     31.1         MCV     93         Mean e' 0.07             Microscopic Comment   SEE COMMENT  Comment: Other formed elements not mentioned in the report are not   present in the microscopic examination.              Mono #     0.5         Mono %     10.3         MPV     10.9         Mr max bruno 580.15             MV mean gradient 6             MV Peak A Bruno 0.99             MV Peak E Bruno 1.61             NITRITE UA   Negative           Non-HDL Cholesterol     93  Comment: Risk category and Non-HDL cholesterol goals:  Coronary heart disease (CHD)or equivalent (10-year risk of CHD >20%):  Non-HDL cholesterol goal     <130 mg/dL  Two or more CHD risk factors and 10-year risk of CHD <= 20%:  Non-HDL cholesterol goal     <160 mg/dL  0 to 1 CHD risk factor:  Non-HDL cholesterol goal     <190 mg/dL           nRBC     0         Occult Blood UA   Negative           pH, UA   6.0           Platelets     194         POC Glucose       197       Potassium     3.6         PROTEIN TOTAL     6.1         Protein, UA   Trace  Comment: Recommend a 24 hour urine protein or a urine   protein/creatinine ratio if globulin induced proteinuria is  clinically suspected.             PT     14.0         Posterior Wall 1.13             Right Atrial Pressure (from  IVC) 3             RBC     3.54         RBC, UA   1           RDW     16.0         Sodium     142         Specific [place], UA   1.025           Specimen UA   Urine, Clean Catch           Squam Epithel, UA   2           TAPSE 1.64             TDI SEPTAL 0.05             TDI LATERAL 0.08             Total Cholesterol/HDL Ratio     3.8         Triglycerides     142  Comment: The National Cholesterol Education Program (NCEP) has set the  following guidelines (reference values) for triglycerides:  Normal......................<150 mg/dL  Borderline High.............150-199 mg/dL  High........................200-499 mg/dL           Triscuspid Valve Regurgitation Peak Gradient 11             TR Max Bruno 1.64             TSH     1.270         TV rest pulmonary artery pressure 14             UROBILINOGEN UA   Negative           WBC, UA   6           WBC     4.68         Yeast, UA   None                   Significant Imaging: I have reviewed all pertinent imaging results/findings within the past 24 hours.

## 2022-10-14 NOTE — CONSULTS
[facility]  Vascular Neurology  Comprehensive Stroke Center  TeleVascular Neurology Acute Consultation Note      Consults    Consulting Provider: YEVGENIY DAWSON  Current Providers  No providers found    Patient Location:  [facility] EMERGENCY DEPARTMENT Emergency Department  Spoke hospital nurse at bedside with patient assisting consultant.     Patient information was obtained from patient.         Assessment/Plan:     Patient presenting with symptoms include slurred speech, dizziness, difficulty ambulating, LKN today at 0000, located at [facility] . NIHSS-1. CT brain did not show any acute intracranial abnormality. TPA not given as outside window and low NIHSS.       Ddx: TIA/stroke/hypertensive urgency  Rectal aspirin 300 mg now or oral aspirin 81 mg.   Head of bed flat, IV Fluids, permissive hypertension  MRA head without contrast and carotid ultrasound  Neuro consult if in house available or transfer for neuro consult  Stroke/TIA work-up with MRI brain, Echo, PT/OT/ Speech and swallow evaluation.  They will call on call team with MRA results if abnormal. Depending on bed side neuro exam and MRI brain/MRA results recommend loading Plavix load 300 mg within 24 hours of symptoms per POINT/CHANCE protocol today and from tomorrow 81 mg aspirin and plavix 75 mg for 21 days followed by mono antiplatelet.           Diagnoses:   Stroke like symptoms     STROKE DOCUMENTATION     Acute Stroke Times:   Acute Stroke Times   Last Known Normal Date: 10/14/22  Last Known Normal Time: 0000  Symptom Onset Date: 10/14/22  Symptom Onset Time: 0000  Stroke Team Called Date: 10/14/22  Stroke Team Called Time: 0445  Stroke Team Arrival Date: 10/14/22  Stroke Team Arrival Time: 0447  CT Interpretation Time: 0450  Alteplase Recommended: No    NIH Scale:  1a. Level of Consciousness: 0-->Alert, keenly responsive  1b. LOC Questions: 0-->Answers both questions correctly  1c. LOC Commands: 0-->Performs  both tasks correctly  2. Best Gaze: 0-->Normal  3. Visual: 0-->No visual loss  4. Facial Palsy: 0-->Normal symmetrical movements  5a. Motor Arm, Left: 0-->No drift, limb holds 90 (or 45) degrees for full 10 secs  5b. Motor Arm, Right: 0-->No drift, limb holds 90 (or 45) degrees for full 10 secs  6a. Motor Leg, Left: 0-->No drift, leg holds 30 degree position for full 5 secs  6b. Motor Leg, Right: 0-->No drift, leg holds 30 degree position for full 5 secs  7. Limb Ataxia: 0-->Absent  8. Sensory: 0-->Normal, no sensory loss  9. Best Language: 0-->No aphasia, normal  10. Dysarthria: 1-->Mild-to-moderate dysarthria, patient slurs at least some words and, at worst, can be understood with some difficulty  11. Extinction and Inattention (formerly Neglect): 0-->No abnormality  Total (NIH Stroke Scale): 1     Modified [place]    [place] Coma Scale:    ABCD2 Score:    [id number] Score:   HAS -BLED Score:   ICH Score:   Hunt & Gamez Classification:       Blood pressure (!) 152/67, pulse 81, temperature 97.2 °F (36.2 °C), resp. rate 19, weight 79.4 kg (175 lb), SpO2 97 %.  Alteplase Eligible?: Yes  Alteplase Recommendation: Alteplase not recommended due to Outside of treatment window   Possible Interventional Revascularization Candidate? Awaiting CTA results from [facility] for determination     Disposition Recommendation: pending further studies  remains as inpatient at [facility]     Subjective:     History of Present Illness:  symptoms include slurred speech, dizziness, difficulty ambulating, LKN today at 0000, located at [facility]     No current facility-administered medications for this encounter.    Current Outpatient Medications:     lisinopriL (PRINIVIL,ZESTRIL) 20 MG tablet, TAKE ONE TABLET BY MOUTH ONCE DAILY, Disp: 90 tablet, Rfl: 2    aspirin 81 mg Tab, Take 81 mg by mouth once daily. Every day, Disp: , Rfl:     blood sugar diagnostic (ACCU-CHEK GUIDE TEST STRIPS) Strp, 300 each by Misc.(Non-Drug; Combo Route) route 3  "(three) times daily., Disp: 300 each, Rfl: 3    blood-glucose meter kit, Accu-chek Marley glucometer check glucose three times daily E11.65, Disp: 1 each, Rfl: 0    calcium carbonate/vitamin D3 (CALCIUM+D ORAL), Take 1 tablet by mouth once daily., Disp: , Rfl:     carvediloL (COREG) 6.25 MG tablet, Take 1 tablet (6.25 mg total) by mouth 2 (two) times daily with meals., Disp: 180 tablet, Rfl: 3    DROPLET PEN NEEDLE 31 gauge x 5/16" Ndle, USE WITH LANTUS SOLOSTAR PEN AS NEEDED, Disp: 100 each, Rfl: 3    empagliflozin (JARDIANCE) 10 mg tablet, Take 1 tablet (10 mg total) by mouth once daily., Disp: 30 tablet, Rfl: 6    ibuprofen 200 mg Cap, , Disp: , Rfl:     insulin (LANTUS SOLOSTAR U-100 INSULIN) glargine 100 units/mL (3mL) SubQ pen, INJECT 24 UNITS SUBCUTANEOUSLY EVERY EVENING, Disp: 30 mL, Rfl: 1    ketoconazole (NIZORAL) 2 % cream, AAA pannus fold bid, Disp: 60 g, Rfl: 3    lancets (ACCU-CHEK SOFTCLIX LANCETS) Misc, New machine Guide Me Accuchek, Disp: 300 each, Rfl: 3    levocetirizine (XYZAL) 5 MG tablet, Take 5 mg by mouth every evening., Disp: , Rfl:     metFORMIN (GLUCOPHAGE) 1000 MG tablet, Take 1 tablet (1,000 mg total) by mouth 2 (two) times daily with meals., Disp: 180 tablet, Rfl: 3    multivitamin (THERAGRAN) per tablet, Take 1 tablet by mouth once daily., Disp: , Rfl:     mycophenolate (CELLCEPT) 500 mg Tab, One tab PO QAM and 2 tabs PO QHS, Disp: 90 tablet, Rfl: 1    predniSONE (DELTASONE) 10 MG tablet, Take 2 tabs PO QAM x 1 week then 1 tab PO QAM x1 week, Disp: 21 tablet, Rfl: 0    PREVALITE 4 gram PwPk, TAKE 1 PACKET (4 G TOTAL) BY MOUTH ONCE DAILY., Disp: 30 packet, Rfl: 11    triamcinolone acetonide 0.1% (KENALOG) 0.1 % cream, AAA bid, Disp: 454 g, Rfl: 3        Woke up with symptoms?: no    Recent bleeding noted: no  Does the patient take any Blood Thinners? yes  Medications: Antiplatelets:  aspirin  No current facility-administered medications for this encounter.    Current Outpatient " "Medications:     lisinopriL (PRINIVIL,ZESTRIL) 20 MG tablet, TAKE ONE TABLET BY MOUTH ONCE DAILY, Disp: 90 tablet, Rfl: 2    aspirin 81 mg Tab, Take 81 mg by mouth once daily. Every day, Disp: , Rfl:     blood sugar diagnostic (ACCU-CHEK GUIDE TEST STRIPS) Strp, 300 each by Misc.(Non-Drug; Combo Route) route 3 (three) times daily., Disp: 300 each, Rfl: 3    blood-glucose meter kit, Accu-chek Marley glucometer check glucose three times daily E11.65, Disp: 1 each, Rfl: 0    calcium carbonate/vitamin D3 (CALCIUM+D ORAL), Take 1 tablet by mouth once daily., Disp: , Rfl:     carvediloL (COREG) 6.25 MG tablet, Take 1 tablet (6.25 mg total) by mouth 2 (two) times daily with meals., Disp: 180 tablet, Rfl: 3    DROPLET PEN NEEDLE 31 gauge x 5/16" Ndle, USE WITH LANTUS SOLOSTAR PEN AS NEEDED, Disp: 100 each, Rfl: 3    empagliflozin (JARDIANCE) 10 mg tablet, Take 1 tablet (10 mg total) by mouth once daily., Disp: 30 tablet, Rfl: 6    ibuprofen 200 mg Cap, , Disp: , Rfl:     insulin (LANTUS SOLOSTAR U-100 INSULIN) glargine 100 units/mL (3mL) SubQ pen, INJECT 24 UNITS SUBCUTANEOUSLY EVERY EVENING, Disp: 30 mL, Rfl: 1    ketoconazole (NIZORAL) 2 % cream, AAA pannus fold bid, Disp: 60 g, Rfl: 3    lancets (ACCU-CHEK SOFTCLIX LANCETS) Misc, New machine Guide Me Accuchek, Disp: 300 each, Rfl: 3    levocetirizine (XYZAL) 5 MG tablet, Take 5 mg by mouth every evening., Disp: , Rfl:     metFORMIN (GLUCOPHAGE) 1000 MG tablet, Take 1 tablet (1,000 mg total) by mouth 2 (two) times daily with meals., Disp: 180 tablet, Rfl: 3    multivitamin (THERAGRAN) per tablet, Take 1 tablet by mouth once daily., Disp: , Rfl:     mycophenolate (CELLCEPT) 500 mg Tab, One tab PO QAM and 2 tabs PO QHS, Disp: 90 tablet, Rfl: 1    predniSONE (DELTASONE) 10 MG tablet, Take 2 tabs PO QAM x 1 week then 1 tab PO QAM x1 week, Disp: 21 tablet, Rfl: 0    PREVALITE 4 gram PwPk, TAKE 1 PACKET (4 G TOTAL) BY MOUTH ONCE DAILY., Disp: 30 packet, Rfl: 11    triamcinolone " acetonide 0.1% (KENALOG) 0.1 % cream, AAA bid, Disp: 454 g, Rfl: 3      Past Medical History: hypertension    Past Surgical History: no major surgeries within the last 2 weeks    Family History: no relevant history    Social History: no smoking, no drinking, no drugs    Allergies: Fenofibric Acid (Choline)  Iodine  Rosuvastatin No relevant allergies    Review of Systems  Objective:   Vitals: Blood pressure (!) 152/67, pulse 81, temperature 97.2 °F (36.2 °C), resp. rate 19, weight 79.4 kg (175 lb), SpO2 97 %. BP: 152/67    CT READ: Yes  No hemmorhage. No mass effect. No early infarct signs.     Physical Exam        Recommended the emergency room physician to have a brief discussion with the patient and/or family if available regarding the  risks and benefits of treatment, and to briefly document the occurrence of that discussion in his clinical encounter note.     The attending portion of this evaluation, treatment, and documentation was performed per Merrick Medina MD via audiovisual.    Billing code:  (non-intervention mild to moderate stroke, TIA, some mimics)      This patient has a critical neurological condition/illness, with some potential for high morbidity and mortality.  There is a moderate probability for acute neurological change leading to clinical and possibly life-threatening deterioration requiring highest level of physician preparedness for urgent intervention.  Care was coordinated with other physicians involved in the patient's care.  Radiologic studies and laboratory data were reviewed and interpreted, and plan of care was re-assessed based on the results.  Diagnosis, treatment options and prognosis may have been discussed with the patient and/or family members or caregiver.    In your opinion, this was a: Tier 1 Van Negative    Consult End Time: 7:12 AM     Merrick Medina MD  Comprehensive Stroke Center  Vascular Neurology   [facility]

## 2022-10-14 NOTE — PLAN OF CARE
Goals to be met by: 22     Patient will increase functional independence with mobility by performin. Supine to sit with Supervision  2. Sit to stand transfer with Supervision  3. Bed to chair transfer with Supervision using Rolling Walker  4. Gait  x 300 feet with Supervision using Rolling Walker.

## 2022-10-14 NOTE — H&P
[facility] - Emergency Dept  Hospital Medicine  History & Physical    Patient Name: Leslie Tolliver  MRN: [id number]  Patient Class: OP- Observation  Admission Date: 10/14/2022  Attending Physician: Taras Bynum MD  Primary Care Provider: Chang Christianson MD         Patient information was obtained from patient, past medical records and ER records.     Subjective:     Principal Problem:TIA (transient ischemic attack)    Chief Complaint:   Chief Complaint   Patient presents with    Dizziness     Started at midnight with slurred speech.         HPI: ED note     [age] year female with past medical history significant diabetes, CAD, CKD, GERD, hypertension hyperlipidemia presents secondary to complain of dizziness.  Patient feels as if she is off balance and developed an episode of slurred speech.  Patient was last seen normal approximately midnight which was greater than 4 hour time frame from symptom onset.  She denies any nausea, vomiting, fever, chills, sweats or changes sensations.  She is otherwise stable and has no other complaints.    10/14/2022  Ms Tolliver has no deficits currently but is very anxious that they could return      Past Medical History:   Diagnosis Date    Anemia due to stage 3 chronic kidney disease 07/24/2019    Arthritis     Bullous pemphigoid 8/29/2022    Chronic bilateral low back pain without sciatica 07/24/2019    CKD (chronic kidney disease) stage 3, GFR 30-59 ml/min 07/24/2019    Colon polyps     Coronary artery disease     Diabetes mellitus type II     Diabetes with neurologic complications     GERD (gastroesophageal reflux disease)     Hyperlipidemia     Hypertension     Hypothyroidism     Type 2 diabetes mellitus        Past Surgical History:   Procedure Laterality Date    ADENOIDECTOMY      AORTIC VALVE REPLACEMENT      BREAST BIOPSY Right 20 yrs ago    benign    CARDIAC SURGERY      CHOLECYSTECTOMY      COLONOSCOPY  5-      "Elayne, 5 year recheck    EPIDURAL STEROID INJECTION N/A 3/25/2021    Procedure: Injection, Steroid, Epidural L3-4;  Surgeon: Sky Love MD;  Location: [facility] OR;  Service: Pain Management;  Laterality: N/A;  Injection, Steroid, Epidural L3-4    EPIDURAL STEROID INJECTION N/A 5/20/2021    Procedure: Injection, Steroid, Epidural L3-4;  Surgeon: Sky Love MD;  Location: [facility] OR;  Service: Pain Management;  Laterality: N/A;  Injection, Steroid, Epidural L3-4    ESOPHAGOGASTRODUODENOSCOPY N/A 6/4/2020    Procedure: EGD (ESOPHAGOGASTRODUODENOSCOPY);  Surgeon: Michael Nugent III, MD;  Location: [facility];  Service: Endoscopy;  Laterality: N/A;    HEMORRHOID SURGERY      HYSTERECTOMY      OOPHORECTOMY      TONSILLECTOMY         Review of patient's allergies indicates:   Allergen Reactions    Fenofibric acid (choline)      Other reaction(s): Vomiting    Iodine      Other reaction(s): lips swollen ivp dye    Rosuvastatin      Other reaction(s): muscle pain       No current facility-administered medications on file prior to encounter.     Current Outpatient Medications on File Prior to Encounter   Medication Sig    lisinopriL (PRINIVIL,ZESTRIL) 20 MG tablet TAKE ONE TABLET BY MOUTH ONCE DAILY    aspirin 81 mg Tab Take 81 mg by mouth once daily. Every day    blood sugar diagnostic (ACCU-CHEK GUIDE TEST STRIPS) Strp 300 each by Misc.(Non-Drug; Combo Route) route 3 (three) times daily.    blood-glucose meter kit Accu-chek Marley glucometer check glucose three times daily E11.65    calcium carbonate/vitamin D3 (CALCIUM+D ORAL) Take 1 tablet by mouth once daily.    carvediloL (COREG) 6.25 MG tablet Take 1 tablet (6.25 mg total) by mouth 2 (two) times daily with meals.    DROPLET PEN NEEDLE 31 gauge x 5/16" Ndle USE WITH LANTUS SOLOSTAR PEN AS NEEDED    empagliflozin (JARDIANCE) 10 mg tablet Take 1 tablet (10 mg total) by mouth once daily.    ibuprofen 200 mg Cap     insulin (LANTUS SOLOSTAR U-100 " INSULIN) glargine 100 units/mL (3mL) SubQ pen INJECT 24 UNITS SUBCUTANEOUSLY EVERY EVENING    ketoconazole (NIZORAL) 2 % cream AAA pannus fold bid    lancets (ACCU-CHEK SOFTCLIX LANCETS) Misc New machine Guide Me Accuchek    levocetirizine (XYZAL) 5 MG tablet Take 5 mg by mouth every evening.    metFORMIN (GLUCOPHAGE) 1000 MG tablet Take 1 tablet (1,000 mg total) by mouth 2 (two) times daily with meals.    multivitamin (THERAGRAN) per tablet Take 1 tablet by mouth once daily.    mycophenolate (CELLCEPT) 500 mg Tab One tab PO QAM and 2 tabs PO QHS    predniSONE (DELTASONE) 10 MG tablet Take 2 tabs PO QAM x 1 week then 1 tab PO QAM x1 week    PREVALITE 4 gram PwPk TAKE 1 PACKET (4 G TOTAL) BY MOUTH ONCE DAILY.    triamcinolone acetonide 0.1% (KENALOG) 0.1 % cream AAA bid     Family History       Problem Relation (Age of Onset)    Cancer Father    Diabetes Mother, Brother    Early death Son, Son    Glaucoma Mother    Heart disease Mother    No Known Problems Daughter          Tobacco Use    Smoking status: Former     Packs/day: 0.25     Years: 15.00     Pack years: 3.75     Types: Cigarettes     Quit date: 3/30/1974     Years since quittin.5    Smokeless tobacco: Never   Substance and Sexual Activity    Alcohol use: No    Drug use: No    Sexual activity: Never     Review of Systems   Constitutional:  Positive for fatigue.   HENT: Negative.     Eyes: Negative.    Respiratory: Negative.     Cardiovascular: Negative.    Gastrointestinal: Negative.    Endocrine: Positive for heat intolerance.   Genitourinary: Negative.    Musculoskeletal:  Positive for arthralgias and myalgias.   Skin: Negative.    Allergic/Immunologic: Negative.    Neurological:  Positive for weakness.   Hematological: Negative.    Psychiatric/Behavioral:  The patient is nervous/anxious.    Objective:     Vital Signs (Most Recent):  Temp: 97.2 °F (36.2 °C) (10/14/22 0430)  Pulse: 73 (10/14/22 1500)  Resp: (!) 21 (10/14/22 1150)  BP: (!)  166/69 (10/14/22 1500)  SpO2: 99 % (10/14/22 1500)   Vital Signs (24h Range):  Temp:  [97.2 °F (36.2 °C)] 97.2 °F (36.2 °C)  Pulse:  [73-84] 73  Resp:  [16-21] 21  SpO2:  [97 %-100 %] 99 %  BP: (141-173)/() 166/69     Weight: 79.4 kg (175 lb)  Body mass index is 27.41 kg/m².    Physical Exam  Vitals and nursing note reviewed.   Constitutional:       General: She is not in acute distress.  HENT:      Head: Normocephalic and atraumatic.      Nose: Nose normal.      Mouth/Throat:      Mouth: Mucous membranes are moist.   Eyes:      Extraocular Movements: Extraocular movements intact.      Pupils: Pupils are equal, round, and reactive to light.   Cardiovascular:      Rate and Rhythm: Normal rate and regular rhythm.   Pulmonary:      Effort: Pulmonary effort is normal.      Breath sounds: Normal breath sounds.   Abdominal:      General: Bowel sounds are normal. There is no distension.      Tenderness: There is abdominal tenderness. There is rebound. There is no guarding.   Musculoskeletal:         General: Normal range of motion.      Cervical back: Normal range of motion and neck supple.   Skin:     General: Skin is warm.   Neurological:      Mental Status: She is alert and oriented to person, place, and time.   Psychiatric:      Comments: Very anxious         CRANIAL NERVES     CN III, IV, VI   Pupils are equal, round, and reactive to light.     Significant Labs: All pertinent labs within the past 24 hours have been reviewed.  Recent Lab Results         10/14/22  1351   10/14/22  0925   10/14/22  0501   10/14/22  0432        Albumin     3.3         Alkaline Phosphatase     66         ALT     11         Anion Gap     9         Ao VTI 64.00             Appearance, UA   Clear           AST     17         AV valve area 1.79             AV mean gradient 21             AV index (prosthetic) 0.46             Bacteria, UA   Negative           Baso #     0.04         Basophil %     0.9         Bilirubin (UA)   Negative            BILIRUBIN TOTAL     1.1  Comment: For infants and newborns, interpretation of results should be based  on gestational age, weight and in agreement with clinical  observations.    Premature Infant recommended reference ranges:  Up to 24 hours.............<8.0 mg/dL  Up to 48 hours............<12.0 mg/dL  3-5 days..................<15.0 mg/dL  6-29 days.................<15.0 mg/dL           BSA 1.94             BUN     18         Calcium     9.0         Chloride     106         Cholesterol     126  Comment: The National Cholesterol Education Program (NCEP) has set the  following guidelines (reference ranges) for Cholesterol:  Optimal.....................<200 mg/dL  Borderline High.............200-239 mg/dL  High........................> or = 240 mg/dL           CO2     27         Color, UA   Yellow           Creatinine     0.8         Left Ventricle Relative Wall Thickness 0.57             Differential Method     Automated         E/A ratio 1.63             E/E' ratio 24.77             eGFR     >60.0         EF 70             Eos #     0.2         Eosinophil %     5.1         Estimated Avg Glucose     180         E wave deceleration time 236.20             FS 28             Glucose     208         Glucose, UA   4+           Gran # (ANC)     3.4         Gran %     72.0         Group & Rh     O POS         HDL     33  Comment: The National Cholesterol Education Program (NCEP) has set the  following guidelines (reference values) for HDL Cholesterol:  Low...............<40 mg/dL  Optimal...........>60 mg/dL           HDL/Cholesterol Ratio     26.2         Hematocrit     32.8         Hemoglobin     10.2         Hemoglobin A1C External     7.9  Comment: According to ADA guidelines, hemoglobin A1C <7.0% represents  optimal control in non-pregnant diabetic patients.  Different  metrics may apply to specific populations.   Standards of Medical Care in Diabetes - 2016.    For the purpose of screening for the presence of  diabetes:  <5.7%     Consistent with the absence of diabetes  5.7-6.4%  Consistent with increasing risk for diabetes   (prediabetes)  >or=6.5%  Consistent with diabetes    Currently no consensus exists for use of hemoglobin A1C  for diagnosis of diabetes for children.           Mitral Valve Heart Rate 78             Hyaline Casts, UA   7           Immature Grans (Abs)     0.02  Comment: Mild elevation in immature granulocytes is non specific and   can be seen in a variety of conditions including stress response,   acute inflammation, trauma and pregnancy. Correlation with other   laboratory and clinical findings is essential.           Immature Granulocytes     0.4         INDIRECT LYLE     NEG         INR     1.2  Comment: Coumadin Therapy:  INR: 2.0-3.0 conventional anticoagulation    INR: 2.5-3.5 intensive anticoagulation           IVSd 1.11             Ketones, UA   Negative           LA size 4.85             LA volume 120.16             LA Volume Index (Mod) 62.9             LVOT area 3.9             LDL Cholesterol External     64.6  Comment: The National Cholesterol Education Program (NCEP) has set the  following guidelines (reference values) for LDL Cholesterol:  Optimal.......................<130 mg/dL  Borderline High...............130-159 mg/dL  High..........................160-189 mg/dL  Very High.....................>190 mg/dL           Leukocytes, UA   Trace           LV LATERAL E/E' RATIO 20.13             LV SEPTAL E/E' RATIO 32.20             LV EDV BP 64.00             LV Diastolic Volume Index 33.51             LVIDd 4.00             LVIDs 2.90             LV mass 149.50             LV Mass Index 78             LVOT diameter 2.22             LVOT peak arlette 128.58             LVOT stroke volume 114.40             LVOT peak VTI 29.57             LV ESV BP 24.38             LV Systolic Volume Index 12.8             Lymph #     0.5         Lymph %     11.3         MCH     28.8         MCHC      31.1         MCV     93         Mean e' 0.07             Microscopic Comment   SEE COMMENT  Comment: Other formed elements not mentioned in the report are not   present in the microscopic examination.              Mono #     0.5         Mono %     10.3         MPV     10.9         Mr max bruno 580.15             MV mean gradient 6             MV Peak A Bruno 0.99             MV Peak E Bruno 1.61             NITRITE UA   Negative           Non-HDL Cholesterol     93  Comment: Risk category and Non-HDL cholesterol goals:  Coronary heart disease (CHD)or equivalent (10-year risk of CHD >20%):  Non-HDL cholesterol goal     <130 mg/dL  Two or more CHD risk factors and 10-year risk of CHD <= 20%:  Non-HDL cholesterol goal     <160 mg/dL  0 to 1 CHD risk factor:  Non-HDL cholesterol goal     <190 mg/dL           nRBC     0         Occult Blood UA   Negative           pH, UA   6.0           Platelets     194         POC Glucose       197       Potassium     3.6         PROTEIN TOTAL     6.1         Protein, UA   Trace  Comment: Recommend a 24 hour urine protein or a urine   protein/creatinine ratio if globulin induced proteinuria is  clinically suspected.             PT     14.0         Posterior Wall 1.13             Right Atrial Pressure (from IVC) 3             RBC     3.54         RBC, UA   1           RDW     16.0         Sodium     142         Specific [place], UA   1.025           Specimen UA   Urine, Clean Catch           Squam Epithel, UA   2           TAPSE 1.64             TDI SEPTAL 0.05             TDI LATERAL 0.08             Total Cholesterol/HDL Ratio     3.8         Triglycerides     142  Comment: The National Cholesterol Education Program (NCEP) has set the  following guidelines (reference values) for triglycerides:  Normal......................<150 mg/dL  Borderline High.............150-199 mg/dL  High........................200-499 mg/dL           Triscuspid Valve Regurgitation Peak Gradient 11             TR  Max Bruno 1.64             TSH     1.270         TV rest pulmonary artery pressure 14             UROBILINOGEN UA   Negative           WBC, UA   6           WBC     4.68         Yeast, UA   None                   Significant Imaging: I have reviewed all pertinent imaging results/findings within the past 24 hours.    Assessment/Plan:     * TIA (transient ischemic attack)  Symptoms have essentially resolved        VTE Risk Mitigation (From admission, onward)         Ordered     enoxaparin injection 40 mg  Daily         10/14/22 0828     IP VTE HIGH RISK PATIENT  Once         10/14/22 0828     Place sequential compression device  Until discontinued         10/14/22 0828                   Taras Bynum MD  Department of Hospital Medicine   [facility] - Emergency Dept

## 2022-10-14 NOTE — ED NOTES
Code stroke overhead page hospital and ER- secure chat sent to Arslan Jimenez RN and Sheila Matta RN notifying of code stroke

## 2022-10-14 NOTE — PLAN OF CARE
[facility] - Emergency Dept  Initial Discharge Assessment       Primary Care Provider: Chang Christianson MD    Admission Diagnosis: Dizziness [R42]    Admission Date: 10/14/2022  Expected Discharge Date:     Assessment completed with pt and her daughter Litzy Stanley [phone] at bedside. PT confirmed her home address and daughter lives with her. PT denied HH and has a home walker. Pt verified Dr. Christianson as PCP and insurance as [employer]. Pt uses [facility]. Pt does not drive and reports that her daughter will provide transport home. CM following.     Discharge Barriers Identified: None    Payor: [employer] MANAGED MEDICARE / Plan: [employer] MEDICARE HMO / Product Type: Capitation /     Extended Emergency Contact Information  Primary Emergency Contact: Litzy Stanley  Address: [address] [place] [address] [place] [address] [facility]  Home Phone: [phone]  Mobile Phone: [phone]  Relation: Daughter  Preferred language: English   needed? No    Discharge Plan A: Home with family  Discharge Plan B: Home      [id number] - [place] - [address]  [address] [address]  Phone: [phone] Fax: [phone]    [facility] - [facility] [address] [facility]  [address] [facility] [address]  Phone: [phone] Fax: [phone]    [id number] [place] - [address] AT [facility] OF [address]  [address] [address]  Phone: [phone] Fax: [phone]      Initial Assessment (most recent)       Adult Discharge Assessment - 10/14/22 1017          Discharge Assessment    Assessment Type Discharge Planning Assessment     Confirmed/corrected address, phone number and insurance Yes     Confirmed Demographics Correct on Facesheet     Source of Information patient;family     Lives With child(chin), adult     Facility Arrived From: home     Do you expect  to return to your current living situation? Yes     Do you have help at home or someone to help you manage your care at home? Yes     Who are your caregiver(s) and their phone number(s)? Mabel Stanley [phone]     Prior to hospitilization cognitive status: Alert/Oriented     Current cognitive status: Alert/Oriented     Walking or Climbing Stairs Difficulty none     Dressing/Bathing Difficulty none     Home Layout Able to live on 1st floor     Equipment Currently Used at Home walker, standard     Readmission within 30 days? No     Patient currently being followed by outpatient case management? No     Do you currently have service(s) that help you manage your care at home? No     Is the pt/caregiver preference to resume services with current agency No     Do you take prescription medications? Yes     Do you have prescription coverage? Yes     Do you have any problems affording any of your prescribed medications? No     Is the patient taking medications as prescribed? yes     Who is going to help you get home at discharge? Mabel Stanley [phone]     How do you get to doctors appointments? car, drives self     Are you on dialysis? No     Do you take coumadin? No     Discharge Plan A Home with family     Discharge Plan B Home     DME Needed Upon Discharge  none     Discharge Plan discussed with: Patient     Discharge Barriers Identified None        Physical Activity    On average, how many days per week do you engage in moderate to strenuous exercise (like a brisk walk)? Patient refused     On average, how many minutes do you engage in exercise at this level? Patient refused        Financial Resource Strain    How hard is it for you to pay for the very basics like food, housing, medical care, and heating? Patient refused        Housing Stability    In the last 12 months, was there a time when you were not able to pay the mortgage or rent on time? Patient refused     In the last 12 months, was  there a time when you did not have a steady place to sleep or slept in a shelter (including now)? Patient refused        Transportation Needs    In the past 12 months, has lack of transportation kept you from medical appointments or from getting medications? Patient refused     In the past 12 months, has lack of transportation kept you from meetings, work, or from getting things needed for daily living? Patient refused        Food Insecurity    Within the past 12 months, you worried that your food would run out before you got the money to buy more. Patient refused     Within the past 12 months, the food you bought just didn't last and you didn't have money to get more. Patient refused        Stress    Do you feel stress - tense, restless, nervous, or anxious, or unable to sleep at night because your mind is troubled all the time - these days? Patient refused        Social Connections    In a typical week, how many times do you talk on the phone with family, friends, or neighbors? Patient refused     How often do you get together with friends or relatives? Patient refused     How often do you attend Christian or Faith services? Patient refused     Do you belong to any clubs or organizations such as Christian groups, unions, fraternal or athletic groups, or school groups? Patient refused     How often do you attend meetings of the clubs or organizations you belong to? Patient refused     Are you , , , , never , or living with a partner? Patient refused        Alcohol Use    Q1: How often do you have a drink containing alcohol? Patient refused     Q2: How many drinks containing alcohol do you have on a typical day when you are drinking? Patient refused     Q3: How often do you have six or more drinks on one occasion? Patient refused        Relationship/Environment    Name(s) of Who Lives With Patient Daughter Litzy Stanley [phone]

## 2022-10-14 NOTE — PT/OT/SLP EVAL
Physical Therapy Evaluation    Patient Name:  Leslie Tolliver   MRN:  [id number]    Recommendations:     Discharge Recommendations:  home health PT (with 24/7 supervision for safety)   Discharge Equipment Recommendations: none   Barriers to discharge: None    Assessment:     Leslie Tolliver is a 88 y.o. female admitted with a medical diagnosis of TIA (transient ischemic attack).  She presents with the following impairments/functional limitations:  gait instability, decreased safety awareness, impaired skin.    Pt found HOb elevated and agreeable to working with PT. Pt A & O x  4 and has the following co-morbidities: DM, HTN, CKD, AVR, Lsp radiculopathy.  Pt tolerated session fairly well with slurred speech noted and required CGA for safe mobilization during session today due to mild instability. Pt would benefit from acute PT during hospitalization to increase strength, endurance and safety with mobility and would benefit from HH PT & close supervision at home upon discharge.      Rehab Prognosis: Fair; patient would benefit from acute skilled PT services to address these deficits and reach maximum level of function.    Recent Surgery: * No surgery found *      Plan:     During this hospitalization, patient to be seen 3 x/week to address the identified rehab impairments via gait training, therapeutic activities, therapeutic exercises, neuromuscular re-education and progress toward the following goals:    Plan of Care Expires:  11/18/22    Subjective     Chief Complaint: Pt c/o her autoimmune skin issues  Patient/Family Comments/goals: HH PT with 24/7 Supervision at home.  Pain/Comfort:  Pain Rating 1:  (not rated)  Location - Orientation 1: generalized  Location 1:  (generalized auto-immune related skin issues/blisters)  Pain Addressed 1: Reposition, Distraction, Nurse notified    Patients cultural, spiritual, Roman Catholic conflicts given the current situation:      Living Environment:  Pt lives in a single story  house with no steps to enter and daughter lives with pt/works from home.  Prior to admission, patients level of function was supervision to MI w/ rollator.  Equipment used at home: rollator.  DME owned (not currently used): none.  Upon discharge, patient will have assistance from her daughter.    Objective:     Communicated with RN prior to/during session.  Patient found HOB elevated with peripheral IV, telemetry, pulse ox (continuous)  upon PT entry to room.    General Precautions: Standard, fall   Orthopedic Precautions:N/A   Braces: N/A  Respiratory Status: Room air    Exams:  Cognitive Exam:  Patient is oriented to Person, Place, Time, and Situation  RLE ROM: WFL  RLE Strength: grossly 4/5  LLE ROM: WFL  LLE Strength: grossly 4/5    Functional Mobility:  Bed Mobility:     Scooting: stand by assistance  Supine to Sit: stand by assistance  Sit to Supine: stand by assistance  Transfers:     Sit to Stand:  contact guard assistance with rolling walker  Toilet Transfer: contact guard assistance and minimum assistance with  rolling walker and grab bars  using  Step Transfer  Gait: x total of 300 feet in ED to walk from one room to another w/ RN pushing stretcher behind pt in case she needed to sit. Pt needed to use the restroom before the end of session with transfers as noted above and pt independent with hygiene.     Therapeutic Activities and Exercises:   Pt was educated on the following: call light use, importance of OOB activity and functional mobility to negate the negative effects of prolonged bed rest during this hospitalization, safe transfers/ambulation and discharge planning recommendations/options.      AM-PAC 6 CLICK MOBILITY  Total Score:21     Patient left HOB elevated with all lines intact, call button in reach, and RN present.    GOALS:   Multidisciplinary Problems       Physical Therapy Goals          Problem: Physical Therapy    Goal Priority Disciplines Outcome Goal Variances Interventions    Physical Therapy Goal     PT, PT/OT      Description: Goals to be met by: 22     Patient will increase functional independence with mobility by performin. Supine to sit with Supervision  2. Sit to stand transfer with Supervision  3. Bed to chair transfer with Supervision using Rolling Walker  4. Gait  x 300 feet with Supervision using Rolling Walker.                              History:     Past Medical History:   Diagnosis Date    Anemia due to stage 3 chronic kidney disease 2019    Arthritis     Bullous pemphigoid 2022    Chronic bilateral low back pain without sciatica 2019    CKD (chronic kidney disease) stage 3, GFR 30-59 ml/min 2019    Colon polyps     Coronary artery disease     Diabetes mellitus type II     Diabetes with neurologic complications     GERD (gastroesophageal reflux disease)     Hyperlipidemia     Hypertension     Hypothyroidism     Type 2 diabetes mellitus        Past Surgical History:   Procedure Laterality Date    ADENOIDECTOMY      AORTIC VALVE REPLACEMENT      BREAST BIOPSY Right 20 yrs ago    benign    CARDIAC SURGERY      CHOLECYSTECTOMY      COLONOSCOPY  2014    Dr Holly, 5 year recheck    EPIDURAL STEROID INJECTION N/A 3/25/2021    Procedure: Injection, Steroid, Epidural L3-4;  Surgeon: Sky Love MD;  Location: [facility] OR;  Service: Pain Management;  Laterality: N/A;  Injection, Steroid, Epidural L3-4    EPIDURAL STEROID INJECTION N/A 2021    Procedure: Injection, Steroid, Epidural L3-4;  Surgeon: Sky Love MD;  Location: [facility] OR;  Service: Pain Management;  Laterality: N/A;  Injection, Steroid, Epidural L3-4    ESOPHAGOGASTRODUODENOSCOPY N/A 2020    Procedure: EGD (ESOPHAGOGASTRODUODENOSCOPY);  Surgeon: Michael Nugent III, MD;  Location: [facility];  Service: Endoscopy;  Laterality: N/A;    HEMORRHOID SURGERY      HYSTERECTOMY      OOPHORECTOMY      TONSILLECTOMY         Time Tracking:     PT Received On: 10/14/22  PT Start  Time: 1351     PT Stop Time: 1423  PT Total Time (min): 32 min     Billable Minutes: Evaluation 8, Gait Training 14, and Therapeutic Activity 10      10/14/2022

## 2022-10-14 NOTE — PT/OT/SLP EVAL
Occupational Therapy   Evaluation and Discharge Note    Name: Leslie Tolliver  MRN: [id number]  Admitting Diagnosis:  TIA (transient ischemic attack)   Recent Surgery: * No surgery found *      Recommendations:     Discharge Recommendations: home  Discharge Equipment Recommendations:  none  Barriers to discharge:  None    Assessment:     Leslie Tolliver is a 88 y.o. female with a medical diagnosis of TIA (transient ischemic attack). At this time, patient is functioning at their prior level of function and does not require further acute OT services.     Plan:     During this hospitalization, patient does not require further acute OT services.  Please re-consult if situation changes.    Plan of Care Reviewed with: patient    Subjective     Chief Complaint: None  Patient/Family Comments/goals: To go home.    Occupational Profile:  Living Environment: lives with daughter and adult granddaughter.  Previous level of function: modified independent for ambulation and ADLs using a rolling walker. Cooks, cleans, but does not drive. Relies on family for transportation.  Roles and Routines: homemaker  Equipment Used at home:  walker, rolling  Assistance upon Discharge: Daughter and granddaughter    Pain/Comfort:  Pain Rating 1: 0/10  Pain Rating Post-Intervention 1: 0/10    Patients cultural, spiritual, Moravian conflicts given the current situation: no    Objective:     Communicated with: nurse prior to session.  Patient found HOB elevated with telemetry, peripheral IV upon OT entry to room.    General Precautions: Standard,  (None)   Orthopedic Precautions:N/A   Braces: N/A  Respiratory Status: Room air     Occupational Performance:    Bed Mobility:    Patient completed Scooting/Bridging with supervision  Patient completed Supine to Sit with supervision  Patient completed Sit to Supine with supervision    Functional Mobility/Transfers:  Patient completed Sit <> Stand Transfer with supervision  with  rolling walker    Patient completed Toilet Transfer Step Transfer technique with supervision with  rolling walker    Activities of Daily Living:  Grooming: stand by assistance to wash hands sitting EOB.  Lower Body Dressing: supervision to don/doff socks sitting EOB.  Toileting: stand by assistance to perform toilet hygiene standing from bedside commode.    Cognitive/Visual Perceptual:  Cognitive/Psychosocial Skills:     -       Oriented to: Person, Place, Time, and Situation   -       Follows Commands/attention:Follows multistep  commands  -       Communication: clear/fluent  -       Memory: No Deficits noted  -       Safety awareness/insight to disability: intact   -       Mood/Affect/Coping skills/emotional control: Cooperative and Pleasant  Visual/Perceptual:      -Intact Acuity    Physical Exam:  Balance:    -       Sitting/Standing: Supervision.  Upper Extremity Range of Motion:     -       Right Upper Extremity: WFL  -       Left Upper Extremity: WFL  Upper Extremity Strength:    -       Right Upper Extremity: WFL  -       Left Upper Extremity: WFL   Strength:    -       Right Upper Extremity: WFL  -       Left Upper Extremity: WFL  Fine Motor Coordination:    -       Intact    AMPAC 6 Click ADL:  AMPAC Total Score: 24    Treatment & Education:  Patient currently supervision for sitting/standing Adl activity with no safety concerns.    Patient left HOB elevated with all lines intact and call button in reach    GOALS:   Multidisciplinary Problems       Occupational Therapy Goals       Not on file                    History:     Past Medical History:   Diagnosis Date    Anemia due to stage 3 chronic kidney disease 07/24/2019    Arthritis     Bullous pemphigoid 8/29/2022    Chronic bilateral low back pain without sciatica 07/24/2019    CKD (chronic kidney disease) stage 3, GFR 30-59 ml/min 07/24/2019    Colon polyps     Coronary artery disease     Diabetes mellitus type II     Diabetes with neurologic complications     GERD  (gastroesophageal reflux disease)     Hyperlipidemia     Hypertension     Hypothyroidism     Type 2 diabetes mellitus          Past Surgical History:   Procedure Laterality Date    ADENOIDECTOMY      AORTIC VALVE REPLACEMENT      BREAST BIOPSY Right 20 yrs ago    benign    CARDIAC SURGERY      CHOLECYSTECTOMY      COLONOSCOPY  5-    Dr Holly, 5 year recheck    EPIDURAL STEROID INJECTION N/A 3/25/2021    Procedure: Injection, Steroid, Epidural L3-4;  Surgeon: Sky Love MD;  Location: [facility];  Service: Pain Management;  Laterality: N/A;  Injection, Steroid, Epidural L3-4    EPIDURAL STEROID INJECTION N/A 5/20/2021    Procedure: Injection, Steroid, Epidural L3-4;  Surgeon: Sky Love MD;  Location: [facility];  Service: Pain Management;  Laterality: N/A;  Injection, Steroid, Epidural L3-4    ESOPHAGOGASTRODUODENOSCOPY N/A 6/4/2020    Procedure: EGD (ESOPHAGOGASTRODUODENOSCOPY);  Surgeon: Michael Nugent III, MD;  Location: [facility];  Service: Endoscopy;  Laterality: N/A;    HEMORRHOID SURGERY      HYSTERECTOMY      OOPHORECTOMY      TONSILLECTOMY         Time Tracking:     OT Date of Treatment: 10/14/22  OT Start Time: 1145  OT Stop Time: 1202  OT Total Time (min): 17 min    Billable Minutes:Evaluation 5  Self Care/Home Management 12    10/14/2022

## 2022-10-14 NOTE — HPI
ED note     [age] female with past medical history significant diabetes, CAD, CKD, GERD, hypertension hyperlipidemia presents secondary to complain of dizziness.  Patient feels as if she is off balance and developed an episode of slurred speech.  Patient was last seen normal approximately midnight which was greater than 4 hour time frame from symptom onset.  She denies any nausea, vomiting, fever, chills, sweats or changes sensations.  She is otherwise stable and has no other complaints.    10/14/2022  Ms Tolliver has no deficits currently but is very anxious that they could return

## 2022-10-14 NOTE — ED NOTES
Upon RN entering room, pt asleep. Pt responds to voice. Pt states she was feeling tired, but is now feeling well rested. Pt falling asleep while talking to RN. Able to be aroused again to voice. Pt oriented to self, situation, time and place. Pt in NAD, no needs or concerns expressed to RN at this time. VSS.

## 2022-10-14 NOTE — ED NOTES
Assisted pt up to bedside commode to obtain urine sample. Pt in NAD. No needs or concerns at this time. VSS

## 2022-10-15 PROBLEM — L10.9 BULLOUS PEMPHIGUS: Status: ACTIVE | Noted: 2022-10-15

## 2022-10-15 LAB
ALBUMIN SERPL BCP-MCNC: 3.7 G/DL (ref 3.5–5.2)
ALP SERPL-CCNC: 62 U/L (ref 55–135)
ALT SERPL W/O P-5'-P-CCNC: 12 U/L (ref 10–44)
ANION GAP SERPL CALC-SCNC: 6 MMOL/L (ref 8–16)
APTT PPP: 29.4 SEC (ref 23.3–35.1)
AST SERPL-CCNC: 17 U/L (ref 10–40)
BASOPHILS # BLD AUTO: 0.04 K/UL (ref 0–0.2)
BASOPHILS NFR BLD: 0.6 % (ref 0–1.9)
BILIRUB SERPL-MCNC: 1.2 MG/DL (ref 0.1–1)
BUN SERPL-MCNC: 21 MG/DL (ref 8–23)
CALCIUM SERPL-MCNC: 8.9 MG/DL (ref 8.7–10.5)
CHLORIDE SERPL-SCNC: 103 MMOL/L (ref 95–110)
CK MB SERPL-MCNC: 2.9 NG/ML (ref 0.1–6.5)
CO2 SERPL-SCNC: 31 MMOL/L (ref 23–29)
CREAT SERPL-MCNC: 0.8 MG/DL (ref 0.5–1.4)
DIFFERENTIAL METHOD: ABNORMAL
EOSINOPHIL # BLD AUTO: 0.3 K/UL (ref 0–0.5)
EOSINOPHIL NFR BLD: 4.3 % (ref 0–8)
ERYTHROCYTE [DISTWIDTH] IN BLOOD BY AUTOMATED COUNT: 16.2 % (ref 11.5–14.5)
EST. GFR  (NO RACE VARIABLE): >60 ML/MIN/1.73 M^2
GLUCOSE SERPL-MCNC: 164 MG/DL (ref 70–110)
GLUCOSE SERPL-MCNC: 183 MG/DL (ref 70–110)
GLUCOSE SERPL-MCNC: 252 MG/DL (ref 70–110)
GLUCOSE SERPL-MCNC: 361 MG/DL (ref 70–110)
HCT VFR BLD AUTO: 36.7 % (ref 37–48.5)
HGB BLD-MCNC: 11.6 G/DL (ref 12–16)
IMM GRANULOCYTES # BLD AUTO: 0.02 K/UL (ref 0–0.04)
IMM GRANULOCYTES NFR BLD AUTO: 0.3 % (ref 0–0.5)
INR PPP: 1.1
LYMPHOCYTES # BLD AUTO: 0.8 K/UL (ref 1–4.8)
LYMPHOCYTES NFR BLD: 12.2 % (ref 18–48)
MAGNESIUM SERPL-MCNC: 1.7 MG/DL (ref 1.6–2.6)
MCH RBC QN AUTO: 29.1 PG (ref 27–31)
MCHC RBC AUTO-ENTMCNC: 31.6 G/DL (ref 32–36)
MCV RBC AUTO: 92 FL (ref 82–98)
MONOCYTES # BLD AUTO: 0.6 K/UL (ref 0.3–1)
MONOCYTES NFR BLD: 8.8 % (ref 4–15)
NEUTROPHILS # BLD AUTO: 4.8 K/UL (ref 1.8–7.7)
NEUTROPHILS NFR BLD: 73.8 % (ref 38–73)
NRBC BLD-RTO: 0 /100 WBC
PHOSPHATE SERPL-MCNC: 2.6 MG/DL (ref 2.7–4.5)
PLATELET # BLD AUTO: 199 K/UL (ref 150–450)
PMV BLD AUTO: 11 FL (ref 9.2–12.9)
POTASSIUM SERPL-SCNC: 3.9 MMOL/L (ref 3.5–5.1)
PROT SERPL-MCNC: 6.7 G/DL (ref 6–8.4)
PROTHROMBIN TIME: 13.5 SEC (ref 11.4–13.7)
RBC # BLD AUTO: 3.99 M/UL (ref 4–5.4)
SODIUM SERPL-SCNC: 140 MMOL/L (ref 136–145)
TROPONIN I SERPL DL<=0.01 NG/ML-MCNC: <0.03 NG/ML
WBC # BLD AUTO: 6.45 K/UL (ref 3.9–12.7)

## 2022-10-15 PROCEDURE — 85025 COMPLETE CBC W/AUTO DIFF WBC: CPT | Performed by: INTERNAL MEDICINE

## 2022-10-15 PROCEDURE — 84484 ASSAY OF TROPONIN QUANT: CPT | Performed by: INTERNAL MEDICINE

## 2022-10-15 PROCEDURE — 96372 THER/PROPH/DIAG INJ SC/IM: CPT | Performed by: INTERNAL MEDICINE

## 2022-10-15 PROCEDURE — 36415 COLL VENOUS BLD VENIPUNCTURE: CPT | Performed by: INTERNAL MEDICINE

## 2022-10-15 PROCEDURE — 80053 COMPREHEN METABOLIC PANEL: CPT | Performed by: INTERNAL MEDICINE

## 2022-10-15 PROCEDURE — 63600175 PHARM REV CODE 636 W HCPCS: Performed by: INTERNAL MEDICINE

## 2022-10-15 PROCEDURE — 84100 ASSAY OF PHOSPHORUS: CPT | Performed by: INTERNAL MEDICINE

## 2022-10-15 PROCEDURE — 92610 EVALUATE SWALLOWING FUNCTION: CPT

## 2022-10-15 PROCEDURE — 85730 THROMBOPLASTIN TIME PARTIAL: CPT | Performed by: INTERNAL MEDICINE

## 2022-10-15 PROCEDURE — G0378 HOSPITAL OBSERVATION PER HR: HCPCS

## 2022-10-15 PROCEDURE — 83735 ASSAY OF MAGNESIUM: CPT | Performed by: INTERNAL MEDICINE

## 2022-10-15 PROCEDURE — 85610 PROTHROMBIN TIME: CPT | Performed by: INTERNAL MEDICINE

## 2022-10-15 PROCEDURE — 82962 GLUCOSE BLOOD TEST: CPT

## 2022-10-15 PROCEDURE — 25000003 PHARM REV CODE 250: Performed by: INTERNAL MEDICINE

## 2022-10-15 PROCEDURE — 82553 CREATINE MB FRACTION: CPT | Performed by: INTERNAL MEDICINE

## 2022-10-15 RX ORDER — HYDROXYZINE HYDROCHLORIDE 25 MG/1
25 TABLET, FILM COATED ORAL 3 TIMES DAILY PRN
Status: DISCONTINUED | OUTPATIENT
Start: 2022-10-15 | End: 2022-10-18 | Stop reason: HOSPADM

## 2022-10-15 RX ADMIN — MYCOPHENOLATE MOFETIL 500 MG: 250 CAPSULE ORAL at 08:10

## 2022-10-15 RX ADMIN — PREDNISONE 10 MG: 5 TABLET ORAL at 08:10

## 2022-10-15 RX ADMIN — INSULIN ASPART 3 UNITS: 100 INJECTION, SOLUTION INTRAVENOUS; SUBCUTANEOUS at 09:10

## 2022-10-15 RX ADMIN — CHOLESTYRAMINE 4 G: 4 POWDER, FOR SUSPENSION ORAL at 08:10

## 2022-10-15 RX ADMIN — CARVEDILOL 6.25 MG: 6.25 TABLET, FILM COATED ORAL at 05:10

## 2022-10-15 RX ADMIN — LISINOPRIL 20 MG: 20 TABLET ORAL at 08:10

## 2022-10-15 RX ADMIN — ASPIRIN 81 MG CHEWABLE TABLET 81 MG: 81 TABLET CHEWABLE at 08:10

## 2022-10-15 RX ADMIN — ENOXAPARIN SODIUM 40 MG: 100 INJECTION SUBCUTANEOUS at 05:10

## 2022-10-15 RX ADMIN — CARVEDILOL 6.25 MG: 6.25 TABLET, FILM COATED ORAL at 08:10

## 2022-10-15 RX ADMIN — HYDROXYZINE HYDROCHLORIDE 25 MG: 25 TABLET ORAL at 04:10

## 2022-10-15 NOTE — PT/OT/SLP EVAL
Speech Language Pathology Evaluation  Bedside Swallow    Patient Name:  Leslie Tolliver   MRN:  [id number]  Admitting Diagnosis: TIA (transient ischemic attack)    Recommendations:                 General Recommendations:   monitor diet tolerance; cognitive-linguistic evaluation  Diet recommendations:  Easy to Chew Diet - IDDSI Level 7, Thin (notify SLP if any difficulty with diet consistency)  Aspiration Precautions:  nursing supervision/assist with set up and positioning; slow rate; 90 degree position; assist with oral care    General Precautions: Standard, aspiration, fall, diabetic  Communication strategies:  none at this time    History:   88 year old female with hx including GERD, DM2, EGD 6/4/2020 admitted with dizziness, slurred speech.  Pt seen for swallow assessment after checking with nursing.  She states she feels at least 90% back to baseline.    Alert, verbal, oriented to self, place.  Follows basic spoken instructions.    Conversation level speech; mildly reduced strength of articulatory contacts/mild dysarthria but fully intelligible.        Past Medical History:   Diagnosis Date    Anemia due to stage 3 chronic kidney disease 07/24/2019    Arthritis     Bullous pemphigoid 8/29/2022    Chronic bilateral low back pain without sciatica 07/24/2019    CKD (chronic kidney disease) stage 3, GFR 30-59 ml/min 07/24/2019    Colon polyps     Coronary artery disease     Diabetes mellitus type II     Diabetes with neurologic complications     GERD (gastroesophageal reflux disease)     Hyperlipidemia     Hypertension     Hypothyroidism     Type 2 diabetes mellitus        Past Surgical History:   Procedure Laterality Date    ADENOIDECTOMY      AORTIC VALVE REPLACEMENT      BREAST BIOPSY Right 20 yrs ago    benign    CARDIAC SURGERY      CHOLECYSTECTOMY      COLONOSCOPY  5-    Dr Holly, 5 year recheck    EPIDURAL STEROID INJECTION N/A 3/25/2021    Procedure: Injection, Steroid, Epidural L3-4;  Surgeon:  "Sky Love MD;  Location: [facility] OR;  Service: Pain Management;  Laterality: N/A;  Injection, Steroid, Epidural L3-4    EPIDURAL STEROID INJECTION N/A 5/20/2021    Procedure: Injection, Steroid, Epidural L3-4;  Surgeon: Sky Love MD;  Location: [facility] OR;  Service: Pain Management;  Laterality: N/A;  Injection, Steroid, Epidural L3-4    ESOPHAGOGASTRODUODENOSCOPY N/A 6/4/2020    Procedure: EGD (ESOPHAGOGASTRODUODENOSCOPY);  Surgeon: Michael Nugent III, MD;  [facility] END[facility];  Service: Endoscopy;  Laterality: N/A;    HEMORRHOID SURGERY      HYSTERECTOMY      OOPHORECTOMY      TONSILLECTOMY         Social History: Patient lives with daughter per pt report.    Prior Intubation HX:  not associated with this admission    Modified Barium Swallow: no  study located in epic    Chest X-Rays: reviewed    Prior diet: denies restrictions other than related to diabetes.    Occupation/hobbies/homemaking: deferred.    Subjective       "I have an auto-immune disease."  Patient goals: no goals stated other than would like to get dressed.     Pain/Comfort:  Pain Rating 1: 0/10    Respiratory Status: Room air    Objective:     Oral Musculature Evaluation  Oral Musculature: other (see comments) (funtional with mild weakness)  Dentition: present and adequate  Secretion Management: adequate  Mucosal Quality: adequate  Mandibular Strength and Mobility: WFL  Oral Labial Strength and Mobility: WFL  Lingual Strength and Mobility: other (see comments) (midline on protrusion; mild difficulty lateralizing to left; mildly decreased rates of motion)  Velar Elevation: WFL  Buccal Strength and Mobility: WFL  Volitional Cough: demonstrated  Volitional Swallow: palpated  Voice Prior to PO Intake: clear and dry before and after swallows    Bedside Swallow Eval: already had breakfast; denies problem with swallow or appetite  Consistencies Assessed:  Thin liquids (water by ts, x 2, cup sip x 6, straw x 5)  Puree (multiple tsp pudding)  Solids " (vinay ibarra)      Oral Phase:   WFL-- faintly extended mastication but overall WFL  No anterior spillage or residue    Pharyngeal Phase:   no overt clinical signs/symptoms of aspiration    Compensatory Strategies  None    Treatment: pt educated regarding recommended swallow safety precautions; verbalized understanding    Discussed results of swallow assessment with nursing.    Assessment:     Leslie Tolliver is a 88 y.o. female referred to SLP for assessment following admit for dizziness/TIA.  She is mildly dysarthric but fully intelligible, alert.  Mastication is very mildly extended/mild lingual weakness; faint oral dysphagia.  She is noted to be leaning to the left.  She demonstrated no sign of aspiration; she denied change in swallow. Recommendations as above.  Will follow for swallow safety, cognitive-linguistic evaluation as warranted.      Goals:   Multidisciplinary Problems       SLP Goals          Problem: SLP    Goal Priority Disciplines Outcome   SLP Goal     SLP Ongoing, Progressing   Description: 1. Tolerate easy to chew regular consistency diet with thin liquid, following recommended swallow safety precautions 80% with nursing supervision.  2. Participate in cognitive-linguistic evaluation.                       Plan:     Patient to be seen:  4 x/week   Plan of Care expires:     Plan of Care reviewed with:  patient, other (see comments) (nursing)   SLP Follow-Up:  Yes       Discharge recommendations:  other (see comments) (to be determined)   Barriers to Discharge:   to be determined    Time Tracking:     SLP Treatment Date:   10/15/22  Speech Start Time:  1104  Speech Stop Time:  1129     Speech Total Time (min):  25 min    Billable Minutes: Eval Swallow and Oral Function 25    10/15/2022

## 2022-10-15 NOTE — SUBJECTIVE & OBJECTIVE
Interval History: Pt has had resolution of her neurologic symptoms. She has new bullae on her left forearm    Review of Systems   Constitutional:  Positive for diaphoresis and fatigue.   HENT: Negative.     Eyes: Negative.    Respiratory: Negative.     Cardiovascular: Negative.    Gastrointestinal: Negative.    Endocrine: Positive for heat intolerance.   Genitourinary: Negative.    Musculoskeletal:  Positive for arthralgias and myalgias.   Skin:  Positive for wound.        New bullae left arm   Allergic/Immunologic: Positive for immunocompromised state.   Neurological:  Positive for weakness.   Hematological:  Bruises/bleeds easily.   Psychiatric/Behavioral:  The patient is nervous/anxious.    Objective:     Vital Signs (Most Recent):  Temp: 98 °F (36.7 °C) (10/15/22 1121)  Pulse: 71 (10/15/22 1121)  Resp: 18 (10/15/22 1121)  BP: (!) 145/80 (10/15/22 1121)  SpO2: 97 % (10/15/22 1121)   Vital Signs (24h Range):  Temp:  [97.6 °F (36.4 °C)-98 °F (36.7 °C)] 98 °F (36.7 °C)  Pulse:  [69-86] 71  Resp:  [18-21] 18  SpO2:  [96 %-100 %] 97 %  BP: (145-187)/(65-90) 145/80     Weight: 80.9 kg (178 lb 5.6 oz)  Body mass index is 27.93 kg/m².    Intake/Output Summary (Last 24 hours) at 10/15/2022 1626  Last data filed at 10/15/2022 0900  Gross per 24 hour   Intake 600 ml   Output --   Net 600 ml      Physical Exam  Vitals and nursing note reviewed.   Constitutional:       General: She is not in acute distress.  HENT:      Head: Normocephalic and atraumatic.      Nose: Nose normal.      Mouth/Throat:      Mouth: Mucous membranes are moist.   Eyes:      Extraocular Movements: Extraocular movements intact.      Pupils: Pupils are equal, round, and reactive to light.   Cardiovascular:      Rate and Rhythm: Normal rate and regular rhythm.   Pulmonary:      Effort: Pulmonary effort is normal.      Breath sounds: Normal breath sounds.   Abdominal:      General: Bowel sounds are normal. There is no distension.      Tenderness: There is  no abdominal tenderness. There is no guarding or rebound.   Musculoskeletal:         General: Normal range of motion.      Cervical back: Normal range of motion and neck supple.   Skin:     Comments: Areas of plaque and bullae all skin areas  New bullae and plaque L forearm   Neurological:      Mental Status: She is alert and oriented to person, place, and time.   Psychiatric:      Comments: anxious       Significant Labs: All pertinent labs within the past 24 hours have been reviewed.  Recent Lab Results         10/15/22  1158   10/15/22  0528   10/15/22  0419   10/14/22  2119        Albumin     3.7         Alkaline Phosphatase     62         ALT     12         Anion Gap     6         aPTT     29.4  Comment: Therapeutic Range of 67.5-105.1 secs.  Equivalent to Heparin Concentration of anti-Xa 0.3-0.7 IU/ml.           AST     17         Baso #     0.04         Basophil %     0.6         BILIRUBIN TOTAL     1.2  Comment: For infants and newborns, interpretation of results should be based  on gestational age, weight and in agreement with clinical  observations.    Premature Infant recommended reference ranges:  Up to 24 hours.............<8.0 mg/dL  Up to 48 hours............<12.0 mg/dL  3-5 days..................<15.0 mg/dL  6-29 days.................<15.0 mg/dL           BUN     21         Calcium     8.9         Chloride     103         CO2     31         CPK MB     2.9         Creatinine     0.8         Differential Method     Automated         eGFR     >60.0         Eos #     0.3         Eosinophil %     4.3         Glucose     183         Gran # (ANC)     4.8         Gran %     73.8         Hematocrit     36.7         Hemoglobin     11.6         Immature Grans (Abs)     0.02  Comment: Mild elevation in immature granulocytes is non specific and   can be seen in a variety of conditions including stress response,   acute inflammation, trauma and pregnancy. Correlation with other   laboratory and clinical findings  is essential.           Immature Granulocytes     0.3         INR     1.1  Comment: Coumadin Therapy:  INR: 2.0-3.0 conventional anticoagulation    INR: 2.5-3.5 intensive anticoagulation           Lymph #     0.8         Lymph %     12.2         Magnesium     1.7         MCH     29.1         MCHC     31.6         MCV     92         Mono #     0.6         Mono %     8.8         MPV     11.0         nRBC     0         Phosphorus     2.6         Platelets     199         POC Glucose 252   164     286       Potassium     3.9         PROTEIN TOTAL     6.7         PT     13.5         RBC     3.99         RDW     16.2         Sodium     140         Troponin I     <0.030         WBC     6.45                 Significant Imaging: I have reviewed all pertinent imaging results/findings within the past 24 hours.

## 2022-10-15 NOTE — PLAN OF CARE
Problem: Adult Inpatient Plan of Care  Goal: Plan of Care Review  10/15/2022 1816 by Madeleine Petersen RN  Outcome: Ongoing, Progressing  10/15/2022 1655 by Madeleine Petersen RN  Outcome: Ongoing, Progressing  Goal: Patient-Specific Goal (Individualized)  10/15/2022 1816 by Madeleine Petersen RN  Outcome: Ongoing, Progressing  10/15/2022 1655 by Madeleine Petersen RN  Outcome: Ongoing, Progressing  Goal: Absence of Hospital-Acquired Illness or Injury  10/15/2022 1816 by Madeleine Petersen RN  Outcome: Ongoing, Progressing  10/15/2022 1655 by Madeleine Petersen RN  Outcome: Ongoing, Progressing  Goal: Optimal Comfort and Wellbeing  10/15/2022 1816 by Madeleine Petersen RN  Outcome: Ongoing, Progressing  10/15/2022 1655 by Madeleine Petersen RN  Outcome: Ongoing, Progressing  Goal: Readiness for Transition of Care  10/15/2022 1816 by Madeleine Petersen RN  Outcome: Ongoing, Progressing  10/15/2022 1655 by Madeleine Petersen RN  Outcome: Ongoing, Progressing     Problem: Infection  Goal: Absence of Infection Signs and Symptoms  10/15/2022 1816 by Madeleine Petersen RN  Outcome: Ongoing, Progressing  10/15/2022 1655 by Madeleine Petersen RN  Outcome: Ongoing, Progressing     Problem: Adjustment to Illness (Stroke, Ischemic/Transient Ischemic Attack)  Goal: Optimal Coping  10/15/2022 1816 by Madeleine Petersen RN  Outcome: Ongoing, Progressing  10/15/2022 1655 by Madeleine Petersen RN  Outcome: Ongoing, Progressing     Problem: Bowel Elimination Impaired (Stroke, Ischemic/Transient Ischemic Attack)  Goal: Effective Bowel Elimination  10/15/2022 1816 by Madeleine Petersen RN  Outcome: Ongoing, Progressing  10/15/2022 1655 by Madeleine Petersen RN  Outcome: Ongoing, Progressing     Problem: Cerebral Tissue Perfusion (Stroke, Ischemic/Transient Ischemic Attack)  Goal: Optimal Cerebral Tissue Perfusion  10/15/2022 1816 by Madeleine Petersen RN  Outcome: Ongoing, Progressing  10/15/2022 1655 by Madeleine Petersen RN  Outcome: Ongoing, Progressing     Problem: Cognitive  Impairment (Stroke, Ischemic/Transient Ischemic Attack)  Goal: Optimal Cognitive Function  10/15/2022 1816 by Madeleine Petersen RN  Outcome: Ongoing, Progressing  10/15/2022 1655 by Madeleine Petersen RN  Outcome: Ongoing, Progressing     Problem: Communication Impairment (Stroke, Ischemic/Transient Ischemic Attack)  Goal: Improved Communication Skills  10/15/2022 1816 by Madeleine Petersen RN  Outcome: Ongoing, Progressing  10/15/2022 1655 by Madeleine Petersen RN  Outcome: Ongoing, Progressing     Problem: Functional Ability Impaired (Stroke, Ischemic/Transient Ischemic Attack)  Goal: Optimal Functional Ability  10/15/2022 1816 by Madeleine Petersen RN  Outcome: Ongoing, Progressing  10/15/2022 1655 by Madeleine Petersen RN  Outcome: Ongoing, Progressing     Problem: Respiratory Compromise (Stroke, Ischemic/Transient Ischemic Attack)  Goal: Effective Oxygenation and Ventilation  10/15/2022 1816 by Madeleine Petersen RN  Outcome: Ongoing, Progressing  10/15/2022 1655 by Madeleine Petersen RN  Outcome: Ongoing, Progressing     Problem: Sensorimotor Impairment (Stroke, Ischemic/Transient Ischemic Attack)  Goal: Improved Sensorimotor Function  10/15/2022 1816 by Madeleine Petersen RN  Outcome: Ongoing, Progressing  10/15/2022 1655 by Madeleine Petersen RN  Outcome: Ongoing, Progressing     Problem: Swallowing Impairment (Stroke, Ischemic/Transient Ischemic Attack)  Goal: Optimal Eating and Swallowing without Aspiration  10/15/2022 1816 by Madeleine Petersen RN  Outcome: Ongoing, Progressing  10/15/2022 1655 by Madeleine Petersen RN  Outcome: Ongoing, Progressing     Problem: Urinary Elimination Impaired (Stroke, Ischemic/Transient Ischemic Attack)  Goal: Effective Urinary Elimination  10/15/2022 1816 by Madeleine Petersen RN  Outcome: Ongoing, Progressing  10/15/2022 1655 by Madeleine Petersen RN  Outcome: Ongoing, Progressing     Problem: Fall Injury Risk  Goal: Absence of Fall and Fall-Related Injury  10/15/2022 1816 by Madeleine Petersen RN  Outcome:  Ongoing, Progressing  10/15/2022 1655 by Madeleine Petersen RN  Outcome: Ongoing, Progressing     Problem: Diabetes Comorbidity  Goal: Blood Glucose Level Within Targeted Range  10/15/2022 1816 by Madeleine Petersen RN  Outcome: Ongoing, Progressing  10/15/2022 1655 by Madeleine Petersen RN  Outcome: Ongoing, Progressing     Problem: Impaired Wound Healing  Goal: Optimal Wound Healing  10/15/2022 1816 by Madeleine Petersen RN  Outcome: Ongoing, Progressing  10/15/2022 1655 by Madeleine Petersen RN  Outcome: Ongoing, Progressing

## 2022-10-15 NOTE — PLAN OF CARE
Problem: Adult Inpatient Plan of Care  Goal: Plan of Care Review  Outcome: Ongoing, Progressing  Goal: Patient-Specific Goal (Individualized)  Outcome: Ongoing, Progressing  Goal: Absence of Hospital-Acquired Illness or Injury  Outcome: Ongoing, Progressing  Goal: Optimal Comfort and Wellbeing  Outcome: Ongoing, Progressing  Goal: Readiness for Transition of Care  Outcome: Ongoing, Progressing     Problem: Infection  Goal: Absence of Infection Signs and Symptoms  Outcome: Ongoing, Progressing     Problem: Adjustment to Illness (Stroke, Ischemic/Transient Ischemic Attack)  Goal: Optimal Coping  Outcome: Ongoing, Progressing     Problem: Bowel Elimination Impaired (Stroke, Ischemic/Transient Ischemic Attack)  Goal: Effective Bowel Elimination  Outcome: Ongoing, Progressing     Problem: Cerebral Tissue Perfusion (Stroke, Ischemic/Transient Ischemic Attack)  Goal: Optimal Cerebral Tissue Perfusion  Outcome: Ongoing, Progressing     Problem: Cognitive Impairment (Stroke, Ischemic/Transient Ischemic Attack)  Goal: Optimal Cognitive Function  Outcome: Ongoing, Progressing     Problem: Communication Impairment (Stroke, Ischemic/Transient Ischemic Attack)  Goal: Improved Communication Skills  Outcome: Ongoing, Progressing     Problem: Functional Ability Impaired (Stroke, Ischemic/Transient Ischemic Attack)  Goal: Optimal Functional Ability  Outcome: Ongoing, Progressing     Problem: Respiratory Compromise (Stroke, Ischemic/Transient Ischemic Attack)  Goal: Effective Oxygenation and Ventilation  Outcome: Ongoing, Progressing     Problem: Sensorimotor Impairment (Stroke, Ischemic/Transient Ischemic Attack)  Goal: Improved Sensorimotor Function  Outcome: Ongoing, Progressing     Problem: Swallowing Impairment (Stroke, Ischemic/Transient Ischemic Attack)  Goal: Optimal Eating and Swallowing without Aspiration  Outcome: Ongoing, Progressing     Problem: Urinary Elimination Impaired (Stroke, Ischemic/Transient Ischemic  Attack)  Goal: Effective Urinary Elimination  Outcome: Ongoing, Progressing     Problem: Fall Injury Risk  Goal: Absence of Fall and Fall-Related Injury  Outcome: Ongoing, Progressing     Problem: Diabetes Comorbidity  Goal: Blood Glucose Level Within Targeted Range  Outcome: Ongoing, Progressing     Problem: Impaired Wound Healing  Goal: Optimal Wound Healing  Outcome: Ongoing, Progressing

## 2022-10-15 NOTE — PLAN OF CARE
Problem: SLP  Goal: SLP Goal  Outcome: Ongoing, Progressing     Problem: SLP  Goal: SLP Goal  Description: 1. Tolerate easy to chew regular consistency diet with thin liquid, following recommended swallow safety precautions 80% with nursing supervision.  2. Participate in cognitive-linguistic evaluation.  10/15/2022 1131 by Alta Mazariegos CCC-SLP  Outcome: Ongoing, Progressing  10/15/2022 1131 by Alta Mazariegos CCC-SLP  Outcome: Ongoing, Progressing

## 2022-10-15 NOTE — PROGRESS NOTES
[facility] Medicine  Progress Note    Patient Name: Leslie Tolliver  MRN: [id number]  Patient Class: OP- Observation   Admission Date: 10/14/2022  Length of Stay: 0 days  Attending Physician: Taras Bynum MD  Primary Care Provider: Chang Christianson MD        Subjective:     Principal Problem:TIA (transient ischemic attack)        HPI:  ED note     [age] year female with past medical history significant diabetes, CAD, CKD, GERD, hypertension hyperlipidemia presents secondary to complain of dizziness.  Patient feels as if she is off balance and developed an episode of slurred speech.  Patient was last seen normal approximately midnight which was greater than 4 hour time frame from symptom onset.  She denies any nausea, vomiting, fever, chills, sweats or changes sensations.  She is otherwise stable and has no other complaints.    10/14/2022  Ms Tolliver has no deficits currently but is very anxious that they could return      Overview/Hospital Course:  10/15/2022  Ms Tolliver has had no new neurologic symptoms since admit  Her Phemphigus is breakin out      Interval History: Pt has had resolution of her neurologic symptoms. She has new bullae on her left forearm    Review of Systems   Constitutional:  Positive for diaphoresis and fatigue.   HENT: Negative.     Eyes: Negative.    Respiratory: Negative.     Cardiovascular: Negative.    Gastrointestinal: Negative.    Endocrine: Positive for heat intolerance.   Genitourinary: Negative.    Musculoskeletal:  Positive for arthralgias and myalgias.   Skin:  Positive for wound.        New bullae left arm   Allergic/Immunologic: Positive for immunocompromised state.   Neurological:  Positive for weakness.   Hematological:  Bruises/bleeds easily.   Psychiatric/Behavioral:  The patient is nervous/anxious.    Objective:     Vital Signs (Most Recent):  Temp: 98 °F (36.7 °C) (10/15/22 1121)  Pulse: 71 (10/15/22 1121)  Resp: 18 (10/15/22 1121)  BP:  (!) 145/80 (10/15/22 1121)  SpO2: 97 % (10/15/22 1121)   Vital Signs (24h Range):  Temp:  [97.6 °F (36.4 °C)-98 °F (36.7 °C)] 98 °F (36.7 °C)  Pulse:  [69-86] 71  Resp:  [18-21] 18  SpO2:  [96 %-100 %] 97 %  BP: (145-187)/(65-90) 145/80     Weight: 80.9 kg (178 lb 5.6 oz)  Body mass index is 27.93 kg/m².    Intake/Output Summary (Last 24 hours) at 10/15/2022 1626  Last data filed at 10/15/2022 0900  Gross per 24 hour   Intake 600 ml   Output --   Net 600 ml      Physical Exam  Vitals and nursing note reviewed.   Constitutional:       General: She is not in acute distress.  HENT:      Head: Normocephalic and atraumatic.      Nose: Nose normal.      Mouth/Throat:      Mouth: Mucous membranes are moist.   Eyes:      Extraocular Movements: Extraocular movements intact.      Pupils: Pupils are equal, round, and reactive to light.   Cardiovascular:      Rate and Rhythm: Normal rate and regular rhythm.   Pulmonary:      Effort: Pulmonary effort is normal.      Breath sounds: Normal breath sounds.   Abdominal:      General: Bowel sounds are normal. There is no distension.      Tenderness: There is no abdominal tenderness. There is no guarding or rebound.   Musculoskeletal:         General: Normal range of motion.      Cervical back: Normal range of motion and neck supple.   Skin:     Comments: Areas of plaque and bullae all skin areas  New bullae and plaque L forearm   Neurological:      Mental Status: She is alert and oriented to person, place, and time.   Psychiatric:      Comments: anxious       Significant Labs: All pertinent labs within the past 24 hours have been reviewed.  Recent Lab Results         10/15/22  1158   10/15/22  0528   10/15/22  0419   10/14/22  2119        Albumin     3.7         Alkaline Phosphatase     62         ALT     12         Anion Gap     6         aPTT     29.4  Comment: Therapeutic Range of 67.5-105.1 secs.  Equivalent to Heparin Concentration of anti-Xa 0.3-0.7 IU/ml.           AST     17          Baso #     0.04         Basophil %     0.6         BILIRUBIN TOTAL     1.2  Comment: For infants and newborns, interpretation of results should be based  on gestational age, weight and in agreement with clinical  observations.    Premature Infant recommended reference ranges:  Up to 24 hours.............<8.0 mg/dL  Up to 48 hours............<12.0 mg/dL  3-5 days..................<15.0 mg/dL  6-29 days.................<15.0 mg/dL           BUN     21         Calcium     8.9         Chloride     103         CO2     31         CPK MB     2.9         Creatinine     0.8         Differential Method     Automated         eGFR     >60.0         Eos #     0.3         Eosinophil %     4.3         Glucose     183         Gran # (ANC)     4.8         Gran %     73.8         Hematocrit     36.7         Hemoglobin     11.6         Immature Grans (Abs)     0.02  Comment: Mild elevation in immature granulocytes is non specific and   can be seen in a variety of conditions including stress response,   acute inflammation, trauma and pregnancy. Correlation with other   laboratory and clinical findings is essential.           Immature Granulocytes     0.3         INR     1.1  Comment: Coumadin Therapy:  INR: 2.0-3.0 conventional anticoagulation    INR: 2.5-3.5 intensive anticoagulation           Lymph #     0.8         Lymph %     12.2         Magnesium     1.7         MCH     29.1         MCHC     31.6         MCV     92         Mono #     0.6         Mono %     8.8         MPV     11.0         nRBC     0         Phosphorus     2.6         Platelets     199         POC Glucose 252   164     286       Potassium     3.9         PROTEIN TOTAL     6.7         PT     13.5         RBC     3.99         RDW     16.2         Sodium     140         Troponin I     <0.030         WBC     6.45                 Significant Imaging: I have reviewed all pertinent imaging results/findings within the past 24 hours.      Assessment/Plan:      * TIA  (transient ischemic attack)  Symptoms have essentially resolved    Neurologic symptoms resolved    Bullous pemphigus  Pt to have her 4 th IVIG infusion  New lesions on the left forearm        VTE Risk Mitigation (From admission, onward)         Ordered     enoxaparin injection 40 mg  Daily         10/14/22 0828     IP VTE HIGH RISK PATIENT  Once         10/14/22 0828     Place sequential compression device  Until discontinued         10/14/22 0828                Discharge Planning   MORA:      Code Status: Full Code   Is the patient medically ready for discharge?:     Reason for patient still in hospital (select all that apply): Patient new problem, Treatment, Consult recommendations and Pending disposition  Discharge Plan A: Home with family                  Taras Bynum MD  Department of Hospital Medicine   [facility]

## 2022-10-15 NOTE — PROGRESS NOTES
[facility]  Neurology Progress Note    Patient Name: Leslie Tolliver   MRN: [id number]  : 1934  TODAY'S DATE: 10/15/2022  ADMIT DATE: 10/14/2022  4:22 AM                                          CONSULTED PROVIDER: Tracy Baca MD, Neurologist. Cellphone: [phone]  CONSULT REQUESTED BY: Taras Bynum MD     Chief Complaint   Patient presents with    Dizziness     Started at midnight with slurred speech.       Chief Complaint:   slurred speech, dizziness, difficulty ambulating, LKN today at 0000     HPI: as per EMR: [age] year female with past medical history significant diabetes, CAD, CKD, GERD, hypertension hyperlipidemia presents secondary to complain of dizziness.  Patient feels as if she is off balance and developed an episode of slurred speech.  Patient was last seen normal approximately midnight which was greater than 4 hour time frame from symptom onset.  She denies any nausea, vomiting, fever, chills, sweats or changes sensations.  She is otherwise stable and has no other complaints     NEUROLOGY CONSULT NOTE: Patient seen and examined, POC discussed with Dr. Solomon Padilla via phone. At the time of my exam this morning, patient continued to have slurred speech but not other focal neurological deficits appreciated. She is alert and oriented x 3.     10/15/22:  Patient was seen and examined by me today.  Son at bedside.  Patient's deficits seem to have resolved since yesterday's admission.  She denies any new deficits.  There were no acute events overnight.      Scheduled Meds:   aspirin  81 mg Oral Daily    carvediloL  6.25 mg Oral BID WM    cholestyramine-aspartame  1 packet Oral Daily    enoxaparin  40 mg Subcutaneous Daily    lisinopriL  20 mg Oral Daily    mycophenolate  500 mg Oral BID    predniSONE  10 mg Oral Daily     Continuous Infusions:  PRN Meds:.dextrose 10%, dextrose 10%, dextrose 50%, dextrose 50%, diphenhydrAMINE, glucagon (human recombinant), glucose,  glucose, hydrOXYzine HCL, insulin aspart U-100, sodium chloride 0.9%      Physical Exam  Current Vitals:  Vitals:    10/15/22 0730   BP: (!) 146/88   Pulse: 69   Resp: 18   Temp: 97.8 °F (36.6 °C)       Physical Exam:  General: AO x4  HEENT: PERRL, EOMI  CV: RRR  Lungs: no respiratory distress  Abdomen: soft  Skin: there are widespread bullae in trunk and extremities consistent with patient's history of bullous pemphigoid     Neurological Exam    MENTAL STATUS EXAM:  Level of alertness: Alert  Level of attention: Attentive w/out deficit  Orientation/Awareness: intact to person, place, time, situation  Language: fluent. Comprehension/repetition/naming intact, no dysarthria noted.    CRANIAL NERVE EXAM:  II/III: fundoscopic exam deferred, PERRL; visual fields full to confrontation; no gross deficit on visual acuity  III/IV/VI: EOMI w/out evidence of nystagmus, strabismus, or evoked diplopia  V: no deficits appreciated to pinprick, temp, vibration; masseter strength intact bilaterally  VII: no facial asymmetry noted  VIII: no deficits in hearing bilaterally  IX/X: palate @ ML and raises symmetrically  XI: shoulder shrug 5/5 bilaterally; head turn 5/5 bilaterally  XII: tongue to midline w/out asymmetry    MOTOR EXAM:  Bulk and Tone: normal throughout  Strength is 5/5 proximally and distally in upper and lower extremities without deficits.  No pronator drift in bilateral UE. No tremor either at rest or with intention.    REFLEXES:  Masseter (CN V) Not Tested  2+ in bilateral upper and lower extremities, no Chowdhury's, no clonus. Downgoing plantars.      SENSORY EXAM  Mild length dependent sensory loss of lower extremities, rest intact     COORDINATION/CEREBELLAR EXAM:  FTN: no dysmetria or other signs of appendicular ataxia  HTS: No evidence of appendicular ataxia    GAIT:  Deferred for safety.    NIH Stroke Scale      Date: 10/15/2022  Time: 1400  Person Administering Scale: Tracy Baca MD    Administer stroke  scale items in the order listed. Record performance in each category after each subscale exam. Do not go back and change scores. Follow directions provided for each exam technique. Scores should reflect what the patient does, not what the clinician thinks the patient can do. The clinician should record answers while administering the exam and work quickly. Except where indicated, the patient should not be coached (i.e., repeated requests to patient to make a special effort).      1a  Level of consciousness: 0=alert; keenly responsive   1b. LOC questions:  0=Answers both tasks correctly   1c. LOC commands: 0=Answers both tasks correctly   2.  Best Gaze: 0=normal   3.  Visual: 0=No visual loss   4. Facial Palsy: 0=Normal symmetric movement   5a.  Motor left arm: 0=No drift, limb holds 90 (or 45) degrees for full 10 seconds   5b.  Motor right arm: 0=No drift, limb holds 90 (or 45) degrees for full 10 seconds   6a. motor left le=No drift, limb holds 90 (or 45) degrees for full 10 seconds   6b  Motor right le=No drift, limb holds 90 (or 45) degrees for full 10 seconds   7. Limb Ataxia: 0=Absent   8.  Sensory: 0=Normal; no sensory loss   9. Best Language:  0=No aphasia, normal   10. Dysarthria: 0=Normal   11. Extinction and Inattention: 0=No abnormality    Total:   0       Laboratory Data & Studies    Recent Labs   Lab 10/14/22  0501 10/15/22  0419   WBC 4.68 6.45   HGB 10.2* 11.6*    199   MCV 93 92       Recent Labs   Lab 10/14/22  0501 10/15/22  0419    140   K 3.6 3.9    103   CO2 27 31*   BUN 18 21   * 183*   CALCIUM 9.0 8.9   MG  --  1.7   PHOS  --  2.6*       Recent Labs   Lab 10/14/22  0501 10/15/22  0419   PROT 6.1 6.7   ALBUMIN 3.3* 3.7   BILITOT 1.1* 1.2*   AST 17 17   ALT 11 12   ALKPHOS 66 62       Recent Labs   Lab 10/14/22  0501 10/15/22  0419   LABPT 14.0* 13.5   INR 1.2 1.1   APTT  --  29.4       Recent Labs   Lab 10/14/22  0501   HGBA1C 7.9*   CHOL 126   TRIG 142    LDLCALC 64.6   HDL 33*   TSH 1.270       Microbiology:  Microbiology Results (last 7 days)       ** No results found for the last 168 hours. **            Imaging:  MRA Brain without contrast    Result Date: 10/14/2022  MRA of the brain 3-D time-of-flight images of the [place] were obtained. The left vertebral artery is dominant. The right distal vertebral artery is small in size. The basilar artery and cavernous portion of the internal carotid arteries are unremarkable. The anterior cerebral arteries, middle cerebral arteries and posterior cerebral arteries are patent without significant stenosis or large vessel occlusion. There is no aneurysm or AVM. There is persistent fetal origin of the posterior cerebral arteries from the internal carotid arteries. IMPRESSION: Normal MRA of the brain Electronically signed by:  Ida Wei MD  10/14/2022 4:16 PM CDT Workstation: [id number]    MRI Brain Without Contrast    Result Date: 10/14/2022  MRI of the brain Clinical history is dizziness, slurred speech Multiplanar images of the brain were obtained. The ventricles and sulci are normal in size and configuration for age. There is no hemorrhage, mass or midline shift. There are no extra-axial fluid collections. There is increased FLAIR and T2 signal within the periventricular white matter and corona radiata compatible with small vessel disease. There is no abnormality on the diffusion-weighted images to suggest acute infarct. Cerebellum and brainstem are normal. There are flow voids in the cavernous portions of the internal carotid arteries and basilar artery indicating patency. The corpus callosum, cerebellar tonsils and midline structures are normal. IMPRESSION: Mild periventricular small vessel disease with no acute intracranial process Electronically signed by:  Ida Wei MD  10/14/2022 4:31 PM CDT Workstation: [id number]    X-Ray Chest AP Portable    Result Date: 10/14/2022  Reason: Stroke  FINDINGS: Portable chest with comparison chest x-ray November 2, 2017 show normal cardiomediastinal silhouette. Median sternotomy wires noted. Lungs are clear. Pulmonary vasculature is normal. No acute osseous abnormality. IMPRESSION: No acute cardiopulmonary abnormality. Electronically signed by:  Neto Bautista DO  10/14/2022 6:29 AM CDT Workstation: [id number]    US Carotid Bilateral    Result Date: 10/14/2022  CMS MANDATED QUALITY DATA - CAROTID - 195 All measurements and percent stenosis described below were determined using NASCET criteria or criteria similar to NASCET, as defined by the Society of Radiologists in Ultrasound Consensus Conference, Radiology, 2003 US CAROTID DOPPLER BILATERAL CLINICAL HISTORY: 88 years Female CVA COMPARISON: None FINDINGS: Grayscale, color Doppler, and spectral Doppler analysis of the carotid arteries was performed. Mild bilateral atherosclerotic plaque. Peak systolic velocity in the right CCA is 46.1 cm/sec, and peak systolic velocity in the right ICA is 79.7 cm/sec, with ICA/CCA ratio of less than 2. Peak systolic velocity in the left CCA is 50.3 cm/sec, and peak systolic velocity in the left ICA is 61.4 cm/sec, with ICA/CCA ratio of less than 2. The vertebral arteries are patent, with normal waveforms, and normal antegrade flow bilaterally. IMPRESSION: 1. No Doppler evidence of carotid artery occlusion or hemodynamically significant stenosis. 2. Patent vertebral arteries with antegrade flow bilaterally. Electronically signed by:  Agustin Henley MD  10/14/2022 12:56 PM CDT Workstation: [id number]    CT HEAD FOR STROKE    Result Date: 10/14/2022   ADDENDUM #1 THIS REPORT CONTAINS FINDINGS THAT MAY BE CRITICAL TO PATIENT CARE: Pertinent results were discussed via telephone with Dr. Raphael Balbuena 10/14/2022 4:56 AM CST. The results were acknowledged and understood. Electronically signed by:  Colleen Prakash MD  10/14/2022 5:29 AM CDT Workstation: [id number]  ORIGINAL REPORT  EXAM: CT Head Without Intravenous Contrast CLINICAL HISTORY: Neuro deficit, acute, stroke suspected. TECHNIQUE: CT images of the head/brain without intravenous contrast. Axial, coronal, and sagittal images.  This CT exam was performed using one or more of the following dose reduction techniques:  automated exposure control, adjustment of the mA and/or kV according to patient size, and/or use of iterative reconstruction technique. COMPARISON: No relevant prior studies available. FINDINGS: Brain:  No acute hemorrhage. No evidence of acute infarct; MRI more sensitive. Periventricular and subcortical white matter hypodensities are nonspecific but most commonly due to chronic small vessel ischemic changes in a patient of this age. Ventricles:  No hydrocephalus. Bones/joints:  No skull fracture. Soft tissues:  No acute findings. Sinuses:  No paranasal sinus air-fluid level. Mild mucosal thickening. Mastoid air cells:  No mastoid effusion. IMPRESSION: No acute intracranial abnormality. Electronically signed by:  Colleen Prakash MD  10/14/2022 4:49 AM CDT Workstation: [id number]    Echo Saline Bubble? Yes    Result Date: 10/14/2022  · There is no evidence of intracardiac shunting. · The left ventricle is normal in size with mild concentric hypertrophy and hyperdynamic systolic function. · The estimated ejection fraction is 70%. · Indeterminate left ventricular diastolic function. · Severe left atrial enlargement. · Severe mitral regurgitation. · Mild to moderate tricuspid regurgitation. · There is moderate aortic valve stenosis. · Aortic valve area is 1.79 cm2; peak velocity is m/s; mean gradient is 21 mmHg. · Mild aortic regurgitation. · Normal central venous pressure (3 mmHg). · The estimated PA systolic pressure is 14 mmHg.        Assessment:    Stroke vs TIA  Slurred Speech, resolved  87 yo woman with past medical history significant diabetes, CAD, CKD, GERD, hypertension hyperlipidemia presents secondary to  complain of dizziness.  Patient feels as if she is off balance and developed an episode of slurred speech. Symptoms improved rapidly upon admission to the ED, so no tPA was given. Admitted for TIA workup.     Stroke workup:  CTH: negative for acute abnormalities   CTA head and neck unable to be obtained due to iodine allergy   MRI brain wo contrast: negative for acute intracranial abnormalityy; chronic changes noted above   MRA head wo contrast: normal   CUS: negative for occlusion or significant stenosis   ECHO: LVEF 70%, no PFO, severe left atrial enlargement   LDL 64.6  Hgb A1c pending     Plan:  - Admitted to hospital medicine with q4 hour neuro checks, on telemetry, continuous pulse oximetry  - Diet after eval per SLP.  - Secondary stroke prevention with ASA 81 mg daily, atorvastatin 80 mg daily. Patient may need anticoagulation in the future, since LAE is correlated to paroxysmal Afib.  - Will need holter monitor or loop recorder to rule out paroxysmal Afib  - Recommend cardiology consult for GABY  - Obtained MRI/MRA brain and CUS as above  - Risk factor stratification with HgbA1C, Fasting Lipid profile, TSH  - 2D echocardiogram as above  - Maintain Euthermia with Tylenol prn temp > 37.2 degrees C.  - Assessment for rehab with PT/OT/SLP evaluation and treatment.  - Permissive HTN systolic BP goal up to 220, diastolic up to 110 for 24H, then lower BP slowly to normotension  - Cardiac enzymes and EKG   - Will follow along    Patient was counseled in stroke signs, symptoms and risk factors. She was instructed to call 911 immediately if experiencing acute neurological deficits.  Patient counseled in healthy diet and physical activity. Importance of medication adherence and follow-up were emphasized.    DVT prophylaxis with chemo/SCD prophylaxis  Extensively discussed lifestyle modifications as prophylactic measures for stroke prevention including smoking cessation, adequate blood pressure management, healthy diet  and regular exercise.    Patient to follow up with [facility] at [phone] within 3 days from discharge.     Stroke education was provided including stroke risk factors modification and any acute neurological changes including weakness, confusion, visual changes to come straight to the ER.     All questions were answered.                              Thank you kindly for including us in the care of this patient. Please do not hesitate to contact us with any questions.      52 minutes of care time has been spent evaluating with the patient. Time includes chart review not limited to diagnostic imaging, labs, and vitals, patient assessment, discussion with family and nursing, current order evaluations, and new order entries.      Tracy Baca MD  Neurology

## 2022-10-15 NOTE — HOSPITAL COURSE
Patient got admitted with TIA  Extensive imaging studies/GABY were all normal  Later pt was discharged to home with following medications(see below)  Pt will follow up with Neurology MD on outpatient basis

## 2022-10-15 NOTE — PLAN OF CARE
Plan of care discussed with pt. Pt AAOx4, admitted overnight. No neuro deficits noted overnight.  VS stable overnight, afebrile. Wound care consult placed for fluid-filled blisters to generalized body- pt report itching- MD gave orders for benadryl with no relief. PRN atarax given this morning.  Pt educated on fall precautions, verbalized understanding. Call light in reach. Pt free of injuries this shift.  All questions addressed. Pt voices no concerns at this time. Safety and comfort measures maintained during hourly rounding.

## 2022-10-16 LAB
ALBUMIN SERPL BCP-MCNC: 3.6 G/DL (ref 3.5–5.2)
ALP SERPL-CCNC: 66 U/L (ref 55–135)
ALT SERPL W/O P-5'-P-CCNC: 12 U/L (ref 10–44)
ANION GAP SERPL CALC-SCNC: 8 MMOL/L (ref 8–16)
AST SERPL-CCNC: 16 U/L (ref 10–40)
BASOPHILS # BLD AUTO: 0.03 K/UL (ref 0–0.2)
BASOPHILS NFR BLD: 0.5 % (ref 0–1.9)
BILIRUB SERPL-MCNC: 1.4 MG/DL (ref 0.1–1)
BUN SERPL-MCNC: 21 MG/DL (ref 8–23)
CALCIUM SERPL-MCNC: 8.5 MG/DL (ref 8.7–10.5)
CHLORIDE SERPL-SCNC: 103 MMOL/L (ref 95–110)
CO2 SERPL-SCNC: 29 MMOL/L (ref 23–29)
CREAT SERPL-MCNC: 0.8 MG/DL (ref 0.5–1.4)
DIFFERENTIAL METHOD: ABNORMAL
EOSINOPHIL # BLD AUTO: 0.3 K/UL (ref 0–0.5)
EOSINOPHIL NFR BLD: 4.5 % (ref 0–8)
ERYTHROCYTE [DISTWIDTH] IN BLOOD BY AUTOMATED COUNT: 16.4 % (ref 11.5–14.5)
EST. GFR  (NO RACE VARIABLE): >60 ML/MIN/1.73 M^2
GLUCOSE SERPL-MCNC: 132 MG/DL (ref 70–110)
GLUCOSE SERPL-MCNC: 143 MG/DL (ref 70–110)
GLUCOSE SERPL-MCNC: 285 MG/DL (ref 70–110)
GLUCOSE SERPL-MCNC: 286 MG/DL (ref 70–110)
GLUCOSE SERPL-MCNC: 370 MG/DL (ref 70–110)
HCT VFR BLD AUTO: 34.9 % (ref 37–48.5)
HGB BLD-MCNC: 11.1 G/DL (ref 12–16)
IMM GRANULOCYTES # BLD AUTO: 0.03 K/UL (ref 0–0.04)
IMM GRANULOCYTES NFR BLD AUTO: 0.5 % (ref 0–0.5)
LYMPHOCYTES # BLD AUTO: 0.8 K/UL (ref 1–4.8)
LYMPHOCYTES NFR BLD: 12.8 % (ref 18–48)
MAGNESIUM SERPL-MCNC: 1.9 MG/DL (ref 1.6–2.6)
MCH RBC QN AUTO: 29.2 PG (ref 27–31)
MCHC RBC AUTO-ENTMCNC: 31.8 G/DL (ref 32–36)
MCV RBC AUTO: 92 FL (ref 82–98)
MONOCYTES # BLD AUTO: 0.7 K/UL (ref 0.3–1)
MONOCYTES NFR BLD: 10.8 % (ref 4–15)
NEUTROPHILS # BLD AUTO: 4.3 K/UL (ref 1.8–7.7)
NEUTROPHILS NFR BLD: 70.9 % (ref 38–73)
NRBC BLD-RTO: 0 /100 WBC
PLATELET # BLD AUTO: 196 K/UL (ref 150–450)
PMV BLD AUTO: 11.1 FL (ref 9.2–12.9)
POTASSIUM SERPL-SCNC: 3.4 MMOL/L (ref 3.5–5.1)
POTASSIUM SERPL-SCNC: 4.1 MMOL/L (ref 3.5–5.1)
PROT SERPL-MCNC: 6.5 G/DL (ref 6–8.4)
RBC # BLD AUTO: 3.8 M/UL (ref 4–5.4)
SODIUM SERPL-SCNC: 140 MMOL/L (ref 136–145)
WBC # BLD AUTO: 6.03 K/UL (ref 3.9–12.7)

## 2022-10-16 PROCEDURE — 84132 ASSAY OF SERUM POTASSIUM: CPT | Performed by: INTERNAL MEDICINE

## 2022-10-16 PROCEDURE — 85025 COMPLETE CBC W/AUTO DIFF WBC: CPT | Performed by: INTERNAL MEDICINE

## 2022-10-16 PROCEDURE — 96372 THER/PROPH/DIAG INJ SC/IM: CPT | Performed by: INTERNAL MEDICINE

## 2022-10-16 PROCEDURE — G0378 HOSPITAL OBSERVATION PER HR: HCPCS

## 2022-10-16 PROCEDURE — 83735 ASSAY OF MAGNESIUM: CPT | Performed by: INTERNAL MEDICINE

## 2022-10-16 PROCEDURE — 63600175 PHARM REV CODE 636 W HCPCS: Performed by: INTERNAL MEDICINE

## 2022-10-16 PROCEDURE — 36415 COLL VENOUS BLD VENIPUNCTURE: CPT | Performed by: INTERNAL MEDICINE

## 2022-10-16 PROCEDURE — 80053 COMPREHEN METABOLIC PANEL: CPT | Performed by: INTERNAL MEDICINE

## 2022-10-16 PROCEDURE — 25000003 PHARM REV CODE 250: Performed by: INTERNAL MEDICINE

## 2022-10-16 RX ORDER — SODIUM,POTASSIUM PHOSPHATES 280-250MG
2 POWDER IN PACKET (EA) ORAL
Status: DISCONTINUED | OUTPATIENT
Start: 2022-10-16 | End: 2022-10-18 | Stop reason: HOSPADM

## 2022-10-16 RX ORDER — LANOLIN ALCOHOL/MO/W.PET/CERES
800 CREAM (GRAM) TOPICAL
Status: DISCONTINUED | OUTPATIENT
Start: 2022-10-16 | End: 2022-10-18 | Stop reason: HOSPADM

## 2022-10-16 RX ADMIN — Medication 800 MG: at 08:10

## 2022-10-16 RX ADMIN — MYCOPHENOLATE MOFETIL 500 MG: 250 CAPSULE ORAL at 08:10

## 2022-10-16 RX ADMIN — CARVEDILOL 6.25 MG: 6.25 TABLET, FILM COATED ORAL at 08:10

## 2022-10-16 RX ADMIN — PREDNISONE 10 MG: 5 TABLET ORAL at 08:10

## 2022-10-16 RX ADMIN — LISINOPRIL 20 MG: 20 TABLET ORAL at 08:10

## 2022-10-16 RX ADMIN — INSULIN DETEMIR 10 UNITS: 100 INJECTION, SOLUTION SUBCUTANEOUS at 08:10

## 2022-10-16 RX ADMIN — CHOLESTYRAMINE 4 G: 4 POWDER, FOR SUSPENSION ORAL at 08:10

## 2022-10-16 RX ADMIN — ASPIRIN 81 MG CHEWABLE TABLET 81 MG: 81 TABLET CHEWABLE at 08:10

## 2022-10-16 RX ADMIN — INSULIN ASPART 3 UNITS: 100 INJECTION, SOLUTION INTRAVENOUS; SUBCUTANEOUS at 12:10

## 2022-10-16 RX ADMIN — POTASSIUM BICARBONATE 35 MEQ: 391 TABLET, EFFERVESCENT ORAL at 08:10

## 2022-10-16 RX ADMIN — POTASSIUM BICARBONATE 35 MEQ: 391 TABLET, EFFERVESCENT ORAL at 11:10

## 2022-10-16 RX ADMIN — INSULIN ASPART 3 UNITS: 100 INJECTION, SOLUTION INTRAVENOUS; SUBCUTANEOUS at 06:10

## 2022-10-16 RX ADMIN — Medication 800 MG: at 12:10

## 2022-10-16 RX ADMIN — CARVEDILOL 6.25 MG: 6.25 TABLET, FILM COATED ORAL at 06:10

## 2022-10-16 NOTE — ASSESSMENT & PLAN NOTE
Symptoms have essentially resolved    Neurologic symptoms resolved    No new neurologic symptoms  GABY in the AM per neurology

## 2022-10-16 NOTE — PLAN OF CARE
Problem: Adult Inpatient Plan of Care  Goal: Plan of Care Review  Outcome: Ongoing, Progressing  Goal: Optimal Comfort and Wellbeing  Outcome: Ongoing, Progressing     Problem: Infection  Goal: Absence of Infection Signs and Symptoms  Outcome: Ongoing, Progressing     Problem: Adjustment to Illness (Stroke, Ischemic/Transient Ischemic Attack)  Goal: Optimal Coping  Outcome: Ongoing, Progressing     Problem: Bowel Elimination Impaired (Stroke, Ischemic/Transient Ischemic Attack)  Goal: Effective Bowel Elimination  Outcome: Ongoing, Progressing     Problem: Cerebral Tissue Perfusion (Stroke, Ischemic/Transient Ischemic Attack)  Goal: Optimal Cerebral Tissue Perfusion  Outcome: Ongoing, Progressing     Problem: Cognitive Impairment (Stroke, Ischemic/Transient Ischemic Attack)  Goal: Optimal Cognitive Function  Outcome: Ongoing, Progressing     Problem: Sensorimotor Impairment (Stroke, Ischemic/Transient Ischemic Attack)  Goal: Improved Sensorimotor Function  Outcome: Ongoing, Progressing     Problem: Urinary Elimination Impaired (Stroke, Ischemic/Transient Ischemic Attack)  Goal: Effective Urinary Elimination  Outcome: Ongoing, Progressing     Problem: Diabetes Comorbidity  Goal: Blood Glucose Level Within Targeted Range  Outcome: Ongoing, Progressing     Problem: Impaired Wound Healing  Goal: Optimal Wound Healing  Outcome: Ongoing, Progressing

## 2022-10-16 NOTE — SUBJECTIVE & OBJECTIVE
Interval History: Ms Tolliver has had no new neurologic symptoms. She will have a GABY per Neurology    Review of Systems   Constitutional:  Positive for fatigue.   HENT: Negative.     Eyes: Negative.    Respiratory: Negative.     Cardiovascular: Negative.    Gastrointestinal: Negative.    Endocrine: Positive for heat intolerance.   Genitourinary: Negative.    Musculoskeletal:  Positive for arthralgias.   Skin:         Bullae and plaques   Allergic/Immunologic: Positive for immunocompromised state.   Neurological:  Positive for weakness.   Hematological:  Bruises/bleeds easily.   Psychiatric/Behavioral:  The patient is nervous/anxious.    Objective:     Vital Signs (Most Recent):  Temp: 98 °F (36.7 °C) (10/16/22 1530)  Pulse: 83 (10/16/22 1530)  Resp: 18 (10/16/22 1530)  BP: (!) 168/73 (10/16/22 1530)  SpO2: 96 % (10/16/22 1530)   Vital Signs (24h Range):  Temp:  [97.3 °F (36.3 °C)-98.9 °F (37.2 °C)] 98 °F (36.7 °C)  Pulse:  [75-83] 83  Resp:  [18] 18  SpO2:  [96 %-100 %] 96 %  BP: (146-179)/(63-98) 168/73     Weight: 81.2 kg (179 lb 0.2 oz)  Body mass index is 28.04 kg/m².    Intake/Output Summary (Last 24 hours) at 10/16/2022 1646  Last data filed at 10/16/2022 1400  Gross per 24 hour   Intake 660 ml   Output --   Net 660 ml      Physical Exam  Vitals and nursing note reviewed.   Constitutional:       General: She is not in acute distress.  HENT:      Head: Normocephalic and atraumatic.      Nose: Nose normal.      Mouth/Throat:      Mouth: Mucous membranes are moist.   Eyes:      Extraocular Movements: Extraocular movements intact.      Pupils: Pupils are equal, round, and reactive to light.   Cardiovascular:      Rate and Rhythm: Normal rate and regular rhythm.   Pulmonary:      Effort: Pulmonary effort is normal.      Breath sounds: Normal breath sounds.   Abdominal:      General: Bowel sounds are normal. There is no distension.      Tenderness: There is no abdominal tenderness. There is no guarding or rebound.    Musculoskeletal:         General: Normal range of motion.      Cervical back: Normal range of motion and neck supple.   Skin:     General: Skin is warm.      Comments: Bullae and plaques which are stable   Neurological:      Mental Status: She is alert and oriented to person, place, and time.   Psychiatric:      Comments: anxious       Significant Labs: All pertinent labs within the past 24 hours have been reviewed.  Recent Lab Results         10/16/22  1152   10/16/22  0556   10/16/22  0512   10/15/22  2102        Albumin     3.6         Alkaline Phosphatase     66         ALT     12         Anion Gap     8         AST     16         Baso #     0.03         Basophil %     0.5         BILIRUBIN TOTAL     1.4  Comment: For infants and newborns, interpretation of results should be based  on gestational age, weight and in agreement with clinical  observations.    Premature Infant recommended reference ranges:  Up to 24 hours.............<8.0 mg/dL  Up to 48 hours............<12.0 mg/dL  3-5 days..................<15.0 mg/dL  6-29 days.................<15.0 mg/dL           BUN     21         Calcium     8.5         Chloride     103         CO2     29         Creatinine     0.8         Differential Method     Automated         eGFR     >60.0         Eos #     0.3         Eosinophil %     4.5         Glucose     143         Gran # (ANC)     4.3         Gran %     70.9         Hematocrit     34.9         Hemoglobin     11.1         Immature Grans (Abs)     0.03  Comment: Mild elevation in immature granulocytes is non specific and   can be seen in a variety of conditions including stress response,   acute inflammation, trauma and pregnancy. Correlation with other   laboratory and clinical findings is essential.           Immature Granulocytes     0.5         Lymph #     0.8         Lymph %     12.8         MCH     29.2         MCHC     31.8         MCV     92         Mono #     0.7         Mono %     10.8         MPV      11.1         nRBC     0         Platelets     196         POC Glucose 286   132     361       Potassium     3.4         PROTEIN TOTAL     6.5         RBC     3.80         RDW     16.4         Sodium     140         WBC     6.03                 Significant Imaging: I have reviewed all pertinent imaging results/findings within the past 24 hours.

## 2022-10-16 NOTE — PROGRESS NOTES
[facility] Medicine  Progress Note    Patient Name: Leslie Tolliver  MRN: [id number]  Patient Class: OP- Observation   Admission Date: 10/14/2022  Length of Stay: 0 days  Attending Physician: Taras Bynum MD  Primary Care Provider: Chang Christianson MD        Subjective:     Principal Problem:TIA (transient ischemic attack)        HPI:  ED note     [age] year female with past medical history significant diabetes, CAD, CKD, GERD, hypertension hyperlipidemia presents secondary to complain of dizziness.  Patient feels as if she is off balance and developed an episode of slurred speech.  Patient was last seen normal approximately midnight which was greater than 4 hour time frame from symptom onset.  She denies any nausea, vomiting, fever, chills, sweats or changes sensations.  She is otherwise stable and has no other complaints.    10/14/2022  Ms Tolliver has no deficits currently but is very anxious that they could return      Overview/Hospital Course:  10/15/2022  Ms Tolliver has had no new neurologic symptoms since admit  Her Phemphigus is breakin out    10/16/2022  Pt has had no new neurologic symptoms. She is more concerned Re her Phemphigus      Interval History: Ms Tolliver has had no new neurologic symptoms. She will have a GABY per Neurology    Review of Systems   Constitutional:  Positive for fatigue.   HENT: Negative.     Eyes: Negative.    Respiratory: Negative.     Cardiovascular: Negative.    Gastrointestinal: Negative.    Endocrine: Positive for heat intolerance.   Genitourinary: Negative.    Musculoskeletal:  Positive for arthralgias.   Skin:         Bullae and plaques   Allergic/Immunologic: Positive for immunocompromised state.   Neurological:  Positive for weakness.   Hematological:  Bruises/bleeds easily.   Psychiatric/Behavioral:  The patient is nervous/anxious.    Objective:     Vital Signs (Most Recent):  Temp: 98 °F (36.7 °C) (10/16/22 1530)  Pulse: 83 (10/16/22  1530)  Resp: 18 (10/16/22 1530)  BP: (!) 168/73 (10/16/22 1530)  SpO2: 96 % (10/16/22 1530)   Vital Signs (24h Range):  Temp:  [97.3 °F (36.3 °C)-98.9 °F (37.2 °C)] 98 °F (36.7 °C)  Pulse:  [75-83] 83  Resp:  [18] 18  SpO2:  [96 %-100 %] 96 %  BP: (146-179)/(63-98) 168/73     Weight: 81.2 kg (179 lb 0.2 oz)  Body mass index is 28.04 kg/m².    Intake/Output Summary (Last 24 hours) at 10/16/2022 1646  Last data filed at 10/16/2022 1400  Gross per 24 hour   Intake 660 ml   Output --   Net 660 ml      Physical Exam  Vitals and nursing note reviewed.   Constitutional:       General: She is not in acute distress.  HENT:      Head: Normocephalic and atraumatic.      Nose: Nose normal.      Mouth/Throat:      Mouth: Mucous membranes are moist.   Eyes:      Extraocular Movements: Extraocular movements intact.      Pupils: Pupils are equal, round, and reactive to light.   Cardiovascular:      Rate and Rhythm: Normal rate and regular rhythm.   Pulmonary:      Effort: Pulmonary effort is normal.      Breath sounds: Normal breath sounds.   Abdominal:      General: Bowel sounds are normal. There is no distension.      Tenderness: There is no abdominal tenderness. There is no guarding or rebound.   Musculoskeletal:         General: Normal range of motion.      Cervical back: Normal range of motion and neck supple.   Skin:     General: Skin is warm.      Comments: Bullae and plaques which are stable   Neurological:      Mental Status: She is alert and oriented to person, place, and time.   Psychiatric:      Comments: anxious       Significant Labs: All pertinent labs within the past 24 hours have been reviewed.  Recent Lab Results         10/16/22  1152   10/16/22  0556   10/16/22  0512   10/15/22  2102        Albumin     3.6         Alkaline Phosphatase     66         ALT     12         Anion Gap     8         AST     16         Baso #     0.03         Basophil %     0.5         BILIRUBIN TOTAL     1.4  Comment: For infants and  newborns, interpretation of results should be based  on gestational age, weight and in agreement with clinical  observations.    Premature Infant recommended reference ranges:  Up to 24 hours.............<8.0 mg/dL  Up to 48 hours............<12.0 mg/dL  3-5 days..................<15.0 mg/dL  6-29 days.................<15.0 mg/dL           BUN     21         Calcium     8.5         Chloride     103         CO2     29         Creatinine     0.8         Differential Method     Automated         eGFR     >60.0         Eos #     0.3         Eosinophil %     4.5         Glucose     143         Gran # (ANC)     4.3         Gran %     70.9         Hematocrit     34.9         Hemoglobin     11.1         Immature Grans (Abs)     0.03  Comment: Mild elevation in immature granulocytes is non specific and   can be seen in a variety of conditions including stress response,   acute inflammation, trauma and pregnancy. Correlation with other   laboratory and clinical findings is essential.           Immature Granulocytes     0.5         Lymph #     0.8         Lymph %     12.8         MCH     29.2         MCHC     31.8         MCV     92         Mono #     0.7         Mono %     10.8         MPV     11.1         nRBC     0         Platelets     196         POC Glucose 286   132     361       Potassium     3.4         PROTEIN TOTAL     6.5         RBC     3.80         RDW     16.4         Sodium     140         WBC     6.03                 Significant Imaging: I have reviewed all pertinent imaging results/findings within the past 24 hours.      Assessment/Plan:      * TIA (transient ischemic attack)  Symptoms have essentially resolved    Neurologic symptoms resolved    No new neurologic symptoms  GABY in the AM per neurology    Bullous pemphigus  Pt to have her 4 th IVIG infusion  New lesions on the left forearm        VTE Risk Mitigation (From admission, onward)         Ordered     enoxaparin injection 40 mg  Daily         10/14/22  0828     IP VTE HIGH RISK PATIENT  Once         10/14/22 0828     Place sequential compression device  Until discontinued         10/14/22 0828                Discharge Planning   MORA:      Code Status: Full Code   Is the patient medically ready for discharge?:     Reason for patient still in hospital (select all that apply): Treatment, Consult recommendations and Pending disposition  Discharge Plan A: Home with family                  Taras Bynum MD  Department of Hospital Medicine   [facility]

## 2022-10-17 ENCOUNTER — CLINICAL SUPPORT (OUTPATIENT)
Dept: CARDIOLOGY | Facility: HOSPITAL | Age: 87
End: 2022-10-17
Attending: STUDENT IN AN ORGANIZED HEALTH CARE EDUCATION/TRAINING PROGRAM
Payer: MEDICARE

## 2022-10-17 ENCOUNTER — ANESTHESIA EVENT (OUTPATIENT)
Dept: CARDIOLOGY | Facility: HOSPITAL | Age: 87
End: 2022-10-17
Payer: MEDICARE

## 2022-10-17 ENCOUNTER — ANESTHESIA (OUTPATIENT)
Dept: CARDIOLOGY | Facility: HOSPITAL | Age: 87
End: 2022-10-17
Payer: MEDICARE

## 2022-10-17 VITALS
HEART RATE: 74 BPM | WEIGHT: 178.81 LBS | SYSTOLIC BLOOD PRESSURE: 128 MMHG | DIASTOLIC BLOOD PRESSURE: 58 MMHG | RESPIRATION RATE: 22 BRPM | HEIGHT: 67 IN | OXYGEN SATURATION: 100 % | BODY MASS INDEX: 28.07 KG/M2

## 2022-10-17 LAB
BSA FOR ECHO PROCEDURE: 1.96 M2
EJECTION FRACTION: 60 %
GLUCOSE SERPL-MCNC: 207 MG/DL (ref 70–110)
GLUCOSE SERPL-MCNC: 285 MG/DL (ref 70–110)
GLUCOSE SERPL-MCNC: 308 MG/DL (ref 70–110)
GLUCOSE SERPL-MCNC: 402 MG/DL (ref 70–110)

## 2022-10-17 PROCEDURE — 93312 ECHO TRANSESOPHAGEAL: CPT | Mod: 26,,, | Performed by: INTERNAL MEDICINE

## 2022-10-17 PROCEDURE — 37000008 HC ANESTHESIA 1ST 15 MINUTES: Performed by: INTERNAL MEDICINE

## 2022-10-17 PROCEDURE — 63600175 PHARM REV CODE 636 W HCPCS: Performed by: INTERNAL MEDICINE

## 2022-10-17 PROCEDURE — 93312 TRANSESOPHAGEAL ECHO (TEE) (CUPID ONLY): ICD-10-PCS | Mod: 26,,, | Performed by: INTERNAL MEDICINE

## 2022-10-17 PROCEDURE — 96372 THER/PROPH/DIAG INJ SC/IM: CPT | Performed by: INTERNAL MEDICINE

## 2022-10-17 PROCEDURE — 63600175 PHARM REV CODE 636 W HCPCS: Performed by: STUDENT IN AN ORGANIZED HEALTH CARE EDUCATION/TRAINING PROGRAM

## 2022-10-17 PROCEDURE — 97116 GAIT TRAINING THERAPY: CPT

## 2022-10-17 PROCEDURE — 25000003 PHARM REV CODE 250: Performed by: STUDENT IN AN ORGANIZED HEALTH CARE EDUCATION/TRAINING PROGRAM

## 2022-10-17 PROCEDURE — 93325 DOPPLER ECHO COLOR FLOW MAPG: CPT | Mod: 26,,, | Performed by: INTERNAL MEDICINE

## 2022-10-17 PROCEDURE — 93312 ECHO TRANSESOPHAGEAL: CPT

## 2022-10-17 PROCEDURE — 99220 PR INITIAL OBSERVATION CARE,LEVL III: ICD-10-PCS | Mod: ,,, | Performed by: INTERNAL MEDICINE

## 2022-10-17 PROCEDURE — G0378 HOSPITAL OBSERVATION PER HR: HCPCS

## 2022-10-17 PROCEDURE — 93325 TRANSESOPHAGEAL ECHO (TEE) (CUPID ONLY): ICD-10-PCS | Mod: 26,,, | Performed by: INTERNAL MEDICINE

## 2022-10-17 PROCEDURE — 25000003 PHARM REV CODE 250: Performed by: INTERNAL MEDICINE

## 2022-10-17 PROCEDURE — 99220 PR INITIAL OBSERVATION CARE,LEVL III: CPT | Mod: ,,, | Performed by: INTERNAL MEDICINE

## 2022-10-17 PROCEDURE — 37000009 HC ANESTHESIA EA ADD 15 MINS: Performed by: INTERNAL MEDICINE

## 2022-10-17 PROCEDURE — 25000003 PHARM REV CODE 250: Mod: GZ | Performed by: INTERNAL MEDICINE

## 2022-10-17 RX ORDER — IBUPROFEN 200 MG
24 TABLET ORAL
Status: DISCONTINUED | OUTPATIENT
Start: 2022-10-17 | End: 2022-10-18 | Stop reason: HOSPADM

## 2022-10-17 RX ORDER — INSULIN ASPART 100 [IU]/ML
1-10 INJECTION, SOLUTION INTRAVENOUS; SUBCUTANEOUS
Status: DISCONTINUED | OUTPATIENT
Start: 2022-10-17 | End: 2022-10-18 | Stop reason: HOSPADM

## 2022-10-17 RX ORDER — IBUPROFEN 200 MG
16 TABLET ORAL
Status: DISCONTINUED | OUTPATIENT
Start: 2022-10-17 | End: 2022-10-18 | Stop reason: HOSPADM

## 2022-10-17 RX ORDER — PROPOFOL 10 MG/ML
VIAL (ML) INTRAVENOUS
Status: DISCONTINUED | OUTPATIENT
Start: 2022-10-17 | End: 2022-10-17

## 2022-10-17 RX ORDER — CLOPIDOGREL BISULFATE 75 MG/1
75 TABLET ORAL DAILY
Status: DISCONTINUED | OUTPATIENT
Start: 2022-10-17 | End: 2022-10-18 | Stop reason: HOSPADM

## 2022-10-17 RX ORDER — GLUCAGON 1 MG
1 KIT INJECTION
Status: DISCONTINUED | OUTPATIENT
Start: 2022-10-17 | End: 2022-10-18 | Stop reason: HOSPADM

## 2022-10-17 RX ADMIN — MYCOPHENOLATE MOFETIL 500 MG: 250 CAPSULE ORAL at 10:10

## 2022-10-17 RX ADMIN — CLOPIDOGREL BISULFATE 75 MG: 75 TABLET, FILM COATED ORAL at 03:10

## 2022-10-17 RX ADMIN — INSULIN DETEMIR 20 UNITS: 100 INJECTION, SOLUTION SUBCUTANEOUS at 10:10

## 2022-10-17 RX ADMIN — CHOLESTYRAMINE 4 G: 4 POWDER, FOR SUSPENSION ORAL at 10:10

## 2022-10-17 RX ADMIN — PROPOFOL 50 MG: 10 INJECTION, EMULSION INTRAVENOUS at 09:10

## 2022-10-17 RX ADMIN — INSULIN ASPART 3 UNITS: 100 INJECTION, SOLUTION INTRAVENOUS; SUBCUTANEOUS at 12:10

## 2022-10-17 RX ADMIN — INSULIN ASPART 10 UNITS: 100 INJECTION, SOLUTION INTRAVENOUS; SUBCUTANEOUS at 04:10

## 2022-10-17 RX ADMIN — LISINOPRIL 20 MG: 20 TABLET ORAL at 10:10

## 2022-10-17 RX ADMIN — ENOXAPARIN SODIUM 40 MG: 100 INJECTION SUBCUTANEOUS at 03:10

## 2022-10-17 RX ADMIN — SODIUM CHLORIDE: 0.9 INJECTION, SOLUTION INTRAVENOUS at 09:10

## 2022-10-17 RX ADMIN — CARVEDILOL 6.25 MG: 6.25 TABLET, FILM COATED ORAL at 10:10

## 2022-10-17 RX ADMIN — CARVEDILOL 6.25 MG: 6.25 TABLET, FILM COATED ORAL at 03:10

## 2022-10-17 RX ADMIN — PROPOFOL 100 MG: 10 INJECTION, EMULSION INTRAVENOUS at 09:10

## 2022-10-17 RX ADMIN — ASPIRIN 81 MG CHEWABLE TABLET 81 MG: 81 TABLET CHEWABLE at 10:10

## 2022-10-17 RX ADMIN — PREDNISONE 10 MG: 5 TABLET ORAL at 10:10

## 2022-10-17 NOTE — CONSULTS
[facility]  Department of Cardiology  Consult Note      PATIENT NAME: Leslie Tolliver  MRN: [id number]  TODAY'S DATE: 10/17/2022  ADMIT DATE: 10/14/2022                          CONSULT REQUESTED BY: Taras Bynum MD    SUBJECTIVE     PRINCIPAL PROBLEM: TIA (transient ischemic attack)      REASON FOR CONSULT:   Dizziness       Started at midnight with slurred speech.       [age] female with past medical history significant diabetes, CAD, CKD, GERD, hypertension hyperlipidemia presents secondary to complain of dizziness.  Patient feels as if she is off balance and developed an episode of slurred speech.  Patient was last seen normal approximately midnight which was greater than 4 hour time frame from symptom onset.  She denies any nausea, vomiting, fever, chills, sweats or changes sensations.  She is otherwise stable and has no other complaints.    CTH: negative for acute abnormalities   CTA head and neck unable to be obtained due to iodine allergy   MRI brain wo contrast: negative for acute intracranial abnormalityy; chronic changes noted above   MRA head wo contrast: normal   CUS: negative for occlusion or significant stenosis   ECHO: LVEF 70%, no PFO, severe left atrial enlargement    HPI:  Patient is 80-year-old female who states she woke up out of a sleep a few nights ago around 10:00 p.m..  Patient states she was uncoordinated, dizzy, could not walk straight, her tongue was thick, and her speech was not good.  Patient states she did not have any chest pain, shortness of breath    Other past medical history includes:    Type 2 Diabetes, CAD, CKD, GERD, hypertension, hyperlipidemia, anemia, bolluous pemphigus        Review of patient's allergies indicates:   Allergen Reactions    Fenofibric acid (choline)      Other reaction(s): Vomiting    Iodine      Other reaction(s): lips swollen ivp dye    Rosuvastatin      Other reaction(s): muscle pain       Past Medical History:   Diagnosis  Date    Anemia due to stage 3 chronic kidney disease 2019    Arthritis     Bullous pemphigoid 2022    Chronic bilateral low back pain without sciatica 2019    CKD (chronic kidney disease) stage 3, GFR 30-59 ml/min 2019    Colon polyps     Coronary artery disease     Diabetes mellitus type II     Diabetes with neurologic complications     GERD (gastroesophageal reflux disease)     Hyperlipidemia     Hypertension     Hypothyroidism     Type 2 diabetes mellitus      Past Surgical History:   Procedure Laterality Date    ADENOIDECTOMY      AORTIC VALVE REPLACEMENT      BREAST BIOPSY Right 20 yrs ago    benign    CARDIAC SURGERY      CHOLECYSTECTOMY      COLONOSCOPY  2014    Dr Holly, 5 year recheck    EPIDURAL STEROID INJECTION N/A 3/25/2021    Procedure: Injection, Steroid, Epidural L3-4;  Surgeon: Sky Love MD;  Location: [facility] OR;  Service: Pain Management;  Laterality: N/A;  Injection, Steroid, Epidural L3-4    EPIDURAL STEROID INJECTION N/A 2021    Procedure: Injection, Steroid, Epidural L3-4;  Surgeon: Sky Love MD;  Location: [facility] OR;  Service: Pain Management;  Laterality: N/A;  Injection, Steroid, Epidural L3-4    ESOPHAGOGASTRODUODENOSCOPY N/A 2020    Procedure: EGD (ESOPHAGOGASTRODUODENOSCOPY);  Surgeon: Michael Nugent III, MD;  Location: [facility];  Service: Endoscopy;  Laterality: N/A;    HEMORRHOID SURGERY      HYSTERECTOMY      OOPHORECTOMY      TONSILLECTOMY       Social History     Tobacco Use    Smoking status: Former     Packs/day: 0.25     Years: 15.00     Pack years: 3.75     Types: Cigarettes     Quit date: 3/30/1974     Years since quittin.5    Smokeless tobacco: Never   Substance Use Topics    Alcohol use: No    Drug use: No        REVIEW OF SYSTEMS  CONSTITUTIONAL:  Positive weakness, fatigue Negative for chills, and fever.   NEURO: No headaches, No dizziness; speech is clear  RESPIRATORY: Negative for cough, shortness of breath and wheezing.     CARDIOVASCULAR: Negative for chest pain. Negative for palpitations and leg swelling.   GI: Negative for abdominal pain, No melena, diarrhea, nausea and vomiting.   : Negative for dysuria and frequency, Negative for hematuria  SKIN:  Positive for rash, blistering, to left lower leg and left forearm due to bullous pemphigus   MUSCULOSKELETAL:  Positive for arthralgias   OBJECTIVE     VITAL SIGNS (Most Recent)  Temp: 98.1 °F (36.7 °C) (10/17/22 1552)  Pulse: 74 (10/17/22 1552)  Resp: 18 (10/17/22 1552)  BP: (!) 159/71 (10/17/22 1552)  SpO2: 99 % (10/17/22 1552)    VENTILATION STATUS  Resp: 18 (10/17/22 1552)  SpO2: 99 % (10/17/22 1552)       I & O (Last 24H):  Intake/Output Summary (Last 24 hours) at 10/17/2022 1700  Last data filed at 10/17/2022 1553  Gross per 24 hour   Intake 360 ml   Output --   Net 360 ml       WEIGHTS  Wt Readings from Last 3 Encounters:   10/17/22 0303 81.1 kg (178 lb 14.4 oz)   10/16/22 0500 81.2 kg (179 lb 0.2 oz)   10/14/22 2115 80.9 kg (178 lb 5.6 oz)   10/14/22 0430 79.4 kg (175 lb)   10/17/22 0940 81.1 kg (178 lb 12.7 oz)   10/14/22 1330 79.4 kg (175 lb)       PHYSICAL EXAM  GENERAL:  Elderly female resting in bed, no speech deficit noted  HEENT: Normocephalic. Pupils normal and conjunctivae normal.    CARDIAC: Regular rate and rhythm. Audible murmurs noted.  CHEST ANATOMY: normal.   LUNGS: Clear to auscultation. No wheezing or rhonchi..   ABDOMEN: Soft no masses or organomegaly.  No abdomen pulsations or bruits.  URINARY: No pfeiffer catheter   EXTREMITIES: No cyanosis, clubbing or edema noted at this time., no calf tenderness bilaterally.    CENTRAL NERVOUS SYSTEM: No focal motor or sensory deficits noted.   SKIN: rash and bulla noted to left lower leg d/t to bullous pemphigous  MUSCLE STRENGTH & TONE: No noteable weakness, atrophy or abnormal movement.     HOME MEDICATIONS:  No current facility-administered medications on file prior to encounter.     Current Outpatient Medications on  "File Prior to Encounter   Medication Sig Dispense Refill    lisinopriL (PRINIVIL,ZESTRIL) 20 MG tablet TAKE ONE TABLET BY MOUTH ONCE DAILY 90 tablet 2    aspirin 81 mg Tab Take 81 mg by mouth once daily. Every day      blood sugar diagnostic (ACCU-CHEK GUIDE TEST STRIPS) Strp 300 each by Misc.(Non-Drug; Combo Route) route 3 (three) times daily. 300 each 3    blood-glucose meter kit Accu-chek Marley glucometer check glucose three times daily E11.65 1 each 0    calcium carbonate/vitamin D3 (CALCIUM+D ORAL) Take 1 tablet by mouth once daily.      carvediloL (COREG) 6.25 MG tablet Take 1 tablet (6.25 mg total) by mouth 2 (two) times daily with meals. 180 tablet 3    DROPLET PEN NEEDLE 31 gauge x 5/16" Ndle USE WITH LANTUS SOLOSTAR PEN AS NEEDED 100 each 3    empagliflozin (JARDIANCE) 10 mg tablet Take 1 tablet (10 mg total) by mouth once daily. 30 tablet 6    ibuprofen 200 mg Cap       insulin (LANTUS SOLOSTAR U-100 INSULIN) glargine 100 units/mL (3mL) SubQ pen INJECT 24 UNITS SUBCUTANEOUSLY EVERY EVENING 30 mL 1    ketoconazole (NIZORAL) 2 % cream AAA pannus fold bid 60 g 3    lancets (ACCU-CHEK SOFTCLIX LANCETS) Misc New machine Guide Me Accuchek 300 each 3    levocetirizine (XYZAL) 5 MG tablet Take 5 mg by mouth every evening.      metFORMIN (GLUCOPHAGE) 1000 MG tablet Take 1 tablet (1,000 mg total) by mouth 2 (two) times daily with meals. 180 tablet 3    multivitamin (THERAGRAN) per tablet Take 1 tablet by mouth once daily.      mycophenolate (CELLCEPT) 500 mg Tab One tab PO QAM and 2 tabs PO QHS 90 tablet 1    predniSONE (DELTASONE) 10 MG tablet Take 2 tabs PO QAM x 1 week then 1 tab PO QAM x1 week 21 tablet 0    PREVALITE 4 gram PwPk TAKE 1 PACKET (4 G TOTAL) BY MOUTH ONCE DAILY. 30 packet 11    triamcinolone acetonide 0.1% (KENALOG) 0.1 % cream AAA bid 454 g 3       SCHEDULED MEDS:   aspirin  81 mg Oral Daily    carvediloL  6.25 mg Oral BID WM    cholestyramine-aspartame  1 packet Oral Daily    clopidogreL  75 mg " Oral Daily    enoxaparin  40 mg Subcutaneous Daily    insulin detemir U-100  20 Units Subcutaneous QHS    lisinopriL  20 mg Oral Daily    mycophenolate  500 mg Oral BID    predniSONE  10 mg Oral Daily       CONTINUOUS INFUSIONS:    PRN MEDS:dextrose 10%, dextrose 10%, diphenhydrAMINE, glucagon (human recombinant), glucose, glucose, hydrOXYzine HCL, insulin aspart U-100, magnesium oxide, magnesium oxide, potassium bicarbonate, potassium bicarbonate, potassium bicarbonate, potassium, sodium phosphates, potassium, sodium phosphates, potassium, sodium phosphates, sodium chloride 0.9%    LABS AND DIAGNOSTICS     CBC LAST 3 DAYS  Recent Labs   Lab 10/14/22  0501 10/15/22  0419 10/16/22  0512   WBC 4.68 6.45 6.03   RBC 3.54* 3.99* 3.80*   HGB 10.2* 11.6* 11.1*   HCT 32.8* 36.7* 34.9*   MCV 93 92 92   MCH 28.8 29.1 29.2   MCHC 31.1* 31.6* 31.8*   RDW 16.0* 16.2* 16.4*    199 196   MPV 10.9 11.0 11.1   GRAN 72.0  3.4 73.8*  4.8 70.9  4.3   LYMPH 11.3*  0.5* 12.2*  0.8* 12.8*  0.8*   MONO 10.3  0.5 8.8  0.6 10.8  0.7   BASO 0.04 0.04 0.03   NRBC 0 0 0       COAGULATION LAST 3 DAYS  Recent Labs   Lab 10/14/22  0501 10/15/22  0419   LABPT 14.0* 13.5   INR 1.2 1.1   APTT  --  29.4       CHEMISTRY LAST 3 DAYS  Recent Labs   Lab 10/14/22  0501 10/15/22  0419 10/16/22  0512 10/16/22  1801 10/16/22  1802    140 140  --   --    K 3.6 3.9 3.4*  --  4.1    103 103  --   --    CO2 27 31* 29  --   --    ANIONGAP 9 6* 8  --   --    BUN 18 21 21  --   --    CREATININE 0.8 0.8 0.8  --   --    * 183* 143*  --   --    CALCIUM 9.0 8.9 8.5*  --   --    MG  --  1.7  --  1.9  --    ALBUMIN 3.3* 3.7 3.6  --   --    PROT 6.1 6.7 6.5  --   --    ALKPHOS 66 62 66  --   --    ALT 11 12 12  --   --    AST 17 17 16  --   --    BILITOT 1.1* 1.2* 1.4*  --   --        CARDIAC PROFILE LAST 3 DAYS  Recent Labs   Lab 10/15/22  1829   CPKMB 2.9   TROPONINI <0.030       ENDOCRINE LAST 3 DAYS  Recent Labs   Lab 10/14/22  5130    TSH 1.270       LAST ARTERIAL BLOOD GAS  ABG  No results for input(s): PH, PO2, PCO2, HCO3, BE in the last 168 hours.    LAST 7 DAYS MICROBIOLOGY   Microbiology Results (last 7 days)       ** No results found for the last 168 hours. **            MOST RECENT IMAGING  Transesophageal echo (GABY)  · The left ventricle is normal in size with normal systolic function.  · The estimated ejection fraction is 60%.  · Normal left ventricular diastolic function.  · Normal right ventricular size with normal right ventricular systolic   function.  · Mild left atrial enlargement.  · There is a bioprosthetic aortic valve present. There is no aortic   insufficiency present. Prosthetic aortic valve is normal.  · No interatrial septal defect present.  · No ASD or PFO closure device in interatrial septum.  · Normal appearing left atrial appendage. No thrombus is present in the   appendage. COREEN occluder is absent. Normal appendage velocities.     Intra-Procedure Documentation  GABY performed in the Invasive Lab    - See Procedure Log link below for nursing documentation    - See GABY order on Card Proc Tab for physician findings       ECHOCARDIOGRAM RESULTS (last 5)  Results for orders placed during the hospital encounter of 10/14/22    Transesophageal echo (GABY)    Interpretation Summary  · The left ventricle is normal in size with normal systolic function.  · The estimated ejection fraction is 60%.  · Normal left ventricular diastolic function.  · Normal right ventricular size with normal right ventricular systolic function.  · Mild left atrial enlargement.  · There is a bioprosthetic aortic valve present. There is no aortic insufficiency present. Prosthetic aortic valve is normal.  · No interatrial septal defect present.  · No ASD or PFO closure device in interatrial septum.  · Normal appearing left atrial appendage. No thrombus is present in the appendage. COREEN occluder is absent. Normal appendage velocities.      Echo Saline  Bubble? Yes    Interpretation Summary  · There is no evidence of intracardiac shunting.  · The left ventricle is normal in size with mild concentric hypertrophy and hyperdynamic systolic function.  · The estimated ejection fraction is 70%.  · Indeterminate left ventricular diastolic function.  · Severe left atrial enlargement.  · Severe mitral regurgitation.  · Mild to moderate tricuspid regurgitation.  · There is moderate aortic valve stenosis.  · Aortic valve area is 1.79 cm2; peak velocity is m/s; mean gradient is 21 mmHg.  · Mild aortic regurgitation.  · Normal central venous pressure (3 mmHg).  · The estimated PA systolic pressure is 14 mmHg.      CURRENT/PREVIOUS VISIT EKG  Results for orders placed or performed during the hospital encounter of 10/14/22   ECG 12 lead    Collection Time: 10/14/22  4:57 AM    Narrative    Test Reason : I63.9,    Vent. Rate : 079 BPM     Atrial Rate : 079 BPM     P-R Int : 196 ms          QRS Dur : 090 ms      QT Int : 380 ms       P-R-T Axes : 052 047 030 degrees     QTc Int : 435 ms    Normal sinus rhythm  Normal ECG  When compared with ECG of 03-JUN-2020 11:54,  Sinus rhythm has replaced Atrial fibrillation    Referred By: [email or web address]   SELF           Confirmed By:            ASSESSMENT/PLAN:     Active Hospital Problems    Diagnosis    *TIA (transient ischemic attack)    Bullous pemphigus       ASSESSMENT & PLAN:   TIA-resolved  Bullous Pemphigus  CAD  CKD  HTN  Hyperlipidemia- on statin therapy  GERD      RECOMMENDATIONS:  1. Continue aspirin 81 mg, Plavix 75 mg and Lipitor 80 mg daily for stroke prevention. Neurology recommended dual antiplatelet therapy will be needed for 3 weeks followed by monotherapy.  2. Will need holter monitor or loop recorder to rule out paroxysmal Afib. Patient will need to follow up with cardiology in 1-2 weeks after discharge. Holter monitor or Loop recorder procedure will be set up at follow up visit.  3. GABY was done showed EF 60%, no PFO, mild  left atrial enlargement, normal appearing left atrial appendage, no thrombus seen  4. Rehab with PT/OT/SLP.  5. Blood pressure has been controlled with lisinopril 20 mg po Daily and Carvedilol 6.25mg po BID.     Mica Adhikari NP  [facility]  Department of Cardiology  Date of Service: 10/17/2022        I have personally interviewed and examined the patient, I have reviewed the Nurse Practitioner's history and physical, assessment, and plan. I agree with the findings and plan.    1.  After obtaining informed consent and explanation patient underwent transesophageal echocardiography under anesthesia.  Patient tolerated the procedure well hemodynamically stable no complications encountered during the procedure.  2.  Oliver was negative for any thrombus.  There was no evidence of ASD or PFO and essentially bubble study was negative for ASD a or PFO.  3.  Thank you for the consultation.          Fernando Huerta M.D.  [facility]  Department of Cardiology  Date of Service: 10/17/2022  5:00 PM

## 2022-10-17 NOTE — TRANSFER OF CARE
"Anesthesia Transfer of Care Note    Patient: Leslie Tolliver    Procedure(s) Performed: Procedure(s) (LRB):  Transesophageal echo (GABY) intra-procedure log documentation (N/A)    [facility]: [facility]    Anesthesia Type: MAC    Transport from OR: Transported from OR on room air with adequate spontaneous ventilation    Post pain: adequate analgesia    Post assessment: no apparent anesthetic complications and tolerated procedure well    Post vital signs: stable    Level of consciousness: awake, alert and oriented    Nausea/Vomiting: no nausea/vomiting    Complications: none    Transfer of care protocol was followed      Last vitals:   Visit Vitals  BP (!) 193/77   Pulse 84   Temp 36.3 °C (97.4 °F) (Oral)   Resp 14   Ht 5' 7" (1.702 m)   Wt 81.1 kg (178 lb 14.4 oz)   SpO2 98%   Breastfeeding No   BMI 28.02 kg/m²     "

## 2022-10-17 NOTE — CONSULTS
Mutiple large areas of redness, blisters erupted and fluidfilled.  Applied mepilex to inner left leg and lateral posterior right thigh.  States hurts to sit.  Padded for comfort.  Spoke to Dr. Bynum, will try a small patch of Triad to arm.

## 2022-10-17 NOTE — ANESTHESIA PREPROCEDURE EVALUATION
10/17/2022  Leslie Tolliver is a 88 y.o., female.      Patient Active Problem List   Diagnosis    Hypertension associated with diabetes    Type 2 diabetes mellitus with diabetic polyneuropathy, with long-term current use of insulin    Arthritis of both hips    Hypothyroidism    Colon polyps    Hyperlipidemia associated with type 2 diabetes mellitus    Stage 3b chronic kidney disease    Acute on chronic anemia    Chronic bilateral low back pain without sciatica    BMI 29.0-29.9,adult    Status post aortic valve replacement with bioprosthetic valve    Iron deficiency anemia    S/P AVR (aortic valve replacement)    Lumbar radiculopathy    Spinal stenosis of lumbar region with neurogenic claudication    Bullous pemphigoid    TIA (transient ischemic attack)    Bullous pemphigus       Past Surgical History:   Procedure Laterality Date    ADENOIDECTOMY      AORTIC VALVE REPLACEMENT      BREAST BIOPSY Right 20 yrs ago    benign    CARDIAC SURGERY      CHOLECYSTECTOMY      COLONOSCOPY  5-    Dr Holly, 5 year recheck    EPIDURAL STEROID INJECTION N/A 3/25/2021    Procedure: Injection, Steroid, Epidural L3-4;  Surgeon: Sky Love MD;  Location: [facility] OR;  Service: Pain Management;  Laterality: N/A;  Injection, Steroid, Epidural L3-4    EPIDURAL STEROID INJECTION N/A 5/20/2021    Procedure: Injection, Steroid, Epidural L3-4;  Surgeon: Sky Love MD;  Location: [facility] OR;  Service: Pain Management;  Laterality: N/A;  Injection, Steroid, Epidural L3-4    ESOPHAGOGASTRODUODENOSCOPY N/A 6/4/2020    Procedure: EGD (ESOPHAGOGASTRODUODENOSCOPY);  Surgeon: Michael Nugent III, MD;  Location: [facility];  Service: Endoscopy;  Laterality: N/A;    HEMORRHOID SURGERY      HYSTERECTOMY      OOPHORECTOMY      TONSILLECTOMY          Tobacco Use:  The patient  reports that she quit smoking  about 48 years ago. Her smoking use included cigarettes. She has a 3.75 pack-year smoking history. She has never used smokeless tobacco.     Results for orders placed or performed during the hospital encounter of 10/14/22   ECG 12 lead    Collection Time: 10/14/22  4:57 AM    Narrative    Test Reason : I63.9,    Vent. Rate : 079 BPM     Atrial Rate : 079 BPM     P-R Int : 196 ms          QRS Dur : 090 ms      QT Int : 380 ms       P-R-T Axes : 052 047 030 degrees     QTc Int : 435 ms    Normal sinus rhythm  Normal ECG  When compared with ECG of 03-JUN-2020 11:54,  Sinus rhythm has replaced Atrial fibrillation    Referred By: [email or web address]   SELF           Confirmed By:         Imaging Results          MRI Brain Without Contrast (Final result)  Result time 10/14/22 16:31:36    Final result by Ida Wei MD (10/14/22 16:31:36)                 Narrative:    MRI of the brain    Clinical history is dizziness, slurred speech    Multiplanar images of the brain were obtained.    The ventricles and sulci are normal in size and configuration for age. There is no hemorrhage, mass or midline shift. There are no extra-axial fluid collections.    There is increased FLAIR and T2 signal within the periventricular white matter and corona radiata compatible with small vessel disease.    There is no abnormality on the diffusion-weighted images to suggest acute infarct. Cerebellum and brainstem are normal.    There are flow voids in the cavernous portions of the internal carotid arteries and basilar artery indicating patency.    The corpus callosum, cerebellar tonsils and midline structures are normal.    IMPRESSION: Mild periventricular small vessel disease with no acute intracranial process    Electronically signed by:  Ida Wei MD  10/14/2022 4:31 PM CDT Workstation: [id number]                             MRA Brain without contrast (Final result)  Result time 10/14/22 16:16:51    Final result by Ida Wei MD  (10/14/22 16:16:51)                 Narrative:    MRA of the brain    3-D time-of-flight images of the [place] were obtained.    The left vertebral artery is dominant. The right distal vertebral artery is small in size.    The basilar artery and cavernous portion of the internal carotid arteries are unremarkable.    The anterior cerebral arteries, middle cerebral arteries and posterior cerebral arteries are patent without significant stenosis or large vessel occlusion. There is no aneurysm or AVM. There is persistent fetal origin of the posterior cerebral arteries from the internal carotid arteries.    IMPRESSION: Normal MRA of the brain    Electronically signed by:  Ida Wei MD  10/14/2022 4:16 PM CDT Workstation: [id number]                             US Carotid Bilateral (Final result)  Result time 10/14/22 12:56:48    Final result by Agustin Henley MD (10/14/22 12:56:48)                 Narrative:    CMS MANDATED QUALITY DATA - CAROTID - 195    All measurements and percent stenosis described below were determined using NASCET criteria or criteria similar to NASCET, as defined by the Society of Radiologists in Ultrasound Consensus Conference, Radiology, 2003    US CAROTID DOPPLER BILATERAL    CLINICAL HISTORY:  88 years Female CVA    COMPARISON: None    FINDINGS: Grayscale, color Doppler, and spectral Doppler analysis of the carotid arteries was performed. Mild bilateral atherosclerotic plaque.    Peak systolic velocity in the right CCA is 46.1 cm/sec, and peak systolic velocity in the right ICA is 79.7 cm/sec, with ICA/CCA ratio of less than 2.    Peak systolic velocity in the left CCA is 50.3 cm/sec, and peak systolic velocity in the left ICA is 61.4 cm/sec, with ICA/CCA ratio of less than 2.    The vertebral arteries are patent, with normal waveforms, and normal antegrade flow bilaterally.    IMPRESSION:    1. No Doppler evidence of carotid artery occlusion or hemodynamically significant  stenosis.  2. Patent vertebral arteries with antegrade flow bilaterally.    Electronically signed by:  Agustin Henley MD  10/14/2022 12:56 PM CDT Workstation: [id number]                             X-Ray Chest AP Portable (Final result)  Result time 10/14/22 06:29:48    Final result by Neto Bautista MD (10/14/22 06:29:48)                 Narrative:    Reason: Stroke    FINDINGS:    Portable chest with comparison chest x-ray November 2, 2017 show normal cardiomediastinal silhouette. Median sternotomy wires noted.  Lungs are clear. Pulmonary vasculature is normal. No acute osseous abnormality.    IMPRESSION:    No acute cardiopulmonary abnormality.    Electronically signed by:  Neto Bautista DO  10/14/2022 6:29 AM CDT Workstation: [id number]                             CT HEAD FOR STROKE (Edited Result - FINAL)  Result time 10/14/22 05:29:15   Procedure changed from CT Head Without Contrast     Edited Result - FINAL by Yaquelin Morin DO (10/14/22 05:29:15)                 Narrative:         ADDENDUM #1       THIS REPORT CONTAINS FINDINGS THAT MAY BE CRITICAL TO PATIENT CARE: Pertinent results were discussed via telephone with Dr. Raphael Balbuena 10/14/2022 4:56 AM CST. The results were acknowledged and understood.    Electronically signed by:  Colleen Prakash MD  10/14/2022 5:29 AM CDT Workstation: [id number]     ORIGINAL REPORT       EXAM:  CT Head Without Intravenous Contrast    CLINICAL HISTORY:  Neuro deficit, acute, stroke suspected.    TECHNIQUE:  CT images of the head/brain without intravenous contrast. Axial, coronal, and sagittal images.  This CT exam was performed using one or more of the following dose reduction techniques:  automated exposure control, adjustment of the mA and/or kV according to patient size, and/or use of iterative reconstruction technique.    COMPARISON:  No relevant prior studies available.    FINDINGS:  Brain:  No acute hemorrhage. No evidence of acute infarct; MRI more  sensitive. Periventricular and subcortical white matter hypodensities are nonspecific but most commonly due to chronic small vessel ischemic changes in a patient of this age.  Ventricles:  No hydrocephalus.  Bones/joints:  No skull fracture.  Soft tissues:  No acute findings.  Sinuses:  No paranasal sinus air-fluid level. Mild mucosal thickening.  Mastoid air cells:  No mastoid effusion.    IMPRESSION:  No acute intracranial abnormality.    Electronically signed by:  Colleen Prakash MD  10/14/2022 4:49 AM CDT Workstation: [id number]                  Final result by Yaquelin Morin DO (10/14/22 04:49:45)                 Narrative:    EXAM:  CT Head Without Intravenous Contrast    CLINICAL HISTORY:  Neuro deficit, acute, stroke suspected.    TECHNIQUE:  CT images of the head/brain without intravenous contrast. Axial, coronal, and sagittal images.  This CT exam was performed using one or more of the following dose reduction techniques:  automated exposure control, adjustment of the mA and/or kV according to patient size, and/or use of iterative reconstruction technique.    COMPARISON:  No relevant prior studies available.    FINDINGS:  Brain:  No acute hemorrhage. No evidence of acute infarct; MRI more sensitive. Periventricular and subcortical white matter hypodensities are nonspecific but most commonly due to chronic small vessel ischemic changes in a patient of this age.  Ventricles:  No hydrocephalus.  Bones/joints:  No skull fracture.  Soft tissues:  No acute findings.  Sinuses:  No paranasal sinus air-fluid level. Mild mucosal thickening.  Mastoid air cells:  No mastoid effusion.    IMPRESSION:  No acute intracranial abnormality.    Electronically signed by:  Colleen Prakash MD  10/14/2022 4:49 AM CDT Workstation: [id number]                               Lab Results   Component Value Date    WBC 6.03 10/16/2022    HGB 11.1 (L) 10/16/2022    HCT 34.9 (L) 10/16/2022    MCV 92 10/16/2022      10/16/2022     BMP  Lab Results   Component Value Date     10/16/2022    K 4.1 10/16/2022     10/16/2022    CO2 29 10/16/2022    BUN 21 10/16/2022    CREATININE 0.8 10/16/2022    CALCIUM 8.5 (L) 10/16/2022    ANIONGAP 8 10/16/2022     (H) 10/16/2022     (H) 10/15/2022     (H) 10/14/2022       Results for orders placed during the hospital encounter of 10/14/22    Echo Saline Bubble? Yes    Interpretation Summary  · There is no evidence of intracardiac shunting.  · The left ventricle is normal in size with mild concentric hypertrophy and hyperdynamic systolic function.  · The estimated ejection fraction is 70%.  · Indeterminate left ventricular diastolic function.  · Severe left atrial enlargement.  · Severe mitral regurgitation.  · Mild to moderate tricuspid regurgitation.  · There is moderate aortic valve stenosis.  · Aortic valve area is 1.79 cm2; peak velocity is m/s; mean gradient is 21 mmHg.  · Mild aortic regurgitation.  · Normal central venous pressure (3 mmHg).  · The estimated PA systolic pressure is 14 mmHg.            Pre-op Assessment    I have reviewed the Patient Summary Reports.     I have reviewed the Nursing Notes. I have reviewed the NPO Status.   I have reviewed the Medications.     Review of Systems  Anesthesia Hx:  No problems with previous Anesthesia Denies Hx of Anesthetic complications  Denies Family Hx of Anesthesia complications.   Denies Personal Hx of Anesthesia complications.   Social:  Former Smoker    Hematology/Oncology:     Oncology Normal    -- Anemia:   EENT/Dental:EENT/Dental Normal   Cardiovascular:   Hypertension Valvular problems/Murmurs, AS CAD   hyperlipidemia ECG has been reviewed.    Pulmonary:  Pulmonary Normal    Renal/:   Chronic Renal Disease, CKD    Hepatic/GI:   GERD    Musculoskeletal:  Spine Disorders: lumbar    Neurological:   TIA, CVA    Endocrine:   Diabetes    Psych:  Psychiatric Normal           Physical Exam  General: Well  nourished    Airway:  Mallampati: II   Mouth Opening: Normal  TM Distance: Normal  Tongue: Normal  Neck ROM: Normal ROM    Dental:  Intact    Chest/Lungs:  Clear to auscultation    Heart:  Rate: Normal  Rhythm: Regular Rhythm  Sounds: Normal        Anesthesia Plan  Type of Anesthesia, risks & benefits discussed:    Anesthesia Type: MAC  Intra-op Monitoring Plan: Standard ASA Monitors  Informed Consent: Informed consent signed with the Patient and all parties understand the risks and agree with anesthesia plan.  All questions answered.   ASA Score: 3    Ready For Surgery From Anesthesia Perspective.     .

## 2022-10-17 NOTE — ASSESSMENT & PLAN NOTE
Symptoms have essentially resolved    Neurologic symptoms resolved    No new neurologic symptoms  GABY in the AM per neurology    GABY normal but neurology requests a loop recorder

## 2022-10-17 NOTE — SUBJECTIVE & OBJECTIVE
Interval History: Ms Tolliver has elevated glucose this date. Increase PM detimir to 20    Review of Systems   Constitutional:  Positive for fatigue.   HENT: Negative.     Eyes: Negative.    Respiratory: Negative.     Cardiovascular: Negative.    Gastrointestinal: Negative.    Endocrine: Positive for heat intolerance.   Genitourinary:  Positive for frequency.   Musculoskeletal:  Positive for arthralgias and myalgias.   Skin: Negative.    Allergic/Immunologic: Positive for immunocompromised state.   Neurological:  Positive for weakness.   Hematological: Negative.    Psychiatric/Behavioral:  The patient is nervous/anxious.    Objective:     Vital Signs (Most Recent):  Temp: 98.1 °F (36.7 °C) (10/17/22 1552)  Pulse: 74 (10/17/22 1552)  Resp: 18 (10/17/22 1552)  BP: (!) 159/71 (10/17/22 1552)  SpO2: 99 % (10/17/22 1552)   Vital Signs (24h Range):  Temp:  [97.4 °F (36.3 °C)-99 °F (37.2 °C)] 98.1 °F (36.7 °C)  Pulse:  [72-86] 74  Resp:  [13-22] 18  SpO2:  [95 %-100 %] 99 %  BP: (128-197)/(58-83) 159/71     Weight: 81.1 kg (178 lb 14.4 oz)  Body mass index is 28.02 kg/m².    Intake/Output Summary (Last 24 hours) at 10/17/2022 1719  Last data filed at 10/17/2022 1553  Gross per 24 hour   Intake 360 ml   Output --   Net 360 ml      Physical Exam  Vitals and nursing note reviewed.   Constitutional:       General: She is not in acute distress.  HENT:      Head: Normocephalic and atraumatic.      Nose: Nose normal.      Mouth/Throat:      Mouth: Mucous membranes are moist.   Eyes:      Extraocular Movements: Extraocular movements intact.      Pupils: Pupils are equal, round, and reactive to light.   Cardiovascular:      Rate and Rhythm: Normal rate and regular rhythm.   Pulmonary:      Effort: Pulmonary effort is normal.      Breath sounds: Normal breath sounds.   Abdominal:      General: Bowel sounds are normal. There is no distension.      Tenderness: There is no abdominal tenderness. There is no guarding or rebound.    Musculoskeletal:         General: Normal range of motion.      Cervical back: Normal range of motion and neck supple.   Skin:     General: Skin is warm.      Comments: Bullae and plaques generalized   Neurological:      Mental Status: She is alert and oriented to person, place, and time.   Psychiatric:      Comments: Very anxious       Significant Labs: All pertinent labs within the past 24 hours have been reviewed.  Recent Lab Results  (Last 5 results in the past 24 hours)        10/17/22  1622   10/17/22  1234   10/17/22  0948   10/17/22  0424   10/16/22  2025        BSA     1.96           EF     60           POC Glucose 402   285     207   370                              Significant Imaging: I have reviewed all pertinent imaging results/findings within the past 24 hours.

## 2022-10-17 NOTE — PROGRESS NOTES
[facility] Medicine  Progress Note    Patient Name: Leslie Tloliver  MRN: [id number]  Patient Class: OP- Observation   Admission Date: 10/14/2022  Length of Stay: 0 days  Attending Physician: Taras Bynum MD  Primary Care Provider: Chang Christianson MD        Subjective:     Principal Problem:TIA (transient ischemic attack)        HPI:  ED note     [age] year female with past medical history significant diabetes, CAD, CKD, GERD, hypertension hyperlipidemia presents secondary to complain of dizziness.  Patient feels as if she is off balance and developed an episode of slurred speech.  Patient was last seen normal approximately midnight which was greater than 4 hour time frame from symptom onset.  She denies any nausea, vomiting, fever, chills, sweats or changes sensations.  She is otherwise stable and has no other complaints.    10/14/2022  Ms Tolliver has no deficits currently but is very anxious that they could return      Overview/Hospital Course:  10/15/2022  Ms Tolliver has had no new neurologic symptoms since admit  Her Phemphigus is breakin out    10/16/2022  Pt has had no new neurologic symptoms. She is more concerned Re her Phemphigus    10/17/2022  Ms Tolliver has no neurologic complaints      Interval History: Ms Tolliver has elevated glucose this date. Increase PM detimir to 20    Review of Systems   Constitutional:  Positive for fatigue.   HENT: Negative.     Eyes: Negative.    Respiratory: Negative.     Cardiovascular: Negative.    Gastrointestinal: Negative.    Endocrine: Positive for heat intolerance.   Genitourinary:  Positive for frequency.   Musculoskeletal:  Positive for arthralgias and myalgias.   Skin: Negative.    Allergic/Immunologic: Positive for immunocompromised state.   Neurological:  Positive for weakness.   Hematological: Negative.    Psychiatric/Behavioral:  The patient is nervous/anxious.    Objective:     Vital Signs (Most Recent):  Temp: 98.1 °F  (36.7 °C) (10/17/22 1552)  Pulse: 74 (10/17/22 1552)  Resp: 18 (10/17/22 1552)  BP: (!) 159/71 (10/17/22 1552)  SpO2: 99 % (10/17/22 1552)   Vital Signs (24h Range):  Temp:  [97.4 °F (36.3 °C)-99 °F (37.2 °C)] 98.1 °F (36.7 °C)  Pulse:  [72-86] 74  Resp:  [13-22] 18  SpO2:  [95 %-100 %] 99 %  BP: (128-197)/(58-83) 159/71     Weight: 81.1 kg (178 lb 14.4 oz)  Body mass index is 28.02 kg/m².    Intake/Output Summary (Last 24 hours) at 10/17/2022 1719  Last data filed at 10/17/2022 1553  Gross per 24 hour   Intake 360 ml   Output --   Net 360 ml      Physical Exam  Vitals and nursing note reviewed.   Constitutional:       General: She is not in acute distress.  HENT:      Head: Normocephalic and atraumatic.      Nose: Nose normal.      Mouth/Throat:      Mouth: Mucous membranes are moist.   Eyes:      Extraocular Movements: Extraocular movements intact.      Pupils: Pupils are equal, round, and reactive to light.   Cardiovascular:      Rate and Rhythm: Normal rate and regular rhythm.   Pulmonary:      Effort: Pulmonary effort is normal.      Breath sounds: Normal breath sounds.   Abdominal:      General: Bowel sounds are normal. There is no distension.      Tenderness: There is no abdominal tenderness. There is no guarding or rebound.   Musculoskeletal:         General: Normal range of motion.      Cervical back: Normal range of motion and neck supple.   Skin:     General: Skin is warm.      Comments: Bullae and plaques generalized   Neurological:      Mental Status: She is alert and oriented to person, place, and time.   Psychiatric:      Comments: Very anxious       Significant Labs: All pertinent labs within the past 24 hours have been reviewed.  Recent Lab Results  (Last 5 results in the past 24 hours)        10/17/22  1622   10/17/22  1234   10/17/22  0948   10/17/22  0424   10/16/22  2025        BSA     1.96           EF     60           POC Glucose 402   285     207   370                              Significant  Imaging: I have reviewed all pertinent imaging results/findings within the past 24 hours.      Assessment/Plan:      * TIA (transient ischemic attack)  Symptoms have essentially resolved    Neurologic symptoms resolved    No new neurologic symptoms  GABY in the AM per neurology    GABY normal but neurology requests a loop recorder    Bullous pemphigus  Pt to have her 4 th IVIG infusion  New lesions on the left forearm        VTE Risk Mitigation (From admission, onward)         Ordered     enoxaparin injection 40 mg  Daily         10/14/22 0828     IP VTE HIGH RISK PATIENT  Once         10/14/22 0828     Place sequential compression device  Until discontinued         10/14/22 0828                Discharge Planning   MORA:      Code Status: Full Code   Is the patient medically ready for discharge?:     Reason for patient still in hospital (select all that apply): Treatment, Consult recommendations and Pending disposition  Discharge Plan A: Home with family                  Taras Bynum MD  Department of Hospital Medicine   [facility]

## 2022-10-17 NOTE — PT/OT/SLP PROGRESS
Physical Therapy Treatment    Patient Name:  Leslie Tolliver   MRN:  [id number]    Recommendations:     Discharge Recommendations:  home   Discharge Equipment Recommendations: none   Barriers to discharge: None    Assessment:     Leslie Tolliver is a 88 y.o. female admitted with a medical diagnosis of TIA (transient ischemic attack).  She presents with the following impairments/functional limitations:  gait instability, decreased safety awareness, impaired skin.    Pt found sitting up in bed and agreeable to working with PT for gait training. Pt tolerated session well with improved gait tolerance and only SBA required for safety while pt used her rollator from home. Pt waiting for results from GABY performed this am and hoping to return home with daughter to supervise today or tomorrow. Pt with improving speech, as well.    Rehab Prognosis: Fair; patient would benefit from acute skilled PT services to address these deficits and reach maximum level of function.    Recent Surgery: Procedure(s) (LRB):  Transesophageal echo (GABY) intra-procedure log documentation (N/A) Day of Surgery    Plan:     During this hospitalization, patient to be seen 3 x/week to address the identified rehab impairments via gait training, therapeutic activities, therapeutic exercises, neuromuscular re-education and progress toward the following goals:    Plan of Care Expires:  11/18/22    Subjective     Chief Complaint: Pt waiting for results of GABY   Patient/Family Comments/goals: home with daughter to supervise  Pain/Comfort:  Pain Rating 1: 0/10      Objective:     Communicated with DON Miner prior to session.  Patient found HOB elevated with peripheral IV, telemetry upon PT entry to room.     General Precautions: Standard, fall   Orthopedic Precautions:N/A   Braces:    Respiratory Status: Room air     Functional Mobility:  Bed Mobility:     Supine to Sit: stand by assistance  Sit to Supine: stand by assistance  Transfers:     Sit to Stand:   stand by assistance with no AD  Gait: x 250 feet with rollator and SBA for safety.      AM-PAC 6 CLICK MOBILITY          Therapeutic Activities and Exercises:   Pt was educated on the following: call light use, importance of OOB activity and functional mobility to negate the negative effects of prolonged bed rest during this hospitalization, safe transfers/ambulation and discharge planning recommendations/options.      Patient left HOB elevated with all lines intact, call button in reach, bed alarm on, and RN notified..    GOALS:   Multidisciplinary Problems       Physical Therapy Goals          Problem: Physical Therapy    Goal Priority Disciplines Outcome Goal Variances Interventions   Physical Therapy Goal     PT, PT/OT      Description: Goals to be met by: 22     Patient will increase functional independence with mobility by performin. Supine to sit with Supervision  2. Sit to stand transfer with Supervision  3. Bed to chair transfer with Supervision using Rolling Walker  4. Gait  x 300 feet with Supervision using Rolling Walker.                              Time Tracking:     PT Received On: 10/17/22  PT Start Time: 1404     PT Stop Time: 1419  PT Total Time (min): 15 min     Billable Minutes: Gait Training 15    Treatment Type: Treatment  PT/PTA: PT           10/17/2022

## 2022-10-17 NOTE — PROGRESS NOTES
[facility]  Neurology Progress Note    Patient Name: Leslie Tolliver   MRN: [id number]  : 1934  TODAY'S DATE: 10/17/2022  ADMIT DATE: 10/14/2022  4:22 AM                                          CONSULTED PROVIDER: Tracy Baca MD, Neurologist. Cellphone: [phone]  CONSULT REQUESTED BY: Taras Bynum MD     Chief Complaint   Patient presents with    Dizziness     Started at midnight with slurred speech.       Chief Complaint:   slurred speech, dizziness, difficulty ambulating, LKN today at 0000     HPI: as per EMR: [age] year female with past medical history significant diabetes, CAD, CKD, GERD, hypertension hyperlipidemia presents secondary to complain of dizziness.  Patient feels as if she is off balance and developed an episode of slurred speech.  Patient was last seen normal approximately midnight which was greater than 4 hour time frame from symptom onset.  She denies any nausea, vomiting, fever, chills, sweats or changes sensations.  She is otherwise stable and has no other complaints    10/15/22:  Patient was seen and examined by me today.  Son at bedside.  Patient's deficits seem to have resolved since yesterday's admission.  She denies any new deficits.  There were no acute events overnight    10/17/2022:  Patient was seen examined by me this morning.  She is alert and oriented x3.  Denies any new neurological complaints.  States that her speech is normal now.      Scheduled Meds:   aspirin  81 mg Oral Daily    carvediloL  6.25 mg Oral BID WM    cholestyramine-aspartame  1 packet Oral Daily    enoxaparin  40 mg Subcutaneous Daily    insulin detemir U-100  10 Units Subcutaneous QHS    lisinopriL  20 mg Oral Daily    mycophenolate  500 mg Oral BID    predniSONE  10 mg Oral Daily     Continuous Infusions:  PRN Meds:.dextrose 10%, dextrose 10%, dextrose 50%, dextrose 50%, diphenhydrAMINE, glucagon (human recombinant), glucose, glucose, hydrOXYzine HCL, insulin aspart  U-100, magnesium oxide, magnesium oxide, potassium bicarbonate, potassium bicarbonate, potassium bicarbonate, potassium, sodium phosphates, potassium, sodium phosphates, potassium, sodium phosphates, sodium chloride 0.9%      Physical Exam  Current Vitals:  Vitals:    10/17/22 0946   BP: (P) 129/69   Pulse:    Resp:    Temp:        Physical Exam:  General: AO x4  HEENT: PERRL, EOMI  CV: RRR  Lungs: no respiratory distress  Abdomen: soft  Skin: there are widespread bullae in trunk and extremities consistent with patient's history of bullous pemphigoid     Neurological Exam    MENTAL STATUS EXAM:  Level of alertness: Alert  Level of attention: Attentive w/out deficit  Orientation/Awareness: intact to person, place, time, situation  Language: fluent. Comprehension/repetition/naming intact, no dysarthria noted.    CRANIAL NERVE EXAM:  II/III: fundoscopic exam deferred, PERRL; visual fields full to confrontation; no gross deficit on visual acuity  III/IV/VI: EOMI w/out evidence of nystagmus, strabismus, or evoked diplopia  V: no deficits appreciated to pinprick, temp, vibration; masseter strength intact bilaterally  VII: no facial asymmetry noted  VIII: no deficits in hearing bilaterally  IX/X: palate @ ML and raises symmetrically  XI: shoulder shrug 5/5 bilaterally; head turn 5/5 bilaterally  XII: tongue to midline w/out asymmetry    MOTOR EXAM:  Bulk and Tone: normal throughout  Strength is 5/5 proximally and distally in upper and lower extremities without deficits.  No pronator drift in bilateral UE. No tremor either at rest or with intention.    REFLEXES:  Masseter (CN V) Not Tested  2+ in bilateral upper and lower extremities, no Chowdhury's, no clonus. Downgoing plantars.      SENSORY EXAM  Mild length dependent sensory loss of lower extremities, rest intact     COORDINATION/CEREBELLAR EXAM:  FTN: no dysmetria or other signs of appendicular ataxia  HTS: No evidence of appendicular ataxia    GAIT:  Deferred for  safety.    NIH Stroke Scale      Date: 10/17/2022  Time: 1400  Person Administering Scale: Malachi Estrada MD    Administer stroke scale items in the order listed. Record performance in each category after each subscale exam. Do not go back and change scores. Follow directions provided for each exam technique. Scores should reflect what the patient does, not what the clinician thinks the patient can do. The clinician should record answers while administering the exam and work quickly. Except where indicated, the patient should not be coached (i.e., repeated requests to patient to make a special effort).      1a  Level of consciousness: 0=alert; keenly responsive   1b. LOC questions:  0=Answers both tasks correctly   1c. LOC commands: 0=Answers both tasks correctly   2.  Best Gaze: 0=normal   3.  Visual: 0=No visual loss   4. Facial Palsy: 0=Normal symmetric movement   5a.  Motor left arm: 0=No drift, limb holds 90 (or 45) degrees for full 10 seconds   5b.  Motor right arm: 0=No drift, limb holds 90 (or 45) degrees for full 10 seconds   6a. motor left le=No drift, limb holds 90 (or 45) degrees for full 10 seconds   6b  Motor right le=No drift, limb holds 90 (or 45) degrees for full 10 seconds   7. Limb Ataxia: 0=Absent   8.  Sensory: 0=Normal; no sensory loss   9. Best Language:  0=No aphasia, normal   10. Dysarthria: 0=Normal   11. Extinction and Inattention: 0=No abnormality    Total:   0       Laboratory Data & Studies    Recent Labs   Lab 10/14/22  0501 10/15/22  0419 10/16/22  0512   WBC 4.68 6.45 6.03   HGB 10.2* 11.6* 11.1*    199 196   MCV 93 92 92       Recent Labs   Lab 10/14/22  0501 10/15/22  0419 10/16/22  0512 10/16/22  1801 10/16/22  180    140 140  --   --    K 3.6 3.9 3.4*  --  4.1    103 103  --   --    CO2 27 31* 29  --   --    BUN 18 21 21  --   --    * 183* 143*  --   --    CALCIUM 9.0 8.9 8.5*  --   --    MG  --  1.7  --  1.9  --    PHOS  --  2.6*  --   --    --        Recent Labs   Lab 10/14/22  0501 10/15/22  0419 10/16/22  0512   PROT 6.1 6.7 6.5   ALBUMIN 3.3* 3.7 3.6   BILITOT 1.1* 1.2* 1.4*   AST 17 17 16   ALT 11 12 12   ALKPHOS 66 62 66       Recent Labs   Lab 10/14/22  0501 10/15/22  0419   LABPT 14.0* 13.5   INR 1.2 1.1   APTT  --  29.4       Recent Labs   Lab 10/14/22  0501   HGBA1C 7.9*   CHOL 126   TRIG 142   LDLCALC 64.6   HDL 33*   TSH 1.270       Microbiology:  Microbiology Results (last 7 days)       ** No results found for the last 168 hours. **            Imaging:  MRA Brain without contrast    Result Date: 10/14/2022  MRA of the brain 3-D time-of-flight images of the [place] were obtained. The left vertebral artery is dominant. The right distal vertebral artery is small in size. The basilar artery and cavernous portion of the internal carotid arteries are unremarkable. The anterior cerebral arteries, middle cerebral arteries and posterior cerebral arteries are patent without significant stenosis or large vessel occlusion. There is no aneurysm or AVM. There is persistent fetal origin of the posterior cerebral arteries from the internal carotid arteries. IMPRESSION: Normal MRA of the brain Electronically signed by:  Ida Wei MD  10/14/2022 4:16 PM CDT Workstation: [id number]    MRI Brain Without Contrast    Result Date: 10/14/2022  MRI of the brain Clinical history is dizziness, slurred speech Multiplanar images of the brain were obtained. The ventricles and sulci are normal in size and configuration for age. There is no hemorrhage, mass or midline shift. There are no extra-axial fluid collections. There is increased FLAIR and T2 signal within the periventricular white matter and corona radiata compatible with small vessel disease. There is no abnormality on the diffusion-weighted images to suggest acute infarct. Cerebellum and brainstem are normal. There are flow voids in the cavernous portions of the internal carotid arteries and  basilar artery indicating patency. The corpus callosum, cerebellar tonsils and midline structures are normal. IMPRESSION: Mild periventricular small vessel disease with no acute intracranial process Electronically signed by:  Ida Wei MD  10/14/2022 4:31 PM CDT Workstation: [id number]    X-Ray Chest AP Portable    Result Date: 10/14/2022  Reason: Stroke FINDINGS: Portable chest with comparison chest x-ray November 2, 2017 show normal cardiomediastinal silhouette. Median sternotomy wires noted. Lungs are clear. Pulmonary vasculature is normal. No acute osseous abnormality. IMPRESSION: No acute cardiopulmonary abnormality. Electronically signed by:  Neto Bautista DO  10/14/2022 6:29 AM CDT Workstation: [id number]    US Carotid Bilateral    Result Date: 10/14/2022  CMS MANDATED QUALITY DATA - CAROTID - 195 All measurements and percent stenosis described below were determined using NASCET criteria or criteria similar to NASCET, as defined by the Society of Radiologists in Ultrasound Consensus Conference, Radiology, 2003 US CAROTID DOPPLER BILATERAL CLINICAL HISTORY: 88 years Female CVA COMPARISON: None FINDINGS: Grayscale, color Doppler, and spectral Doppler analysis of the carotid arteries was performed. Mild bilateral atherosclerotic plaque. Peak systolic velocity in the right CCA is 46.1 cm/sec, and peak systolic velocity in the right ICA is 79.7 cm/sec, with ICA/CCA ratio of less than 2. Peak systolic velocity in the left CCA is 50.3 cm/sec, and peak systolic velocity in the left ICA is 61.4 cm/sec, with ICA/CCA ratio of less than 2. The vertebral arteries are patent, with normal waveforms, and normal antegrade flow bilaterally. IMPRESSION: 1. No Doppler evidence of carotid artery occlusion or hemodynamically significant stenosis. 2. Patent vertebral arteries with antegrade flow bilaterally. Electronically signed by:  Agustin Henley MD  10/14/2022 12:56 PM CDT Workstation: [id number]    CT HEAD FOR  STROKE    Result Date: 10/14/2022   ADDENDUM #1 THIS REPORT CONTAINS FINDINGS THAT MAY BE CRITICAL TO PATIENT CARE: Pertinent results were discussed via telephone with Dr. Raphael Balbuena 10/14/2022 4:56 AM CST. The results were acknowledged and understood. Electronically signed by:  Colleen Prakash MD  10/14/2022 5:29 AM CDT Workstation: [id number]  ORIGINAL REPORT EXAM: CT Head Without Intravenous Contrast CLINICAL HISTORY: Neuro deficit, acute, stroke suspected. TECHNIQUE: CT images of the head/brain without intravenous contrast. Axial, coronal, and sagittal images.  This CT exam was performed using one or more of the following dose reduction techniques:  automated exposure control, adjustment of the mA and/or kV according to patient size, and/or use of iterative reconstruction technique. COMPARISON: No relevant prior studies available. FINDINGS: Brain:  No acute hemorrhage. No evidence of acute infarct; MRI more sensitive. Periventricular and subcortical white matter hypodensities are nonspecific but most commonly due to chronic small vessel ischemic changes in a patient of this age. Ventricles:  No hydrocephalus. Bones/joints:  No skull fracture. Soft tissues:  No acute findings. Sinuses:  No paranasal sinus air-fluid level. Mild mucosal thickening. Mastoid air cells:  No mastoid effusion. IMPRESSION: No acute intracranial abnormality. Electronically signed by:  Colleen Prakash MD  10/14/2022 4:49 AM CDT Workstation: [id number]    Echo Saline Bubble? Yes    Result Date: 10/14/2022  · There is no evidence of intracardiac shunting. · The left ventricle is normal in size with mild concentric hypertrophy and hyperdynamic systolic function. · The estimated ejection fraction is 70%. · Indeterminate left ventricular diastolic function. · Severe left atrial enlargement. · Severe mitral regurgitation. · Mild to moderate tricuspid regurgitation. · There is moderate aortic valve stenosis. · Aortic valve area is 1.79  cm2; peak velocity is m/s; mean gradient is 21 mmHg. · Mild aortic regurgitation. · Normal central venous pressure (3 mmHg). · The estimated PA systolic pressure is 14 mmHg.        Assessment:    TIA  Slurred Speech, resolved  87 yo woman with past medical history significant diabetes, CAD, CKD, GERD, hypertension hyperlipidemia presents secondary to complain of dizziness.  Patient feels as if she is off balance and developed an episode of slurred speech. Symptoms improved rapidly upon admission to the ED, so no tPA was given. Admitted for TIA workup.     Stroke workup:  CTH: negative for acute abnormalities   CTA head and neck unable to be obtained due to iodine allergy   MRI brain wo contrast: negative for acute intracranial abnormalityy; chronic changes noted above   MRA head wo contrast: normal   CUS: negative for occlusion or significant stenosis   ECHO: LVEF 70%, no PFO, severe left atrial enlargement   LDL 64.6  Hgb A1c 7.9     Plan:  - Admitted to hospital medicine with q4 hour neuro checks, on telemetry, continuous pulse oximetry  - Diet after eval per SLP.  - recommend aspirin 81 mg, Plavix 75 mg and Lipitor 80 mg daily for stroke prevention.  Dual antiplatelet therapy for 3 weeks followed by monotherapy.   - Will need holter monitor to rule out paroxysmal Afib  - GABY was done showed EF 60%, no PFO, mild left atrial enlargement, normal appearing left atrial appendage, no thrombus speech  - Assessment for rehab with PT/OT/SLP evaluation and treatment.  - BP goal normotensive blood pressure  - Cardiac enzymes and EKG   - Will follow as needed. Please call with questions    Patient was counseled in stroke signs, symptoms and risk factors. She was instructed to call [phone] immediately if experiencing acute neurological deficits.  Patient counseled in healthy diet and physical activity. Importance of medication adherence and follow-up were emphasized.    DVT prophylaxis with chemo/SCD prophylaxis  Extensively  discussed lifestyle modifications as prophylactic measures for stroke prevention including smoking cessation, adequate blood pressure management, healthy diet and regular exercise.    [phone] 7 days from discharge.     Stroke education was provided including stroke risk factors modification and any acute neurological changes including weakness, confusion, visual changes to come straight to the ER.     All questions were answered.                              Thank you kindly for including us in the care of this patient. Please do not hesitate to contact us with any questions.      36 minutes of care time has been spent evaluating with the patient. Time includes chart review not limited to diagnostic imaging, labs, and vitals, patient assessment, discussion with family and nursing, current order evaluations, and new order entries.      Malachi Estrada MD  Neurology/Vascular Neurology

## 2022-10-17 NOTE — ANESTHESIA POSTPROCEDURE EVALUATION
Anesthesia Post Evaluation    Patient: Leslie Tolliver    Procedure(s) Performed: Procedure(s) (LRB):  Transesophageal echo (GABY) intra-procedure log documentation (N/A)    Final Anesthesia Type: MAC      Patient location during evaluation: [facility]  Patient participation: Yes- Able to Participate  Level of consciousness: awake and alert  Post-procedure vital signs: reviewed and stable  Pain management: adequate  Airway patency: patent    PONV status at discharge: No PONV  Anesthetic complications: no      Cardiovascular status: hemodynamically stable  Respiratory status: unassisted, spontaneous ventilation and room air  Hydration status: euvolemic  Follow-up not needed.          Vitals Value Taken Time   /67 10/17/22 1015   Temp 36.3 °C (97.4 °F) 10/17/22 0811   Pulse 72 10/17/22 1015   Resp 20 10/17/22 1015   SpO2 96 % 10/17/22 1015         No case tracking events are documented in the log.      Pain/Jose Score: Jose Score: 10 (10/17/2022  9:46 AM)

## 2022-10-18 ENCOUNTER — TELEPHONE (OUTPATIENT)
Dept: CARDIOLOGY | Facility: CLINIC | Age: 87
End: 2022-10-18
Payer: MEDICARE

## 2022-10-18 VITALS
SYSTOLIC BLOOD PRESSURE: 149 MMHG | TEMPERATURE: 99 F | RESPIRATION RATE: 18 BRPM | HEIGHT: 67 IN | OXYGEN SATURATION: 96 % | WEIGHT: 177.88 LBS | DIASTOLIC BLOOD PRESSURE: 66 MMHG | HEART RATE: 80 BPM | BODY MASS INDEX: 27.92 KG/M2

## 2022-10-18 PROBLEM — G45.9 TIA (TRANSIENT ISCHEMIC ATTACK): Status: RESOLVED | Noted: 2022-10-14 | Resolved: 2022-10-18

## 2022-10-18 PROBLEM — L10.9 BULLOUS PEMPHIGUS: Status: RESOLVED | Noted: 2022-10-15 | Resolved: 2022-10-18

## 2022-10-18 LAB
GLUCOSE SERPL-MCNC: 141 MG/DL (ref 70–110)
GLUCOSE SERPL-MCNC: 260 MG/DL (ref 70–110)

## 2022-10-18 PROCEDURE — 97116 GAIT TRAINING THERAPY: CPT

## 2022-10-18 PROCEDURE — 25000003 PHARM REV CODE 250: Performed by: INTERNAL MEDICINE

## 2022-10-18 PROCEDURE — G0378 HOSPITAL OBSERVATION PER HR: HCPCS

## 2022-10-18 PROCEDURE — 63600175 PHARM REV CODE 636 W HCPCS: Performed by: INTERNAL MEDICINE

## 2022-10-18 RX ORDER — CLOPIDOGREL BISULFATE 75 MG/1
75 TABLET ORAL DAILY
Qty: 21 TABLET | Refills: 0 | Status: SHIPPED | OUTPATIENT
Start: 2022-10-19 | End: 2022-11-11 | Stop reason: SDUPTHER

## 2022-10-18 RX ADMIN — CLOPIDOGREL BISULFATE 75 MG: 75 TABLET, FILM COATED ORAL at 08:10

## 2022-10-18 RX ADMIN — MYCOPHENOLATE MOFETIL 500 MG: 250 CAPSULE ORAL at 08:10

## 2022-10-18 RX ADMIN — ASPIRIN 81 MG CHEWABLE TABLET 81 MG: 81 TABLET CHEWABLE at 08:10

## 2022-10-18 RX ADMIN — INSULIN ASPART 6 UNITS: 100 INJECTION, SOLUTION INTRAVENOUS; SUBCUTANEOUS at 12:10

## 2022-10-18 RX ADMIN — LISINOPRIL 20 MG: 20 TABLET ORAL at 08:10

## 2022-10-18 RX ADMIN — CHOLESTYRAMINE 4 G: 4 POWDER, FOR SUSPENSION ORAL at 08:10

## 2022-10-18 RX ADMIN — PREDNISONE 10 MG: 5 TABLET ORAL at 08:10

## 2022-10-18 RX ADMIN — CARVEDILOL 6.25 MG: 6.25 TABLET, FILM COATED ORAL at 08:10

## 2022-10-18 NOTE — PLAN OF CARE
Problem: Physical Therapy  Goal: Physical Therapy Goal  Description: Goals to be met by: 22     Patient will increase functional independence with mobility by performin. Supine to sit with Supervision  2. Sit to stand transfer with Supervision  3. Bed to chair transfer with Supervision using Rolling Walker  4. Gait  x 300 feet with Supervision using Rolling Walker.         Outcome: Ongoing, Progressing

## 2022-10-18 NOTE — PLAN OF CARE
10/18/22 1340   Final Note   Assessment Type Final Discharge Note   Anticipated Discharge Disposition Home   What phone number can be called within the next 1-3 days to see how you are doing after discharge? [phone]   Hospital Resources/Appts/Education Provided Appointments scheduled and added to AVS   Post-Acute Status   Post-Acute Authorization Other   Other Status No Post-Acute Service Needs   Discharge Delays None known at this time   Patient cleared for discharge from case management standpoint.    Follow up appointments scheduled and added to AVS.    Chart and discharge orders reviewed.  Patient discharged home with no further case management needs.

## 2022-10-18 NOTE — PT/OT/SLP PROGRESS
Physical Therapy Treatment    Patient Name:  Leslie Tolliver   MRN:  [id number]    Recommendations:     Discharge Recommendations:  home   Discharge Equipment Recommendations: none   Barriers to discharge: None    Assessment:     Leslie Tolliver is a 88 y.o. female admitted with a medical diagnosis of TIA (transient ischemic attack).  She presents with the following impairments/functional limitations:  gait instability, impaired skin.    Rehab Prognosis: Fair; patient would benefit from acute skilled PT services to address these deficits and reach maximum level of function.    Recent Surgery: Procedure(s) (LRB):  Transesophageal echo (GABY) intra-procedure log documentation (N/A) 1 Day Post-Op    Plan:     During this hospitalization, patient to be seen 3 x/week to address the identified rehab impairments via gait training, therapeutic activities, therapeutic exercises and progress toward the following goals:    Plan of Care Expires:  11/18/22    Subjective     Chief Complaint: fatigue  Patient/Family Comments/goals: go home  Pain/Comfort:  Pain Rating 1: 0/10      Objective:     Communicated with RN prior to session.  Patient found up in chair with peripheral IV, telemetry upon PT entry to room.     General Precautions: Standard, fall   Orthopedic Precautions:N/A   Braces:    Respiratory Status: Room air     Functional Mobility:  Transfers:     Sit to Stand:  contact guard assistance with rolling walker  Bed to Chair: contact guard assistance with  rolling walker  using  Step Transfer  Gait: x250' w/Rollator and CGA      AM-PAC 6 CLICK MOBILITY  Turning over in bed (including adjusting bedclothes, sheets and blankets)?: 4  Sitting down on and standing up from a chair with arms (e.g., wheelchair, bedside commode, etc.): 3  Moving from lying on back to sitting on the side of the bed?: 3  Moving to and from a bed to a chair (including a wheelchair)?: 3  Need to walk in hospital room?: 3  Climbing 3-5 steps with a  railing?: 3  Basic Mobility Total Score: 19       Treatment & Education:  Pt was educated on the following: call light use, importance of OOB activity and functional mobility to negate the negative effects of prolonged bed rest during this hospitalization, safe transfers/ambulation and discharge planning recommendations/options.     Patient left up in chair with all lines intact, call button in reach, and RN notified..    GOALS:   Multidisciplinary Problems       Physical Therapy Goals          Problem: Physical Therapy    Goal Priority Disciplines Outcome Goal Variances Interventions   Physical Therapy Goal     PT, PT/OT Ongoing, Progressing     Description: Goals to be met by: 22     Patient will increase functional independence with mobility by performin. Supine to sit with Supervision  2. Sit to stand transfer with Supervision  3. Bed to chair transfer with Supervision using Rolling Walker  4. Gait  x 300 feet with Supervision using Rolling Walker.                              Time Tracking:     PT Received On: 10/18/22  PT Start Time: 1206     PT Stop Time: 1217  PT Total Time (min): 11 min     Billable Minutes: Gait Training 11    Treatment Type: Treatment  PT/PTA: PT           10/18/2022

## 2022-10-18 NOTE — PLAN OF CARE
10/18/22 1127   HARGROVE Message   Date HARGROVE was signed 10/18/22   Time HARGROVE was signed 1000      No

## 2022-10-19 NOTE — DISCHARGE SUMMARY
[facility] Medicine  Discharge Summary      Patient Name: Leslie Tolliver  MRN: [id number]  Patient Class: OP- Observation  Admission Date: 10/14/2022  Hospital Length of Stay: 0 days  Discharge Date and Time: 10/18/2022  5:12 PM  Attending Physician: No att. providers found   Discharging Provider: Gm Contreras MD  Primary Care Provider: Chang Christianson MD      HPI:   ED note     [age] year female with past medical history significant diabetes, CAD, CKD, GERD, hypertension hyperlipidemia presents secondary to complain of dizziness.  Patient feels as if she is off balance and developed an episode of slurred speech.  Patient was last seen normal approximately midnight which was greater than 4 hour time frame from symptom onset.  She denies any nausea, vomiting, fever, chills, sweats or changes sensations.  She is otherwise stable and has no other complaints.    10/14/2022  Ms Tolliver has no deficits currently but is very anxious that they could return      Procedure(s) (LRB):  Transesophageal echo (GABY) intra-procedure log documentation (N/A)      Hospital Course:   Patient got admitted with TIA  Extensive imaging studies/GABY were all normal  Later pt was discharged to home with following medications(see below)  Pt will follow up with Neurology MD on outpatient basis    Pt reported muscle pains with statins when she had it  before    Goals of Care Treatment Preferences:  Code Status: Full Code      Consults:   Consults (From admission, onward)          Status Ordering Provider     IP consult to case management/social work  Once        Provider:  (Not yet assigned)    Completed BLAKE RODRIGUEZ     Inpatient consult to Neurology  Once        Provider:  Tracy Casas MD    Completed BLAKE RODRIGUEZ            No new Assessment & Plan notes have been filed under this hospital service since the last note was generated.  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    Specialties: Interventional Cardiology, Cardiology, Cardiovascular Disease  Why: office will contact for follow up appt  Contact information:  [address] [facility]  [address]  [place] [address]  [phone]               Malachi Estrada MD. Go on 10/27/2022.    Specialty: Neurology  Why: 145 pm, [place] office  Contact information:  [address]  [place] [address]  [phone]                           Patient Instructions:      Ambulatory referral/consult to Outpatient Case Management   Referral Priority: Routine Referral Type: Consultation   Referral Reason: Specialty Services Required   Number of Visits Requested: 1           Pending Diagnostic Studies:       None           Medications:  Reconciled Home Medications:      Medication List        START taking these medications      clopidogreL 75 mg tablet  Commonly known as: PLAVIX  Take 1 tablet (75 mg total) by mouth once daily. for 21 days            CONTINUE taking these medications      ACCU-CHEK GUIDE TEST STRIPS Strp  Generic drug: blood sugar diagnostic  300 each by Misc.(Non-Drug; Combo Route) route 3 (three) times daily.     aspirin 81 mg Tab  Take 81 mg by mouth once daily. Every day     blood-glucose meter kit  Accu-chek Marley glucometer check glucose three times daily E11.65     CALCIUM+D ORAL  Take 1 tablet by mouth once daily.     carvediloL 6.25 MG tablet  Commonly known as: COREG  Take 1 tablet (6.25 mg total) by mouth 2 (two) times daily with meals.     DROPLET PEN NEEDLE 31 gauge x 5/16" Ndle  Generic drug: pen needle, diabetic  USE WITH " LANTUS SOLOSTAR PEN AS NEEDED     empagliflozin 10 mg tablet  Commonly known as: JARDIANCE  Take 1 tablet (10 mg total) by mouth once daily.     ketoconazole 2 % cream  Commonly known as: NIZORAL  AAA pannus fold bid     lancets Misc  Commonly known as: ACCU-CHEK SOFTCLIX LANCETS  New machine Guide Me Accuchek     LANTUS SOLOSTAR U-100 INSULIN glargine 100 units/mL SubQ pen  Generic drug: insulin  INJECT 24 UNITS SUBCUTANEOUSLY EVERY EVENING     levocetirizine 5 MG tablet  Commonly known as: XYZAL  Take 5 mg by mouth every evening.     lisinopriL 20 MG tablet  Commonly known as: PRINIVIL,ZESTRIL  TAKE ONE TABLET BY MOUTH ONCE DAILY     metFORMIN 1000 MG tablet  Commonly known as: GLUCOPHAGE  Take 1 tablet (1,000 mg total) by mouth 2 (two) times daily with meals.     multivitamin per tablet  Commonly known as: THERAGRAN  Take 1 tablet by mouth once daily.     mycophenolate 500 mg Tab  Commonly known as: CELLCEPT  One tab PO QAM and 2 tabs PO QHS     predniSONE 10 MG tablet  Commonly known as: DELTASONE  Take 2 tabs PO QAM x 1 week then 1 tab PO QAM x1 week     PREVALITE 4 gram Pwpk  Generic drug: cholestyramine-aspartame  TAKE 1 PACKET (4 G TOTAL) BY MOUTH ONCE DAILY.     triamcinolone acetonide 0.1% 0.1 % cream  Commonly known as: KENALOG  AAA bid            STOP taking these medications      ibuprofen 200 mg Cap              Indwelling Lines/Drains at time of discharge:   Lines/Drains/Airways       None                 Physical Exam  Cardiovascular:      Rate and Rhythm: Normal rate.   Neurological:      General: No focal deficit present.      Mental Status: She is alert.       Time spent on the discharge of patient: 23 minutes         Gm Contreras MD  Department of Hospital Medicine  [facility]

## 2022-10-20 ENCOUNTER — TELEPHONE (OUTPATIENT)
Dept: CARDIOLOGY | Facility: CLINIC | Age: 87
End: 2022-10-20
Payer: MEDICARE

## 2022-10-20 NOTE — TELEPHONE ENCOUNTER
----- Message from Billierula Brooks sent at 10/20/2022  2:37 PM CDT -----  Regarding: change her appt  Contact: pt  Type:  Patient Returning Call    Who Called:  pt  Who Left Message for Patient:  sakshi or Pamela  Does the patient know what this is regarding?:  about her appt, she needs to speak to you about and change it  Best Call Back Number:  [phone]  Additional Information:

## 2022-10-20 NOTE — TELEPHONE ENCOUNTER
----- Message from Edgard Patel sent at 10/20/2022 10:31 AM CDT -----  Type:  Patient Returning Call    Who Called:  Pt  Who Left Message for Patient:  Sarah  Does the patient know what this is regarding?:  appt  Best Call Back Number:  [phone]     Additional Information:  Can't make that date and time due to appt in [place] for auto immune disease.  Please advise -- Thank you

## 2022-10-28 ENCOUNTER — OUTPATIENT CASE MANAGEMENT (OUTPATIENT)
Dept: ADMINISTRATIVE | Facility: OTHER | Age: 87
End: 2022-10-28
Payer: MEDICARE

## 2022-10-28 NOTE — PROGRESS NOTES
Outpatient Care Management  Initial Patient Assessment    Patient: Leslie Tolliver  MRN: [id number]  Date of Service: 10/28/2022  Completed by: Cassie Adhikari RN  Referral Date: 10/17/2022  Program: No programs to display      Reason for Visit   Patient presents with    OPCM Chart Review    OPCM Enrollment Call       Brief Summary:  Leslie Tolliver was referred by Dr. Bynum  for dizziness. Patient qualifies for program based on high risk score 64.1%..   Active problem list, medical, surgical and social history reviewed. Active comorbidities include bullous pemphigoid, HTN, diabetes and TIA. Areas of need identified by patient include TIA.   Complex care plan created with patient/caregiver input.   Admitted to hospital on 10/14/22 with complaints of dizziness and slurred speech. Principal Problem: TIA. Discharged home on 10/18/22. Daughter Litzy lives with her and granddaughter has apartment in the house. She had an appt with neurology on 10/27/22. She needs to continue to take her plavix and Asprin. She has an appt with cardiology on 11/15/22. She denies dizziness. Denies needing home health. She is going to [facility] dermatology for blisters on her lower body. On 11/01/22 she will have her third infusion in [place]. She continues to have a rash and neuropathy in her feet. A1C was 7.9 on 10/14/22. She takes her blood pressure and blood glucose as needed. She is using a walker for ambulation.   Med Rec done  CM ACTION PLAN:  Follow up next week   Ordered Mom's Meals -diabetic diet with low sodium no food allergies to be delivered on 11/02/22  Mail TIA, stroke, low salt diet and advance directive/living will   Refer to  for [employer] benefits, transportation, hearing aides and advance directive/living will     Assessment Documentation     OPCM Initial Assessment    Involvement of Care  Do I have permission to speak with other family members about your care?: Yes (Comment: daughterJohn)  Assessment completed  by: Patient  Identified Areas of Need  Advanced Care Planning: Yes (Comment: refer to SW)  Housing: no  Medication Adherence: No  *Active medication list was reviewed and reconciled with patient and/or caregiver:   Nutrition: no  Lab Adherence: no  Depression: No  Cognitive/Behavioral Health: no  Communication: no  Health Literacy: no  Fall risk?: Yes  Equipment/Supplies/Services: no          Problem List and History     Problems Addressed This Visit    Hypertension: Not identified by patient as current problem  Diabetes: Not identified by patient as current problem  Stroke: Identified as current problem  Anemia: Not identified by patient as current problem  Chronic Kidney Disease: Not identified by patient as current problem  Hyperlipidemia: Not identified by patient as current problem         Reviewed medical and social history with patient and/or caregiver. A complex care plan was discussed and completed today, with input from patient and/or caregiver.    Patient Instructions     Instructions were provided via the "[employer] patient resources and are available for the patient to view on the patient portal, if active.      Follow up in about 1 week (around 11/4/2022) for RN Follow up call.    Todays OPCM Self-Management Care Plan was developed with the patients/caregivers input and was based on identified barriers from todays assessment.  Goals were written today with the patient/caregiver and the patient has agreed to work towards these goals to improve his/her overall well-being. Patient verbalized understanding of the care plan, goals, and all of today's instructions. Encouraged patient/caregiver to communicate with his/her physician and health care team about health conditions and the treatment plan.  Provided my contact information today and encouraged patient/caregiver to call me with any questions as needed.

## 2022-10-28 NOTE — LETTER
October 31, 2022           Dear Ms RuizSharee:     Welcome to [facility]s Complex Care Management Program.  It was a pleasure talking with you today.  My name is Cassie Adhikari RN CCM. I look forward to working with you as your .  My goal is to help you function at the healthiest and highest level possible.  You can contact me directly at [phone].    As an [facility] patient with [employer], some of the services we may be able to provide include:      Development of an individualized care plan with a Registered Nurse    Connection with a    Connection with available resources and services     Coordinate communication among your care team members    Provide coaching and education    Help you understand your doctors treatment plan   Help you obtain information about your insurance coverage.     All services provided by [facility]s Complex Care Managers and other care team members are coordinated with and communicated to your primary care team.    As part of your enrollment, you will be receiving education materials and more information about these services in your [facility] account, by phone or through the mail.  If you do not wish to participate or receive information, please contact our office at [phone].      Sincerely,        Cassie Adhikari RN CCM   [facility]   Out-patient RN Complex Care Manager

## 2022-11-04 ENCOUNTER — OUTPATIENT CASE MANAGEMENT (OUTPATIENT)
Dept: ADMINISTRATIVE | Facility: OTHER | Age: 87
End: 2022-11-04
Payer: MEDICARE

## 2022-11-04 NOTE — PROGRESS NOTES
Outpatient Care Management  Plan of Care Follow Up Visit    Patient: Leslie Tolliver  MRN: [id number]  Date of Service: 11/04/2022  Completed by: Cassie Adhikari RN  Referral Date: 10/17/2022    Reason for Visit   Patient presents with    OPCM RN First Follow-Up Attempt     11/04/22       Brief Summary: 11/04/22-Attempt follow up with patient for outpatient case management. No answer. Left message requesting call back.  RN OPCM 1'st follow up attempt  11/11/22-Ms Nelson could not talk long because she had two friends come over and they brought her banana pudding. She received her mom's meals. She did her assessment with NEIDA Miner . She rescheduled her cardiology appt to 12/15/22. She saw her dermatologist on 11/15/22.   CM ACTION PLAN   : Follow up next week   Review is she received TIA, stroke, low salt diet and advance directive/living will     Patient Summary     Involvement of Care:  Do I have permission to speak with other family members about your care?       Patient Reported Labs & Vitals:  1.  Any Patient Reported Labs & Vitals?   no  2.  Patient Reported Blood Pressure:   n/a  3.  Patient Reported Pulse:   n/a  4.  Patient Reported Weight (Kg):   n/a  5.  Patient Reported Blood Glucose (mg/dl):   n/a    Medical and social history was reviewed with patient and/or caregiver.     Clinical Assessment     Reviewed and provided basic information on available community resources for mental health, transportation, wellness resources, and palliative care programs with patient and/or caregiver.     Complex Care Plan     Care plan was discussed and completed today with input from patient and/or caregiver.    Patient Instructions     Instructions were provided via the [employer] patient resources and are available for the patient to view on the patient portal.    Follow up in about 1 week (around 11/18/2022) for RN Follow up call.    Todays OPCM Self-Management Care Plan was developed with the patients/caregivers input  and was based on identified barriers from todays assessment.  Goals were written today with the patient/caregiver and the patient has agreed to work towards these goals to improve his/her overall well-being. Patient verbalized understanding of the care plan, goals, and all of today's instructions. Encouraged patient/caregiver to communicate with his/her physician and health care team about health conditions and the treatment plan.  Provided my contact information today and encouraged patient/caregiver to call me with any questions as needed.

## 2022-11-07 ENCOUNTER — OUTPATIENT CASE MANAGEMENT (OUTPATIENT)
Dept: ADMINISTRATIVE | Facility: OTHER | Age: 87
End: 2022-11-07
Payer: MEDICARE

## 2022-11-07 NOTE — PROGRESS NOTES
Outpatient Care Management   - High Risk Patient Assessment    Patient: Leslie Tolliver  MRN:  [id number]  Date of Service:  11/7/2022  Completed by:  Isidra Field LCSW  Referral Date: 10/17/2022    Reason for Visit   Patient presents with    [facility] Chart Review    Social Work Assessment - High Risk    Plan Of Care       Brief Summary:  received a referral from [facility] DON Matthews for the following patient identified psycho-social needs of education re: pt's [employer] beneftis, transportation, hearing aids and advance care planning.  [facility]  completed high risk assessment with pt who reports that she lives with her dtr.  Pt does not drive.  States her dtr or g-dtr will drive pt to the grocery or to her drs appts.  Pt states she is independent with ADL's.  Plays cards with her friends twice per month.  This  was unable to look up pt's [employer] plan because pt was a school board employee and those plans are not accessible online.  Provided pt with the phone number to [email or web address] who is the provider for hearing aids for [employer].  Educated pt re: advance care planning and mailed an ACP packet to pt. Care plan was created in collaboration with patient/caregiver input.     Patient Summary     [facility] Social Work Assessment (High Risk)    Involvement of Care  Do I have permission to speak with other family members about your care?: Yes (Comment: pt's Litzy knox)  Assessment completed by: Patient  Cognitive  Which of the following can you state?: Name, Date of birth  Cognitive barriers?: No  General  Marital status:   Current employment status: Retired and not working  Support  Level of Caregiver support: Member independent and does not need caregiver assistance  Support system: Grandchildren, Children  Is the caregiver reporting burnout?: No  Support Barriers?: No  Advanced Care Planning  Do you have any of the following?: None  If yes, do we have a copy?: N/A  If no, would you  like Advance Directive resources?: Yes  Advance Care Planning resources provided?: Yes  Is Advance Care Planning an area of need?: Yes  Financial  Current medical coverage: Medicare Advantage (Comment: pt has [employer])  Financial Support Barriers?: No  Safety  Significant change in functioning?: Due to trauma or new/serious diagnosis  Safety barriers?: No   History  Do you or your spouse currently or formerly serve in the ?: No  Disaster Plan  Established evacuation plan?: Yes (Comment: could leave with dtr)  [place] residence: [facility]  Registered for evacuation?: No  Ability to evacuate: N/A  Mental Health Status  Emotional status: Stable  Have you recenetly lost a loved one?: No  Psychiatric diagnosis: none  Current mental health treatment: No  Would you like mental health resources?: No  Current symptoms: None  Mental/Behavioral/Environmental risk: None  Mental Health Barriers?: No  Addictive Behaviors  Current alcohol consumption?: No  Current substance abuse?: No  Gambling habits?: No  Was the PHQ depression screening completed?: No  Was the IVAN-7 completed?: No         Complex Care Plan     Care plan was discussed and completed today with input from patient and/or caregiver.    Patient Instructions     Follow up in about 1 week (around 11/14/2022).    Todays OPCM Self-Management Care Plan was developed with the patients/caregivers input and was based on identified barriers from todays assessment.  Goals were written today with the patient/caregiver and the patient has agreed to work towards these goals to improve his/her overall well-being. Patient verbalized understanding of the care plan, goals, and all of today's instructions. Encouraged patient/caregiver to communicate with his/her physician and health care team about health conditions and the treatment plan.  Provided my contact information today and encouraged patient/caregiver to call me with any questions as needed.

## 2022-11-10 NOTE — TELEPHONE ENCOUNTER
----- Message from Prabhjot Gomez sent at 11/10/2022  9:12 AM CST -----  Type:  Sooner Appointment Request    Caller is requesting a sooner appointment.  Caller declined first available appointment listed below.  Caller will not accept being placed on the waitlist and is requesting a message be sent to doctor.    Name of Caller:  pt  When is the first available appointment?  Pt had on 11/15 and had to cancel it she have an appt in [place] the same day--please call and advise  Symptoms:  hospital f/u  Best Call Back Number:  [phone] (home)     Additional Information:  thank you

## 2022-11-11 RX ORDER — CLOPIDOGREL BISULFATE 75 MG/1
75 TABLET ORAL DAILY
Qty: 21 TABLET | Refills: 0 | Status: SHIPPED | OUTPATIENT
Start: 2022-11-11 | End: 2022-12-02

## 2022-11-15 ENCOUNTER — OUTPATIENT CASE MANAGEMENT (OUTPATIENT)
Dept: ADMINISTRATIVE | Facility: OTHER | Age: 87
End: 2022-11-15
Payer: MEDICARE

## 2022-11-18 ENCOUNTER — OUTPATIENT CASE MANAGEMENT (OUTPATIENT)
Dept: ADMINISTRATIVE | Facility: OTHER | Age: 87
End: 2022-11-18
Payer: MEDICARE

## 2022-11-18 NOTE — PROGRESS NOTES
11/18/22-Attempt to follow up with patient for outpatient case management. No answer. Left message requesting call back.  RN OPCM 1'st follow up attempt.   12/02/22-Called and spoke to Ms Nelson and care plans reviewed.     Outpatient Care Management  Plan of Care Follow Up Visit    Patient: Leslie Tolliver  MRN: [id number]  Date of Service: 11/18/2022  Completed by: Cassie Adhikari RN  Referral Date: 10/17/2022    Reason for Visit   Patient presents with    OPCM RN First Follow-Up Attempt     11/18/22    Update Plan Of Care     12/02/22       Brief Summary: 11/18/22-Attempt to follow up with patient for outpatient case management. No answer. Left message requesting call back.  RN OPCM 1'st follow up attempt.   12/02/22-Called and spoke to Ms Nelson and care plans reviewed.     Patient Summary     Involvement of Care:  Do I have permission to speak with other family members about your care?       Patient Reported Labs & Vitals:  1.  Any Patient Reported Labs & Vitals?   no  2.  Patient Reported Blood Pressure:   n/a  3.  Patient Reported Pulse:   n/a  4.  Patient Reported Weight (Kg):   n/a  5.  Patient Reported Blood Glucose (mg/dl):   n/a    Medical and social history was reviewed with patient and/or caregiver.     Clinical Assessment     Reviewed and provided basic information on available community resources for mental health, transportation, wellness resources, and palliative care programs with patient and/or caregiver.     Complex Care Plan     Care plan was discussed and completed today with input from patient and/or caregiver.    Patient Instructions     Instructions were provided via the [employer] patient resources and are available for the patient to view on the patient portal.    Follow up in about 14 days (around 12/15/2022) for RN Follow up call.    [facility] OPCM Self-Management Care Plan was developed with the patients/caregivers input and was based on identified barriers from todays assessment.   Goals were written today with the patient/caregiver and the patient has agreed to work towards these goals to improve his/her overall well-being. Patient verbalized understanding of the care plan, goals, and all of today's instructions. Encouraged patient/caregiver to communicate with his/her physician and health care team about health conditions and the treatment plan.  Provided my contact information today and encouraged patient/caregiver to call me with any questions as needed.

## 2022-12-05 ENCOUNTER — OFFICE VISIT (OUTPATIENT)
Dept: FAMILY MEDICINE | Facility: CLINIC | Age: 87
End: 2022-12-05
Attending: FAMILY MEDICINE
Payer: MEDICARE

## 2022-12-05 VITALS
RESPIRATION RATE: 17 BRPM | HEART RATE: 94 BPM | OXYGEN SATURATION: 98 % | WEIGHT: 178.44 LBS | BODY MASS INDEX: 28.01 KG/M2 | TEMPERATURE: 98 F | SYSTOLIC BLOOD PRESSURE: 131 MMHG | HEIGHT: 67 IN | DIASTOLIC BLOOD PRESSURE: 78 MMHG

## 2022-12-05 DIAGNOSIS — E11.69 HYPERLIPIDEMIA ASSOCIATED WITH TYPE 2 DIABETES MELLITUS: ICD-10-CM

## 2022-12-05 DIAGNOSIS — E11.42 TYPE 2 DIABETES MELLITUS WITH DIABETIC POLYNEUROPATHY, WITH LONG-TERM CURRENT USE OF INSULIN: ICD-10-CM

## 2022-12-05 DIAGNOSIS — E03.9 ACQUIRED HYPOTHYROIDISM: ICD-10-CM

## 2022-12-05 DIAGNOSIS — Z79.4 TYPE 2 DIABETES MELLITUS WITH DIABETIC POLYNEUROPATHY, WITH LONG-TERM CURRENT USE OF INSULIN: ICD-10-CM

## 2022-12-05 DIAGNOSIS — S20.211A CONTUSION OF RIGHT CHEST WALL, INITIAL ENCOUNTER: ICD-10-CM

## 2022-12-05 DIAGNOSIS — I15.2 HYPERTENSION ASSOCIATED WITH DIABETES: ICD-10-CM

## 2022-12-05 DIAGNOSIS — L12.0 BULLOUS PEMPHIGOID: Primary | ICD-10-CM

## 2022-12-05 DIAGNOSIS — E78.5 HYPERLIPIDEMIA ASSOCIATED WITH TYPE 2 DIABETES MELLITUS: ICD-10-CM

## 2022-12-05 DIAGNOSIS — E11.59 HYPERTENSION ASSOCIATED WITH DIABETES: ICD-10-CM

## 2022-12-05 DIAGNOSIS — N18.32 STAGE 3B CHRONIC KIDNEY DISEASE: ICD-10-CM

## 2022-12-05 PROCEDURE — 3288F FALL RISK ASSESSMENT DOCD: CPT | Mod: CPTII,S$GLB,, | Performed by: FAMILY MEDICINE

## 2022-12-05 PROCEDURE — 99999 PR PBB SHADOW E&M-EST. PATIENT-LVL V: CPT | Mod: PBBFAC,,, | Performed by: FAMILY MEDICINE

## 2022-12-05 PROCEDURE — 99214 PR OFFICE/OUTPT VISIT, EST, LEVL IV, 30-39 MIN: ICD-10-PCS | Mod: 25,S$GLB,, | Performed by: FAMILY MEDICINE

## 2022-12-05 PROCEDURE — 1160F PR REVIEW ALL MEDS BY PRESCRIBER/CLIN PHARMACIST DOCUMENTED: ICD-10-PCS | Mod: CPTII,S$GLB,, | Performed by: FAMILY MEDICINE

## 2022-12-05 PROCEDURE — 1159F MED LIST DOCD IN RCRD: CPT | Mod: CPTII,S$GLB,, | Performed by: FAMILY MEDICINE

## 2022-12-05 PROCEDURE — 90694 VACC AIIV4 NO PRSRV 0.5ML IM: CPT | Mod: S$GLB,,, | Performed by: FAMILY MEDICINE

## 2022-12-05 PROCEDURE — 99214 OFFICE O/P EST MOD 30 MIN: CPT | Mod: 25,S$GLB,, | Performed by: FAMILY MEDICINE

## 2022-12-05 PROCEDURE — 3072F LOW RISK FOR RETINOPATHY: CPT | Mod: CPTII,S$GLB,, | Performed by: FAMILY MEDICINE

## 2022-12-05 PROCEDURE — G0008 FLU VACCINE - QUADRIVALENT - ADJUVANTED: ICD-10-PCS | Mod: S$GLB,,, | Performed by: FAMILY MEDICINE

## 2022-12-05 PROCEDURE — G0008 ADMIN INFLUENZA VIRUS VAC: HCPCS | Mod: S$GLB,,, | Performed by: FAMILY MEDICINE

## 2022-12-05 PROCEDURE — 1101F PT FALLS ASSESS-DOCD LE1/YR: CPT | Mod: CPTII,S$GLB,, | Performed by: FAMILY MEDICINE

## 2022-12-05 PROCEDURE — 99999 PR PBB SHADOW E&M-EST. PATIENT-LVL V: ICD-10-PCS | Mod: PBBFAC,,, | Performed by: FAMILY MEDICINE

## 2022-12-05 PROCEDURE — 3288F PR FALLS RISK ASSESSMENT DOCUMENTED: ICD-10-PCS | Mod: CPTII,S$GLB,, | Performed by: FAMILY MEDICINE

## 2022-12-05 PROCEDURE — 1101F PR PT FALLS ASSESS DOC 0-1 FALLS W/OUT INJ PAST YR: ICD-10-PCS | Mod: CPTII,S$GLB,, | Performed by: FAMILY MEDICINE

## 2022-12-05 PROCEDURE — 1160F RVW MEDS BY RX/DR IN RCRD: CPT | Mod: CPTII,S$GLB,, | Performed by: FAMILY MEDICINE

## 2022-12-05 PROCEDURE — 1125F AMNT PAIN NOTED PAIN PRSNT: CPT | Mod: CPTII,S$GLB,, | Performed by: FAMILY MEDICINE

## 2022-12-05 PROCEDURE — 1125F PR PAIN SEVERITY QUANTIFIED, PAIN PRESENT: ICD-10-PCS | Mod: CPTII,S$GLB,, | Performed by: FAMILY MEDICINE

## 2022-12-05 PROCEDURE — 3072F PR LOW RISK FOR RETINOPATHY: ICD-10-PCS | Mod: CPTII,S$GLB,, | Performed by: FAMILY MEDICINE

## 2022-12-05 PROCEDURE — 1159F PR MEDICATION LIST DOCUMENTED IN MEDICAL RECORD: ICD-10-PCS | Mod: CPTII,S$GLB,, | Performed by: FAMILY MEDICINE

## 2022-12-05 PROCEDURE — 90694 FLU VACCINE - QUADRIVALENT - ADJUVANTED: ICD-10-PCS | Mod: S$GLB,,, | Performed by: FAMILY MEDICINE

## 2022-12-05 NOTE — PROGRESS NOTES
Subjective:       Patient ID: Leslie Tolliver is a 88 y.o. female.    Chief Complaint: Hypertension (Pt states that she is here for her 3 mo fu )    88-year-old female coming in for three month follow-up.  She is being treated by Dr. Mills for bullous pemphigoid with some improvement particularly on her right lower extremity while the left is still heavily involved.  Five days ago she fell backwards in her kitchen striking a dining chair that she pushed into another chair in hitting her right lower chest wall at about the posterior axillary line.  She has had painful inspiration although it is starting to improve and she handles the pain with Tylenol.  She has no shortness of breath and no hemoptysis.  Her blood sugar has been doing well and her recent A1c was 7.9.  Her blood pressure is well controlled.    She brought in copies of her living will and healthcare power of  and had questions regarding the choice between her options on feeding and hydration.  I explained the differences and she chose to accept nutritional and hydration support.    Past Medical History:  07/24/2019: Anemia due to stage 3 chronic kidney disease  No date: Arthritis  8/29/2022: Bullous pemphigoid  07/24/2019: Chronic bilateral low back pain without sciatica  07/24/2019: CKD (chronic kidney disease) stage 3, GFR 30-59 ml/min  No date: Colon polyps  No date: Coronary artery disease  No date: Diabetes mellitus type II  No date: Diabetes with neurologic complications  No date: GERD (gastroesophageal reflux disease)  No date: Hyperlipidemia  No date: Hypertension  No date: Hypothyroidism  No date: Type 2 diabetes mellitus    Past Surgical History:  No date: ADENOIDECTOMY  No date: AORTIC VALVE REPLACEMENT  20 yrs ago: BREAST BIOPSY; Right      Comment:  benign  No date: CARDIAC SURGERY  No date: CHOLECYSTECTOMY  5-: COLONOSCOPY      Comment:  Dr Holly, 5 year recheck  3/25/2021: EPIDURAL STEROID INJECTION; N/A       "Comment:  Procedure: Injection, Steroid, Epidural L3-4;  Surgeon:                Sky Love MD;  Location: [facility] OR;  Service: Pain                Management;  Laterality: N/A;  Injection, Steroid,                Epidural L3-4  5/20/2021: EPIDURAL STEROID INJECTION; N/A      Comment:  Procedure: Injection, Steroid, Epidural L3-4;  Surgeon:                Sky Love MD;  Location: [facility] OR;  Service: Pain                Management;  Laterality: N/A;  Injection, Steroid,                Epidural L3-4  6/4/2020: ESOPHAGOGASTRODUODENOSCOPY; N/A      Comment:  Procedure: EGD (ESOPHAGOGASTRODUODENOSCOPY);  Surgeon:                Michael Nugent III, MD;  Location: [facility];                 Service: Endoscopy;  Laterality: N/A;  No date: HEMORRHOID SURGERY  No date: HYSTERECTOMY  No date: OOPHORECTOMY  No date: TONSILLECTOMY    Current Outpatient Medications on File Prior to Visit:  aspirin 81 mg Tab, Take 81 mg by mouth once daily. Every day, Disp: 30 tablet, Rfl: 0  blood sugar diagnostic (ACCU-CHEK GUIDE TEST STRIPS) Strp, 300 each by Misc.(Non-Drug; Combo Route) route 3 (three) times daily., Disp: 300 each, Rfl: 3  blood-glucose meter kit, Accu-chek Marley glucometer check glucose three times daily E11.65, Disp: 1 each, Rfl: 0  calcium carbonate/vitamin D3 (CALCIUM+D ORAL), Take 1 tablet by mouth once daily., Disp: , Rfl:   carvediloL (COREG) 6.25 MG tablet, Take 1 tablet (6.25 mg total) by mouth 2 (two) times daily with meals., Disp: 180 tablet, Rfl: 3  DROPLET PEN NEEDLE 31 gauge x 5/16" Ndle, USE WITH LANTUS SOLOSTAR PEN AS NEEDED, Disp: 100 each, Rfl: 3  empagliflozin (JARDIANCE) 10 mg tablet, Take 1 tablet (10 mg total) by mouth once daily., Disp: 30 tablet, Rfl: 6  ketoconazole (NIZORAL) 2 % cream, AAA pannus fold bid, Disp: 60 g, Rfl: 3  lancets (ACCU-CHEK SOFTCLIX LANCETS) Misc, New machine Guide Me Accuchek, Disp: 300 each, Rfl: 3  LANTUS SOLOSTAR U-100 INSULIN glargine 100 units/mL SubQ pen, INJECT 24 " UNITS SUBCUTANEOUSLY EVERY EVENING, Disp: 30 mL, Rfl: 1  lisinopriL (PRINIVIL,ZESTRIL) 20 MG tablet, TAKE ONE TABLET BY MOUTH ONCE DAILY, Disp: 90 tablet, Rfl: 2  metFORMIN (GLUCOPHAGE) 1000 MG tablet, Take 1 tablet (1,000 mg total) by mouth 2 (two) times daily with meals., Disp: 180 tablet, Rfl: 3  multivitamin (THERAGRAN) per tablet, Take 1 tablet by mouth once daily., Disp: , Rfl:   mycophenolate (CELLCEPT) 500 mg Tab, One tab PO QAM and 2 tabs PO QHS, Disp: 90 tablet, Rfl: 1  PREVALITE 4 gram PwPk, TAKE 1 PACKET (4 G TOTAL) BY MOUTH ONCE DAILY., Disp: 30 packet, Rfl: 11  triamcinolone acetonide 0.1% (KENALOG) 0.1 % cream, AAA bid, Disp: 454 g, Rfl: 3  clopidogreL (PLAVIX) 75 mg tablet, Take 1 tablet (75 mg total) by mouth once daily. for 21 days (Patient not taking: Reported on 12/5/2022), Disp: 21 tablet, Rfl: 0  levocetirizine (XYZAL) 5 MG tablet, Take 5 mg by mouth every evening., Disp: , Rfl:   predniSONE (DELTASONE) 10 MG tablet, Take 2 tabs PO QAM x 1 week then 1 tab PO QAM x1 week (Patient not taking: Reported on 12/5/2022), Disp: 21 tablet, Rfl: 0    No current facility-administered medications on file prior to visit.        Review of Systems   Constitutional:  Negative for chills, diaphoresis and fever.   Respiratory:  Negative for chest tightness and shortness of breath.    Cardiovascular:  Positive for chest pain (Contusion right lower posterior chest). Negative for palpitations.   Gastrointestinal:  Negative for nausea and vomiting.     Objective:      Physical Exam  Vitals and nursing note reviewed.   Constitutional:       Appearance: Normal appearance. She is obese.      Comments: Good BP control  Normal weight for age with a BMI of 27.9 she is up 3 lb from her August 29, 2012   HENT:      Head: Normocephalic and atraumatic.   Cardiovascular:      Rate and Rhythm: Normal rate and regular rhythm.      Heart sounds: Normal heart sounds.   Pulmonary:      Effort: Pulmonary effort is normal. No  respiratory distress.      Breath sounds: Normal breath sounds. No stridor. No wheezing or rhonchi.   Chest:      Chest wall: No tenderness.   Skin:     Findings: Bruising and erythema present.          Neurological:      General: No focal deficit present.      Mental Status: She is alert and oriented to person, place, and time. Mental status is at baseline.   Psychiatric:         Mood and Affect: Mood normal.         Behavior: Behavior normal.         Thought Content: Thought content normal.         Judgment: Judgment normal.       Assessment:       1. Bullous pemphigoid    2. Contusion of right chest wall, initial encounter    3. Hypertension associated with diabetes    4. Hyperlipidemia associated with type 2 diabetes mellitus    5. Stage 3b chronic kidney disease    6. Type 2 diabetes mellitus with diabetic polyneuropathy, with long-term current use of insulin    7. Acquired hypothyroidism    8. BMI 27.0-27.9,adult          Plan:       1. Bullous pemphigoid  Improving, followed by Dr. Mills in dermatology    2. Contusion of right chest wall, initial encounter  Doubt rib fracture, no pulmonary impairment, improving pain    3. Hypertension associated with diabetes  Good control, no changes needed    4. Hyperlipidemia associated with type 2 diabetes mellitus  Lab Results   Component Value Date    CHOL 126 10/14/2022    CHOL 132 03/11/2022    CHOL 130 02/25/2021     Lab Results   Component Value Date    HDL 33 (L) 10/14/2022    HDL 33 (L) 03/11/2022    HDL 31 (L) 02/25/2021     Lab Results   Component Value Date    LDLCALC 64.6 10/14/2022    LDLCALC 54.4 (L) 03/11/2022    LDLCALC 37.2 (L) 02/25/2021     Lab Results   Component Value Date    TRIG 142 10/14/2022    TRIG 223 (H) 03/11/2022    TRIG 309 (H) 02/25/2021     Lab Results   Component Value Date    CHOLHDL 26.2 10/14/2022    CHOLHDL 25.0 03/11/2022    CHOLHDL 23.8 02/25/2021   Good control, no changes needed      5. Stage 3b chronic kidney disease    BMP  Lab Results   Component Value Date     10/16/2022    K 4.1 10/16/2022     10/16/2022    CO2 29 10/16/2022    BUN 21 10/16/2022    CREATININE 0.8 10/16/2022    CALCIUM 8.5 (L) 10/16/2022    ANIONGAP 8 10/16/2022    EGFRNORACEVR >60.0 10/16/2022       6. Type 2 diabetes mellitus with diabetic polyneuropathy, with long-term current use of insulin  Lab Results   Component Value Date    HGBA1C 7.9 (H) 10/14/2022     Adequate control no changes needed    7. Acquired hypothyroidism  Lab Results   Component Value Date    TSH 1.270 10/14/2022     Adequate control, no changes needed    8. BMI 27.0-27.9,adult  Good weight for age, no changes needed    Living will and power of  for medical purposes forwarded to scanning

## 2022-12-15 ENCOUNTER — TELEPHONE (OUTPATIENT)
Dept: CARDIOLOGY | Facility: CLINIC | Age: 87
End: 2022-12-15
Payer: MEDICARE

## 2022-12-15 ENCOUNTER — OUTPATIENT CASE MANAGEMENT (OUTPATIENT)
Dept: ADMINISTRATIVE | Facility: OTHER | Age: 87
End: 2022-12-15
Payer: MEDICARE

## 2022-12-15 NOTE — PROGRESS NOTES
Outpatient Care Management  Plan of Care Follow Up Visit    Patient: Leslie Tolliver  MRN: [id number]  Date of Service: 12/15/2022  Completed by: Cassie Adhikari RN  Referral Date: 10/17/2022    Reason for Visit   Patient presents with    OPCM RN First Follow-Up Attempt     12/15/22       Brief Summary: 12/15/22-Attempt follow up with patient for outpatient case management. No answer. Left message requesting call back.  RN OPCM 1'st follow up attempt.   12/23/22-Attempt follow up with patient for outpatient case management. No answer. Left message requesting call back.  RN OPCM 2'nd follow up attempt. Letter mailed.   01/13/23-Called and spoke to Ms Nelson. Appt with dermatology on 01/31/23-  Next Steps: Follow up

## 2022-12-15 NOTE — LETTER
December 23, 2022    Leslie RADHA Sharee  [address] [address]             [facility] [address] [place] [address] Dear Ms Tolliver:    I work with [facility]'s Outpatient Case Management Department. I have been unsuccessful at reaching you to follow-up to see how you have been doing. Please call me back at your earliest convenience to discuss your health care needs.      I can be reached at [phone]  from 8:00AM to 4:30 PM on Monday thru Friday. [facility] is a program offered through [facility] where a nurse is available 24/7 to answer questions or provide medical advice, their number is [phone].    Thanks,  Cassie Adhikari RN [facility]   Outpatient Case Management

## 2022-12-15 NOTE — TELEPHONE ENCOUNTER
----- Message from Niurka Hicks sent at 12/15/2022  9:57 AM CST -----  Contact: called at [phone]  Type: Needs Medical Advice  Who Called:  Pt  Best Call Back Number: [phone]  Additional Information: Pt is calling to inform the  That she can't make it to her appt today for 3pm because she no longer has a ride. Pt would like to know if can she reschedule to possible a Monday. Either way she would really like to make it to an appt without having to cancel it. Please call back and advise.

## 2023-01-03 ENCOUNTER — HOSPITAL ENCOUNTER (EMERGENCY)
Facility: HOSPITAL | Age: 88
Discharge: HOME | End: 2023-01-03
Attending: EMERGENCY MEDICINE
Payer: MEDICARE

## 2023-01-03 VITALS
RESPIRATION RATE: 18 BRPM | TEMPERATURE: 98 F | BODY MASS INDEX: 28.25 KG/M2 | DIASTOLIC BLOOD PRESSURE: 60 MMHG | HEART RATE: 74 BPM | WEIGHT: 180 LBS | HEIGHT: 67 IN | OXYGEN SATURATION: 97 % | SYSTOLIC BLOOD PRESSURE: 126 MMHG

## 2023-01-03 DIAGNOSIS — N39.0 URINARY TRACT INFECTION WITHOUT HEMATURIA, SITE UNSPECIFIED: ICD-10-CM

## 2023-01-03 DIAGNOSIS — R11.2 NAUSEA & VOMITING: Primary | ICD-10-CM

## 2023-01-03 DIAGNOSIS — R05.9 COUGH: ICD-10-CM

## 2023-01-03 LAB
ALBUMIN SERPL BCP-MCNC: 4.1 G/DL (ref 3.5–5.2)
ALP SERPL-CCNC: 84 U/L (ref 55–135)
ALT SERPL W/O P-5'-P-CCNC: 17 U/L (ref 10–44)
ANION GAP SERPL CALC-SCNC: 5 MMOL/L (ref 8–16)
AST SERPL-CCNC: 24 U/L (ref 10–40)
BACTERIA #/AREA URNS HPF: NEGATIVE /HPF
BASOPHILS # BLD AUTO: 0.04 K/UL (ref 0–0.2)
BASOPHILS NFR BLD: 0.7 % (ref 0–1.9)
BILIRUB SERPL-MCNC: 1.1 MG/DL (ref 0.1–1)
BILIRUB UR QL STRIP: NEGATIVE
BUN SERPL-MCNC: 27 MG/DL (ref 8–23)
CALCIUM SERPL-MCNC: 9.5 MG/DL (ref 8.7–10.5)
CHLORIDE SERPL-SCNC: 102 MMOL/L (ref 95–110)
CLARITY UR: CLEAR
CO2 SERPL-SCNC: 31 MMOL/L (ref 23–29)
COLOR UR: YELLOW
CREAT SERPL-MCNC: 0.8 MG/DL (ref 0.5–1.4)
DIFFERENTIAL METHOD: ABNORMAL
EOSINOPHIL # BLD AUTO: 0.2 K/UL (ref 0–0.5)
EOSINOPHIL NFR BLD: 3 % (ref 0–8)
ERYTHROCYTE [DISTWIDTH] IN BLOOD BY AUTOMATED COUNT: 14.1 % (ref 11.5–14.5)
EST. GFR  (NO RACE VARIABLE): >60 ML/MIN/1.73 M^2
GLUCOSE SERPL-MCNC: 209 MG/DL (ref 70–110)
GLUCOSE UR QL STRIP: ABNORMAL
HCT VFR BLD AUTO: 40.2 % (ref 37–48.5)
HGB BLD-MCNC: 12.8 G/DL (ref 12–16)
HGB UR QL STRIP: NEGATIVE
HYALINE CASTS #/AREA URNS LPF: 3 /LPF
IMM GRANULOCYTES # BLD AUTO: 0.08 K/UL (ref 0–0.04)
IMM GRANULOCYTES NFR BLD AUTO: 1.4 % (ref 0–0.5)
INFLUENZA A, MOLECULAR: NEGATIVE
INFLUENZA B, MOLECULAR: NEGATIVE
KETONES UR QL STRIP: NEGATIVE
LEUKOCYTE ESTERASE UR QL STRIP: ABNORMAL
LIPASE SERPL-CCNC: 35 U/L (ref 4–60)
LYMPHOCYTES # BLD AUTO: 0.5 K/UL (ref 1–4.8)
LYMPHOCYTES NFR BLD: 8.2 % (ref 18–48)
MCH RBC QN AUTO: 31.4 PG (ref 27–31)
MCHC RBC AUTO-ENTMCNC: 31.8 G/DL (ref 32–36)
MCV RBC AUTO: 99 FL (ref 82–98)
MICROSCOPIC COMMENT: ABNORMAL
MONOCYTES # BLD AUTO: 0.5 K/UL (ref 0.3–1)
MONOCYTES NFR BLD: 8 % (ref 4–15)
NEUTROPHILS # BLD AUTO: 4.5 K/UL (ref 1.8–7.7)
NEUTROPHILS NFR BLD: 78.7 % (ref 38–73)
NITRITE UR QL STRIP: NEGATIVE
NRBC BLD-RTO: 0 /100 WBC
PH UR STRIP: 6 [PH] (ref 5–8)
PLATELET # BLD AUTO: 217 K/UL (ref 150–450)
PMV BLD AUTO: 11.1 FL (ref 9.2–12.9)
POTASSIUM SERPL-SCNC: 4.6 MMOL/L (ref 3.5–5.1)
PROT SERPL-MCNC: 7.4 G/DL (ref 6–8.4)
PROT UR QL STRIP: ABNORMAL
RBC # BLD AUTO: 4.07 M/UL (ref 4–5.4)
RBC #/AREA URNS HPF: 1 /HPF (ref 0–4)
SARS-COV-2 RDRP RESP QL NAA+PROBE: NEGATIVE
SODIUM SERPL-SCNC: 138 MMOL/L (ref 136–145)
SP GR UR STRIP: 1.02 (ref 1–1.03)
SPECIMEN SOURCE: NORMAL
SQUAMOUS #/AREA URNS HPF: 1 /HPF
TROPONIN I SERPL HS-MCNC: 6 PG/ML (ref 0–14.9)
URN SPEC COLLECT METH UR: ABNORMAL
UROBILINOGEN UR STRIP-ACNC: NEGATIVE EU/DL
WBC # BLD AUTO: 5.74 K/UL (ref 3.9–12.7)
WBC #/AREA URNS HPF: 12 /HPF (ref 0–5)
YEAST URNS QL MICRO: ABNORMAL

## 2023-01-03 PROCEDURE — 25000003 PHARM REV CODE 250: Performed by: EMERGENCY MEDICINE

## 2023-01-03 PROCEDURE — 93010 ELECTROCARDIOGRAM REPORT: CPT | Mod: ,,, | Performed by: INTERNAL MEDICINE

## 2023-01-03 PROCEDURE — 85025 COMPLETE CBC W/AUTO DIFF WBC: CPT | Performed by: EMERGENCY MEDICINE

## 2023-01-03 PROCEDURE — 87502 INFLUENZA DNA AMP PROBE: CPT | Performed by: EMERGENCY MEDICINE

## 2023-01-03 PROCEDURE — U0002 COVID-19 LAB TEST NON-CDC: HCPCS | Performed by: EMERGENCY MEDICINE

## 2023-01-03 PROCEDURE — 87086 URINE CULTURE/COLONY COUNT: CPT | Performed by: EMERGENCY MEDICINE

## 2023-01-03 PROCEDURE — 83690 ASSAY OF LIPASE: CPT | Performed by: EMERGENCY MEDICINE

## 2023-01-03 PROCEDURE — 93005 ELECTROCARDIOGRAM TRACING: CPT | Performed by: INTERNAL MEDICINE

## 2023-01-03 PROCEDURE — 81001 URINALYSIS AUTO W/SCOPE: CPT | Performed by: EMERGENCY MEDICINE

## 2023-01-03 PROCEDURE — 96360 HYDRATION IV INFUSION INIT: CPT

## 2023-01-03 PROCEDURE — 80053 COMPREHEN METABOLIC PANEL: CPT | Performed by: EMERGENCY MEDICINE

## 2023-01-03 PROCEDURE — 99284 EMERGENCY DEPT VISIT MOD MDM: CPT | Mod: 25

## 2023-01-03 PROCEDURE — 84484 ASSAY OF TROPONIN QUANT: CPT | Performed by: EMERGENCY MEDICINE

## 2023-01-03 PROCEDURE — 93010 EKG 12-LEAD: ICD-10-PCS | Mod: ,,, | Performed by: INTERNAL MEDICINE

## 2023-01-03 RX ORDER — AMOXICILLIN AND CLAVULANATE POTASSIUM 875; 125 MG/1; MG/1
1 TABLET, FILM COATED ORAL 2 TIMES DAILY
Qty: 14 TABLET | Refills: 0 | Status: SHIPPED | OUTPATIENT
Start: 2023-01-03 | End: 2023-01-03 | Stop reason: SDUPTHER

## 2023-01-03 RX ORDER — ONDANSETRON 4 MG/1
4 TABLET, ORALLY DISINTEGRATING ORAL EVERY 6 HOURS PRN
Qty: 12 TABLET | Refills: 0 | Status: SHIPPED | OUTPATIENT
Start: 2023-01-03 | End: 2023-11-16

## 2023-01-03 RX ORDER — AMOXICILLIN AND CLAVULANATE POTASSIUM 875; 125 MG/1; MG/1
1 TABLET, FILM COATED ORAL 2 TIMES DAILY
Qty: 14 TABLET | Refills: 0 | Status: SHIPPED | OUTPATIENT
Start: 2023-01-03 | End: 2023-08-30

## 2023-01-03 RX ADMIN — SODIUM CHLORIDE 1000 ML: 0.9 INJECTION, SOLUTION INTRAVENOUS at 03:01

## 2023-01-03 NOTE — ED PROVIDER NOTES
Encounter Date: 1/3/2023       History     Chief Complaint   Patient presents with    Emesis     Pt presents to ed complaining of nausea and vomiting x2 days.      88-year-old female presents complaining of nausea and vomiting, patient reports that she may have eaten something bad patient reports symptoms present for the past 2 days patient reports a little bit of lightheadedness on standing but no current symptoms patient denies abdominal pain patient has no sick contacts that she is aware of patient has a history of bullous pemphigoid involving her lower extremities, patient has a history of diabetes, hypothyroidism, hyperlipidemia.    Review of patient's allergies indicates:   Allergen Reactions    Fenofibric acid (choline)      Other reaction(s): Vomiting    Iodine      Other reaction(s): lips swollen ivp dye    Rosuvastatin      Other reaction(s): muscle pain     Past Medical History:   Diagnosis Date    Anemia due to stage 3 chronic kidney disease 07/24/2019    Arthritis     Bullous pemphigoid 8/29/2022    Chronic bilateral low back pain without sciatica 07/24/2019    CKD (chronic kidney disease) stage 3, GFR 30-59 ml/min 07/24/2019    Colon polyps     Coronary artery disease     Diabetes mellitus type II     Diabetes with neurologic complications     GERD (gastroesophageal reflux disease)     Hyperlipidemia     Hypertension     Hypothyroidism     Type 2 diabetes mellitus      Past Surgical History:   Procedure Laterality Date    ADENOIDECTOMY      AORTIC VALVE REPLACEMENT      BREAST BIOPSY Right 20 yrs ago    benign    CARDIAC SURGERY      CHOLECYSTECTOMY      COLONOSCOPY  5-    Dr Holly, 5 year recheck    EPIDURAL STEROID INJECTION N/A 3/25/2021    Procedure: Injection, Steroid, Epidural L3-4;  Surgeon: Sky Love MD;  Location: [facility] OR;  Service: Pain Management;  Laterality: N/A;  Injection, Steroid, Epidural L3-4    EPIDURAL STEROID INJECTION N/A 5/20/2021    Procedure: Injection, Steroid,  Epidural L3-4;  Surgeon: Sky Love MD;  Location: [facility] OR;  Service: Pain Management;  Laterality: N/A;  Injection, Steroid, Epidural L3-4    ESOPHAGOGASTRODUODENOSCOPY N/A 2020    Procedure: EGD (ESOPHAGOGASTRODUODENOSCOPY);  Surgeon: Michael Nugent III, MD;  Location: [facility];  Service: Endoscopy;  Laterality: N/A;    HEMORRHOID SURGERY      HYSTERECTOMY      OOPHORECTOMY      TONSILLECTOMY       Family History   Problem Relation Age of Onset    Diabetes Mother     Heart disease Mother     Glaucoma Mother     Cancer Father         lung cancer    Early death Son         brain tumor age 3    Diabetes Brother     No Known Problems Daughter     Early death Son         MVA 25    Macular degeneration Neg Hx     Retinal detachment Neg Hx      Social History     Tobacco Use    Smoking status: Former     Packs/day: 0.25     Years: 15.00     Pack years: 3.75     Types: Cigarettes     Quit date: 3/30/1974     Years since quittin.8    Smokeless tobacco: Never   Substance Use Topics    Alcohol use: No    Drug use: No     Review of Systems   Constitutional:  Negative for fever.   HENT:  Negative for congestion, rhinorrhea, sore throat and trouble swallowing.    Eyes:  Negative for visual disturbance.   Respiratory:  Negative for cough, chest tightness, shortness of breath and wheezing.    Cardiovascular:  Negative for chest pain, palpitations and leg swelling.   Gastrointestinal:  Positive for nausea and vomiting. Negative for abdominal distention, abdominal pain, constipation and diarrhea.   Genitourinary:  Negative for difficulty urinating, dysuria, flank pain and frequency.   Musculoskeletal:  Negative for arthralgias, back pain, joint swelling and neck pain.   Skin:  Negative for color change and rash.   Neurological:  Positive for light-headedness. Negative for dizziness, syncope, speech difficulty, weakness, numbness and headaches.   All other systems reviewed and are negative.    Physical Exam      Initial Vitals [01/03/23 1427]   BP Pulse Resp Temp SpO2   131/62 78 20 98.3 °F (36.8 °C) 98 %      MAP       --         Physical Exam    Nursing note and vitals reviewed.  Constitutional: She appears well-developed and well-nourished. She is not diaphoretic. No distress.   HENT:   Head: Normocephalic and atraumatic.   Right Ear: External ear normal.   Left Ear: External ear normal.   Nose: Nose normal.   Mouth/Throat: Oropharynx is clear and moist. No oropharyngeal exudate.   Eyes: Conjunctivae and EOM are normal. Pupils are equal, round, and reactive to light. Right eye exhibits no discharge. Left eye exhibits no discharge. No scleral icterus.   Neck: Neck supple. No thyromegaly present. No tracheal deviation present. No JVD present.   Normal range of motion.  Cardiovascular:  Normal rate, regular rhythm, normal heart sounds and intact distal pulses.     Exam reveals no gallop and no friction rub.       No murmur heard.  Pulmonary/Chest: Breath sounds normal. No stridor. No respiratory distress. She has no wheezes. She has no rhonchi. She has no rales. She exhibits no tenderness.   Abdominal: Abdomen is soft. Bowel sounds are normal. She exhibits no distension and no mass. There is no abdominal tenderness. There is no rebound and no guarding.   Musculoskeletal:         General: No tenderness or edema. Normal range of motion.      Cervical back: Normal range of motion and neck supple.     Lymphadenopathy:     She has no cervical adenopathy.   Neurological: She is alert and oriented to person, place, and time. She has normal strength. No cranial nerve deficit or sensory deficit.   Skin: Skin is warm and dry. No rash and no abscess noted. No erythema. No pallor.       ED Course   Procedures  Labs Reviewed   CBC W/ AUTO DIFFERENTIAL - Abnormal; Notable for the following components:       Result Value    MCV 99 (*)     MCH 31.4 (*)     MCHC 31.8 (*)     Immature Granulocytes 1.4 (*)     Immature Grans (Abs) 0.08  (*)     Lymph # 0.5 (*)     Gran % 78.7 (*)     Lymph % 8.2 (*)     All other components within normal limits   COMPREHENSIVE METABOLIC PANEL - Abnormal; Notable for the following components:    CO2 31 (*)     Glucose 209 (*)     BUN 27 (*)     Total Bilirubin 1.1 (*)     Anion Gap 5 (*)     All other components within normal limits   URINALYSIS, REFLEX TO URINE CULTURE - Abnormal; Notable for the following components:    Protein, UA Trace (*)     Glucose, UA 4+ (*)     Leukocytes, UA 1+ (*)     All other components within normal limits    Narrative:     Specimen Source->Urine   URINALYSIS MICROSCOPIC - Abnormal; Notable for the following components:    WBC, UA 12 (*)     Hyaline Casts, UA 3 (*)     All other components within normal limits    Narrative:     Specimen Source->Urine   CULTURE, URINE    Narrative:     Specimen Source->Urine   SARS-COV-2 RNA AMPLIFICATION, QUAL   LIPASE   INFLUENZA A AND B ANTIGEN    Narrative:     Specimen Source->Nasopharyngeal Swab   TROPONIN I HIGH SENSITIVITY        ECG Results              EKG 12-lead (In process)  Result time 01/03/23 15:47:34      In process by Interface, Lab In [facility] (01/03/23 15:47:34)                   Narrative:    Test Reason : R42,    Vent. Rate : 074 BPM     Atrial Rate : 074 BPM     P-R Int : 182 ms          QRS Dur : 092 ms      QT Int : 392 ms       P-R-T Axes : 073 045 028 degrees     QTc Int : 435 ms    Normal sinus rhythm  Low voltage QRS  Cannot rule out Anterior infarct ,age undetermined  Abnormal ECG  When compared with ECG of 14-OCT-2022 04:57,  No significant change was found    Referred By: [email or web address]   SELF           Confirmed By:                                   Imaging Results              X-Ray Abdomen Flat And Erect (Final result)  Result time 01/03/23 16:38:50      Final result by Kamar Barahona Jr., MD (01/03/23 16:38:50)                   Narrative:    HISTORY: Emesis. Complaining of nausea and vomiting    COMPARISON:No recent  comparison study is made available.    FINDINGS: Supine and left lateral decubitus views of the abdomen reveal no evidence of nondependent free air on the lateral decubitus views. Normal distribution of gas and stool throughout the abdominal cavity with prominent residual fecal load within ascending colon. Patient is a large body habitus limiting diagnostic inspection. Clips in right upper quadrant are significant for prior gallbladder resection. The skeletal structures reveal no significant finding.    IMPRESSION:  1. No evidence of free air on the decubitus images.  2. No indirect evidence of acute intra-abdominal findings.    Electronically signed by:  Kamar Barahona MD  1/3/2023 4:38 PM CST Workstation: [id number]                                     X-Ray Chest PA And Lateral (Final result)  Result time 01/03/23 16:36:02      Final result by Kamar Barahona Jr., MD (01/03/23 16:36:02)                   Narrative:    HISTORY: Document history of emesis      COMPARISON:10/14/2022    FINDINGS:Cardiomediastinal silhouette is normal in size. Lungs are well expanded and free of active disease without evidence of pneumonic infiltrate, pleural effusion, atelectasis, or mass lesion. Postoperative changes of previous median sternotomy. The thoracic aorta is tortuous in radiographic appearance becoming midline at the diaphragmatic hiatus secondary to systemic hypertension. Chronic bilateral glenohumeral joint osteoarthritis and minimal degenerative changes about the bilateral AC joints.    IMPRESSION:  1. No evidence of acute cardiopulmonary findings.  2. Treated coronary artery disease.    Electronically signed by:  Kamar Barahona MD  1/3/2023 4:36 PM CST Workstation: [id number]                                     Medications   sodium chloride 0.9% bolus 1,000 mL 1,000 mL (0 mLs Intravenous Stopped 1/3/23 1642)     Medical Decision Making:   History:   Old Medical Records: I decided to obtain old medical  records.  Initial Assessment:   Emergent evaluation of a 88-year-old female presenting with nausea and vomiting differential diagnosis includes dehydration, electrolyte abnormality, endocrine dysfunction, infection          Attending Attestation:             Attending ED Notes:   Patient is tolerating p.o. in ER patient reports that she is feeling better, patient's labs show no significant acute abnormalities, patient's imaging shows no significant acute abnormalities patient is reporting that she is feeling better and would like to go home.  Patient will be started on a course of nausea medication she is advised to hydrate well patient is to follow up with PCP in the next 2-3 days for further evaluation and management and cautioned to return immediately to the ER for any worsening or for any further concerns.    MDM    Patient presents for emergent evaluation of acute complaint that poses a possible threat to life and/or bodily function.    I ordered labs and personally reviewed them.  Labs significant for abnormalities noted above.    I ordered X-rays and personally reviewed them and reviewed the radiologist interpretation.  Xray significant for findings noted above.    I ordered EKG and personally reviewed it.  EKG significant for findings noted above.    I ordered CT scan and personally reviewed it and reviewed the radiologist interpretation.  CT significant for findings noted above.      I had a detailed discussion with the patient and/or guardian regarding: The historical points, exam findings, and diagnostic results supporting the discharge diagnosis, lab results, pertinent radiology results, and the need for outpatient follow-up, for definitive care with a family practitioner and to return to the emergency department if symptoms worsen or persist or if there are any questions or concerns that arise at home. All questions have been answered in detail. Strict return to Emergency Department precautions have  been provided.     A dictation software program was used for this note.  Please expect some simple typographical  errors in this note.                        Clinical Impression:   Final diagnoses:  [R05.9] Cough  [R11.2] Nausea & vomiting (Primary)  [N39.0] Urinary tract infection without hematuria, site unspecified        ED Disposition Condition    Discharge Stable          ED Prescriptions       Medication Sig Dispense Start Date End Date Auth. Provider    ondansetron (ZOFRAN-ODT) 4 MG TbDL Take 1 tablet (4 mg total) by mouth every 6 (six) hours as needed. 12 tablet 1/3/2023 -- Ian Vizcaino MD    amoxicillin-clavulanate 875-125mg (AUGMENTIN) 875-125 mg per tablet  (Status: Discontinued) Take 1 tablet by mouth 2 (two) times daily. 14 tablet 1/3/2023 1/3/2023 Ian Vizcaino MD    amoxicillin-clavulanate 875-125mg (AUGMENTIN) 875-125 mg per tablet Take 1 tablet by mouth 2 (two) times daily. 14 tablet 1/3/2023 -- Ian Vizcaino MD          Follow-up Information       Follow up With Specialties Details Why Contact Info Additional Information    Chang Christianson MD Family Medicine Schedule an appointment as soon as possible for a visit in 2 days  [address]  [facility] [address]  [phone]       [facility] - Emergency Dept Emergency Medicine  If symptoms worsen [address] [facility] [address]  [phone] 1st floor             Ian Vizcaino MD  01/06/23 1948

## 2023-01-06 LAB — BACTERIA UR CULT: NO GROWTH

## 2023-02-03 ENCOUNTER — OUTPATIENT CASE MANAGEMENT (OUTPATIENT)
Dept: ADMINISTRATIVE | Facility: OTHER | Age: 88
End: 2023-02-03
Payer: MEDICARE

## 2023-02-03 NOTE — PROGRESS NOTES
Outpatient Care Management  Plan of Care Follow Up Visit    Patient: Leslie Tolliver  MRN: [id number]  Date of Service: 02/03/2023  Completed by: Cassie Adhikari RN  Referral Date: 10/17/2022    Reason for Visit   Patient presents with    OPCM RN First Follow-Up Attempt     02/03/23    Update Plan Of Care     02/09/23       Brief Summary: 02/03/23-Attempt follow up with patient for outpatient case management. No answer and unable to leave a message. RN OPCM 1'st follow up attempt.   02/09/23-  Follow up with Ms Tolliver.   Next Steps:  Care plan goals met. Will close case and dis-enroll from OPCM. Mailed RN contact info and [facility] on call magnet. OPCM Case Closure.

## 2023-02-09 DIAGNOSIS — Z00.00 ENCOUNTER FOR MEDICARE ANNUAL WELLNESS EXAM: ICD-10-CM

## 2023-02-09 DIAGNOSIS — E11.9 TYPE 2 DIABETES MELLITUS WITHOUT COMPLICATION: ICD-10-CM

## 2023-02-09 RX ORDER — PEN NEEDLE, DIABETIC 31 GX5/16"
NEEDLE, DISPOSABLE MISCELLANEOUS
Qty: 100 EACH | Refills: 3 | Status: SHIPPED | OUTPATIENT
Start: 2023-02-09 | End: 2024-02-21

## 2023-02-09 NOTE — TELEPHONE ENCOUNTER
Refill Decision Note   Leslie Tolliver  is requesting a refill authorization.  Brief Assessment and Rationale for Refill:  Approve     Medication Therapy Plan:       Medication Reconciliation Completed: No   Comments:     Provider Staff:     Action is required for this patient.   Please see care gap opportunities below in Care Due Message.     Thanks!  [facility]     Appointments      Date Provider   Last Visit   12/5/2022 Chang Christianson MD   Next Visit   3/16/2023 Chang Christianson MD     Note composed:11:42 AM 02/09/2023           Note composed:11:42 AM 02/09/2023

## 2023-02-09 NOTE — TELEPHONE ENCOUNTER
Care Due:                  Date            Visit Type   Department     Provider  --------------------------------------------------------------------------------                                EP -                              PRIMARY      [facility]  Last Visit: 12-      CARE ([facility])   PRACTICE       Chang Christianson                               -                              PRIMARY      [facility]  Next Visit: 03-      CARE ([facility])   PRACTICE       Chang Christianson                                                            Last  Test          Frequency    Reason                     Performed    Due Date  --------------------------------------------------------------------------------    HBA1C.......  6 months...  LANTUS, empagliflozin,     10-   04-                             metFORMIN................    [facility] Embedded Care Gaps. Reference number: [id number]. 2/09/2023   11:18:43 AM CST

## 2023-03-16 ENCOUNTER — OFFICE VISIT (OUTPATIENT)
Dept: FAMILY MEDICINE | Facility: CLINIC | Age: 88
End: 2023-03-16
Attending: FAMILY MEDICINE
Payer: MEDICARE

## 2023-03-16 VITALS
BODY MASS INDEX: 28.34 KG/M2 | WEIGHT: 180.56 LBS | DIASTOLIC BLOOD PRESSURE: 69 MMHG | RESPIRATION RATE: 17 BRPM | HEART RATE: 85 BPM | HEIGHT: 67 IN | SYSTOLIC BLOOD PRESSURE: 112 MMHG | TEMPERATURE: 99 F | OXYGEN SATURATION: 97 %

## 2023-03-16 DIAGNOSIS — E03.9 ACQUIRED HYPOTHYROIDISM: ICD-10-CM

## 2023-03-16 DIAGNOSIS — I15.2 HYPERTENSION ASSOCIATED WITH DIABETES: ICD-10-CM

## 2023-03-16 DIAGNOSIS — E11.42 TYPE 2 DIABETES MELLITUS WITH DIABETIC POLYNEUROPATHY, WITH LONG-TERM CURRENT USE OF INSULIN: Primary | ICD-10-CM

## 2023-03-16 DIAGNOSIS — N18.32 STAGE 3B CHRONIC KIDNEY DISEASE: ICD-10-CM

## 2023-03-16 DIAGNOSIS — Z79.4 TYPE 2 DIABETES MELLITUS WITH DIABETIC POLYNEUROPATHY, WITH LONG-TERM CURRENT USE OF INSULIN: Primary | ICD-10-CM

## 2023-03-16 DIAGNOSIS — E11.59 HYPERTENSION ASSOCIATED WITH DIABETES: ICD-10-CM

## 2023-03-16 DIAGNOSIS — E78.5 HYPERLIPIDEMIA ASSOCIATED WITH TYPE 2 DIABETES MELLITUS: ICD-10-CM

## 2023-03-16 DIAGNOSIS — E11.69 HYPERLIPIDEMIA ASSOCIATED WITH TYPE 2 DIABETES MELLITUS: ICD-10-CM

## 2023-03-16 DIAGNOSIS — L12.0 BULLOUS PEMPHIGOID: ICD-10-CM

## 2023-03-16 DIAGNOSIS — I48.0 PAROXYSMAL ATRIAL FIBRILLATION: ICD-10-CM

## 2023-03-16 DIAGNOSIS — Z79.899 DRUG-INDUCED IMMUNODEFICIENCY: ICD-10-CM

## 2023-03-16 DIAGNOSIS — D84.821 DRUG-INDUCED IMMUNODEFICIENCY: ICD-10-CM

## 2023-03-16 PROCEDURE — 1101F PT FALLS ASSESS-DOCD LE1/YR: CPT | Mod: HCNC,CPTII,S$GLB, | Performed by: FAMILY MEDICINE

## 2023-03-16 PROCEDURE — 99499 UNLISTED E&M SERVICE: CPT | Mod: HCNC,S$GLB,, | Performed by: FAMILY MEDICINE

## 2023-03-16 PROCEDURE — 99499 RISK ADDL DX/OHS AUDIT: ICD-10-PCS | Mod: HCNC,S$GLB,, | Performed by: FAMILY MEDICINE

## 2023-03-16 PROCEDURE — 1157F PR ADVANCE CARE PLAN OR EQUIV PRESENT IN MEDICAL RECORD: ICD-10-PCS | Mod: HCNC,CPTII,S$GLB, | Performed by: FAMILY MEDICINE

## 2023-03-16 PROCEDURE — 1157F ADVNC CARE PLAN IN RCRD: CPT | Mod: HCNC,CPTII,S$GLB, | Performed by: FAMILY MEDICINE

## 2023-03-16 PROCEDURE — 1126F AMNT PAIN NOTED NONE PRSNT: CPT | Mod: HCNC,CPTII,S$GLB, | Performed by: FAMILY MEDICINE

## 2023-03-16 PROCEDURE — 1126F PR PAIN SEVERITY QUANTIFIED, NO PAIN PRESENT: ICD-10-PCS | Mod: HCNC,CPTII,S$GLB, | Performed by: FAMILY MEDICINE

## 2023-03-16 PROCEDURE — 1159F PR MEDICATION LIST DOCUMENTED IN MEDICAL RECORD: ICD-10-PCS | Mod: HCNC,CPTII,S$GLB, | Performed by: FAMILY MEDICINE

## 2023-03-16 PROCEDURE — 3072F PR LOW RISK FOR RETINOPATHY: ICD-10-PCS | Mod: HCNC,CPTII,S$GLB, | Performed by: FAMILY MEDICINE

## 2023-03-16 PROCEDURE — 3288F PR FALLS RISK ASSESSMENT DOCUMENTED: ICD-10-PCS | Mod: HCNC,CPTII,S$GLB, | Performed by: FAMILY MEDICINE

## 2023-03-16 PROCEDURE — 99214 OFFICE O/P EST MOD 30 MIN: CPT | Mod: HCNC,S$GLB,, | Performed by: FAMILY MEDICINE

## 2023-03-16 PROCEDURE — 1160F PR REVIEW ALL MEDS BY PRESCRIBER/CLIN PHARMACIST DOCUMENTED: ICD-10-PCS | Mod: HCNC,CPTII,S$GLB, | Performed by: FAMILY MEDICINE

## 2023-03-16 PROCEDURE — 1101F PR PT FALLS ASSESS DOC 0-1 FALLS W/OUT INJ PAST YR: ICD-10-PCS | Mod: HCNC,CPTII,S$GLB, | Performed by: FAMILY MEDICINE

## 2023-03-16 PROCEDURE — 1160F RVW MEDS BY RX/DR IN RCRD: CPT | Mod: HCNC,CPTII,S$GLB, | Performed by: FAMILY MEDICINE

## 2023-03-16 PROCEDURE — 99999 PR PBB SHADOW E&M-EST. PATIENT-LVL V: ICD-10-PCS | Mod: PBBFAC,HCNC,, | Performed by: FAMILY MEDICINE

## 2023-03-16 PROCEDURE — 3072F LOW RISK FOR RETINOPATHY: CPT | Mod: HCNC,CPTII,S$GLB, | Performed by: FAMILY MEDICINE

## 2023-03-16 PROCEDURE — 3288F FALL RISK ASSESSMENT DOCD: CPT | Mod: HCNC,CPTII,S$GLB, | Performed by: FAMILY MEDICINE

## 2023-03-16 PROCEDURE — 1159F MED LIST DOCD IN RCRD: CPT | Mod: HCNC,CPTII,S$GLB, | Performed by: FAMILY MEDICINE

## 2023-03-16 PROCEDURE — 99999 PR PBB SHADOW E&M-EST. PATIENT-LVL V: CPT | Mod: PBBFAC,HCNC,, | Performed by: FAMILY MEDICINE

## 2023-03-16 PROCEDURE — 99214 PR OFFICE/OUTPT VISIT, EST, LEVL IV, 30-39 MIN: ICD-10-PCS | Mod: HCNC,S$GLB,, | Performed by: FAMILY MEDICINE

## 2023-03-16 RX ORDER — VALACYCLOVIR HYDROCHLORIDE 1 G/1
1000 TABLET, FILM COATED ORAL
COMMUNITY
Start: 2022-11-15 | End: 2023-11-16

## 2023-03-16 NOTE — PROGRESS NOTES
Subjective:       Patient ID: Leslie Tolliver is a 88 y.o. female.    Chief Complaint: Diabetes (Pt states that she is here for her 3 mo fu )    88-year-old female coming in to follow-up on diabetes, hypertension, and other problems.  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  She has bullous pemphigoid under the care of Dr. Mills in she is improving but still on immunosuppressant drugs.  She has a history of aortic valve stenosis status post a bioprosthetic aortic valve replacement with diabetes with nephropathy and neuropathy and with insulin use, hypertension, hyperlipidemia, stage IIIB chronic kidney disease, iron deficiency anemia, hypothyroidism, colon polyps, osteoarthritis and bilateral low back pain without sciatica.  Her blood sugar has been stable on 24 units of Lantus, 10 of Jardiance, and 1000 mg of metformin b.i.d..  She has no low blood sugar episodes to report and she is coming due for her next A1c along with a microalbumin and a chemistry panel.  Her thyroid and cholesterol will be due in about another 6 to 8 months.    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  No date: GERD (gastroesophageal reflux disease)  No date: Hyperlipidemia  No date: Hypertension  No date: Hypothyroidism  3/16/2023: Paroxysmal atrial fibrillation  No date: Type 2 diabetes mellitus    Past Surgical History:  No date: ADENOIDECTOMY  No date: AORTIC VALVE REPLACEMENT  20 yrs ago: BREAST BIOPSY;  "Right      Comment:  benign  No date: CARDIAC SURGERY  No date: CHOLECYSTECTOMY  5-: COLONOSCOPY      Comment:  Dr Holly, 5 year recheck  3/25/2021: EPIDURAL STEROID INJECTION; N/A      Comment:  Procedure: Injection, Steroid, Epidural L3-4;  Surgeon:                Sky Love MD;  Location: [facility] OR;  Service: Pain                Management;  Laterality: N/A;  Injection, Steroid,                Epidural L3-4  5/20/2021: EPIDURAL STEROID INJECTION; N/A      Comment:  Procedure: Injection, Steroid, Epidural L3-4;  Surgeon:                Sky Love MD;  Location: [facility] OR;  Service: Pain                Management;  Laterality: N/A;  Injection, Steroid,                Epidural L3-4  6/4/2020: ESOPHAGOGASTRODUODENOSCOPY; N/A      Comment:  Procedure: EGD (ESOPHAGOGASTRODUODENOSCOPY);  Surgeon:                Michael Nugent III, MD;  Location: [facility];                 Service: Endoscopy;  Laterality: N/A;  No date: HEMORRHOID SURGERY  No date: HYSTERECTOMY  No date: OOPHORECTOMY  No date: TONSILLECTOMY    Current Outpatient Medications on File Prior to Visit:  aspirin 81 mg Tab, Take 81 mg by mouth once daily. Every day, Disp: 30 tablet, Rfl: 0  blood sugar diagnostic (ACCU-CHEK GUIDE TEST STRIPS) Strp, 300 each by Misc.(Non-Drug; Combo Route) route 3 (three) times daily., Disp: 300 each, Rfl: 3  blood-glucose meter kit, Accu-chek Marley glucometer check glucose three times daily E11.65, Disp: 1 each, Rfl: 0  calcium carbonate/vitamin D3 (CALCIUM+D ORAL), Take 1 tablet by mouth once daily., Disp: , Rfl:   carvediloL (COREG) 6.25 MG tablet, Take 1 tablet (6.25 mg total) by mouth 2 (two) times daily with meals., Disp: 180 tablet, Rfl: 3  DROPLET PEN NEEDLE 31 gauge x 5/16" Ndle, USE AS DIRECTED WITH LANTUS SOLOSTAR PEN AS NEEDED, Disp: 100 each, Rfl: 3  empagliflozin (JARDIANCE) 10 mg tablet, Take 1 tablet (10 mg total) by mouth once daily., Disp: 30 tablet, Rfl: 6  ketoconazole (NIZORAL) 2 % cream, AAA " pannus fold bid, Disp: 60 g, Rfl: 3  lancets (ACCU-CHEK SOFTCLIX LANCETS) Misc, New machine Guide Me Accuchek, Disp: 300 each, Rfl: 3  LANTUS SOLOSTAR U-100 INSULIN glargine 100 units/mL SubQ pen, INJECT 24 UNITS SUBCUTANEOUSLY EVERY EVENING, Disp: 30 mL, Rfl: 1  lisinopriL (PRINIVIL,ZESTRIL) 20 MG tablet, TAKE ONE TABLET BY MOUTH ONCE DAILY, Disp: 90 tablet, Rfl: 2  metFORMIN (GLUCOPHAGE) 1000 MG tablet, Take 1 tablet (1,000 mg total) by mouth 2 (two) times daily with meals., Disp: 180 tablet, Rfl: 3  multivitamin (THERAGRAN) per tablet, Take 1 tablet by mouth once daily., Disp: , Rfl:   PREVALITE 4 gram PwPk, TAKE 1 PACKET (4 G TOTAL) BY MOUTH ONCE DAILY., Disp: 30 packet, Rfl: 11  triamcinolone acetonide 0.1% (KENALOG) 0.1 % cream, AAA bid, Disp: 454 g, Rfl: 3  valACYclovir (VALTREX) 1000 MG tablet, Take 1,000 mg by mouth., Disp: , Rfl:   amoxicillin-clavulanate 875-125mg (AUGMENTIN) 875-125 mg per tablet, Take 1 tablet by mouth 2 (two) times daily. (Patient not taking: Reported on 3/16/2023), Disp: 14 tablet, Rfl: 0  clopidogreL (PLAVIX) 75 mg tablet, Take 1 tablet (75 mg total) by mouth once daily. for 21 days (Patient not taking: Reported on 12/5/2022), Disp: 21 tablet, Rfl: 0  levocetirizine (XYZAL) 5 MG tablet, Take 5 mg by mouth every evening., Disp: , Rfl:   mycophenolate (CELLCEPT) 500 mg Tab, One tab PO QAM and 2 tabs PO QHS, Disp: 90 tablet, Rfl: 1  ondansetron (ZOFRAN-ODT) 4 MG TbDL, Take 1 tablet (4 mg total) by mouth every 6 (six) hours as needed., Disp: 12 tablet, Rfl: 0  predniSONE (DELTASONE) 10 MG tablet, Take 2 tabs PO QAM x 1 week then 1 tab PO QAM x1 week (Patient not taking: Reported on 12/5/2022), Disp: 21 tablet, Rfl: 0    No current facility-administered medications on file prior to visit.        Review of Systems   Constitutional:  Negative for chills, fatigue and fever.   Respiratory:  Negative for chest tightness and shortness of breath.    Cardiovascular:  Negative for chest pain and  palpitations.   Gastrointestinal:  Negative for abdominal pain, constipation, diarrhea and nausea.   Genitourinary:  Negative for dysuria and frequency.   Musculoskeletal:  Negative for back pain and joint swelling.   Skin:  Positive for rash (Bullous pemphigoid still active on the arms and the left thigh, right thigh in leg have cleared).     Objective:      Physical Exam  Vitals and nursing note reviewed.   Constitutional:       General: She is not in acute distress.     Appearance: Normal appearance. She is not ill-appearing, toxic-appearing or diaphoretic.      Comments: Good blood pressure control   Mildly overweight with a BMI of 28.3 she is up 2.1 lb from her last visit with me December 5, 2022   HENT:      Head: Normocephalic and atraumatic.   Neck:      Vascular: No carotid bruit.   Cardiovascular:      Rate and Rhythm: Normal rate and regular rhythm.      Pulses:           Dorsalis pedis pulses are 2+ on the right side and 2+ on the left side.        Posterior tibial pulses are 2+ on the right side and 2+ on the left side.      Heart sounds: Murmur (Soft 2/6 systolic murmur radiating from left sternal border to right sternal border) heard.     No friction rub. No gallop.   Pulmonary:      Effort: Pulmonary effort is normal. No respiratory distress.      Breath sounds: Normal breath sounds. No stridor. No wheezing, rhonchi or rales.   Musculoskeletal:      Cervical back: Normal range of motion and neck supple. No rigidity or tenderness.      Right lower leg: No edema.      Left lower leg: No edema.      Right foot: Normal range of motion. No deformity, bunion, Charcot foot, foot drop or prominent metatarsal heads.      Left foot: Normal range of motion. No deformity, bunion, Charcot foot, foot drop or prominent metatarsal heads.   Feet:      Right foot:      Protective Sensation: 9 sites tested.  9 sites sensed.      Skin integrity: Skin integrity normal. No ulcer, blister, skin breakdown, erythema, warmth,  callus, dry skin or fissure.      Toenail Condition: Right toenails are normal.      Left foot:      Protective Sensation: 9 sites tested.  9 sites sensed.      Skin integrity: Skin integrity normal. No ulcer, blister, skin breakdown, erythema, warmth, callus, dry skin or fissure.      Toenail Condition: Left toenails are normal.   Lymphadenopathy:      Cervical: No cervical adenopathy.   Skin:     General: Skin is warm and dry.      Capillary Refill: Capillary refill takes less than 2 seconds.      Coloration: Skin is not jaundiced or pale.      Findings: Rash (Erythematous rash over the upper arms and proximal left thigh) present. No bruising, erythema or lesion.   Neurological:      General: No focal deficit present.      Mental Status: She is alert and oriented to person, place, and time. Mental status is at baseline.      Comments: Proprioception intact all 10 toes   Psychiatric:         Mood and Affect: Mood normal.         Behavior: Behavior normal.         Thought Content: Thought content normal.         Judgment: Judgment normal.       Assessment:       1. Type 2 diabetes mellitus with diabetic polyneuropathy, with long-term current use of insulin    2. Hypertension associated with diabetes    3. Hyperlipidemia associated with type 2 diabetes mellitus    4. Acquired hypothyroidism    5. Bullous pemphigoid    6. Drug-induced immunodeficiency    7. Paroxysmal atrial fibrillation    8. Stage 3b chronic kidney disease    9. BMI 28.0-28.9,adult          Plan:       1. Type 2 diabetes mellitus with diabetic polyneuropathy, with long-term current use of insulin  Check A1c and adjust Lantus or possibly increase Jardiance if needed  - Basic Metabolic Panel; Future  - Hemoglobin A1C; Future  - Microalbumin/Creatinine Ratio, Urine; Future    2. Hypertension associated with diabetes  Well controlled no changes needed on Coreg or lisinopril  - Basic Metabolic Panel; Future    3. Hyperlipidemia associated with type 2  diabetes mellitus  Due for recheck in October    4. Acquired hypothyroidism  Due for recheck in October    5. Bullous pemphigoid  Slowly clearing under the care of Dr. Mills    6. Drug-induced immunodeficiency    7. Paroxysmal atrial fibrillation  Normal sinus rhythm, followed by Cardiology    8. Stage 3b chronic kidney disease  Await chemistry panel results for re-evaluation    9. BMI 28.0-28.9,adult  Good weight no changes needed

## 2023-03-17 ENCOUNTER — TELEPHONE (OUTPATIENT)
Dept: CARDIOLOGY | Facility: CLINIC | Age: 88
End: 2023-03-17
Payer: MEDICARE

## 2023-03-17 NOTE — TELEPHONE ENCOUNTER
Vb needs to leave early. Asked her to come in at 11:45 on the same day, note has been made on he lianna time

## 2023-03-23 ENCOUNTER — OFFICE VISIT (OUTPATIENT)
Dept: CARDIOLOGY | Facility: CLINIC | Age: 88
End: 2023-03-23
Payer: MEDICARE

## 2023-03-23 VITALS
HEART RATE: 88 BPM | HEIGHT: 67 IN | RESPIRATION RATE: 16 BRPM | WEIGHT: 179 LBS | BODY MASS INDEX: 28.09 KG/M2 | OXYGEN SATURATION: 97 % | DIASTOLIC BLOOD PRESSURE: 74 MMHG | SYSTOLIC BLOOD PRESSURE: 116 MMHG

## 2023-03-23 DIAGNOSIS — Z95.2 S/P AVR (AORTIC VALVE REPLACEMENT): ICD-10-CM

## 2023-03-23 DIAGNOSIS — E78.5 HYPERLIPIDEMIA ASSOCIATED WITH TYPE 2 DIABETES MELLITUS: ICD-10-CM

## 2023-03-23 DIAGNOSIS — E11.42 TYPE 2 DIABETES MELLITUS WITH DIABETIC POLYNEUROPATHY, WITH LONG-TERM CURRENT USE OF INSULIN: ICD-10-CM

## 2023-03-23 DIAGNOSIS — Z79.4 TYPE 2 DIABETES MELLITUS WITH DIABETIC POLYNEUROPATHY, WITH LONG-TERM CURRENT USE OF INSULIN: ICD-10-CM

## 2023-03-23 DIAGNOSIS — I15.2 HYPERTENSION ASSOCIATED WITH DIABETES: ICD-10-CM

## 2023-03-23 DIAGNOSIS — D84.821 DRUG-INDUCED IMMUNODEFICIENCY: ICD-10-CM

## 2023-03-23 DIAGNOSIS — E11.59 HYPERTENSION ASSOCIATED WITH DIABETES: ICD-10-CM

## 2023-03-23 DIAGNOSIS — Z79.899 DRUG-INDUCED IMMUNODEFICIENCY: ICD-10-CM

## 2023-03-23 DIAGNOSIS — E11.69 HYPERLIPIDEMIA ASSOCIATED WITH TYPE 2 DIABETES MELLITUS: ICD-10-CM

## 2023-03-23 DIAGNOSIS — I48.0 PAROXYSMAL ATRIAL FIBRILLATION: ICD-10-CM

## 2023-03-23 PROCEDURE — 1101F PR PT FALLS ASSESS DOC 0-1 FALLS W/OUT INJ PAST YR: ICD-10-PCS | Mod: HCNC,CPTII,S$GLB, | Performed by: INTERNAL MEDICINE

## 2023-03-23 PROCEDURE — 99499 RISK ADDL DX/OHS AUDIT: ICD-10-PCS | Mod: HCNC,S$GLB,, | Performed by: INTERNAL MEDICINE

## 2023-03-23 PROCEDURE — 1160F RVW MEDS BY RX/DR IN RCRD: CPT | Mod: HCNC,CPTII,S$GLB, | Performed by: INTERNAL MEDICINE

## 2023-03-23 PROCEDURE — 3072F PR LOW RISK FOR RETINOPATHY: ICD-10-PCS | Mod: HCNC,CPTII,S$GLB, | Performed by: INTERNAL MEDICINE

## 2023-03-23 PROCEDURE — 99999 PR PBB SHADOW E&M-EST. PATIENT-LVL IV: CPT | Mod: PBBFAC,HCNC,, | Performed by: INTERNAL MEDICINE

## 2023-03-23 PROCEDURE — 1160F PR REVIEW ALL MEDS BY PRESCRIBER/CLIN PHARMACIST DOCUMENTED: ICD-10-PCS | Mod: HCNC,CPTII,S$GLB, | Performed by: INTERNAL MEDICINE

## 2023-03-23 PROCEDURE — 3288F FALL RISK ASSESSMENT DOCD: CPT | Mod: HCNC,CPTII,S$GLB, | Performed by: INTERNAL MEDICINE

## 2023-03-23 PROCEDURE — 99204 PR OFFICE/OUTPT VISIT, NEW, LEVL IV, 45-59 MIN: ICD-10-PCS | Mod: HCNC,S$GLB,, | Performed by: INTERNAL MEDICINE

## 2023-03-23 PROCEDURE — 99204 OFFICE O/P NEW MOD 45 MIN: CPT | Mod: HCNC,S$GLB,, | Performed by: INTERNAL MEDICINE

## 2023-03-23 PROCEDURE — 93000 ELECTROCARDIOGRAM COMPLETE: CPT | Mod: HCNC,S$GLB,, | Performed by: INTERNAL MEDICINE

## 2023-03-23 PROCEDURE — 1159F MED LIST DOCD IN RCRD: CPT | Mod: HCNC,CPTII,S$GLB, | Performed by: INTERNAL MEDICINE

## 2023-03-23 PROCEDURE — 3288F PR FALLS RISK ASSESSMENT DOCUMENTED: ICD-10-PCS | Mod: HCNC,CPTII,S$GLB, | Performed by: INTERNAL MEDICINE

## 2023-03-23 PROCEDURE — 93000 EKG 12-LEAD: ICD-10-PCS | Mod: HCNC,S$GLB,, | Performed by: INTERNAL MEDICINE

## 2023-03-23 PROCEDURE — 1157F ADVNC CARE PLAN IN RCRD: CPT | Mod: HCNC,CPTII,S$GLB, | Performed by: INTERNAL MEDICINE

## 2023-03-23 PROCEDURE — 1159F PR MEDICATION LIST DOCUMENTED IN MEDICAL RECORD: ICD-10-PCS | Mod: HCNC,CPTII,S$GLB, | Performed by: INTERNAL MEDICINE

## 2023-03-23 PROCEDURE — 99499 UNLISTED E&M SERVICE: CPT | Mod: HCNC,S$GLB,, | Performed by: INTERNAL MEDICINE

## 2023-03-23 PROCEDURE — 3072F LOW RISK FOR RETINOPATHY: CPT | Mod: HCNC,CPTII,S$GLB, | Performed by: INTERNAL MEDICINE

## 2023-03-23 PROCEDURE — 99999 PR PBB SHADOW E&M-EST. PATIENT-LVL IV: ICD-10-PCS | Mod: PBBFAC,HCNC,, | Performed by: INTERNAL MEDICINE

## 2023-03-23 PROCEDURE — 1101F PT FALLS ASSESS-DOCD LE1/YR: CPT | Mod: HCNC,CPTII,S$GLB, | Performed by: INTERNAL MEDICINE

## 2023-03-23 PROCEDURE — 1157F PR ADVANCE CARE PLAN OR EQUIV PRESENT IN MEDICAL RECORD: ICD-10-PCS | Mod: HCNC,CPTII,S$GLB, | Performed by: INTERNAL MEDICINE

## 2023-03-23 RX ORDER — CLOPIDOGREL BISULFATE 75 MG/1
75 TABLET ORAL DAILY
Qty: 30 TABLET | Refills: 11 | Status: ON HOLD | OUTPATIENT
Start: 2023-03-23 | End: 2023-11-21 | Stop reason: HOSPADM

## 2023-03-23 NOTE — ASSESSMENT & PLAN NOTE
Patient is presently on Jardiance as well as metformin 1000 mg b.i.d..  Encouraged her to continue the same and she is on Lantus 24 units at nighttime this seemed to be managing her well.

## 2023-03-23 NOTE — ASSESSMENT & PLAN NOTE
Regarding paroxysmal atrial fibrillation because of fall risk she is encouraged to continue on aspirin but like to add Plavix to her regimen as well to minimize risk of embolic events given that she has prior history of TIA.  She is not an ideal candidate for NOAC

## 2023-03-23 NOTE — ASSESSMENT & PLAN NOTE
Blood pressure is well controlled 116/74 mmHg continue on present therapy with low-salt diet and also on lisinopril 20 mg once daily.

## 2023-03-23 NOTE — PROGRESS NOTES
Subjective:    Patient ID:  Leslie Tolliver is a 88 y.o. female patient here for evaluation Establish Care (Was seen in the hospital by CB. Previous cardio Dr. Lemon, had aortic valve done by Dr. Doshi)      History of Present Illness:   Patient is 88-year-old lady with history of aortic valve replacement by Dr. Steven Doshi 4 years ago paroxysmal 2018 had been doing fairly well.  She used to follow-up with Dr. Arslan Lemon and now she wants to reestablish cardiovascular care with me today.  Patient was hospitalized in October with an episode of TIA and reportedly ravi was negative and subsequently discharged home with outpatient follow-up care.  She is been doing fairly well from a cardiac standpoint.    She would complications of vaccination in 2021 at which time she is received both COVID vaccine and a flu vaccine at the time.  Following that she had developed profound rash and itching and for the past year she is been dealing with this.  Currently she is doing better and being evaluated by dermatologist at [facility].      Patient is using a Rollator for mobility and she has no occurrence of any angina shortness of breath with normal physical activity and no swelling in the lower extremities.  No cough or congestion no fevers chills noted.  Her other past medical history is notable for paroxysmal atrial fibrillation although presently she is maintaining sinus rhythm.    Review of patient's allergies indicates:   Allergen Reactions    Fenofibric acid (choline)      Other reaction(s): Vomiting    Iodine      Other reaction(s): lips swollen ivp dye    Rosuvastatin      Other reaction(s): muscle pain       Past Medical History:   Diagnosis Date    Anemia due to stage 3 chronic kidney disease 07/24/2019    Arthritis     Bullous pemphigoid 8/29/2022    Chronic bilateral low back pain without sciatica 07/24/2019    CKD (chronic kidney disease) stage 3, GFR 30-59 ml/min 07/24/2019    Colon polyps      Coronary artery disease     Diabetes mellitus type II     Diabetes with neurologic complications     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    GERD (gastroesophageal reflux disease)     Hyperlipidemia     Hypertension     Hypothyroidism     Paroxysmal atrial fibrillation 3/16/2023    Type 2 diabetes mellitus      Past Surgical History:   Procedure Laterality Date    ADENOIDECTOMY      AORTIC VALVE REPLACEMENT      BREAST BIOPSY Right 20 yrs ago    benign    CARDIAC SURGERY      CHOLECYSTECTOMY      COLONOSCOPY  2014    Dr Holly, 5 year recheck    EPIDURAL STEROID INJECTION N/A 3/25/2021    Procedure: Injection, Steroid, Epidural L3-4;  Surgeon: Sky Love MD;  Location: [facility] OR;  Service: Pain Management;  Laterality: N/A;  Injection, Steroid, Epidural L3-4    EPIDURAL STEROID INJECTION N/A 2021    Procedure: Injection, Steroid, Epidural L3-4;  Surgeon: Sky Love MD;  Location: [facility] OR;  Service: Pain Management;  Laterality: N/A;  Injection, Steroid, Epidural L3-4    ESOPHAGOGASTRODUODENOSCOPY N/A 2020    Procedure: EGD (ESOPHAGOGASTRODUODENOSCOPY);  Surgeon: Michael Nugent III, MD;  Location: [facility];  Service: Endoscopy;  Laterality: N/A;    HEMORRHOID SURGERY      HYSTERECTOMY      OOPHORECTOMY      TONSILLECTOMY       Social History     Tobacco Use    Smoking status: Former     Packs/day: 0.25     Years: 15.00     Pack years: 3.75     Types: Cigarettes     Quit date: 3/30/1974     Years since quittin.0    Smokeless tobacco: Never   Substance Use Topics    Alcohol use: No    Drug use: No        Review of Systems   As noted in HPI in addition     Constitutional: Negative for chills, fatigue and fever.   Eyes: No double vision, No blurred vision  Neuro: No headaches, No dizziness  Respiratory: Negative for cough, shortness of breath and wheezing.    Cardiovascular: Negative for chest pain. Negative for palpitations and leg swelling.   Gastrointestinal: Negative for abdominal pain,  No melena, diarrhea, nausea and vomiting.   Genitourinary: Negative for dysuria and frequency, Negative for hematuria  Skin:  Extensive skin rash noted in her trunk as well as lower extremities apparently.  With intense itching and this is beginning to get better.  Endocrine: Negative for polyphagia, Negative for heat intolerance, Negative for cold intolerance  Psychiatric: Negative for depression, Negative for anxiety, Negative for memory loss  Musculoskeletal: Negative for neck pain, some weakness in lower extremities noted she uses a Rollator for stability and mobility.       Objective        Vitals:    03/23/23 1200   BP: 116/74   Pulse: 88   Resp: 16       LIPIDS - LAST 2   Lab Results   Component Value Date    CHOL 126 10/14/2022    CHOL 132 03/11/2022    HDL 33 (L) 10/14/2022    HDL 33 (L) 03/11/2022    LDLCALC 64.6 10/14/2022    LDLCALC 54.4 (L) 03/11/2022    TRIG 142 10/14/2022    TRIG 223 (H) 03/11/2022    CHOLHDL 26.2 10/14/2022    CHOLHDL 25.0 03/11/2022       CBC - LAST 2  Lab Results   Component Value Date    WBC 5.74 01/03/2023    WBC 6.03 10/16/2022    RBC 4.07 01/03/2023    RBC 3.80 (L) 10/16/2022    HGB 12.8 01/03/2023    HGB 11.1 (L) 10/16/2022    HCT 40.2 01/03/2023    HCT 34.9 (L) 10/16/2022    MCV 99 (H) 01/03/2023    MCV 92 10/16/2022    MCH 31.4 (H) 01/03/2023    MCH 29.2 10/16/2022    MCHC 31.8 (L) 01/03/2023    MCHC 31.8 (L) 10/16/2022    RDW 14.1 01/03/2023    RDW 16.4 (H) 10/16/2022     01/03/2023     10/16/2022    MPV 11.1 01/03/2023    MPV 11.1 10/16/2022    GRAN 4.5 01/03/2023    GRAN 78.7 (H) 01/03/2023    LYMPH 0.5 (L) 01/03/2023    LYMPH 8.2 (L) 01/03/2023    MONO 0.5 01/03/2023    MONO 8.0 01/03/2023    BASO 0.04 01/03/2023    BASO 0.03 10/16/2022    NRBC 0 01/03/2023    NRBC 0 10/16/2022       CHEMISTRY & LIVER FUNCTION - LAST 2  Lab Results   Component Value Date     01/03/2023     10/16/2022    K 4.6 01/03/2023    K 4.1 10/16/2022     01/03/2023      10/16/2022    CO2 31 (H) 01/03/2023    CO2 29 10/16/2022    ANIONGAP 5 (L) 01/03/2023    ANIONGAP 8 10/16/2022    BUN 27 (H) 01/03/2023    BUN 21 10/16/2022    CREATININE 0.8 01/03/2023    CREATININE 0.8 10/16/2022     (H) 01/03/2023     (H) 10/16/2022    CALCIUM 9.5 01/03/2023    CALCIUM 8.5 (L) 10/16/2022    MG 1.9 10/16/2022    MG 1.7 10/15/2022    ALBUMIN 4.1 01/03/2023    ALBUMIN 3.6 10/16/2022    PROT 7.4 01/03/2023    PROT 6.5 10/16/2022    ALKPHOS 84 01/03/2023    ALKPHOS 66 10/16/2022    ALT 17 01/03/2023    ALT 12 10/16/2022    AST 24 01/03/2023    AST 16 10/16/2022    BILITOT 1.1 (H) 01/03/2023    BILITOT 1.4 (H) 10/16/2022        CARDIAC PROFILE - LAST 2  Lab Results   Component Value Date     (H) 06/04/2020     03/09/2012    CPK 96 08/02/2011    CPKMB 2.9 10/15/2022    TROPONINI <0.030 10/15/2022    TROPONINI 0.088 (HH) 06/03/2020    TROPONINIHS 6.0 01/03/2023        COAGULATION - LAST 2  Lab Results   Component Value Date    LABPT 13.5 10/15/2022    LABPT 14.0 (H) 10/14/2022    INR 1.1 10/15/2022    INR 1.2 10/14/2022    APTT 29.4 10/15/2022    APTT 30.9 06/03/2020       ENDOCRINE & PSA - LAST 2  Lab Results   Component Value Date    HGBA1C 7.9 (H) 10/14/2022    HGBA1C 7.4 (H) 08/20/2022    TSH 1.270 10/14/2022    TSH 2.469 03/11/2022        ECHOCARDIOGRAM RESULTS  Results for orders placed during the hospital encounter of 10/14/22    Transesophageal echo (GABY)    Interpretation Summary  · The left ventricle is normal in size with normal systolic function.  · The estimated ejection fraction is 60%.  · Normal left ventricular diastolic function.  · Normal right ventricular size with normal right ventricular systolic function.  · Mild left atrial enlargement.  · There is a bioprosthetic aortic valve present. There is no aortic insufficiency present. Prosthetic aortic valve is normal.  · No interatrial septal defect present.  · No ASD or PFO closure device in  interatrial septum.  · Normal appearing left atrial appendage. No thrombus is present in the appendage. COREEN occluder is absent. Normal appendage velocities.      CURRENT/PREVIOUS VISIT EKG  Results for orders placed or performed during the hospital encounter of 01/03/23   EKG 12-lead    Collection Time: 01/03/23  3:15 PM    Narrative    Test Reason : R42,    Vent. Rate : 074 BPM     Atrial Rate : 074 BPM     P-R Int : 182 ms          QRS Dur : 092 ms      QT Int : 392 ms       P-R-T Axes : 073 045 028 degrees     QTc Int : 435 ms    Normal sinus rhythm  Low voltage QRS  Cannot rule out Anterior infarct ,age undetermined  Abnormal ECG  When compared with ECG of 14-OCT-2022 04:57,  No significant change was found  Confirmed by Delfino Huerta MD ([id number]) on 1/6/2023 5:57:08 PM    Referred By: PASCUAL   SELF           Confirmed By:Delfino Huerta MD     No valid procedures specified.   No results found for this or any previous visit.    No valid procedures specified.    02/23/2023 EKG shows sinus rhythm anteroseptal infarct pattern versus lead placement otherwise no acute changes noted.      PREVIOUS STRESS TEST              PREVIOUS ANGIOGRAM        PHYSICAL EXAM    GENERAL: well built, well nourished, well-developed in no apparent distress alert and oriented.   HEENT: Normocephalic. Pupils normal and conjunctivae normal.  Mucous membranes normal, no cyanosis or icterus, trachea central,no pallor or icterus is noted..   NECK: No JVD. No bruit..   THYROID: Thyroid not enlarged. No nodules present..   CARDIAC: Regular rate and rhythm. S1 is normal.  Diminished S2 and a grade 2 systolic murmur is present.  CHEST ANATOMY: normal.   LUNGS: Clear to auscultation. No wheezing or rhonchi..   ABDOMEN: Soft no masses or organomegaly.  No abdomen pulsations or bruits.  Normal bowel sounds. No pulsations and no masses felt, No guarding or rebound.   EXTREMITIES: No cyanosis, clubbing or edema noted at this time., no calf  "tenderness bilaterally.   PERIPHERAL VASCULAR SYSTEM: Good palpable distal pulses.   CENTRAL NERVOUS SYSTEM: No focal motor or sensory deficits noted.   SKIN:  Residual skin lesions are noted in her abdomen and trunk as well as lower extremities that were not fully cough uncovered.    MUSCLE STRENGTH & TONE: No noteable weakness, atrophy or abnormal movement.     I HAVE REVIEWED :    The vital signs, nurses notes, and all the pertinent radiology and labs.        Current Outpatient Medications   Medication Instructions    amoxicillin-clavulanate 875-125mg (AUGMENTIN) 875-125 mg per tablet 1 tablet, Oral, 2 times daily    aspirin 81 mg, Oral, Daily, Every day    blood sugar diagnostic (ACCU-CHEK GUIDE TEST STRIPS) Strp 300 each, Misc.(Non-Drug; Combo Route), 3 times daily    blood-glucose meter kit Accu-chek Marley glucometer check glucose three times daily E11.65    calcium carbonate/vitamin D3 (CALCIUM+D ORAL) 1 tablet, Oral, Daily    carvediloL (COREG) 6.25 mg, Oral, 2 times daily with meals    clopidogreL (PLAVIX) 75 mg, Oral, Daily    DROPLET PEN NEEDLE 31 gauge x 5/16" Ndle USE AS DIRECTED WITH LANTUS SOLOSTAR PEN AS NEEDED    empagliflozin (JARDIANCE) 10 mg, Oral, Daily    ketoconazole (NIZORAL) 2 % cream AAA pannus fold bid    lancets (ACCU-CHEK SOFTCLIX LANCETS) Misc New machine Guide Me Accuchek    LANTUS SOLOSTAR U-100 INSULIN glargine 100 units/mL SubQ pen INJECT 24 UNITS SUBCUTANEOUSLY EVERY EVENING    levocetirizine (XYZAL) 5 mg, Oral, Nightly    lisinopriL (PRINIVIL,ZESTRIL) 20 MG tablet TAKE ONE TABLET BY MOUTH ONCE DAILY    metFORMIN (GLUCOPHAGE) 1,000 mg, Oral, 2 times daily with meals    multivitamin (THERAGRAN) per tablet 1 tablet, Oral, Daily    mycophenolate (CELLCEPT) 500 mg Tab One tab PO QAM and 2 tabs PO QHS    ondansetron (ZOFRAN-ODT) 4 mg, Oral, Every 6 hours PRN    predniSONE (DELTASONE) 10 MG tablet Take 2 tabs PO QAM x 1 week then 1 tab PO QAM x1 week    PREVALITE 4 gram PwPk TAKE 1 PACKET " (4 G TOTAL) BY MOUTH ONCE DAILY.    triamcinolone acetonide 0.1% (KENALOG) 0.1 % cream AAA bid    valACYclovir (VALTREX) 1,000 mg, Oral          Assessment & Plan     S/P AVR (aortic valve replacement)  Arm she is doing well with a bioprosthetic aortic valve with normal function.  Transesophageal echocardiography demonstrated adequate function.  Continue on present regimen appears stable    Paroxysmal atrial fibrillation  Regarding paroxysmal atrial fibrillation because of fall risk she is encouraged to continue on aspirin but like to add Plavix to her regimen as well to minimize risk of embolic events given that she has prior history of TIA.  She is not an ideal candidate for NOAC    Hypertension associated with diabetes  Blood pressure is well controlled 116/74 mmHg continue on present therapy with low-salt diet and also on lisinopril 20 mg once daily.    Hyperlipidemia associated with type 2 diabetes mellitus  She is not on any medications for lipid management at this time.    Type 2 diabetes mellitus with diabetic polyneuropathy, with long-term current use of insulin  Patient is presently on Jardiance as well as metformin 1000 mg b.i.d..  Encouraged her to continue the same and she is on Lantus 24 units at nighttime this seemed to be managing her well.    Hypothyroidism  She is not on any supplements at this time    Drug-induced immunodeficiency  She is getting CellCept arm as well as prednisone therapy for her skin condition.  And follow-up with Dr. cortez          No follow-ups on file.

## 2023-03-23 NOTE — ASSESSMENT & PLAN NOTE
She is getting CellCept arm as well as prednisone therapy for her skin condition.  And follow-up with Dr. cortez

## 2023-03-23 NOTE — ASSESSMENT & PLAN NOTE
Arm she is doing well with a bioprosthetic aortic valve with normal function.  Transesophageal echocardiography demonstrated adequate function.  Continue on present regimen appears stable

## 2023-04-25 DIAGNOSIS — I15.2 HYPERTENSION ASSOCIATED WITH DIABETES: ICD-10-CM

## 2023-04-25 DIAGNOSIS — E11.59 HYPERTENSION ASSOCIATED WITH DIABETES: ICD-10-CM

## 2023-04-25 RX ORDER — LISINOPRIL 20 MG/1
20 TABLET ORAL DAILY
Qty: 90 TABLET | Refills: 2 | Status: ON HOLD | OUTPATIENT
Start: 2023-04-25 | End: 2023-11-21 | Stop reason: HOSPADM

## 2023-04-25 NOTE — TELEPHONE ENCOUNTER
Cherelle DENSON Staff  Caller: [phone] (Today,  9:35 AM)    ----- Message from Cherelle Kitchen sent at 4/25/2023  9:35 AM CDT -----  Regarding: RX Refill Request  Contact: [phone]  Type:  RX Refill Request    Who Called:  patient  Refill or New Rx:  refill  RX Name and Strength:  lisinopriL (PRINIVIL,ZESTRIL) 20 MG tablet 90 tablet 2 9/14/2022    Sig: TAKE ONE TABLET BY MOUTH ONCE DAILY   Sent to pharmacy as: lisinopriL (PRINIVIL,ZESTRIL) 20 MG tablet   E-Prescribing Status: Receipt confirmed by pharmacy (9/14/2022  4:54 PM CDT)     Preferred Pharmacy with phone number:    [id number] - [place] - [address]  [address] [address]  Phone: [phone] Fax: [phone]    Best Call Back Number:  [phone]    Additional Information:  Patient has lost her medication and hasn't taken it for a few days. She would like a refill for today to get back on them. Please call and advise. Thank you

## 2023-04-25 NOTE — TELEPHONE ENCOUNTER
Patient states she has misplaced Lisinopril prescription. Call placed to [facility], spoke to Poppy who states patient last filled Lisinopril prescription on 4-4-23. Patient does not have any further refills on file at pharmacy. Mrs. Mcwilliams advised need new 90 day prescription, patient's out of pocket cost will be between $6-$10. Patient updated regarding out of pocket cost. Please advise. Thank you.     LOV--3-16-23  [facility]--8-30-23

## 2023-04-25 NOTE — TELEPHONE ENCOUNTER
No new care gaps identified.  [facility] Embedded Care Gaps. Reference number: [id number]. 4/25/2023   9:56:45 AM CDT

## 2023-05-18 DIAGNOSIS — Z79.4 TYPE 2 DIABETES MELLITUS WITH DIABETIC POLYNEUROPATHY, WITH LONG-TERM CURRENT USE OF INSULIN: ICD-10-CM

## 2023-05-18 DIAGNOSIS — E11.42 TYPE 2 DIABETES MELLITUS WITH DIABETIC POLYNEUROPATHY, WITH LONG-TERM CURRENT USE OF INSULIN: ICD-10-CM

## 2023-05-18 RX ORDER — EMPAGLIFLOZIN 10 MG/1
TABLET, FILM COATED ORAL
Qty: 30 TABLET | Refills: 6 | OUTPATIENT
Start: 2023-05-18

## 2023-05-18 NOTE — TELEPHONE ENCOUNTER
No care due was identified.  [facility] Embedded Care Due Messages. Reference number: [id number].   5/18/2023 5:53:00 PM CDT

## 2023-05-19 NOTE — TELEPHONE ENCOUNTER
Refill Routing Note   Medication(s) are not appropriate for processing by [facility] for the following reason(s):      Required labs outdated    [facility] action(s):  Defer None identified          Appointments  past 12m or future 3m with PCP    Date Provider   Last Visit   3/16/2023 Chang Christianson MD   Next Visit   8/30/2023 Chang Christianson MD   ED visits in past 90 days: 0        Note composed:8:01 PM 05/18/2023

## 2023-05-25 ENCOUNTER — LAB VISIT (OUTPATIENT)
Dept: LAB | Facility: HOSPITAL | Age: 88
End: 2023-05-25
Attending: FAMILY MEDICINE
Payer: MEDICARE

## 2023-05-25 DIAGNOSIS — E11.42 TYPE 2 DIABETES MELLITUS WITH DIABETIC POLYNEUROPATHY, WITH LONG-TERM CURRENT USE OF INSULIN: ICD-10-CM

## 2023-05-25 DIAGNOSIS — E11.59 HYPERTENSION ASSOCIATED WITH DIABETES: ICD-10-CM

## 2023-05-25 DIAGNOSIS — I15.2 HYPERTENSION ASSOCIATED WITH DIABETES: ICD-10-CM

## 2023-05-25 DIAGNOSIS — Z79.4 TYPE 2 DIABETES MELLITUS WITH DIABETIC POLYNEUROPATHY, WITH LONG-TERM CURRENT USE OF INSULIN: ICD-10-CM

## 2023-05-25 LAB
ANION GAP SERPL CALC-SCNC: 11 MMOL/L (ref 8–16)
BUN SERPL-MCNC: 18 MG/DL (ref 8–23)
CALCIUM SERPL-MCNC: 9.2 MG/DL (ref 8.7–10.5)
CHLORIDE SERPL-SCNC: 110 MMOL/L (ref 95–110)
CO2 SERPL-SCNC: 21 MMOL/L (ref 23–29)
CREAT SERPL-MCNC: 1 MG/DL (ref 0.5–1.4)
EST. GFR  (NO RACE VARIABLE): 54.2 ML/MIN/1.73 M^2
ESTIMATED AVG GLUCOSE: 177 MG/DL (ref 68–131)
GLUCOSE SERPL-MCNC: 113 MG/DL (ref 70–110)
HBA1C MFR BLD: 7.8 % (ref 4–5.6)
POTASSIUM SERPL-SCNC: 3.8 MMOL/L (ref 3.5–5.1)
SODIUM SERPL-SCNC: 142 MMOL/L (ref 136–145)

## 2023-05-25 PROCEDURE — 36415 COLL VENOUS BLD VENIPUNCTURE: CPT | Mod: PO | Performed by: FAMILY MEDICINE

## 2023-05-25 PROCEDURE — 83036 HEMOGLOBIN GLYCOSYLATED A1C: CPT | Performed by: FAMILY MEDICINE

## 2023-05-25 PROCEDURE — 80048 BASIC METABOLIC PNL TOTAL CA: CPT | Performed by: FAMILY MEDICINE

## 2023-05-26 ENCOUNTER — LAB VISIT (OUTPATIENT)
Dept: LAB | Facility: HOSPITAL | Age: 88
End: 2023-05-26
Attending: FAMILY MEDICINE
Payer: MEDICARE

## 2023-05-26 DIAGNOSIS — E11.42 TYPE 2 DIABETES MELLITUS WITH DIABETIC POLYNEUROPATHY, WITH LONG-TERM CURRENT USE OF INSULIN: ICD-10-CM

## 2023-05-26 DIAGNOSIS — Z79.4 TYPE 2 DIABETES MELLITUS WITH DIABETIC POLYNEUROPATHY, WITH LONG-TERM CURRENT USE OF INSULIN: ICD-10-CM

## 2023-05-26 LAB
ALBUMIN/CREAT UR: 70.6 UG/MG (ref 0–30)
CREAT UR-MCNC: 51 MG/DL (ref 15–325)
MICROALBUMIN UR DL<=1MG/L-MCNC: 36 UG/ML

## 2023-05-26 PROCEDURE — 82570 ASSAY OF URINE CREATININE: CPT | Performed by: FAMILY MEDICINE

## 2023-05-31 ENCOUNTER — TELEPHONE (OUTPATIENT)
Dept: FAMILY MEDICINE | Facility: CLINIC | Age: 88
End: 2023-05-31
Payer: MEDICARE

## 2023-05-31 NOTE — TELEPHONE ENCOUNTER
Call placed to patient. No answer received. Left message requesting return call to office.       Disp Refills Start End JAYLON   empagliflozin (JARDIANCE) 10 mg tablet 30 tablet 6 5/29/2023  No   Sig - Route: Take 1 tablet (10 mg total) by mouth once daily. - Oral   Sent to pharmacy as: empagliflozin (JARDIANCE) 10 mg tablet   Class: Normal   Notes to Pharmacy: Pharmacy:  Replaces Januvia due to side effects.  Please remove Januvia from active medication list   Order: [id number]   Date/Time Signed: 5/29/2023 20:33       E-Prescribing Status: Receipt confirmed by pharmacy (5/29/2023  8:33 PM CDT)     Pharmacy    [id number] - [place], [place] - [address]

## 2023-05-31 NOTE — TELEPHONE ENCOUNTER
----- Message from Ash Jones sent at 5/31/2023  9:30 AM CDT -----  Contact: pt [phone]  Type:  RX Refill Request    Who Called:  pt  Refill or New Rx:  refill  RX Name and Strength:  empagliflozin (JARDIANCE) 10 mg tablet  How is the patient currently taking it? (ex. 1XDay):  1xday  Is this a 30 day or 90 day RX:  30  Preferred Pharmacy with phone number:   [id number] - [place] - [address]  [address] [address]  Phone: [phone] Fax: [phone]  Local or Mail Order:  local  Ordering Provider:  Dr.Taylor Mary Call Back Number:  [phone]    Additional Information:  Pt is calling the office for empagliflozin (JARDIANCE) 10 mg tablet to be refilled.Please call back and advise.

## 2023-06-13 ENCOUNTER — PES CALL (OUTPATIENT)
Dept: ADMINISTRATIVE | Facility: CLINIC | Age: 88
End: 2023-06-13
Payer: MEDICARE

## 2023-06-26 NOTE — TELEPHONE ENCOUNTER
----- Message from Felicitas Atkins sent at 10/18/2022  1:37 PM CDT -----  Regarding: appointment  Contact: benjamin rowland want tp speak with a nurse regarding scheduling hospital follow up in one week, please call back at [phone] (home)       
S/w pt & advised dr larkin has no appts with dr larkin. Made appt with NP for 11/15 at 320pm  
n/a

## 2023-06-27 DIAGNOSIS — E11.59 HYPERTENSION ASSOCIATED WITH DIABETES: ICD-10-CM

## 2023-06-27 DIAGNOSIS — I15.2 HYPERTENSION ASSOCIATED WITH DIABETES: ICD-10-CM

## 2023-06-27 NOTE — TELEPHONE ENCOUNTER
No care due was identified.  [facility] Embedded Care Due Messages. Reference number: [id number].   6/27/2023 2:19:56 PM CDT

## 2023-06-28 RX ORDER — CARVEDILOL 6.25 MG/1
6.25 TABLET ORAL 2 TIMES DAILY WITH MEALS
Qty: 180 TABLET | Refills: 3 | Status: ON HOLD | OUTPATIENT
Start: 2023-06-28 | End: 2023-11-21 | Stop reason: HOSPADM

## 2023-08-11 ENCOUNTER — PES CALL (OUTPATIENT)
Dept: ADMINISTRATIVE | Facility: CLINIC | Age: 88
End: 2023-08-11
Payer: MEDICARE

## 2023-08-25 DIAGNOSIS — E11.42 TYPE 2 DIABETES MELLITUS WITH DIABETIC POLYNEUROPATHY, WITH LONG-TERM CURRENT USE OF INSULIN: ICD-10-CM

## 2023-08-25 DIAGNOSIS — Z79.4 TYPE 2 DIABETES MELLITUS WITH DIABETIC POLYNEUROPATHY, WITH LONG-TERM CURRENT USE OF INSULIN: ICD-10-CM

## 2023-08-25 NOTE — TELEPHONE ENCOUNTER
----- Message from Mary Maciel sent at 8/25/2023 10:57 AM CDT -----  Type:  RX Refill Request    Who Called:  Pt    Refill or New Rx:  refill    RX Name and Strength:  metFORMIN (GLUCOPHAGE) 1000 MG tablet and empagliflozin (JARDIANCE) 10 mg tablet    How is the patient currently taking it? (ex. 1XDay):  as directed    Is this a 30 day or 90 day RX:  90    Preferred Pharmacy with phone number:    [facility] - [place], [place] - [address]  [address] [address]  Phone: [phone] Fax: [phone]    Local or Mail Order:  Mail Order    Ordering Provider:  Dr Christianson    Would the patient rather a call back or a response via [facility]?  Call back    Best Call Back Number:  [phone]    Additional Information:  Pt is wanting 90 day supply so it will be cheaper than 30 day.   Please call back to advise. Thanks!

## 2023-08-28 RX ORDER — METFORMIN HYDROCHLORIDE 1000 MG/1
1000 TABLET ORAL 2 TIMES DAILY WITH MEALS
Qty: 180 TABLET | Refills: 1 | Status: SHIPPED | OUTPATIENT
Start: 2023-08-28

## 2023-08-29 ENCOUNTER — OFFICE VISIT (OUTPATIENT)
Dept: CARDIOLOGY | Facility: CLINIC | Age: 88
End: 2023-08-29
Payer: MEDICARE

## 2023-08-29 VITALS
DIASTOLIC BLOOD PRESSURE: 74 MMHG | RESPIRATION RATE: 16 BRPM | BODY MASS INDEX: 28.09 KG/M2 | SYSTOLIC BLOOD PRESSURE: 120 MMHG | HEIGHT: 67 IN | HEART RATE: 64 BPM | OXYGEN SATURATION: 98 % | WEIGHT: 179 LBS

## 2023-08-29 DIAGNOSIS — I15.2 HYPERTENSION ASSOCIATED WITH DIABETES: ICD-10-CM

## 2023-08-29 DIAGNOSIS — E11.69 HYPERLIPIDEMIA ASSOCIATED WITH TYPE 2 DIABETES MELLITUS: ICD-10-CM

## 2023-08-29 DIAGNOSIS — I48.0 PAROXYSMAL ATRIAL FIBRILLATION: ICD-10-CM

## 2023-08-29 DIAGNOSIS — Z95.2 S/P AVR (AORTIC VALVE REPLACEMENT): ICD-10-CM

## 2023-08-29 DIAGNOSIS — E78.5 HYPERLIPIDEMIA ASSOCIATED WITH TYPE 2 DIABETES MELLITUS: ICD-10-CM

## 2023-08-29 DIAGNOSIS — E11.59 HYPERTENSION ASSOCIATED WITH DIABETES: ICD-10-CM

## 2023-08-29 DIAGNOSIS — E11.42 TYPE 2 DIABETES MELLITUS WITH DIABETIC POLYNEUROPATHY, WITH LONG-TERM CURRENT USE OF INSULIN: ICD-10-CM

## 2023-08-29 DIAGNOSIS — Z79.4 TYPE 2 DIABETES MELLITUS WITH DIABETIC POLYNEUROPATHY, WITH LONG-TERM CURRENT USE OF INSULIN: ICD-10-CM

## 2023-08-29 PROCEDURE — 1126F PR PAIN SEVERITY QUANTIFIED, NO PAIN PRESENT: ICD-10-PCS | Mod: HCNC,CPTII,S$GLB, | Performed by: INTERNAL MEDICINE

## 2023-08-29 PROCEDURE — 1157F PR ADVANCE CARE PLAN OR EQUIV PRESENT IN MEDICAL RECORD: ICD-10-PCS | Mod: HCNC,CPTII,S$GLB, | Performed by: INTERNAL MEDICINE

## 2023-08-29 PROCEDURE — 1101F PT FALLS ASSESS-DOCD LE1/YR: CPT | Mod: HCNC,CPTII,S$GLB, | Performed by: INTERNAL MEDICINE

## 2023-08-29 PROCEDURE — 99999 PR PBB SHADOW E&M-EST. PATIENT-LVL IV: CPT | Mod: PBBFAC,HCNC,, | Performed by: INTERNAL MEDICINE

## 2023-08-29 PROCEDURE — 99213 OFFICE O/P EST LOW 20 MIN: CPT | Mod: HCNC,S$GLB,, | Performed by: INTERNAL MEDICINE

## 2023-08-29 PROCEDURE — 3288F PR FALLS RISK ASSESSMENT DOCUMENTED: ICD-10-PCS | Mod: HCNC,CPTII,S$GLB, | Performed by: INTERNAL MEDICINE

## 2023-08-29 PROCEDURE — 1160F PR REVIEW ALL MEDS BY PRESCRIBER/CLIN PHARMACIST DOCUMENTED: ICD-10-PCS | Mod: HCNC,CPTII,S$GLB, | Performed by: INTERNAL MEDICINE

## 2023-08-29 PROCEDURE — 1101F PR PT FALLS ASSESS DOC 0-1 FALLS W/OUT INJ PAST YR: ICD-10-PCS | Mod: HCNC,CPTII,S$GLB, | Performed by: INTERNAL MEDICINE

## 2023-08-29 PROCEDURE — 1159F PR MEDICATION LIST DOCUMENTED IN MEDICAL RECORD: ICD-10-PCS | Mod: HCNC,CPTII,S$GLB, | Performed by: INTERNAL MEDICINE

## 2023-08-29 PROCEDURE — 99999 PR PBB SHADOW E&M-EST. PATIENT-LVL IV: ICD-10-PCS | Mod: PBBFAC,HCNC,, | Performed by: INTERNAL MEDICINE

## 2023-08-29 PROCEDURE — 1126F AMNT PAIN NOTED NONE PRSNT: CPT | Mod: HCNC,CPTII,S$GLB, | Performed by: INTERNAL MEDICINE

## 2023-08-29 PROCEDURE — 1160F RVW MEDS BY RX/DR IN RCRD: CPT | Mod: HCNC,CPTII,S$GLB, | Performed by: INTERNAL MEDICINE

## 2023-08-29 PROCEDURE — 99213 PR OFFICE/OUTPT VISIT, EST, LEVL III, 20-29 MIN: ICD-10-PCS | Mod: HCNC,S$GLB,, | Performed by: INTERNAL MEDICINE

## 2023-08-29 PROCEDURE — 1157F ADVNC CARE PLAN IN RCRD: CPT | Mod: HCNC,CPTII,S$GLB, | Performed by: INTERNAL MEDICINE

## 2023-08-29 PROCEDURE — 1159F MED LIST DOCD IN RCRD: CPT | Mod: HCNC,CPTII,S$GLB, | Performed by: INTERNAL MEDICINE

## 2023-08-29 PROCEDURE — 3288F FALL RISK ASSESSMENT DOCD: CPT | Mod: HCNC,CPTII,S$GLB, | Performed by: INTERNAL MEDICINE

## 2023-08-29 NOTE — ASSESSMENT & PLAN NOTE
Blood pressure is very well controlled 120/74 mmHg continue the same therapy.  Maintain on low-salt diet lisinopril 20 mg daily

## 2023-08-30 ENCOUNTER — OFFICE VISIT (OUTPATIENT)
Dept: FAMILY MEDICINE | Facility: CLINIC | Age: 88
End: 2023-08-30
Attending: FAMILY MEDICINE
Payer: MEDICARE

## 2023-08-30 VITALS
WEIGHT: 180.13 LBS | HEIGHT: 67 IN | OXYGEN SATURATION: 98 % | HEART RATE: 86 BPM | RESPIRATION RATE: 18 BRPM | DIASTOLIC BLOOD PRESSURE: 74 MMHG | TEMPERATURE: 98 F | SYSTOLIC BLOOD PRESSURE: 111 MMHG | BODY MASS INDEX: 28.27 KG/M2

## 2023-08-30 DIAGNOSIS — E11.42 TYPE 2 DIABETES MELLITUS WITH DIABETIC POLYNEUROPATHY, WITH LONG-TERM CURRENT USE OF INSULIN: Primary | ICD-10-CM

## 2023-08-30 DIAGNOSIS — N18.32 STAGE 3B CHRONIC KIDNEY DISEASE: ICD-10-CM

## 2023-08-30 DIAGNOSIS — E11.59 HYPERTENSION ASSOCIATED WITH DIABETES: ICD-10-CM

## 2023-08-30 DIAGNOSIS — L12.0 BULLOUS PEMPHIGOID: ICD-10-CM

## 2023-08-30 DIAGNOSIS — I15.2 HYPERTENSION ASSOCIATED WITH DIABETES: ICD-10-CM

## 2023-08-30 DIAGNOSIS — E78.5 HYPERLIPIDEMIA ASSOCIATED WITH TYPE 2 DIABETES MELLITUS: ICD-10-CM

## 2023-08-30 DIAGNOSIS — Z79.4 TYPE 2 DIABETES MELLITUS WITH DIABETIC POLYNEUROPATHY, WITH LONG-TERM CURRENT USE OF INSULIN: Primary | ICD-10-CM

## 2023-08-30 DIAGNOSIS — E11.69 HYPERLIPIDEMIA ASSOCIATED WITH TYPE 2 DIABETES MELLITUS: ICD-10-CM

## 2023-08-30 PROCEDURE — 1126F AMNT PAIN NOTED NONE PRSNT: CPT | Mod: HCNC,CPTII,S$GLB, | Performed by: FAMILY MEDICINE

## 2023-08-30 PROCEDURE — 3288F FALL RISK ASSESSMENT DOCD: CPT | Mod: HCNC,CPTII,S$GLB, | Performed by: FAMILY MEDICINE

## 2023-08-30 PROCEDURE — 99214 OFFICE O/P EST MOD 30 MIN: CPT | Mod: HCNC,S$GLB,, | Performed by: FAMILY MEDICINE

## 2023-08-30 PROCEDURE — 1101F PT FALLS ASSESS-DOCD LE1/YR: CPT | Mod: HCNC,CPTII,S$GLB, | Performed by: FAMILY MEDICINE

## 2023-08-30 PROCEDURE — 1157F ADVNC CARE PLAN IN RCRD: CPT | Mod: HCNC,CPTII,S$GLB, | Performed by: FAMILY MEDICINE

## 2023-08-30 PROCEDURE — 1159F PR MEDICATION LIST DOCUMENTED IN MEDICAL RECORD: ICD-10-PCS | Mod: HCNC,CPTII,S$GLB, | Performed by: FAMILY MEDICINE

## 2023-08-30 PROCEDURE — 1160F RVW MEDS BY RX/DR IN RCRD: CPT | Mod: HCNC,CPTII,S$GLB, | Performed by: FAMILY MEDICINE

## 2023-08-30 PROCEDURE — 1157F PR ADVANCE CARE PLAN OR EQUIV PRESENT IN MEDICAL RECORD: ICD-10-PCS | Mod: HCNC,CPTII,S$GLB, | Performed by: FAMILY MEDICINE

## 2023-08-30 PROCEDURE — 1126F PR PAIN SEVERITY QUANTIFIED, NO PAIN PRESENT: ICD-10-PCS | Mod: HCNC,CPTII,S$GLB, | Performed by: FAMILY MEDICINE

## 2023-08-30 PROCEDURE — 99214 PR OFFICE/OUTPT VISIT, EST, LEVL IV, 30-39 MIN: ICD-10-PCS | Mod: HCNC,S$GLB,, | Performed by: FAMILY MEDICINE

## 2023-08-30 PROCEDURE — 99999 PR PBB SHADOW E&M-EST. PATIENT-LVL V: ICD-10-PCS | Mod: PBBFAC,HCNC,, | Performed by: FAMILY MEDICINE

## 2023-08-30 PROCEDURE — 3288F PR FALLS RISK ASSESSMENT DOCUMENTED: ICD-10-PCS | Mod: HCNC,CPTII,S$GLB, | Performed by: FAMILY MEDICINE

## 2023-08-30 PROCEDURE — 1159F MED LIST DOCD IN RCRD: CPT | Mod: HCNC,CPTII,S$GLB, | Performed by: FAMILY MEDICINE

## 2023-08-30 PROCEDURE — 99999 PR PBB SHADOW E&M-EST. PATIENT-LVL V: CPT | Mod: PBBFAC,HCNC,, | Performed by: FAMILY MEDICINE

## 2023-08-30 PROCEDURE — 1160F PR REVIEW ALL MEDS BY PRESCRIBER/CLIN PHARMACIST DOCUMENTED: ICD-10-PCS | Mod: HCNC,CPTII,S$GLB, | Performed by: FAMILY MEDICINE

## 2023-08-30 PROCEDURE — 1101F PR PT FALLS ASSESS DOC 0-1 FALLS W/OUT INJ PAST YR: ICD-10-PCS | Mod: HCNC,CPTII,S$GLB, | Performed by: FAMILY MEDICINE

## 2023-08-30 RX ORDER — CLOBETASOL PROPIONATE 0.5 MG/G
1 OINTMENT TOPICAL 2 TIMES DAILY
COMMUNITY
Start: 2023-08-23

## 2023-08-30 NOTE — PROGRESS NOTES
Subjective:       Patient ID: Leslie Tolliver is a 89 y.o. female.    Chief Complaint: Diabetes (Pt states that she is here for her 6 mo fu) and Mass (Pt states that she has had two warts show up on her arms)    [age]-year-old female coming in to discuss several problems.  She has a history of bullous pemphigoid in his currently [place] [facility].  She has no fresh lesions and all her old ones are healing well with almost all locations well into the healing process.  She has a history of atrial fibrillation and was recently hospitalized at [facility] by Dr. Lemon.  The patient no longer has family here and she has switched to Dr. Huerta as she can not go to [place] for [facility] and Dr. Lemon does not go to any of the hospitals here in [facility].  She is doing well with her blood sugars and has had no episodes of hypoglycemia.  She is trying to lose a few lb and we discussed diet for a time.  In addition to her diabetes with neuropathy and nephropathy she has hypertension, hyperlipidemia, and aortic valve replacement with a bioprosthetic, stage IIIB chronic kidney disease, iron deficiency anemia, hypothyroidism, colon polyps and bilateral low back pain without sciatica.  She is had several epidural steroids.  The epic computer indicates she is due for a colonoscopy and at the age of 89 she declines.    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  No date: GERD (gastroesophageal reflux disease)  No date: Hyperlipidemia  No date: Hypertension  No date:  Hypothyroidism  3/16/2023: Paroxysmal atrial fibrillation  No date: Type 2 diabetes mellitus    Past Surgical History:  No date: ADENOIDECTOMY  No date: AORTIC VALVE REPLACEMENT  20 yrs ago: BREAST BIOPSY; Right      Comment:  benign  No date: CARDIAC SURGERY  No date: CHOLECYSTECTOMY  5-: COLONOSCOPY      Comment:  Dr Holly, 5 year recheck  3/25/2021: EPIDURAL STEROID INJECTION; N/A      Comment:  Procedure: Injection, Steroid, Epidural L3-4;  Surgeon:                Sky Love MD;  Location: [facility] OR;  Service: Pain                Management;  Laterality: N/A;  Injection, Steroid,                Epidural L3-4  5/20/2021: EPIDURAL STEROID INJECTION; N/A      Comment:  Procedure: Injection, Steroid, Epidural L3-4;  Surgeon:                Sky Loev MD;  Location: [facility] OR;  Service: Pain                Management;  Laterality: N/A;  Injection, Steroid,                Epidural L3-4  6/4/2020: ESOPHAGOGASTRODUODENOSCOPY; N/A      Comment:  Procedure: EGD (ESOPHAGOGASTRODUODENOSCOPY);  Surgeon:                Michael Nugent III, MD;  Location: [facility];                 Service: Endoscopy;  Laterality: N/A;  No date: HEMORRHOID SURGERY  No date: HYSTERECTOMY  No date: OOPHORECTOMY  No date: TONSILLECTOMY    Current Outpatient Medications on File Prior to Visit:  aspirin 81 mg Tab, Take 81 mg by mouth once daily. Every day, Disp: 30 tablet, Rfl: 0  blood sugar diagnostic (ACCU-CHEK GUIDE TEST STRIPS) Strp, 300 each by Misc.(Non-Drug; Combo Route) route 3 (three) times daily., Disp: 300 each, Rfl: 3  blood-glucose meter kit, Accu-chek Marley glucometer check glucose three times daily E11.65, Disp: 1 each, Rfl: 0  calcium carbonate/vitamin D3 (CALCIUM+D ORAL), Take 1 tablet by mouth once daily., Disp: , Rfl:   carvediloL (COREG) 6.25 MG tablet, Take 1 tablet (6.25 mg total) by mouth 2 (two) times daily with meals., Disp: 180 tablet, Rfl: 3  clobetasol 0.05% (TEMOVATE) 0.05 % Oint, Apply topically., Disp: ,  "Rfl:   clopidogreL (PLAVIX) 75 mg tablet, Take 1 tablet (75 mg total) by mouth once daily., Disp: 30 tablet, Rfl: 11  DROPLET PEN NEEDLE 31 gauge x 5/16" Ndle, USE AS DIRECTED WITH LANTUS SOLOSTAR PEN AS NEEDED, Disp: 100 each, Rfl: 3  empagliflozin (JARDIANCE) 10 mg tablet, Take 1 tablet (10 mg total) by mouth once daily., Disp: 90 tablet, Rfl: 1  ketoconazole (NIZORAL) 2 % cream, AAA pannus fold bid, Disp: 60 g, Rfl: 3  lancets (ACCU-CHEK SOFTCLIX LANCETS) Misc, New machine Guide Me Accuchek, Disp: 300 each, Rfl: 3  LANTUS SOLOSTAR U-100 INSULIN glargine 100 units/mL SubQ pen, INJECT 24 UNITS SUBCUTANEOUSLY EVERY EVENING, Disp: 30 mL, Rfl: 1  lisinopriL (PRINIVIL,ZESTRIL) 20 MG tablet, Take 1 tablet (20 mg total) by mouth once daily., Disp: 90 tablet, Rfl: 2  metFORMIN (GLUCOPHAGE) 1000 MG tablet, Take 1 tablet (1,000 mg total) by mouth 2 (two) times daily with meals., Disp: 180 tablet, Rfl: 1  multivitamin (THERAGRAN) per tablet, Take 1 tablet by mouth once daily., Disp: , Rfl:   mycophenolate (CELLCEPT) 500 mg Tab, One tab PO QAM and 2 tabs PO QHS, Disp: 90 tablet, Rfl: 1  PREVALITE 4 gram PwPk, TAKE 1 PACKET (4 G TOTAL) BY MOUTH ONCE DAILY., Disp: 30 packet, Rfl: 11  triamcinolone acetonide 0.1% (KENALOG) 0.1 % cream, AAA bid, Disp: 454 g, Rfl: 3  levocetirizine (XYZAL) 5 MG tablet, Take 5 mg by mouth every evening., Disp: , Rfl:   ondansetron (ZOFRAN-ODT) 4 MG TbDL, Take 1 tablet (4 mg total) by mouth every 6 (six) hours as needed. (Patient not taking: Reported on 8/30/2023), Disp: 12 tablet, Rfl: 0  predniSONE (DELTASONE) 10 MG tablet, Take 2 tabs PO QAM x 1 week then 1 tab PO QAM x1 week (Patient not taking: Reported on 12/5/2022), Disp: 21 tablet, Rfl: 0  valACYclovir (VALTREX) 1000 MG tablet, Take 1,000 mg by mouth., Disp: , Rfl:   [DISCONTINUED] amoxicillin-clavulanate 875-125mg (AUGMENTIN) 875-125 mg per tablet, Take 1 tablet by mouth 2 (two) times daily. (Patient not taking: Reported on 3/16/2023), " Disp: 14 tablet, Rfl: 0    No current facility-administered medications on file prior to visit.          Review of Systems   Constitutional:  Negative for chills, diaphoresis and fever.   Respiratory:  Negative for chest tightness and shortness of breath.    Cardiovascular:  Negative for chest pain and palpitations.   Endocrine: Negative for polydipsia and polyuria.   Skin:  Positive for rash (Healing well, no fresh lesions). Negative for color change.       Objective:      Physical Exam  Vitals and nursing note reviewed.   Constitutional:       General: She is not in acute distress.     Appearance: Normal appearance. She is not ill-appearing, toxic-appearing or diaphoretic.      Comments: Good blood pressure control  Normal pulse with regular rhythm  Mildly overweight with a BMI of 28.2 she is down 4/10 of a lb from her March 16, 2023 visit   HENT:      Head: Atraumatic.   Cardiovascular:      Rate and Rhythm: Normal rate and regular rhythm.      Heart sounds: Normal heart sounds. No murmur heard.     No friction rub. No gallop.   Pulmonary:      Effort: Pulmonary effort is normal. No respiratory distress.      Breath sounds: Normal breath sounds. No stridor. No wheezing, rhonchi or rales.   Chest:      Chest wall: No tenderness.   Musculoskeletal:      Right lower leg: No edema.      Left lower leg: No edema.   Neurological:      General: No focal deficit present.      Mental Status: She is alert and oriented to person, place, and time.   Psychiatric:         Mood and Affect: Mood normal.         Behavior: Behavior normal.         Thought Content: Thought content normal.         Judgment: Judgment normal.         Assessment:       1. Type 2 diabetes mellitus with diabetic polyneuropathy, with long-term current use of insulin    2. Hypertension associated with diabetes    3. Hyperlipidemia associated with type 2 diabetes mellitus    4. Stage 3b chronic kidney disease    5. Bullous pemphigoid    6. BMI  28.0-28.9,adult        Plan:       1. Type 2 diabetes mellitus with diabetic polyneuropathy, with long-term current use of insulin  Scheduled fasting labs for early November  - Comprehensive Metabolic Panel; Future  - Hemoglobin A1C; Future  - Lipid Panel; Future    2. Hypertension associated with diabetes  Well controlled with no changes needed  - Comprehensive Metabolic Panel; Future  - Lipid Panel; Future    3. Hyperlipidemia associated with type 2 diabetes mellitus  Lipid panel will be done in early November as well  - Comprehensive Metabolic Panel; Future  - Lipid Panel; Future    4. Stage 3b chronic kidney disease  Await chemistry panel results  - Comprehensive Metabolic Panel; Future    5. Bullous pemphigoid  No new lesions, healing well    6. BMI 28.0-28.9,adult  Improving

## 2023-09-22 ENCOUNTER — TELEPHONE (OUTPATIENT)
Dept: CARDIOLOGY | Facility: CLINIC | Age: 88
End: 2023-09-22
Payer: MEDICARE

## 2023-09-22 NOTE — TELEPHONE ENCOUNTER
----- Message from Art Smith sent at 9/22/2023  3:03 PM CDT -----  Contact: Self  Type: Needs Medical Advice  Who Called:  Patient    Best Call Back Number: [phone]   Additional Information: States she may be having allergic reactions to clopidogreL (PLAVIX) 75 mg tablet. Please advise.

## 2023-09-22 NOTE — TELEPHONE ENCOUNTER
S/w pt & leaves her hoarse & sinus drip. The plavix.   Didn't take it yesterday & it was better.  Advised will s/e dr larkin about it & call her back.    Printing for vb to review

## 2023-10-09 DIAGNOSIS — R19.7 DIARRHEA, UNSPECIFIED TYPE: ICD-10-CM

## 2023-10-09 RX ORDER — CHOLESTYRAMINE 4 G/4.8G
1 POWDER, FOR SUSPENSION ORAL DAILY
Qty: 30 PACKET | Refills: 11 | Status: ON HOLD | OUTPATIENT
Start: 2023-10-09 | End: 2023-11-21 | Stop reason: HOSPADM

## 2023-10-09 NOTE — TELEPHONE ENCOUNTER
----- Message from Jaqueline Gunter sent at 10/9/2023 11:49 AM CDT -----  Regarding: pt called  Name of Who is Calling: GIRMA ART [[id number]]      What is the request in detail: PREVALITE 4 gram PwPk        [id number] - [place] [address] [facility] [address] [address]  [facility] [place] [address]  Phone: [phone] Fax: [phone]          Can the clinic reply by YANELISNER: no      What Number to Call Back if not in [facility]: Telephone Information:  [phone]

## 2023-10-09 NOTE — TELEPHONE ENCOUNTER
No care due was identified.  [facility] Embedded Care Due Messages. Reference number: [id number].   10/09/2023 12:13:18 PM CDT

## 2023-11-06 ENCOUNTER — TELEPHONE (OUTPATIENT)
Dept: CARDIOLOGY | Facility: CLINIC | Age: 88
End: 2023-11-06
Payer: MEDICARE

## 2023-11-06 NOTE — TELEPHONE ENCOUNTER
----- Message from Mallory Key sent at 11/6/2023  2:33 PM CST -----  Type:  Sooner Appointment Request    Caller is requesting a sooner appointment.  Caller declined first available appointment listed below.  Caller will not accept being placed on the waitlist and is requesting a message be sent to doctor.    Name of Caller:  pt   When is the first available appointment?  Jan 2024  Symptoms:  f/u   Would the patient rather a call back or a response via [facility]? Call back  Best Call Back Number:  [phone]    Additional Information:  please advise

## 2023-11-16 ENCOUNTER — HOSPITAL ENCOUNTER (INPATIENT)
Facility: HOSPITAL | Age: 88
LOS: 1 days | Discharge: HOME HEALTH | DRG: 308 | End: 2023-11-21
Attending: EMERGENCY MEDICINE | Admitting: STUDENT IN AN ORGANIZED HEALTH CARE EDUCATION/TRAINING PROGRAM
Payer: MEDICARE

## 2023-11-16 DIAGNOSIS — I48.0 PAROXYSMAL ATRIAL FIBRILLATION: ICD-10-CM

## 2023-11-16 DIAGNOSIS — I48.92 ATRIAL FIBRILLATION AND FLUTTER: ICD-10-CM

## 2023-11-16 DIAGNOSIS — I48.91 ATRIAL FIBRILLATION WITH RVR: Primary | ICD-10-CM

## 2023-11-16 DIAGNOSIS — R06.02 SHORTNESS OF BREATH: ICD-10-CM

## 2023-11-16 DIAGNOSIS — I48.91 ATRIAL FIBRILLATION AND FLUTTER: ICD-10-CM

## 2023-11-16 DIAGNOSIS — I50.9 CONGESTIVE HEART FAILURE, UNSPECIFIED HF CHRONICITY, UNSPECIFIED HEART FAILURE TYPE: ICD-10-CM

## 2023-11-16 DIAGNOSIS — I50.9 CONGESTIVE HEART FAILURE: ICD-10-CM

## 2023-11-16 DIAGNOSIS — M79.89 LEFT LEG SWELLING: ICD-10-CM

## 2023-11-16 DIAGNOSIS — I50.9 ACUTE DECOMPENSATED HEART FAILURE: ICD-10-CM

## 2023-11-16 LAB
ALBUMIN SERPL BCP-MCNC: 4.4 G/DL (ref 3.5–5.2)
ALP SERPL-CCNC: 86 U/L (ref 55–135)
ALT SERPL W/O P-5'-P-CCNC: 19 U/L (ref 10–44)
ANION GAP SERPL CALC-SCNC: 7 MMOL/L (ref 8–16)
AST SERPL-CCNC: 20 U/L (ref 10–40)
BASOPHILS # BLD AUTO: 0.05 K/UL (ref 0–0.2)
BASOPHILS NFR BLD: 0.9 % (ref 0–1.9)
BILIRUB SERPL-MCNC: 1.4 MG/DL (ref 0.1–1)
BNP SERPL-MCNC: 277 PG/ML (ref 0–99)
BUN SERPL-MCNC: 25 MG/DL (ref 8–23)
CALCIUM SERPL-MCNC: 9.3 MG/DL (ref 8.7–10.5)
CHLORIDE SERPL-SCNC: 108 MMOL/L (ref 95–110)
CO2 SERPL-SCNC: 22 MMOL/L (ref 23–29)
CREAT SERPL-MCNC: 1.1 MG/DL (ref 0.5–1.4)
DIFFERENTIAL METHOD: ABNORMAL
EOSINOPHIL # BLD AUTO: 0.2 K/UL (ref 0–0.5)
EOSINOPHIL NFR BLD: 3.2 % (ref 0–8)
ERYTHROCYTE [DISTWIDTH] IN BLOOD BY AUTOMATED COUNT: 15 % (ref 11.5–14.5)
EST. GFR  (NO RACE VARIABLE): 48 ML/MIN/1.73 M^2
GLUCOSE SERPL-MCNC: 117 MG/DL (ref 70–110)
GLUCOSE SERPL-MCNC: 184 MG/DL (ref 70–110)
HCT VFR BLD AUTO: 37.5 % (ref 37–48.5)
HGB BLD-MCNC: 11.6 G/DL (ref 12–16)
IMM GRANULOCYTES # BLD AUTO: 0.02 K/UL (ref 0–0.04)
IMM GRANULOCYTES NFR BLD AUTO: 0.4 % (ref 0–0.5)
LYMPHOCYTES # BLD AUTO: 0.8 K/UL (ref 1–4.8)
LYMPHOCYTES NFR BLD: 14.3 % (ref 18–48)
MAGNESIUM SERPL-MCNC: 1.9 MG/DL (ref 1.6–2.6)
MCH RBC QN AUTO: 29.9 PG (ref 27–31)
MCHC RBC AUTO-ENTMCNC: 30.9 G/DL (ref 32–36)
MCV RBC AUTO: 97 FL (ref 82–98)
MONOCYTES # BLD AUTO: 0.4 K/UL (ref 0.3–1)
MONOCYTES NFR BLD: 7.8 % (ref 4–15)
NEUTROPHILS # BLD AUTO: 4.1 K/UL (ref 1.8–7.7)
NEUTROPHILS NFR BLD: 73.4 % (ref 38–73)
NRBC BLD-RTO: 0 /100 WBC
PLATELET # BLD AUTO: 193 K/UL (ref 150–450)
PMV BLD AUTO: 10.6 FL (ref 9.2–12.9)
POTASSIUM SERPL-SCNC: 4.2 MMOL/L (ref 3.5–5.1)
PROT SERPL-MCNC: 6.8 G/DL (ref 6–8.4)
RBC # BLD AUTO: 3.88 M/UL (ref 4–5.4)
SODIUM SERPL-SCNC: 137 MMOL/L (ref 136–145)
TROPONIN I SERPL HS-MCNC: 10.8 PG/ML (ref 0–14.9)
TSH SERPL DL<=0.005 MIU/L-ACNC: 2.56 UIU/ML (ref 0.34–5.6)
WBC # BLD AUTO: 5.61 K/UL (ref 3.9–12.7)

## 2023-11-16 PROCEDURE — 96375 TX/PRO/DX INJ NEW DRUG ADDON: CPT

## 2023-11-16 PROCEDURE — 96372 THER/PROPH/DIAG INJ SC/IM: CPT | Performed by: STUDENT IN AN ORGANIZED HEALTH CARE EDUCATION/TRAINING PROGRAM

## 2023-11-16 PROCEDURE — 99285 EMERGENCY DEPT VISIT HI MDM: CPT | Mod: 25

## 2023-11-16 PROCEDURE — 85025 COMPLETE CBC W/AUTO DIFF WBC: CPT | Performed by: EMERGENCY MEDICINE

## 2023-11-16 PROCEDURE — 96374 THER/PROPH/DIAG INJ IV PUSH: CPT

## 2023-11-16 PROCEDURE — G0378 HOSPITAL OBSERVATION PER HR: HCPCS

## 2023-11-16 PROCEDURE — 82962 GLUCOSE BLOOD TEST: CPT

## 2023-11-16 PROCEDURE — 63600175 PHARM REV CODE 636 W HCPCS: Performed by: STUDENT IN AN ORGANIZED HEALTH CARE EDUCATION/TRAINING PROGRAM

## 2023-11-16 PROCEDURE — 93010 EKG 12-LEAD: ICD-10-PCS | Mod: ,,, | Performed by: INTERNAL MEDICINE

## 2023-11-16 PROCEDURE — 84443 ASSAY THYROID STIM HORMONE: CPT | Performed by: EMERGENCY MEDICINE

## 2023-11-16 PROCEDURE — 93005 ELECTROCARDIOGRAM TRACING: CPT | Performed by: INTERNAL MEDICINE

## 2023-11-16 PROCEDURE — 96376 TX/PRO/DX INJ SAME DRUG ADON: CPT

## 2023-11-16 PROCEDURE — 83735 ASSAY OF MAGNESIUM: CPT | Performed by: EMERGENCY MEDICINE

## 2023-11-16 PROCEDURE — 25000003 PHARM REV CODE 250: Performed by: STUDENT IN AN ORGANIZED HEALTH CARE EDUCATION/TRAINING PROGRAM

## 2023-11-16 PROCEDURE — 63600175 PHARM REV CODE 636 W HCPCS: Performed by: EMERGENCY MEDICINE

## 2023-11-16 PROCEDURE — 25000003 PHARM REV CODE 250: Performed by: EMERGENCY MEDICINE

## 2023-11-16 PROCEDURE — 80053 COMPREHEN METABOLIC PANEL: CPT | Performed by: EMERGENCY MEDICINE

## 2023-11-16 PROCEDURE — 83036 HEMOGLOBIN GLYCOSYLATED A1C: CPT | Performed by: EMERGENCY MEDICINE

## 2023-11-16 PROCEDURE — 93010 ELECTROCARDIOGRAM REPORT: CPT | Mod: ,,, | Performed by: INTERNAL MEDICINE

## 2023-11-16 PROCEDURE — 83880 ASSAY OF NATRIURETIC PEPTIDE: CPT | Performed by: EMERGENCY MEDICINE

## 2023-11-16 PROCEDURE — 96372 THER/PROPH/DIAG INJ SC/IM: CPT | Performed by: EMERGENCY MEDICINE

## 2023-11-16 PROCEDURE — 84484 ASSAY OF TROPONIN QUANT: CPT | Performed by: EMERGENCY MEDICINE

## 2023-11-16 RX ORDER — MYCOPHENOLATE MOFETIL 250 MG/1
500 CAPSULE ORAL 2 TIMES DAILY
Status: DISCONTINUED | OUTPATIENT
Start: 2023-11-17 | End: 2023-11-21 | Stop reason: HOSPADM

## 2023-11-16 RX ORDER — SODIUM CHLORIDE 0.9 % (FLUSH) 0.9 %
10 SYRINGE (ML) INJECTION
Status: DISCONTINUED | OUTPATIENT
Start: 2023-11-16 | End: 2023-11-21 | Stop reason: HOSPADM

## 2023-11-16 RX ORDER — NAPROXEN SODIUM 220 MG/1
81 TABLET, FILM COATED ORAL DAILY
Status: DISCONTINUED | OUTPATIENT
Start: 2023-11-17 | End: 2023-11-21 | Stop reason: HOSPADM

## 2023-11-16 RX ORDER — IBUPROFEN 200 MG
24 TABLET ORAL
Status: DISCONTINUED | OUTPATIENT
Start: 2023-11-16 | End: 2023-11-21 | Stop reason: HOSPADM

## 2023-11-16 RX ORDER — ONDANSETRON 2 MG/ML
4 INJECTION INTRAMUSCULAR; INTRAVENOUS EVERY 8 HOURS PRN
Status: DISCONTINUED | OUTPATIENT
Start: 2023-11-16 | End: 2023-11-21 | Stop reason: HOSPADM

## 2023-11-16 RX ORDER — ACETAMINOPHEN 500 MG
5000 TABLET ORAL DAILY
COMMUNITY

## 2023-11-16 RX ORDER — SODIUM,POTASSIUM PHOSPHATES 280-250MG
2 POWDER IN PACKET (EA) ORAL
Status: DISCONTINUED | OUTPATIENT
Start: 2023-11-16 | End: 2023-11-21 | Stop reason: HOSPADM

## 2023-11-16 RX ORDER — GLUCAGON 1 MG
1 KIT INJECTION
Status: DISCONTINUED | OUTPATIENT
Start: 2023-11-16 | End: 2023-11-21 | Stop reason: HOSPADM

## 2023-11-16 RX ORDER — IBUPROFEN 200 MG
16 TABLET ORAL
Status: DISCONTINUED | OUTPATIENT
Start: 2023-11-16 | End: 2023-11-21 | Stop reason: HOSPADM

## 2023-11-16 RX ORDER — ENOXAPARIN SODIUM 100 MG/ML
40 INJECTION SUBCUTANEOUS EVERY 24 HOURS
Status: DISCONTINUED | OUTPATIENT
Start: 2023-11-17 | End: 2023-11-17

## 2023-11-16 RX ORDER — FERROUS SULFATE, DRIED 160(50) MG
1 TABLET, EXTENDED RELEASE ORAL DAILY
Status: DISCONTINUED | OUTPATIENT
Start: 2023-11-17 | End: 2023-11-21 | Stop reason: HOSPADM

## 2023-11-16 RX ORDER — MYCOPHENOLATE MOFETIL 500 MG/20ML
INJECTION, POWDER, LYOPHILIZED, FOR SOLUTION INTRAVENOUS
COMMUNITY
End: 2023-11-16 | Stop reason: SDUPTHER

## 2023-11-16 RX ORDER — LANOLIN ALCOHOL/MO/W.PET/CERES
800 CREAM (GRAM) TOPICAL
Status: DISCONTINUED | OUTPATIENT
Start: 2023-11-16 | End: 2023-11-21 | Stop reason: HOSPADM

## 2023-11-16 RX ORDER — FUROSEMIDE 10 MG/ML
20 INJECTION INTRAMUSCULAR; INTRAVENOUS
Status: COMPLETED | OUTPATIENT
Start: 2023-11-16 | End: 2023-11-16

## 2023-11-16 RX ORDER — FUROSEMIDE 10 MG/ML
40 INJECTION INTRAMUSCULAR; INTRAVENOUS
Status: DISCONTINUED | OUTPATIENT
Start: 2023-11-16 | End: 2023-11-18

## 2023-11-16 RX ORDER — ACETAMINOPHEN 325 MG/1
650 TABLET ORAL EVERY 4 HOURS PRN
Status: DISCONTINUED | OUTPATIENT
Start: 2023-11-16 | End: 2023-11-21 | Stop reason: HOSPADM

## 2023-11-16 RX ORDER — METOPROLOL TARTRATE 1 MG/ML
5 INJECTION, SOLUTION INTRAVENOUS
Status: COMPLETED | OUTPATIENT
Start: 2023-11-16 | End: 2023-11-16

## 2023-11-16 RX ORDER — ENOXAPARIN SODIUM 100 MG/ML
1 INJECTION SUBCUTANEOUS
Status: COMPLETED | OUTPATIENT
Start: 2023-11-16 | End: 2023-11-16

## 2023-11-16 RX ORDER — INSULIN ASPART 100 [IU]/ML
0-5 INJECTION, SOLUTION INTRAVENOUS; SUBCUTANEOUS
Status: DISCONTINUED | OUTPATIENT
Start: 2023-11-16 | End: 2023-11-21 | Stop reason: HOSPADM

## 2023-11-16 RX ORDER — CARVEDILOL 3.12 MG/1
6.25 TABLET ORAL 2 TIMES DAILY WITH MEALS
Status: DISCONTINUED | OUTPATIENT
Start: 2023-11-17 | End: 2023-11-17

## 2023-11-16 RX ORDER — LISINOPRIL 10 MG/1
20 TABLET ORAL DAILY
Status: DISCONTINUED | OUTPATIENT
Start: 2023-11-17 | End: 2023-11-19

## 2023-11-16 RX ADMIN — METOPROLOL TARTRATE 5 MG: 1 INJECTION, SOLUTION INTRAVENOUS at 09:11

## 2023-11-16 RX ADMIN — INSULIN DETEMIR 24 UNITS: 100 INJECTION, SOLUTION SUBCUTANEOUS at 11:11

## 2023-11-16 RX ADMIN — FUROSEMIDE 40 MG: 10 INJECTION, SOLUTION INTRAVENOUS at 11:11

## 2023-11-16 RX ADMIN — FUROSEMIDE 20 MG: 10 INJECTION, SOLUTION INTRAMUSCULAR; INTRAVENOUS at 08:11

## 2023-11-16 RX ADMIN — ENOXAPARIN SODIUM 80 MG: 80 INJECTION SUBCUTANEOUS at 09:11

## 2023-11-16 NOTE — Clinical Note
Diagnosis: Atrial fibrillation with RVR [[id number]]   Future Attending Provider: IBRAHIMA MEDINA [[id number]]   Place in Observation: [facility] [[id number]]   Admitting Provider:: IBRAHIMA MEDINA [[id number]]

## 2023-11-17 ENCOUNTER — CLINICAL SUPPORT (OUTPATIENT)
Dept: CARDIOLOGY | Facility: HOSPITAL | Age: 88
DRG: 308 | End: 2023-11-17
Attending: STUDENT IN AN ORGANIZED HEALTH CARE EDUCATION/TRAINING PROGRAM
Payer: MEDICARE

## 2023-11-17 VITALS — WEIGHT: 179.88 LBS | HEIGHT: 67 IN | BODY MASS INDEX: 28.23 KG/M2

## 2023-11-17 LAB
ANION GAP SERPL CALC-SCNC: 11 MMOL/L (ref 8–16)
AORTIC ROOT ANNULUS: 2.9 CM
ASCENDING AORTA: 3.3 CM
AV INDEX (PROSTH): 0.53
AV MEAN GRADIENT: 11 MMHG
AV PEAK GRADIENT: 20 MMHG
AV VALVE AREA BY VELOCITY RATIO: 1.81 CM²
AV VALVE AREA: 1.65 CM²
AV VELOCITY RATIO: 0.58
BASOPHILS # BLD AUTO: 0.04 K/UL (ref 0–0.2)
BASOPHILS NFR BLD: 0.7 % (ref 0–1.9)
BSA FOR ECHO PROCEDURE: 1.96 M2
BUN SERPL-MCNC: 22 MG/DL (ref 8–23)
CALCIUM SERPL-MCNC: 9.3 MG/DL (ref 8.7–10.5)
CHLORIDE SERPL-SCNC: 107 MMOL/L (ref 95–110)
CO2 SERPL-SCNC: 23 MMOL/L (ref 23–29)
CREAT SERPL-MCNC: 0.9 MG/DL (ref 0.5–1.4)
CV ECHO LV RWT: 0.41 CM
DIFFERENTIAL METHOD: ABNORMAL
DOP CALC AO PEAK VEL: 2.25 M/S
DOP CALC AO VTI: 38.9 CM
DOP CALC LVOT AREA: 3.1 CM2
DOP CALC LVOT DIAMETER: 2 CM
DOP CALC LVOT PEAK VEL: 1.3 M/S
DOP CALC LVOT STROKE VOLUME: 64.37 CM3
DOP CALC MV VTI: 43.9 CM
DOP CALCLVOT PEAK VEL VTI: 20.5 CM
E WAVE DECELERATION TIME: 183 MSEC
E/A RATIO: 3.2
E/E' RATIO: 18.11 M/S
ECHO LV POSTERIOR WALL: 0.94 CM (ref 0.6–1.1)
EOSINOPHIL # BLD AUTO: 0.2 K/UL (ref 0–0.5)
EOSINOPHIL NFR BLD: 3.2 % (ref 0–8)
ERYTHROCYTE [DISTWIDTH] IN BLOOD BY AUTOMATED COUNT: 15 % (ref 11.5–14.5)
EST. GFR  (NO RACE VARIABLE): >60 ML/MIN/1.73 M^2
FRACTIONAL SHORTENING: 31 % (ref 28–44)
GLUCOSE SERPL-MCNC: 120 MG/DL (ref 70–110)
GLUCOSE SERPL-MCNC: 148 MG/DL (ref 70–110)
GLUCOSE SERPL-MCNC: 194 MG/DL (ref 70–110)
GLUCOSE SERPL-MCNC: 78 MG/DL (ref 70–110)
GLUCOSE SERPL-MCNC: 96 MG/DL (ref 70–110)
HCT VFR BLD AUTO: 37.6 % (ref 37–48.5)
HGB BLD-MCNC: 11.9 G/DL (ref 12–16)
IMM GRANULOCYTES # BLD AUTO: 0.01 K/UL (ref 0–0.04)
IMM GRANULOCYTES NFR BLD AUTO: 0.2 % (ref 0–0.5)
INTERVENTRICULAR SEPTUM: 1.21 CM (ref 0.6–1.1)
IVC DIAMETER: 2.35 CM
LEFT INTERNAL DIMENSION IN SYSTOLE: 3.19 CM (ref 2.1–4)
LEFT VENTRICLE DIASTOLIC VOLUME INDEX: 50.67 ML/M2
LEFT VENTRICLE DIASTOLIC VOLUME: 97.8 ML
LEFT VENTRICLE MASS INDEX: 91 G/M2
LEFT VENTRICLE SYSTOLIC VOLUME INDEX: 21 ML/M2
LEFT VENTRICLE SYSTOLIC VOLUME: 40.6 ML
LEFT VENTRICULAR INTERNAL DIMENSION IN DIASTOLE: 4.61 CM (ref 3.5–6)
LEFT VENTRICULAR MASS: 176.11 G
LV LATERAL E/E' RATIO: 16.3 M/S
LV SEPTAL E/E' RATIO: 20.38 M/S
LVOT MG: 3 MMHG
LVOT MV: 0.84 CM/S
LYMPHOCYTES # BLD AUTO: 0.9 K/UL (ref 1–4.8)
LYMPHOCYTES NFR BLD: 15.5 % (ref 18–48)
MAGNESIUM SERPL-MCNC: 1.6 MG/DL (ref 1.6–2.6)
MCH RBC QN AUTO: 30.2 PG (ref 27–31)
MCHC RBC AUTO-ENTMCNC: 31.6 G/DL (ref 32–36)
MCV RBC AUTO: 95 FL (ref 82–98)
MONOCYTES # BLD AUTO: 0.5 K/UL (ref 0.3–1)
MONOCYTES NFR BLD: 8.4 % (ref 4–15)
MV MEAN GRADIENT: 4 MMHG
MV PEAK A VEL: 0.51 M/S
MV PEAK E VEL: 1.63 M/S
MV PEAK GRADIENT: 13 MMHG
MV STENOSIS PRESSURE HALF TIME: 79 MS
MV VALVE AREA BY CONTINUITY EQUATION: 1.47 CM2
MV VALVE AREA P 1/2 METHOD: 2.78 CM2
NEUTROPHILS # BLD AUTO: 4.1 K/UL (ref 1.8–7.7)
NEUTROPHILS NFR BLD: 72 % (ref 38–73)
NRBC BLD-RTO: 0 /100 WBC
PISA MRMAX VEL: 5.14 M/S
PISA TR MAX VEL: 2.85 M/S
PLATELET # BLD AUTO: 179 K/UL (ref 150–450)
PMV BLD AUTO: 10.3 FL (ref 9.2–12.9)
POTASSIUM SERPL-SCNC: 3.4 MMOL/L (ref 3.5–5.1)
PV MV: 0.66 M/S
PV PEAK GRADIENT: 4 MMHG
PV PEAK VELOCITY: 0.98 M/S
RA PRESSURE ESTIMATED: 15 MMHG
RBC # BLD AUTO: 3.94 M/UL (ref 4–5.4)
RIGHT VENTRICULAR END-DIASTOLIC DIMENSION: 3.85 CM
RV TB RVSP: 18 MMHG
RV TISSUE DOPPLER FREE WALL SYSTOLIC VELOCITY 1 (APICAL 4 CHAMBER VIEW): 9.68 CM/S
SODIUM SERPL-SCNC: 141 MMOL/L (ref 136–145)
TDI LATERAL: 0.1 M/S
TDI SEPTAL: 0.08 M/S
TDI: 0.09 M/S
TR MAX PG: 32 MMHG
TR MEAN GRADIENT: 22 MMHG
TRICUSPID ANNULAR PLANE SYSTOLIC EXCURSION: 0.87 CM
TV REST PULMONARY ARTERY PRESSURE: 47 MMHG
WBC # BLD AUTO: 5.69 K/UL (ref 3.9–12.7)
Z-SCORE OF LEFT VENTRICULAR DIMENSION IN END DIASTOLE: -1.7
Z-SCORE OF LEFT VENTRICULAR DIMENSION IN END SYSTOLE: -0.42

## 2023-11-17 PROCEDURE — 83735 ASSAY OF MAGNESIUM: CPT | Performed by: STUDENT IN AN ORGANIZED HEALTH CARE EDUCATION/TRAINING PROGRAM

## 2023-11-17 PROCEDURE — 99213 OFFICE O/P EST LOW 20 MIN: CPT | Mod: 25,,, | Performed by: INTERNAL MEDICINE

## 2023-11-17 PROCEDURE — 96372 THER/PROPH/DIAG INJ SC/IM: CPT | Performed by: NURSE PRACTITIONER

## 2023-11-17 PROCEDURE — 63600175 PHARM REV CODE 636 W HCPCS: Performed by: STUDENT IN AN ORGANIZED HEALTH CARE EDUCATION/TRAINING PROGRAM

## 2023-11-17 PROCEDURE — G0378 HOSPITAL OBSERVATION PER HR: HCPCS

## 2023-11-17 PROCEDURE — 99900031 HC PATIENT EDUCATION (STAT)

## 2023-11-17 PROCEDURE — 25000003 PHARM REV CODE 250: Performed by: STUDENT IN AN ORGANIZED HEALTH CARE EDUCATION/TRAINING PROGRAM

## 2023-11-17 PROCEDURE — 96376 TX/PRO/DX INJ SAME DRUG ADON: CPT

## 2023-11-17 PROCEDURE — 94761 N-INVAS EAR/PLS OXIMETRY MLT: CPT

## 2023-11-17 PROCEDURE — 99213 PR OFFICE/OUTPT VISIT, EST, LEVL III, 20-29 MIN: ICD-10-PCS | Mod: 25,,, | Performed by: INTERNAL MEDICINE

## 2023-11-17 PROCEDURE — 93306 TTE W/DOPPLER COMPLETE: CPT

## 2023-11-17 PROCEDURE — 93306 TTE W/DOPPLER COMPLETE: CPT | Mod: 26,,, | Performed by: INTERNAL MEDICINE

## 2023-11-17 PROCEDURE — 80048 BASIC METABOLIC PNL TOTAL CA: CPT | Performed by: STUDENT IN AN ORGANIZED HEALTH CARE EDUCATION/TRAINING PROGRAM

## 2023-11-17 PROCEDURE — 82962 GLUCOSE BLOOD TEST: CPT

## 2023-11-17 PROCEDURE — 96372 THER/PROPH/DIAG INJ SC/IM: CPT | Performed by: STUDENT IN AN ORGANIZED HEALTH CARE EDUCATION/TRAINING PROGRAM

## 2023-11-17 PROCEDURE — 63600175 PHARM REV CODE 636 W HCPCS: Performed by: INTERNAL MEDICINE

## 2023-11-17 PROCEDURE — 99900035 HC TECH TIME PER 15 MIN (STAT)

## 2023-11-17 PROCEDURE — 85025 COMPLETE CBC W/AUTO DIFF WBC: CPT | Performed by: STUDENT IN AN ORGANIZED HEALTH CARE EDUCATION/TRAINING PROGRAM

## 2023-11-17 PROCEDURE — 63600175 PHARM REV CODE 636 W HCPCS: Performed by: NURSE PRACTITIONER

## 2023-11-17 PROCEDURE — 96375 TX/PRO/DX INJ NEW DRUG ADDON: CPT

## 2023-11-17 PROCEDURE — 93306 ECHO (CUPID ONLY): ICD-10-PCS | Mod: 26,,, | Performed by: INTERNAL MEDICINE

## 2023-11-17 RX ORDER — ENOXAPARIN SODIUM 100 MG/ML
1 INJECTION SUBCUTANEOUS EVERY 12 HOURS
Status: DISCONTINUED | OUTPATIENT
Start: 2023-11-17 | End: 2023-11-21 | Stop reason: HOSPADM

## 2023-11-17 RX ORDER — CARVEDILOL 6.25 MG/1
6.25 TABLET ORAL 2 TIMES DAILY WITH MEALS
Status: DISCONTINUED | OUTPATIENT
Start: 2023-11-17 | End: 2023-11-20

## 2023-11-17 RX ORDER — DIGOXIN 0.25 MG/ML
250 INJECTION INTRAMUSCULAR; INTRAVENOUS ONCE
Status: COMPLETED | OUTPATIENT
Start: 2023-11-17 | End: 2023-11-17

## 2023-11-17 RX ADMIN — CARVEDILOL 6.25 MG: 6.25 TABLET, FILM COATED ORAL at 04:11

## 2023-11-17 RX ADMIN — Medication 800 MG: at 07:11

## 2023-11-17 RX ADMIN — DIGOXIN 250 MCG: 0.25 INJECTION INTRAMUSCULAR; INTRAVENOUS at 02:11

## 2023-11-17 RX ADMIN — POTASSIUM BICARBONATE 35 MEQ: 391 TABLET, EFFERVESCENT ORAL at 03:11

## 2023-11-17 RX ADMIN — INSULIN DETEMIR 24 UNITS: 100 INJECTION, SOLUTION SUBCUTANEOUS at 10:11

## 2023-11-17 RX ADMIN — CARVEDILOL 6.25 MG: 6.25 TABLET, FILM COATED ORAL at 06:11

## 2023-11-17 RX ADMIN — MYCOPHENOLATE MOFETIL 500 MG: 250 CAPSULE ORAL at 10:11

## 2023-11-17 RX ADMIN — FUROSEMIDE 40 MG: 10 INJECTION, SOLUTION INTRAVENOUS at 10:11

## 2023-11-17 RX ADMIN — ENOXAPARIN SODIUM 80 MG: 80 INJECTION SUBCUTANEOUS at 07:11

## 2023-11-17 RX ADMIN — ASPIRIN 81 MG 81 MG: 81 TABLET ORAL at 09:11

## 2023-11-17 RX ADMIN — Medication 800 MG: at 03:11

## 2023-11-17 RX ADMIN — FUROSEMIDE 40 MG: 10 INJECTION, SOLUTION INTRAVENOUS at 09:11

## 2023-11-17 RX ADMIN — Medication 1 TABLET: at 09:11

## 2023-11-17 RX ADMIN — MYCOPHENOLATE MOFETIL 500 MG: 250 CAPSULE ORAL at 09:11

## 2023-11-17 RX ADMIN — POTASSIUM BICARBONATE 35 MEQ: 391 TABLET, EFFERVESCENT ORAL at 07:11

## 2023-11-17 NOTE — PLAN OF CARE
11/17/23 1019   HARGROVE Message   Medicare Outpatient and Observation Notification regarding financial responsibility Given to patient/caregiver;Explained to patient/caregiver;Signed/date by patient/caregiver   Date HARGROVE was signed 11/17/23   Time HARGROVE was signed 1014

## 2023-11-17 NOTE — ASSESSMENT & PLAN NOTE
Transesophageal echo on 10/16/2022 showed EF 60%  No history of congestive heart failure   We will order echocardiogram, started patient on Lasix 40 mg twice a day   Consult Cardiology, patient follows with Dr. Huerta outpatient

## 2023-11-17 NOTE — CARE UPDATE
Patient doing well, however heart rate still uncontrolled.  To low normal blood pressures, will start patient on IV amiodarone GTT.  Cardiology consulted.  H&P per previous physician, labs and images reviewed.  Continue current care management.

## 2023-11-17 NOTE — ASSESSMENT & PLAN NOTE
Creatine stable for now. BMP reviewed- noted Estimated Creatinine Clearance: 38.1 mL/min (based on SCr of 1.1 mg/dL). according to latest data. Based on current GFR, CKD stage is stage 3 - GFR 30-59.  Monitor UOP and serial BMP and adjust therapy as needed. Renally dose meds. Avoid nephrotoxic medications and procedures.

## 2023-11-17 NOTE — HPI
Patient is an 89-year-old female with history of bullous pemphigoid, type 2 diabetes, hypertension, hyperlipidemia, paroxysmal atrial fibrillation not on anticoagulation outpatient, CAD, aortic valve replacement comes to the emergency room with complaints of shortness of breath.  Patient reports that the shortness of breath has been ongoing for the last week, worsened at night.  Patient reports having shortness of breath while lying flat, reports waking up in the middle of night with shortness of breath.  Patient follows with Dr. Huerta outpatient for atrial fibrillation.  Reports not being on anticoagulation given she is felt to be high-risk for treatment because of fall risk and currently being treated with dual anti-platelet therapy per Cardiology.  Patient  reports shortness of breath worse with exertion, denies having any chest pain, fever, chills, diarrhea, nausea.  Reports taking medications as prescribed.  Patient states that she had hoarseness, states that she stopped taking her Plavix because she read that it was a side effect.  Patient states she noticed improvement in her symptoms of shortness of breath after she stopped drinking soda and stop eating chips this past week.      On admission patient was noted to have atrial fibrillation with heart rates in the 130s, was given Lopressor 5 mg with improvement in heart rates.  Was also given Lasix and Lovenox.  Chest x-ray showed no acute cardiopulmonary disease.  .  Patient also had complaints of left lower extremity swelling, ultrasound negative for DVT.

## 2023-11-17 NOTE — SUBJECTIVE & OBJECTIVE
Past Medical History:   Diagnosis Date    Anemia due to stage 3 chronic kidney disease 07/24/2019    Arthritis     Bullous pemphigoid 8/29/2022    Chronic bilateral low back pain without sciatica 07/24/2019    CKD (chronic kidney disease) stage 3, GFR 30-59 ml/min 07/24/2019    Colon polyps     Coronary artery disease     Diabetes mellitus type II     Diabetes with neurologic complications     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    GERD (gastroesophageal reflux disease)     Hyperlipidemia     Hypertension     Hypothyroidism     Paroxysmal atrial fibrillation 3/16/2023    Type 2 diabetes mellitus        Past Surgical History:   Procedure Laterality Date    ADENOIDECTOMY      AORTIC VALVE REPLACEMENT      BREAST BIOPSY Right 20 yrs ago    benign    CARDIAC SURGERY      CHOLECYSTECTOMY      COLONOSCOPY  5-    Dr Holly, 5 year recheck    EPIDURAL STEROID INJECTION N/A 3/25/2021    Procedure: Injection, Steroid, Epidural L3-4;  Surgeon: Sky Love MD;  Location: [facility];  Service: Pain Management;  Laterality: N/A;  Injection, Steroid, Epidural L3-4    EPIDURAL STEROID INJECTION N/A 5/20/2021    Procedure: Injection, Steroid, Epidural L3-4;  Surgeon: kSy Love MD;  Location: [facility] OR;  Service: Pain Management;  Laterality: N/A;  Injection, Steroid, Epidural L3-4    ESOPHAGOGASTRODUODENOSCOPY N/A 6/4/2020    Procedure: EGD (ESOPHAGOGASTRODUODENOSCOPY);  Surgeon: Michael Nugent III, MD;  Location: [facility];  Service: Endoscopy;  Laterality: N/A;    HEMORRHOID SURGERY      HYSTERECTOMY      OOPHORECTOMY      TONSILLECTOMY         Review of patient's allergies indicates:   Allergen Reactions    Fenofibric acid (choline)      Other reaction(s): Vomiting    Iodine      Other reaction(s): lips swollen ivp dye    Rosuvastatin      Other reaction(s): muscle pain       No current facility-administered medications on file prior to encounter.     Current Outpatient Medications on File Prior to Encounter  "  Medication Sig    aspirin 81 mg Tab Take 81 mg by mouth once daily. Every day    calcium carbonate/vitamin D3 (CALCIUM+D ORAL) Take 1 tablet by mouth once daily.    clobetasol 0.05% (TEMOVATE) 0.05 % Oint Apply 1 g topically 2 (two) times daily.    empagliflozin (JARDIANCE) 10 mg tablet Take 1 tablet (10 mg total) by mouth once daily.    ketoconazole (NIZORAL) 2 % cream AAA pannus fold bid    LANTUS SOLOSTAR U-100 INSULIN glargine 100 units/mL SubQ pen INJECT 24 UNITS SUBCUTANEOUSLY EVERY EVENING (Patient taking differently: Inject 24 Units into the skin every evening. INJECT 24 UNITS SUBCUTANEOUSLY EVERY EVENING)    lisinopriL (PRINIVIL,ZESTRIL) 20 MG tablet Take 1 tablet (20 mg total) by mouth once daily.    metFORMIN (GLUCOPHAGE) 1000 MG tablet Take 1 tablet (1,000 mg total) by mouth 2 (two) times daily with meals.    multivitamin (THERAGRAN) per tablet Take 1 tablet by mouth once daily.    triamcinolone acetonide 0.1% (KENALOG) 0.1 % cream AAA bid    blood sugar diagnostic (ACCU-CHEK GUIDE TEST STRIPS) Strp 300 each by Misc.(Non-Drug; Combo Route) route 3 (three) times daily.    blood-glucose meter kit Accu-chek Marley glucometer check glucose three times daily E11.65    carvediloL (COREG) 6.25 MG tablet Take 1 tablet (6.25 mg total) by mouth 2 (two) times daily with meals.    cholestyramine-aspartame (PREVALITE) 4 gram PwPk Take 1 packet (4 g total) by mouth once daily.    clopidogreL (PLAVIX) 75 mg tablet Take 1 tablet (75 mg total) by mouth once daily.    DROPLET PEN NEEDLE 31 gauge x 5/16" Ndle USE AS DIRECTED WITH LANTUS SOLOSTAR PEN AS NEEDED    lancets (ACCU-CHEK SOFTCLIX LANCETS) Misc New machine Guide Me Accuchek    levocetirizine (XYZAL) 5 MG tablet Take 5 mg by mouth every evening.    mycophenolate (CELLCEPT) 500 mg Tab One tab PO QAM and 2 tabs PO QHS (Patient taking differently: Take 500 mg by mouth 2 (two) times daily. One tab PO QAM and 2 tabs PO QHS)    [DISCONTINUED] mycophenolate (CELLCEPT) 500 " mg injection as directed Intravenous    [DISCONTINUED] ondansetron (ZOFRAN-ODT) 4 MG TbDL Take 1 tablet (4 mg total) by mouth every 6 (six) hours as needed. (Patient not taking: Reported on 2023)    [DISCONTINUED] predniSONE (DELTASONE) 10 MG tablet Take 2 tabs PO QAM x 1 week then 1 tab PO QAM x1 week (Patient not taking: Reported on 2022)    [DISCONTINUED] valACYclovir (VALTREX) 1000 MG tablet Take 1,000 mg by mouth.     Family History       Problem Relation (Age of Onset)    Cancer Father    Diabetes Mother, Brother    Early death Son, Son    Glaucoma Mother    Heart disease Mother    No Known Problems Daughter          Tobacco Use    Smoking status: Former     Current packs/day: 0.00     Average packs/day: 0.3 packs/day for 15.0 years (3.8 ttl pk-yrs)     Types: Cigarettes     Start date: 3/30/1959     Quit date: 3/30/1974     Years since quittin.6    Smokeless tobacco: Never   Substance and Sexual Activity    Alcohol use: No    Drug use: No    Sexual activity: Never     Review of Systems   Constitutional:  Negative for appetite change, chills and diaphoresis.   HENT:  Negative for ear pain and facial swelling.    Respiratory:  Positive for shortness of breath. Negative for cough, choking and chest tightness.    Cardiovascular:  Positive for leg swelling. Negative for chest pain.   Gastrointestinal:  Negative for abdominal distention and abdominal pain.   Genitourinary:  Negative for dysuria.   Musculoskeletal:  Negative for joint swelling.   Neurological:  Negative for light-headedness and numbness.   Psychiatric/Behavioral:  Negative for agitation and behavioral problems.      Objective:     Vital Signs (Most Recent):  Temp: 98.2 °F (36.8 °C) (23)  Pulse: 101 (23)  Resp: 20 (23)  BP: (!) 119/58 (23)  SpO2: 96 % (23) Vital Signs (24h Range):  Temp:  [98.2 °F (36.8 °C)] 98.2 °F (36.8 °C)  Pulse:  [101-130] 101  Resp:  [20] 20  SpO2:  [96 %-97  %] 96 %  BP: (119-152)/(58-90) 119/58     Weight: 81.6 kg (180 lb)  Body mass index is 28.19 kg/m².     Physical Exam  Vitals reviewed.   Constitutional:       Appearance: She is ill-appearing.   HENT:      Head: Normocephalic and atraumatic.   Cardiovascular:      Rate and Rhythm: Tachycardia present. Rhythm irregular.      Heart sounds: No murmur heard.  Pulmonary:      Effort: Respiratory distress present.      Breath sounds: No wheezing.   Abdominal:      General: There is no distension.      Tenderness: There is no abdominal tenderness.   Musculoskeletal:         General: Normal range of motion.   Skin:     General: Skin is warm and dry.   Neurological:      General: No focal deficit present.      Mental Status: She is alert and oriented to person, place, and time.                Significant Labs: All pertinent labs within the past 24 hours have been reviewed.  CBC:   Recent Labs   Lab 11/16/23 1911   WBC 5.61   HGB 11.6*   HCT 37.5        CMP:   Recent Labs   Lab 11/16/23 1911      K 4.2      CO2 22*   *   BUN 25*   CREATININE 1.1   CALCIUM 9.3   PROT 6.8   ALBUMIN 4.4   BILITOT 1.4*   ALKPHOS 86   AST 20   ALT 19   ANIONGAP 7*       Significant Imaging: I have reviewed all pertinent imaging results/findings within the past 24 hours.

## 2023-11-17 NOTE — PLAN OF CARE
[facility] - Emergency Dept  Initial Discharge Assessment       Primary Care Provider: Chang Christianson MD    Admission Diagnosis: Atrial fibrillation with RVR [I48.91]    Admission Date: 11/16/2023  Expected Discharge Date: 11/18/2023    Transition of Care Barriers: None    Assessment completed at bedside.  Pt intends to  discharge home where her daughter lives with her.  Pt is independent with ADL's and currently uses a rollator.  Denies HH, Coumdin and Dialysis.  No needs identified a this time.    Payor: [employer] MEDICARE / Plan: [employer] MEDICARE HMO / Product Type: Capitation /     Extended Emergency Contact Information  Primary Emergency Contact: Litzy Stanley  Address: [address] [place] [address] [place] [address] [facility]  Home Phone: [phone]  Mobile Phone: [phone]  Relation: Daughter  Preferred language: English   needed? No    Discharge Plan A: Home  Discharge Plan B: Home with [facility] - [facility] [address]  [address] [facility] [address]  Phone: [phone] Fax: [phone]    [id number] - [place] - [address] AT [facility] [address]  [address] [place] [address]  Phone: [phone] Fax: [phone]      Initial Assessment (most recent)       Adult Discharge Assessment - 11/17/23 1024          Discharge Assessment    Assessment Type Discharge Planning Assessment     Confirmed/corrected address, phone number and insurance Yes     Confirmed Demographics Correct on Facesheet     Source of Information patient     Communicated MORA with patient/caregiver Date not available/Unable to determine     Reason For Admission heart failure     People in Home child(chin), adult     Facility Arrived From: home     Do you expect to return to your current living situation? No     Do you have help at home or someone to help you manage your care at  home? Yes     Who are your caregiver(s) and their phone number(s)? Litzy Stanley (Daughter) [phone]     Prior to hospitilization cognitive status: Alert/Oriented     Current cognitive status: Alert/Oriented     Equipment Currently Used at Home rollator     Readmission within 30 days? No     Patient currently being followed by outpatient case management? No     Do you currently have service(s) that help you manage your care at home? No     Do you take prescription medications? Yes     Do you have prescription coverage? Yes     Coverage [employer]     Do you have any problems affording any of your prescribed medications? No     Is the patient taking medications as prescribed? yes     Who is going to help you get home at discharge? Litzy Stanley (Daughter) [phone]     How do you get to doctors appointments? family or friend will provide     Are you on dialysis? No     Do you take coumadin? No     DME Needed Upon Discharge  none     Discharge Plan discussed with: Patient     Transition of Care Barriers None     Discharge Plan A Home     Discharge Plan B Home with family

## 2023-11-17 NOTE — PHARMACY MED REC
"              .      Admission Medication History     The home medication history was taken by Lindy Martin.    You may go to "Admission" then "Reconcile Home Medications" tabs to review and/or act upon these items.     The home medication list has been updated by the Pharmacy department.   Please read ALL comments highlighted in yellow.   Please address this information as you see fit.    Feel free to contact us if you have any questions or require assistance.        Medications listed below were obtained from: Patient/family and Analytic software- [employer] ([email or web address])  No current facility-administered medications on file prior to encounter.     Current Outpatient Medications on File Prior to Encounter   Medication Sig Dispense Refill    aspirin 81 mg Tab Take 81 mg by mouth once daily. Every day 30 tablet 0    calcium carbonate/vitamin D3 (CALCIUM+D ORAL) Take 1 tablet by mouth once daily.      carvediloL (COREG) 6.25 MG tablet Take 1 tablet (6.25 mg total) by mouth 2 (two) times daily with meals. 180 tablet 3    cholecalciferol, vitamin D3, (VITAMIN D3) 125 mcg (5,000 unit) Tab Take 5,000 Units by mouth once daily.      clobetasol 0.05% (TEMOVATE) 0.05 % Oint Apply 1 g topically 2 (two) times daily.      empagliflozin (JARDIANCE) 10 mg tablet Take 1 tablet (10 mg total) by mouth once daily. 90 tablet 1    ketoconazole (NIZORAL) 2 % cream AAA pannus fold bid (Patient taking differently: Apply 1 application  topically once daily. AAA pannus fold bid) 60 g 3    LANTUS SOLOSTAR U-100 INSULIN glargine 100 units/mL SubQ pen INJECT 24 UNITS SUBCUTANEOUSLY EVERY EVENING (Patient taking differently: Inject 24 Units into the skin every evening. INJECT 24 UNITS SUBCUTANEOUSLY EVERY EVENING) 30 mL 1    levocetirizine (XYZAL) 5 MG tablet Take 5 mg by mouth every evening.      lisinopriL (PRINIVIL,ZESTRIL) 20 MG tablet Take 1 tablet (20 mg total) by mouth once daily. 90 tablet 2    metFORMIN (GLUCOPHAGE) 1000 MG tablet Take 1 tablet " "(1,000 mg total) by mouth 2 (two) times daily with meals. 180 tablet 1    multivitamin (THERAGRAN) per tablet Take 1 tablet by mouth once daily.      triamcinolone acetonide 0.1% (KENALOG) 0.1 % cream AAA bid (Patient taking differently: Apply 1 g topically 2 (two) times daily. AAA bid) 454 g 3    blood sugar diagnostic (ACCU-CHEK GUIDE TEST STRIPS) Strp 300 each by Misc.(Non-Drug; Combo Route) route 3 (three) times daily. 300 each 3    blood-glucose meter kit Accu-chek Marley glucometer check glucose three times daily E11.65 1 each 0    cholestyramine-aspartame (PREVALITE) 4 gram PwPk Take 1 packet (4 g total) by mouth once daily. (Patient not taking: Reported on 11/16/2023) 30 packet 11    clopidogreL (PLAVIX) 75 mg tablet Take 1 tablet (75 mg total) by mouth once daily. (Patient not taking: Reported on 11/16/2023) 30 tablet 11    DROPLET PEN NEEDLE 31 gauge x 5/16" Ndle USE AS DIRECTED WITH LANTUS SOLOSTAR PEN AS NEEDED 100 each 3    lancets (ACCU-CHEK SOFTCLIX LANCETS) Misc New machine Guide Me Accuchek 300 each 3    mycophenolate (CELLCEPT) 500 mg Tab One tab PO QAM and 2 tabs PO QHS (Patient not taking: Reported on 11/16/2023) 90 tablet 1    [DISCONTINUED] mycophenolate (CELLCEPT) 500 mg injection as directed Intravenous      [DISCONTINUED] ondansetron (ZOFRAN-ODT) 4 MG TbDL Take 1 tablet (4 mg total) by mouth every 6 (six) hours as needed. (Patient not taking: Reported on 8/30/2023) 12 tablet 0    [DISCONTINUED] predniSONE (DELTASONE) 10 MG tablet Take 2 tabs PO QAM x 1 week then 1 tab PO QAM x1 week (Patient not taking: Reported on 12/5/2022) 21 tablet 0    [DISCONTINUED] valACYclovir (VALTREX) 1000 MG tablet Take 1,000 mg by mouth.         Potential issues to be addressed PRIOR TO DISCHARGE  Patient reported not taking the following medications: (Prevalite, Plavix & Cellcept). These medications remain on the home medication list. Please address accordingly.     Lindy Martin  EXT 1924      "

## 2023-11-17 NOTE — CONSULTS
[facility] - Emergency Dept  Department of Cardiology  Consult Note      PATIENT NAME: Leslie Tolliver    MRN: [id number]  TODAY'S DATE: 11/17/2023  ADMIT DATE: 11/16/2023                          CONSULT REQUESTED BY: Taiwo Rivera MD    SUBJECTIVE     PRINCIPAL PROBLEM: Acute decompensated heart failure      REASON FOR CONSULT:    From H&P: Patient is an 89-year-old female with history of bullous pemphigoid, type 2 diabetes, hypertension, hyperlipidemia, paroxysmal atrial fibrillation not on anticoagulation outpatient, CAD, aortic valve replacement comes to the emergency room with complaints of shortness of breath.  Patient reports that the shortness of breath has been ongoing for the last week, worsened at night.  Patient reports having shortness of breath while lying flat, reports waking up in the middle of night with shortness of breath.  Patient follows with Dr. Huerta outpatient for atrial fibrillation.  Reports not being on anticoagulation given she is felt to be high-risk for treatment because of fall risk and currently being treated with dual anti-platelet therapy per Cardiology.  Patient  reports shortness of breath worse with exertion, denies having any chest pain, fever, chills, diarrhea, nausea.  Reports taking medications as prescribed.  Patient states that she had hoarseness, states that she stopped taking her Plavix because she read that it was a side effect.  Patient states she noticed improvement in her symptoms of shortness of breath after she stopped drinking soda and stop eating chips this past week.        On admission patient was noted to have atrial fibrillation with heart rates in the 130s, was given Lopressor 5 mg with improvement in heart rates.  Was also given Lasix and Lovenox.  Chest x-ray showed no acute cardiopulmonary disease.  .  Patient also had complaints of left lower extremity swelling, ultrasound negative for DVT.      HPI:    Patient is an 89-year-old  female who presented to the emergency room with complaints of shortness of breath.  Patient has had issues with shortness of breath weakness, and swelling over the past several days.  Patient has a history of type 2 diabetes, hypertension, and paroxysmal atrial fibrillation as well as CAD and aortic valve replacement in the past.  She has been taking Plavix and aspirin at home however had recently stopped taking Plavix due to hoarseness.  She had been eating chips at home and drinking soda and felt like that brought on her swelling and thus she had cut back.  Patient noted to be in AFib with RVR in the 130s on arrival.  Patient was given 1 dose of IV metoprolol in admitted to hospital.  Unfortunately she was not given any further rate control medications until this afternoon.  She is allergic to iodine and thus amiodarone was not started.        Review of patient's allergies indicates:   Allergen Reactions    Fenofibric acid (choline)      Other reaction(s): Vomiting    Iodine      Other reaction(s): lips swollen ivp dye    Rosuvastatin      Other reaction(s): muscle pain       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    GERD (gastroesophageal reflux disease)     Hyperlipidemia     Hypertension     Hypothyroidism     Paroxysmal atrial fibrillation 3/16/2023    Type 2 diabetes mellitus      Past Surgical History:   Procedure Laterality Date    ADENOIDECTOMY      AORTIC VALVE REPLACEMENT      BREAST BIOPSY Right 20 yrs ago    benign    CARDIAC SURGERY      CHOLECYSTECTOMY       COLONOSCOPY  2014    Dr Holly, 5 year recheck    EPIDURAL STEROID INJECTION N/A 3/25/2021    Procedure: Injection, Steroid, Epidural L3-4;  Surgeon: Sky Love MD;  Location: [facility] OR;  Service: Pain Management;  Laterality: N/A;  Injection, Steroid, Epidural L3-4    EPIDURAL STEROID INJECTION N/A 2021    Procedure: Injection, Steroid, Epidural L3-4;  Surgeon: Sky Love MD;  Location: [facility] OR;  Service: Pain Management;  Laterality: N/A;  Injection, Steroid, Epidural L3-4    ESOPHAGOGASTRODUODENOSCOPY N/A 2020    Procedure: EGD (ESOPHAGOGASTRODUODENOSCOPY);  Surgeon: Michael Nugent III, MD;  Location: [facility];  Service: Endoscopy;  Laterality: N/A;    HEMORRHOID SURGERY      HYSTERECTOMY      OOPHORECTOMY      TONSILLECTOMY       Social History     Tobacco Use    Smoking status: Former     Current packs/day: 0.00     Average packs/day: 0.3 packs/day for 15.0 years (3.8 ttl pk-yrs)     Types: Cigarettes     Start date: 3/30/1959     Quit date: 3/30/1974     Years since quittin.6    Smokeless tobacco: Never   Substance Use Topics    Alcohol use: No    Drug use: No        REVIEW OF SYSTEMS  Per HPI    OBJECTIVE     VITAL SIGNS (Most Recent)  Temp: 98.1 °F (36.7 °C) (23 0730)  Pulse: (!) 111 (23 1030)  Resp: 18 (23 1030)  BP: 108/75 (23 1030)  SpO2: 97 % (23 1030)    VENTILATION STATUS  Resp: 18 (23 1030)  SpO2: 97 % (23 1030)           I & O (Last 24H):  Intake/Output Summary (Last 24 hours) at 2023 1410  Last data filed at 2023 0440  Gross per 24 hour   Intake --   Output 3000 ml   Net -3000 ml       WEIGHTS  Wt Readings from Last 1 Encounters:   23 1839 81.6 kg (180 lb)       PHYSICAL EXAM  CONSTITUTIONAL: No fever, no chills  HEENT: Normocephalic, atraumatic,pupils reactive to light                 NECK:  No JVD no carotid bruit  CVS: S1S2+, iRRR  LUNGS: Clear  ABDOMEN: Soft, NT, BS+  EXTREMITIES: No cyanosis, trace BLE  edema  : No pfeiffer catheter  NEURO: AAO X 3  PSY: Normal affect      HOME MEDICATIONS:  No current facility-administered medications on file prior to encounter.     Current Outpatient Medications on File Prior to Encounter   Medication Sig Dispense Refill    aspirin 81 mg Tab Take 81 mg by mouth once daily. Every day 30 tablet 0    calcium carbonate/vitamin D3 (CALCIUM+D ORAL) Take 1 tablet by mouth once daily.      carvediloL (COREG) 6.25 MG tablet Take 1 tablet (6.25 mg total) by mouth 2 (two) times daily with meals. 180 tablet 3    cholecalciferol, vitamin D3, (VITAMIN D3) 125 mcg (5,000 unit) Tab Take 5,000 Units by mouth once daily.      clobetasol 0.05% (TEMOVATE) 0.05 % Oint Apply 1 g topically 2 (two) times daily.      empagliflozin (JARDIANCE) 10 mg tablet Take 1 tablet (10 mg total) by mouth once daily. 90 tablet 1    ketoconazole (NIZORAL) 2 % cream AAA pannus fold bid (Patient taking differently: Apply 1 application  topically once daily. AAA pannus fold bid) 60 g 3    LANTUS SOLOSTAR U-100 INSULIN glargine 100 units/mL SubQ pen INJECT 24 UNITS SUBCUTANEOUSLY EVERY EVENING (Patient taking differently: Inject 24 Units into the skin every evening. INJECT 24 UNITS SUBCUTANEOUSLY EVERY EVENING) 30 mL 1    levocetirizine (XYZAL) 5 MG tablet Take 5 mg by mouth every evening.      lisinopriL (PRINIVIL,ZESTRIL) 20 MG tablet Take 1 tablet (20 mg total) by mouth once daily. 90 tablet 2    metFORMIN (GLUCOPHAGE) 1000 MG tablet Take 1 tablet (1,000 mg total) by mouth 2 (two) times daily with meals. 180 tablet 1    multivitamin (THERAGRAN) per tablet Take 1 tablet by mouth once daily.      triamcinolone acetonide 0.1% (KENALOG) 0.1 % cream AAA bid (Patient taking differently: Apply 1 g topically 2 (two) times daily. AAA bid) 454 g 3    blood sugar diagnostic (ACCU-CHEK GUIDE TEST STRIPS) Strp 300 each by Misc.(Non-Drug; Combo Route) route 3 (three) times daily. 300 each 3    blood-glucose meter kit Accu-chek Marley  "glucometer check glucose three times daily E11.65 1 each 0    cholestyramine-aspartame (PREVALITE) 4 gram PwPk Take 1 packet (4 g total) by mouth once daily. (Patient not taking: Reported on 11/16/2023) 30 packet 11    clopidogreL (PLAVIX) 75 mg tablet Take 1 tablet (75 mg total) by mouth once daily. (Patient not taking: Reported on 11/16/2023) 30 tablet 11    DROPLET PEN NEEDLE 31 gauge x 5/16" Ndle USE AS DIRECTED WITH LANTUS SOLOSTAR PEN AS NEEDED 100 each 3    lancets (ACCU-CHEK SOFTCLIX LANCETS) Misc New machine Guide Me Accuchek 300 each 3    mycophenolate (CELLCEPT) 500 mg Tab One tab PO QAM and 2 tabs PO QHS (Patient not taking: Reported on 11/16/2023) 90 tablet 1       SCHEDULED MEDS:   amiodarone in dextrose  150 mg Intravenous Once    aspirin  81 mg Oral Daily    calcium-vitamin D3  1 tablet Oral Daily    carvediloL  6.25 mg Oral BID WM    enoxparin  40 mg Subcutaneous Daily    furosemide (LASIX) injection  40 mg Intravenous Q12H    insulin detemir U-100  24 Units Subcutaneous QHS    lisinopriL  20 mg Oral Daily    mycophenolate  500 mg Oral BID       CONTINUOUS INFUSIONS:   amiodarone in dextrose 5%      amiodarone in dextrose 5%         PRN MEDS:acetaminophen, dextrose 50%, dextrose 50%, glucagon (human recombinant), glucose, glucose, insulin aspart U-100, magnesium oxide, magnesium oxide, ondansetron, potassium bicarbonate, potassium bicarbonate, potassium bicarbonate, potassium, sodium phosphates, potassium, sodium phosphates, potassium, sodium phosphates, sodium chloride 0.9%    LABS AND DIAGNOSTICS     CBC LAST 3 DAYS  Recent Labs   Lab 11/16/23  1911 11/17/23  0421   WBC 5.61 5.69   RBC 3.88* 3.94*   HGB 11.6* 11.9*   HCT 37.5 37.6   MCV 97 95   MCH 29.9 30.2   MCHC 30.9* 31.6*   RDW 15.0* 15.0*    179   MPV 10.6 10.3   GRAN 73.4*  4.1 72.0  4.1   LYMPH 14.3*  0.8* 15.5*  0.9*   MONO 7.8  0.4 8.4  0.5   BASO 0.05 0.04   NRBC 0 0       COAGULATION LAST 3 DAYS  No results for input(s): " ""LABPT", "INR", "APTT" in the last 168 hours.    CHEMISTRY LAST 3 DAYS  Recent Labs   Lab 11/16/23 1911 11/17/23  0421    141   K 4.2 3.4*    107   CO2 22* 23   ANIONGAP 7* 11   BUN 25* 22   CREATININE 1.1 0.9   * 120*   CALCIUM 9.3 9.3   MG 1.9 1.6   ALBUMIN 4.4  --    PROT 6.8  --    ALKPHOS 86  --    ALT 19  --    AST 20  --    BILITOT 1.4*  --        CARDIAC PROFILE LAST 3 DAYS  Recent Labs   Lab 11/16/23 1911   *   TROPONINIHS 10.8       ENDOCRINE LAST 3 DAYS  Recent Labs   Lab 11/16/23 1911   TSH 2.565       LAST ARTERIAL BLOOD GAS  ABG  No results for input(s): "PH", "PO2", "PCO2", "HCO3", "BE" in the last 168 hours.    LAST 7 DAYS MICROBIOLOGY   Microbiology Results (last 7 days)       ** No results found for the last 168 hours. **            MOST RECENT IMAGING  X-Ray Chest AP  HISTORY: SHORTNESS OF BREATH    COMPARISON:January 3, 2023    FINDINGS:Operative changes of prior median sternotomy. The heart is normal in size. The lungs are well-expanded and free of active disease. No evidence of pneumothorax. The osseous structures show nothing unusual site for chronic degenerative spondylosis changes. Clips in the right upper quadrant are the consequence of prior cholecystectomy.    IMPRESSION:No evidence of acute cardiopulmonary findings. Treated coronary artery disease is noted.    Electronically signed by:  Kamar Barahona MD  11/16/2023 07:59 PM CST Workstation: [id number]  US Lower Extremity Veins Left  EXAMINATION: US LEFT LOWER EXTREMITY VENOUS DOPPLER    CLINICAL INDICATION: Female , 89 years old. Swelling    TECHNIQUE: Complete unilateral duplex sonography of the left lower extremity veins was performed. The examination included compression for vein patency, color Doppler imaging and flow augmentation in response to distal compression of the distal external iliac, common femoral, femoral, popliteal, tibial, and great and small saphenous veins. The contralateral common " femoral vein was also studied. [id number].    COMPARISON: No prior exam.    FINDINGS:  Duplex sonography imaging demonstrates all deep and superficial veins examined to be fully compressible with spontaneous, phasic and augmented flow in the left lower extremity.    IMPRESSION:  No evidence of left lower extremity deep venous thrombosis or superficial venous thrombosis seen.    Electronically signed by:  Kamar Barahona MD  11/16/2023 07:57 PM CST Workstation: [id number]      ECHOCARDIOGRAM RESULTS (last 5)  Results for orders placed during the hospital encounter of 10/14/22    Transesophageal echo (GABY)    Interpretation Summary  · The left ventricle is normal in size with normal systolic function.  · The estimated ejection fraction is 60%.  · Normal left ventricular diastolic function.  · Normal right ventricular size with normal right ventricular systolic function.  · Mild left atrial enlargement.  · There is a bioprosthetic aortic valve present. There is no aortic insufficiency present. Prosthetic aortic valve is normal.  · No interatrial septal defect present.  · No ASD or PFO closure device in interatrial septum.  · Normal appearing left atrial appendage. No thrombus is present in the appendage. COREEN occluder is absent. Normal appendage velocities.      Echo Saline Bubble? Yes    Interpretation Summary  · There is no evidence of intracardiac shunting.  · The left ventricle is normal in size with mild concentric hypertrophy and hyperdynamic systolic function.  · The estimated ejection fraction is 70%.  · Indeterminate left ventricular diastolic function.  · Severe left atrial enlargement.  · Severe mitral regurgitation.  · Mild to moderate tricuspid regurgitation.  · There is moderate aortic valve stenosis.  · Aortic valve area is 1.79 cm2; peak velocity is m/s; mean gradient is 21 mmHg.  · Mild aortic regurgitation.  · Normal central venous pressure (3 mmHg).  · The estimated PA systolic pressure is 14  mmHg.      CURRENT/PREVIOUS VISIT EKG  Results for orders placed or performed during the hospital encounter of 11/16/23   EKG 12-lead    Collection Time: 11/16/23  6:55 PM    Narrative    Test Reason : R06.02,    Vent. Rate : 113 BPM     Atrial Rate : 111 BPM     P-R Int : 000 ms          QRS Dur : 088 ms      QT Int : 282 ms       P-R-T Axes : 000 037 013 degrees     QTc Int : 386 ms    Atrial fibrillation with rapid ventricular response  Low voltage QRS  Septal infarct (cited on or before 03-JAN-2023)  Abnormal ECG  When compared with ECG of 23-MAR-2023 12:10,  Atrial fibrillation has replaced Sinus rhythm  Questionable change in initial forces of Anterior leads  Nonspecific T wave abnormality now evident in Inferior leads    Referred By: [email or web address]   SELF           Confirmed By:            ASSESSMENT/PLAN:     Active Hospital Problems    Diagnosis    *Acute decompensated heart failure    Paroxysmal atrial fibrillation    Bullous pemphigoid    S/P AVR (aortic valve replacement)    Stage 3b chronic kidney disease    Hyperlipidemia associated with type 2 diabetes mellitus    Hypertension associated with diabetes    Type 2 diabetes mellitus with diabetic polyneuropathy, with long-term current use of insulin       ASSESSMENT & PLAN:   Afib with RVR  Acute HFpEF   CKD stage III  HTN  Cad  Hx of AVR      RECOMMENDATIONS:    Give digoxin 250 mcg IV x 1 dose now.   Start diltiazem drip and titrate for rate control as BP tolerates.   Continue to diurese with furosemide 40 mg IV BID.   Strict I&O.   Continue to check and replace potassium and magnesium. Goal for potassium is 4.0, and goal for magnesium is 2.0.   Continue carvedilol 6.25 mg po BID and lisinopril 20 mg po daily. Hold lisinopril for BP less than 110 systolic.   Recommend weight based Lovenox for stroke prevention in PAF.        Anjana Reese NP  Date of Service: 11/17/2023  2:10 PM  Patient has been experiencing intermittent episodes of atrial fibrillation  and symptoms related to it.  Because she has iodine allergy amiodarone can not be used.  As above will try digoxin and start on Cardizem drip and titrate this upwards as tolerated.  Continue on Co Coreg and hold lisinopril if the blood pressure is below 110  Discussed with the patient regarding the plans and the nursing staff at bedside   I have personally interviewed and examined the patient, I have reviewed the Nurse Practitioner's history and physical, assessment, and plan. I have personally evaluated the patient at bedside and agree with the findings and made appropriate changes as necessary in recommendations.    Delfino Huerta MD  Department of Cardiology  [facility]  11/17/2023 4:15 PM

## 2023-11-17 NOTE — ED PROVIDER NOTES
Encounter Date: 11/16/2023       History     Chief Complaint   Patient presents with    Shortness of Breath     SOB x 2 weeks that has not improved.     Tachycardia     130 HR. Denies CP    Foot Swelling     Bilateral feet sweeling     89-year-old female past medical history of bullous pemphigoid, diabetes, hypertension, hyperlipidemia, paroxysmal atrial fibrillation, coronary artery disease, aortic valve replacement, presents emergency department with shortness of breath.  Patient has evidence of shortness of breath for about 2 weeks.  Worse with exertion worse with lying flat.  She does not endorse some swelling in her bilateral lower extremities left greater than right.  She does not have any chest pain.  She denies any abdominal pain.  She is not have any fever.  She is not have any cough.  No sick contacts.  She says she is been taking her medications.  No recent travel.      Review of patient's allergies indicates:   Allergen Reactions    Fenofibric acid (choline)      Other reaction(s): Vomiting    Iodine      Other reaction(s): lips swollen ivp dye    Rosuvastatin      Other reaction(s): muscle pain     Past Medical History:   Diagnosis Date    Anemia due to stage 3 chronic kidney disease 07/24/2019    Arthritis     Bullous pemphigoid 8/29/2022    Chronic bilateral low back pain without sciatica 07/24/2019    CKD (chronic kidney disease) stage 3, GFR 30-59 ml/min 07/24/2019    Colon polyps     Coronary artery disease     Diabetes mellitus type II     Diabetes with neurologic complications     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    GERD (gastroesophageal reflux disease)     Hyperlipidemia     Hypertension     Hypothyroidism     Paroxysmal atrial fibrillation 3/16/2023    Type 2 diabetes mellitus      Past Surgical History:   Procedure Laterality Date    ADENOIDECTOMY      AORTIC VALVE REPLACEMENT      BREAST BIOPSY Right 20 yrs ago    benign    CARDIAC SURGERY      CHOLECYSTECTOMY      COLONOSCOPY   2014    Dr Holly, 5 year recheck    EPIDURAL STEROID INJECTION N/A 3/25/2021    Procedure: Injection, Steroid, Epidural L3-4;  Surgeon: Sky Love MD;  Location: [facility] OR;  Service: Pain Management;  Laterality: N/A;  Injection, Steroid, Epidural L3-4    EPIDURAL STEROID INJECTION N/A 2021    Procedure: Injection, Steroid, Epidural L3-4;  Surgeon: Sky Love MD;  Location: [facility] OR;  Service: Pain Management;  Laterality: N/A;  Injection, Steroid, Epidural L3-4    ESOPHAGOGASTRODUODENOSCOPY N/A 2020    Procedure: EGD (ESOPHAGOGASTRODUODENOSCOPY);  Surgeon: Michael Nugent III, MD;  Location: [facility];  Service: Endoscopy;  Laterality: N/A;    HEMORRHOID SURGERY      HYSTERECTOMY      OOPHORECTOMY      TONSILLECTOMY       Family History   Problem Relation Age of Onset    Diabetes Mother     Heart disease Mother     Glaucoma Mother     Cancer Father         lung cancer    Early death Son         brain tumor age 3    Diabetes Brother     No Known Problems Daughter     Early death Son         MVA 25    Macular degeneration Neg Hx     Retinal detachment Neg Hx      Social History     Tobacco Use    Smoking status: Former     Current packs/day: 0.00     Average packs/day: 0.3 packs/day for 15.0 years (3.8 ttl pk-yrs)     Types: Cigarettes     Start date: 3/30/1959     Quit date: 3/30/1974     Years since quittin.6    Smokeless tobacco: Never   Substance Use Topics    Alcohol use: No    Drug use: No     Review of Systems   Constitutional:  Negative for chills and fever.   HENT:  Negative for sore throat.    Respiratory:  Positive for shortness of breath. Negative for cough.    Cardiovascular:  Positive for leg swelling. Negative for chest pain and palpitations.   Gastrointestinal:  Negative for abdominal pain, nausea and vomiting.   Genitourinary:  Negative for dysuria.   Musculoskeletal:  Negative for back pain.   Skin:  Negative for rash.   Neurological:  Negative for weakness.    Hematological:  Does not bruise/bleed easily.   All other systems reviewed and are negative.      Physical Exam     Initial Vitals [11/16/23 1839]   BP Pulse Resp Temp SpO2   (!) 152/90 (!) 130 20 98.2 °F (36.8 °C) 97 %      MAP       --         Physical Exam    Nursing note and vitals reviewed.  Constitutional: She appears well-developed and well-nourished. No distress.   HENT:   Head: Normocephalic and atraumatic.   Mouth/Throat: No oropharyngeal exudate.   Eyes: Conjunctivae and EOM are normal. Pupils are equal, round, and reactive to light.   Neck: Neck supple. No tracheal deviation present.   Cardiovascular:  Normal heart sounds and intact distal pulses.           No murmur heard.  Irregularly irregular, tachycardic   Pulmonary/Chest: Breath sounds normal. No stridor. No respiratory distress. She has no wheezes. She has no rhonchi. She has no rales.   Abdominal: Abdomen is soft. She exhibits no distension. There is no abdominal tenderness. There is no rebound.   Musculoskeletal:         General: Edema (bilateral lower extremities left greater than right, 2+ pitting in the left lower leg ankle and foot, nonpitting edema in the right lower leg ankle and foot) present. No tenderness. Normal range of motion.      Cervical back: Neck supple.     Neurological: She is alert and oriented to person, place, and time. She has normal strength. No cranial nerve deficit or sensory deficit.   Skin: Skin is warm and dry. Capillary refill takes less than 2 seconds. No rash noted. No erythema. No pallor.   Psychiatric: She has a normal mood and affect. Her behavior is normal. Judgment and thought content normal.         ED Course   Procedures  Labs Reviewed   CBC W/ AUTO DIFFERENTIAL - Abnormal; Notable for the following components:       Result Value    RBC 3.88 (*)     Hemoglobin 11.6 (*)     MCHC 30.9 (*)     RDW 15.0 (*)     Lymph # 0.8 (*)     Gran % 73.4 (*)     Lymph % 14.3 (*)     All other components within normal  limits   COMPREHENSIVE METABOLIC PANEL - Abnormal; Notable for the following components:    CO2 22 (*)     Glucose 184 (*)     BUN 25 (*)     Total Bilirubin 1.4 (*)     eGFR 48.0 (*)     Anion Gap 7 (*)     All other components within normal limits   B-TYPE NATRIURETIC PEPTIDE - Abnormal; Notable for the following components:     (*)     All other components within normal limits   MAGNESIUM   TSH   TROPONIN I HIGH SENSITIVITY   TSH   TROPONIN I HIGH SENSITIVITY   MAGNESIUM          Imaging Results              US Lower Extremity Veins Left (Final result)  Result time 11/16/23 19:57:56      Final result by Kamar Barahona Jr., MD (11/16/23 19:57:56)                   Narrative:    EXAMINATION: US LEFT LOWER EXTREMITY VENOUS DOPPLER    CLINICAL INDICATION: Female , 89 years old. Swelling    TECHNIQUE: Complete unilateral duplex sonography of the left lower extremity veins was performed. The examination included compression for vein patency, color Doppler imaging and flow augmentation in response to distal compression of the distal external iliac, common femoral, femoral, popliteal, tibial, and great and small saphenous veins. The contralateral common femoral vein was also studied. [id number].    COMPARISON: No prior exam.    FINDINGS:  Duplex sonography imaging demonstrates all deep and superficial veins examined to be fully compressible with spontaneous, phasic and augmented flow in the left lower extremity.    IMPRESSION:  No evidence of left lower extremity deep venous thrombosis or superficial venous thrombosis seen.    Electronically signed by:  Kamar Barahona MD  11/16/2023 07:57 PM CST Workstation: [id number]                                     X-Ray Chest AP (Final result)  Result time 11/16/23 19:59:24      Final result by Kamar Barahona Jr., MD (11/16/23 19:59:24)                   Narrative:    HISTORY: SHORTNESS OF BREATH    COMPARISON:January 3, 2023    FINDINGS:Operative changes of prior  median sternotomy. The heart is normal in size. The lungs are well-expanded and free of active disease. No evidence of pneumothorax. The osseous structures show nothing unusual site for chronic degenerative spondylosis changes. Clips in the right upper quadrant are the consequence of prior cholecystectomy.    IMPRESSION:No evidence of acute cardiopulmonary findings. Treated coronary artery disease is noted.    Electronically signed by:  Kamar Barahona MD  11/16/2023 07:59 PM CST Workstation: [id number]                                     Medications   enoxaparin injection 80 mg (has no administration in time range)   metoprolol injection 5 mg (5 mg Intravenous Given 11/16/23 2100)   furosemide injection 20 mg (20 mg Intravenous Given 11/16/23 2058)     Medical Decision Making  Differential includes but not limited to electrolyte abnormality, no onset atrial fibrillation, dehydration, infectious etiology, pneumonia, pulmonary edema, CHF, dehydration, PE, DVT    89-year-old female presents emergency department shortness of breath with exertion, leg swelling.  Patient has evidence of congestive heart failure.  I did a DVT study which was negative.  Patient also with AFib with RVR.  She is been given 5 mg of metoprolol IV which slowed her heart rate down from the 130s 140s down to the 100 range high 90s range.  Patient is feeling better patient will be admitted for diuresis for further evaluation of symptoms.      A dictation software program was used for this note.  Please expect some simple typographical  errors in this note.     Amount and/or Complexity of Data Reviewed  External Data Reviewed: labs and notes.  Labs: ordered.  Radiology: ordered.    Risk  Prescription drug management.  Decision regarding hospitalization.               ED Course as of 11/16/23 2144   u Nov 16, 2023   1911 EKG 6:55 p.m. atrial fibrillation with rapid ventricular response, rate of 113.  Nonspecific interventricular conduction delay,  low voltage QRS.  No STEMI.  EKG interpreted independently by me.   [JR]   2129 I discussed case with Dr. Rivera from [facility] who will admit the patient to the hospital. [JR]      ED Course User Index  [JR] Cricket Diego DO                        Clinical Impression:  Final diagnoses:  [R06.02] Shortness of breath  [M79.89] Left leg swelling  [I48.91] Atrial fibrillation with RVR (Primary)  [I50.9] Congestive heart failure, unspecified HF chronicity, unspecified heart failure type          ED Disposition Condition    Admit Stable                Cricket Diego,   11/16/23 2132       Cricket Diego DO  11/16/23 2145

## 2023-11-17 NOTE — ASSESSMENT & PLAN NOTE
Patient with Paroxysmal (<7 days) atrial fibrillation which is uncontrolled currently with Beta Blocker. Patient is currently in atrial fibrillation.[id number] Score: 5.  Not on anticoagulation given history of falls and currently on dual antiplatelet therapy.  Patient reports not taking Plavix anymore for hoarseness.  We will defer anticoagulation to Cardiology.  Patient with AFib RVR on admission, received Lopressor 5 mg with improvement in heart rates.  We will continue with outpatient carvedilol.

## 2023-11-18 LAB
ANION GAP SERPL CALC-SCNC: 13 MMOL/L (ref 8–16)
BASOPHILS # BLD AUTO: 0.04 K/UL (ref 0–0.2)
BASOPHILS NFR BLD: 0.7 % (ref 0–1.9)
BUN SERPL-MCNC: 31 MG/DL (ref 8–23)
CALCIUM SERPL-MCNC: 9.9 MG/DL (ref 8.7–10.5)
CHLORIDE SERPL-SCNC: 100 MMOL/L (ref 95–110)
CO2 SERPL-SCNC: 29 MMOL/L (ref 23–29)
CREAT SERPL-MCNC: 1.2 MG/DL (ref 0.5–1.4)
DIFFERENTIAL METHOD: ABNORMAL
EOSINOPHIL # BLD AUTO: 0.3 K/UL (ref 0–0.5)
EOSINOPHIL NFR BLD: 5.3 % (ref 0–8)
ERYTHROCYTE [DISTWIDTH] IN BLOOD BY AUTOMATED COUNT: 14.9 % (ref 11.5–14.5)
EST. GFR  (NO RACE VARIABLE): 43.3 ML/MIN/1.73 M^2
GLUCOSE SERPL-MCNC: 103 MG/DL (ref 70–110)
GLUCOSE SERPL-MCNC: 207 MG/DL (ref 70–110)
GLUCOSE SERPL-MCNC: 223 MG/DL (ref 70–110)
GLUCOSE SERPL-MCNC: 268 MG/DL (ref 70–110)
GLUCOSE SERPL-MCNC: 96 MG/DL (ref 70–110)
HCT VFR BLD AUTO: 41.9 % (ref 37–48.5)
HGB BLD-MCNC: 12.8 G/DL (ref 12–16)
IMM GRANULOCYTES # BLD AUTO: 0.02 K/UL (ref 0–0.04)
IMM GRANULOCYTES NFR BLD AUTO: 0.3 % (ref 0–0.5)
LYMPHOCYTES # BLD AUTO: 1 K/UL (ref 1–4.8)
LYMPHOCYTES NFR BLD: 16.5 % (ref 18–48)
MAGNESIUM SERPL-MCNC: 2.1 MG/DL (ref 1.6–2.6)
MCH RBC QN AUTO: 29.4 PG (ref 27–31)
MCHC RBC AUTO-ENTMCNC: 30.5 G/DL (ref 32–36)
MCV RBC AUTO: 96 FL (ref 82–98)
MONOCYTES # BLD AUTO: 0.8 K/UL (ref 0.3–1)
MONOCYTES NFR BLD: 13.5 % (ref 4–15)
NEUTROPHILS # BLD AUTO: 3.8 K/UL (ref 1.8–7.7)
NEUTROPHILS NFR BLD: 63.7 % (ref 38–73)
NRBC BLD-RTO: 0 /100 WBC
PLATELET # BLD AUTO: 228 K/UL (ref 150–450)
PMV BLD AUTO: 10.7 FL (ref 9.2–12.9)
POTASSIUM SERPL-SCNC: 3.7 MMOL/L (ref 3.5–5.1)
POTASSIUM SERPL-SCNC: 4.8 MMOL/L (ref 3.5–5.1)
RBC # BLD AUTO: 4.35 M/UL (ref 4–5.4)
SODIUM SERPL-SCNC: 142 MMOL/L (ref 136–145)
WBC # BLD AUTO: 6 K/UL (ref 3.9–12.7)

## 2023-11-18 PROCEDURE — 82962 GLUCOSE BLOOD TEST: CPT

## 2023-11-18 PROCEDURE — 84132 ASSAY OF SERUM POTASSIUM: CPT | Performed by: HOSPITALIST

## 2023-11-18 PROCEDURE — 85025 COMPLETE CBC W/AUTO DIFF WBC: CPT | Performed by: STUDENT IN AN ORGANIZED HEALTH CARE EDUCATION/TRAINING PROGRAM

## 2023-11-18 PROCEDURE — 99900035 HC TECH TIME PER 15 MIN (STAT)

## 2023-11-18 PROCEDURE — G0378 HOSPITAL OBSERVATION PER HR: HCPCS

## 2023-11-18 PROCEDURE — 83735 ASSAY OF MAGNESIUM: CPT | Performed by: STUDENT IN AN ORGANIZED HEALTH CARE EDUCATION/TRAINING PROGRAM

## 2023-11-18 PROCEDURE — 96372 THER/PROPH/DIAG INJ SC/IM: CPT | Performed by: NURSE PRACTITIONER

## 2023-11-18 PROCEDURE — 96372 THER/PROPH/DIAG INJ SC/IM: CPT | Performed by: STUDENT IN AN ORGANIZED HEALTH CARE EDUCATION/TRAINING PROGRAM

## 2023-11-18 PROCEDURE — 94761 N-INVAS EAR/PLS OXIMETRY MLT: CPT

## 2023-11-18 PROCEDURE — 80048 BASIC METABOLIC PNL TOTAL CA: CPT | Performed by: STUDENT IN AN ORGANIZED HEALTH CARE EDUCATION/TRAINING PROGRAM

## 2023-11-18 PROCEDURE — 99900031 HC PATIENT EDUCATION (STAT)

## 2023-11-18 PROCEDURE — 27000221 HC OXYGEN, UP TO 24 HOURS

## 2023-11-18 PROCEDURE — 36415 COLL VENOUS BLD VENIPUNCTURE: CPT | Performed by: STUDENT IN AN ORGANIZED HEALTH CARE EDUCATION/TRAINING PROGRAM

## 2023-11-18 PROCEDURE — 63600175 PHARM REV CODE 636 W HCPCS: Performed by: STUDENT IN AN ORGANIZED HEALTH CARE EDUCATION/TRAINING PROGRAM

## 2023-11-18 PROCEDURE — 94799 UNLISTED PULMONARY SVC/PX: CPT

## 2023-11-18 PROCEDURE — 36415 COLL VENOUS BLD VENIPUNCTURE: CPT | Performed by: HOSPITALIST

## 2023-11-18 PROCEDURE — 99213 PR OFFICE/OUTPT VISIT, EST, LEVL III, 20-29 MIN: ICD-10-PCS | Mod: ,,, | Performed by: INTERNAL MEDICINE

## 2023-11-18 PROCEDURE — 25000003 PHARM REV CODE 250: Performed by: STUDENT IN AN ORGANIZED HEALTH CARE EDUCATION/TRAINING PROGRAM

## 2023-11-18 PROCEDURE — 63600175 PHARM REV CODE 636 W HCPCS: Performed by: NURSE PRACTITIONER

## 2023-11-18 PROCEDURE — 99213 OFFICE O/P EST LOW 20 MIN: CPT | Mod: ,,, | Performed by: INTERNAL MEDICINE

## 2023-11-18 RX ADMIN — CARVEDILOL 6.25 MG: 6.25 TABLET, FILM COATED ORAL at 08:11

## 2023-11-18 RX ADMIN — MYCOPHENOLATE MOFETIL 500 MG: 250 CAPSULE ORAL at 08:11

## 2023-11-18 RX ADMIN — ENOXAPARIN SODIUM 80 MG: 80 INJECTION SUBCUTANEOUS at 06:11

## 2023-11-18 RX ADMIN — INSULIN ASPART 1 UNITS: 100 INJECTION, SOLUTION INTRAVENOUS; SUBCUTANEOUS at 09:11

## 2023-11-18 RX ADMIN — CARVEDILOL 6.25 MG: 6.25 TABLET, FILM COATED ORAL at 05:11

## 2023-11-18 RX ADMIN — ASPIRIN 81 MG 81 MG: 81 TABLET ORAL at 08:11

## 2023-11-18 RX ADMIN — Medication 1 TABLET: at 08:11

## 2023-11-18 RX ADMIN — ENOXAPARIN SODIUM 80 MG: 80 INJECTION SUBCUTANEOUS at 05:11

## 2023-11-18 RX ADMIN — INSULIN ASPART 2 UNITS: 100 INJECTION, SOLUTION INTRAVENOUS; SUBCUTANEOUS at 12:11

## 2023-11-18 RX ADMIN — INSULIN ASPART 3 UNITS: 100 INJECTION, SOLUTION INTRAVENOUS; SUBCUTANEOUS at 05:11

## 2023-11-18 RX ADMIN — POTASSIUM BICARBONATE 50 MEQ: 977.5 TABLET, EFFERVESCENT ORAL at 08:11

## 2023-11-18 RX ADMIN — INSULIN DETEMIR 24 UNITS: 100 INJECTION, SOLUTION SUBCUTANEOUS at 08:11

## 2023-11-18 NOTE — HOSPITAL COURSE
Patient on immunosuppressants for bullous pemphigoid, afib, AVR, CAD and DM2 admitted with afib rvr and LE edema. . She was started on IV lasix for decompensated CHF due to afib. She received digoxin x 1 dose and started cardizem infusion. Coreg resumed + lisinopril if BP tolerates.  She also has severe pruritic rash of the left groin and thigh. Topical antifungal applied and wound care consulted. She started lovenox BID.     Patient had drop in her BP and lasix dose was discontinued. Cardiology consulted. PLan for GABY/CV 11/20/23.   HR improved and did not undergo cardioversion.   Coreg dose increased and will continue medical therapy for now, monitor overnight.     Cardioogy gave final dc recs:  Eliquis and increase coreg 12.5mg BID  Follow up cardiology as scheduled  Counseled on low salt diet to reduce fluid retention and CHF exacerbation  Inguinal rash improved on antifungal powder  HH services ordered on discharge - RN can continue to monitor rash   Follow up PCP if rash worsens

## 2023-11-18 NOTE — ASSESSMENT & PLAN NOTE
Follows with dermatology outpatient, is on CellCept outpatient  Fungal rash to left inguinal region - topical miconazole

## 2023-11-18 NOTE — NURSING
Patient from ED to room 2516. Patient alert and oriented x4. Patient follows commands appropriately. Patient on 2 L NC, tolerating well. Assessment complete, see flowsheet. Walker at bedside. Patient educated on calling for assistance. Bed alarm in place. VSS

## 2023-11-18 NOTE — CONSULTS
[facility]  Adult Nutrition   Consult Note (Nutrition Education)     SUMMARY     Recommendations  Recommendation/Intervention: 1. Added diabetic diet restriction. Continue cardiac/diabetic diet. 2. RD provided therapeutic diet education.  Goals: 1. Patient to meet at least 75% of estimated energy and protein needs.   2. Patient to express understanding of diet recommendations and lifestyle changes. RD to answer diet related questions should they arise.  Nutrition Goal Status: new  Communication of RD Recs: reviewed with RN (reviewed with MD)    Dietitian Rounds Brief  Consult for diet education. RD educated patient on heart healthy consistent carbohydrate diet. Pt cooks mostly at home. Good oral intake. States when she noticed legs swelling she cut out sodas and chips. Explained need to limit sodium, especially in processed foods like chips. Pt states she will no longer eat chips. Discussed upcoming holiday foods (Thanksgiving and food prep options to comply with diet recommendations). Expect good compliance. Provided RD contact info should questions arise.   Note: Pt opened up about tragic losses in life. Per conversation patient lost 3 year old baby, son at 25 years of age on Thanksgiving, and loss of . Recommend support for grief. Notified RN and MD.     Reason for Assessment  Reason For Assessment: consult  Diagnosis: cardiac disease  Relevant Medical History: Hypertension associated with diabetes  Type 2 diabetes mellitus with diabetic polyneuropathy, with long-term current use of insulin  Hyperlipidemia associated with type 2 diabetes mellitus  Stage 3b chronic kidney disease  S/P AVR (aortic valve replacement)  Bullous pemphigoid  Paroxysmal atrial fibrillation  Acute decompensated heart failure  Nutrition Discharge Planning: Cardiac/diabetic diet    Nutrition Risk Screen  Nutrition Risk Screen: no indicators present       Altered Skin Integrity Left anterior Greater trochanter-Wound  "Image: Images linked       Altered Skin Integrity 11/17/23 0701 Right anterior Pelvis #1 Moisture associated dermatitis-Wound Image: Images linked  MST Score: 0  Have you recently lost weight without trying?: No  Weight loss score: 0  Have you been eating poorly because of a decreased appetite?: No  Appetite score: 0       Nutrition/Diet History  Patient Reported Diet/Restrictions/Preferences: low salt, diabetic diet  Food Allergies: NKFA  Factors Affecting Nutritional Intake: None identified at this time    Anthropometrics  Temp: 98.1 °F (36.7 °C)  Height Method: Stated  Height: 5' 7" (170.2 cm)  Height (inches): 67 in  Weight Method: Bed Scale  Weight: 81.6 kg (179 lb 14.3 oz)  Weight (lb): 179.9 lb  Ideal Body Weight (IBW), Female: 135 lb  % Ideal Body Weight, Female (lb): 133.26 %  BMI (Calculated): 28.2  BMI Grade: 25 - 29.9 - overweight       Weight History:  Wt Readings from Last 10 Encounters:   11/17/23 81.6 kg (179 lb 14.3 oz)   11/17/23 81.6 kg (179 lb 14.3 oz)   08/30/23 81.7 kg (180 lb 1.9 oz)   08/29/23 81.2 kg (179 lb)   03/23/23 81.2 kg (179 lb)   03/16/23 81.9 kg (180 lb 8.9 oz)   01/03/23 81.6 kg (180 lb)   12/05/22 81 kg (178 lb 7.4 oz)   10/18/22 80.7 kg (177 lb 14.4 oz)   10/17/22 81.1 kg (178 lb 12.7 oz)       Lab/Procedures/Meds: Pertinent Labs Reviewed    Clinical Chemistry:  Recent Labs   Lab 11/16/23  1911 11/17/23  0421 11/18/23  0421 11/18/23  1345      < > 142  --    K 4.2   < > 3.7 4.8      < > 100  --    CO2 22*   < > 29  --    *   < > 96  --    BUN 25*   < > 31*  --    CREATININE 1.1   < > 1.2  --    CALCIUM 9.3   < > 9.9  --    PROT 6.8  --   --   --    ALBUMIN 4.4  --   --   --    BILITOT 1.4*  --   --   --    ALKPHOS 86  --   --   --    AST 20  --   --   --    ALT 19  --   --   --    ANIONGAP 7*   < > 13  --    MG 1.9   < > 2.1  --     < > = values in this interval not displayed.       CBC:   Recent Labs   Lab 11/18/23  0421   WBC 6.00   RBC 4.35   HGB 12.8   HCT " 41.9      MCV 96   MCH 29.4   MCHC 30.5*       Cardiac Profile:  Recent Labs   Lab 11/16/23 1911   *       Thyroid & Parathyroid:  Recent Labs   Lab 11/16/23 1911   TSH 2.565       Medications: Pertinent Medications reviewed    Scheduled Meds:   aspirin  81 mg Oral Daily    calcium-vitamin D3  1 tablet Oral Daily    carvediloL  6.25 mg Oral BID WM    enoxparin  1 mg/kg Subcutaneous Q12H (treatment, non-standard time)    furosemide (LASIX) injection  40 mg Intravenous Q12H    insulin detemir U-100  24 Units Subcutaneous QHS    lisinopriL  20 mg Oral Daily    mycophenolate  500 mg Oral BID       Continuous Infusions:   [id number]         PRN Meds:.acetaminophen, dextrose 50%, dextrose 50%, glucagon (human recombinant), glucose, glucose, insulin aspart U-100, magnesium oxide, magnesium oxide, ondansetron, potassium bicarbonate, potassium bicarbonate, potassium bicarbonate, potassium, sodium phosphates, potassium, sodium phosphates, potassium, sodium phosphates, sodium chloride 0.9%    Estimated/Assessed Needs  Weight Used For Calorie Calculations: 81.6 kg (179 lb 14.3 oz)  Energy Calorie Requirements (kcal): 1632 - 2040 (20 - 25)  Energy Need Method: Kcal/kg  Protein Requirements: 82 - 98 (1-  1.2)  Weight Used For Protein Calculations: 81.6 kg (179 lb 14.3 oz)  Fluid Requirements (mL): 2040 (25 ml/kg or per MD )  Estimated Fluid Requirement Method: other (see comments)  RDA Method (mL): 1632       Nutrition Prescription Ordered  Current Diet Order: Cardiac    Evaluation of Received Nutrient/Fluid Intake  Energy Calories Required: meeting needs  Protein Required: meeting needs  Fluid Required: meeting needs  Tolerance: tolerating     Intake/Output Summary (Last 24 hours) at 11/18/2023 1559  Last data filed at 11/18/2023 1400  Gross per 24 hour   Intake 870 ml   Output 900 ml   Net -30 ml      % Intake of Estimated Energy Needs: 75 - 100 %  % Meal Intake: 75 - 100 %    Nutrition Risk  Level of  Risk/Frequency of Follow-up: moderate     Monitor and Evaluation  Food and Nutrient Intake: energy intake, food and beverage intake  Food and Nutrient Adminstration: diet order  Knowledge/Beliefs/Attitudes: beliefs and attitudes, food and nutrition knowledge/skill  Physical Activity and Function: nutrition-related ADLs and IADLs, factors affecting access to physical activity  Anthropometric Measurements: weight, weight change, body mass index  Biochemical Data, Medical Tests and Procedures: electrolyte and renal panel, inflammatory profile, gastrointestinal profile, lipid profile, glucose/endocrine profile  Nutrition-Focused Physical Findings: overall appearance     Nutrition Follow-Up  RD Follow-up?: Yes    Flaca Blancas RD 11/18/2023 3:58 PM

## 2023-11-18 NOTE — ASSESSMENT & PLAN NOTE
Patient with Paroxysmal (<7 days) atrial fibrillation which is uncontrolled currently with Beta Blocker. Patient is currently in atrial fibrillation.[id number] Score: 5.  Not on anticoagulation given history of falls and currently on dual antiplatelet therapy.  Patient reports not taking Plavix anymore for hoarseness.  We will defer anticoagulation to Cardiology.  Patient with AFib RVR on admission, received Lopressor 5 mg with improvement in heart rates.  We will continue with outpatient carvedilol.    Cardizem infusion   (Allergy to iodine)   Lovenox BID

## 2023-11-18 NOTE — PROGRESS NOTES
[facility] Medicine  Progress Note    Patient Name: Leslie Tolliver  MRN: [id number]  Patient Class: OP- Observation   Admission Date: 11/16/2023  Length of Stay: 0 days  Attending Physician: Isidra Peterson MD  Primary Care Provider: Chang Christianson MD        Subjective:     Principal Problem:Acute decompensated heart failure        HPI:  Patient is an 89-year-old female with history of bullous pemphigoid, type 2 diabetes, hypertension, hyperlipidemia, paroxysmal atrial fibrillation not on anticoagulation outpatient, CAD, aortic valve replacement comes to the emergency room with complaints of shortness of breath.  Patient reports that the shortness of breath has been ongoing for the last week, worsened at night.  Patient reports having shortness of breath while lying flat, reports waking up in the middle of night with shortness of breath.  Patient follows with Dr. Huerta outpatient for atrial fibrillation.  Reports not being on anticoagulation given she is felt to be high-risk for treatment because of fall risk and currently being treated with dual anti-platelet therapy per Cardiology.  Patient  reports shortness of breath worse with exertion, denies having any chest pain, fever, chills, diarrhea, nausea.  Reports taking medications as prescribed.  Patient states that she had hoarseness, states that she stopped taking her Plavix because she read that it was a side effect.  Patient states she noticed improvement in her symptoms of shortness of breath after she stopped drinking soda and stop eating chips this past week.      On admission patient was noted to have atrial fibrillation with heart rates in the 130s, was given Lopressor 5 mg with improvement in heart rates.  Was also given Lasix and Lovenox.  Chest x-ray showed no acute cardiopulmonary disease.  .  Patient also had complaints of left lower extremity swelling, ultrasound negative for DVT.    Overview/Hospital  Course:  Patient on immunosuppressants for bullous pemphigoid, afib, AVR, CAD and DM2 admitted with afib rvr and LE edema. . She was started on IV lasix for decompensated CHF due to afib. She received digoxin x 1 dose and started cardizem infusion. Coreg resumed + lisinopril if BP tolerates.  She also has severe pruritic rash of the left groin and thigh. Topical antifungal applied and wound care consulted. She started lovenox BID.     Patient had drop in her BP and lasix dose was discontinued. Cardiology consulted.     No new subjective & objective note has been filed under this hospital service since the last note was generated.      Assessment/Plan:      * Acute decompensated heart failure  Transesophageal echo on 10/16/2022 showed EF 60%  No history of congestive heart failure   We will order echocardiogram, started patient on Lasix 40 mg twice a day- discontinue - appears compensated   Consult Cardiology, patient follows with Dr. Huerta outpatient  The left ventricle is normal in size with normal systolic function.  The estimated ejection fraction is 60%.  Normal left ventricular diastolic function.  Normal right ventricular size with normal right ventricular systolic function.  Mild left atrial enlargement.  There is a bioprosthetic aortic valve present. There is no aortic insufficiency present. Prosthetic aortic valve is normal.  No interatrial septal defect present.  No ASD or PFO closure device in interatrial septum.  Normal appearing left atrial appendage. No thrombus is present in the appendage. COREEN occluder is absent. Normal appendage velocities.      Paroxysmal atrial fibrillation  Patient with Paroxysmal (<7 days) atrial fibrillation which is uncontrolled currently with Beta Blocker. Patient is currently in atrial fibrillation.[id number] Score: 5.  Not on anticoagulation given history of falls and currently on dual antiplatelet therapy.  Patient reports not taking Plavix anymore for hoarseness.   We will defer anticoagulation to Cardiology.  Patient with AFib RVR on admission, received Lopressor 5 mg with improvement in heart rates.  We will continue with outpatient carvedilol.    Cardizem infusion   (Allergy to iodine)   Lovenox BID     Bullous pemphigoid  Follows with dermatology outpatient, is on CellCept outpatient  Fungal rash to left inguinal region - topical miconazole       S/P AVR (aortic valve replacement)    Noted    Stage 3b chronic kidney disease  Creatine stable for now. BMP reviewed- noted Estimated Creatinine Clearance: 38.1 mL/min (based on SCr of 1.1 mg/dL). according to latest data. Based on current GFR, CKD stage is stage 3 - GFR 30-59.  Monitor UOP and serial BMP and adjust therapy as needed. Renally dose meds. Avoid nephrotoxic medications and procedures.    Hyperlipidemia associated with type 2 diabetes mellitus  Patient with history of severe statin intolerance, continue with low-fat low-cholesterol diet      Type 2 diabetes mellitus with diabetic polyneuropathy, with long-term current use of insulin  Started the patient on insulin sliding scale, continue Lantus 24 units q.h.s.      Hypertension associated with diabetes  Continue with outpatient lisinopril        VTE Risk Mitigation (From admission, onward)           Ordered     enoxaparin injection 80 mg  Every 12 hours         11/17/23 1615     IP VTE HIGH RISK PATIENT  Once         11/16/23 2220     Place sequential compression device  Until discontinued         11/16/23 2220                    Discharge Planning   MORA: 11/18/2023     Code Status: Full Code   Is the patient medically ready for discharge?:     Reason for patient still in hospital (select all that apply): Patient trending condition  Discharge Plan A: Home                  Isidra Peterson MD  Department of Hospital Medicine   [facility]

## 2023-11-18 NOTE — NURSING
Patient has order for cardiazem gtt. Escalated to Gabriella Charge Nurse. Patient to be transferred.

## 2023-11-18 NOTE — PLAN OF CARE
11/17/23 2134   Patient Assessment/Suction   Level of Consciousness (AVPU) alert   Respiratory Effort Normal;Unlabored   Rhythm/Pattern, Respiratory depth regular;pattern regular;unlabored   PRE-TX-O2   Device (Oxygen Therapy) room air   SpO2 96 %   Pulse Oximetry Type Continuous   $ Pulse Oximetry - Multiple Charge Pulse Oximetry - Multiple   Pulse 95   Resp (!) 22   Education   $ Education 15 min   Respiratory Evaluation   $ Care Plan Tech Time 15 min

## 2023-11-18 NOTE — CARE UPDATE
11/18/23 0730   Patient Assessment/Suction   Level of Consciousness (AVPU) alert   Skin Integrity   $ Wound Care Tech Time 15 min   Area Observed Left;Right;Cheek;Behind ear;Upper lip;Nares   Skin Appearance without discoloration   PRE-TX-O2   Device (Oxygen Therapy) room air   $ Is the patient on Low Flow Oxygen? Yes   Flow (L/min) 2  (PT WEANED TO ROOM)   Pulse Oximetry Type Continuous   $ Pulse Oximetry - Multiple Charge Pulse Oximetry - Multiple   Education   $ Education Other (see comment);15 min

## 2023-11-18 NOTE — SUBJECTIVE & OBJECTIVE
Interval History: see hospital course    Review of Systems   Constitutional: Negative.    Respiratory:  Positive for shortness of breath.    Cardiovascular:  Positive for leg swelling.   Gastrointestinal: Negative.    Musculoskeletal:  Positive for arthralgias and back pain.   Skin:  Positive for rash.   Neurological: Negative.      Objective:     Vital Signs (Most Recent):  Temp: 98.1 °F (36.7 °C) (11/18/23 1500)  Pulse: 96 (11/18/23 1500)  Resp: 16 (11/18/23 1500)  BP: (!) 132/56 (11/18/23 1500)  SpO2: 95 % (11/18/23 1500) Vital Signs (24h Range):  Temp:  [97.9 °F (36.6 °C)-98.2 °F (36.8 °C)] 98.1 °F (36.7 °C)  Pulse:  [] 96  Resp:  [16-27] 16  SpO2:  [92 %-99 %] 95 %  BP: ()/(49-90) 132/56     Weight: 81.6 kg (179 lb 14.3 oz)  Body mass index is 28.18 kg/m².    Intake/Output Summary (Last 24 hours) at 11/18/2023 1607  Last data filed at 11/18/2023 1400  Gross per 24 hour   Intake 870 ml   Output 900 ml   Net -30 ml         Physical Exam  Constitutional:       General: She is not in acute distress.  HENT:      Head: Normocephalic and atraumatic.      Mouth/Throat:      Mouth: Mucous membranes are moist.      Pharynx: Oropharynx is clear.   Eyes:      Extraocular Movements: Extraocular movements intact.      Conjunctiva/sclera: Conjunctivae normal.   Cardiovascular:      Rate and Rhythm: Tachycardia present. Rhythm irregular.      Pulses: Normal pulses.   Pulmonary:      Effort: Pulmonary effort is normal.      Breath sounds: Rhonchi present.   Abdominal:      General: Bowel sounds are normal. There is no distension.      Tenderness: There is no abdominal tenderness.   Musculoskeletal:      Cervical back: Normal range of motion and neck supple.   Skin:     General: Skin is warm and dry.      Capillary Refill: Capillary refill takes less than 2 seconds.      Findings: Rash present.      Comments: Large area of erythema to left inguinal region    Neurological:      General: No focal deficit present.       Mental Status: She is alert and oriented to person, place, and time. Mental status is at baseline.   Psychiatric:         Mood and Affect: Mood normal.         Behavior: Behavior normal.             Significant Labs: All pertinent labs within the past 24 hours have been reviewed.  Recent Lab Results  (Last 5 results in the past 24 hours)        11/18/23  1345   11/18/23  1106   11/18/23  0711   11/18/23  0421   11/17/23  2141        Anion Gap       13         Baso #       0.04         Basophil %       0.7         BUN       31         Calcium       9.9         Chloride       100         CO2       29         Creatinine       1.2         Differential Method       Automated         eGFR       43.3         Eos #       0.3         Eosinophil %       5.3         Glucose       96         Gran # (ANC)       3.8         Gran %       63.7         Hematocrit       41.9         Hemoglobin       12.8         Immature Grans (Abs)       0.02  Comment: Mild elevation in immature granulocytes is non specific and   can be seen in a variety of conditions including stress response,   acute inflammation, trauma and pregnancy. Correlation with other   laboratory and clinical findings is essential.           Immature Granulocytes       0.3         Lymph #       1.0         Lymph %       16.5         Magnesium        2.1         MCH       29.4         MCHC       30.5         MCV       96         Mono #       0.8         Mono %       13.5         MPV       10.7         nRBC       0         Platelet Count       228         POC Glucose   223   103     148       Potassium 4.8       3.7         RBC       4.35         RDW       14.9         Sodium       142         WBC       6.00                                Significant Imaging: I have reviewed all pertinent imaging results/findings within the past 24 hours.

## 2023-11-18 NOTE — NURSING
Nurses Note -- 4 Eyes      11/18/2023   6:26 AM      Skin assessed during: Transfer      [] No Altered Skin Integrity Present    []Prevention Measures Documented      [x] Yes- Altered Skin Integrity Present or Discovered   [] LDA Added if Not in Epic (Describe Wound)   [x] New Altered Skin Integrity was Present on Admit and Documented in LDA   [] Wound Image Taken    Wound Care Consulted? Yes    Attending Nurse:  Lucero Melton RN/Staff Member:   DON Lombardo

## 2023-11-18 NOTE — PROGRESS NOTES
[facility] - Emergency Dept  Department of Cardiology  Progress Note      PATIENT NAME: Leslie Tolliver    MRN: [id number]  TODAY'S DATE: 11/18/2023  ADMIT DATE: 11/16/2023                          CONSULT REQUESTED BY: Isidra Peterson MD    SUBJECTIVE     PRINCIPAL PROBLEM: Acute decompensated heart failure      11/18/23:  Patient seen resting in bed with family at bedside. Remains in Afib with variable.     REASON FOR CONSULT:    From H&P: Patient is an 89-year-old female with history of bullous pemphigoid, type 2 diabetes, hypertension, hyperlipidemia, paroxysmal atrial fibrillation not on anticoagulation outpatient, CAD, aortic valve replacement comes to the emergency room with complaints of shortness of breath.  Patient reports that the shortness of breath has been ongoing for the last week, worsened at night.  Patient reports having shortness of breath while lying flat, reports waking up in the middle of night with shortness of breath.  Patient follows with Dr. Huerta outpatient for atrial fibrillation.  Reports not being on anticoagulation given she is felt to be high-risk for treatment because of fall risk and currently being treated with dual anti-platelet therapy per Cardiology.  Patient  reports shortness of breath worse with exertion, denies having any chest pain, fever, chills, diarrhea, nausea.  Reports taking medications as prescribed.  Patient states that she had hoarseness, states that she stopped taking her Plavix because she read that it was a side effect.  Patient states she noticed improvement in her symptoms of shortness of breath after she stopped drinking soda and stop eating chips this past week.        On admission patient was noted to have atrial fibrillation with heart rates in the 130s, was given Lopressor 5 mg with improvement in heart rates.  Was also given Lasix and Lovenox.  Chest x-ray showed no acute cardiopulmonary disease.  .  Patient also had complaints  of left lower extremity swelling, ultrasound negative for DVT.      HPI:    Patient is an 89-year-old female who presented to the emergency room with complaints of shortness of breath.  Patient has had issues with shortness of breath weakness, and swelling over the past several days.  Patient has a history of type 2 diabetes, hypertension, and paroxysmal atrial fibrillation as well as CAD and aortic valve replacement in the past.  She has been taking Plavix and aspirin at home however had recently stopped taking Plavix due to hoarseness.  She had been eating chips at home and drinking soda and felt like that brought on her swelling and thus she had cut back.  Patient noted to be in AFib with RVR in the 130s on arrival.  Patient was given 1 dose of IV metoprolol in admitted to hospital.  Unfortunately she was not given any further rate control medications until this afternoon.  She is allergic to iodine and thus amiodarone was not started.        Review of patient's allergies indicates:   Allergen Reactions    Fenofibric acid (choline)      Other reaction(s): Vomiting    Iodine      Other reaction(s): lips swollen ivp dye    Rosuvastatin      Other reaction(s): muscle pain       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    GERD (gastroesophageal reflux disease)     Hyperlipidemia     Hypertension     Hypothyroidism     Paroxysmal atrial fibrillation 3/16/2023    Type 2 diabetes mellitus      Past Surgical History:   Procedure Laterality Date    ADENOIDECTOMY      AORTIC VALVE  REPLACEMENT      BREAST BIOPSY Right 20 yrs ago    benign    CARDIAC SURGERY      CHOLECYSTECTOMY      COLONOSCOPY  2014    Dr Holly, 5 year recheck    EPIDURAL STEROID INJECTION N/A 3/25/2021    Procedure: Injection, Steroid, Epidural L3-4;  Surgeon: Sky Love MD;  Location: [facility] OR;  Service: Pain Management;  Laterality: N/A;  Injection, Steroid, Epidural L3-4    EPIDURAL STEROID INJECTION N/A 2021    Procedure: Injection, Steroid, Epidural L3-4;  Surgeon: Sky Love MD;  Location: [facility] OR;  Service: Pain Management;  Laterality: N/A;  Injection, Steroid, Epidural L3-4    ESOPHAGOGASTRODUODENOSCOPY N/A 2020    Procedure: EGD (ESOPHAGOGASTRODUODENOSCOPY);  Surgeon: Michael Nugent III, MD;  Location: [facility];  Service: Endoscopy;  Laterality: N/A;    HEMORRHOID SURGERY      HYSTERECTOMY      OOPHORECTOMY      TONSILLECTOMY       Social History     Tobacco Use    Smoking status: Former     Current packs/day: 0.00     Average packs/day: 0.3 packs/day for 15.0 years (3.8 ttl pk-yrs)     Types: Cigarettes     Start date: 3/30/1959     Quit date: 3/30/1974     Years since quittin.6    Smokeless tobacco: Never   Substance Use Topics    Alcohol use: No    Drug use: No        REVIEW OF SYSTEMS  Per HPI    OBJECTIVE     VITAL SIGNS (Most Recent)  Temp: 98.1 °F (36.7 °C) (23 1500)  Pulse: 99 (23 1600)  Resp: 18 (23 1600)  BP: 116/66 (23 1600)  SpO2: 96 % (23 1600)    VENTILATION STATUS  Resp: 18 (23 1600)  SpO2: 96 % (23 1600)           I & O (Last 24H):  Intake/Output Summary (Last 24 hours) at 2023 1624  Last data filed at 2023 1400  Gross per 24 hour   Intake 870 ml   Output 900 ml   Net -30 ml         WEIGHTS  Wt Readings from Last 1 Encounters:   23 1916 81.6 kg (179 lb 14.3 oz)   23 1839 81.6 kg (180 lb)       PHYSICAL EXAM  CONSTITUTIONAL: No fever, no chills  HEENT: Normocephalic               NECK:  No JVD   CVS:  S1S2+, iRRR  LUNGS: Clear  ABDOMEN: Soft, NT, BS+  EXTREMITIES: No cyanosis, trace BLE edema  NEURO: AAO X 3  PSY: Normal affect      HOME MEDICATIONS:  No current facility-administered medications on file prior to encounter.     Current Outpatient Medications on File Prior to Encounter   Medication Sig Dispense Refill    aspirin 81 mg Tab Take 81 mg by mouth once daily. Every day 30 tablet 0    calcium carbonate/vitamin D3 (CALCIUM+D ORAL) Take 1 tablet by mouth once daily.      carvediloL (COREG) 6.25 MG tablet Take 1 tablet (6.25 mg total) by mouth 2 (two) times daily with meals. 180 tablet 3    cholecalciferol, vitamin D3, (VITAMIN D3) 125 mcg (5,000 unit) Tab Take 5,000 Units by mouth once daily.      clobetasol 0.05% (TEMOVATE) 0.05 % Oint Apply 1 g topically 2 (two) times daily.      empagliflozin (JARDIANCE) 10 mg tablet Take 1 tablet (10 mg total) by mouth once daily. 90 tablet 1    ketoconazole (NIZORAL) 2 % cream AAA pannus fold bid (Patient taking differently: Apply 1 application  topically once daily. AAA pannus fold bid) 60 g 3    LANTUS SOLOSTAR U-100 INSULIN glargine 100 units/mL SubQ pen INJECT 24 UNITS SUBCUTANEOUSLY EVERY EVENING (Patient taking differently: Inject 24 Units into the skin every evening. INJECT 24 UNITS SUBCUTANEOUSLY EVERY EVENING) 30 mL 1    levocetirizine (XYZAL) 5 MG tablet Take 5 mg by mouth every evening.      lisinopriL (PRINIVIL,ZESTRIL) 20 MG tablet Take 1 tablet (20 mg total) by mouth once daily. 90 tablet 2    metFORMIN (GLUCOPHAGE) 1000 MG tablet Take 1 tablet (1,000 mg total) by mouth 2 (two) times daily with meals. 180 tablet 1    multivitamin (THERAGRAN) per tablet Take 1 tablet by mouth once daily.      triamcinolone acetonide 0.1% (KENALOG) 0.1 % cream AAA bid (Patient taking differently: Apply 1 g topically 2 (two) times daily. AAA bid) 454 g 3    blood sugar diagnostic (ACCU-CHEK GUIDE TEST STRIPS) Strp 300 each by Misc.(Non-Drug; Combo Route) route 3 (three)  "times daily. 300 each 3    blood-glucose meter kit Accu-chek Marley glucometer check glucose three times daily E11.65 1 each 0    cholestyramine-aspartame (PREVALITE) 4 gram PwPk Take 1 packet (4 g total) by mouth once daily. (Patient not taking: Reported on 11/16/2023) 30 packet 11    clopidogreL (PLAVIX) 75 mg tablet Take 1 tablet (75 mg total) by mouth once daily. (Patient not taking: Reported on 11/16/2023) 30 tablet 11    DROPLET PEN NEEDLE 31 gauge x 5/16" Ndle USE AS DIRECTED WITH LANTUS SOLOSTAR PEN AS NEEDED 100 each 3    lancets (ACCU-CHEK SOFTCLIX LANCETS) Misc New machine Guide Me Accuchek 300 each 3    mycophenolate (CELLCEPT) 500 mg Tab One tab PO QAM and 2 tabs PO QHS (Patient not taking: Reported on 11/16/2023) 90 tablet 1       SCHEDULED MEDS:   aspirin  81 mg Oral Daily    calcium-vitamin D3  1 tablet Oral Daily    carvediloL  6.25 mg Oral BID WM    enoxparin  1 mg/kg Subcutaneous Q12H (treatment, non-standard time)    insulin detemir U-100  24 Units Subcutaneous QHS    lisinopriL  20 mg Oral Daily    mycophenolate  500 mg Oral BID       CONTINUOUS INFUSIONS:   [id number]         PRN MEDS:acetaminophen, dextrose 50%, dextrose 50%, glucagon (human recombinant), glucose, glucose, insulin aspart U-100, magnesium oxide, magnesium oxide, ondansetron, potassium bicarbonate, potassium bicarbonate, potassium bicarbonate, potassium, sodium phosphates, potassium, sodium phosphates, potassium, sodium phosphates, sodium chloride 0.9%    LABS AND DIAGNOSTICS     CBC LAST 3 DAYS  Recent Labs   Lab 11/16/23  1911 11/17/23  0421 11/18/23  0421   WBC 5.61 5.69 6.00   RBC 3.88* 3.94* 4.35   HGB 11.6* 11.9* 12.8   HCT 37.5 37.6 41.9   MCV 97 95 96   MCH 29.9 30.2 29.4   MCHC 30.9* 31.6* 30.5*   RDW 15.0* 15.0* 14.9*    179 228   MPV 10.6 10.3 10.7   GRAN 73.4*  4.1 72.0  4.1 63.7  3.8   LYMPH 14.3*  0.8* 15.5*  0.9* 16.5*  1.0   MONO 7.8  0.4 8.4  0.5 13.5  0.8   BASO 0.05 0.04 0.04   NRBC 0 0 0 " "        COAGULATION LAST 3 DAYS  No results for input(s): "LABPT", "INR", "APTT" in the last 168 hours.    CHEMISTRY LAST 3 DAYS  Recent Labs   Lab 11/16/23 1911 11/17/23 0421 11/18/23 0421 11/18/23  1345    141 142  --    K 4.2 3.4* 3.7 4.8    107 100  --    CO2 22* 23 29  --    ANIONGAP 7* 11 13  --    BUN 25* 22 31*  --    CREATININE 1.1 0.9 1.2  --    * 120* 96  --    CALCIUM 9.3 9.3 9.9  --    MG 1.9 1.6 2.1  --    ALBUMIN 4.4  --   --   --    PROT 6.8  --   --   --    ALKPHOS 86  --   --   --    ALT 19  --   --   --    AST 20  --   --   --    BILITOT 1.4*  --   --   --          CARDIAC PROFILE LAST 3 DAYS  Recent Labs   Lab 11/16/23 1911   *   TROPONINIHS 10.8         ENDOCRINE LAST 3 DAYS  Recent Labs   Lab 11/16/23 1911   TSH 2.565         LAST ARTERIAL BLOOD GAS  ABG  No results for input(s): "PH", "PO2", "PCO2", "HCO3", "BE" in the last 168 hours.    LAST 7 DAYS MICROBIOLOGY   Microbiology Results (last 7 days)       ** No results found for the last 168 hours. **            MOST RECENT IMAGING  Echo    Left Ventricle: The left ventricle is normal in size. Mildly increased   wall thickness. There is mild concentric hypertrophy. Normal wall motion.   There is normal systolic function with a visually estimated ejection   fraction of 60 - 65%. There is normal diastolic function.    Right Ventricle: Normal right ventricular cavity size. Wall thickness   is normal. Right ventricle wall motion  is normal. Systolic function is   normal.    Left Atrium: Left atrium is severely dilated.    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    Mitral Valve: There is moderate bileaflet sclerosis. Moderately   calcified posterior leaflet. There is mild anterior mitral annular   calcification present. There is mild stenosis. The mean pressure gradient   across the mitral valve is 4 mmHg at a heart rate of  bpm. There is   moderate regurgitation with a centrally directed " jet.    Tricuspid Valve: There is mild regurgitation with a centrally directed   jet.    IVC/SVC: Elevated venous pressure at 15 mmHg.      ECHOCARDIOGRAM RESULTS (last 5)  Results for orders placed during the hospital encounter of 10/14/22    Transesophageal echo (GABY)    Interpretation Summary  · The left ventricle is normal in size with normal systolic function.  · The estimated ejection fraction is 60%.  · Normal left ventricular diastolic function.  · Normal right ventricular size with normal right ventricular systolic function.  · Mild left atrial enlargement.  · There is a bioprosthetic aortic valve present. There is no aortic insufficiency present. Prosthetic aortic valve is normal.  · No interatrial septal defect present.  · No ASD or PFO closure device in interatrial septum.  · Normal appearing left atrial appendage. No thrombus is present in the appendage. COREEN occluder is absent. Normal appendage velocities.      Echo Saline Bubble? Yes    Interpretation Summary  · There is no evidence of intracardiac shunting.  · The left ventricle is normal in size with mild concentric hypertrophy and hyperdynamic systolic function.  · The estimated ejection fraction is 70%.  · Indeterminate left ventricular diastolic function.  · Severe left atrial enlargement.  · Severe mitral regurgitation.  · Mild to moderate tricuspid regurgitation.  · There is moderate aortic valve stenosis.  · Aortic valve area is 1.79 cm2; peak velocity is m/s; mean gradient is 21 mmHg.  · Mild aortic regurgitation.  · Normal central venous pressure (3 mmHg).  · The estimated PA systolic pressure is 14 mmHg.      CURRENT/PREVIOUS VISIT EKG  Results for orders placed or performed during the hospital encounter of 11/16/23   EKG 12-lead    Collection Time: 11/16/23  6:55 PM    Narrative    Test Reason : R06.02,    Vent. Rate : 113 BPM     Atrial Rate : 111 BPM     P-R Int : 000 ms          QRS Dur : 088 ms      QT Int : 282 ms       P-R-T Axes :  000 037 013 degrees     QTc Int : 386 ms    Atrial fibrillation with rapid ventricular response  Low voltage QRS  Septal infarct (cited on or before 03-JAN-2023)  Abnormal ECG  When compared with ECG of 23-MAR-2023 12:10,  Atrial fibrillation has replaced Sinus rhythm  Questionable change in initial forces of Anterior leads  Nonspecific T wave abnormality now evident in Inferior leads    Referred By: [email or web address]   SELF           Confirmed By:            ASSESSMENT/PLAN:     Active Hospital Problems    Diagnosis    *Acute decompensated heart failure    Paroxysmal atrial fibrillation    Bullous pemphigoid    S/P AVR (aortic valve replacement)    Stage 3b chronic kidney disease    Hyperlipidemia associated with type 2 diabetes mellitus    Hypertension associated with diabetes    Type 2 diabetes mellitus with diabetic polyneuropathy, with long-term current use of insulin       ASSESSMENT & PLAN:   Afib with RVR  Acute HFpEF   CKD stage III  HTN  Cad  Hx of AVR      RECOMMENDATIONS:    Continue carvedilol 6.25 mg po BID.   Hold diuresis for now. She appears euvolemic on exam.   Hold lisinopril for now.   Continue weightbased lovenox 1mg/kg Sub Q BID.   Will attempt to achieve rate control. If able to do so, can dc home and come back for outpatient GABY/CV. If not, will plan for inpatient GABY/CV.   Keep NPO after midnight just in case of procedure tomorrow.     Anjana Reese NP  Date of Service: 11/18/2023  2:10 PM    I have personally interviewed and examined the patient, I have reviewed the Nurse Practitioner's history and physical, assessment, and plan. I have personally evaluated the patient at bedside and agree with the findings and made appropriate changes as necessary in recommendations.    Liat Antonio MD  Department of Cardiology  [facility]  11/18/23

## 2023-11-18 NOTE — ASSESSMENT & PLAN NOTE
Transesophageal echo on 10/16/2022 showed EF 60%  No history of congestive heart failure   We will order echocardiogram, started patient on Lasix 40 mg twice a day- discontinue - appears compensated   Consult Cardiology, patient follows with Dr. Huerta outpatient  The left ventricle is normal in size with normal systolic function.  The estimated ejection fraction is 60%.  Normal left ventricular diastolic function.  Normal right ventricular size with normal right ventricular systolic function.  Mild left atrial enlargement.  There is a bioprosthetic aortic valve present. There is no aortic insufficiency present. Prosthetic aortic valve is normal.  No interatrial septal defect present.  No ASD or PFO closure device in interatrial septum.  Normal appearing left atrial appendage. No thrombus is present in the appendage. COREEN occluder is absent. Normal appendage velocities.

## 2023-11-18 NOTE — PROGRESS NOTES
[facility]  Progress Note    Patient Name: Leslie Tolliver  MRN: [id number]  Patient Class: OP- Observation   Admission Date: 11/16/2023  Length of Stay: 0 days  Attending Physician: Isidra Peterson MD  Primary Care Provider: Chang Christianson MD        Subjective:     Principal Problem:Acute decompensated heart failure        HPI:  Patient is an 89-year-old female with history of bullous pemphigoid, type 2 diabetes, hypertension, hyperlipidemia, paroxysmal atrial fibrillation not on anticoagulation outpatient, CAD, aortic valve replacement comes to the emergency room with complaints of shortness of breath.  Patient reports that the shortness of breath has been ongoing for the last week, worsened at night.  Patient reports having shortness of breath while lying flat, reports waking up in the middle of night with shortness of breath.  Patient follows with Dr. Huerta outpatient for atrial fibrillation.  Reports not being on anticoagulation given she is felt to be high-risk for treatment because of fall risk and currently being treated with dual anti-platelet therapy per Cardiology.  Patient  reports shortness of breath worse with exertion, denies having any chest pain, fever, chills, diarrhea, nausea.  Reports taking medications as prescribed.  Patient states that she had hoarseness, states that she stopped taking her Plavix because she read that it was a side effect.  Patient states she noticed improvement in her symptoms of shortness of breath after she stopped drinking soda and stop eating chips this past week.      On admission patient was noted to have atrial fibrillation with heart rates in the 130s, was given Lopressor 5 mg with improvement in heart rates.  Was also given Lasix and Lovenox.  Chest x-ray showed no acute cardiopulmonary disease.  .  Patient also had complaints of left lower extremity swelling, ultrasound negative for DVT.    Overview/Hospital  Course:  Patient on immunosuppressants for bullous pemphigoid, afib, AVR, CAD and DM2 admitted with afib rvr and LE edema. . She was started on IV lasix for decompensated CHF due to afib. She received digoxin x 1 dose and started cardizem infusion. Coreg resumed + lisinopril if BP tolerates.  She also has severe pruritic rash of the left groin and thigh. Topical antifungal applied and wound care consulted. She started lovenox BID.     Patient had drop in her BP and lasix dose was discontinued. Cardiology consulted.     Interval History: see hospital course    Review of Systems   Constitutional: Negative.    Respiratory:  Positive for shortness of breath.    Cardiovascular:  Positive for leg swelling.   Gastrointestinal: Negative.    Musculoskeletal:  Positive for arthralgias and back pain.   Skin:  Positive for rash.   Neurological: Negative.      Objective:     Vital Signs (Most Recent):  Temp: 98.1 °F (36.7 °C) (11/18/23 1500)  Pulse: 96 (11/18/23 1500)  Resp: 16 (11/18/23 1500)  BP: (!) 132/56 (11/18/23 1500)  SpO2: 95 % (11/18/23 1500) Vital Signs (24h Range):  Temp:  [97.9 °F (36.6 °C)-98.2 °F (36.8 °C)] 98.1 °F (36.7 °C)  Pulse:  [] 96  Resp:  [16-27] 16  SpO2:  [92 %-99 %] 95 %  BP: ()/(49-90) 132/56     Weight: 81.6 kg (179 lb 14.3 oz)  Body mass index is 28.18 kg/m².    Intake/Output Summary (Last 24 hours) at 11/18/2023 1607  Last data filed at 11/18/2023 1400  Gross per 24 hour   Intake 870 ml   Output 900 ml   Net -30 ml         Physical Exam  Constitutional:       General: She is not in acute distress.  HENT:      Head: Normocephalic and atraumatic.      Mouth/Throat:      Mouth: Mucous membranes are moist.      Pharynx: Oropharynx is clear.   Eyes:      Extraocular Movements: Extraocular movements intact.      Conjunctiva/sclera: Conjunctivae normal.   Cardiovascular:      Rate and Rhythm: Tachycardia present. Rhythm irregular.      Pulses: Normal pulses.   Pulmonary:      Effort:  Pulmonary effort is normal.      Breath sounds: Rhonchi present.   Abdominal:      General: Bowel sounds are normal. There is no distension.      Tenderness: There is no abdominal tenderness.   Musculoskeletal:      Cervical back: Normal range of motion and neck supple.   Skin:     General: Skin is warm and dry.      Capillary Refill: Capillary refill takes less than 2 seconds.      Findings: Rash present.      Comments: Large area of erythema to left inguinal region    Neurological:      General: No focal deficit present.      Mental Status: She is alert and oriented to person, place, and time. Mental status is at baseline.   Psychiatric:         Mood and Affect: Mood normal.         Behavior: Behavior normal.             Significant Labs: All pertinent labs within the past 24 hours have been reviewed.  Recent Lab Results  (Last 5 results in the past 24 hours)        11/18/23  1345   11/18/23  1106   11/18/23  0711   11/18/23  0421   11/17/23  2141        Anion Gap       13         Baso #       0.04         Basophil %       0.7         BUN       31         Calcium       9.9         Chloride       100         CO2       29         Creatinine       1.2         Differential Method       Automated         eGFR       43.3         Eos #       0.3         Eosinophil %       5.3         Glucose       96         Gran # (ANC)       3.8         Gran %       63.7         Hematocrit       41.9         Hemoglobin       12.8         Immature Grans (Abs)       0.02  Comment: Mild elevation in immature granulocytes is non specific and   can be seen in a variety of conditions including stress response,   acute inflammation, trauma and pregnancy. Correlation with other   laboratory and clinical findings is essential.           Immature Granulocytes       0.3         Lymph #       1.0         Lymph %       16.5         Magnesium        2.1         MCH       29.4         MCHC       30.5         MCV       96         Mono #       0.8          Mono %       13.5         MPV       10.7         nRBC       0         Platelet Count       228         POC Glucose   223   103     148       Potassium 4.8       3.7         RBC       4.35         RDW       14.9         Sodium       142         WBC       6.00                                Significant Imaging: I have reviewed all pertinent imaging results/findings within the past 24 hours.    Assessment/Plan:      * Acute decompensated heart failure  Transesophageal echo on 10/16/2022 showed EF 60%  No history of congestive heart failure   We will order echocardiogram, started patient on Lasix 40 mg twice a day- discontinue - appears compensated   Consult Cardiology, patient follows with Dr. Huerta outpatient  The left ventricle is normal in size with normal systolic function.  The estimated ejection fraction is 60%.  Normal left ventricular diastolic function.  Normal right ventricular size with normal right ventricular systolic function.  Mild left atrial enlargement.  There is a bioprosthetic aortic valve present. There is no aortic insufficiency present. Prosthetic aortic valve is normal.  No interatrial septal defect present.  No ASD or PFO closure device in interatrial septum.  Normal appearing left atrial appendage. No thrombus is present in the appendage. COREEN occluder is absent. Normal appendage velocities.      Paroxysmal atrial fibrillation  Patient with Paroxysmal (<7 days) atrial fibrillation which is uncontrolled currently with Beta Blocker. Patient is currently in atrial fibrillation.[id number] Score: 5.  Not on anticoagulation given history of falls and currently on dual antiplatelet therapy.  Patient reports not taking Plavix anymore for hoarseness.  We will defer anticoagulation to Cardiology.  Patient with AFib RVR on admission, received Lopressor 5 mg with improvement in heart rates.  We will continue with outpatient carvedilol.    Cardizem infusion   (Allergy to iodine)   Lovenox BID      Bullous pemphigoid  Follows with dermatology outpatient, is on CellCept outpatient      S/P AVR (aortic valve replacement)    Noted    Stage 3b chronic kidney disease  Creatine stable for now. BMP reviewed- noted Estimated Creatinine Clearance: 38.1 mL/min (based on SCr of 1.1 mg/dL). according to latest data. Based on current GFR, CKD stage is stage 3 - GFR 30-59.  Monitor UOP and serial BMP and adjust therapy as needed. Renally dose meds. Avoid nephrotoxic medications and procedures.    Hyperlipidemia associated with type 2 diabetes mellitus  Patient with history of severe statin intolerance, continue with low-fat low-cholesterol diet      Type 2 diabetes mellitus with diabetic polyneuropathy, with long-term current use of insulin  Started the patient on insulin sliding scale, continue Lantus 24 units q.h.s.      Hypertension associated with diabetes  Continue with outpatient lisinopril        VTE Risk Mitigation (From admission, onward)           Ordered     enoxaparin injection 80 mg  Every 12 hours         11/17/23 1615     IP VTE HIGH RISK PATIENT  Once         11/16/23 2220     Place sequential compression device  Until discontinued         11/16/23 2220                    Discharge Planning   MORA: 11/18/2023     Code Status: Full Code   Is the patient medically ready for discharge?:     Reason for patient still in hospital (select all that apply): Patient trending condition and Consult recommendations  Discharge Plan A: Home                  Isidra Peterson MD  Department of Hospital Medicine   [facility]

## 2023-11-19 PROBLEM — B37.2 CANDIDIASIS OF SKIN: Status: ACTIVE | Noted: 2023-11-19

## 2023-11-19 LAB
ANION GAP SERPL CALC-SCNC: 9 MMOL/L (ref 8–16)
BASOPHILS # BLD AUTO: 0.03 K/UL (ref 0–0.2)
BASOPHILS NFR BLD: 0.6 % (ref 0–1.9)
BUN SERPL-MCNC: 36 MG/DL (ref 8–23)
CALCIUM SERPL-MCNC: 9.4 MG/DL (ref 8.7–10.5)
CHLORIDE SERPL-SCNC: 101 MMOL/L (ref 95–110)
CO2 SERPL-SCNC: 29 MMOL/L (ref 23–29)
CREAT SERPL-MCNC: 1.1 MG/DL (ref 0.5–1.4)
DIFFERENTIAL METHOD: ABNORMAL
EOSINOPHIL # BLD AUTO: 0.3 K/UL (ref 0–0.5)
EOSINOPHIL NFR BLD: 5.6 % (ref 0–8)
ERYTHROCYTE [DISTWIDTH] IN BLOOD BY AUTOMATED COUNT: 14.8 % (ref 11.5–14.5)
EST. GFR  (NO RACE VARIABLE): 48 ML/MIN/1.73 M^2
GLUCOSE SERPL-MCNC: 129 MG/DL (ref 70–110)
GLUCOSE SERPL-MCNC: 129 MG/DL (ref 70–110)
GLUCOSE SERPL-MCNC: 229 MG/DL (ref 70–110)
GLUCOSE SERPL-MCNC: 256 MG/DL (ref 70–110)
GLUCOSE SERPL-MCNC: 295 MG/DL (ref 70–110)
HCT VFR BLD AUTO: 43.9 % (ref 37–48.5)
HGB BLD-MCNC: 13.3 G/DL (ref 12–16)
IMM GRANULOCYTES # BLD AUTO: 0.01 K/UL (ref 0–0.04)
IMM GRANULOCYTES NFR BLD AUTO: 0.2 % (ref 0–0.5)
LYMPHOCYTES # BLD AUTO: 1 K/UL (ref 1–4.8)
LYMPHOCYTES NFR BLD: 18 % (ref 18–48)
MAGNESIUM SERPL-MCNC: 2.2 MG/DL (ref 1.6–2.6)
MCH RBC QN AUTO: 29.2 PG (ref 27–31)
MCHC RBC AUTO-ENTMCNC: 30.3 G/DL (ref 32–36)
MCV RBC AUTO: 97 FL (ref 82–98)
MONOCYTES # BLD AUTO: 0.7 K/UL (ref 0.3–1)
MONOCYTES NFR BLD: 13.7 % (ref 4–15)
NEUTROPHILS # BLD AUTO: 3.3 K/UL (ref 1.8–7.7)
NEUTROPHILS NFR BLD: 61.9 % (ref 38–73)
NRBC BLD-RTO: 0 /100 WBC
PLATELET # BLD AUTO: 204 K/UL (ref 150–450)
PMV BLD AUTO: 10.7 FL (ref 9.2–12.9)
POTASSIUM SERPL-SCNC: 3.8 MMOL/L (ref 3.5–5.1)
POTASSIUM SERPL-SCNC: 5 MMOL/L (ref 3.5–5.1)
RBC # BLD AUTO: 4.55 M/UL (ref 4–5.4)
SODIUM SERPL-SCNC: 139 MMOL/L (ref 136–145)
WBC # BLD AUTO: 5.32 K/UL (ref 3.9–12.7)

## 2023-11-19 PROCEDURE — G0378 HOSPITAL OBSERVATION PER HR: HCPCS

## 2023-11-19 PROCEDURE — 94761 N-INVAS EAR/PLS OXIMETRY MLT: CPT

## 2023-11-19 PROCEDURE — 93005 ELECTROCARDIOGRAM TRACING: CPT | Performed by: GENERAL PRACTICE

## 2023-11-19 PROCEDURE — 99900035 HC TECH TIME PER 15 MIN (STAT)

## 2023-11-19 PROCEDURE — 99213 PR OFFICE/OUTPT VISIT, EST, LEVL III, 20-29 MIN: ICD-10-PCS | Mod: ,,, | Performed by: INTERNAL MEDICINE

## 2023-11-19 PROCEDURE — 96376 TX/PRO/DX INJ SAME DRUG ADON: CPT

## 2023-11-19 PROCEDURE — 63600175 PHARM REV CODE 636 W HCPCS: Performed by: STUDENT IN AN ORGANIZED HEALTH CARE EDUCATION/TRAINING PROGRAM

## 2023-11-19 PROCEDURE — 99213 OFFICE O/P EST LOW 20 MIN: CPT | Mod: ,,, | Performed by: INTERNAL MEDICINE

## 2023-11-19 PROCEDURE — 25000003 PHARM REV CODE 250: Performed by: STUDENT IN AN ORGANIZED HEALTH CARE EDUCATION/TRAINING PROGRAM

## 2023-11-19 PROCEDURE — 83735 ASSAY OF MAGNESIUM: CPT | Performed by: STUDENT IN AN ORGANIZED HEALTH CARE EDUCATION/TRAINING PROGRAM

## 2023-11-19 PROCEDURE — 85025 COMPLETE CBC W/AUTO DIFF WBC: CPT | Performed by: STUDENT IN AN ORGANIZED HEALTH CARE EDUCATION/TRAINING PROGRAM

## 2023-11-19 PROCEDURE — 93010 ELECTROCARDIOGRAM REPORT: CPT | Mod: ,,, | Performed by: GENERAL PRACTICE

## 2023-11-19 PROCEDURE — 36415 COLL VENOUS BLD VENIPUNCTURE: CPT | Performed by: STUDENT IN AN ORGANIZED HEALTH CARE EDUCATION/TRAINING PROGRAM

## 2023-11-19 PROCEDURE — 36415 COLL VENOUS BLD VENIPUNCTURE: CPT | Performed by: HOSPITALIST

## 2023-11-19 PROCEDURE — 84132 ASSAY OF SERUM POTASSIUM: CPT | Performed by: HOSPITALIST

## 2023-11-19 PROCEDURE — 99900031 HC PATIENT EDUCATION (STAT)

## 2023-11-19 PROCEDURE — 96372 THER/PROPH/DIAG INJ SC/IM: CPT | Performed by: NURSE PRACTITIONER

## 2023-11-19 PROCEDURE — 94799 UNLISTED PULMONARY SVC/PX: CPT

## 2023-11-19 PROCEDURE — 63600175 PHARM REV CODE 636 W HCPCS: Performed by: NURSE PRACTITIONER

## 2023-11-19 PROCEDURE — 80048 BASIC METABOLIC PNL TOTAL CA: CPT | Performed by: STUDENT IN AN ORGANIZED HEALTH CARE EDUCATION/TRAINING PROGRAM

## 2023-11-19 PROCEDURE — 93010 EKG 12-LEAD: ICD-10-PCS | Mod: ,,, | Performed by: GENERAL PRACTICE

## 2023-11-19 RX ORDER — DIGOXIN 0.25 MG/ML
250 INJECTION INTRAMUSCULAR; INTRAVENOUS ONCE
Status: COMPLETED | OUTPATIENT
Start: 2023-11-19 | End: 2023-11-19

## 2023-11-19 RX ADMIN — ENOXAPARIN SODIUM 80 MG: 80 INJECTION SUBCUTANEOUS at 06:11

## 2023-11-19 RX ADMIN — INSULIN DETEMIR 24 UNITS: 100 INJECTION, SOLUTION SUBCUTANEOUS at 09:11

## 2023-11-19 RX ADMIN — INSULIN ASPART 3 UNITS: 100 INJECTION, SOLUTION INTRAVENOUS; SUBCUTANEOUS at 05:11

## 2023-11-19 RX ADMIN — DIGOXIN 250 MCG: 0.25 INJECTION INTRAMUSCULAR; INTRAVENOUS at 11:11

## 2023-11-19 RX ADMIN — INSULIN ASPART 1 UNITS: 100 INJECTION, SOLUTION INTRAVENOUS; SUBCUTANEOUS at 09:11

## 2023-11-19 RX ADMIN — CARVEDILOL 6.25 MG: 6.25 TABLET, FILM COATED ORAL at 05:11

## 2023-11-19 RX ADMIN — MYCOPHENOLATE MOFETIL 500 MG: 250 CAPSULE ORAL at 09:11

## 2023-11-19 RX ADMIN — INSULIN ASPART 3 UNITS: 100 INJECTION, SOLUTION INTRAVENOUS; SUBCUTANEOUS at 11:11

## 2023-11-19 RX ADMIN — Medication 1 TABLET: at 08:11

## 2023-11-19 RX ADMIN — POTASSIUM BICARBONATE 50 MEQ: 977.5 TABLET, EFFERVESCENT ORAL at 06:11

## 2023-11-19 RX ADMIN — CARVEDILOL 6.25 MG: 6.25 TABLET, FILM COATED ORAL at 07:11

## 2023-11-19 RX ADMIN — ASPIRIN 81 MG 81 MG: 81 TABLET ORAL at 08:11

## 2023-11-19 RX ADMIN — MYCOPHENOLATE MOFETIL 500 MG: 250 CAPSULE ORAL at 08:11

## 2023-11-19 RX ADMIN — ENOXAPARIN SODIUM 80 MG: 80 INJECTION SUBCUTANEOUS at 05:11

## 2023-11-19 NOTE — PROGRESS NOTES
[facility] - Emergency Dept  Department of Cardiology  Progress Note      PATIENT NAME: Leslie Tolliver    MRN: [id number]  TODAY'S DATE: 11/19/2023  ADMIT DATE: 11/16/2023                          CONSULT REQUESTED BY: Isidra Peterson MD    SUBJECTIVE     PRINCIPAL PROBLEM: Acute decompensated heart failure    11/19/23:  Patient seen resting in bed with no distress noted. Breathing is stable. She remains in Afib with HR in the 90-100s. BP remains soft.     11/18/23:  Patient seen resting in bed with family at bedside. Remains in Afib with variable.     REASON FOR CONSULT:    From H&P: Patient is an 89-year-old female with history of bullous pemphigoid, type 2 diabetes, hypertension, hyperlipidemia, paroxysmal atrial fibrillation not on anticoagulation outpatient, CAD, aortic valve replacement comes to the emergency room with complaints of shortness of breath.  Patient reports that the shortness of breath has been ongoing for the last week, worsened at night.  Patient reports having shortness of breath while lying flat, reports waking up in the middle of night with shortness of breath.  Patient follows with Dr. Huerta outpatient for atrial fibrillation.  Reports not being on anticoagulation given she is felt to be high-risk for treatment because of fall risk and currently being treated with dual anti-platelet therapy per Cardiology.  Patient  reports shortness of breath worse with exertion, denies having any chest pain, fever, chills, diarrhea, nausea.  Reports taking medications as prescribed.  Patient states that she had hoarseness, states that she stopped taking her Plavix because she read that it was a side effect.  Patient states she noticed improvement in her symptoms of shortness of breath after she stopped drinking soda and stop eating chips this past week.        On admission patient was noted to have atrial fibrillation with heart rates in the 130s, was given Lopressor 5 mg with  improvement in heart rates.  Was also given Lasix and Lovenox.  Chest x-ray showed no acute cardiopulmonary disease.  .  Patient also had complaints of left lower extremity swelling, ultrasound negative for DVT.      HPI:    Patient is an 89-year-old female who presented to the emergency room with complaints of shortness of breath.  Patient has had issues with shortness of breath weakness, and swelling over the past several days.  Patient has a history of type 2 diabetes, hypertension, and paroxysmal atrial fibrillation as well as CAD and aortic valve replacement in the past.  She has been taking Plavix and aspirin at home however had recently stopped taking Plavix due to hoarseness.  She had been eating chips at home and drinking soda and felt like that brought on her swelling and thus she had cut back.  Patient noted to be in AFib with RVR in the 130s on arrival.  Patient was given 1 dose of IV metoprolol in admitted to hospital.  Unfortunately she was not given any further rate control medications until this afternoon.  She is allergic to iodine and thus amiodarone was not started.        Review of patient's allergies indicates:   Allergen Reactions    Fenofibric acid (choline)      Other reaction(s): Vomiting    Iodine      Other reaction(s): lips swollen ivp dye    Rosuvastatin      Other reaction(s): muscle pain       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    GERD (gastroesophageal reflux disease)     Hyperlipidemia     Hypertension     Hypothyroidism     Paroxysmal  atrial fibrillation 3/16/2023    Type 2 diabetes mellitus      Past Surgical History:   Procedure Laterality Date    ADENOIDECTOMY      AORTIC VALVE REPLACEMENT      BREAST BIOPSY Right 20 yrs ago    benign    CARDIAC SURGERY      CHOLECYSTECTOMY      COLONOSCOPY  2014    Dr Holly, 5 year recheck    EPIDURAL STEROID INJECTION N/A 3/25/2021    Procedure: Injection, Steroid, Epidural L3-4;  Surgeon: Sky Love MD;  Location: [facility] OR;  Service: Pain Management;  Laterality: N/A;  Injection, Steroid, Epidural L3-4    EPIDURAL STEROID INJECTION N/A 2021    Procedure: Injection, Steroid, Epidural L3-4;  Surgeon: Sky Love MD;  Location: [facility] OR;  Service: Pain Management;  Laterality: N/A;  Injection, Steroid, Epidural L3-4    ESOPHAGOGASTRODUODENOSCOPY N/A 2020    Procedure: EGD (ESOPHAGOGASTRODUODENOSCOPY);  Surgeon: Michael Nugent III, MD;  Location: [facility];  Service: Endoscopy;  Laterality: N/A;    HEMORRHOID SURGERY      HYSTERECTOMY      OOPHORECTOMY      TONSILLECTOMY       Social History     Tobacco Use    Smoking status: Former     Current packs/day: 0.00     Average packs/day: 0.3 packs/day for 15.0 years (3.8 ttl pk-yrs)     Types: Cigarettes     Start date: 3/30/1959     Quit date: 3/30/1974     Years since quittin.6    Smokeless tobacco: Never   Substance Use Topics    Alcohol use: No    Drug use: No        REVIEW OF SYSTEMS  Per HPI    OBJECTIVE     VITAL SIGNS (Most Recent)  Temp: 97.8 °F (36.6 °C) (23)  Pulse: 105 (23)  Resp: (!) 22 (23)  BP: (!) 110/53 (2348)  SpO2: (!) 94 % (23)    VENTILATION STATUS  Resp: (!)  (23)  SpO2: (!) 94 % (23)           I & O (Last 24H):  Intake/Output Summary (Last 24 hours) at 2023 1047  Last data filed at 2023 0329  Gross per 24 hour   Intake 240 ml   Output 725 ml   Net -485 ml         WEIGHTS  Wt Readings from Last 1 Encounters:   23 81.6  kg (179 lb 14.3 oz)   11/16/23 1839 81.6 kg (180 lb)       PHYSICAL EXAM  CONSTITUTIONAL: No fever, no chills  HEENT: Normocephalic               NECK:  No JVD   CVS: S1S2+, iRRR  LUNGS: Clear  ABDOMEN: Soft, NT, BS+  EXTREMITIES: No cyanosis, trace BLE edema  NEURO: AAO X 3  PSY: Normal affect      HOME MEDICATIONS:  No current facility-administered medications on file prior to encounter.     Current Outpatient Medications on File Prior to Encounter   Medication Sig Dispense Refill    aspirin 81 mg Tab Take 81 mg by mouth once daily. Every day 30 tablet 0    calcium carbonate/vitamin D3 (CALCIUM+D ORAL) Take 1 tablet by mouth once daily.      carvediloL (COREG) 6.25 MG tablet Take 1 tablet (6.25 mg total) by mouth 2 (two) times daily with meals. 180 tablet 3    cholecalciferol, vitamin D3, (VITAMIN D3) 125 mcg (5,000 unit) Tab Take 5,000 Units by mouth once daily.      clobetasol 0.05% (TEMOVATE) 0.05 % Oint Apply 1 g topically 2 (two) times daily.      empagliflozin (JARDIANCE) 10 mg tablet Take 1 tablet (10 mg total) by mouth once daily. 90 tablet 1    ketoconazole (NIZORAL) 2 % cream AAA pannus fold bid (Patient taking differently: Apply 1 application  topically once daily. AAA pannus fold bid) 60 g 3    LANTUS SOLOSTAR U-100 INSULIN glargine 100 units/mL SubQ pen INJECT 24 UNITS SUBCUTANEOUSLY EVERY EVENING (Patient taking differently: Inject 24 Units into the skin every evening. INJECT 24 UNITS SUBCUTANEOUSLY EVERY EVENING) 30 mL 1    levocetirizine (XYZAL) 5 MG tablet Take 5 mg by mouth every evening.      lisinopriL (PRINIVIL,ZESTRIL) 20 MG tablet Take 1 tablet (20 mg total) by mouth once daily. 90 tablet 2    metFORMIN (GLUCOPHAGE) 1000 MG tablet Take 1 tablet (1,000 mg total) by mouth 2 (two) times daily with meals. 180 tablet 1    multivitamin (THERAGRAN) per tablet Take 1 tablet by mouth once daily.      triamcinolone acetonide 0.1% (KENALOG) 0.1 % cream AAA bid (Patient taking differently: Apply 1 g  "topically 2 (two) times daily. AAA bid) 454 g 3    blood sugar diagnostic (ACCU-CHEK GUIDE TEST STRIPS) Strp 300 each by Misc.(Non-Drug; Combo Route) route 3 (three) times daily. 300 each 3    blood-glucose meter kit Accu-chek Marley glucometer check glucose three times daily E11.65 1 each 0    cholestyramine-aspartame (PREVALITE) 4 gram PwPk Take 1 packet (4 g total) by mouth once daily. (Patient not taking: Reported on 11/16/2023) 30 packet 11    clopidogreL (PLAVIX) 75 mg tablet Take 1 tablet (75 mg total) by mouth once daily. (Patient not taking: Reported on 11/16/2023) 30 tablet 11    DROPLET PEN NEEDLE 31 gauge x 5/16" Ndle USE AS DIRECTED WITH LANTUS SOLOSTAR PEN AS NEEDED 100 each 3    lancets (ACCU-CHEK SOFTCLIX LANCETS) Misc New machine Guide Me Accuchek 300 each 3    mycophenolate (CELLCEPT) 500 mg Tab One tab PO QAM and 2 tabs PO QHS (Patient not taking: Reported on 11/16/2023) 90 tablet 1       SCHEDULED MEDS:   aspirin  81 mg Oral Daily    calcium-vitamin D3  1 tablet Oral Daily    carvediloL  6.25 mg Oral BID WM    enoxparin  1 mg/kg Subcutaneous Q12H (treatment, non-standard time)    insulin detemir U-100  24 Units Subcutaneous QHS    lisinopriL  20 mg Oral Daily    mycophenolate  500 mg Oral BID       CONTINUOUS INFUSIONS:   [id number]         PRN MEDS:acetaminophen, dextrose 50%, dextrose 50%, glucagon (human recombinant), glucose, glucose, insulin aspart U-100, magnesium oxide, magnesium oxide, ondansetron, potassium bicarbonate, potassium bicarbonate, potassium bicarbonate, potassium, sodium phosphates, potassium, sodium phosphates, potassium, sodium phosphates, sodium chloride 0.9%    LABS AND DIAGNOSTICS     CBC LAST 3 DAYS  Recent Labs   Lab 11/17/23  0421 11/18/23  0421 11/19/23  0453   WBC 5.69 6.00 5.32   RBC 3.94* 4.35 4.55   HGB 11.9* 12.8 13.3   HCT 37.6 41.9 43.9   MCV 95 96 97   MCH 30.2 29.4 29.2   MCHC 31.6* 30.5* 30.3*   RDW 15.0* 14.9* 14.8*    228 204   MPV 10.3 10.7 10.7 " "  GRAN 72.0  4.1 63.7  3.8 61.9  3.3   LYMPH 15.5*  0.9* 16.5*  1.0 18.0  1.0   MONO 8.4  0.5 13.5  0.8 13.7  0.7   BASO 0.04 0.04 0.03   NRBC 0 0 0         COAGULATION LAST 3 DAYS  No results for input(s): "LABPT", "INR", "APTT" in the last 168 hours.    CHEMISTRY LAST 3 DAYS  Recent Labs   Lab 11/16/23 1911 11/17/23  0421 11/18/23  0421 11/18/23  1345 11/19/23  0453    141 142  --  139   K 4.2 3.4* 3.7 4.8 3.8    107 100  --  101   CO2 22* 23 29  --  29   ANIONGAP 7* 11 13  --  9   BUN 25* 22 31*  --  36*   CREATININE 1.1 0.9 1.2  --  1.1   * 120* 96  --  129*   CALCIUM 9.3 9.3 9.9  --  9.4   MG 1.9 1.6 2.1  --  2.2   ALBUMIN 4.4  --   --   --   --    PROT 6.8  --   --   --   --    ALKPHOS 86  --   --   --   --    ALT 19  --   --   --   --    AST 20  --   --   --   --    BILITOT 1.4*  --   --   --   --          CARDIAC PROFILE LAST 3 DAYS  Recent Labs   Lab 11/16/23 1911   *   TROPONINIHS 10.8         ENDOCRINE LAST 3 DAYS  Recent Labs   Lab 11/16/23 1911   TSH 2.565         LAST ARTERIAL BLOOD GAS  ABG  No results for input(s): "PH", "PO2", "PCO2", "HCO3", "BE" in the last 168 hours.    LAST 7 DAYS MICROBIOLOGY   Microbiology Results (last 7 days)       ** No results found for the last 168 hours. **            MOST RECENT IMAGING  Echo    Left Ventricle: The left ventricle is normal in size. Mildly increased   wall thickness. There is mild concentric hypertrophy. Normal wall motion.   There is normal systolic function with a visually estimated ejection   fraction of 60 - 65%. There is normal diastolic function.    Right Ventricle: Normal right ventricular cavity size. Wall thickness   is normal. Right ventricle wall motion  is normal. Systolic function is   normal.    Left Atrium: Left atrium is severely dilated.    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    Mitral Valve: There is moderate bileaflet sclerosis. Moderately   calcified posterior " leaflet. There is mild anterior mitral annular   calcification present. There is mild stenosis. The mean pressure gradient   across the mitral valve is 4 mmHg at a heart rate of  bpm. There is   moderate regurgitation with a centrally directed jet.    Tricuspid Valve: There is mild regurgitation with a centrally directed   jet.    IVC/SVC: Elevated venous pressure at 15 mmHg.      ECHOCARDIOGRAM RESULTS (last 5)  Results for orders placed during the hospital encounter of 10/14/22    Transesophageal echo (GABY)    Interpretation Summary  · The left ventricle is normal in size with normal systolic function.  · The estimated ejection fraction is 60%.  · Normal left ventricular diastolic function.  · Normal right ventricular size with normal right ventricular systolic function.  · Mild left atrial enlargement.  · There is a bioprosthetic aortic valve present. There is no aortic insufficiency present. Prosthetic aortic valve is normal.  · No interatrial septal defect present.  · No ASD or PFO closure device in interatrial septum.  · Normal appearing left atrial appendage. No thrombus is present in the appendage. COREEN occluder is absent. Normal appendage velocities.      Echo Saline Bubble? Yes    Interpretation Summary  · There is no evidence of intracardiac shunting.  · The left ventricle is normal in size with mild concentric hypertrophy and hyperdynamic systolic function.  · The estimated ejection fraction is 70%.  · Indeterminate left ventricular diastolic function.  · Severe left atrial enlargement.  · Severe mitral regurgitation.  · Mild to moderate tricuspid regurgitation.  · There is moderate aortic valve stenosis.  · Aortic valve area is 1.79 cm2; peak velocity is m/s; mean gradient is 21 mmHg.  · Mild aortic regurgitation.  · Normal central venous pressure (3 mmHg).  · The estimated PA systolic pressure is 14 mmHg.      CURRENT/PREVIOUS VISIT EKG  Results for orders placed or performed during the hospital  encounter of 11/16/23   EKG 12-lead    Collection Time: 11/16/23  6:55 PM    Narrative    Test Reason : R06.02,    Vent. Rate : 113 BPM     Atrial Rate : 111 BPM     P-R Int : 000 ms          QRS Dur : 088 ms      QT Int : 282 ms       P-R-T Axes : 000 037 013 degrees     QTc Int : 386 ms    Atrial fibrillation with rapid ventricular response  Low voltage QRS  Septal infarct (cited on or before 03-JAN-2023)  Abnormal ECG  When compared with ECG of 23-MAR-2023 12:10,  Atrial fibrillation has replaced Sinus rhythm  Questionable change in initial forces of Anterior leads  Nonspecific T wave abnormality now evident in Inferior leads    Referred By: [email or web address]   SELF           Confirmed By:            ASSESSMENT/PLAN:     Active Hospital Problems    Diagnosis    *Acute decompensated heart failure    Paroxysmal atrial fibrillation    Bullous pemphigoid    S/P AVR (aortic valve replacement)    Stage 3b chronic kidney disease    Hyperlipidemia associated with type 2 diabetes mellitus    Hypertension associated with diabetes    Type 2 diabetes mellitus with diabetic polyneuropathy, with long-term current use of insulin       ASSESSMENT & PLAN:   Afib with RVR  Acute HFpEF   CKD stage III  HTN  Cad  Hx of AVR      RECOMMENDATIONS:    Continue weight based lovenox 1mg/kg SUBQ BID.   Continue carvedilol 6.25 mg po BID.   She did eat breakfast today so GABY/CV is not possible. Discussed with her that we will plan for GABY/CV tomorrow.   Give digoxin 250 mcg IV x 1 dose now.   Trend labs. Give Kcl 10 meq x 1 dose now.       Anjana Reese NP  Date of Service: 11/19/2023  2:10 PM      I have personally interviewed and examined the patient, I have reviewed the Nurse Practitioner's history and physical, assessment, and plan. I have personally evaluated the patient at bedside and agree with the findings and made appropriate changes as necessary in recommendations.  Heart rate in 90s to low 100s today.  Blood pressure on the lower side.   Plan for GABY cardioversion tomorrow if heart rate still not well controlled.      Liat Antonio MD  Department of Cardiology  [facility]  11/19/23

## 2023-11-19 NOTE — CARE UPDATE
11/19/23 0750   Patient Assessment/Suction   Level of Consciousness (AVPU) alert   PRE-TX-O2   Device (Oxygen Therapy) room air   Pulse Oximetry Type Continuous   $ Pulse Oximetry - Multiple Charge Pulse Oximetry - Multiple   Education   $ Education Other (see comment);15 min

## 2023-11-19 NOTE — CARE UPDATE
11/18/23 2280   Patient Assessment/Suction   Level of Consciousness (AVPU) alert   Respiratory Effort Unlabored   Expansion/Accessory Muscles/Retractions no use of accessory muscles   All Lung Fields Breath Sounds clear;diminished   Rhythm/Pattern, Respiratory no shortness of breath reported   Cough Frequency no cough   PRE-TX-O2   Device (Oxygen Therapy) room air   SpO2 95 %   Pulse Oximetry Type Continuous   $ Pulse Oximetry - Multiple Charge Pulse Oximetry - Multiple   Pulse 101   Resp (!) 27   Positioning   Head of Bed (HOB) Positioning HOB elevated;HOB at 30-45 degrees   Respiratory Evaluation   $ Care Plan Tech Time 15 min   $ Eval/Re-eval Charges Evaluation

## 2023-11-20 LAB
ANION GAP SERPL CALC-SCNC: 7 MMOL/L (ref 8–16)
BASOPHILS # BLD AUTO: 0.05 K/UL (ref 0–0.2)
BASOPHILS NFR BLD: 1.1 % (ref 0–1.9)
BUN SERPL-MCNC: 32 MG/DL (ref 8–23)
CALCIUM SERPL-MCNC: 9.6 MG/DL (ref 8.7–10.5)
CHLORIDE SERPL-SCNC: 102 MMOL/L (ref 95–110)
CO2 SERPL-SCNC: 30 MMOL/L (ref 23–29)
CREAT SERPL-MCNC: 1.1 MG/DL (ref 0.5–1.4)
DIFFERENTIAL METHOD: ABNORMAL
EOSINOPHIL # BLD AUTO: 0.3 K/UL (ref 0–0.5)
EOSINOPHIL NFR BLD: 6.5 % (ref 0–8)
ERYTHROCYTE [DISTWIDTH] IN BLOOD BY AUTOMATED COUNT: 14.6 % (ref 11.5–14.5)
EST. GFR  (NO RACE VARIABLE): 48 ML/MIN/1.73 M^2
GLUCOSE SERPL-MCNC: 140 MG/DL (ref 70–110)
GLUCOSE SERPL-MCNC: 143 MG/DL (ref 70–110)
GLUCOSE SERPL-MCNC: 158 MG/DL (ref 70–110)
GLUCOSE SERPL-MCNC: 244 MG/DL (ref 70–110)
GLUCOSE SERPL-MCNC: 262 MG/DL (ref 70–110)
HCT VFR BLD AUTO: 42.2 % (ref 37–48.5)
HGB BLD-MCNC: 13 G/DL (ref 12–16)
IMM GRANULOCYTES # BLD AUTO: 0.01 K/UL (ref 0–0.04)
IMM GRANULOCYTES NFR BLD AUTO: 0.2 % (ref 0–0.5)
LYMPHOCYTES # BLD AUTO: 0.9 K/UL (ref 1–4.8)
LYMPHOCYTES NFR BLD: 19.7 % (ref 18–48)
MAGNESIUM SERPL-MCNC: 2 MG/DL (ref 1.6–2.6)
MCH RBC QN AUTO: 29.3 PG (ref 27–31)
MCHC RBC AUTO-ENTMCNC: 30.8 G/DL (ref 32–36)
MCV RBC AUTO: 95 FL (ref 82–98)
MONOCYTES # BLD AUTO: 0.7 K/UL (ref 0.3–1)
MONOCYTES NFR BLD: 14.9 % (ref 4–15)
NEUTROPHILS # BLD AUTO: 2.7 K/UL (ref 1.8–7.7)
NEUTROPHILS NFR BLD: 57.6 % (ref 38–73)
NRBC BLD-RTO: 0 /100 WBC
PLATELET # BLD AUTO: 203 K/UL (ref 150–450)
PMV BLD AUTO: 10.3 FL (ref 9.2–12.9)
POTASSIUM SERPL-SCNC: 4.2 MMOL/L (ref 3.5–5.1)
RBC # BLD AUTO: 4.43 M/UL (ref 4–5.4)
SODIUM SERPL-SCNC: 139 MMOL/L (ref 136–145)
WBC # BLD AUTO: 4.63 K/UL (ref 3.9–12.7)

## 2023-11-20 PROCEDURE — 63600175 PHARM REV CODE 636 W HCPCS: Performed by: NURSE PRACTITIONER

## 2023-11-20 PROCEDURE — 36415 COLL VENOUS BLD VENIPUNCTURE: CPT | Performed by: STUDENT IN AN ORGANIZED HEALTH CARE EDUCATION/TRAINING PROGRAM

## 2023-11-20 PROCEDURE — 96372 THER/PROPH/DIAG INJ SC/IM: CPT | Performed by: NURSE PRACTITIONER

## 2023-11-20 PROCEDURE — 21000000 HC CCU ICU ROOM CHARGE

## 2023-11-20 PROCEDURE — 25000003 PHARM REV CODE 250

## 2023-11-20 PROCEDURE — 94761 N-INVAS EAR/PLS OXIMETRY MLT: CPT

## 2023-11-20 PROCEDURE — 99232 SBSQ HOSP IP/OBS MODERATE 35: CPT | Mod: ,,, | Performed by: INTERNAL MEDICINE

## 2023-11-20 PROCEDURE — 80048 BASIC METABOLIC PNL TOTAL CA: CPT | Performed by: STUDENT IN AN ORGANIZED HEALTH CARE EDUCATION/TRAINING PROGRAM

## 2023-11-20 PROCEDURE — 83735 ASSAY OF MAGNESIUM: CPT | Performed by: STUDENT IN AN ORGANIZED HEALTH CARE EDUCATION/TRAINING PROGRAM

## 2023-11-20 PROCEDURE — 25000003 PHARM REV CODE 250: Performed by: STUDENT IN AN ORGANIZED HEALTH CARE EDUCATION/TRAINING PROGRAM

## 2023-11-20 PROCEDURE — 99232 PR SUBSEQUENT HOSPITAL CARE,LEVL II: ICD-10-PCS | Mod: ,,, | Performed by: INTERNAL MEDICINE

## 2023-11-20 PROCEDURE — 85025 COMPLETE CBC W/AUTO DIFF WBC: CPT | Performed by: STUDENT IN AN ORGANIZED HEALTH CARE EDUCATION/TRAINING PROGRAM

## 2023-11-20 PROCEDURE — 63600175 PHARM REV CODE 636 W HCPCS: Performed by: STUDENT IN AN ORGANIZED HEALTH CARE EDUCATION/TRAINING PROGRAM

## 2023-11-20 PROCEDURE — 82962 GLUCOSE BLOOD TEST: CPT

## 2023-11-20 RX ORDER — CARVEDILOL 12.5 MG/1
12.5 TABLET ORAL 2 TIMES DAILY
Status: DISCONTINUED | OUTPATIENT
Start: 2023-11-20 | End: 2023-11-21 | Stop reason: HOSPADM

## 2023-11-20 RX ORDER — CARVEDILOL 12.5 MG/1
12.5 TABLET ORAL 2 TIMES DAILY WITH MEALS
Status: DISCONTINUED | OUTPATIENT
Start: 2023-11-20 | End: 2023-11-20

## 2023-11-20 RX ORDER — CARVEDILOL 6.25 MG/1
6.25 TABLET ORAL ONCE
Status: COMPLETED | OUTPATIENT
Start: 2023-11-20 | End: 2023-11-20

## 2023-11-20 RX ADMIN — ASPIRIN 81 MG 81 MG: 81 TABLET ORAL at 09:11

## 2023-11-20 RX ADMIN — CARVEDILOL 6.25 MG: 6.25 TABLET, FILM COATED ORAL at 02:11

## 2023-11-20 RX ADMIN — MYCOPHENOLATE MOFETIL 500 MG: 250 CAPSULE ORAL at 08:11

## 2023-11-20 RX ADMIN — ENOXAPARIN SODIUM 80 MG: 80 INJECTION SUBCUTANEOUS at 05:11

## 2023-11-20 RX ADMIN — CARVEDILOL 6.25 MG: 6.25 TABLET, FILM COATED ORAL at 07:11

## 2023-11-20 RX ADMIN — CARVEDILOL 12.5 MG: 12.5 TABLET, FILM COATED ORAL at 08:11

## 2023-11-20 RX ADMIN — Medication 1 TABLET: at 09:11

## 2023-11-20 RX ADMIN — MYCOPHENOLATE MOFETIL 500 MG: 250 CAPSULE ORAL at 09:11

## 2023-11-20 RX ADMIN — INSULIN ASPART 3 UNITS: 100 INJECTION, SOLUTION INTRAVENOUS; SUBCUTANEOUS at 09:11

## 2023-11-20 RX ADMIN — INSULIN DETEMIR 24 UNITS: 100 INJECTION, SOLUTION SUBCUTANEOUS at 09:11

## 2023-11-20 RX ADMIN — INSULIN ASPART 2 UNITS: 100 INJECTION, SOLUTION INTRAVENOUS; SUBCUTANEOUS at 05:11

## 2023-11-20 NOTE — SUBJECTIVE & OBJECTIVE
Interval History: see hospital course    Review of Systems   Constitutional: Negative.    Respiratory:  Positive for shortness of breath.    Cardiovascular:  Positive for leg swelling.   Gastrointestinal: Negative.    Musculoskeletal:  Positive for arthralgias and back pain.   Skin:  Positive for rash.   Neurological: Negative.      Objective:     Vital Signs (Most Recent):  Temp: 98.2 °F (36.8 °C) (11/19/23 1901)  Pulse: 90 (11/19/23 2051)  Resp: (!) 24 (11/19/23 2051)  BP: (!) 141/73 (11/19/23 1500)  SpO2: 96 % (11/19/23 2051) Vital Signs (24h Range):  Temp:  [97.2 °F (36.2 °C)-98.2 °F (36.8 °C)] 98.2 °F (36.8 °C)  Pulse:  [] 90  Resp:  [20-25] 24  SpO2:  [94 %-97 %] 96 %  BP: (110-141)/(53-90) 141/73     Weight: 81.6 kg (179 lb 14.3 oz)  Body mass index is 28.18 kg/m².    Intake/Output Summary (Last 24 hours) at 11/19/2023 2246  Last data filed at 11/19/2023 1726  Gross per 24 hour   Intake 762 ml   Output 550 ml   Net 212 ml           Physical Exam  Constitutional:       General: She is not in acute distress.  HENT:      Head: Normocephalic and atraumatic.      Mouth/Throat:      Mouth: Mucous membranes are moist.      Pharynx: Oropharynx is clear.   Eyes:      Extraocular Movements: Extraocular movements intact.      Conjunctiva/sclera: Conjunctivae normal.   Cardiovascular:      Rate and Rhythm: Tachycardia present. Rhythm irregular.      Pulses: Normal pulses.   Pulmonary:      Effort: Pulmonary effort is normal.      Breath sounds: Rhonchi present.   Abdominal:      General: Bowel sounds are normal. There is no distension.      Tenderness: There is no abdominal tenderness.   Musculoskeletal:      Cervical back: Normal range of motion and neck supple.   Skin:     General: Skin is warm and dry.      Capillary Refill: Capillary refill takes less than 2 seconds.      Findings: Rash present.      Comments: Large area of erythema to left inguinal region    Neurological:      General: No focal deficit  present.      Mental Status: She is alert and oriented to person, place, and time. Mental status is at baseline.   Psychiatric:         Mood and Affect: Mood normal.         Behavior: Behavior normal.             Significant Labs: All pertinent labs within the past 24 hours have been reviewed.  Recent Lab Results  (Last 5 results in the past 24 hours)        11/19/23 2015 11/19/23  1625   11/19/23  1103   11/19/23  1056   11/19/23  0712        POC Glucose 229   256   295     129       Potassium       5.0                                Significant Imaging: I have reviewed all pertinent imaging results/findings within the past 24 hours.

## 2023-11-20 NOTE — RESPIRATORY THERAPY
11/19/23 2051   Patient Assessment/Suction   Level of Consciousness (AVPU) alert   Respiratory Effort Normal;Unlabored   Expansion/Accessory Muscles/Retractions no use of accessory muscles;no retractions;expansion symmetric   All Lung Fields Breath Sounds Anterior:;diminished   Rhythm/Pattern, Respiratory no shortness of breath reported   Cough Frequency no cough   PRE-TX-O2   Device (Oxygen Therapy) room air   SpO2 96 %   Pulse Oximetry Type Continuous   $ Pulse Oximetry - Multiple Charge Pulse Oximetry - Multiple   Pulse 90   Resp (!) 24   Positioning HOB elevated 30 degrees   Education   $ Education Other (see comment)   Respiratory Evaluation   $ Care Plan Tech Time 15 min   $ Eval/Re-eval Charges Re-evaluation

## 2023-11-20 NOTE — PROGRESS NOTES
[facility] Medicine  Progress Note    Patient Name: Leslie Tolliver  MRN: [id number]  Patient Class: OP- Observation   Admission Date: 11/16/2023  Length of Stay: 0 days  Attending Physician: Isidra Peterson MD  Primary Care Provider: Chang Christianson MD        Subjective:     Principal Problem:Acute decompensated heart failure        HPI:  Patient is an 89-year-old female with history of bullous pemphigoid, type 2 diabetes, hypertension, hyperlipidemia, paroxysmal atrial fibrillation not on anticoagulation outpatient, CAD, aortic valve replacement comes to the emergency room with complaints of shortness of breath.  Patient reports that the shortness of breath has been ongoing for the last week, worsened at night.  Patient reports having shortness of breath while lying flat, reports waking up in the middle of night with shortness of breath.  Patient follows with Dr. Huerta outpatient for atrial fibrillation.  Reports not being on anticoagulation given she is felt to be high-risk for treatment because of fall risk and currently being treated with dual anti-platelet therapy per Cardiology.  Patient  reports shortness of breath worse with exertion, denies having any chest pain, fever, chills, diarrhea, nausea.  Reports taking medications as prescribed.  Patient states that she had hoarseness, states that she stopped taking her Plavix because she read that it was a side effect.  Patient states she noticed improvement in her symptoms of shortness of breath after she stopped drinking soda and stop eating chips this past week.      On admission patient was noted to have atrial fibrillation with heart rates in the 130s, was given Lopressor 5 mg with improvement in heart rates.  Was also given Lasix and Lovenox.  Chest x-ray showed no acute cardiopulmonary disease.  .  Patient also had complaints of left lower extremity swelling, ultrasound negative for DVT.    Overview/Hospital  Course:  Patient on immunosuppressants for bullous pemphigoid, afib, AVR, CAD and DM2 admitted with afib rvr and LE edema. . She was started on IV lasix for decompensated CHF due to afib. She received digoxin x 1 dose and started cardizem infusion. Coreg resumed + lisinopril if BP tolerates.  She also has severe pruritic rash of the left groin and thigh. Topical antifungal applied and wound care consulted. She started lovenox BID.     Patient had drop in her BP and lasix dose was discontinued. Cardiology consulted. PLan for GABY/CV 11/20/23.     Interval History: see hospital course    Review of Systems   Constitutional: Negative.    Respiratory:  Positive for shortness of breath.    Cardiovascular:  Positive for leg swelling.   Gastrointestinal: Negative.    Musculoskeletal:  Positive for arthralgias and back pain.   Skin:  Positive for rash.   Neurological: Negative.      Objective:     Vital Signs (Most Recent):  Temp: 98.2 °F (36.8 °C) (11/19/23 1901)  Pulse: 90 (11/19/23 2051)  Resp: (!) 24 (11/19/23 2051)  BP: (!) 141/73 (11/19/23 1500)  SpO2: 96 % (11/19/23 2051) Vital Signs (24h Range):  Temp:  [97.2 °F (36.2 °C)-98.2 °F (36.8 °C)] 98.2 °F (36.8 °C)  Pulse:  [] 90  Resp:  [20-25] 24  SpO2:  [94 %-97 %] 96 %  BP: (110-141)/(53-90) 141/73     Weight: 81.6 kg (179 lb 14.3 oz)  Body mass index is 28.18 kg/m².    Intake/Output Summary (Last 24 hours) at 11/19/2023 2246  Last data filed at 11/19/2023 1726  Gross per 24 hour   Intake 762 ml   Output 550 ml   Net 212 ml           Physical Exam  Constitutional:       General: She is not in acute distress.  HENT:      Head: Normocephalic and atraumatic.      Mouth/Throat:      Mouth: Mucous membranes are moist.      Pharynx: Oropharynx is clear.   Eyes:      Extraocular Movements: Extraocular movements intact.      Conjunctiva/sclera: Conjunctivae normal.   Cardiovascular:      Rate and Rhythm: Tachycardia present. Rhythm irregular.      Pulses: Normal  pulses.   Pulmonary:      Effort: Pulmonary effort is normal.      Breath sounds: Rhonchi present.   Abdominal:      General: Bowel sounds are normal. There is no distension.      Tenderness: There is no abdominal tenderness.   Musculoskeletal:      Cervical back: Normal range of motion and neck supple.   Skin:     General: Skin is warm and dry.      Capillary Refill: Capillary refill takes less than 2 seconds.      Findings: Rash present.      Comments: Large area of erythema to left inguinal region    Neurological:      General: No focal deficit present.      Mental Status: She is alert and oriented to person, place, and time. Mental status is at baseline.   Psychiatric:         Mood and Affect: Mood normal.         Behavior: Behavior normal.             Significant Labs: All pertinent labs within the past 24 hours have been reviewed.  Recent Lab Results  (Last 5 results in the past 24 hours)        11/19/23 2015 11/19/23  1625   11/19/23  1103   11/19/23  1056   11/19/23  0712        POC Glucose 229   256   295     129       Potassium       5.0                                Significant Imaging: I have reviewed all pertinent imaging results/findings within the past 24 hours.    Assessment/Plan:      * Acute decompensated heart failure  Transesophageal echo on 10/16/2022 showed EF 60%  No history of congestive heart failure   We will order echocardiogram, started patient on Lasix 40 mg twice a day- discontinue - appears compensated   Consult Cardiology, patient follows with Dr. Huerta outpatient  The left ventricle is normal in size with normal systolic function.  The estimated ejection fraction is 60%.  Normal left ventricular diastolic function.  Normal right ventricular size with normal right ventricular systolic function.  Mild left atrial enlargement.  There is a bioprosthetic aortic valve present. There is no aortic insufficiency present. Prosthetic aortic valve is normal.  No interatrial septal defect  present.  No ASD or PFO closure device in interatrial septum.  Normal appearing left atrial appendage. No thrombus is present in the appendage. COREEN occluder is absent. Normal appendage velocities.      Candidiasis of skin    Continue miconazole topical   Wound care    Paroxysmal atrial fibrillation  Patient with Paroxysmal (<7 days) atrial fibrillation which is uncontrolled currently with Beta Blocker. Patient is currently in atrial fibrillation.[id number] Score: 5.  Not on anticoagulation given history of falls and currently on dual antiplatelet therapy.  Patient reports not taking Plavix anymore for hoarseness.  We will defer anticoagulation to Cardiology.  Patient with AFib RVR on admission, received Lopressor 5 mg with improvement in heart rates.  We will continue with outpatient carvedilol.    Cardizem infusion   (Allergy to iodine)   Lovenox BID   GABY/CV in am     Bullous pemphigoid  Follows with dermatology outpatient, is on CellCept outpatient  Fungal rash to left inguinal region - topical miconazole       S/P AVR (aortic valve replacement)    Noted    Stage 3b chronic kidney disease  Creatine stable for now. BMP reviewed- noted Estimated Creatinine Clearance: 38.1 mL/min (based on SCr of 1.1 mg/dL). according to latest data. Based on current GFR, CKD stage is stage 3 - GFR 30-59.  Monitor UOP and serial BMP and adjust therapy as needed. Renally dose meds. Avoid nephrotoxic medications and procedures.    Hyperlipidemia associated with type 2 diabetes mellitus  Patient with history of severe statin intolerance, continue with low-fat low-cholesterol diet      Type 2 diabetes mellitus with diabetic polyneuropathy, with long-term current use of insulin  Started the patient on insulin sliding scale, continue Lantus 24 units q.h.s.      Hypertension associated with diabetes  Continue with outpatient lisinopril        VTE Risk Mitigation (From admission, onward)           Ordered     enoxaparin injection 80 mg   Every 12 hours         11/17/23 1615     IP VTE HIGH RISK PATIENT  Once         11/16/23 2220     Place sequential compression device  Until discontinued         11/16/23 2220                    Discharge Planning   MORA: 11/18/2023     Code Status: Full Code   Is the patient medically ready for discharge?:     Reason for patient still in hospital (select all that apply): Patient trending condition and Treatment  Discharge Plan A: Home                  Isidra Peterson MD  Department of Hospital Medicine   [facility]

## 2023-11-20 NOTE — CONSULTS
Bilateral groin yeast, staff have been using miconazole powder and cream.  Looks better than photos.

## 2023-11-20 NOTE — PLAN OF CARE
Met with pt at bedside to discuss dc plan. Pt is agreeable to home health (nurse only)  1st choice [facility]. Referral sent via [employer]       11/20/23 5140   Discharge Reassessment   Assessment Type Discharge Planning Reassessment   Discharge Plan discussed with: Patient   Communicated MORA with patient/caregiver Yes   Discharge Plan A Home Health   Discharge Plan B Home with family   DME Needed Upon Discharge  none   Transition of Care Barriers None   Post-Acute Status   Post-Acute Authorization Home Health   Home Health Status Referrals Sent

## 2023-11-20 NOTE — NURSING
RN Navigator met with patient at bedside to discuss scheduling tcc/hospital follow up appt. upon discharge. Pt is A&Ox4, lying supine w/ hob elevated.    Pt is agreeable to schedule hospital follow up appt at [facility]. RN Navigator informed pt of scheduled appointment Date: 11/27 Time: 10:30 a.m. and patient reminded to bring portable home O2 if used , as well as all medication bottles to Discharge Clinic follow up appointment.  Discharge Clinic information handout, appointment letter and folder provided to patient. Transportation will be provided by her dtr, who is off on Mondays. Pt. reminded to call DC Clinic Contact number, [phone], with any questions or if appointment needs to be rescheduled.    Barriers to attending TCC/Hospital Discharge Clinic visit: None verbalized by pt at this time.

## 2023-11-20 NOTE — PROGRESS NOTES
[facility] - Emergency Dept  Department of Cardiology  Progress Note      PATIENT NAME: Leslie Tolliver    MRN: [id number]  TODAY'S DATE: 11/20/2023  ADMIT DATE: 11/16/2023                          CONSULT REQUESTED BY: Isidra Peterson MD    SUBJECTIVE     PRINCIPAL PROBLEM: Acute decompensated heart failure    11/20/23    Resting in chair. Rate controlled AF on tele.  HR 90s.  NAD.  RA.  Denies complaints.       11/19/23:  Patient seen resting in bed with no distress noted. Breathing is stable. She remains in Afib with HR in the 90-100s. BP remains soft.     11/18/23:  Patient seen resting in bed with family at bedside. Remains in Afib with variable.     REASON FOR CONSULT:    From H&P: Patient is an 89-year-old female with history of bullous pemphigoid, type 2 diabetes, hypertension, hyperlipidemia, paroxysmal atrial fibrillation not on anticoagulation outpatient, CAD, aortic valve replacement comes to the emergency room with complaints of shortness of breath.  Patient reports that the shortness of breath has been ongoing for the last week, worsened at night.  Patient reports having shortness of breath while lying flat, reports waking up in the middle of night with shortness of breath.  Patient follows with Dr. Huerta outpatient for atrial fibrillation.  Reports not being on anticoagulation given she is felt to be high-risk for treatment because of fall risk and currently being treated with dual anti-platelet therapy per Cardiology.  Patient  reports shortness of breath worse with exertion, denies having any chest pain, fever, chills, diarrhea, nausea.  Reports taking medications as prescribed.  Patient states that she had hoarseness, states that she stopped taking her Plavix because she read that it was a side effect.  Patient states she noticed improvement in her symptoms of shortness of breath after she stopped drinking soda and stop eating chips this past week.        On admission patient  was noted to have atrial fibrillation with heart rates in the 130s, was given Lopressor 5 mg with improvement in heart rates.  Was also given Lasix and Lovenox.  Chest x-ray showed no acute cardiopulmonary disease.  .  Patient also had complaints of left lower extremity swelling, ultrasound negative for DVT.      HPI:    Patient is an 89-year-old female who presented to the emergency room with complaints of shortness of breath.  Patient has had issues with shortness of breath weakness, and swelling over the past several days.  Patient has a history of type 2 diabetes, hypertension, and paroxysmal atrial fibrillation as well as CAD and aortic valve replacement in the past.  She has been taking Plavix and aspirin at home however had recently stopped taking Plavix due to hoarseness.  She had been eating chips at home and drinking soda and felt like that brought on her swelling and thus she had cut back.  Patient noted to be in AFib with RVR in the 130s on arrival.  Patient was given 1 dose of IV metoprolol in admitted to hospital.  Unfortunately she was not given any further rate control medications until this afternoon.  She is allergic to iodine and thus amiodarone was not started.        Review of patient's allergies indicates:   Allergen Reactions    Fenofibric acid (choline)      Other reaction(s): Vomiting    Iodine      Other reaction(s): lips swollen ivp dye    Rosuvastatin      Other reaction(s): muscle pain       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    GERD  (gastroesophageal reflux disease)     Hyperlipidemia     Hypertension     Hypothyroidism     Paroxysmal atrial fibrillation 3/16/2023    Type 2 diabetes mellitus      Past Surgical History:   Procedure Laterality Date    ADENOIDECTOMY      AORTIC VALVE REPLACEMENT      BREAST BIOPSY Right 20 yrs ago    benign    CARDIAC SURGERY      CHOLECYSTECTOMY      COLONOSCOPY  2014    Dr Holly, 5 year recheck    EPIDURAL STEROID INJECTION N/A 3/25/2021    Procedure: Injection, Steroid, Epidural L3-4;  Surgeon: Sky Love MD;  Location: [facility] OR;  Service: Pain Management;  Laterality: N/A;  Injection, Steroid, Epidural L3-4    EPIDURAL STEROID INJECTION N/A 2021    Procedure: Injection, Steroid, Epidural L3-4;  Surgeon: Sky Love MD;  Location: [facility] OR;  Service: Pain Management;  Laterality: N/A;  Injection, Steroid, Epidural L3-4    ESOPHAGOGASTRODUODENOSCOPY N/A 2020    Procedure: EGD (ESOPHAGOGASTRODUODENOSCOPY);  Surgeon: Michael Nugent III, MD;  [facility];  Service: Endoscopy;  Laterality: N/A;    HEMORRHOID SURGERY      HYSTERECTOMY      OOPHORECTOMY      TONSILLECTOMY       Social History     Tobacco Use    Smoking status: Former     Current packs/day: 0.00     Average packs/day: 0.3 packs/day for 15.0 years (3.8 ttl pk-yrs)     Types: Cigarettes     Start date: 3/30/1959     Quit date: 3/30/1974     Years since quittin.6    Smokeless tobacco: Never   Substance Use Topics    Alcohol use: No    Drug use: No        REVIEW OF SYSTEMS  Per HPI    OBJECTIVE     VITAL SIGNS (Most Recent)  Temp: 98.4 °F (36.9 °C) (23 1101)  Pulse: 87 (23 1300)  Resp: 18 (23 1300)  BP: 120/73 (23 1300)  SpO2: (!) 92 % (23 1300)    VENTILATION STATUS  Resp: 18 (23 1300)  SpO2: (!) 92 % (23 1300)           I & O (Last 24H):  Intake/Output Summary (Last 24 hours) at 2023 1415  Last data filed at 2023 0441  Gross per 24 hour   Intake 587 ml   Output 700  ml   Net -113 ml       WEIGHTS  Wt Readings from Last 1 Encounters:   11/17/23 1916 81.6 kg (179 lb 14.3 oz)   11/16/23 1839 81.6 kg (180 lb)       PHYSICAL EXAM  CONSTITUTIONAL: No fever, no chills  HEENT: Normocephalic               NECK:  No JVD   CVS: S1S2+, iRRR  LUNGS: Clear  ABDOMEN: Soft, NT, BS+  EXTREMITIES: No cyanosis, trace BLE edema  NEURO: AAO X 3  PSY: Normal affect      HOME MEDICATIONS:  No current facility-administered medications on file prior to encounter.     Current Outpatient Medications on File Prior to Encounter   Medication Sig Dispense Refill    aspirin 81 mg Tab Take 81 mg by mouth once daily. Every day 30 tablet 0    calcium carbonate/vitamin D3 (CALCIUM+D ORAL) Take 1 tablet by mouth once daily.      carvediloL (COREG) 6.25 MG tablet Take 1 tablet (6.25 mg total) by mouth 2 (two) times daily with meals. 180 tablet 3    cholecalciferol, vitamin D3, (VITAMIN D3) 125 mcg (5,000 unit) Tab Take 5,000 Units by mouth once daily.      clobetasol 0.05% (TEMOVATE) 0.05 % Oint Apply 1 g topically 2 (two) times daily.      empagliflozin (JARDIANCE) 10 mg tablet Take 1 tablet (10 mg total) by mouth once daily. 90 tablet 1    ketoconazole (NIZORAL) 2 % cream AAA pannus fold bid (Patient taking differently: Apply 1 application  topically once daily. AAA pannus fold bid) 60 g 3    LANTUS SOLOSTAR U-100 INSULIN glargine 100 units/mL SubQ pen INJECT 24 UNITS SUBCUTANEOUSLY EVERY EVENING (Patient taking differently: Inject 24 Units into the skin every evening. INJECT 24 UNITS SUBCUTANEOUSLY EVERY EVENING) 30 mL 1    levocetirizine (XYZAL) 5 MG tablet Take 5 mg by mouth every evening.      lisinopriL (PRINIVIL,ZESTRIL) 20 MG tablet Take 1 tablet (20 mg total) by mouth once daily. 90 tablet 2    metFORMIN (GLUCOPHAGE) 1000 MG tablet Take 1 tablet (1,000 mg total) by mouth 2 (two) times daily with meals. 180 tablet 1    multivitamin (THERAGRAN) per tablet Take 1 tablet by mouth once daily.       "triamcinolone acetonide 0.1% (KENALOG) 0.1 % cream AAA bid (Patient taking differently: Apply 1 g topically 2 (two) times daily. AAA bid) 454 g 3    blood sugar diagnostic (ACCU-CHEK GUIDE TEST STRIPS) Strp 300 each by Misc.(Non-Drug; Combo Route) route 3 (three) times daily. 300 each 3    blood-glucose meter kit Accu-chek Marley glucometer check glucose three times daily E11.65 1 each 0    cholestyramine-aspartame (PREVALITE) 4 gram PwPk Take 1 packet (4 g total) by mouth once daily. (Patient not taking: Reported on 11/16/2023) 30 packet 11    clopidogreL (PLAVIX) 75 mg tablet Take 1 tablet (75 mg total) by mouth once daily. (Patient not taking: Reported on 11/16/2023) 30 tablet 11    DROPLET PEN NEEDLE 31 gauge x 5/16" Ndle USE AS DIRECTED WITH LANTUS SOLOSTAR PEN AS NEEDED 100 each 3    lancets (ACCU-CHEK SOFTCLIX LANCETS) Misc New machine Guide Me Accuchek 300 each 3    mycophenolate (CELLCEPT) 500 mg Tab One tab PO QAM and 2 tabs PO QHS (Patient not taking: Reported on 11/16/2023) 90 tablet 1       SCHEDULED MEDS:   aspirin  81 mg Oral Daily    calcium-vitamin D3  1 tablet Oral Daily    carvediloL  12.5 mg Oral BID    carvediloL  6.25 mg Oral Once    enoxparin  1 mg/kg Subcutaneous Q12H (treatment, non-standard time)    insulin detemir U-100  24 Units Subcutaneous QHS    mycophenolate  500 mg Oral BID       CONTINUOUS INFUSIONS:   [id number]         PRN MEDS:acetaminophen, dextrose 50%, dextrose 50%, glucagon (human recombinant), glucose, glucose, insulin aspart U-100, magnesium oxide, magnesium oxide, ondansetron, potassium bicarbonate, potassium bicarbonate, potassium bicarbonate, potassium, sodium phosphates, potassium, sodium phosphates, potassium, sodium phosphates, sodium chloride 0.9%    LABS AND DIAGNOSTICS     CBC LAST 3 DAYS  Recent Labs   Lab 11/18/23  0421 11/19/23  0453 11/20/23  0454   WBC 6.00 5.32 4.63   RBC 4.35 4.55 4.43   HGB 12.8 13.3 13.0   HCT 41.9 43.9 42.2   MCV 96 97 95   MCH 29.4 29.2 " "29.3   MCHC 30.5* 30.3* 30.8*   RDW 14.9* 14.8* 14.6*    204 203   MPV 10.7 10.7 10.3   GRAN 63.7  3.8 61.9  3.3 57.6  2.7   LYMPH 16.5*  1.0 18.0  1.0 19.7  0.9*   MONO 13.5  0.8 13.7  0.7 14.9  0.7   BASO 0.04 0.03 0.05   NRBC 0 0 0       COAGULATION LAST 3 DAYS  No results for input(s): "LABPT", "INR", "APTT" in the last 168 hours.    CHEMISTRY LAST 3 DAYS  Recent Labs   Lab 11/16/23 1911 11/17/23 0421 11/18/23  0421 11/18/23  1345 11/19/23  0453 11/19/23  1056 11/20/23  0454      < > 142  --  139  --  139   K 4.2   < > 3.7   < > 3.8 5.0 4.2      < > 100  --  101  --  102   CO2 22*   < > 29  --  29  --  30*   ANIONGAP 7*   < > 13  --  9  --  7*   BUN 25*   < > 31*  --  36*  --  32*   CREATININE 1.1   < > 1.2  --  1.1  --  1.1   *   < > 96  --  129*  --  158*   CALCIUM 9.3   < > 9.9  --  9.4  --  9.6   MG 1.9   < > 2.1  --  2.2  --  2.0   ALBUMIN 4.4  --   --   --   --   --   --    PROT 6.8  --   --   --   --   --   --    ALKPHOS 86  --   --   --   --   --   --    ALT 19  --   --   --   --   --   --    AST 20  --   --   --   --   --   --    BILITOT 1.4*  --   --   --   --   --   --     < > = values in this interval not displayed.       CARDIAC PROFILE LAST 3 DAYS  Recent Labs   Lab 11/16/23 1911   *   TROPONINIHS 10.8       ENDOCRINE LAST 3 DAYS  Recent Labs   Lab 11/16/23 1911   TSH 2.565       LAST ARTERIAL BLOOD GAS  ABG  No results for input(s): "PH", "PO2", "PCO2", "HCO3", "BE" in the last 168 hours.    LAST 7 DAYS MICROBIOLOGY   Microbiology Results (last 7 days)       ** No results found for the last 168 hours. **            MOST RECENT IMAGING  Echo    Left Ventricle: The left ventricle is normal in size. Mildly increased   wall thickness. There is mild concentric hypertrophy. Normal wall motion.   There is normal systolic function with a visually estimated ejection   fraction of 60 - 65%. There is normal diastolic function.    Right Ventricle: Normal right " ventricular cavity size. Wall thickness   is normal. Right ventricle wall motion  is normal. Systolic function is   normal.    Left Atrium: Left atrium is severely dilated.    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    Mitral Valve: There is moderate bileaflet sclerosis. Moderately   calcified posterior leaflet. There is mild anterior mitral annular   calcification present. There is mild stenosis. The mean pressure gradient   across the mitral valve is 4 mmHg at a heart rate of  bpm. There is   moderate regurgitation with a centrally directed jet.    Tricuspid Valve: There is mild regurgitation with a centrally directed   jet.    IVC/SVC: Elevated venous pressure at 15 mmHg.      ECHOCARDIOGRAM RESULTS (last 5)  Results for orders placed during the hospital encounter of 10/14/22    Transesophageal echo (GABY)    Interpretation Summary  · The left ventricle is normal in size with normal systolic function.  · The estimated ejection fraction is 60%.  · Normal left ventricular diastolic function.  · Normal right ventricular size with normal right ventricular systolic function.  · Mild left atrial enlargement.  · There is a bioprosthetic aortic valve present. There is no aortic insufficiency present. Prosthetic aortic valve is normal.  · No interatrial septal defect present.  · No ASD or PFO closure device in interatrial septum.  · Normal appearing left atrial appendage. No thrombus is present in the appendage. COREEN occluder is absent. Normal appendage velocities.      Echo Saline Bubble? Yes    Interpretation Summary  · There is no evidence of intracardiac shunting.  · The left ventricle is normal in size with mild concentric hypertrophy and hyperdynamic systolic function.  · The estimated ejection fraction is 70%.  · Indeterminate left ventricular diastolic function.  · Severe left atrial enlargement.  · Severe mitral regurgitation.  · Mild to moderate tricuspid regurgitation.  · There is  moderate aortic valve stenosis.  · Aortic valve area is 1.79 cm2; peak velocity is m/s; mean gradient is 21 mmHg.  · Mild aortic regurgitation.  · Normal central venous pressure (3 mmHg).  · The estimated PA systolic pressure is 14 mmHg.      CURRENT/PREVIOUS VISIT EKG  Results for orders placed or performed during the hospital encounter of 11/16/23   EKG 12-LEAD    Collection Time: 11/19/23 11:07 AM    Narrative    Test Reason : I48.91,I48.92,    Vent. Rate : 092 BPM     Atrial Rate : 000 BPM     P-R Int : 000 ms          QRS Dur : 084 ms      QT Int : 374 ms       P-R-T Axes : 000 016 010 degrees     QTc Int : 462 ms    Atrial fibrillation with a competing junctional pacemaker  Low voltage QRS  Septal infarct (cited on or before 03-JAN-2023)  Abnormal ECG  When compared with ECG of 16-NOV-2023 18:55,  No significant change was found  Confirmed by Gertrudis CROOK, Narayan DENSON ([id number]) on 11/19/2023 3:43:27 PM    Referred By: [email or web address]   SELF           Confirmed By:Narayan Caba MD           ASSESSMENT/PLAN:     Active Hospital Problems    Diagnosis    *Acute decompensated heart failure    Candidiasis of skin    Paroxysmal atrial fibrillation    Bullous pemphigoid    S/P AVR (aortic valve replacement)    Stage 3b chronic kidney disease    Hyperlipidemia associated with type 2 diabetes mellitus    Hypertension associated with diabetes    Type 2 diabetes mellitus with diabetic polyneuropathy, with long-term current use of insulin       ASSESSMENT & PLAN:   Afib with RVR  Acute HFpEF   CKD stage III  HTN  Cad  Hx of AVR      RECOMMENDATIONS:    Continue weight based lovenox 1mg/kg SUBQ BID.   Increase carvedilol 12.5 mg po BID. Give one time additional dose of 6.25 mg now.  Will hold off on GABY/CV for now.  Will attempt to achieve rate control.   We will continue to follow.       Grace Martinez NP  Date of Service: 11/20/2023  2:10 PM    I have personally interviewed and examined the patient, I have reviewed the Nurse  Practitioner's history and physical, assessment, and plan. I have personally evaluated the patient at bedside and agree with the findings and made appropriate changes as necessary in recommendations.    Heart rate better controlled today.  Blood pressure improved.  will hold off on GABY cardioversion today.  Increase carvedilol dose and monitor heart rate.     Liat Antonio MD  Department of Cardiology  On license of [facility]  11/20/23

## 2023-11-20 NOTE — PLAN OF CARE
Pt was accepted [facility]. 1st visit 11/21 11/20/23 1410   Post-Acute Status   Post-Acute Authorization Home Health   Home Health Status Pending medical clearance/testing

## 2023-11-20 NOTE — ASSESSMENT & PLAN NOTE
Patient with Paroxysmal (<7 days) atrial fibrillation which is uncontrolled currently with Beta Blocker. Patient is currently in atrial fibrillation.[id number] Score: 5.  Not on anticoagulation given history of falls and currently on dual antiplatelet therapy.  Patient reports not taking Plavix anymore for hoarseness.  We will defer anticoagulation to Cardiology.  Patient with AFib RVR on admission, received Lopressor 5 mg with improvement in heart rates.  We will continue with outpatient carvedilol.    Cardizem infusion   (Allergy to iodine)   Lovenox BID   GABY/CV in am

## 2023-11-21 ENCOUNTER — ANESTHESIA EVENT (OUTPATIENT)
Dept: LONG TERM CARE ACUTE | Facility: HOSPITAL | Age: 88
End: 2023-11-21

## 2023-11-21 ENCOUNTER — ANESTHESIA (OUTPATIENT)
Dept: LONG TERM CARE ACUTE | Facility: HOSPITAL | Age: 88
End: 2023-11-21

## 2023-11-21 VITALS
OXYGEN SATURATION: 99 % | HEIGHT: 67 IN | HEART RATE: 82 BPM | SYSTOLIC BLOOD PRESSURE: 128 MMHG | RESPIRATION RATE: 23 BRPM | DIASTOLIC BLOOD PRESSURE: 56 MMHG | WEIGHT: 182.38 LBS | TEMPERATURE: 98 F | BODY MASS INDEX: 28.63 KG/M2

## 2023-11-21 LAB
ANION GAP SERPL CALC-SCNC: 11 MMOL/L (ref 8–16)
ANION GAP SERPL CALC-SCNC: 9 MMOL/L (ref 8–16)
BASOPHILS # BLD AUTO: 0.04 K/UL (ref 0–0.2)
BASOPHILS NFR BLD: 0.8 % (ref 0–1.9)
BUN SERPL-MCNC: 31 MG/DL (ref 8–23)
BUN SERPL-MCNC: 34 MG/DL (ref 8–23)
CALCIUM SERPL-MCNC: 9.1 MG/DL (ref 8.7–10.5)
CALCIUM SERPL-MCNC: 9.3 MG/DL (ref 8.7–10.5)
CHLORIDE SERPL-SCNC: 103 MMOL/L (ref 95–110)
CHLORIDE SERPL-SCNC: 104 MMOL/L (ref 95–110)
CO2 SERPL-SCNC: 24 MMOL/L (ref 23–29)
CO2 SERPL-SCNC: 25 MMOL/L (ref 23–29)
CREAT SERPL-MCNC: 0.9 MG/DL (ref 0.5–1.4)
CREAT SERPL-MCNC: 1 MG/DL (ref 0.5–1.4)
DIFFERENTIAL METHOD: ABNORMAL
EOSINOPHIL # BLD AUTO: 0.3 K/UL (ref 0–0.5)
EOSINOPHIL NFR BLD: 5.5 % (ref 0–8)
ERYTHROCYTE [DISTWIDTH] IN BLOOD BY AUTOMATED COUNT: 14.6 % (ref 11.5–14.5)
EST. GFR  (NO RACE VARIABLE): 53.9 ML/MIN/1.73 M^2
EST. GFR  (NO RACE VARIABLE): >60 ML/MIN/1.73 M^2
GLUCOSE SERPL-MCNC: 136 MG/DL (ref 70–110)
GLUCOSE SERPL-MCNC: 140 MG/DL (ref 70–110)
GLUCOSE SERPL-MCNC: 148 MG/DL (ref 70–110)
GLUCOSE SERPL-MCNC: 258 MG/DL (ref 70–110)
HCT VFR BLD AUTO: 45.2 % (ref 37–48.5)
HGB BLD-MCNC: 13.9 G/DL (ref 12–16)
IMM GRANULOCYTES # BLD AUTO: 0.01 K/UL (ref 0–0.04)
IMM GRANULOCYTES NFR BLD AUTO: 0.2 % (ref 0–0.5)
LYMPHOCYTES # BLD AUTO: 1.1 K/UL (ref 1–4.8)
LYMPHOCYTES NFR BLD: 22.6 % (ref 18–48)
MAGNESIUM SERPL-MCNC: 2.1 MG/DL (ref 1.6–2.6)
MCH RBC QN AUTO: 29.6 PG (ref 27–31)
MCHC RBC AUTO-ENTMCNC: 30.8 G/DL (ref 32–36)
MCV RBC AUTO: 96 FL (ref 82–98)
MONOCYTES # BLD AUTO: 0.7 K/UL (ref 0.3–1)
MONOCYTES NFR BLD: 14.3 % (ref 4–15)
NEUTROPHILS # BLD AUTO: 2.7 K/UL (ref 1.8–7.7)
NEUTROPHILS NFR BLD: 56.6 % (ref 38–73)
NRBC BLD-RTO: 0 /100 WBC
PLATELET # BLD AUTO: 212 K/UL (ref 150–450)
PMV BLD AUTO: 10.4 FL (ref 9.2–12.9)
POTASSIUM SERPL-SCNC: 3.9 MMOL/L (ref 3.5–5.1)
POTASSIUM SERPL-SCNC: 5.5 MMOL/L (ref 3.5–5.1)
RBC # BLD AUTO: 4.69 M/UL (ref 4–5.4)
SODIUM SERPL-SCNC: 137 MMOL/L (ref 136–145)
SODIUM SERPL-SCNC: 139 MMOL/L (ref 136–145)
WBC # BLD AUTO: 4.77 K/UL (ref 3.9–12.7)

## 2023-11-21 PROCEDURE — 83735 ASSAY OF MAGNESIUM: CPT | Performed by: STUDENT IN AN ORGANIZED HEALTH CARE EDUCATION/TRAINING PROGRAM

## 2023-11-21 PROCEDURE — 94761 N-INVAS EAR/PLS OXIMETRY MLT: CPT

## 2023-11-21 PROCEDURE — 36415 COLL VENOUS BLD VENIPUNCTURE: CPT | Performed by: HOSPITALIST

## 2023-11-21 PROCEDURE — 80048 BASIC METABOLIC PNL TOTAL CA: CPT | Performed by: HOSPITALIST

## 2023-11-21 PROCEDURE — 25000003 PHARM REV CODE 250

## 2023-11-21 PROCEDURE — 63600175 PHARM REV CODE 636 W HCPCS: Performed by: NURSE PRACTITIONER

## 2023-11-21 PROCEDURE — 63600175 PHARM REV CODE 636 W HCPCS: Performed by: STUDENT IN AN ORGANIZED HEALTH CARE EDUCATION/TRAINING PROGRAM

## 2023-11-21 PROCEDURE — 99900035 HC TECH TIME PER 15 MIN (STAT)

## 2023-11-21 PROCEDURE — 80048 BASIC METABOLIC PNL TOTAL CA: CPT | Mod: 91 | Performed by: STUDENT IN AN ORGANIZED HEALTH CARE EDUCATION/TRAINING PROGRAM

## 2023-11-21 PROCEDURE — 85025 COMPLETE CBC W/AUTO DIFF WBC: CPT | Performed by: STUDENT IN AN ORGANIZED HEALTH CARE EDUCATION/TRAINING PROGRAM

## 2023-11-21 PROCEDURE — 99232 SBSQ HOSP IP/OBS MODERATE 35: CPT | Mod: ,,, | Performed by: INTERNAL MEDICINE

## 2023-11-21 PROCEDURE — 25000003 PHARM REV CODE 250: Performed by: STUDENT IN AN ORGANIZED HEALTH CARE EDUCATION/TRAINING PROGRAM

## 2023-11-21 PROCEDURE — 36415 COLL VENOUS BLD VENIPUNCTURE: CPT | Performed by: STUDENT IN AN ORGANIZED HEALTH CARE EDUCATION/TRAINING PROGRAM

## 2023-11-21 PROCEDURE — 99232 PR SUBSEQUENT HOSPITAL CARE,LEVL II: ICD-10-PCS | Mod: ,,, | Performed by: INTERNAL MEDICINE

## 2023-11-21 RX ORDER — MYCOPHENOLATE MOFETIL 250 MG/1
500 CAPSULE ORAL 2 TIMES DAILY
Qty: 120 CAPSULE | Refills: 0 | OUTPATIENT
Start: 2023-11-21 | End: 2024-03-27

## 2023-11-21 RX ORDER — CARVEDILOL 12.5 MG/1
12.5 TABLET ORAL 2 TIMES DAILY
Qty: 60 TABLET | Refills: 11 | Status: SHIPPED | OUTPATIENT
Start: 2023-11-21 | End: 2024-11-20

## 2023-11-21 RX ADMIN — CARVEDILOL 12.5 MG: 12.5 TABLET, FILM COATED ORAL at 08:11

## 2023-11-21 RX ADMIN — ASPIRIN 81 MG 81 MG: 81 TABLET ORAL at 08:11

## 2023-11-21 RX ADMIN — MYCOPHENOLATE MOFETIL 500 MG: 250 CAPSULE ORAL at 08:11

## 2023-11-21 RX ADMIN — Medication 1 TABLET: at 08:11

## 2023-11-21 RX ADMIN — ACETAMINOPHEN 650 MG: 325 TABLET ORAL at 05:11

## 2023-11-21 RX ADMIN — ENOXAPARIN SODIUM 80 MG: 80 INJECTION SUBCUTANEOUS at 05:11

## 2023-11-21 NOTE — PROGRESS NOTES
[facility]  Progress Note    Patient Name: Leslie Tolliver  MRN: [id number]  Patient Class: IP- Inpatient   Admission Date: 11/16/2023  Length of Stay: 0 days  Attending Physician: Isidra Peterson MD  Primary Care Provider: Chang Christianson MD        Subjective:     Principal Problem:Acute decompensated heart failure        HPI:  Patient is an 89-year-old female with history of bullous pemphigoid, type 2 diabetes, hypertension, hyperlipidemia, paroxysmal atrial fibrillation not on anticoagulation outpatient, CAD, aortic valve replacement comes to the emergency room with complaints of shortness of breath.  Patient reports that the shortness of breath has been ongoing for the last week, worsened at night.  Patient reports having shortness of breath while lying flat, reports waking up in the middle of night with shortness of breath.  Patient follows with Dr. Huerta outpatient for atrial fibrillation.  Reports not being on anticoagulation given she is felt to be high-risk for treatment because of fall risk and currently being treated with dual anti-platelet therapy per Cardiology.  Patient  reports shortness of breath worse with exertion, denies having any chest pain, fever, chills, diarrhea, nausea.  Reports taking medications as prescribed.  Patient states that she had hoarseness, states that she stopped taking her Plavix because she read that it was a side effect.  Patient states she noticed improvement in her symptoms of shortness of breath after she stopped drinking soda and stop eating chips this past week.      On admission patient was noted to have atrial fibrillation with heart rates in the 130s, was given Lopressor 5 mg with improvement in heart rates.  Was also given Lasix and Lovenox.  Chest x-ray showed no acute cardiopulmonary disease.  .  Patient also had complaints of left lower extremity swelling, ultrasound negative for DVT.    Overview/Hospital  Course:  Patient on immunosuppressants for bullous pemphigoid, afib, AVR, CAD and DM2 admitted with afib rvr and LE edema. . She was started on IV lasix for decompensated CHF due to afib. She received digoxin x 1 dose and started cardizem infusion. Coreg resumed + lisinopril if BP tolerates.  She also has severe pruritic rash of the left groin and thigh. Topical antifungal applied and wound care consulted. She started lovenox BID.     Patient had drop in her BP and lasix dose was discontinued. Cardiology consulted. PLan for GABY/CV 11/20/23.   HR improved and did not undergo cardioversion.   Coreg dose increased and will continue medical therapy for now, monitor overnight.     Interval History: see hospital course    Review of Systems   Constitutional: Negative.    Respiratory:  Positive for shortness of breath.    Cardiovascular:  Positive for leg swelling.   Gastrointestinal: Negative.    Musculoskeletal:  Positive for arthralgias and back pain.   Skin:  Positive for rash.   Neurological: Negative.      Objective:     Vital Signs (Most Recent):  Temp: 98.4 °F (36.9 °C) (11/20/23 1101)  Pulse: 92 (11/20/23 1901)  Resp: 20 (11/20/23 1901)  BP: (!) 93/57 (11/20/23 1901)  SpO2: 95 % (11/20/23 1901) Vital Signs (24h Range):  Temp:  [97.8 °F (36.6 °C)-98.4 °F (36.9 °C)] 98.4 °F (36.9 °C)  Pulse:  [78-99] 92  Resp:  [16-26] 20  SpO2:  [92 %-98 %] 95 %  BP: ()/(53-79) 93/57     Weight: 81.6 kg (179 lb 14.3 oz)  Body mass index is 28.18 kg/m².    Intake/Output Summary (Last 24 hours) at 11/20/2023 1923  Last data filed at 11/20/2023 1752  Gross per 24 hour   Intake 375 ml   Output 702 ml   Net -327 ml           Physical Exam  Constitutional:       General: She is not in acute distress.  HENT:      Head: Normocephalic and atraumatic.      Mouth/Throat:      Mouth: Mucous membranes are moist.      Pharynx: Oropharynx is clear.   Eyes:      Extraocular Movements: Extraocular movements intact.       Conjunctiva/sclera: Conjunctivae normal.   Cardiovascular:      Rate and Rhythm: Tachycardia present. Rhythm irregular.      Pulses: Normal pulses.   Pulmonary:      Effort: Pulmonary effort is normal.      Breath sounds: Rhonchi present.   Abdominal:      General: Bowel sounds are normal. There is no distension.      Tenderness: There is no abdominal tenderness.   Musculoskeletal:      Cervical back: Normal range of motion and neck supple.   Skin:     General: Skin is warm and dry.      Capillary Refill: Capillary refill takes less than 2 seconds.      Findings: Rash present.      Comments: Large area of erythema to left inguinal region    Neurological:      General: No focal deficit present.      Mental Status: She is alert and oriented to person, place, and time. Mental status is at baseline.   Psychiatric:         Mood and Affect: Mood normal.         Behavior: Behavior normal.             Significant Labs: All pertinent labs within the past 24 hours have been reviewed.  Recent Lab Results  (Last 5 results in the past 24 hours)        11/20/23  1615   11/20/23  1125   11/20/23  0734   11/20/23  0454   11/19/23 2015        Anion Gap       7         Baso #       0.05         Basophil %       1.1         BUN       32         Calcium       9.6         Chloride       102         CO2       30         Creatinine       1.1         Differential Method       Automated         eGFR       48.0         Eos #       0.3         Eosinophil %       6.5         Glucose       158         Gran # (ANC)       2.7         Gran %       57.6         Hematocrit       42.2         Hemoglobin       13.0         Immature Grans (Abs)       0.01  Comment: Mild elevation in immature granulocytes is non specific and   can be seen in a variety of conditions including stress response,   acute inflammation, trauma and pregnancy. Correlation with other   laboratory and clinical findings is essential.           Immature Granulocytes       0.2          Lymph #       0.9         Lymph %       19.7         Magnesium        2.0         MCH       29.3         MCHC       30.8         MCV       95         Mono #       0.7         Mono %       14.9         MPV       10.3         nRBC       0         Platelet Count       203         POC Glucose 244   140   143     229       Potassium       4.2         RBC       4.43         RDW       14.6         Sodium       139         WBC       4.63                                Significant Imaging: I have reviewed all pertinent imaging results/findings within the past 24 hours.    Assessment/Plan:      * Acute decompensated heart failure  Transesophageal echo on 10/16/2022 showed EF 60%  No history of congestive heart failure   We will order echocardiogram, started patient on Lasix 40 mg twice a day- discontinue - appears compensated   Consult Cardiology, patient follows with Dr. Huerta outpatient  The left ventricle is normal in size with normal systolic function.  The estimated ejection fraction is 60%.  Normal left ventricular diastolic function.  Normal right ventricular size with normal right ventricular systolic function.  Mild left atrial enlargement.  There is a bioprosthetic aortic valve present. There is no aortic insufficiency present. Prosthetic aortic valve is normal.  No interatrial septal defect present.  No ASD or PFO closure device in interatrial septum.  Normal appearing left atrial appendage. No thrombus is present in the appendage. COREEN occluder is absent. Normal appendage velocities.      Candidiasis of skin    Continue miconazole topical   Wound care    Paroxysmal atrial fibrillation  Patient with Paroxysmal (<7 days) atrial fibrillation which is uncontrolled currently with Beta Blocker. Patient is currently in atrial fibrillation.[id number] Score: 5.  Not on anticoagulation given history of falls and currently on dual antiplatelet therapy.  Patient reports not taking Plavix anymore for hoarseness.  We will  defer anticoagulation to Cardiology.  Patient with AFib RVR on admission, received Lopressor 5 mg with improvement in heart rates.  We will continue with outpatient carvedilol.    Cardizem infusion   (Allergy to iodine)   Lovenox BID   GABY/CV - held   Increase Coreg and monitor overnight     Bullous pemphigoid  Follows with dermatology outpatient, is on CellCept outpatient  Fungal rash to left inguinal region - topical miconazole       S/P AVR (aortic valve replacement)    Noted    Stage 3b chronic kidney disease  Creatine stable for now. BMP reviewed- noted Estimated Creatinine Clearance: 38.1 mL/min (based on SCr of 1.1 mg/dL). according to latest data. Based on current GFR, CKD stage is stage 3 - GFR 30-59.  Monitor UOP and serial BMP and adjust therapy as needed. Renally dose meds. Avoid nephrotoxic medications and procedures.    Hyperlipidemia associated with type 2 diabetes mellitus  Patient with history of severe statin intolerance, continue with low-fat low-cholesterol diet      Type 2 diabetes mellitus with diabetic polyneuropathy, with long-term current use of insulin  Started the patient on insulin sliding scale, continue Lantus 24 units q.h.s.  Increase basal dose insulin       Hypertension associated with diabetes  Continue with outpatient lisinopril        VTE Risk Mitigation (From admission, onward)           Ordered     enoxaparin injection 80 mg  Every 12 hours         11/17/23 1615     IP VTE HIGH RISK PATIENT  Once         11/16/23 2220     Place sequential compression device  Until discontinued         11/16/23 2220                    Discharge Planning   MORA: 11/20/2023     Code Status: Full Code   Is the patient medically ready for discharge?:     Reason for patient still in hospital (select all that apply): Patient trending condition and Consult recommendations  Discharge Plan A: Home Health                  Isidra Peterson MD  Department of Hospital Medicine   Replaced by [facility]

## 2023-11-21 NOTE — PLAN OF CARE
Problem: Adult Inpatient Plan of Care  Goal: Plan of Care Review  Outcome: Met  Goal: Patient-Specific Goal (Individualized)  Outcome: Met  Goal: Absence of Hospital-Acquired Illness or Injury  Outcome: Met  Goal: Optimal Comfort and Wellbeing  Outcome: Met  Goal: Readiness for Transition of Care  Outcome: Met     Problem: Fall Injury Risk  Goal: Absence of Fall and Fall-Related Injury  Outcome: Met     Problem: Diabetes Comorbidity  Goal: Blood Glucose Level Within Targeted Range  Outcome: Met     Problem: Impaired Wound Healing  Goal: Optimal Wound Healing  Outcome: Met

## 2023-11-21 NOTE — NURSING
Discharge instructions reviewed with patient. No questions or concerns at time of discharge. No distress noted. Patient wheeled to personal vehicle with all belongings via wheel chair. Patient released to daughter.

## 2023-11-21 NOTE — PROGRESS NOTES
[facility] [place] - Emergency Dept  Department of Cardiology  Progress Note      PATIENT NAME: Leslie Tolliver    MRN: [id number]  TODAY'S DATE: 11/21/2023  ADMIT DATE: 11/16/2023                          CONSULT REQUESTED BY: Isidra Peterson MD    SUBJECTIVE     PRINCIPAL PROBLEM: Acute decompensated heart failure    11/21/23    Resting in bed. NAD.  VSS.  Rate controlled AF on tele.  No events overnight.  Patient is anxious to be discharged today.  Labs reviewed and stable.       11/20/23    Resting in chair. Rate controlled AF on tele.  HR 90s.  NAD.  RA.  Denies complaints.       11/19/23:  Patient seen resting in bed with no distress noted. Breathing is stable. She remains in Afib with HR in the 90-100s. BP remains soft.     11/18/23:  Patient seen resting in bed with family at bedside. Remains in Afib with variable.     REASON FOR CONSULT:    From H&P: Patient is an 89-year-old female with history of bullous pemphigoid, type 2 diabetes, hypertension, hyperlipidemia, paroxysmal atrial fibrillation not on anticoagulation outpatient, CAD, aortic valve replacement comes to the emergency room with complaints of shortness of breath.  Patient reports that the shortness of breath has been ongoing for the last week, worsened at night.  Patient reports having shortness of breath while lying flat, reports waking up in the middle of night with shortness of breath.  Patient follows with Dr. Huerta outpatient for atrial fibrillation.  Reports not being on anticoagulation given she is felt to be high-risk for treatment because of fall risk and currently being treated with dual anti-platelet therapy per Cardiology.  Patient  reports shortness of breath worse with exertion, denies having any chest pain, fever, chills, diarrhea, nausea.  Reports taking medications as prescribed.  Patient states that she had hoarseness, states that she stopped taking her Plavix because she read that it was a side effect.  Patient  states she noticed improvement in her symptoms of shortness of breath after she stopped drinking soda and stop eating chips this past week.        On admission patient was noted to have atrial fibrillation with heart rates in the 130s, was given Lopressor 5 mg with improvement in heart rates.  Was also given Lasix and Lovenox.  Chest x-ray showed no acute cardiopulmonary disease.  .  Patient also had complaints of left lower extremity swelling, ultrasound negative for DVT.      HPI:    Patient is an 89-year-old female who presented to the emergency room with complaints of shortness of breath.  Patient has had issues with shortness of breath weakness, and swelling over the past several days.  Patient has a history of type 2 diabetes, hypertension, and paroxysmal atrial fibrillation as well as CAD and aortic valve replacement in the past.  She has been taking Plavix and aspirin at home however had recently stopped taking Plavix due to hoarseness.  She had been eating chips at home and drinking soda and felt like that brought on her swelling and thus she had cut back.  Patient noted to be in AFib with RVR in the 130s on arrival.  Patient was given 1 dose of IV metoprolol in admitted to hospital.  Unfortunately she was not given any further rate control medications until this afternoon.  She is allergic to iodine and thus amiodarone was not started.        Review of patient's allergies indicates:   Allergen Reactions    Fenofibric acid (choline)      Other reaction(s): Vomiting    Iodine      Other reaction(s): lips swollen ivp dye    Rosuvastatin      Other reaction(s): muscle pain       Past Medical History:   Diagnosis Date    Acute decompensated heart failure 11/16/2023    Anemia due to stage 3 chronic kidney disease 07/24/2019    Arthritis     Bullous pemphigoid 8/29/2022    Chronic bilateral low back pain without sciatica 07/24/2019    CKD (chronic kidney disease) stage 3, GFR 30-59 ml/min 07/24/2019     Colon polyps     Coronary artery disease     Diabetes mellitus type II     Diabetes with neurologic complications     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    GERD (gastroesophageal reflux disease)     Hyperlipidemia     Hypertension     Hypothyroidism     Paroxysmal atrial fibrillation 3/16/2023    Type 2 diabetes mellitus      Past Surgical History:   Procedure Laterality Date    ADENOIDECTOMY      AORTIC VALVE REPLACEMENT      BREAST BIOPSY Right 20 yrs ago    benign    CARDIAC SURGERY      CHOLECYSTECTOMY      COLONOSCOPY  2014    Dr Holly, 5 year recheck    EPIDURAL STEROID INJECTION N/A 3/25/2021    Procedure: Injection, Steroid, Epidural L3-4;  Surgeon: Sky Love MD;  Location: [facility] OR;  Service: Pain Management;  Laterality: N/A;  Injection, Steroid, Epidural L3-4    EPIDURAL STEROID INJECTION N/A 2021    Procedure: Injection, Steroid, Epidural L3-4;  Surgeon: Sky Love MD;  Location: [facility] OR;  Service: Pain Management;  Laterality: N/A;  Injection, Steroid, Epidural L3-4    ESOPHAGOGASTRODUODENOSCOPY N/A 2020    Procedure: EGD (ESOPHAGOGASTRODUODENOSCOPY);  Surgeon: Michael Nugent III, MD;  Location: [facility];  Service: Endoscopy;  Laterality: N/A;    HEMORRHOID SURGERY      HYSTERECTOMY      OOPHORECTOMY      TONSILLECTOMY       Social History     Tobacco Use    Smoking status: Former     Current packs/day: 0.00     Average packs/day: 0.3 packs/day for 15.0 years (3.8 ttl pk-yrs)     Types: Cigarettes     Start date: 3/30/1959     Quit date: 3/30/1974     Years since quittin.6    Smokeless tobacco: Never   Substance Use Topics    Alcohol use: No    Drug use: No        REVIEW OF SYSTEMS  Per HPI    OBJECTIVE     VITAL SIGNS (Most Recent)  Temp: 98.1 °F (36.7 °C) (23 0701)  Pulse: 79 (23 1101)  Resp: 20 (23 1101)  BP: (!) 128/56 (23 0901)  SpO2: 95 % (23 1101)    VENTILATION STATUS  Resp: 20 (23 1101)  SpO2: 95 % (23 1101)            I & O (Last 24H):  Intake/Output Summary (Last 24 hours) at 11/21/2023 1202  Last data filed at 11/21/2023 0940  Gross per 24 hour   Intake 555 ml   Output 0 ml   Net 555 ml       WEIGHTS  Wt Readings from Last 1 Encounters:   11/21/23 0400 82.7 kg (182 lb 6.4 oz)   11/17/23 1916 81.6 kg (179 lb 14.3 oz)   11/16/23 1839 81.6 kg (180 lb)       PHYSICAL EXAM  CONSTITUTIONAL: No fever, no chills  HEENT: Normocephalic               NECK:  No JVD   CVS: S1S2+, iRRR  LUNGS: Clear  ABDOMEN: Soft, NT, BS+  EXTREMITIES: No cyanosis, or edema  NEURO: AAO X 3  PSY: Normal affect      HOME MEDICATIONS:  No current facility-administered medications on file prior to encounter.     Current Outpatient Medications on File Prior to Encounter   Medication Sig Dispense Refill    aspirin 81 mg Tab Take 81 mg by mouth once daily. Every day 30 tablet 0    calcium carbonate/vitamin D3 (CALCIUM+D ORAL) Take 1 tablet by mouth once daily.      carvediloL (COREG) 6.25 MG tablet Take 1 tablet (6.25 mg total) by mouth 2 (two) times daily with meals. 180 tablet 3    cholecalciferol, vitamin D3, (VITAMIN D3) 125 mcg (5,000 unit) Tab Take 5,000 Units by mouth once daily.      clobetasol 0.05% (TEMOVATE) 0.05 % Oint Apply 1 g topically 2 (two) times daily.      empagliflozin (JARDIANCE) 10 mg tablet Take 1 tablet (10 mg total) by mouth once daily. 90 tablet 1    ketoconazole (NIZORAL) 2 % cream AAA pannus fold bid (Patient taking differently: Apply 1 application  topically once daily. AAA pannus fold bid) 60 g 3    LANTUS SOLOSTAR U-100 INSULIN glargine 100 units/mL SubQ pen INJECT 24 UNITS SUBCUTANEOUSLY EVERY EVENING (Patient taking differently: Inject 24 Units into the skin every evening. INJECT 24 UNITS SUBCUTANEOUSLY EVERY EVENING) 30 mL 1    levocetirizine (XYZAL) 5 MG tablet Take 5 mg by mouth every evening.      lisinopriL (PRINIVIL,ZESTRIL) 20 MG tablet Take 1 tablet (20 mg total) by mouth once daily. 90 tablet 2    metFORMIN  "(GLUCOPHAGE) 1000 MG tablet Take 1 tablet (1,000 mg total) by mouth 2 (two) times daily with meals. 180 tablet 1    multivitamin (THERAGRAN) per tablet Take 1 tablet by mouth once daily.      triamcinolone acetonide 0.1% (KENALOG) 0.1 % cream AAA bid (Patient taking differently: Apply 1 g topically 2 (two) times daily. AAA bid) 454 g 3    blood sugar diagnostic (ACCU-CHEK GUIDE TEST STRIPS) Strp 300 each by Misc.(Non-Drug; Combo Route) route 3 (three) times daily. 300 each 3    blood-glucose meter kit Accu-chek Marley glucometer check glucose three times daily E11.65 1 each 0    cholestyramine-aspartame (PREVALITE) 4 gram PwPk Take 1 packet (4 g total) by mouth once daily. (Patient not taking: Reported on 11/16/2023) 30 packet 11    clopidogreL (PLAVIX) 75 mg tablet Take 1 tablet (75 mg total) by mouth once daily. (Patient not taking: Reported on 11/16/2023) 30 tablet 11    DROPLET PEN NEEDLE 31 gauge x 5/16" Ndle USE AS DIRECTED WITH LANTUS SOLOSTAR PEN AS NEEDED 100 each 3    lancets (ACCU-CHEK SOFTCLIX LANCETS) Misc New machine Guide Me Accuchek 300 each 3    mycophenolate (CELLCEPT) 500 mg Tab One tab PO QAM and 2 tabs PO QHS (Patient not taking: Reported on 11/16/2023) 90 tablet 1       SCHEDULED MEDS:   aspirin  81 mg Oral Daily    calcium-vitamin D3  1 tablet Oral Daily    carvediloL  12.5 mg Oral BID    enoxparin  1 mg/kg Subcutaneous Q12H (treatment, non-standard time)    insulin detemir U-100  24 Units Subcutaneous QHS    mycophenolate  500 mg Oral BID       CONTINUOUS INFUSIONS:   dilTIAZem         PRN MEDS:acetaminophen, dextrose 50%, dextrose 50%, glucagon (human recombinant), glucose, glucose, insulin aspart U-100, magnesium oxide, magnesium oxide, ondansetron, potassium bicarbonate, potassium bicarbonate, potassium bicarbonate, potassium, sodium phosphates, potassium, sodium phosphates, potassium, sodium phosphates, sodium chloride 0.9%    LABS AND DIAGNOSTICS     CBC LAST 3 DAYS  Recent Labs   Lab " "11/19/23  0453 11/20/23  0454 11/21/23  0249   WBC 5.32 4.63 4.77   RBC 4.55 4.43 4.69   HGB 13.3 13.0 13.9   HCT 43.9 42.2 45.2   MCV 97 95 96   MCH 29.2 29.3 29.6   MCHC 30.3* 30.8* 30.8*   RDW 14.8* 14.6* 14.6*    203 212   MPV 10.7 10.3 10.4   GRAN 61.9  3.3 57.6  2.7 56.6  2.7   LYMPH 18.0  1.0 19.7  0.9* 22.6  1.1   MONO 13.7  0.7 14.9  0.7 14.3  0.7   BASO 0.03 0.05 0.04   NRBC 0 0 0       COAGULATION LAST 3 DAYS  No results for input(s): "LABPT", "INR", "APTT" in the last 168 hours.    CHEMISTRY LAST 3 DAYS  Recent Labs   Lab 11/16/23  1911 11/17/23  0421 11/19/23  0453 11/19/23  1056 11/20/23  0454 11/21/23  0249 11/21/23  0526      < > 139  --  139 137 139   K 4.2   < > 3.8   < > 4.2 5.5* 3.9      < > 101  --  102 103 104   CO2 22*   < > 29  --  30* 25 24   ANIONGAP 7*   < > 9  --  7* 9 11   BUN 25*   < > 36*  --  32* 34* 31*   CREATININE 1.1   < > 1.1  --  1.1 1.0 0.9   *   < > 129*  --  158* 148* 140*   CALCIUM 9.3   < > 9.4  --  9.6 9.3 9.1   MG 1.9   < > 2.2  --  2.0 2.1  --    ALBUMIN 4.4  --   --   --   --   --   --    PROT 6.8  --   --   --   --   --   --    ALKPHOS 86  --   --   --   --   --   --    ALT 19  --   --   --   --   --   --    AST 20  --   --   --   --   --   --    BILITOT 1.4*  --   --   --   --   --   --     < > = values in this interval not displayed.       CARDIAC PROFILE LAST 3 DAYS  Recent Labs   Lab 11/16/23 1911   *   TROPONINIHS 10.8       ENDOCRINE LAST 3 DAYS  Recent Labs   Lab 11/16/23 1911   TSH 2.565       LAST ARTERIAL BLOOD GAS  ABG  No results for input(s): "PH", "PO2", "PCO2", "HCO3", "BE" in the last 168 hours.    LAST 7 DAYS MICROBIOLOGY   Microbiology Results (last 7 days)       ** No results found for the last 168 hours. **            MOST RECENT IMAGING  Echo    Left Ventricle: The left ventricle is normal in size. Mildly increased   wall thickness. There is mild concentric hypertrophy. Normal wall motion.   There is " normal systolic function with a visually estimated ejection   fraction of 60 - 65%. There is normal diastolic function.    Right Ventricle: Normal right ventricular cavity size. Wall thickness   is normal. Right ventricle wall motion  is normal. Systolic function is   normal.    Left Atrium: Left atrium is severely dilated.    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    Mitral Valve: There is moderate bileaflet sclerosis. Moderately   calcified posterior leaflet. There is mild anterior mitral annular   calcification present. There is mild stenosis. The mean pressure gradient   across the mitral valve is 4 mmHg at a heart rate of  bpm. There is   moderate regurgitation with a centrally directed jet.    Tricuspid Valve: There is mild regurgitation with a centrally directed   jet.    IVC/SVC: Elevated venous pressure at 15 mmHg.      ECHOCARDIOGRAM RESULTS (last 5)  Results for orders placed during the hospital encounter of 10/14/22    Transesophageal echo (GABY)    Interpretation Summary  · The left ventricle is normal in size with normal systolic function.  · The estimated ejection fraction is 60%.  · Normal left ventricular diastolic function.  · Normal right ventricular size with normal right ventricular systolic function.  · Mild left atrial enlargement.  · There is a bioprosthetic aortic valve present. There is no aortic insufficiency present. Prosthetic aortic valve is normal.  · No interatrial septal defect present.  · No ASD or PFO closure device in interatrial septum.  · Normal appearing left atrial appendage. No thrombus is present in the appendage. COREEN occluder is absent. Normal appendage velocities.      Echo Saline Bubble? Yes    Interpretation Summary  · There is no evidence of intracardiac shunting.  · The left ventricle is normal in size with mild concentric hypertrophy and hyperdynamic systolic function.  · The estimated ejection fraction is 70%.  · Indeterminate left  ventricular diastolic function.  · Severe left atrial enlargement.  · Severe mitral regurgitation.  · Mild to moderate tricuspid regurgitation.  · There is moderate aortic valve stenosis.  · Aortic valve area is 1.79 cm2; peak velocity is m/s; mean gradient is 21 mmHg.  · Mild aortic regurgitation.  · Normal central venous pressure (3 mmHg).  · The estimated PA systolic pressure is 14 mmHg.      CURRENT/PREVIOUS VISIT EKG  Results for orders placed or performed during the hospital encounter of 11/16/23   EKG 12-LEAD    Collection Time: 11/19/23 11:07 AM    Narrative    Test Reason : I48.91,I48.92,    Vent. Rate : 092 BPM     Atrial Rate : 000 BPM     P-R Int : 000 ms          QRS Dur : 084 ms      QT Int : 374 ms       P-R-T Axes : 000 016 010 degrees     QTc Int : 462 ms    Atrial fibrillation with a competing junctional pacemaker  Low voltage QRS  Septal infarct (cited on or before 03-JAN-2023)  Abnormal ECG  When compared with ECG of 16-NOV-2023 18:55,  No significant change was found  Confirmed by Gertrudis CROOK, Narayan DENSON ([id number]) on 11/19/2023 3:43:27 PM    Referred By: [email or web address]   SELF           Confirmed By:Narayan Caba MD           ASSESSMENT/PLAN:     Active Hospital Problems    Diagnosis    *Acute decompensated heart failure    Candidiasis of skin    Paroxysmal atrial fibrillation    Bullous pemphigoid    S/P AVR (aortic valve replacement)    Stage 3b chronic kidney disease    Hyperlipidemia associated with type 2 diabetes mellitus    Hypertension associated with diabetes    Type 2 diabetes mellitus with diabetic polyneuropathy, with long-term current use of insulin       ASSESSMENT & PLAN:   Afib with RVR  HFpEF   CKD stage III  HTN  Cad  Hx of AVR      RECOMMENDATIONS:    Patient will need Eliquis 5 mg BID upon dc.  Rate controlled AF on tele.  Continue carvedilol 12.5 mg po BID.   DC plavix on discharge.  Patient is stable for discharge from cardiac standpoint.  She will need to follow up with cardiology  outpatient in next 2 weeks.       Grace Martinez NP  Date of Service: 11/21/2023  2:10 PM      I have personally interviewed and examined the patient, I have reviewed the Nurse Practitioner's history and physical, assessment, and plan. I have personally evaluated the patient at bedside and agree with the findings and made appropriate changes as necessary in recommendations.    Liat Antonio MD  Department of Cardiology  [facility]  11/21/23

## 2023-11-21 NOTE — SUBJECTIVE & OBJECTIVE
Interval History: see hospital course    Review of Systems   Constitutional: Negative.    Respiratory:  Positive for shortness of breath.    Cardiovascular:  Positive for leg swelling.   Gastrointestinal: Negative.    Musculoskeletal:  Positive for arthralgias and back pain.   Skin:  Positive for rash.   Neurological: Negative.      Objective:     Vital Signs (Most Recent):  Temp: 98.4 °F (36.9 °C) (11/20/23 1101)  Pulse: 92 (11/20/23 1901)  Resp: 20 (11/20/23 1901)  BP: (!) 93/57 (11/20/23 1901)  SpO2: 95 % (11/20/23 1901) Vital Signs (24h Range):  Temp:  [97.8 °F (36.6 °C)-98.4 °F (36.9 °C)] 98.4 °F (36.9 °C)  Pulse:  [78-99] 92  Resp:  [16-26] 20  SpO2:  [92 %-98 %] 95 %  BP: ()/(53-79) 93/57     Weight: 81.6 kg (179 lb 14.3 oz)  Body mass index is 28.18 kg/m².    Intake/Output Summary (Last 24 hours) at 11/20/2023 1923  Last data filed at 11/20/2023 1752  Gross per 24 hour   Intake 375 ml   Output 702 ml   Net -327 ml           Physical Exam  Constitutional:       General: She is not in acute distress.  HENT:      Head: Normocephalic and atraumatic.      Mouth/Throat:      Mouth: Mucous membranes are moist.      Pharynx: Oropharynx is clear.   Eyes:      Extraocular Movements: Extraocular movements intact.      Conjunctiva/sclera: Conjunctivae normal.   Cardiovascular:      Rate and Rhythm: Tachycardia present. Rhythm irregular.      Pulses: Normal pulses.   Pulmonary:      Effort: Pulmonary effort is normal.      Breath sounds: Rhonchi present.   Abdominal:      General: Bowel sounds are normal. There is no distension.      Tenderness: There is no abdominal tenderness.   Musculoskeletal:      Cervical back: Normal range of motion and neck supple.   Skin:     General: Skin is warm and dry.      Capillary Refill: Capillary refill takes less than 2 seconds.      Findings: Rash present.      Comments: Large area of erythema to left inguinal region    Neurological:      General: No focal deficit present.       Mental Status: She is alert and oriented to person, place, and time. Mental status is at baseline.   Psychiatric:         Mood and Affect: Mood normal.         Behavior: Behavior normal.             Significant Labs: All pertinent labs within the past 24 hours have been reviewed.  Recent Lab Results  (Last 5 results in the past 24 hours)        11/20/23  1615   11/20/23  1125   11/20/23  0734   11/20/23  0454   11/19/23 2015        Anion Gap       7         Baso #       0.05         Basophil %       1.1         BUN       32         Calcium       9.6         Chloride       102         CO2       30         Creatinine       1.1         Differential Method       Automated         eGFR       48.0         Eos #       0.3         Eosinophil %       6.5         Glucose       158         Gran # (ANC)       2.7         Gran %       57.6         Hematocrit       42.2         Hemoglobin       13.0         Immature Grans (Abs)       0.01  Comment: Mild elevation in immature granulocytes is non specific and   can be seen in a variety of conditions including stress response,   acute inflammation, trauma and pregnancy. Correlation with other   laboratory and clinical findings is essential.           Immature Granulocytes       0.2         Lymph #       0.9         Lymph %       19.7         Magnesium        2.0         MCH       29.3         MCHC       30.8         MCV       95         Mono #       0.7         Mono %       14.9         MPV       10.3         nRBC       0         Platelet Count       203         POC Glucose 244   140   143     229       Potassium       4.2         RBC       4.43         RDW       14.6         Sodium       139         WBC       4.63                                Significant Imaging: I have reviewed all pertinent imaging results/findings within the past 24 hours.

## 2023-11-21 NOTE — ASSESSMENT & PLAN NOTE
Patient with Paroxysmal (<7 days) atrial fibrillation which is uncontrolled currently with Beta Blocker. Patient is currently in atrial fibrillation.[id number] Score: 5.  Not on anticoagulation given history of falls and currently on dual antiplatelet therapy.  Patient reports not taking Plavix anymore for hoarseness.  We will defer anticoagulation to Cardiology.  Patient with AFib RVR on admission, received Lopressor 5 mg with improvement in heart rates.  We will continue with outpatient carvedilol.    Cardizem infusion   (Allergy to iodine)   Lovenox BID   GABY/CV - held   Increase Coreg and monitor overnight

## 2023-11-21 NOTE — ASSESSMENT & PLAN NOTE
Started the patient on insulin sliding scale, continue Lantus 24 units q.h.s.  Increase basal dose insulin

## 2023-11-21 NOTE — PLAN OF CARE
Appts scheduled and on AVS.   Pt was accepted with [facility]- orders sent  Pt is clear for dc from CM standpoint if cardiology recs obtained & meds delivered from pharmacy.         11/21/23 1000   Final Note   Assessment Type Final Discharge Note   Anticipated Discharge Disposition Home-Health   Hospital Resources/Appts/Education Provided Appointments scheduled and added to AVS;Post-Acute resouces added to AVS   Post-Acute Status   Post-Acute Authorization Home Health;Medications   Home Health Status Set-up Complete/Auth obtained   Medication Status Pending bedside delivery

## 2023-11-21 NOTE — ANESTHESIA PREPROCEDURE EVALUATION
11/21/2023  Leslie Tolliver is a 89 y.o., female.      Patient Active Problem List   Diagnosis    Hypertension associated with diabetes    Type 2 diabetes mellitus with diabetic polyneuropathy, with long-term current use of insulin    Arthritis of both hips    Hypothyroidism    Colon polyps    Hyperlipidemia associated with type 2 diabetes mellitus    Stage 3b chronic kidney disease    Acute on chronic anemia    Chronic bilateral low back pain without sciatica    BMI 29.0-29.9,adult    Status post aortic valve replacement with bioprosthetic valve    Iron deficiency anemia    S/P AVR (aortic valve replacement)    Lumbar radiculopathy    Spinal stenosis of lumbar region with neurogenic claudication    Bullous pemphigoid    Drug-induced immunodeficiency    Paroxysmal atrial fibrillation    Acute decompensated heart failure    Candidiasis of skin       Past Surgical History:   Procedure Laterality Date    ADENOIDECTOMY      AORTIC VALVE REPLACEMENT      BREAST BIOPSY Right 20 yrs ago    benign    CARDIAC SURGERY      CHOLECYSTECTOMY      COLONOSCOPY  5-    Dr Holly, 5 year recheck    EPIDURAL STEROID INJECTION N/A 3/25/2021    Procedure: Injection, Steroid, Epidural L3-4;  Surgeon: Sky Love MD;  Location: [facility] OR;  Service: Pain Management;  Laterality: N/A;  Injection, Steroid, Epidural L3-4    EPIDURAL STEROID INJECTION N/A 5/20/2021    Procedure: Injection, Steroid, Epidural L3-4;  Surgeon: Sky Love MD;  Location: [facility] OR;  Service: Pain Management;  Laterality: N/A;  Injection, Steroid, Epidural L3-4    ESOPHAGOGASTRODUODENOSCOPY N/A 6/4/2020    Procedure: EGD (ESOPHAGOGASTRODUODENOSCOPY);  Surgeon: Michael Nugent III, MD;  Location: [facility];  Service: Endoscopy;  Laterality: N/A;    HEMORRHOID SURGERY      HYSTERECTOMY      OOPHORECTOMY      TONSILLECTOMY          Tobacco Use:  The  patient  reports that she quit smoking about 49 years ago. Her smoking use included cigarettes. She started smoking about 64 years ago. She has a 3.8 pack-year smoking history. She has never used smokeless tobacco.     Results for orders placed or performed during the hospital encounter of 11/16/23   EKG 12-LEAD    Collection Time: 11/19/23 11:07 AM    Narrative    Test Reason : I48.91,I48.92,    Vent. Rate : 092 BPM     Atrial Rate : 000 BPM     P-R Int : 000 ms          QRS Dur : 084 ms      QT Int : 374 ms       P-R-T Axes : 000 016 010 degrees     QTc Int : 462 ms    Atrial fibrillation with a competing junctional pacemaker  Low voltage QRS  Septal infarct (cited on or before 03-JAN-2023)  Abnormal ECG  When compared with ECG of 16-NOV-2023 18:55,  No significant change was found  Confirmed by Gertrudis CROOK, Narayan DENSON ([id number]) on 11/19/2023 3:43:27 PM    Referred By: [email or web address]   SELF           Confirmed By:Narayan Caba MD        Imaging Results              US Lower Extremity Veins Left (Final result)  Result time 11/16/23 19:57:56      Final result by Kamar Barahona Jr., MD (11/16/23 19:57:56)                   Narrative:    EXAMINATION: US LEFT LOWER EXTREMITY VENOUS DOPPLER    CLINICAL INDICATION: Female , 89 years old. Swelling    TECHNIQUE: Complete unilateral duplex sonography of the left lower extremity veins was performed. The examination included compression for vein patency, color Doppler imaging and flow augmentation in response to distal compression of the distal external iliac, common femoral, femoral, popliteal, tibial, and great and small saphenous veins. The contralateral common femoral vein was also studied. [id number].    COMPARISON: No prior exam.    FINDINGS:  Duplex sonography imaging demonstrates all deep and superficial veins examined to be fully compressible with spontaneous, phasic and augmented flow in the left lower extremity.    IMPRESSION:  No evidence of left lower extremity deep  venous thrombosis or superficial venous thrombosis seen.    Electronically signed by:  Kamar Barahona MD  11/16/2023 07:57 PM CST Workstation: [id number]                                     X-Ray Chest AP (Final result)  Result time 11/16/23 19:59:24      Final result by Kamar Barahona Jr., MD (11/16/23 19:59:24)                   Narrative:    HISTORY: SHORTNESS OF BREATH    COMPARISON:January 3, 2023    FINDINGS:Operative changes of prior median sternotomy. The heart is normal in size. The lungs are well-expanded and free of active disease. No evidence of pneumothorax. The osseous structures show nothing unusual site for chronic degenerative spondylosis changes. Clips in the right upper quadrant are the consequence of prior cholecystectomy.    IMPRESSION:No evidence of acute cardiopulmonary findings. Treated coronary artery disease is noted.    Electronically signed by:  Kamar Barahona MD  11/16/2023 07:59 PM CST Workstation: [id number]                                     Lab Results   Component Value Date    WBC 4.77 11/21/2023    HGB 13.9 11/21/2023    HCT 45.2 11/21/2023    MCV 96 11/21/2023     11/21/2023     BMP  Lab Results   Component Value Date     11/21/2023    K 3.9 11/21/2023     11/21/2023    CO2 24 11/21/2023    BUN 31 (H) 11/21/2023    CREATININE 0.9 11/21/2023    CALCIUM 9.1 11/21/2023    ANIONGAP 11 11/21/2023     (H) 11/21/2023     (H) 11/21/2023     (H) 11/20/2023       Results for orders placed during the hospital encounter of 10/14/22    Echo Saline Bubble? Yes    Interpretation Summary  · There is no evidence of intracardiac shunting.  · The left ventricle is normal in size with mild concentric hypertrophy and hyperdynamic systolic function.  · The estimated ejection fraction is 70%.  · Indeterminate left ventricular diastolic function.  · Severe left atrial enlargement.  · Severe mitral regurgitation.  · Mild to moderate tricuspid  regurgitation.  · There is moderate aortic valve stenosis.  · Aortic valve area is 1.79 cm2; peak velocity is m/s; mean gradient is 21 mmHg.  · Mild aortic regurgitation.  · Normal central venous pressure (3 mmHg).  · The estimated PA systolic pressure is 14 mmHg.            Pre-op Assessment    I have reviewed the Patient Summary Reports.     I have reviewed the Nursing Notes. I have reviewed the NPO Status.   I have reviewed the Medications.     Review of Systems  Anesthesia Hx:  No problems with previous Anesthesia             Denies Family Hx of Anesthesia complications.    Denies Personal Hx of Anesthesia complications.                    Social:  Former Smoker       Hematology/Oncology:    Oncology Normal    -- Anemia:                                  EENT/Dental:  EENT/Dental Normal           Cardiovascular:     Hypertension Valvular problems/Murmurs, AS  CAD    Dysrhythmias atrial fibrillation      hyperlipidemia   ECG has been reviewed.                          Pulmonary:  Pulmonary Normal                       Renal/:  Chronic Renal Disease, CKD                Hepatic/GI:     GERD             Musculoskeletal:         Spine Disorders: lumbar            Neurological:  TIA, CVA              Peripheral Neuropathy                          Endocrine:  Diabetes Hypothyroidism          Psych:  Psychiatric Normal                    Physical Exam  General: Well nourished    Airway:  Mallampati: II   Mouth Opening: Normal  TM Distance: Normal  Tongue: Normal  Neck ROM: Normal ROM    Dental:  Intact    Chest/Lungs:  Clear to auscultation    Heart:  Rate: Normal  Rhythm: Irregularly Irregular  Sounds: Normal        Anesthesia Plan  Type of Anesthesia, risks & benefits discussed:    Anesthesia Type: MAC  Intra-op Monitoring Plan: Standard ASA Monitors  Informed Consent: Informed consent signed with the Patient and all parties understand the risks and agree with anesthesia plan.  All questions answered.   ASA Score:  3  Anesthesia Plan Notes: General with natural airway.  POM    Ready For Surgery From Anesthesia Perspective.     .

## 2023-11-22 PROCEDURE — G0180 MD CERTIFICATION HHA PATIENT: HCPCS | Mod: ,,, | Performed by: FAMILY MEDICINE

## 2023-11-22 PROCEDURE — G0180 PR HOME HEALTH MD CERTIFICATION: ICD-10-PCS | Mod: ,,, | Performed by: FAMILY MEDICINE

## 2023-11-22 NOTE — DISCHARGE SUMMARY
[facility]  Discharge Summary      Patient Name: Leslie Tolliver  MRN: [id number]  BIENVENIDO: [id number]  Patient Class: IP- Inpatient  Admission Date: 11/16/2023  Hospital Length of Stay: 1 days  Discharge Date and Time: 11/21/2023  Attending Physician: No att. providers found   Discharging Provider: Isidra Peterson MD  Primary Care Provider: Chang Christianson MD    Primary Care Team: Networked reference to record PCT     HPI:   Patient is an 89-year-old female with history of bullous pemphigoid, type 2 diabetes, hypertension, hyperlipidemia, paroxysmal atrial fibrillation not on anticoagulation outpatient, CAD, aortic valve replacement comes to the emergency room with complaints of shortness of breath.  Patient reports that the shortness of breath has been ongoing for the last week, worsened at night.  Patient reports having shortness of breath while lying flat, reports waking up in the middle of night with shortness of breath.  Patient follows with Dr. Huerta outpatient for atrial fibrillation.  Reports not being on anticoagulation given she is felt to be high-risk for treatment because of fall risk and currently being treated with dual anti-platelet therapy per Cardiology.  Patient  reports shortness of breath worse with exertion, denies having any chest pain, fever, chills, diarrhea, nausea.  Reports taking medications as prescribed.  Patient states that she had hoarseness, states that she stopped taking her Plavix because she read that it was a side effect.  Patient states she noticed improvement in her symptoms of shortness of breath after she stopped drinking soda and stop eating chips this past week.      On admission patient was noted to have atrial fibrillation with heart rates in the 130s, was given Lopressor 5 mg with improvement in heart rates.  Was also given Lasix and Lovenox.  Chest x-ray showed no acute cardiopulmonary disease.  .  Patient also had complaints  of left lower extremity swelling, ultrasound negative for DVT.    * No surgery found *      Hospital Course:   Patient on immunosuppressants for bullous pemphigoid, afib, AVR, CAD and DM2 admitted with afib rvr and LE edema. . She was started on IV lasix for decompensated CHF due to afib. She received digoxin x 1 dose and started cardizem infusion. Coreg resumed + lisinopril if BP tolerates.  She also has severe pruritic rash of the left groin and thigh. Topical antifungal applied and wound care consulted. She started lovenox BID.     Patient had drop in her BP and lasix dose was discontinued. Cardiology consulted. PLan for GABY/CV 11/20/23.   HR improved and did not undergo cardioversion.   Coreg dose increased and will continue medical therapy for now, monitor overnight.     Cardioogy gave final dc recs:  Eliquis and increase coreg 12.5mg BID  Follow up cardiology as scheduled  Counseled on low salt diet to reduce fluid retention and CHF exacerbation  Inguinal rash improved on antifungal powder  HH services ordered on discharge - RN can continue to monitor rash   Follow up PCP if rash worsens        Goals of Care Treatment Preferences:  Code Status: Full Code      Consults:   Consults (From admission, onward)          Status Ordering Provider     Inpatient consult to Registered Dietitian/Nutritionist  Once        Provider:  (Not yet assigned)    Completed IBRAHIMA MEDINA     Inpatient consult to Cardiology  Once        Provider:  Delfino Huerta MD    Completed IBRAHIMA MEDINA            No new Assessment & Plan notes have been filed under this hospital service since the last note was generated.  Service: Hospital Medicine    Final Active Diagnoses:    Diagnosis Date Noted POA    PRINCIPAL PROBLEM:  Acute decompensated heart failure [I50.9] 11/16/2023 Yes    Candidiasis of skin [B37.2] 11/19/2023 Yes    Paroxysmal atrial fibrillation [I48.0] 03/16/2023 Yes    Bullous pemphigoid [L12.0] 08/29/2022 Yes    S/P AVR  (aortic valve replacement) [Z95.2] 07/16/2020 Not Applicable    Stage 3b chronic kidney disease [N18.32] 07/24/2019 Yes    Hyperlipidemia associated with type 2 diabetes mellitus [E11.69, E78.5] 01/08/2019 Yes    Hypertension associated with diabetes [E11.59, I15.2]  Yes    Type 2 diabetes mellitus with diabetic polyneuropathy, with long-term current use of insulin [E11.42, Z79.4]  Not Applicable      Problems Resolved During this Admission:       Discharged Condition: good    Disposition: Home or Self Care    Follow Up:   Follow-up Information       Delfino Huerta MD Follow up.    Specialties: Interventional Cardiology, Cardiology  Why: 12/18 @ 8:30 am  Contact information:  [address]  CARDIOLOGY [facility] [address]  [phone]               [facility] Follow up.    Specialties: Home Health Services, Home Therapy Services, Home Living Aide Services  Why: 1st visit 11/22  Contact information:  [address] [facility] [address]  [phone]             [place] [facility]. Go in 7 day(s).    Specialty: Primary Care  Why: Hospital follow  appt scheduled 11/27 at 10:30 a.m.  Contact information:  [address]  [facility] [address]  [phone]  Additional information:  [address]                         Patient Instructions:      Ambulatory referral/consult to Outpatient Case Management   Referral Priority: Routine Referral Type: Consultation   Referral Reason: Specialty Services Required   Number of Visits Requested: 1     Ambulatory referral/consult to Home Health   Standing Status: Future   Referral Priority: Routine Referral Type: Home Health   Referral Reason: Specialty Services Required   Requested Specialty: Home Health Services   Number of Visits Requested: 1     Ambulatory referral/consult to Home Health   Referral Priority: Routine Referral Type: Home Health   Referral Reason: Specialty Services Required   Requested  "Specialty: Home Health Services   Number of Visits Requested: 1       Significant Diagnostic Studies: Labs: BMP:   Recent Labs   Lab 11/21/23  0249 11/21/23  0526   * 140*    139   K 5.5* 3.9    104   CO2 25 24   BUN 34* 31*   CREATININE 1.0 0.9   CALCIUM 9.3 9.1   MG 2.1  --    , CBC   Recent Labs   Lab 11/21/23 0249   WBC 4.77   HGB 13.9   HCT 45.2      , Lipid Panel   Lab Results   Component Value Date    CHOL 126 10/14/2022    HDL 33 (L) 10/14/2022    LDLCALC 64.6 10/14/2022    TRIG 142 10/14/2022    CHOLHDL 26.2 10/14/2022   , and A1C: No results for input(s): "HGBA1C" in the last 4320 hours.    Pending Diagnostic Studies:       None           Medications:  Reconciled Home Medications:      Medication List        START taking these medications      ELIQUIS 5 mg Tab  Generic drug: apixaban  Take 1 tablet (5 mg total) by mouth 2 (two) times daily.     mycophenolate 250 mg Cap  Commonly known as: CELLCEPT  Take 2 capsules (500 mg total) by mouth 2 (two) times daily.  Replaces: mycophenolate 500 mg Tab            CHANGE how you take these medications      carvediloL 12.5 MG tablet  Commonly known as: COREG  Take 1 tablet (12.5 mg total) by mouth 2 (two) times daily.  What changed:   medication strength  how much to take  when to take this     ketoconazole 2 % cream  Commonly known as: NIZORAL  AAA pannus fold bid  What changed:   how much to take  how to take this  when to take this     LANTUS SOLOSTAR U-100 INSULIN glargine 100 units/mL SubQ pen  Generic drug: insulin  INJECT 24 UNITS SUBCUTANEOUSLY EVERY EVENING  What changed: See the new instructions.     triamcinolone acetonide 0.1% 0.1 % cream  Commonly known as: KENALOG  AAA bid  What changed:   how much to take  how to take this  when to take this            CONTINUE taking these medications      ACCU-CHEK GUIDE TEST STRIPS Strp  Generic drug: blood sugar diagnostic  300 each by Misc.(Non-Drug; Combo Route) route 3 (three) times " "daily.     aspirin 81 mg Tab  Take 81 mg by mouth once daily. Every day     blood-glucose meter kit  Accu-chek Marley glucometer check glucose three times daily E11.65     CALCIUM+D ORAL  Take 1 tablet by mouth once daily.     clobetasol 0.05% 0.05 % Oint  Commonly known as: TEMOVATE  Apply 1 g topically 2 (two) times daily.     DROPLET PEN NEEDLE 31 gauge x 5/16" Ndle  Generic drug: pen needle, diabetic  USE AS DIRECTED WITH LANTUS SOLOSTAR PEN AS NEEDED     empagliflozin 10 mg tablet  Commonly known as: Jardiance  Take 1 tablet (10 mg total) by mouth once daily.     lancets Misc  Commonly known as: ACCU-CHEK SOFTCLIX LANCETS  New machine Guide Me Accuchek     levocetirizine 5 MG tablet  Commonly known as: XYZAL  Take 5 mg by mouth every evening.     metFORMIN 1000 MG tablet  Commonly known as: GLUCOPHAGE  Take 1 tablet (1,000 mg total) by mouth 2 (two) times daily with meals.     multivitamin per tablet  Commonly known as: THERAGRAN  Take 1 tablet by mouth once daily.     VITAMIN D3 125 mcg (5,000 unit) Tab  Generic drug: cholecalciferol (vitamin D3)  Take 5,000 Units by mouth once daily.            STOP taking these medications      cholestyramine-aspartame 4 gram Pwpk  Commonly known as: PREVALITE     clopidogreL 75 mg tablet  Commonly known as: PLAVIX     lisinopriL 20 MG tablet  Commonly known as: PRINIVIL,ZESTRIL     mycophenolate 500 mg Tab  Commonly known as: CELLCEPT  Replaced by: mycophenolate 250 mg Cap              Indwelling Lines/Drains at time of discharge:   Lines/Drains/Airways       None                   Time spent on the discharge of patient: 35 minutes         Isidra Peterson MD  Department of Hospital Medicine  [facility]  "

## 2023-11-24 ENCOUNTER — TELEPHONE (OUTPATIENT)
Dept: FAMILY MEDICINE | Facility: CLINIC | Age: 88
End: 2023-11-24
Payer: MEDICARE

## 2023-11-24 NOTE — TELEPHONE ENCOUNTER
Spoke with patient   She was in the hospital for 5 days with CHF and A fib She lost 14.5 pounds of fluid.   She is concerned about taking Jardiance  She gets bad diarrhea from the medication.   She was instructed not to take Prevalite.   Any suggestions?  She has [facility] D/[facility] appt on 11/27/2023      ----- Message from Fredo Pablo sent at 11/24/2023 10:57 AM CST -----  Contact: Self  Type: Needs Medical Advice  Who Called:  PT  Symptoms (please be specific):    How long has patient had these symptoms:    Pharmacy name and phone #:    Best Call Back Number: [phone]    Additional Information: Needs to discuss medications from the hospital.  Please call back as soon possible to discuss.

## 2023-11-24 NOTE — TELEPHONE ENCOUNTER
Diarrhea is not a reported side effect of Jardiance, she is on metformin and it is a very common side effect of the metformin.  Jardiance is commonly used for heart failure and has a diuretic affect to help get rid of excess fluid.  I do not know why she was instructed to discontinue the Questran/Prevalite.  They did not give any reason in the hospital.  It is a common treatment for diarrhea after gallbladder removal.  I do not know why they are doing what they are doing and it makes it very difficult for me to give any suggestions at all.

## 2023-11-28 ENCOUNTER — TELEPHONE (OUTPATIENT)
Dept: CARDIOLOGY | Facility: CLINIC | Age: 88
End: 2023-11-28
Payer: MEDICARE

## 2023-11-28 NOTE — TELEPHONE ENCOUNTER
----- Message from Juan Ortega sent at 11/28/2023  9:02 AM CST -----  Type: Needs Medical Advice  Who Called:  Radha/ [facility]  Symptoms (please be specific):  9 lb weight gain--Eliquis and Plavix are in use.  How long has patient had these symptoms:    Pharmacy name and phone #:    Best Call Back Number: [phone]  Additional Information: Please call to advise

## 2023-12-04 DIAGNOSIS — Z79.4 TYPE 2 DIABETES MELLITUS WITHOUT COMPLICATION, WITH LONG-TERM CURRENT USE OF INSULIN: ICD-10-CM

## 2023-12-04 DIAGNOSIS — E11.42 TYPE 2 DIABETES MELLITUS WITH DIABETIC POLYNEUROPATHY, WITH LONG-TERM CURRENT USE OF INSULIN: Primary | ICD-10-CM

## 2023-12-04 DIAGNOSIS — E11.9 TYPE 2 DIABETES MELLITUS WITHOUT COMPLICATION, WITH LONG-TERM CURRENT USE OF INSULIN: ICD-10-CM

## 2023-12-04 DIAGNOSIS — Z79.4 TYPE 2 DIABETES MELLITUS WITH DIABETIC POLYNEUROPATHY, WITH LONG-TERM CURRENT USE OF INSULIN: Primary | ICD-10-CM

## 2023-12-05 ENCOUNTER — OUTPATIENT CASE MANAGEMENT (OUTPATIENT)
Dept: ADMINISTRATIVE | Facility: OTHER | Age: 88
End: 2023-12-05
Payer: MEDICARE

## 2023-12-05 RX ORDER — INSULIN GLARGINE 100 [IU]/ML
24 INJECTION, SOLUTION SUBCUTANEOUS NIGHTLY
Qty: 30 ML | Refills: 0 | Status: CANCELLED | OUTPATIENT
Start: 2023-12-05

## 2023-12-05 RX ORDER — INSULIN DETEMIR 100 [IU]/ML
24 INJECTION, SOLUTION SUBCUTANEOUS NIGHTLY
Qty: 24 ML | Refills: 1 | Status: SHIPPED | OUTPATIENT
Start: 2023-12-05 | End: 2024-02-26 | Stop reason: CLARIF

## 2023-12-05 NOTE — LETTER
Leslie Tolliver  [address] [place] [id number]      Dear Leslie Tolliver,     I work with [facility] Outpatient Care Management Department. We received a referral to call you to discuss your medical history. These services are free of charge and are offered to [facility] patients who have recently been discharged from any of our facilities or who have medical conditions that may require the skill of a nurse to assist with management.     I am a Registered Nurse who specializes in connecting patients with available medical and financial resources as well as addressing any educational needs that may be indicated.    I attempted to reach you by telephone, but I was unsuccessful. Please call our department so that we can go over some questions with you, regarding your health.    The Outpatient Care Management Department can be reached at [phone], from 8:00AM to 4:30 PM, on Monday thru Friday.     Additionally, [facility] also has a program where a nurse is available 24/7 to answer questions or provide medical advice, their number is [phone].      Thanks,        Emily Espinoza RN  Outpatient Care Management  Phone #: [phone]

## 2023-12-05 NOTE — TELEPHONE ENCOUNTER
Care Due:                  Date            Visit Type   Department     Provider  --------------------------------------------------------------------------------                                EP -                              PRIMARY      [facility] FAMILY  Last Visit: 08-      CARE ([facility])   PRACTICE       Chang Christianson                               -                              PRIMARY      [facility]  Next Visit: 03-      CARE ([facility])   PRACTICE       Chang Christianson                                                            Last  Test          Frequency    Reason                     Performed    Due Date  --------------------------------------------------------------------------------    HBA1C.......  6 months...  LANTUS, empagliflozin,     05- 11-                             metFORMIN................    [facility] Embedded Care Due Messages. Reference number: [id number].   12/04/2023 9:29:34 PM CST

## 2023-12-05 NOTE — PROGRESS NOTES
12/5/23 Attempt assessment with patient/caregiver. No answer. Left message requesting call back. RN OPCM  sent portal message

## 2023-12-05 NOTE — TELEPHONE ENCOUNTER
Refill Routing Note   Medication(s) are not appropriate for processing by [facility] for the following reason(s):        Required labs outdated: A1c     [facility] action(s):  Defer     Requires labs : Yes    Medication Therapy Plan:   A1c orders placed, may require staff scheduling assistance      Appointments  past 12m or future 3m with PCP    Date Provider   Last Visit   8/30/2023 Chang Christianson MD   Next Visit   3/8/2024 Chang Christianson MD   ED visits in past 90 days: 0        Note composed:9:39 AM 12/05/2023

## 2023-12-06 ENCOUNTER — EXTERNAL HOME HEALTH (OUTPATIENT)
Dept: HOME HEALTH SERVICES | Facility: HOSPITAL | Age: 88
End: 2023-12-06
Payer: MEDICARE

## 2023-12-06 NOTE — TELEPHONE ENCOUNTER
Epic indicates that the Lantus is no longer covered on her insurance.  I substituted Levemir with the same sig, 24 units HS.    For some reason epic would not send it directly to mail order pharmacy but insisted on printing it.  It is on Karuna's desk.    She is overdue for her follow-up lab.  Please see result note of May 25, 2023.  The lipid panel, A1c, and CMP were ordered August 30th and are still awaiting scheduling.

## 2023-12-07 NOTE — TELEPHONE ENCOUNTER
Spoke with patient    Information given about her Levemir Lab appointment scheduled on 12/18/2023 when she see Dr Busby   Verbalizes understanding

## 2023-12-08 ENCOUNTER — TELEPHONE (OUTPATIENT)
Dept: CARDIOLOGY | Facility: CLINIC | Age: 88
End: 2023-12-08
Payer: MEDICARE

## 2023-12-08 ENCOUNTER — OUTPATIENT CASE MANAGEMENT (OUTPATIENT)
Dept: ADMINISTRATIVE | Facility: OTHER | Age: 88
End: 2023-12-08
Payer: MEDICARE

## 2023-12-08 NOTE — PROGRESS NOTES
12/8/23 Attempt assessment with patient/caregiver. Caregiver declined OPCM at this time. Has all needs met. Gave RN info. RN OPCM

## 2023-12-08 NOTE — TELEPHONE ENCOUNTER
----- Message from Prabhjot Gomez sent at 12/8/2023  1:09 PM CST -----  Type: Needs Medical Advice  Who Called:  Sayda from Home Health  Symptoms (please be specific):  said the pt have a weight gain of 8 lb in the last 2 weeks and she was in the hospital for fluid --she need the nurse to return her call--please call and advise   Best Call Back Number: [phone]  Additional Information:  thank you

## 2023-12-11 ENCOUNTER — TELEPHONE (OUTPATIENT)
Dept: FAMILY MEDICINE | Facility: CLINIC | Age: 88
End: 2023-12-11
Payer: MEDICARE

## 2023-12-11 NOTE — TELEPHONE ENCOUNTER
----- Message from Senait Mercado, Patient Care Assistant sent at 12/11/2023  8:51 AM CST -----  Regarding: refill  Contact: pt  Type:  RX Refill Request    Who Called:  pt   Refill or New Rx:  refill     RX Name and Strength:  insulin detemir U-100, Levemir, (LEVEMIR FLEXPEN) 100 unit/mL (3 mL) InPn pen    How is the patient currently taking it? 1xday   Is this a 30 day or 90 day RX:  30    Preferred Pharmacy with phone number:    [id number] - [place] - [address]  [address] [address]  Phone: [phone] Fax: [phone]    Local or Mail Order:  local   Ordering Provider:  Meghan Mary Call Back Number:  [phone] (home)     Additional Information:  please call pt to advise. Thanks!

## 2023-12-11 NOTE — TELEPHONE ENCOUNTER
Called patient who is almost out of insulin and [facility] told her they couldn't do refill -  I called [facility] pharmacy and the Levemir was shipped 12/8/23. I tried calling patient several times home and cell but no answer to let her know. Sent her an email.

## 2023-12-18 ENCOUNTER — OFFICE VISIT (OUTPATIENT)
Dept: CARDIOLOGY | Facility: CLINIC | Age: 88
End: 2023-12-18
Payer: MEDICARE

## 2023-12-18 VITALS
OXYGEN SATURATION: 98 % | RESPIRATION RATE: 16 BRPM | DIASTOLIC BLOOD PRESSURE: 80 MMHG | HEART RATE: 72 BPM | SYSTOLIC BLOOD PRESSURE: 122 MMHG | HEIGHT: 67 IN | BODY MASS INDEX: 27.94 KG/M2 | WEIGHT: 178 LBS

## 2023-12-18 DIAGNOSIS — Z95.2 S/P AVR (AORTIC VALVE REPLACEMENT): ICD-10-CM

## 2023-12-18 DIAGNOSIS — I48.0 PAROXYSMAL ATRIAL FIBRILLATION: Primary | ICD-10-CM

## 2023-12-18 DIAGNOSIS — E78.5 HYPERLIPIDEMIA ASSOCIATED WITH TYPE 2 DIABETES MELLITUS: ICD-10-CM

## 2023-12-18 DIAGNOSIS — E11.69 HYPERLIPIDEMIA ASSOCIATED WITH TYPE 2 DIABETES MELLITUS: ICD-10-CM

## 2023-12-18 DIAGNOSIS — N18.32 STAGE 3B CHRONIC KIDNEY DISEASE: ICD-10-CM

## 2023-12-18 DIAGNOSIS — Z79.4 TYPE 2 DIABETES MELLITUS WITH DIABETIC POLYNEUROPATHY, WITH LONG-TERM CURRENT USE OF INSULIN: ICD-10-CM

## 2023-12-18 DIAGNOSIS — E11.42 TYPE 2 DIABETES MELLITUS WITH DIABETIC POLYNEUROPATHY, WITH LONG-TERM CURRENT USE OF INSULIN: ICD-10-CM

## 2023-12-18 DIAGNOSIS — E03.2 HYPOTHYROIDISM DUE TO MEDICATION: ICD-10-CM

## 2023-12-18 DIAGNOSIS — E11.59 HYPERTENSION ASSOCIATED WITH DIABETES: ICD-10-CM

## 2023-12-18 DIAGNOSIS — I15.2 HYPERTENSION ASSOCIATED WITH DIABETES: ICD-10-CM

## 2023-12-18 PROCEDURE — 3288F FALL RISK ASSESSMENT DOCD: CPT | Mod: HCNC,CPTII,S$GLB, | Performed by: INTERNAL MEDICINE

## 2023-12-18 PROCEDURE — 1160F PR REVIEW ALL MEDS BY PRESCRIBER/CLIN PHARMACIST DOCUMENTED: ICD-10-PCS | Mod: HCNC,CPTII,S$GLB, | Performed by: INTERNAL MEDICINE

## 2023-12-18 PROCEDURE — 99999 PR PBB SHADOW E&M-EST. PATIENT-LVL IV: CPT | Mod: PBBFAC,HCNC,, | Performed by: INTERNAL MEDICINE

## 2023-12-18 PROCEDURE — 1111F DSCHRG MED/CURRENT MED MERGE: CPT | Mod: HCNC,CPTII,S$GLB, | Performed by: INTERNAL MEDICINE

## 2023-12-18 PROCEDURE — 1159F PR MEDICATION LIST DOCUMENTED IN MEDICAL RECORD: ICD-10-PCS | Mod: HCNC,CPTII,S$GLB, | Performed by: INTERNAL MEDICINE

## 2023-12-18 PROCEDURE — 99999 PR PBB SHADOW E&M-EST. PATIENT-LVL IV: ICD-10-PCS | Mod: PBBFAC,HCNC,, | Performed by: INTERNAL MEDICINE

## 2023-12-18 PROCEDURE — 93005 ELECTROCARDIOGRAM TRACING: CPT | Mod: HCNC,S$GLB,, | Performed by: INTERNAL MEDICINE

## 2023-12-18 PROCEDURE — 1101F PR PT FALLS ASSESS DOC 0-1 FALLS W/OUT INJ PAST YR: ICD-10-PCS | Mod: HCNC,CPTII,S$GLB, | Performed by: INTERNAL MEDICINE

## 2023-12-18 PROCEDURE — 3288F PR FALLS RISK ASSESSMENT DOCUMENTED: ICD-10-PCS | Mod: HCNC,CPTII,S$GLB, | Performed by: INTERNAL MEDICINE

## 2023-12-18 PROCEDURE — 1126F AMNT PAIN NOTED NONE PRSNT: CPT | Mod: HCNC,CPTII,S$GLB, | Performed by: INTERNAL MEDICINE

## 2023-12-18 PROCEDURE — 93010 EKG 12-LEAD: ICD-10-PCS | Mod: HCNC,S$GLB,, | Performed by: GENERAL PRACTICE

## 2023-12-18 PROCEDURE — 93010 ELECTROCARDIOGRAM REPORT: CPT | Mod: HCNC,S$GLB,, | Performed by: GENERAL PRACTICE

## 2023-12-18 PROCEDURE — 1111F PR DISCHARGE MEDS RECONCILED W/ CURRENT OUTPATIENT MED LIST: ICD-10-PCS | Mod: HCNC,CPTII,S$GLB, | Performed by: INTERNAL MEDICINE

## 2023-12-18 PROCEDURE — 99214 PR OFFICE/OUTPT VISIT, EST, LEVL IV, 30-39 MIN: ICD-10-PCS | Mod: HCNC,S$GLB,, | Performed by: INTERNAL MEDICINE

## 2023-12-18 PROCEDURE — 1101F PT FALLS ASSESS-DOCD LE1/YR: CPT | Mod: HCNC,CPTII,S$GLB, | Performed by: INTERNAL MEDICINE

## 2023-12-18 PROCEDURE — 1126F PR PAIN SEVERITY QUANTIFIED, NO PAIN PRESENT: ICD-10-PCS | Mod: HCNC,CPTII,S$GLB, | Performed by: INTERNAL MEDICINE

## 2023-12-18 PROCEDURE — 99214 OFFICE O/P EST MOD 30 MIN: CPT | Mod: HCNC,S$GLB,, | Performed by: INTERNAL MEDICINE

## 2023-12-18 PROCEDURE — 1160F RVW MEDS BY RX/DR IN RCRD: CPT | Mod: HCNC,CPTII,S$GLB, | Performed by: INTERNAL MEDICINE

## 2023-12-18 PROCEDURE — 1159F MED LIST DOCD IN RCRD: CPT | Mod: HCNC,CPTII,S$GLB, | Performed by: INTERNAL MEDICINE

## 2023-12-18 PROCEDURE — 93005 EKG 12-LEAD: ICD-10-PCS | Mod: HCNC,S$GLB,, | Performed by: INTERNAL MEDICINE

## 2023-12-18 PROCEDURE — 1157F PR ADVANCE CARE PLAN OR EQUIV PRESENT IN MEDICAL RECORD: ICD-10-PCS | Mod: HCNC,CPTII,S$GLB, | Performed by: INTERNAL MEDICINE

## 2023-12-18 PROCEDURE — 1157F ADVNC CARE PLAN IN RCRD: CPT | Mod: HCNC,CPTII,S$GLB, | Performed by: INTERNAL MEDICINE

## 2023-12-18 RX ORDER — AMIODARONE HYDROCHLORIDE 200 MG/1
200 TABLET ORAL 2 TIMES DAILY
Qty: 180 TABLET | Refills: 3 | Status: SHIPPED | OUTPATIENT
Start: 2023-12-18 | End: 2024-12-17

## 2023-12-18 RX ORDER — SERTRALINE HYDROCHLORIDE 25 MG/1
25 TABLET, FILM COATED ORAL NIGHTLY
Qty: 90 TABLET | Refills: 3 | OUTPATIENT
Start: 2023-12-18 | End: 2024-03-27

## 2023-12-18 NOTE — PROGRESS NOTES
Subjective:    Patient ID:  Leslie Tolliver is a 89 y.o. female patient here for evaluation Follow-up      History of Present Illness:     Patient is 89-year-old with history of paroxysmal atrial fibrillation was recently hospitalized November is seeking follow-up evaluation.  She had atrial fibrillation with a rapid rates at the time.  She then she has been anticoagulated and doing well she is here for follow-up evaluation denies having angina arm neck or jaw pain no significant pedal edema noted.  Effort capacity is good she uses a Rollator for mobility and stability.  She has no complaints of chest discomfort jaw pain and has remained stable there is no blood in the stools no black stools        Discharge Summary        Patient Name: Leslie Tolliver  MRN: [id number]  BIENVENIDO: [id number]  Patient Class: IP- Inpatient  Admission Date: 11/16/2023  Hospital Length of Stay: 1 days  Discharge Date and Time: 11/21/2023  Attending Physician: No att. providers found   Discharging Provider: Isidra Peterson MD  Primary Care Provider: Chang Christianson MD     Primary Care Team: Networked reference to record PCT      HPI:   Patient is an 89-year-old female with history of bullous pemphigoid, type 2 diabetes, hypertension, hyperlipidemia, paroxysmal atrial fibrillation not on anticoagulation outpatient, CAD, aortic valve replacement comes to the emergency room with complaints of shortness of breath.  Patient reports that the shortness of breath has been ongoing for the last week, worsened at night.  Patient reports having shortness of breath while lying flat, reports waking up in the middle of night with shortness of breath.  Patient follows with Dr. Huerta outpatient for atrial fibrillation.  Reports not being on anticoagulation given she is felt to be high-risk for treatment because of fall risk and currently being treated with dual anti-platelet therapy per Cardiology.  Patient  reports shortness of breath worse with  exertion, denies having any chest pain, fever, chills, diarrhea, nausea.  Reports taking medications as prescribed.  Patient states that she had hoarseness, states that she stopped taking her Plavix because she read that it was a side effect.  Patient states she noticed improvement in her symptoms of shortness of breath after she stopped drinking soda and stop eating chips this past week.        On admission patient was noted to have atrial fibrillation with heart rates in the 130s, was given Lopressor 5 mg with improvement in heart rates.  Was also given Lasix and Lovenox.  Chest x-ray showed no acute cardiopulmonary disease.  .  Patient also had complaints of left lower extremity swelling, ultrasound negative for DVT.     * No surgery found *       Hospital Course:   Patient on immunosuppressants for bullous pemphigoid, afib, AVR, CAD and DM2 admitted with afib rvr and LE edema. . She was started on IV lasix for decompensated CHF due to afib. She received digoxin x 1 dose and started cardizem infusion. Coreg resumed + lisinopril if BP tolerates.  She also has severe pruritic rash of the left groin and thigh. Topical antifungal applied and wound care consulted. She started lovenox BID.      Patient had drop in her BP and lasix dose was discontinued. Cardiology consulted. PLan for GABY/CV 11/20/23.   HR improved and did not undergo cardioversion.   Coreg dose increased and will continue medical therapy for now, monitor overnight.      Cardioogy gave final dc recs:  Eliquis and increase coreg 12.5mg BID  Follow up cardiology as scheduled  Counseled on low salt diet to reduce fluid retention and CHF exacerbation  Inguinal rash improved on antifungal powder  HH services ordered on discharge - RN can continue to monitor rash   Follow up PCP if rash worsens          Review of patient's allergies indicates:   Allergen Reactions    Fenofibric acid (choline)      Other reaction(s): Vomiting    Iodine      Other  reaction(s): lips swollen ivp dye    Rosuvastatin      Other reaction(s): muscle pain       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    GERD (gastroesophageal reflux disease)     Hyperlipidemia     Hypertension     Hypothyroidism     Paroxysmal atrial fibrillation 3/16/2023    Type 2 diabetes mellitus      Past Surgical History:   Procedure Laterality Date    ADENOIDECTOMY      AORTIC VALVE REPLACEMENT      BREAST BIOPSY Right 20 yrs ago    benign    CARDIAC SURGERY      CHOLECYSTECTOMY      COLONOSCOPY  5-    Dr Holly, 5 year recheck    EPIDURAL STEROID INJECTION N/A 3/25/2021    Procedure: Injection, Steroid, Epidural L3-4;  Surgeon: Sky Love MD;  Location: [facility] OR;  Service: Pain Management;  Laterality: N/A;  Injection, Steroid, Epidural L3-4    EPIDURAL STEROID INJECTION N/A 5/20/2021    Procedure: Injection, Steroid, Epidural L3-4;  Surgeon: Sky Love MD;  Location: [facility] OR;  Service: Pain Management;  Laterality: N/A;  Injection, Steroid, Epidural L3-4    ESOPHAGOGASTRODUODENOSCOPY N/A 6/4/2020    Procedure: EGD (ESOPHAGOGASTRODUODENOSCOPY);  Surgeon: Michael Nugent III, MD;  Location: [facility];  Service: Endoscopy;  Laterality: N/A;    HEMORRHOID SURGERY      HYSTERECTOMY      OOPHORECTOMY      TONSILLECTOMY       Social History     Tobacco Use    Smoking status: Former     Current packs/day: 0.00     Average packs/day: 0.3 packs/day for 15.0 years (3.8 ttl pk-yrs)     Types: Cigarettes     Start date: 3/30/1959     Quit date: 3/30/1974     Years  since quittin.7    Smokeless tobacco: Never   Substance Use Topics    Alcohol use: No    Drug use: No        Review of Systems:    As noted in HPI in addition      REVIEW OF SYSTEMS  CARDIOVASCULAR: No recent chest pain, palpitations, arm, neck, or jaw pain  RESPIRATORY: No recent fever, cough chills, SOB or congestion  : No blood in the urine  GI: No Nausea, vomiting, constipation, diarrhea, blood, or reflux.  MUSCULOSKELETAL: No myalgias  NEURO: No lightheadedness or dizziness  EYES: No Double vision, blurry, vision or headache              Objective        Vitals:    23 0840   BP: 122/80   Pulse: 72   Resp: 16       LIPIDS - LAST 2   Lab Results   Component Value Date    CHOL 126 10/14/2022    CHOL 132 2022    HDL 33 (L) 10/14/2022    HDL 33 (L) 2022    LDLCALC 64.6 10/14/2022    LDLCALC 54.4 (L) 2022    TRIG 142 10/14/2022    TRIG 223 (H) 2022    CHOLHDL 26.2 10/14/2022    CHOLHDL 25.0 2022       CBC - LAST 2  Lab Results   Component Value Date    WBC 4.77 2023    WBC 4.63 2023    RBC 4.69 2023    RBC 4.43 2023    HGB 13.9 2023    HGB 13.0 2023    HCT 45.2 2023    HCT 42.2 2023    MCV 96 2023    MCV 95 2023    MCH 29.6 2023    MCH 29.3 2023    MCHC 30.8 (L) 2023    MCHC 30.8 (L) 2023    RDW 14.6 (H) 2023    RDW 14.6 (H) 2023     2023     2023    MPV 10.4 2023    MPV 10.3 2023    GRAN 2.7 2023    GRAN 56.6 2023    LYMPH 1.1 2023    LYMPH 22.6 2023    MONO 0.7 2023    MONO 14.3 2023    BASO 0.04 2023    BASO 0.05 2023    NRBC 0 2023    NRBC 0 2023       CHEMISTRY & LIVER FUNCTION - LAST 2  Lab Results   Component Value Date     2023     2023    K 3.9 2023    K 5.5 (H) 2023     2023     2023    CO2 24 2023    CO2 25  11/21/2023    ANIONGAP 11 11/21/2023    ANIONGAP 9 11/21/2023    BUN 31 (H) 11/21/2023    BUN 34 (H) 11/21/2023    CREATININE 0.9 11/21/2023    CREATININE 1.0 11/21/2023     (H) 11/21/2023     (H) 11/21/2023    CALCIUM 9.1 11/21/2023    CALCIUM 9.3 11/21/2023    MG 2.1 11/21/2023    MG 2.0 11/20/2023    ALBUMIN 4.4 11/16/2023    ALBUMIN 4.1 01/03/2023    PROT 6.8 11/16/2023    PROT 7.4 01/03/2023    ALKPHOS 86 11/16/2023    ALKPHOS 84 01/03/2023    ALT 19 11/16/2023    ALT 17 01/03/2023    AST 20 11/16/2023    AST 24 01/03/2023    BILITOT 1.4 (H) 11/16/2023    BILITOT 1.1 (H) 01/03/2023        CARDIAC PROFILE - LAST 2  Lab Results   Component Value Date     (H) 11/16/2023     (H) 06/04/2020     03/09/2012    CPK 96 08/02/2011    CPKMB 2.9 10/15/2022    TROPONINI <0.030 10/15/2022    TROPONINI 0.088 (HH) 06/03/2020    TROPONINIHS 10.8 11/16/2023    TROPONINIHS 6.0 01/03/2023        COAGULATION - LAST 2  Lab Results   Component Value Date    LABPT 13.5 10/15/2022    LABPT 14.0 (H) 10/14/2022    INR 1.1 10/15/2022    INR 1.2 10/14/2022    APTT 29.4 10/15/2022    APTT 30.9 06/03/2020       ENDOCRINE & PSA - LAST 2  Lab Results   Component Value Date    HGBA1C 7.8 (H) 05/25/2023    HGBA1C 7.9 (H) 10/14/2022    TSH 2.565 11/16/2023    TSH 1.270 10/14/2022        ECHOCARDIOGRAM RESULTS  Results for orders placed during the hospital encounter of 11/16/23    Echo    Interpretation Summary    Left Ventricle: The left ventricle is normal in size. Mildly increased wall thickness. There is mild concentric hypertrophy. Normal wall motion. There is normal systolic function with a visually estimated ejection fraction of 60 - 65%. There is normal diastolic function.    Right Ventricle: Normal right ventricular cavity size. Wall thickness is normal. Right ventricle wall motion  is normal. Systolic function is normal.    Left Atrium: Left atrium is severely dilated.    Aortic Valve: There is a  bioprosthetic valve in the aortic position.    Mitral Valve: There is moderate bileaflet sclerosis. Moderately calcified posterior leaflet. There is mild anterior mitral annular calcification present. There is mild stenosis. The mean pressure gradient across the mitral valve is 4 mmHg at a heart rate of  bpm. There is moderate regurgitation with a centrally directed jet.    Tricuspid Valve: There is mild regurgitation with a centrally directed jet.    IVC/SVC: Elevated venous pressure at 15 mmHg.      CURRENT/PREVIOUS VISIT EKG  Results for orders placed or performed during the hospital encounter of 11/16/23   EKG 12-LEAD    Collection Time: 11/19/23 11:07 AM    Narrative    Test Reason : I48.91,I48.92,    Vent. Rate : 092 BPM     Atrial Rate : 000 BPM     P-R Int : 000 ms          QRS Dur : 084 ms      QT Int : 374 ms       P-R-T Axes : 000 016 010 degrees     QTc Int : 462 ms    Atrial fibrillation with a competing junctional pacemaker  Low voltage QRS  Septal infarct (cited on or before 03-JAN-2023)  Abnormal ECG  When compared with ECG of 16-NOV-2023 18:55,  No significant change was found  Confirmed by Gertrudis CROOK, Narayan DENSON ([id number]) on 11/19/2023 3:43:27 PM    Referred By: [email or web address]   SELF           Confirmed By:Narayan Caba MD     No valid procedures specified.   No results found for this or any previous visit.    No valid procedures specified.    PHYSICAL EXAM  CONSTITUTIONAL: Well built, well nourished in no apparent distress  NECK: no carotid bruit, no JVD  LUNGS: CTA  CHEST WALL: no tenderness  HEART:  Irregular rhythm diminished S1-S2 and holosystolic murmur at the apex noted.    ABDOMEN: soft, non-tender; bowel sounds normal; no masses,  no organomegaly  EXTREMITIES: Extremities normal, no edema, no calf tenderness noted  NEURO: AAO X 3    I HAVE REVIEWED :    The vital signs, nurses notes, and all the pertinent radiology and labs.        Current Outpatient Medications   Medication Instructions     "amiodarone (PACERONE) 200 mg, Oral, 2 times daily    apixaban (ELIQUIS) 5 mg, Oral, 2 times daily    aspirin 81 mg, Oral, Daily, Every day    blood sugar diagnostic (ACCU-CHEK GUIDE TEST STRIPS) Strp 300 each, Misc.(Non-Drug; Combo Route), 3 times daily    blood-glucose meter kit Accu-chek Marley glucometer check glucose three times daily E11.65    calcium carbonate/vitamin D3 (CALCIUM+D ORAL) 1 tablet, Oral, Daily    carvediloL (COREG) 12.5 mg, Oral, 2 times daily    cholecalciferol (vitamin D3) (VITAMIN D3) 5,000 Units, Oral, Daily    clobetasol 0.05% (TEMOVATE) 1 g, Topical (Top), 2 times daily    DROPLET PEN NEEDLE 31 gauge x 5/16" Ndle USE AS DIRECTED WITH LANTUS SOLOSTAR PEN AS NEEDED    empagliflozin (JARDIANCE) 10 mg, Oral, Daily    ketoconazole (NIZORAL) 2 % cream AAA pannus fold bid    lancets (ACCU-CHEK SOFTCLIX LANCETS) Misc New machine Guide Me Accuchek    LEVEMIR FLEXPEN 24 Units, Subcutaneous, Nightly, INJECT 24 UNITS SUBCUTANEOUSLY EVERY EVENING<BR>Strength: 100 unit/mL (3 mL)    levocetirizine (XYZAL) 5 mg, Oral, Nightly    metFORMIN (GLUCOPHAGE) 1,000 mg, Oral, 2 times daily with meals    multivitamin (THERAGRAN) per tablet 1 tablet, Oral, Daily    mycophenolate (CELLCEPT) 500 mg, Oral, 2 times daily    sertraline (ZOLOFT) 25 mg, Oral, Nightly    triamcinolone acetonide 0.1% (KENALOG) 0.1 % cream AAA bid        12/18/2023 EKG shows atrial fibrillation rate of 91 beats per minute low-voltage QRS complex anteroseptal infarct pattern nonspecific ST T wave changes    Assessment & Plan     Paroxysmal atrial fibrillation  Paroxysmal atrial fibrillation rate is controlled at this time.  Continue on Eliquis 5 mg p.o. b.i.d. may consider starting her on amiodarone 200 mg p.o. b.i.d. and consider electrical cardioversion in about 3-4 weeks time.  Will plan for transesophageal echocardiography and electrical cardioversion at that time.  Risks and Benefits of the procedure have been explained to the patient.  To " include but  not limited to oropharyngeal esophageal damage, tooth damage, sore throat and risk of anesthesia have been explained to patient by anesthesiologist.  All questions have been answered.   Informed consent obtained.      Hypertension associated with diabetes  Her blood pressure is very well controlled 122/80 mm Hg encouraged her to continue the same therapy    Hyperlipidemia associated with type 2 diabetes mellitus  She has intolerance to statin therapy allergic to rosuvastatin continue on diet control alone    S/P AVR (aortic valve replacement)  Bioprosthetic AVR appears to be clinically stable.  Will evaluate by GABY in the near future    Stage 3b chronic kidney disease  Relatively stable at this time.    Type 2 diabetes mellitus with diabetic polyneuropathy, with long-term current use of insulin  She is running fasting blood sugars in the low 60s at this time encouraged to cut back on Levemir insulin to about 20 units some gradual adjust this.  Continue on calorie restricted diet    Hypothyroidism  Recheck TSH in the near future          Follow up in about 4 weeks (around 1/15/2024).

## 2023-12-18 NOTE — ASSESSMENT & PLAN NOTE
Her blood pressure is very well controlled 122/80 mm Hg encouraged her to continue the same therapy

## 2023-12-18 NOTE — ASSESSMENT & PLAN NOTE
She is running fasting blood sugars in the low 60s at this time encouraged to cut back on Levemir insulin to about 20 units some gradual adjust this.  Continue on calorie restricted diet

## 2023-12-18 NOTE — ASSESSMENT & PLAN NOTE
Paroxysmal atrial fibrillation rate is controlled at this time.  Continue on Eliquis 5 mg p.o. b.i.d. may consider starting her on amiodarone 200 mg p.o. b.i.d. and consider electrical cardioversion in about 3-4 weeks time.  Will plan for transesophageal echocardiography and electrical cardioversion at that time.  Risks and Benefits of the procedure have been explained to the patient.  To include but  not limited to oropharyngeal esophageal damage, tooth damage, sore throat and risk of anesthesia have been explained to patient by anesthesiologist.  All questions have been answered.   Informed consent obtained.

## 2023-12-21 ENCOUNTER — TELEPHONE (OUTPATIENT)
Dept: CARDIOLOGY | Facility: CLINIC | Age: 88
End: 2023-12-21
Payer: MEDICARE

## 2023-12-21 NOTE — TELEPHONE ENCOUNTER
----- Message from Coco Mesa sent at 12/21/2023 12:54 PM CST -----  Contact: Fanny with [facility]  Type:  Needs Medical Advice    Who Called: Radha with [facility]  Symptoms (please be specific): she had a nosebleed and blood in her stool, needs to verify if she needs to be on eloquis and Plavix.   Would the patient rather a call back or a response via [facility]? call  Best Call Back Number:   Additional Information: please advise and thank you.

## 2023-12-29 ENCOUNTER — TELEPHONE (OUTPATIENT)
Dept: CARDIOLOGY | Facility: CLINIC | Age: 88
End: 2023-12-29
Payer: MEDICARE

## 2023-12-29 NOTE — TELEPHONE ENCOUNTER
----- Message from Prabhjot Gomez sent at 12/29/2023 10:22 AM CST -----  Type: Needs Medical Advice  Who Called:  Radha from [facility]  Symptoms (please be specific):  said she is on apixaban (ELIQUIS) 5 mg Tab, aspirin 81 mg Tab and clopidogreL (PLAVIX) 75 mg tablet and she need to know if the dr want her on all 3 of the meds--please call and advise  Best Call Back Number: [phone]  Additional Information: thank you

## 2023-12-29 NOTE — TELEPHONE ENCOUNTER
Called and went over dosages of eliquis, how dr. Huerta decreased to 2.5 mg bid and she was hold ing her plavix due to irritation/hoarseness of her throat.  Scheduled her a follow up for Dr. Huerta in Jan.

## 2024-01-05 ENCOUNTER — TELEPHONE (OUTPATIENT)
Dept: CARDIOLOGY | Facility: CLINIC | Age: 89
End: 2024-01-05
Payer: MEDICARE

## 2024-01-05 DIAGNOSIS — R60.9 EDEMA, UNSPECIFIED TYPE: Primary | ICD-10-CM

## 2024-01-05 NOTE — TELEPHONE ENCOUNTER
She is up 7 pounds, she is a little more winded. She has not been eating well since the holidays. She is not on a fluid medication at this time    What can we do with her? Labs?

## 2024-01-05 NOTE — TELEPHONE ENCOUNTER
----- Message from Art Smith sent at 1/5/2024  3:21 PM CST -----  Contact: Sayda/ERNESTO JOHNSON  Type: Needs Medical Advice  Who Called:  [facility] EL[facility]ABETH/Sayda  Symptoms (please be specific):  Weight gain    Best Call Back Number: [phone]  Additional Information: Called to speak with office regarding symptom

## 2024-01-09 ENCOUNTER — LAB VISIT (OUTPATIENT)
Dept: LAB | Facility: HOSPITAL | Age: 89
End: 2024-01-09
Attending: INTERNAL MEDICINE
Payer: MEDICARE

## 2024-01-09 DIAGNOSIS — I10 ESSENTIAL HYPERTENSION, MALIGNANT: Primary | ICD-10-CM

## 2024-01-09 LAB
ALBUMIN SERPL BCP-MCNC: 3.8 G/DL (ref 3.5–5.2)
ALP SERPL-CCNC: 73 U/L (ref 55–135)
ALT SERPL W/O P-5'-P-CCNC: 25 U/L (ref 10–44)
ANION GAP SERPL CALC-SCNC: 11 MMOL/L (ref 8–16)
AST SERPL-CCNC: 23 U/L (ref 10–40)
BILIRUB SERPL-MCNC: 1.7 MG/DL (ref 0.1–1)
BNP SERPL-MCNC: 191 PG/ML (ref 0–99)
BUN SERPL-MCNC: 30 MG/DL (ref 8–23)
CALCIUM SERPL-MCNC: 8.8 MG/DL (ref 8.7–10.5)
CHLORIDE SERPL-SCNC: 106 MMOL/L (ref 95–110)
CO2 SERPL-SCNC: 23 MMOL/L (ref 23–29)
CREAT SERPL-MCNC: 1.1 MG/DL (ref 0.5–1.4)
EST. GFR  (NO RACE VARIABLE): 48 ML/MIN/1.73 M^2
GLUCOSE SERPL-MCNC: 123 MG/DL (ref 70–110)
POTASSIUM SERPL-SCNC: 4.1 MMOL/L (ref 3.5–5.1)
PROT SERPL-MCNC: 6.6 G/DL (ref 6–8.4)
SODIUM SERPL-SCNC: 140 MMOL/L (ref 136–145)

## 2024-01-09 PROCEDURE — 83880 ASSAY OF NATRIURETIC PEPTIDE: CPT | Performed by: INTERNAL MEDICINE

## 2024-01-09 PROCEDURE — 36415 COLL VENOUS BLD VENIPUNCTURE: CPT | Performed by: INTERNAL MEDICINE

## 2024-01-09 PROCEDURE — 80053 COMPREHEN METABOLIC PANEL: CPT | Performed by: INTERNAL MEDICINE

## 2024-01-19 ENCOUNTER — DOCUMENT SCAN (OUTPATIENT)
Dept: HOME HEALTH SERVICES | Facility: HOSPITAL | Age: 89
End: 2024-01-19
Payer: MEDICARE

## 2024-01-21 PROCEDURE — G0179 MD RECERTIFICATION HHA PT: HCPCS | Mod: ,,, | Performed by: FAMILY MEDICINE

## 2024-01-22 ENCOUNTER — HOSPITAL ENCOUNTER (OUTPATIENT)
Dept: RADIOLOGY | Facility: HOSPITAL | Age: 89
Discharge: HOME | End: 2024-01-22
Attending: INTERNAL MEDICINE
Payer: MEDICARE

## 2024-01-22 ENCOUNTER — HOSPITAL ENCOUNTER (OUTPATIENT)
Dept: RADIOLOGY | Facility: HOSPITAL | Age: 89
Discharge: HOME | End: 2024-01-22
Attending: FAMILY MEDICINE
Payer: MEDICARE

## 2024-01-22 VITALS — BODY MASS INDEX: 27.93 KG/M2 | WEIGHT: 177.94 LBS | HEIGHT: 67 IN

## 2024-01-22 DIAGNOSIS — Z12.31 SCREENING MAMMOGRAM FOR BREAST CANCER: ICD-10-CM

## 2024-01-22 DIAGNOSIS — R60.9 EDEMA, UNSPECIFIED TYPE: ICD-10-CM

## 2024-01-22 PROCEDURE — 77067 SCR MAMMO BI INCL CAD: CPT | Mod: TC,PO

## 2024-01-22 PROCEDURE — 71046 X-RAY EXAM CHEST 2 VIEWS: CPT | Mod: TC,PO

## 2024-01-26 ENCOUNTER — LAB VISIT (OUTPATIENT)
Dept: LAB | Facility: HOSPITAL | Age: 89
End: 2024-01-26
Attending: PHYSICAL MEDICINE & REHABILITATION
Payer: MEDICARE

## 2024-01-26 DIAGNOSIS — N18.6 TYPE 2 DIABETES MELLITUS WITH END-STAGE RENAL DISEASE: Primary | ICD-10-CM

## 2024-01-26 DIAGNOSIS — E11.22 TYPE 2 DIABETES MELLITUS WITH END-STAGE RENAL DISEASE: Primary | ICD-10-CM

## 2024-01-26 PROCEDURE — 83036 HEMOGLOBIN GLYCOSYLATED A1C: CPT | Performed by: PHYSICAL MEDICINE & REHABILITATION

## 2024-01-26 PROCEDURE — 36415 COLL VENOUS BLD VENIPUNCTURE: CPT | Performed by: PHYSICAL MEDICINE & REHABILITATION

## 2024-01-27 LAB
ESTIMATED AVG GLUCOSE: 146 MG/DL (ref 68–131)
HBA1C MFR BLD: 6.7 % (ref 4.5–6.2)

## 2024-02-01 ENCOUNTER — EXTERNAL HOME HEALTH (OUTPATIENT)
Dept: HOME HEALTH SERVICES | Facility: HOSPITAL | Age: 89
End: 2024-02-01
Payer: MEDICARE

## 2024-02-06 DIAGNOSIS — I15.2 HYPERTENSION ASSOCIATED WITH DIABETES: Primary | ICD-10-CM

## 2024-02-06 DIAGNOSIS — E11.59 HYPERTENSION ASSOCIATED WITH DIABETES: Primary | ICD-10-CM

## 2024-02-06 RX ORDER — LISINOPRIL 20 MG/1
20 TABLET ORAL DAILY
Qty: 90 TABLET | Refills: 1 | Status: SHIPPED | OUTPATIENT
Start: 2024-02-06 | End: 2024-02-22 | Stop reason: ALTCHOICE

## 2024-02-06 RX ORDER — LISINOPRIL 20 MG/1
20 TABLET ORAL DAILY
COMMUNITY
End: 2024-02-06 | Stop reason: SDUPTHER

## 2024-02-06 NOTE — TELEPHONE ENCOUNTER
No care due was identified.  Health [facility] Embedded Care Due Messages. Reference number: [id number].   2/06/2024 7:54:06 AM CST

## 2024-02-20 ENCOUNTER — TELEPHONE (OUTPATIENT)
Dept: CARDIOLOGY | Facility: CLINIC | Age: 89
End: 2024-02-20
Payer: MEDICARE

## 2024-02-20 DIAGNOSIS — E03.9 ACQUIRED HYPOTHYROIDISM: ICD-10-CM

## 2024-02-20 DIAGNOSIS — I48.0 PAROXYSMAL ATRIAL FIBRILLATION: Primary | ICD-10-CM

## 2024-02-20 NOTE — TELEPHONE ENCOUNTER
----- Message from Remedios Osman NP sent at 1/23/2024  3:02 PM CST -----  CMP BNP AND SEE IF SHE IS SOB   CXR IS SHOWING SOME FLUID  ----- Message -----  From: Interface, Rad Results In  Sent: 1/22/2024  11:39 AM CST  To: Delfino Huerta MD

## 2024-02-21 ENCOUNTER — TELEPHONE (OUTPATIENT)
Dept: CARDIOLOGY | Facility: CLINIC | Age: 89
End: 2024-02-21
Payer: MEDICARE

## 2024-02-21 DIAGNOSIS — I10 PRIMARY HYPERTENSION: Primary | ICD-10-CM

## 2024-02-21 DIAGNOSIS — E11.9 TYPE 2 DIABETES MELLITUS WITHOUT COMPLICATION: ICD-10-CM

## 2024-02-21 RX ORDER — PEN NEEDLE, DIABETIC 31 GX5/16"
NEEDLE, DISPOSABLE MISCELLANEOUS
Qty: 100 EACH | Refills: 3 | Status: ON HOLD | OUTPATIENT
Start: 2024-02-21 | End: 2024-05-31

## 2024-02-21 NOTE — TELEPHONE ENCOUNTER
Refill Decision Note   Leslie Sharee  is requesting a refill authorization.  Brief Assessment and Rationale for Refill:  Approve     Medication Therapy Plan:        Comments:     Note composed:10:38 AM 02/21/2024

## 2024-02-21 NOTE — TELEPHONE ENCOUNTER
----- Message from Prabhjot Gomez sent at 2/21/2024  9:37 AM CST -----  Type: Needs Medical Advice  Who Called:  pt  Symptoms (please be specific):  pt said she need to speak to the dr--said she just found out she allergic to lisinopriL (PRINIVIL,ZESTRIL) 20 MG tablet--said she need to know what else can she take--please call and advise    Pharmacy name and phone #:    [facility][id number] - [address] [facility] [address]  [facility] [address]  Phone: [phone] Fax: [phone]      Best Call Back Number: [phone] (home)     Additional Information: thank you

## 2024-02-21 NOTE — TELEPHONE ENCOUNTER
----- Message from Prabhjot Gomez sent at 2/21/2024  9:37 AM CST -----  Type: Needs Medical Advice  Who Called:  pt  Symptoms (please be specific):  pt said she need to speak to the dr--said she just found out she allergic to lisinopriL (PRINIVIL,ZESTRIL) 20 MG tablet--said she need to know what else can she take--please call and advise    Pharmacy name and phone #:    [facility] #[id number] - [address] AT [facility] [address]  [facility] [address]  Phone: [phone] Fax: [phone]      Best Call Back Number: [phone] (home)     Additional Information: thank you

## 2024-02-21 NOTE — TELEPHONE ENCOUNTER
Spoke to patient   She is having problem with Lisinopril.  States she has been having increased hoarseness and problems talking.  Took her last dose of Lisinopril yesterday.  Denies any chest pains, SOB, or blurred vision.   States blood pressure is fine.    She would like to change her medication.   Please advise

## 2024-02-21 NOTE — TELEPHONE ENCOUNTER
No care due was identified.  Health [facility] Embedded Care Due Messages. Reference number: [id number].   2/21/2024 10:14:08 AM CST

## 2024-02-22 RX ORDER — OLMESARTAN MEDOXOMIL 20 MG/1
20 TABLET ORAL DAILY
Qty: 90 TABLET | Refills: 3 | Status: ON HOLD | OUTPATIENT
Start: 2024-02-22 | End: 2024-05-31 | Stop reason: HOSPADM

## 2024-02-22 NOTE — TELEPHONE ENCOUNTER
Does she have any problems with swelling of the lips or tongue?  That could indicate angioedema and you can not use any of the lisinopril family or their relatives if that is the case.  If not, I would switch to olmesartan 20 mg

## 2024-02-22 NOTE — TELEPHONE ENCOUNTER
No care due was identified.  Health [facility] Embedded Care Due Messages. Reference number: [id number].   2/22/2024 11:29:10 AM CST

## 2024-02-26 RX ORDER — INSULIN GLARGINE 100 [IU]/ML
24 INJECTION, SOLUTION SUBCUTANEOUS NIGHTLY
Qty: 3 ML | Refills: 3 | Status: SHIPPED | OUTPATIENT
Start: 2024-02-26 | End: 2025-02-25

## 2024-02-26 NOTE — TELEPHONE ENCOUNTER
No care due was identified.  Health [facility] Embedded Care Due Messages. Reference number: [id number].   2/26/2024 3:07:54 PM CST

## 2024-03-22 ENCOUNTER — TELEPHONE (OUTPATIENT)
Dept: FAMILY MEDICINE | Facility: CLINIC | Age: 89
End: 2024-03-22
Payer: MEDICARE

## 2024-03-22 ENCOUNTER — TELEPHONE (OUTPATIENT)
Dept: CARDIOLOGY | Facility: CLINIC | Age: 89
End: 2024-03-22
Payer: MEDICARE

## 2024-03-22 NOTE — TELEPHONE ENCOUNTER
Spoke with patient Requesting appt for 3/25/2024 due to transportation problems  She states she is having problems breathing when laying down at night.   She went to Urgent Care about 2 weeks ago with cough and congestion.   Instructed to go to ED if worsens.   Verbalizes understanding      ----- Message from Nicole Culver sent at 3/22/2024 10:48 AM CDT -----  Contact: Patient  Type:  Sooner Apoointment Request    Caller is requesting a sooner appointment.  Caller declined first available appointment listed below.  Caller will not accept being placed on the waitlist and is requesting a message be sent to doctor.    Name of Caller:Patient     When is the first available appointment?June    Symptoms:Trouble breathing while lying down     Would the patient rather a call back or a response via [facility]? Call    Best Call Back Number:[phone] (home)     Additional Information: Patient is requesting an appt on this coming Monday so that she may have transportation. Please call to advise

## 2024-03-22 NOTE — TELEPHONE ENCOUNTER
----- Message from Nicole Culver sent at 3/22/2024 10:50 AM CDT -----  Contact: Patient  Type:  Sooner Apoointment Request    Caller is requesting a sooner appointment.  Caller declined first available appointment listed below.  Caller will not accept being placed on the waitlist and is requesting a message be sent to doctor.    Name of Caller:Patient     When is the first available appointment? April 22 nd    Symptoms:Trouble breathing while lying down, has to sit up while sleeping or resting    Would the patient rather a call back or a response via [facility]? Call    Best Call Back Number:[phone] (home)     Additional Information: Patient is requesting an appt on this coming Monday so that she may have transportation. Please call to advise     #Patient's breathing sounds labored over the phone#

## 2024-03-25 ENCOUNTER — OFFICE VISIT (OUTPATIENT)
Dept: FAMILY MEDICINE | Facility: CLINIC | Age: 89
End: 2024-03-25
Payer: MEDICARE

## 2024-03-25 ENCOUNTER — HOSPITAL ENCOUNTER (OUTPATIENT)
Dept: RADIOLOGY | Facility: CLINIC | Age: 89
Discharge: HOME | End: 2024-03-25
Attending: NURSE PRACTITIONER
Payer: MEDICARE

## 2024-03-25 VITALS
BODY MASS INDEX: 29.41 KG/M2 | WEIGHT: 187.38 LBS | OXYGEN SATURATION: 98 % | HEART RATE: 81 BPM | DIASTOLIC BLOOD PRESSURE: 60 MMHG | SYSTOLIC BLOOD PRESSURE: 122 MMHG | HEIGHT: 67 IN | TEMPERATURE: 98 F

## 2024-03-25 DIAGNOSIS — Z79.4 TYPE 2 DIABETES MELLITUS WITH DIABETIC POLYNEUROPATHY, WITH LONG-TERM CURRENT USE OF INSULIN: ICD-10-CM

## 2024-03-25 DIAGNOSIS — E11.42 TYPE 2 DIABETES MELLITUS WITH DIABETIC POLYNEUROPATHY, WITH LONG-TERM CURRENT USE OF INSULIN: ICD-10-CM

## 2024-03-25 DIAGNOSIS — R06.09 EXERTIONAL DYSPNEA: Primary | ICD-10-CM

## 2024-03-25 DIAGNOSIS — R06.09 EXERTIONAL DYSPNEA: ICD-10-CM

## 2024-03-25 LAB
OHS QRS DURATION: 112 MS
OHS QTC CALCULATION: 486 MS

## 2024-03-25 PROCEDURE — 1101F PT FALLS ASSESS-DOCD LE1/YR: CPT | Mod: HCNC,CPTII,S$GLB, | Performed by: NURSE PRACTITIONER

## 2024-03-25 PROCEDURE — 93005 ELECTROCARDIOGRAM TRACING: CPT | Mod: HCNC,S$GLB,, | Performed by: NURSE PRACTITIONER

## 2024-03-25 PROCEDURE — 99213 OFFICE O/P EST LOW 20 MIN: CPT | Mod: HCNC,S$GLB,, | Performed by: NURSE PRACTITIONER

## 2024-03-25 PROCEDURE — 1160F RVW MEDS BY RX/DR IN RCRD: CPT | Mod: HCNC,CPTII,S$GLB, | Performed by: NURSE PRACTITIONER

## 2024-03-25 PROCEDURE — 71046 X-RAY EXAM CHEST 2 VIEWS: CPT | Mod: TC,HCNC,FY,PO

## 2024-03-25 PROCEDURE — 1126F AMNT PAIN NOTED NONE PRSNT: CPT | Mod: HCNC,CPTII,S$GLB, | Performed by: NURSE PRACTITIONER

## 2024-03-25 PROCEDURE — 99999 PR PBB SHADOW E&M-EST. PATIENT-LVL V: CPT | Mod: PBBFAC,HCNC,, | Performed by: NURSE PRACTITIONER

## 2024-03-25 PROCEDURE — 1157F ADVNC CARE PLAN IN RCRD: CPT | Mod: HCNC,CPTII,S$GLB, | Performed by: NURSE PRACTITIONER

## 2024-03-25 PROCEDURE — 1159F MED LIST DOCD IN RCRD: CPT | Mod: HCNC,CPTII,S$GLB, | Performed by: NURSE PRACTITIONER

## 2024-03-25 PROCEDURE — 93010 ELECTROCARDIOGRAM REPORT: CPT | Mod: HCWC,S$GLB,, | Performed by: INTERNAL MEDICINE

## 2024-03-25 PROCEDURE — 71046 X-RAY EXAM CHEST 2 VIEWS: CPT | Mod: 26,HCNC,, | Performed by: RADIOLOGY

## 2024-03-25 PROCEDURE — 3288F FALL RISK ASSESSMENT DOCD: CPT | Mod: HCNC,CPTII,S$GLB, | Performed by: NURSE PRACTITIONER

## 2024-03-25 RX ORDER — ALBUTEROL SULFATE 90 UG/1
2 AEROSOL, METERED RESPIRATORY (INHALATION) EVERY 6 HOURS PRN
Status: CANCELLED | OUTPATIENT
Start: 2024-03-25

## 2024-03-25 NOTE — PROGRESS NOTES
Subjective:       Patient ID: Leslie Tolliver is a 89 y.o. female.    Chief Complaint: Shortness of Breath     HPI   88 y/o female patient with medical problems listed below presents for sob for 2 weeks which is worse at night. Patient states was seen in an urgent care 2 weeks ago for cough which is getting better. She reports short of breath worse with exertion. She also states has been depressed lately thinking of two sons and  who passed away. Denies SI. However, she changed her mind to think positive. She states has been less depressed and her sob has been improved since then. Denies coughing, chest pain, sob, wheezing, nausea, abdominal pain, fever, chills, generalized body ache or weakness, pain or swelling in legs in clinic. Patient lives with a daughter. She states uses 2 pillows in the bed which is as her usual. Today her body weight was 187 lbs (177 lbs 3 months ago on 1/22, 179 lbs- 180 lbs since 1/2023).    Patient is followed by Cardiology Dr. Huerta for A fib, CAD, aortic valve replacement- Patient is on carvedilol 12.5 mg bid and amiodarone, and not on anticoagulation     Chest x-ray 1/22/2024  IMPRESSION:     Mild bilateral diffuse interstitial lung opacities and perihilar haziness could reflect pulmonary edema versus atypical infection. Correlate.    TTE 11/17/2023    Left Ventricle: The left ventricle is normal in size. Mildly increased wall thickness. There is mild concentric hypertrophy. Normal wall motion. There is normal systolic function with a visually estimated ejection fraction of 60 - 65%. There is normal diastolic function.    Right Ventricle: Normal right ventricular cavity size. Wall thickness is normal. Right ventricle wall motion  is normal. Systolic function is normal.    Left Atrium: Left atrium is severely dilated.    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    Mitral Valve: There is moderate bileaflet sclerosis. Moderately calcified posterior  leaflet. There is mild anterior mitral annular calcification present. There is mild stenosis. The mean pressure gradient across the mitral valve is 4 mmHg at a heart rate of  bpm. There is moderate regurgitation with a centrally directed jet.    Tricuspid Valve: There is mild regurgitation with a centrally directed jet.    IVC/SVC: Elevated venous pressure at 15 mmHg.    Labs reviewed from 1/2024- A1C 6.7,     Patient Active Problem List   Diagnosis    Hypertension associated with diabetes    Type 2 diabetes mellitus with diabetic polyneuropathy, with long-term current use of insulin    Arthritis of both hips    Hypothyroidism    Colon polyps    Hyperlipidemia associated with type 2 diabetes mellitus    Stage 3b chronic kidney disease    Acute on chronic anemia    Chronic bilateral low back pain without sciatica    BMI 29.0-29.9,adult    Status post aortic valve replacement with bioprosthetic valve    Iron deficiency anemia    S/P AVR (aortic valve replacement)    Lumbar radiculopathy    Spinal stenosis of lumbar region with neurogenic claudication    Bullous pemphigoid    Drug-induced immunodeficiency    Paroxysmal atrial fibrillation    Acute decompensated heart failure    Candidiasis of skin      Review of patient's allergies indicates:   Allergen Reactions    Fenofibric acid (choline)      Other reaction(s): Vomiting    Iodine      Other reaction(s): lips swollen ivp dye    Rosuvastatin      Other reaction(s): muscle pain     Past Surgical History:   Procedure Laterality Date    ADENOIDECTOMY      AORTIC VALVE REPLACEMENT      BREAST BIOPSY Right 20 yrs ago    benign    CARDIAC SURGERY      CHOLECYSTECTOMY      COLONOSCOPY  5-    Dr Holly, 5 year recheck    EPIDURAL STEROID INJECTION N/A 3/25/2021    Procedure: Injection, Steroid, Epidural L3-4;  Surgeon: Sky Love MD;  Location: [facility] OR;  Service: Pain Management;  Laterality: N/A;  Injection, Steroid, Epidural L3-4    EPIDURAL STEROID  "INJECTION N/A 5/20/2021    Procedure: Injection, Steroid, Epidural L3-4;  Surgeon: Sky Love MD;  Location: [facility] OR;  Service: Pain Management;  Laterality: N/A;  Injection, Steroid, Epidural L3-4    ESOPHAGOGASTRODUODENOSCOPY N/A 6/4/2020    Procedure: EGD (ESOPHAGOGASTRODUODENOSCOPY);  Surgeon: Michael Nugent III, MD;  Location: [facility] ENDO;  Service: Endoscopy;  Laterality: N/A;    HEMORRHOID SURGERY      HYSTERECTOMY      OOPHORECTOMY      TONSILLECTOMY          Current Outpatient Medications:     amiodarone (PACERONE) 200 MG Tab, Take 1 tablet (200 mg total) by mouth 2 (two) times daily., Disp: 180 tablet, Rfl: 3    blood sugar diagnostic (ACCU-CHEK GUIDE TEST STRIPS) Strp, 300 each by Misc.(Non-Drug; Combo Route) route 3 (three) times daily., Disp: 300 each, Rfl: 3    calcium carbonate/vitamin D3 (CALCIUM+D ORAL), Take 1 tablet by mouth once daily., Disp: , Rfl:     carvediloL (COREG) 12.5 MG tablet, Take 1 tablet (12.5 mg total) by mouth 2 (two) times daily., Disp: 60 tablet, Rfl: 11    cholecalciferol, vitamin D3, (VITAMIN D3) 125 mcg (5,000 unit) Tab, Take 5,000 Units by mouth once daily., Disp: , Rfl:     clobetasol 0.05% (TEMOVATE) 0.05 % Oint, Apply 1 g topically 2 (two) times daily., Disp: , Rfl:     DROPLET PEN NEEDLE 31 gauge x 5/16" Ndle, USE AS DIRECTED WITH LANTUS SOLOSTAR PEN AS NEEDED, Disp: 100 each, Rfl: 3    empagliflozin (JARDIANCE) 10 mg tablet, Take 1 tablet (10 mg total) by mouth once daily., Disp: 90 tablet, Rfl: 1    insulin (LANTUS SOLOSTAR U-100 INSULIN) glargine 100 units/mL SubQ pen, Inject 24 Units into the skin every evening., Disp: 3 mL, Rfl: 3    ketoconazole (NIZORAL) 2 % cream, AAA pannus fold bid (Patient taking differently: Apply 1 application  topically once daily. AAA pannus fold bid), Disp: 60 g, Rfl: 3    lancets (ACCU-CHEK SOFTCLIX LANCETS) Misc, New machine Guide Me Accuchek, Disp: 300 each, Rfl: 3    metFORMIN (GLUCOPHAGE) 1000 MG tablet, Take 1 tablet (1,000 mg " "total) by mouth 2 (two) times daily with meals., Disp: 180 tablet, Rfl: 1    multivitamin (THERAGRAN) per tablet, Take 1 tablet by mouth once daily., Disp: , Rfl:     triamcinolone acetonide 0.1% (KENALOG) 0.1 % cream, AAA bid (Patient taking differently: Apply 1 g topically 2 (two) times daily. AAA bid), Disp: 454 g, Rfl: 3    aspirin 81 mg Tab, Take 81 mg by mouth once daily. Every day, Disp: 30 tablet, Rfl: 0    blood-glucose meter kit, Accu-chek Marley glucometer check glucose three times daily E11.65, Disp: 1 each, Rfl: 0    levocetirizine (XYZAL) 5 MG tablet, Take 5 mg by mouth every evening., Disp: , Rfl:     mycophenolate (CELLCEPT) 250 mg Cap, Take 2 capsules (500 mg total) by mouth 2 (two) times daily., Disp: 120 capsule, Rfl: 0    olmesartan (BENICAR) 20 MG tablet, Take 1 tablet (20 mg total) by mouth once daily. (Patient not taking: Reported on 3/25/2024), Disp: 90 tablet, Rfl: 3    sertraline (ZOLOFT) 25 MG tablet, Take 1 tablet (25 mg total) by mouth every evening. (Patient not taking: Reported on 3/25/2024), Disp: 90 tablet, Rfl: 3    Review of Systems   Constitutional:  Negative for chills and fever.   Respiratory:  Positive for shortness of breath. Negative for cough and chest tightness.    Cardiovascular:  Negative for chest pain and palpitations.   Gastrointestinal:  Negative for abdominal pain.   Neurological:  Negative for dizziness and headaches.       Objective:   /60 (BP Location: Right arm, Patient Position: Sitting, BP Method: Medium (Manual))   Pulse 81   Temp 97.7 °F (36.5 °C) (Temporal)   Ht 5' 7" (1.702 m)   Wt 85 kg (187 lb 6.3 oz)   SpO2 98%   BMI 29.35 kg/m²         Physical Exam  Constitutional:       General: She is not in acute distress.     Appearance: Normal appearance.   HENT:      Head: Atraumatic.   Cardiovascular:      Rate and Rhythm: Normal rate and regular rhythm.      Pulses: Normal pulses.      Heart sounds: Normal heart sounds.   Pulmonary:      Effort: " Pulmonary effort is normal.      Breath sounds: Normal breath sounds.   Abdominal:      General: Abdomen is flat. Bowel sounds are normal.      Palpations: Abdomen is soft.      Tenderness: There is no abdominal tenderness.   Musculoskeletal:      Comments: 1+pitting edema in b/l lower extremities   Neurological:      Mental Status: She is oriented to person, place, and time.         Assessment:       1. Exertional dyspnea    2. Type 2 diabetes mellitus with diabetic polyneuropathy, with long-term current use of insulin        Plan:       1. Exertional dyspnea  - Patient is NAD and O2Sat 98%  - CBC Auto Differential; Future  - Comprehensive Metabolic Panel; Future  - B-TYPE NATRIURETIC PEPTIDE; Future  - EKG 12-lead: atrial fibrillation with rate of 80, septal infarct, age undetermined   - X-Ray Chest PA And Lateral; Future  - Magnesium; Future  - Advised to go to ED if symptoms worsen including dizziness, chest pain, sob, nausea, fever, chills, generalized body ache or weakness or any abnormal worsening symptoms  - Continue to follow up with cardiology as scheduled on 3/28    2. Type 2 diabetes mellitus with diabetic polyneuropathy, with long-term current use of insulin  - TSH; Future     Patient with be reevaluated in  follow up with pcp  or sooner arsalan Coon NP

## 2024-03-26 ENCOUNTER — TELEPHONE (OUTPATIENT)
Dept: FAMILY MEDICINE | Facility: CLINIC | Age: 89
End: 2024-03-26
Payer: MEDICARE

## 2024-03-26 ENCOUNTER — HOSPITAL ENCOUNTER (OUTPATIENT)
Facility: HOSPITAL | Age: 89
Discharge: HOME | End: 2024-03-27
Attending: EMERGENCY MEDICINE | Admitting: INTERNAL MEDICINE
Payer: MEDICARE

## 2024-03-26 DIAGNOSIS — R07.9 CHEST PAIN: ICD-10-CM

## 2024-03-26 DIAGNOSIS — R06.02 SHORTNESS OF BREATH: ICD-10-CM

## 2024-03-26 DIAGNOSIS — R60.0 LEG EDEMA: ICD-10-CM

## 2024-03-26 DIAGNOSIS — I50.9 ACUTE ON CHRONIC CONGESTIVE HEART FAILURE, UNSPECIFIED HEART FAILURE TYPE: Primary | ICD-10-CM

## 2024-03-26 LAB
ALBUMIN SERPL BCP-MCNC: 4 G/DL (ref 3.5–5.2)
ALP SERPL-CCNC: 97 U/L (ref 55–135)
ALT SERPL W/O P-5'-P-CCNC: 28 U/L (ref 10–44)
ANION GAP SERPL CALC-SCNC: 8 MMOL/L (ref 8–16)
AST SERPL-CCNC: 22 U/L (ref 10–40)
BACTERIA #/AREA URNS HPF: ABNORMAL /HPF
BASOPHILS # BLD AUTO: 0.05 K/UL (ref 0–0.2)
BASOPHILS NFR BLD: 0.8 % (ref 0–1.9)
BILIRUB SERPL-MCNC: 2.3 MG/DL (ref 0.1–1)
BILIRUB UR QL STRIP: NEGATIVE
BNP SERPL-MCNC: 493 PG/ML (ref 0–99)
BUN SERPL-MCNC: 29 MG/DL (ref 8–23)
CALCIUM SERPL-MCNC: 9.1 MG/DL (ref 8.7–10.5)
CHLORIDE SERPL-SCNC: 107 MMOL/L (ref 95–110)
CK SERPL-CCNC: 101 U/L (ref 20–180)
CLARITY UR: CLEAR
CO2 SERPL-SCNC: 25 MMOL/L (ref 23–29)
COLOR UR: YELLOW
CREAT SERPL-MCNC: 1.3 MG/DL (ref 0.5–1.4)
DIFFERENTIAL METHOD BLD: ABNORMAL
EOSINOPHIL # BLD AUTO: 0.2 K/UL (ref 0–0.5)
EOSINOPHIL NFR BLD: 2.5 % (ref 0–8)
ERYTHROCYTE [DISTWIDTH] IN BLOOD BY AUTOMATED COUNT: 15.8 % (ref 11.5–14.5)
EST. GFR  (NO RACE VARIABLE): 39.3 ML/MIN/1.73 M^2
GLUCOSE SERPL-MCNC: 169 MG/DL (ref 70–110)
GLUCOSE UR QL STRIP: ABNORMAL
HCT VFR BLD AUTO: 37.1 % (ref 37–48.5)
HGB BLD-MCNC: 11.5 G/DL (ref 12–16)
HGB UR QL STRIP: NEGATIVE
HYALINE CASTS #/AREA URNS LPF: 0 /LPF
IMM GRANULOCYTES # BLD AUTO: 0.01 K/UL (ref 0–0.04)
IMM GRANULOCYTES NFR BLD AUTO: 0.2 % (ref 0–0.5)
KETONES UR QL STRIP: NEGATIVE
LEUKOCYTE ESTERASE UR QL STRIP: NEGATIVE
LIPASE SERPL-CCNC: 28 U/L (ref 4–60)
LYMPHOCYTES # BLD AUTO: 0.6 K/UL (ref 1–4.8)
LYMPHOCYTES NFR BLD: 9.8 % (ref 18–48)
MAGNESIUM SERPL-MCNC: 1.9 MG/DL (ref 1.6–2.6)
MCH RBC QN AUTO: 29.9 PG (ref 27–31)
MCHC RBC AUTO-ENTMCNC: 31 G/DL (ref 32–36)
MCV RBC AUTO: 97 FL (ref 82–98)
MICROSCOPIC COMMENT: ABNORMAL
MONOCYTES # BLD AUTO: 0.5 K/UL (ref 0.3–1)
MONOCYTES NFR BLD: 8.4 % (ref 4–15)
NEUTROPHILS # BLD AUTO: 5 K/UL (ref 1.8–7.7)
NEUTROPHILS NFR BLD: 78.3 % (ref 38–73)
NITRITE UR QL STRIP: NEGATIVE
NRBC BLD-RTO: 0 /100 WBC
PH UR STRIP: 6 [PH] (ref 5–8)
PLATELET # BLD AUTO: 188 K/UL (ref 150–450)
PMV BLD AUTO: 11.2 FL (ref 9.2–12.9)
POTASSIUM SERPL-SCNC: 4.3 MMOL/L (ref 3.5–5.1)
PROT SERPL-MCNC: 6.7 G/DL (ref 6–8.4)
PROT UR QL STRIP: ABNORMAL
RBC # BLD AUTO: 3.84 M/UL (ref 4–5.4)
RBC #/AREA URNS HPF: 1 /HPF (ref 0–4)
SODIUM SERPL-SCNC: 140 MMOL/L (ref 136–145)
SP GR UR STRIP: 1.03 (ref 1–1.03)
SQUAMOUS #/AREA URNS HPF: 1 /HPF
TROPONIN I SERPL HS-MCNC: 6.8 PG/ML (ref 0–14.9)
TROPONIN I SERPL HS-MCNC: 7.3 PG/ML (ref 0–14.9)
TSH SERPL DL<=0.005 MIU/L-ACNC: 2.57 UIU/ML (ref 0.34–5.6)
TSH SERPL DL<=0.005 MIU/L-ACNC: 2.64 UIU/ML (ref 0.34–5.6)
URN SPEC COLLECT METH UR: ABNORMAL
UROBILINOGEN UR STRIP-ACNC: NEGATIVE EU/DL
WBC # BLD AUTO: 6.41 K/UL (ref 3.9–12.7)
WBC #/AREA URNS HPF: 9 /HPF (ref 0–5)
YEAST URNS QL MICRO: ABNORMAL

## 2024-03-26 PROCEDURE — 93005 ELECTROCARDIOGRAM TRACING: CPT | Performed by: GENERAL PRACTICE

## 2024-03-26 PROCEDURE — 63600175 PHARM REV CODE 636 W HCPCS: Performed by: EMERGENCY MEDICINE

## 2024-03-26 PROCEDURE — 83735 ASSAY OF MAGNESIUM: CPT | Performed by: EMERGENCY MEDICINE

## 2024-03-26 PROCEDURE — 80053 COMPREHEN METABOLIC PANEL: CPT | Performed by: EMERGENCY MEDICINE

## 2024-03-26 PROCEDURE — 84484 ASSAY OF TROPONIN QUANT: CPT | Mod: 91 | Performed by: EMERGENCY MEDICINE

## 2024-03-26 PROCEDURE — 82550 ASSAY OF CK (CPK): CPT | Performed by: EMERGENCY MEDICINE

## 2024-03-26 PROCEDURE — 83880 ASSAY OF NATRIURETIC PEPTIDE: CPT | Performed by: EMERGENCY MEDICINE

## 2024-03-26 PROCEDURE — 83690 ASSAY OF LIPASE: CPT | Performed by: EMERGENCY MEDICINE

## 2024-03-26 PROCEDURE — 84443 ASSAY THYROID STIM HORMONE: CPT | Mod: 91 | Performed by: EMERGENCY MEDICINE

## 2024-03-26 PROCEDURE — 93010 ELECTROCARDIOGRAM REPORT: CPT | Mod: ,,, | Performed by: GENERAL PRACTICE

## 2024-03-26 PROCEDURE — 81001 URINALYSIS AUTO W/SCOPE: CPT | Performed by: EMERGENCY MEDICINE

## 2024-03-26 PROCEDURE — 85025 COMPLETE CBC W/AUTO DIFF WBC: CPT | Performed by: EMERGENCY MEDICINE

## 2024-03-26 PROCEDURE — 99285 EMERGENCY DEPT VISIT HI MDM: CPT | Mod: 25

## 2024-03-26 PROCEDURE — 84443 ASSAY THYROID STIM HORMONE: CPT | Performed by: EMERGENCY MEDICINE

## 2024-03-26 PROCEDURE — 84484 ASSAY OF TROPONIN QUANT: CPT | Performed by: EMERGENCY MEDICINE

## 2024-03-26 PROCEDURE — 96374 THER/PROPH/DIAG INJ IV PUSH: CPT

## 2024-03-26 RX ORDER — FUROSEMIDE 10 MG/ML
40 INJECTION INTRAMUSCULAR; INTRAVENOUS
Status: COMPLETED | OUTPATIENT
Start: 2024-03-26 | End: 2024-03-26

## 2024-03-26 RX ORDER — BENZONATATE 100 MG/1
100 CAPSULE ORAL 3 TIMES DAILY PRN
COMMUNITY
End: 2024-05-22

## 2024-03-26 RX ORDER — CLOPIDOGREL BISULFATE 75 MG/1
75 TABLET ORAL DAILY
Status: ON HOLD | COMMUNITY
End: 2024-05-31 | Stop reason: HOSPADM

## 2024-03-26 RX ADMIN — FUROSEMIDE 40 MG: 10 INJECTION, SOLUTION INTRAMUSCULAR; INTRAVENOUS at 07:03

## 2024-03-26 NOTE — TELEPHONE ENCOUNTER
BNP (cardiac lab) is elevated to 723 which is higher than before.   Patient short of breath is likely from CHF exacerbation.   Will send patient to ED concerning for CHF exacerbation. Spoke to nurse (mabel) to contact patient to instruct patient to go to ED.

## 2024-03-26 NOTE — TELEPHONE ENCOUNTER
----- Message from Nicole Culver sent at 3/26/2024 11:26 AM CDT -----  Contact: Patient  Type:  Patient Returning Call    Who Called:Patient     Who Left Message for Patient:Noemi    Does the patient know what this is regarding?:no    Would the patient rather a call back or a response via [facility]? Call    Best Call Back Number:[phone] (home)     Additional Information: Please return call

## 2024-03-26 NOTE — TELEPHONE ENCOUNTER
Spoke with pt. Informed pt per NP Isis;;  I have reviewed your labs and the following are in the normal or acceptable range including: liver function, blood cell count, thyroid lab, magnesium. Kidney function is moderately declined and avoid nephrotoxic agent. BNP (cardiac lab) is elevated to 723 which is higher than before. Will send patient to ED concerning for CHF exacerbation.      Pt MELISSA.     Pt stated she will go to the ER right now.

## 2024-03-26 NOTE — TELEPHONE ENCOUNTER
Called pt, no answer. Message left for pt to call our clinic.  Pt needs to go to ED for CHF exacerbation. BNP elevate 723

## 2024-03-26 NOTE — TELEPHONE ENCOUNTER
----- Message from Maame Hussein sent at 3/26/2024 12:44 PM CDT -----  Contact: Pt Daughter Anny  Type:  Patient Returning Call    Who Called:Pt Daughter Anny,  Who Left Message for Patient: Noemi   Does the patient know what this is regarding?: Jason Heart Failure   Best Call Back Number: [phone]  Please call to advise... Thank you...

## 2024-03-27 VITALS
WEIGHT: 187 LBS | DIASTOLIC BLOOD PRESSURE: 58 MMHG | HEART RATE: 77 BPM | SYSTOLIC BLOOD PRESSURE: 130 MMHG | OXYGEN SATURATION: 96 % | HEIGHT: 67 IN | TEMPERATURE: 98 F | RESPIRATION RATE: 20 BRPM | BODY MASS INDEX: 29.35 KG/M2

## 2024-03-27 PROBLEM — I48.91 A-FIB: Status: ACTIVE | Noted: 2024-03-27

## 2024-03-27 PROBLEM — E11.9 DIABETES: Status: ACTIVE | Noted: 2024-03-27

## 2024-03-27 PROBLEM — Z79.02 PLATELET INHIBITION DUE TO PLAVIX: Status: ACTIVE | Noted: 2024-03-27

## 2024-03-27 LAB
ALBUMIN SERPL BCP-MCNC: 4 G/DL (ref 3.5–5.2)
ALP SERPL-CCNC: 91 U/L (ref 55–135)
ALT SERPL W/O P-5'-P-CCNC: 26 U/L (ref 10–44)
ANION GAP SERPL CALC-SCNC: 8 MMOL/L (ref 8–16)
AST SERPL-CCNC: 21 U/L (ref 10–40)
BASOPHILS # BLD AUTO: 0.05 K/UL (ref 0–0.2)
BASOPHILS NFR BLD: 1 % (ref 0–1.9)
BILIRUB SERPL-MCNC: 2.5 MG/DL (ref 0.1–1)
BUN SERPL-MCNC: 26 MG/DL (ref 8–23)
CALCIUM SERPL-MCNC: 9.2 MG/DL (ref 8.7–10.5)
CHLORIDE SERPL-SCNC: 104 MMOL/L (ref 95–110)
CO2 SERPL-SCNC: 30 MMOL/L (ref 23–29)
CREAT SERPL-MCNC: 1.3 MG/DL (ref 0.5–1.4)
DIFFERENTIAL METHOD BLD: ABNORMAL
EOSINOPHIL # BLD AUTO: 0.2 K/UL (ref 0–0.5)
EOSINOPHIL NFR BLD: 3.5 % (ref 0–8)
ERYTHROCYTE [DISTWIDTH] IN BLOOD BY AUTOMATED COUNT: 15.5 % (ref 11.5–14.5)
EST. GFR  (NO RACE VARIABLE): 39.3 ML/MIN/1.73 M^2
GLUCOSE SERPL-MCNC: 115 MG/DL (ref 70–110)
GLUCOSE SERPL-MCNC: 119 MG/DL (ref 70–110)
GLUCOSE SERPL-MCNC: 123 MG/DL (ref 70–110)
GLUCOSE SERPL-MCNC: 202 MG/DL (ref 70–110)
HCT VFR BLD AUTO: 37.9 % (ref 37–48.5)
HGB BLD-MCNC: 11.5 G/DL (ref 12–16)
IMM GRANULOCYTES # BLD AUTO: 0.01 K/UL (ref 0–0.04)
IMM GRANULOCYTES NFR BLD AUTO: 0.2 % (ref 0–0.5)
LYMPHOCYTES # BLD AUTO: 0.6 K/UL (ref 1–4.8)
LYMPHOCYTES NFR BLD: 13.3 % (ref 18–48)
MAGNESIUM SERPL-MCNC: 1.8 MG/DL (ref 1.6–2.6)
MCH RBC QN AUTO: 29 PG (ref 27–31)
MCHC RBC AUTO-ENTMCNC: 30.3 G/DL (ref 32–36)
MCV RBC AUTO: 96 FL (ref 82–98)
MONOCYTES # BLD AUTO: 0.5 K/UL (ref 0.3–1)
MONOCYTES NFR BLD: 11.2 % (ref 4–15)
NEUTROPHILS # BLD AUTO: 3.4 K/UL (ref 1.8–7.7)
NEUTROPHILS NFR BLD: 70.8 % (ref 38–73)
NRBC BLD-RTO: 0 /100 WBC
PLATELET # BLD AUTO: 175 K/UL (ref 150–450)
PMV BLD AUTO: 11.1 FL (ref 9.2–12.9)
POTASSIUM SERPL-SCNC: 3.4 MMOL/L (ref 3.5–5.1)
PROT SERPL-MCNC: 6.6 G/DL (ref 6–8.4)
RBC # BLD AUTO: 3.96 M/UL (ref 4–5.4)
SODIUM SERPL-SCNC: 142 MMOL/L (ref 136–145)
WBC # BLD AUTO: 4.83 K/UL (ref 3.9–12.7)

## 2024-03-27 PROCEDURE — 80053 COMPREHEN METABOLIC PANEL: CPT | Performed by: INTERNAL MEDICINE

## 2024-03-27 PROCEDURE — G0378 HOSPITAL OBSERVATION PER HR: HCPCS

## 2024-03-27 PROCEDURE — 96375 TX/PRO/DX INJ NEW DRUG ADDON: CPT

## 2024-03-27 PROCEDURE — 83735 ASSAY OF MAGNESIUM: CPT | Performed by: INTERNAL MEDICINE

## 2024-03-27 PROCEDURE — 96376 TX/PRO/DX INJ SAME DRUG ADON: CPT

## 2024-03-27 PROCEDURE — 85025 COMPLETE CBC W/AUTO DIFF WBC: CPT | Performed by: INTERNAL MEDICINE

## 2024-03-27 PROCEDURE — 25000003 PHARM REV CODE 250: Performed by: INTERNAL MEDICINE

## 2024-03-27 PROCEDURE — 63600175 PHARM REV CODE 636 W HCPCS: Performed by: INTERNAL MEDICINE

## 2024-03-27 PROCEDURE — 36415 COLL VENOUS BLD VENIPUNCTURE: CPT | Performed by: INTERNAL MEDICINE

## 2024-03-27 RX ORDER — ALUMINUM HYDROXIDE, MAGNESIUM HYDROXIDE, AND SIMETHICONE 1200; 120; 1200 MG/30ML; MG/30ML; MG/30ML
30 SUSPENSION ORAL 4 TIMES DAILY PRN
Status: DISCONTINUED | OUTPATIENT
Start: 2024-03-27 | End: 2024-03-27 | Stop reason: HOSPADM

## 2024-03-27 RX ORDER — GLUCAGON 1 MG
1 KIT INJECTION
Status: DISCONTINUED | OUTPATIENT
Start: 2024-03-27 | End: 2024-03-27

## 2024-03-27 RX ORDER — FAMOTIDINE 10 MG/ML
20 INJECTION INTRAVENOUS DAILY
Status: DISCONTINUED | OUTPATIENT
Start: 2024-03-27 | End: 2024-03-27 | Stop reason: HOSPADM

## 2024-03-27 RX ORDER — TALC
6 POWDER (GRAM) TOPICAL NIGHTLY PRN
Status: DISCONTINUED | OUTPATIENT
Start: 2024-03-27 | End: 2024-03-27 | Stop reason: HOSPADM

## 2024-03-27 RX ORDER — SODIUM,POTASSIUM PHOSPHATES 280-250MG
2 POWDER IN PACKET (EA) ORAL
Status: DISCONTINUED | OUTPATIENT
Start: 2024-03-27 | End: 2024-03-27 | Stop reason: HOSPADM

## 2024-03-27 RX ORDER — FUROSEMIDE 20 MG/1
20 TABLET ORAL DAILY
Qty: 30 TABLET | Refills: 0 | Status: SHIPPED | OUTPATIENT
Start: 2024-03-27 | End: 2024-03-27

## 2024-03-27 RX ORDER — IBUPROFEN 200 MG
24 TABLET ORAL
Status: DISCONTINUED | OUTPATIENT
Start: 2024-03-27 | End: 2024-03-27 | Stop reason: HOSPADM

## 2024-03-27 RX ORDER — FAMOTIDINE 20 MG/50ML
20 INJECTION, SOLUTION INTRAVENOUS 2 TIMES DAILY
Status: DISCONTINUED | OUTPATIENT
Start: 2024-03-27 | End: 2024-03-27

## 2024-03-27 RX ORDER — FUROSEMIDE 20 MG/1
20 TABLET ORAL DAILY
Qty: 30 TABLET | Refills: 0 | Status: ON HOLD | OUTPATIENT
Start: 2024-03-27 | End: 2024-05-31 | Stop reason: HOSPADM

## 2024-03-27 RX ORDER — ASPIRIN 81 MG/1
81 TABLET ORAL DAILY
Status: DISCONTINUED | OUTPATIENT
Start: 2024-03-27 | End: 2024-03-27 | Stop reason: HOSPADM

## 2024-03-27 RX ORDER — GLUCAGON 1 MG
1 KIT INJECTION
Status: DISCONTINUED | OUTPATIENT
Start: 2024-03-27 | End: 2024-03-27 | Stop reason: HOSPADM

## 2024-03-27 RX ORDER — SPIRONOLACTONE 25 MG/1
25 TABLET ORAL DAILY
Qty: 30 TABLET | Refills: 0 | Status: SHIPPED | OUTPATIENT
Start: 2024-03-27 | End: 2024-03-27

## 2024-03-27 RX ORDER — CLOPIDOGREL BISULFATE 75 MG/1
75 TABLET ORAL DAILY
Status: DISCONTINUED | OUTPATIENT
Start: 2024-03-27 | End: 2024-03-27 | Stop reason: HOSPADM

## 2024-03-27 RX ORDER — INSULIN ASPART 100 [IU]/ML
0-5 INJECTION, SOLUTION INTRAVENOUS; SUBCUTANEOUS
Status: DISCONTINUED | OUTPATIENT
Start: 2024-03-27 | End: 2024-03-27 | Stop reason: HOSPADM

## 2024-03-27 RX ORDER — POLYETHYLENE GLYCOL 3350 17 G/17G
17 POWDER, FOR SOLUTION ORAL DAILY
Status: DISCONTINUED | OUTPATIENT
Start: 2024-03-27 | End: 2024-03-27 | Stop reason: HOSPADM

## 2024-03-27 RX ORDER — IBUPROFEN 200 MG
24 TABLET ORAL
Status: DISCONTINUED | OUTPATIENT
Start: 2024-03-27 | End: 2024-03-27

## 2024-03-27 RX ORDER — LANOLIN ALCOHOL/MO/W.PET/CERES
800 CREAM (GRAM) TOPICAL
Status: DISCONTINUED | OUTPATIENT
Start: 2024-03-27 | End: 2024-03-27 | Stop reason: HOSPADM

## 2024-03-27 RX ORDER — LANOLIN ALCOHOL/MO/W.PET/CERES
400 CREAM (GRAM) TOPICAL 2 TIMES DAILY
Status: DISCONTINUED | OUTPATIENT
Start: 2024-03-27 | End: 2024-03-27 | Stop reason: HOSPADM

## 2024-03-27 RX ORDER — FUROSEMIDE 10 MG/ML
40 INJECTION INTRAMUSCULAR; INTRAVENOUS EVERY 8 HOURS
Status: DISCONTINUED | OUTPATIENT
Start: 2024-03-27 | End: 2024-03-27 | Stop reason: HOSPADM

## 2024-03-27 RX ORDER — SPIRONOLACTONE 25 MG/1
25 TABLET ORAL DAILY
Qty: 30 TABLET | Refills: 0 | Status: ON HOLD | OUTPATIENT
Start: 2024-03-27 | End: 2024-05-31 | Stop reason: HOSPADM

## 2024-03-27 RX ORDER — ACETAMINOPHEN 325 MG/1
650 TABLET ORAL EVERY 4 HOURS PRN
Status: DISCONTINUED | OUTPATIENT
Start: 2024-03-27 | End: 2024-03-27 | Stop reason: HOSPADM

## 2024-03-27 RX ORDER — NALOXONE HCL 0.4 MG/ML
0.02 VIAL (ML) INJECTION
Status: DISCONTINUED | OUTPATIENT
Start: 2024-03-27 | End: 2024-03-27 | Stop reason: HOSPADM

## 2024-03-27 RX ORDER — SERTRALINE HYDROCHLORIDE 25 MG/1
25 TABLET, FILM COATED ORAL NIGHTLY
Status: DISCONTINUED | OUTPATIENT
Start: 2024-03-27 | End: 2024-03-27 | Stop reason: HOSPADM

## 2024-03-27 RX ORDER — IBUPROFEN 200 MG
16 TABLET ORAL
Status: DISCONTINUED | OUTPATIENT
Start: 2024-03-27 | End: 2024-03-27

## 2024-03-27 RX ORDER — IBUPROFEN 200 MG
16 TABLET ORAL
Status: DISCONTINUED | OUTPATIENT
Start: 2024-03-27 | End: 2024-03-27 | Stop reason: HOSPADM

## 2024-03-27 RX ORDER — AMIODARONE HYDROCHLORIDE 200 MG/1
200 TABLET ORAL 2 TIMES DAILY
Status: DISCONTINUED | OUTPATIENT
Start: 2024-03-27 | End: 2024-03-27 | Stop reason: HOSPADM

## 2024-03-27 RX ORDER — ONDANSETRON HYDROCHLORIDE 2 MG/ML
4 INJECTION, SOLUTION INTRAVENOUS EVERY 6 HOURS PRN
Status: DISCONTINUED | OUTPATIENT
Start: 2024-03-27 | End: 2024-03-27 | Stop reason: HOSPADM

## 2024-03-27 RX ORDER — ACETAMINOPHEN 325 MG/1
650 TABLET ORAL EVERY 8 HOURS PRN
Status: DISCONTINUED | OUTPATIENT
Start: 2024-03-27 | End: 2024-03-27 | Stop reason: HOSPADM

## 2024-03-27 RX ORDER — SODIUM CHLORIDE 0.9 % (FLUSH) 0.9 %
10 SYRINGE (ML) INJECTION
Status: DISCONTINUED | OUTPATIENT
Start: 2024-03-27 | End: 2024-03-27 | Stop reason: HOSPADM

## 2024-03-27 RX ORDER — POTASSIUM CHLORIDE 750 MG/1
10 CAPSULE, EXTENDED RELEASE ORAL 2 TIMES DAILY
Status: DISCONTINUED | OUTPATIENT
Start: 2024-03-27 | End: 2024-03-27 | Stop reason: HOSPADM

## 2024-03-27 RX ORDER — SPIRONOLACTONE 25 MG/1
25 TABLET ORAL DAILY
Status: DISCONTINUED | OUTPATIENT
Start: 2024-03-27 | End: 2024-03-27 | Stop reason: HOSPADM

## 2024-03-27 RX ORDER — SODIUM CHLORIDE 0.9 % (FLUSH) 0.9 %
10 SYRINGE (ML) INJECTION EVERY 12 HOURS PRN
Status: DISCONTINUED | OUTPATIENT
Start: 2024-03-27 | End: 2024-03-27 | Stop reason: HOSPADM

## 2024-03-27 RX ADMIN — FAMOTIDINE 20 MG: 10 INJECTION INTRAVENOUS at 03:03

## 2024-03-27 RX ADMIN — FUROSEMIDE 40 MG: 10 INJECTION, SOLUTION INTRAMUSCULAR; INTRAVENOUS at 03:03

## 2024-03-27 RX ADMIN — ASPIRIN 81 MG: 81 TABLET, COATED ORAL at 10:03

## 2024-03-27 RX ADMIN — POTASSIUM CHLORIDE 10 MEQ: 750 CAPSULE, EXTENDED RELEASE ORAL at 10:03

## 2024-03-27 RX ADMIN — Medication 400 MG: at 10:03

## 2024-03-27 RX ADMIN — AMIODARONE HYDROCHLORIDE 200 MG: 200 TABLET ORAL at 10:03

## 2024-03-27 RX ADMIN — CLOPIDOGREL BISULFATE 75 MG: 75 TABLET, FILM COATED ORAL at 10:03

## 2024-03-27 RX ADMIN — APIXABAN 5 MG: 5 TABLET, FILM COATED ORAL at 10:03

## 2024-03-27 RX ADMIN — SPIRONOLACTONE 25 MG: 25 TABLET ORAL at 10:03

## 2024-03-27 NOTE — PLAN OF CARE
[facility] - Emergency Dept  Initial Discharge Assessment       Primary Care Provider: Chang Christianson MD    Admission Diagnosis: Acute on chronic congestive heart failure, unspecified heart failure type [I50.9]    Admission Date: 3/26/2024  Expected Discharge Date: 3/28/2024    met with Pt at bedside to complete discharge assessment. Pt AAOx4s. Demographics, PCP, and insurance verified. No home health. No dialysis. Pt reports ability to complete ADLs without assistance. Pt verbalized plan to discharge home via family transport. Pt has no other needs to be addressed at this time.    Transition of Care Barriers: None    Payor: HU[employer] MANAGED MEDICARE / Plan: HU[employer]NA MEDICARE HMO / Product Type: Capitation /     Extended Emergency Contact Information  Primary Emergency Contact: Litzy Stanley  Address: [address] [place] [address] [place] [address] [facility]  Home Phone: [phone]  Mobile Phone: [phone]  Relation: Daughter  Preferred language: English   needed? No    Discharge Plan A: Home with family  Discharge Plan B: Home with family      [facility] - [facility] [address]  [address] [facility] [address]  Phone: [phone] Fax: [phone]    [id number] - [place] - [address] AT [facility] OF [address]  [address] [address]  Phone: [phone] Fax: [phone]      Initial Assessment (most recent)       Adult Discharge Assessment - 03/27/24 0925          Discharge Assessment    Assessment Type Discharge Planning Assessment     Confirmed/corrected address, phone number and insurance Yes     Confirmed Demographics Correct on Facesheet     Source of Information patient     Does patient/caregiver understand observation status Yes     Communicated MORA with patient/caregiver Date not available/Unable to determine     Reason For Admission Acute on  chronic congest     People in Home child(chin), adult     Do you expect to return to your current living situation? Yes     Do you have help at home or someone to help you manage your care at home? Yes     Who are your caregiver(s) and their phone number(s)? Litzy Stanley (Daughter) [phone]     Prior to hospitilization cognitive status: Alert/Oriented     Current cognitive status: Alert/Oriented     Walking or Climbing Stairs Difficulty no     Dressing/Bathing Difficulty no     Home Accessibility wheelchair accessible     Home Layout Able to live on 1st floor     Equipment Currently Used at Home cane, gauri;wheelchair     Readmission within 30 days? No     Patient currently being followed by outpatient case management? No     Do you currently have service(s) that help you manage your care at home? No     Do you take prescription medications? Yes     Do you have prescription coverage? Yes     Coverage Payor: HUMANA MANAGED MEDICARE - HU[employer]NA MEDICARE HMO -     Do you have any problems affording any of your prescribed medications? No     Is the patient taking medications as prescribed? yes     Who is going to help you get home at discharge? Litzy Stanley (Daughter) [phone]     How do you get to doctors appointments? family or friend will provide     Are you on dialysis? No     Do you take coumadin? No     Discharge Plan A Home with family     Discharge Plan B Home with family     Discharge Plan discussed with: Patient     Transition of Care Barriers None

## 2024-03-27 NOTE — HOSPITAL COURSE
Patient was admitted early this morning with fluid overload, although recent echo from Nov 2023 shows normal EF and normal diastolic function. Patient was diuresed well with IV Lasix  and made > 2L urine overnight. Her Cr is stable, swelling and Sob is resolved. Patient was started on PO Lasix 20 mg daily and 25 mg of Aldactone on discharge.

## 2024-03-27 NOTE — HPI
89 WF with pmh of HTN and CHF hx ,  ( normal LV EF)  ,  DMII on insulin , afib on eliquis ,  also on asa / plavix ?? ,  hx Aortic valve replacement bovine    Non smoker  Non drinker    She lives at her home with her Daughter     She came in because of her legs swelling and being SOB when she tries to lay flat.      She says for last few weeks she can't lay flat because her breathing gets worse     But the swelling legs is not new and has gone on for long time.      She was hospitalized here with heart Failure last year she says and lost about 7-9 pounds     And her legs did improve.  But then slowly has filled back up .      She sees a cardiologist and they have not put her on any diuretics she says at home.     Her PCP has not either.         Her doctor had ordered some blood work lately she says because of all this and called and told her to go in to the ER last night.      The patient was not acutely worse , she just was told to come in because of her new complaints .  The PCP office saw that her BNP was high which made them suspect Heart failure .       Patient also says she has been crying a lot lately thinking about losing her  and 2 sons.   She tells me her   over 10 years ago . And her One son  at age 25 and the other  at age 3 .      But for some reason has started thinking about them and crying a lot and then she decided she was not going to cry anymore and started to feel better last few days.         She does say she tries to cut out salt but food often tastes bad to her and sometimes she cheats and adds salt.       Her weight she thinks normally is about 170 and now she is 187 she says       In our ER her labs were ok    renal function ok  CXR ok  BNP was up   Troponin normal     They gave her some IV Lasix      And we are admitting her for the CHF

## 2024-03-27 NOTE — SUBJECTIVE & OBJECTIVE
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    GERD (gastroesophageal reflux disease)     Hyperlipidemia     Hypertension     Hypothyroidism     Paroxysmal atrial fibrillation 3/16/2023    Type 2 diabetes mellitus        Past Surgical History:   Procedure Laterality Date    ADENOIDECTOMY      AORTIC VALVE REPLACEMENT      BREAST BIOPSY Right 20 yrs ago    benign    CARDIAC SURGERY      CHOLECYSTECTOMY      COLONOSCOPY  5-    Dr Holly, 5 year recheck    EPIDURAL STEROID INJECTION N/A 3/25/2021    Procedure: Injection, Steroid, Epidural L3-4;  Surgeon: Sky Love MD;  Location: [facility];  Service: Pain Management;  Laterality: N/A;  Injection, Steroid, Epidural L3-4    EPIDURAL STEROID INJECTION N/A 5/20/2021    Procedure: Injection, Steroid, Epidural L3-4;  Surgeon: Sky Love MD;  Location: [facility] OR;  Service: Pain Management;  Laterality: N/A;  Injection, Steroid, Epidural L3-4    ESOPHAGOGASTRODUODENOSCOPY N/A 6/4/2020    Procedure: EGD (ESOPHAGOGASTRODUODENOSCOPY);  Surgeon: Michael Nugent III, MD;  Location: [facility];  Service: Endoscopy;  Laterality: N/A;    HEMORRHOID SURGERY      HYSTERECTOMY      OOPHORECTOMY      TONSILLECTOMY         Review of patient's allergies indicates:   Allergen Reactions    Fenofibric acid (choline)      Other reaction(s): Vomiting    Iodine      Other reaction(s): lips swollen ivp dye    Rosuvastatin      Other reaction(s): muscle pain       No current facility-administered medications on file prior to encounter.     Current  "Outpatient Medications on File Prior to Encounter   Medication Sig    amiodarone (PACERONE) 200 MG Tab Take 1 tablet (200 mg total) by mouth 2 (two) times daily.    apixaban (ELIQUIS) 5 mg Tab Take 5 mg by mouth 2 (two) times daily.    aspirin 81 mg Tab Take 81 mg by mouth once daily. Every day    benzonatate (TESSALON) 100 MG capsule Take 100 mg by mouth 3 (three) times daily as needed for Cough.    calcium carbonate/vitamin D3 (CALCIUM+D ORAL) Take 1 tablet by mouth once daily.    carvediloL (COREG) 12.5 MG tablet Take 1 tablet (12.5 mg total) by mouth 2 (two) times daily.    cholecalciferol, vitamin D3, (VITAMIN D3) 125 mcg (5,000 unit) Tab Take 5,000 Units by mouth once daily.    clopidogreL (PLAVIX) 75 mg tablet Take 75 mg by mouth once daily.    insulin (LANTUS SOLOSTAR U-100 INSULIN) glargine 100 units/mL SubQ pen Inject 24 Units into the skin every evening. (Patient taking differently: Inject 20 Units into the skin every evening.)    ketoconazole (NIZORAL) 2 % cream AAA pannus fold bid (Patient taking differently: Apply 1 application  topically once daily. AAA pannus fold bid)    levocetirizine (XYZAL) 5 MG tablet Take 5 mg by mouth every evening.    metFORMIN (GLUCOPHAGE) 1000 MG tablet Take 1 tablet (1,000 mg total) by mouth 2 (two) times daily with meals.    multivitamin (THERAGRAN) per tablet Take 1 tablet by mouth once daily.    blood sugar diagnostic (ACCU-CHEK GUIDE TEST STRIPS) Strp 300 each by Misc.(Non-Drug; Combo Route) route 3 (three) times daily.    blood-glucose meter kit Accu-chek Marley glucometer check glucose three times daily E11.65    clobetasol 0.05% (TEMOVATE) 0.05 % Oint Apply 1 g topically 2 (two) times daily.    DROPLET PEN NEEDLE 31 gauge x 5/16" Ndle USE AS DIRECTED WITH LANTUS SOLOSTAR PEN AS NEEDED    empagliflozin (JARDIANCE) 10 mg tablet Take 1 tablet (10 mg total) by mouth once daily. (Patient not taking: Reported on 3/26/2024)    lancets (ACCU-CHEK SOFTCLIX LANCETS) Misc New " machine Guide Me Accuchek    mycophenolate (CELLCEPT) 250 mg Cap Take 2 capsules (500 mg total) by mouth 2 (two) times daily. (Patient not taking: Reported on 3/26/2024)    olmesartan (BENICAR) 20 MG tablet Take 1 tablet (20 mg total) by mouth once daily. (Patient not taking: Reported on 3/25/2024)    sertraline (ZOLOFT) 25 MG tablet Take 1 tablet (25 mg total) by mouth every evening. (Patient not taking: Reported on 3/25/2024)    triamcinolone acetonide 0.1% (KENALOG) 0.1 % cream AAA bid (Patient taking differently: Apply 1 g topically 2 (two) times daily. AAA bid)     Family History       Problem Relation (Age of Onset)    Cancer Father    Diabetes Mother, Brother    Early death Son, Son    Glaucoma Mother    Heart disease Mother    No Known Problems Daughter          Tobacco Use    Smoking status: Former     Current packs/day: 0.00     Average packs/day: 0.3 packs/day for 15.0 years (3.8 ttl pk-yrs)     Types: Cigarettes     Start date: 3/30/1959     Quit date: 3/30/1974     Years since quittin.0    Smokeless tobacco: Never   Substance and Sexual Activity    Alcohol use: No    Drug use: No    Sexual activity: Never     Review of Systems   Constitutional:  Positive for activity change and unexpected weight change.   Respiratory:  Positive for shortness of breath.    Cardiovascular:  Positive for leg swelling.   All other systems reviewed and are negative.    Objective:     Vital Signs (Most Recent):  Temp: 98.1 °F (36.7 °C) (24 1504)  Pulse: 80 (24 0200)  Resp: (!) 22 (24 0200)  BP: 130/63 (24 0200)  SpO2: 96 % (24 0200) Vital Signs (24h Range):  Temp:  [98.1 °F (36.7 °C)] 98.1 °F (36.7 °C)  Pulse:  [76-88] 80  Resp:  [18-27] 22  SpO2:  [95 %-100 %] 96 %  BP: (106-154)/(51-94) 130/63     Weight: 84.8 kg (187 lb)  Body mass index is 29.29 kg/m².     Physical Exam  Vitals and nursing note reviewed.   Constitutional:       Appearance: Normal appearance.   HENT:      Head:  Normocephalic and atraumatic.      Mouth/Throat:      Mouth: Mucous membranes are moist.   Eyes:      Extraocular Movements: Extraocular movements intact.      Pupils: Pupils are equal, round, and reactive to light.   Cardiovascular:      Rate and Rhythm: Normal rate. Rhythm irregular.      Pulses: Normal pulses.      Heart sounds: Normal heart sounds.   Pulmonary:      Breath sounds: Normal breath sounds.   Abdominal:      General: Abdomen is flat. Bowel sounds are normal.      Palpations: Abdomen is soft.   Musculoskeletal:         General: Normal range of motion.      Cervical back: Normal range of motion.      Right lower leg: Edema present.      Left lower leg: Edema present.   Skin:     General: Skin is warm.      Capillary Refill: Capillary refill takes less than 2 seconds.   Neurological:      General: No focal deficit present.      Mental Status: She is alert and oriented to person, place, and time.   Psychiatric:         Mood and Affect: Mood normal.         Behavior: Behavior normal.              CRANIAL NERVES     CN III, IV, VI   Pupils are equal, round, and reactive to light.       Significant Labs: All pertinent labs within the past 24 hours have been reviewed.  CBC:   Recent Labs   Lab 03/25/24  1552 03/26/24  1549   WBC 5.21 6.41   HGB 11.3* 11.5*   HCT 37.5 37.1    188     CMP:   Recent Labs   Lab 03/25/24  1552 03/26/24  1549    140   K 3.9 4.3    107   CO2 23 25   * 169*   BUN 26* 29*   CREATININE 1.2 1.3   CALCIUM 9.2 9.1   PROT 6.5 6.7   ALBUMIN 3.6 4.0   BILITOT 2.2* 2.3*   ALKPHOS 111 97   AST 25 22   ALT 33 28   ANIONGAP 9 8     Cardiac Markers:   Recent Labs   Lab 03/26/24  1549   *       Significant Imaging: I have reviewed all pertinent imaging results/findings within the past 24 hours.  I have reviewed and interpreted all pertinent imaging results/findings within the past 24 hours.

## 2024-03-27 NOTE — DISCHARGE SUMMARY
[facility] - Emergency Dept  Hospital Medicine  Discharge Summary      Patient Name: Leslie Tolliver  MRN: [id number]  BIENVENIDO: [id number]  Patient Class: OP- Observation  Admission Date: 3/26/2024  Hospital Length of Stay: 0 days  Discharge Date and Time:  2024 10:12 AM  Attending Physician: Candido Jenkins MD   Discharging Provider: Candido Jenkins MD  Primary Care Provider: Chang Christianson MD    Primary Care Team: Networked reference to record PCT     HPI:   89 WF with pmh of HTN and CHF hx ,  ( normal LV EF)  ,  DMII on insulin , afib on eliquis ,  also on asa / plavix ?? ,  hx Aortic valve replacement bovine    Non smoker  Non drinker    She lives at her home with her Daughter     She came in because of her legs swelling and being SOB when she tries to lay flat.      She says for last few weeks she can't lay flat because her breathing gets worse     But the swelling legs is not new and has gone on for long time.      She was hospitalized here with heart Failure last year she says and lost about 7-9 pounds     And her legs did improve.  But then slowly has filled back up .      She sees a cardiologist and they have not put her on any diuretics she says at home.     Her PCP has not either.         Her doctor had ordered some blood work lately she says because of all this and called and told her to go in to the ER last night.      The patient was not acutely worse , she just was told to come in because of her new complaints .  The PCP office saw that her BNP was high which made them suspect Heart failure .       Patient also says she has been crying a lot lately thinking about losing her  and 2 sons.   She tells me her   over 10 years ago . And her One son  at age 25 and the other  at age 3 .      But for some reason has started thinking about them and crying a lot and then she decided she was not going to cry anymore and started to feel better last few  days.         She does say she tries to cut out salt but food often tastes bad to her and sometimes she cheats and adds salt.       Her weight she thinks normally is about 170 and now she is 187 she says       In our ER her labs were ok    renal function ok  CXR ok  BNP was up   Troponin normal     They gave her some IV Lasix      And we are admitting her for the CHF     * No surgery found *      Hospital Course:   Patient was admitted early this morning with fluid overload, although recent echo from Nov 2023 shows normal EF and normal diastolic function. Patient was diuresed well with IV Lasix  and made > 2L urine overnight. Her Cr is stable, swelling and Sob is resolved. Patient was started on PO Lasix 20 mg daily and 25 mg of Aldactone on discharge.      Goals of Care Treatment Preferences:  Code Status: Full Code    Physical Exam  Vitals and nursing note reviewed.   Constitutional:       Appearance: Normal appearance.   HENT:      Head: Normocephalic and atraumatic.      Mouth/Throat:      Mouth: Mucous membranes are moist.   Eyes:      Extraocular Movements: Extraocular movements intact.      Pupils: Pupils are equal, round, and reactive to light.   Cardiovascular:      Rate and Rhythm: Normal rate. Rhythm irregular.      Pulses: Normal pulses.      Heart sounds: Normal heart sounds.   Pulmonary:      Breath sounds: Normal breath sounds.   Abdominal:      General: Abdomen is flat. Bowel sounds are normal.      Palpations: Abdomen is soft.   Musculoskeletal:         General: Normal range of motion.      Cervical back: Normal range of motion.      Swelling resolved   Skin:     General: Skin is warm.      Capillary Refill: Capillary refill takes less than 2 seconds.   Neurological:      General: No focal deficit present.      Mental Status: She is alert and oriented to person, place, and time.   Psychiatric:         Mood and Affect: Mood normal.         Behavior: Behavior normal.     Consults:   Consults (From  admission, onward)          Status Ordering Provider     Inpatient consult to Registered Dietitian/Nutritionist  Once        Provider:  (Not yet assigned)    Acknowledged MALCOLM SHAIKH            Cardiac/Vascular  * Acute decompensated heart failure  Po Lasix and aldactone started   Resume Olmesartan, Coreg and Jardiance     A-fib  Resume home Coreg, amiodarone and Eliquis     Endocrine  Diabetes  Resume home regime       Final Active Diagnoses:    Diagnosis Date Noted POA    PRINCIPAL PROBLEM:  Acute decompensated heart failure [I50.9] 11/16/2023 Yes    Diabetes [E11.9] 03/27/2024 Yes    A-fib [I48.91] 03/27/2024 Yes    Platelet inhibition due to Plavix [Z79.02] 03/27/2024 Not Applicable      Problems Resolved During this Admission:       Discharged Condition: good    Disposition: Home or Self Care    Follow Up:   Follow-up Information       Chang Christianson MD Follow up in 1 week(s).    Specialty: Family Medicine  Contact information:  [facility] [address] [address] [phone]                           Patient Instructions:   No discharge procedures on file.    Significant Diagnostic Studies: Labs: CMP   Recent Labs   Lab 03/25/24  1552 03/26/24  1549 03/27/24  0615    140 142   K 3.9 4.3 3.4*    107 104   CO2 23 25 30*   * 169* 115*   BUN 26* 29* 26*   CREATININE 1.2 1.3 1.3   CALCIUM 9.2 9.1 9.2   PROT 6.5 6.7 6.6   ALBUMIN 3.6 4.0 4.0   BILITOT 2.2* 2.3* 2.5*   ALKPHOS 111 97 91   AST 25 22 21   ALT 33 28 26   ANIONGAP 9 8 8    and CBC   Recent Labs   Lab 03/25/24  1552 03/26/24  1549 03/27/24  0615   WBC 5.21 6.41 4.83   HGB 11.3* 11.5* 11.5*   HCT 37.5 37.1 37.9    188 175       Pending Diagnostic Studies:       None           Medications:  Reconciled Home Medications:      Medication List        START taking these medications      empagliflozin 10 mg tablet  Commonly known as: Jardiance  Take 1 tablet (10 mg total) by mouth once daily.     furosemide 20 MG  "tablet  Commonly known as: LASIX  Take 1 tablet (20 mg total) by mouth once daily.     olmesartan 20 MG tablet  Commonly known as: BENICAR  Take 1 tablet (20 mg total) by mouth once daily.     spironolactone 25 MG tablet  Commonly known as: ALDACTONE  Take 1 tablet (25 mg total) by mouth once daily.            CHANGE how you take these medications      ketoconazole 2 % cream  Commonly known as: NIZORAL  AAA pannus fold bid  What changed:   how much to take  how to take this  when to take this     LANTUS SOLOSTAR U-100 INSULIN glargine 100 units/mL SubQ pen  Generic drug: insulin  Inject 24 Units into the skin every evening.  What changed: how much to take     triamcinolone acetonide 0.1% 0.1 % cream  Commonly known as: KENALOG  AAA bid  What changed:   how much to take  how to take this  when to take this            CONTINUE taking these medications      ACCU-CHEK GUIDE TEST STRIPS Strp  Generic drug: blood sugar diagnostic  300 each by Misc.(Non-Drug; Combo Route) route 3 (three) times daily.     amiodarone 200 MG Tab  Commonly known as: PACERONE  Take 1 tablet (200 mg total) by mouth 2 (two) times daily.     aspirin 81 mg Tab  Take 81 mg by mouth once daily. Every day     benzonatate 100 MG capsule  Commonly known as: TESSALON  Take 100 mg by mouth 3 (three) times daily as needed for Cough.     blood-glucose meter kit  Accu-chek Marley glucometer check glucose three times daily E11.65     CALCIUM+D ORAL  Take 1 tablet by mouth once daily.     carvediloL 12.5 MG tablet  Commonly known as: COREG  Take 1 tablet (12.5 mg total) by mouth 2 (two) times daily.     clobetasol 0.05% 0.05 % Oint  Commonly known as: TEMOVATE  Apply 1 g topically 2 (two) times daily.     clopidogreL 75 mg tablet  Commonly known as: PLAVIX  Take 75 mg by mouth once daily.     DROPLET PEN NEEDLE 31 gauge x 5/16" Ndle  Generic drug: pen needle, diabetic  USE AS DIRECTED WITH LANTUS SOLOSTAR PEN AS NEEDED     ELIQUIS 5 mg Tab  Generic drug: " apixaban  Take 5 mg by mouth 2 (two) times daily.     lancets Misc  Commonly known as: ACCU-CHEK SOFTCLIX LANCETS  New machine Guide Me Accuchek     levocetirizine 5 MG tablet  Commonly known as: XYZAL  Take 5 mg by mouth every evening.     metFORMIN 1000 MG tablet  Commonly known as: GLUCOPHAGE  Take 1 tablet (1,000 mg total) by mouth 2 (two) times daily with meals.     multivitamin per tablet  Commonly known as: THERAGRAN  Take 1 tablet by mouth once daily.     VITAMIN D3 125 mcg (5,000 unit) Tab  Generic drug: cholecalciferol (vitamin D3)  Take 5,000 Units by mouth once daily.            STOP taking these medications      mycophenolate 250 mg Cap  Commonly known as: CELLCEPT     sertraline 25 MG tablet  Commonly known as: ZOLOFT              Indwelling Lines/Drains at time of discharge:   Lines/Drains/Airways       Drain  Duration             Female External Urinary Catheter w/ Suction 03/26/24 2006 <1 day                    Time spent on the discharge of patient: 40 minutes         Candido Jenkins MD  Department of Hospital Medicine  [facility] - Emergency Dept

## 2024-03-27 NOTE — ASSESSMENT & PLAN NOTE
In no distress    Sounds like she has slowly accumulated more fluid over last few months     We need to get her back to a DRY weight and see what that weight is     Then maybe put her on small lasix dose daily or maybe lasix 20 or 40 MWF    and PRN if weight goes up       I talked to her at length and I said at her age , she should not be so strict on eating that food is all bad to taste     The salt added to our food only makes up 10-12% of most peoples salt intake .  So I said dont worry about adding salt to your food.  Just try to avoid the frozen meals and fast food that has several GRAMS of salt per meal .      But I encouraged her to eat Good fresh food and adding salt is not something she needs to worry about     Id rather checks her weights more often and uses PRN lasix       She does not need a a fluid restriction either.        At her age she already has limited free water reserves     And salt and fluid restrictions , some evidence or opinions suggest can actually worsen her CHF and BP by triggering more Thirst and renin Angiotensin pathways further     PLAN    - continuous pulse ox  - IV lasix 40 mg Q8 for now   - I's and O's and daily weights   - aldactone 25 mg po daily   - KCL 10 meq PO BID  - Mag ox 400 po BID  - get new chemistry and mag in morning labs

## 2024-03-27 NOTE — ED NOTES
Kirit son wants update called [phone]  In [facility], needs to know if he should fly in.

## 2024-03-27 NOTE — ASSESSMENT & PLAN NOTE
WHY IS SHE ON PLAVIX AND ASPIRIN ??      RISKY AT HER AGE WITH ELIQUIS AND PLAVIX AND ASPIRIN       She has no MI or stents that Im aware of     We should find out before she goes home and make the right changes

## 2024-03-27 NOTE — PLAN OF CARE
03/27/24 0925   HARGROVE Message   Medicare Outpatient and Observation Notification regarding financial responsibility Given to patient/caregiver;Explained to patient/caregiver;Signed/date by patient/caregiver   Date HARGROVE was signed 03/27/24   Time HARGROVE was signed 0849     Pt was explained HARGROVE. Pt verbalized understanding of HARGROVE and signed. HARGROVE scanned to .

## 2024-03-27 NOTE — ASSESSMENT & PLAN NOTE
With her low A1c at her age does she even need insulin ??    Its risky       I put her on 20 levemir in am and low dose slide scale ACHS     Lets see how she runs and then decide before DC home      REGULAR DIET     No special diets needed for  her at her age

## 2024-03-27 NOTE — PROGRESS NOTES
Pharmacist Renal Adjustment Note    Leslie Tolliver is a 89 y.o. female being treated with Famotidine.    Patient Data:    Vital Signs (Most Recent):  Temp: 98.1 °F (36.7 °C) (03/26/24 1504)  Pulse: 80 (03/27/24 0200)  Resp: (!) 22 (03/27/24 0200)  BP: 130/63 (03/27/24 0200)  SpO2: 96 % (03/27/24 0200) Vital Signs (72h Range):  Temp:  [97.7 °F (36.5 °C)-98.1 °F (36.7 °C)]   Pulse:  [76-88]   Resp:  [18-27]   BP: (106-154)/(51-94)   SpO2:  [95 %-100 %]      Recent Labs   Lab 03/25/24  1552 03/26/24  1549   CREATININE 1.2 1.3     Serum creatinine: 1.3 mg/dL 03/26/24 1549  Estimated creatinine clearance: 32.8 mL/min    Famotidine 20 mg twice a day will be changed to Famotidine 20 mg once daily due to CrCl 10-50 per Renal Dose Adjustment protocol.    Pharmacist's Name: Delmi Chavez  Pharmacist's Extension: [phone]

## 2024-03-27 NOTE — PLAN OF CARE
Discharge orders and chart reviewed. No other discharge needs noted at this time. Pt is clear for discharge from case management, after prescriptions delivered to bedside. Pt is discharging to home.    Follow up info added to AVS    Discharge prescriptions sent to mail in pharmacy - secure chat sent to hospital medicine to please send prescriptions to [facility] - prescriptions to be resent by Dr. Jenkins. Patient ok for discharge once medications delivered to bedside.      03/27/24 1030   Final Note   Assessment Type Final Discharge Note   Anticipated Discharge Disposition Home   What phone number can be called within the next 1-3 days to see how you are doing after discharge? [phone]   Hospital Resources/Appts/Education Provided Appointments scheduled and added to AVS   Post-Acute Status   Discharge Delays (!) Medication Delivery

## 2024-03-28 ENCOUNTER — OFFICE VISIT (OUTPATIENT)
Dept: CARDIOLOGY | Facility: CLINIC | Age: 89
End: 2024-03-28
Payer: MEDICARE

## 2024-03-28 VITALS
HEIGHT: 67 IN | DIASTOLIC BLOOD PRESSURE: 80 MMHG | HEART RATE: 93 BPM | OXYGEN SATURATION: 93 % | BODY MASS INDEX: 27 KG/M2 | RESPIRATION RATE: 16 BRPM | WEIGHT: 172 LBS | SYSTOLIC BLOOD PRESSURE: 134 MMHG

## 2024-03-28 DIAGNOSIS — I70.0 AORTIC ATHEROSCLEROSIS: Primary | ICD-10-CM

## 2024-03-28 DIAGNOSIS — E11.42 TYPE 2 DIABETES MELLITUS WITH DIABETIC POLYNEUROPATHY, WITH LONG-TERM CURRENT USE OF INSULIN: ICD-10-CM

## 2024-03-28 DIAGNOSIS — Z95.2 S/P AVR (AORTIC VALVE REPLACEMENT): ICD-10-CM

## 2024-03-28 DIAGNOSIS — N18.32 STAGE 3B CHRONIC KIDNEY DISEASE: ICD-10-CM

## 2024-03-28 DIAGNOSIS — I48.0 PAROXYSMAL ATRIAL FIBRILLATION: ICD-10-CM

## 2024-03-28 DIAGNOSIS — I15.2 HYPERTENSION ASSOCIATED WITH DIABETES: ICD-10-CM

## 2024-03-28 DIAGNOSIS — I50.9 ACUTE DECOMPENSATED HEART FAILURE: ICD-10-CM

## 2024-03-28 DIAGNOSIS — E11.59 HYPERTENSION ASSOCIATED WITH DIABETES: ICD-10-CM

## 2024-03-28 DIAGNOSIS — Z79.4 TYPE 2 DIABETES MELLITUS WITH DIABETIC POLYNEUROPATHY, WITH LONG-TERM CURRENT USE OF INSULIN: ICD-10-CM

## 2024-03-28 PROCEDURE — 99999 PR PBB SHADOW E&M-EST. PATIENT-LVL IV: CPT | Mod: PBBFAC,HCNC,, | Performed by: INTERNAL MEDICINE

## 2024-03-28 PROCEDURE — 1159F MED LIST DOCD IN RCRD: CPT | Mod: HCNC,CPTII,S$GLB, | Performed by: INTERNAL MEDICINE

## 2024-03-28 PROCEDURE — 1126F AMNT PAIN NOTED NONE PRSNT: CPT | Mod: HCNC,CPTII,S$GLB, | Performed by: INTERNAL MEDICINE

## 2024-03-28 PROCEDURE — 1160F RVW MEDS BY RX/DR IN RCRD: CPT | Mod: HCNC,CPTII,S$GLB, | Performed by: INTERNAL MEDICINE

## 2024-03-28 PROCEDURE — 1157F ADVNC CARE PLAN IN RCRD: CPT | Mod: HCNC,CPTII,S$GLB, | Performed by: INTERNAL MEDICINE

## 2024-03-28 PROCEDURE — 99215 OFFICE O/P EST HI 40 MIN: CPT | Mod: HCNC,S$GLB,, | Performed by: INTERNAL MEDICINE

## 2024-03-28 NOTE — ASSESSMENT & PLAN NOTE
Clinically stable doing well at this time.  No acute decompensation noted  Last echocardiogram done in November did not show any significant aortic insufficiency  Summary         Left Ventricle: The left ventricle is normal in size. Mildly increased wall thickness. There is mild concentric hypertrophy. Normal wall motion. There is normal systolic function with a visually estimated ejection fraction of 60 - 65%. There is normal diastolic function.    Right Ventricle: Normal right ventricular cavity size. Wall thickness is normal. Right ventricle wall motion  is normal. Systolic function is normal.    Left Atrium: Left atrium is severely dilated.    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    Mitral Valve: There is moderate bileaflet sclerosis. Moderately calcified posterior leaflet. There is mild anterior mitral annular calcification present. There is mild stenosis. The mean pressure gradient across the mitral valve is 4 mmHg at a heart rate of  bpm. There is moderate regurgitation with a centrally directed jet.    Tricuspid Valve: There is mild regurgitation with a centrally directed jet.    IVC/SVC: Elevated venous pressure at 15 mmHg.     Vitals       Subjective   Mariano Childress is a 62 y.o.male who presents to the ED with complaints of rectal bleeding. The patient reports that he has a history of diverticulitis. He states that 3 years ago, he was experiencing severe rectal bleeding and had to receive blood transfusions. His last colonoscopy was at that time. He states that 3 days ago, he began to experience left lower quadrant abdominal discomfort that he describes as a 3/10 in severity. Last night, he noticed dark red blood and blackish blood in his stool and he had another episode this morning. He thinks that he passed 2 cups of blood since last night. He notes that he takes naproxen, but not daily. He has a history of alcohol abuse and has esophageal varices. He states that he has not drank alcohol in the past week, but will sometimes drink beer and vodka. He has a history of hypertension and hyperlipidemia. He is not anticoagulated. There are no other acute complaints at this time.         History provided by:  Patient  Rectal Bleeding   Quality:  Maroon and black and tarry  Chronicity:  Recurrent  Similar prior episodes: yes    Relieved by:  Nothing  Associated symptoms: abdominal pain        Review of Systems   Gastrointestinal: Positive for abdominal pain, blood in stool and hematochezia.   All other systems reviewed and are negative.      Past Medical History:   Diagnosis Date   • Arthritis    • Colon polyp    • Diabetes mellitus (CMS/HCC)    • Diverticulosis    • Esophageal varices (CMS/HCC)    • GERD (gastroesophageal reflux disease)    • GI bleed    • History of blood transfusion    • Hyperlipidemia    • Hypertension    • Pancreatitis        No Known Allergies    Past Surgical History:   Procedure Laterality Date   • ANKLE SURGERY     • HERNIA REPAIR     • REPLACEMENT TOTAL KNEE     • SHOULDER ROTATOR CUFF REPAIR Right    • SHOULDER SURGERY Left     Bone spurs       History reviewed. No pertinent family history.    Social History     Socioeconomic  History   • Marital status:      Spouse name: Not on file   • Number of children: Not on file   • Years of education: Not on file   • Highest education level: Not on file   Tobacco Use   • Smoking status: Former Smoker   • Smokeless tobacco: Former User     Types: Chew   Substance and Sexual Activity   • Alcohol use: Yes     Comment: used to be a heavy drinker   • Drug use: No   • Sexual activity: Defer         Objective   Physical Exam   Constitutional: He is oriented to person, place, and time. He appears well-developed and well-nourished.   Pleasant, morbidly obese.    HENT:   Head: Normocephalic and atraumatic.   Eyes: Conjunctivae are normal. No scleral icterus.   Neck: Normal range of motion. Neck supple.   Cardiovascular: Normal rate, regular rhythm and normal heart sounds.   Pulmonary/Chest: Effort normal and breath sounds normal.   Abdominal: Soft. There is tenderness.   Minimal tenderness in left lower quadrant only.    Genitourinary: Rectal exam shows guaiac positive stool.   Genitourinary Comments: Strong guaiac positive dark red blood. No hemorrhoids, fissures, or masses palpable.    Musculoskeletal: Normal range of motion.   Neurological: He is alert and oriented to person, place, and time.   Skin: Skin is warm and dry.   Psychiatric: He has a normal mood and affect. His behavior is normal.   Nursing note and vitals reviewed.      Procedures         ED Course  ED Course as of Sep 05 1435   Thu Sep 05, 2019   1145 Dr. Gillespie paged Dr. Rosario.   [JE]   1149 Dr. Gillespie discussed the case in detail with Dr. Rosario who will evaluate as an inpatient.   [JE]   1254 Dr. Gillespie paged the hospitalist.   [JE]   1300 Dr. Gillespie spoke to Dr. Machado who will admit the patient.   [JE]      ED Course User Index  [JE] Dawn Swanson     Recent Results (from the past 24 hour(s))   Comprehensive Metabolic Panel    Collection Time: 09/05/19 11:08 AM   Result Value Ref Range    Glucose 160 (H) 65 - 99  mg/dL    BUN 15 8 - 23 mg/dL    Creatinine 0.84 0.76 - 1.27 mg/dL    Sodium 138 136 - 145 mmol/L    Potassium 4.0 3.5 - 5.2 mmol/L    Chloride 99 98 - 107 mmol/L    CO2 25.0 22.0 - 29.0 mmol/L    Calcium 9.3 8.6 - 10.5 mg/dL    Total Protein 7.6 6.0 - 8.5 g/dL    Albumin 4.70 3.50 - 5.20 g/dL    ALT (SGPT) 33 1 - 41 U/L    AST (SGOT) 32 1 - 40 U/L    Alkaline Phosphatase 80 39 - 117 U/L    Total Bilirubin 0.5 0.2 - 1.2 mg/dL    eGFR Non African Amer 93 >60 mL/min/1.73    Globulin 2.9 gm/dL    A/G Ratio 1.6 g/dL    BUN/Creatinine Ratio 17.9 7.0 - 25.0    Anion Gap 14.0 5.0 - 15.0 mmol/L   Protime-INR    Collection Time: 09/05/19 11:08 AM   Result Value Ref Range    Protime 13.0 11.2 - 14.3 Seconds    INR 1.03 0.85 - 1.16   Type & Screen    Collection Time: 09/05/19 11:08 AM   Result Value Ref Range    ABO Type A     RH type Positive     Antibody Screen Negative     T&S Expiration Date 9/8/2019 11:59:59 PM    Light Blue Top    Collection Time: 09/05/19 11:08 AM   Result Value Ref Range    Extra Tube hold for add-on    Green Top (Gel)    Collection Time: 09/05/19 11:08 AM   Result Value Ref Range    Extra Tube Hold for add-ons.    Lavender Top    Collection Time: 09/05/19 11:08 AM   Result Value Ref Range    Extra Tube hold for add-on    Gold Top - SST    Collection Time: 09/05/19 11:08 AM   Result Value Ref Range    Extra Tube Hold for add-ons.    CBC Auto Differential    Collection Time: 09/05/19 11:08 AM   Result Value Ref Range    WBC 7.56 3.40 - 10.80 10*3/mm3    RBC 4.74 4.14 - 5.80 10*6/mm3    Hemoglobin 15.1 13.0 - 17.7 g/dL    Hematocrit 44.2 37.5 - 51.0 %    MCV 93.2 79.0 - 97.0 fL    MCH 31.9 26.6 - 33.0 pg    MCHC 34.2 31.5 - 35.7 g/dL    RDW 12.4 12.3 - 15.4 %    RDW-SD 42.5 37.0 - 54.0 fl    MPV 10.0 6.0 - 12.0 fL    Platelets 215 140 - 450 10*3/mm3    Neutrophil % 59.6 42.7 - 76.0 %    Lymphocyte % 27.0 19.6 - 45.3 %    Monocyte % 9.3 5.0 - 12.0 %    Eosinophil % 2.0 0.3 - 6.2 %    Basophil % 1.3 0.0 -  1.5 %    Immature Grans % 0.8 (H) 0.0 - 0.5 %    Neutrophils, Absolute 4.51 1.70 - 7.00 10*3/mm3    Lymphocytes, Absolute 2.04 0.70 - 3.10 10*3/mm3    Monocytes, Absolute 0.70 0.10 - 0.90 10*3/mm3    Eosinophils, Absolute 0.15 0.00 - 0.40 10*3/mm3    Basophils, Absolute 0.10 0.00 - 0.20 10*3/mm3    Immature Grans, Absolute 0.06 (H) 0.00 - 0.05 10*3/mm3    nRBC 0.0 0.0 - 0.2 /100 WBC   Lactic Acid, Plasma    Collection Time: 09/05/19 11:08 AM   Result Value Ref Range    Lactate 1.9 0.5 - 2.0 mmol/L   Urinalysis With Microscopic If Indicated (No Culture) - Urine, Clean Catch    Collection Time: 09/05/19 11:08 AM   Result Value Ref Range    Color, UA Yellow Yellow, Straw    Appearance, UA Clear Clear    pH, UA 7.0 5.0 - 8.0    Specific Gravity, UA 1.016 1.001 - 1.030    Glucose, UA Negative Negative    Ketones, UA Negative Negative    Bilirubin, UA Negative Negative    Blood, UA Negative Negative    Protein, UA Negative Negative    Leuk Esterase, UA Negative Negative    Nitrite, UA Negative Negative    Urobilinogen, UA 1.0 E.U./dL 0.2 - 1.0 E.U./dL   Urine Drug Screen - Urine, Clean Catch    Collection Time: 09/05/19 11:08 AM   Result Value Ref Range    THC, Screen, Urine Negative Negative    Phencyclidine (PCP), Urine Negative Negative    Cocaine Screen, Urine Negative Negative    Methamphetamine, Ur Negative Negative    Opiate Screen Negative Negative    Amphetamine Screen, Urine Negative Negative    Benzodiazepine Screen, Urine Negative Negative    Tricyclic Antidepressants Screen Negative Negative    Methadone Screen, Urine Negative Negative    Barbiturates Screen, Urine Negative Negative    Oxycodone Screen, Urine Negative Negative    Propoxyphene Screen Negative Negative    Buprenorphine, Screen, Urine Negative Negative   POCT Occult Blood, stool    Collection Time: 09/05/19 11:09 AM   Result Value Ref Range    Fecal Occult Blood Positive (A) Negative    Lot Number [id number]     Expiration Date 2/22     DEVELOPER  "LOT NUMBER [id number]     DEVELOPER EXPIRATION DATE 10/2,022     Positive Control Positive Positive    Negative Control Negative Negative   ABO RH Specimen Verification    Collection Time: 09/05/19 12:22 PM   Result Value Ref Range    ABO Type A     RH type Positive      Note: In addition to lab results from this visit, the labs listed above may include labs taken at another facility or during a different encounter within the last 24 hours. Please correlate lab times with ED admission and discharge times for further clarification of the services performed during this visit.    XR Chest 1 View   Preliminary Result   1. Cardiomegaly without evidence for decompensated heart failure.   2. Chronic appearing bilateral lower lobe interstitial lung changes.       D:  09/05/2019   E:  09/05/2019            Vitals:    09/05/19 1023 09/05/19 1215   BP: 157/81 129/78   Pulse: 78 70   Resp: 16 16   Temp: 98.6 °F (37 °C)    SpO2: 98% 96%   Weight: 136 kg (300 lb)    Height: 177.8 cm (70\")      Medications   sodium chloride 0.9 % flush 10 mL (not administered)   acetaminophen (TYLENOL) tablet 650 mg (not administered)   thiamine (VITAMIN B-1) tablet 100 mg (not administered)   sodium chloride 0.9 % bolus 1,000 mL (0 mL Intravenous Stopped 9/5/19 1247)   pantoprazole (PROTONIX) injection 80 mg (80 mg Intravenous Given 9/5/19 1315)     ECG/EMG Results (last 24 hours)     ** No results found for the last 24 hours. **        ECG 12 Lead   Final Result   Test Reason : gi bleed   Blood Pressure : **/** mmHG   Vent. Rate : 067 BPM     Atrial Rate : 067 BPM      P-R Int : 164 ms          QRS Dur : 116 ms       QT Int : 398 ms       P-R-T Axes : 000 -44 031 degrees      QTc Int : 420 ms      Normal sinus rhythm   Left axis deviation   Abnormal ECG   No previous ECGs available   Confirmed by MARIZA SEN, JOSE ([id number]) on 9/5/2019 1:41:15 PM      Referred By:  ANA SEN           Confirmed By:JOSE DAWKINS MD                          [facility]    Final " diagnoses:   Acute GI bleeding       Documentation assistance provided by dominique Swanson.  Information recorded by the dominique was done at my direction and has been verified and validated by me.     Dawn Swanson  09/05/19 1128       Dawn Swanson  09/05/19 1150       Dawn Swanson  09/05/19 1434       Brenden Gillespie MD  09/07/19 [id number]

## 2024-03-28 NOTE — ASSESSMENT & PLAN NOTE
She is better compensated at this time continue on present therapy to include   1. Olmesartan 20 mg a day  2. Continue on Lasix 20 mg daily   3. Aldactone 25 mg a day  4. Carvedilol 12.5 mg p.o. b.i.d.   5. Continue on Jardiance 10 mg daily   Maintain on salt restricted diet  Encouraged her to weigh herself every single day this is a 2 lb weight gain over 2 days then consider additional diuretic therapy.

## 2024-03-28 NOTE — ASSESSMENT & PLAN NOTE
Currently maintaining regular rhythm  Continue on Eliquis 5 mg half a tablet p.o. b.i.d..  Continue on enteric-coated aspirin 81 mg a day  She is also on clopidogrel however I do not recommend triple therapy.  Recommend to stop taking aspirin.

## 2024-03-28 NOTE — PROGRESS NOTES
Subjective:    Patient ID:  Leslie Tolliver is a 89 y.o. female patient here for evaluation Follow-up (She was just in the ED. Was on IV lasix over night)      History of Present Illness:     Patient is an 89-year-old lady who has history of arterial hypertension congestive heart failure with preserved LV function reportedly had developed increasing shortness of breath and was seen at local doctor's office and subsequently x-ray demonstrated abnormality and she was referred to the emergency room.  She was admitted on 03/26/2024 in his jog yesterday 03/27/2024 after optimizing therapy for heart failure she feels she was doing better she was already back here in the office today to evaluate her response to therapy.    Patient denies having chest discomfort no arm neck or jaw pain noted no symptoms of PND and orthopnea noted.  Patient was last seen in the office on 12/23/2024 at which time she did not have any overt manifestation of congestive heart failure.  Patient denies having any chest pain lately and no arm neck or jaw pain no sore throat noted.    Discharge Summary        Patient Name: Leslie Tolliver  MRN: [id number]  BIENVENIDO: [id number]  Patient Class: OP- Observation  Admission Date: 3/26/2024  Hospital Length of Stay: 0 days  Discharge Date and Time:  03/27/2024 10:12 AM  Attending Physician: Candido Jenkins MD   Discharging Provider: Candido Jenkins MD  Primary Care Provider: Chang Christianson MD     Primary Care Team: Networked reference to record PCT      HPI:   89 WF with pmh of HTN and CHF hx ,  ( normal LV EF)  ,  DMII on insulin , afib on eliquis ,  also on asa / plavix ?? ,  hx Aortic valve replacement bovine     Non smoker  Non drinker     She lives at her home with her Daughter      She came in because of her legs swelling and being SOB when she tries to lay flat.       She says for last few weeks she can't lay flat because her breathing gets worse      But the swelling legs is not new  and has gone on for long time.      She was hospitalized here with heart Failure last year she says and lost about 7-9 pounds      And her legs did improve.  But then slowly has filled back up .      She sees a cardiologist and they have not put her on any diuretics she says at home.     Her PCP has not either.          Her doctor had ordered some blood work lately she says because of all this and called and told her to go in to the ER last night.       The patient was not acutely worse , she just was told to come in because of her new complaints .  The PCP office saw that her BNP was high which made them suspect Heart failure .         Patient also says she has been crying a lot lately thinking about losing her  and 2 sons.   She tells me her   over 10 years ago . And her One son  at age 25 and the other  at age 3 .      But for some reason has started thinking about them and crying a lot and then she decided she was not going to cry anymore and started to feel better last few days.           She does say she tries to cut out salt but food often tastes bad to her and sometimes she cheats and adds salt.        Her weight she thinks normally is about 170 and now she is 187 she says           Review of patient's allergies indicates:   Allergen Reactions    Fenofibric acid (choline)      Other reaction(s): Vomiting    Iodine      Other reaction(s): lips swollen ivp dye    Rosuvastatin      Other reaction(s): muscle pain       Past Medical History:   Diagnosis Date    Acute decompensated heart failure 2023    Anemia due to stage 3 chronic kidney disease 2019    Arthritis     Bullous pemphigoid 2022    Chronic bilateral low back pain without sciatica 2019    CKD (chronic kidney disease) stage 3, GFR 30-59 ml/min 2019    Colon polyps     Coronary artery disease     Diabetes mellitus type II     Diabetes with neurologic complications     Drug-induced immunodeficiency  3/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    GERD (gastroesophageal reflux disease)     Hyperlipidemia     Hypertension     Hypothyroidism     Paroxysmal atrial fibrillation 3/16/2023    Type 2 diabetes mellitus      Past Surgical History:   Procedure Laterality Date    ADENOIDECTOMY      AORTIC VALVE REPLACEMENT      BREAST BIOPSY Right 20 yrs ago    benign    CARDIAC SURGERY      CHOLECYSTECTOMY      COLONOSCOPY  2014    Dr Holly, 5 year recheck    EPIDURAL STEROID INJECTION N/A 3/25/2021    Procedure: Injection, Steroid, Epidural L3-4;  Surgeon: Sky Love MD;  Location: [facility] OR;  Service: Pain Management;  Laterality: N/A;  Injection, Steroid, Epidural L3-4    EPIDURAL STEROID INJECTION N/A 2021    Procedure: Injection, Steroid, Epidural L3-4;  Surgeon: Sky Love MD;  Location: [facility] OR;  Service: Pain Management;  Laterality: N/A;  Injection, Steroid, Epidural L3-4    ESOPHAGOGASTRODUODENOSCOPY N/A 2020    Procedure: EGD (ESOPHAGOGASTRODUODENOSCOPY);  Surgeon: Michael Nugent III, MD;  Location: [facility];  Service: Endoscopy;  Laterality: N/A;    HEMORRHOID SURGERY      HYSTERECTOMY      OOPHORECTOMY      TONSILLECTOMY       Social History     Tobacco Use    Smoking status: Former     Current packs/day: 0.00     Average packs/day: 0.3 packs/day for 15.0 years (3.8 ttl pk-yrs)     Types: Cigarettes     Start date: 3/30/1959     Quit date: 3/30/1974     Years since quittin.0    Smokeless tobacco: Never   Substance Use Topics    Alcohol use: No    Drug use: No        Review of Systems:    As noted in HPI in addition      REVIEW OF SYSTEMS  CARDIOVASCULAR: No recent chest pain, palpitations, arm, neck, or jaw pain  RESPIRATORY: No recent fever, cough chills, SOB or congestion  : No blood in the urine  GI: No Nausea, vomiting, constipation, diarrhea, blood, or reflux.  MUSCULOSKELETAL: No myalgias  NEURO: No lightheadedness or dizziness  EYES: No Double vision, blurry, vision or headache   Patient denies having  chest pain arm neck or jaw pain no sore throat noted           Objective        Vitals:    03/28/24 1217   BP: 134/80   Pulse: 93   Resp: 16       LIPIDS - LAST 2   Lab Results   Component Value Date    CHOL 126 10/14/2022    CHOL 132 03/11/2022    HDL 33 (L) 10/14/2022    HDL 33 (L) 03/11/2022    LDLCALC 64.6 10/14/2022    LDLCALC 54.4 (L) 03/11/2022    TRIG 142 10/14/2022    TRIG 223 (H) 03/11/2022    CHOLHDL 26.2 10/14/2022    CHOLHDL 25.0 03/11/2022       CBC - LAST 2  Lab Results   Component Value Date    WBC 4.83 03/27/2024    WBC 6.41 03/26/2024    RBC 3.96 (L) 03/27/2024    RBC 3.84 (L) 03/26/2024    HGB 11.5 (L) 03/27/2024    HGB 11.5 (L) 03/26/2024    HCT 37.9 03/27/2024    HCT 37.1 03/26/2024    MCV 96 03/27/2024    MCV 97 03/26/2024    MCH 29.0 03/27/2024    MCH 29.9 03/26/2024    MCHC 30.3 (L) 03/27/2024    MCHC 31.0 (L) 03/26/2024    RDW 15.5 (H) 03/27/2024    RDW 15.8 (H) 03/26/2024     03/27/2024     03/26/2024    MPV 11.1 03/27/2024    MPV 11.2 03/26/2024    GRAN 3.4 03/27/2024    GRAN 70.8 03/27/2024    LYMPH 0.6 (L) 03/27/2024    LYMPH 13.3 (L) 03/27/2024    MONO 0.5 03/27/2024    MONO 11.2 03/27/2024    BASO 0.05 03/27/2024    BASO 0.05 03/26/2024    NRBC 0 03/27/2024    NRBC 0 03/26/2024       CHEMISTRY & LIVER FUNCTION - LAST 2  Lab Results   Component Value Date     03/27/2024     03/26/2024    K 3.4 (L) 03/27/2024    K 4.3 03/26/2024     03/27/2024     03/26/2024    CO2 30 (H) 03/27/2024    CO2 25 03/26/2024    ANIONGAP 8 03/27/2024    ANIONGAP 8 03/26/2024    BUN 26 (H) 03/27/2024    BUN 29 (H) 03/26/2024    CREATININE 1.3 03/27/2024    CREATININE 1.3 03/26/2024     (H) 03/27/2024     (H) 03/26/2024    CALCIUM 9.2 03/27/2024    CALCIUM 9.1 03/26/2024    MG 1.8 03/27/2024    MG 1.9 03/26/2024    ALBUMIN 4.0 03/27/2024    ALBUMIN 4.0 03/26/2024    PROT 6.6 03/27/2024    PROT 6.7 03/26/2024    ALKPHOS 91 03/27/2024    ALKPHOS 97 03/26/2024     ALT 26 03/27/2024    ALT 28 03/26/2024    AST 21 03/27/2024    AST 22 03/26/2024    BILITOT 2.5 (H) 03/27/2024    BILITOT 2.3 (H) 03/26/2024        CARDIAC PROFILE - LAST 2  Lab Results   Component Value Date     (H) 03/26/2024     (H) 03/25/2024     03/26/2024     03/09/2012    CPKMB 2.9 10/15/2022    TROPONINI <0.030 10/15/2022    TROPONINI 0.088 (HH) 06/03/2020    TROPONINIHS 7.3 03/26/2024    TROPONINIHS 6.8 03/26/2024        COAGULATION - LAST 2  Lab Results   Component Value Date    LABPT 13.5 10/15/2022    LABPT 14.0 (H) 10/14/2022    INR 1.1 10/15/2022    INR 1.2 10/14/2022    APTT 29.4 10/15/2022    APTT 30.9 06/03/2020       ENDOCRINE & PSA - LAST 2  Lab Results   Component Value Date    HGBA1C 6.7 (H) 01/26/2024    HGBA1C 7.8 (H) 05/25/2023    TSH 2.636 03/26/2024    TSH 2.572 03/26/2024        ECHOCARDIOGRAM RESULTS  Results for orders placed during the hospital encounter of 11/16/23    Echo    Interpretation Summary    Left Ventricle: The left ventricle is normal in size. Mildly increased wall thickness. There is mild concentric hypertrophy. Normal wall motion. There is normal systolic function with a visually estimated ejection fraction of 60 - 65%. There is normal diastolic function.    Right Ventricle: Normal right ventricular cavity size. Wall thickness is normal. Right ventricle wall motion  is normal. Systolic function is normal.    Left Atrium: Left atrium is severely dilated.    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    Mitral Valve: There is moderate bileaflet sclerosis. Moderately calcified posterior leaflet. There is mild anterior mitral annular calcification present. There is mild stenosis. The mean pressure gradient across the mitral valve is 4 mmHg at a heart rate of  bpm. There is moderate regurgitation with a centrally directed jet.    Tricuspid Valve: There is mild regurgitation with a centrally directed jet.    IVC/SVC: Elevated  venous pressure at 15 mmHg.      CURRENT/PREVIOUS VISIT EKG  Results for orders placed or performed during the hospital encounter of 03/26/24   EKG 12-lead    Collection Time: 03/26/24  3:11 PM   Result Value Ref Range    QRS Duration 106 ms    [facility] QTC Calculation 467 ms    Narrative    Test Reason : R06.02,    Vent. Rate : 083 BPM     Atrial Rate : 078 BPM     P-R Int : 000 ms          QRS Dur : 106 ms      QT Int : 398 ms       P-R-T Axes : 000 098 038 degrees     QTc Int : 467 ms    Atrial fibrillation  Rightward axis  Low voltage QRS  Septal infarct (cited on or before 03-JAN-2023)  Abnormal ECG  When compared with ECG of 25-MAR-2024 15:10,  No significant change was found    Referred By:             Confirmed By:      No valid procedures specified.   No results found for this or any previous visit.    No valid procedures specified.    PHYSICAL EXAM  CONSTITUTIONAL: Well built, well nourished in no apparent distress  NECK: no carotid bruit, no JVD  LUNGS: CTA  CHEST WALL: no tenderness midline surgical scar is healed very well  HEART: regular rate and rhythm, normal S1 diminished S2 grade 3 systolic ejection murmur noted.    ABDOMEN: soft, non-tender; bowel sounds normal; no masses,  no organomegaly  EXTREMITIES: Extremities normal, no edema, no calf tenderness noted  NEURO: AAO X 3    I HAVE REVIEWED :    The vital signs, nurses notes, and all the pertinent radiology and labs.        Current Outpatient Medications   Medication Instructions    amiodarone (PACERONE) 200 mg, Oral, 2 times daily    apixaban (ELIQUIS) 5 mg, Oral, 2 times daily    aspirin 81 mg, Oral, Daily, Every day    benzonatate (TESSALON) 100 mg, Oral, 3 times daily PRN    blood sugar diagnostic (ACCU-CHEK GUIDE TEST STRIPS) Strp 300 each, Misc.(Non-Drug; Combo Route), 3 times daily    blood-glucose meter kit Accu-chek Marley glucometer check glucose three times daily E11.65    calcium carbonate/vitamin D3 (CALCIUM+D ORAL) 1 tablet, Oral, Daily     "carvediloL (COREG) 12.5 mg, Oral, 2 times daily    cholecalciferol (vitamin D3) (VITAMIN D3) 5,000 Units, Oral, Daily    clobetasol 0.05% (TEMOVATE) 1 g, Topical (Top), 2 times daily    clopidogreL (PLAVIX) 75 mg, Oral, Daily    DROPLET PEN NEEDLE 31 gauge x 5/16" Ndle USE AS DIRECTED WITH LANTUS SOLOSTAR PEN AS NEEDED    empagliflozin (JARDIANCE) 10 mg, Oral, Daily    furosemide (LASIX) 20 mg, Oral, Daily    ketoconazole (NIZORAL) 2 % cream AAA pannus fold bid    lancets (ACCU-CHEK SOFTCLIX LANCETS) Misc New machine Guide Me Accuchek    LANTUS SOLOSTAR U-100 INSULIN 24 Units, Subcutaneous, Nightly    levocetirizine (XYZAL) 5 mg, Oral, Nightly    metFORMIN (GLUCOPHAGE) 1,000 mg, Oral, 2 times daily with meals    multivitamin (THERAGRAN) per tablet 1 tablet, Oral, Daily    olmesartan (BENICAR) 20 mg, Oral, Daily    spironolactone (ALDACTONE) 25 mg, Oral, Daily    triamcinolone acetonide 0.1% (KENALOG) 0.1 % cream AAA bid          Assessment & Plan     S/P AVR (aortic valve replacement)  Clinically stable doing well at this time.  No acute decompensation noted  Last echocardiogram done in November did not show any significant aortic insufficiency  Summary         Left Ventricle: The left ventricle is normal in size. Mildly increased wall thickness. There is mild concentric hypertrophy. Normal wall motion. There is normal systolic function with a visually estimated ejection fraction of 60 - 65%. There is normal diastolic function.    Right Ventricle: Normal right ventricular cavity size. Wall thickness is normal. Right ventricle wall motion  is normal. Systolic function is normal.    Left Atrium: Left atrium is severely dilated.    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    Mitral Valve: There is moderate bileaflet sclerosis. Moderately calcified posterior leaflet. There is mild anterior mitral annular calcification present. There is mild stenosis. The mean pressure gradient across the " mitral valve is 4 mmHg at a heart rate of  bpm. There is moderate regurgitation with a centrally directed jet.    Tricuspid Valve: There is mild regurgitation with a centrally directed jet.    IVC/SVC: Elevated venous pressure at 15 mmHg.     Vitals        Paroxysmal atrial fibrillation  Currently maintaining regular rhythm  Continue on Eliquis 5 mg half a tablet p.o. b.i.d..  Continue on enteric-coated aspirin 81 mg a day  She is also on clopidogrel however I do not recommend triple therapy.  Recommend to stop taking aspirin.    Hypertension associated with diabetes  Blood pressure is 134/80 mm Hg continue on Benicar 20 mg a day she is on Lasix 20 mg daily   Continue on Aldactone 25 mg a day and Coreg 12.5 mg p.o. b.i.d. maintain on low-salt diet    Acute decompensated heart failure  She is better compensated at this time continue on present therapy to include   1. Olmesartan 20 mg a day  2. Continue on Lasix 20 mg daily   3. Aldactone 25 mg a day  4. Carvedilol 12.5 mg p.o. b.i.d.   5. Continue on Jardiance 10 mg daily   Maintain on salt restricted diet  Encouraged her to weigh herself every single day this is a 2 lb weight gain over 2 days then consider additional diuretic therapy.      Stage 3b chronic kidney disease  Clinically stable monitor renal function BNP and BMP in 4-6 weeks    Type 2 diabetes mellitus with diabetic polyneuropathy, with long-term current use of insulin  Diabetic therapy as directed from her hospitalization follow-up with primary care          Follow up in about 4 weeks (around 4/25/2024).

## 2024-03-28 NOTE — ASSESSMENT & PLAN NOTE
Blood pressure is 134/80 mm Hg continue on Benicar 20 mg a day she is on Lasix 20 mg daily   Continue on Aldactone 25 mg a day and Coreg 12.5 mg p.o. b.i.d. maintain on low-salt diet

## 2024-04-03 ENCOUNTER — OFFICE VISIT (OUTPATIENT)
Dept: FAMILY MEDICINE | Facility: CLINIC | Age: 89
End: 2024-04-03
Attending: FAMILY MEDICINE
Payer: MEDICARE

## 2024-04-03 VITALS
TEMPERATURE: 98 F | BODY MASS INDEX: 26.82 KG/M2 | DIASTOLIC BLOOD PRESSURE: 78 MMHG | SYSTOLIC BLOOD PRESSURE: 128 MMHG | HEART RATE: 87 BPM | HEIGHT: 67 IN | OXYGEN SATURATION: 97 % | WEIGHT: 170.88 LBS

## 2024-04-03 DIAGNOSIS — E11.59 HYPERTENSION ASSOCIATED WITH DIABETES: ICD-10-CM

## 2024-04-03 DIAGNOSIS — I50.9 ACUTE DECOMPENSATED HEART FAILURE: Primary | ICD-10-CM

## 2024-04-03 DIAGNOSIS — I48.0 PAROXYSMAL ATRIAL FIBRILLATION: ICD-10-CM

## 2024-04-03 DIAGNOSIS — E11.69 HYPERLIPIDEMIA ASSOCIATED WITH TYPE 2 DIABETES MELLITUS: ICD-10-CM

## 2024-04-03 DIAGNOSIS — E03.9 ACQUIRED HYPOTHYROIDISM: ICD-10-CM

## 2024-04-03 DIAGNOSIS — I15.2 HYPERTENSION ASSOCIATED WITH DIABETES: ICD-10-CM

## 2024-04-03 DIAGNOSIS — E11.42 TYPE 2 DIABETES MELLITUS WITH DIABETIC POLYNEUROPATHY, WITH LONG-TERM CURRENT USE OF INSULIN: ICD-10-CM

## 2024-04-03 DIAGNOSIS — Z79.4 TYPE 2 DIABETES MELLITUS WITH DIABETIC POLYNEUROPATHY, WITH LONG-TERM CURRENT USE OF INSULIN: ICD-10-CM

## 2024-04-03 DIAGNOSIS — E78.5 HYPERLIPIDEMIA ASSOCIATED WITH TYPE 2 DIABETES MELLITUS: ICD-10-CM

## 2024-04-03 DIAGNOSIS — L12.0 BULLOUS PEMPHIGOID: ICD-10-CM

## 2024-04-03 DIAGNOSIS — I70.0 AORTIC ATHEROSCLEROSIS: ICD-10-CM

## 2024-04-03 PROBLEM — D84.821 DRUG-INDUCED IMMUNODEFICIENCY: Status: RESOLVED | Noted: 2023-03-16 | Resolved: 2024-04-03

## 2024-04-03 PROBLEM — Z79.899 DRUG-INDUCED IMMUNODEFICIENCY: Status: RESOLVED | Noted: 2023-03-16 | Resolved: 2024-04-03

## 2024-04-03 PROCEDURE — 99214 OFFICE O/P EST MOD 30 MIN: CPT | Mod: HCNC,S$GLB,, | Performed by: FAMILY MEDICINE

## 2024-04-03 PROCEDURE — 1160F RVW MEDS BY RX/DR IN RCRD: CPT | Mod: HCNC,CPTII,S$GLB, | Performed by: FAMILY MEDICINE

## 2024-04-03 PROCEDURE — 1157F ADVNC CARE PLAN IN RCRD: CPT | Mod: HCNC,CPTII,S$GLB, | Performed by: FAMILY MEDICINE

## 2024-04-03 PROCEDURE — 1126F AMNT PAIN NOTED NONE PRSNT: CPT | Mod: HCNC,CPTII,S$GLB, | Performed by: FAMILY MEDICINE

## 2024-04-03 PROCEDURE — 1159F MED LIST DOCD IN RCRD: CPT | Mod: HCNC,CPTII,S$GLB, | Performed by: FAMILY MEDICINE

## 2024-04-03 PROCEDURE — 1101F PT FALLS ASSESS-DOCD LE1/YR: CPT | Mod: HCNC,CPTII,S$GLB, | Performed by: FAMILY MEDICINE

## 2024-04-03 PROCEDURE — 99999 PR PBB SHADOW E&M-EST. PATIENT-LVL V: CPT | Mod: PBBFAC,HCNC,, | Performed by: FAMILY MEDICINE

## 2024-04-03 PROCEDURE — 3288F FALL RISK ASSESSMENT DOCD: CPT | Mod: HCNC,CPTII,S$GLB, | Performed by: FAMILY MEDICINE

## 2024-04-03 RX ORDER — LANCETS
EACH MISCELLANEOUS
Qty: 300 EACH | Refills: 3 | Status: ON HOLD | OUTPATIENT
Start: 2024-04-03 | End: 2024-05-31

## 2024-04-03 NOTE — PROGRESS NOTES
Subjective:       Patient ID: Leslie Tolliver is a 89 y.o. female.    Chief Complaint: Follow-up (Hospital Follow up)    89-year-old female coming in for follow-up from the emergency room where she was seen at [facility] on March 26, 2024.  The patient had been seen the day prior by one of the nurse practitioners for some shortness of breath and lab results returned after the visit with an elevated BNP of 723.  The patient was called and directed to the emergency room for further evaluation.  On arrival she was relatively asymptomatic but the ER staff did note that she seemed a little short of breath when moving around.  She was reporting increased orthopnea and had to use extra pillows on her bed and had more shortness of breath with mild exertion than she was used to.  She did have a history of paroxysmal atrial fibrillation and an aortic valve replacement with a bioprosthetic valve as well as aortic atherosclerosis diabetes, hypertension, hyperlipidemia, stage IIIB chronic kidney disease and hypothyroidism.  She was not on any diuretics but was on Pacerone, Coreg, Benicar, Plavix and Eliquis, Jardiance, Lantus, and metformin.  The Jardiance was the low-dose at 10 mg.  The patient had ultrasounds with negative deep vein thrombosis EKG showed her to be in atrial fib but no change from previous tracings she had a mild anemia with a hemoglobin of 11.5 and her potassiums was mildly low at 3.4 with a GFR of 39.  Chest x-ray showed stable mild cardiomegaly and on examination she was reported to have 4+ edema with shortness of breath with movement and a BNP had dropped from 723 to 493 since the day before.  The patient was treated with some diuretics and kept most of the night for observation and released from the emergency room without admission.  She comes in today able to sleep flat on her back and her edema has totally resolved.  She does report she ate some crawfish and shrimp at [facility] but that was after  "the ER visit.  She can not recall anything before the visit where she had had an increase in salt.  I did warn her about highly processed foods including frozen chicken which is loaded with salt water before flash freezing.  She is using a salt substitute and I asked her to check the labile and make sure it does not have salt or would be a so called "light salt".  She was sent home on Lasix 20 daily and Aldactone 25 daily in his tolerating the diuresis without any problems.  We will have to monitor the potassium and kidney function.      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  No date: GERD (gastroesophageal reflux disease)  No date: Hyperlipidemia  No date: Hypertension  No date: Hypothyroidism  3/16/2023: Paroxysmal atrial fibrillation  No date: Type 2 diabetes mellitus    Past Surgical History:  No date: ADENOIDECTOMY  No date: AORTIC VALVE REPLACEMENT  20 yrs ago: BREAST BIOPSY; Right      Comment:  benign  No date: CARDIAC SURGERY  No date: CHOLECYSTECTOMY  5-: COLONOSCOPY      Comment:  Dr Holly, 5 year recheck  3/25/2021: EPIDURAL STEROID INJECTION; N/A      Comment:  Procedure: Injection, Steroid, Epidural L3-4;  Surgeon:                Sky Love MD;  Location: [facility] OR;  Service: Pain                Management;  Laterality: N/A;  Injection, Steroid,                Epidural L3-4  5/20/2021: EPIDURAL STEROID INJECTION; N/A      Comment:  Procedure: Injection, Steroid, Epidural L3-4;  Surgeon:                Sky Love MD;  Location: [facility] OR;  Service: " "Pain                Management;  Laterality: N/A;  Injection, Steroid,                Epidural L3-4  6/4/2020: ESOPHAGOGASTRODUODENOSCOPY; N/A      Comment:  Procedure: EGD (ESOPHAGOGASTRODUODENOSCOPY);  Surgeon:                Michael Nugent III, MD;  Location: [facility];                 Service: Endoscopy;  Laterality: N/A;  No date: HEMORRHOID SURGERY  No date: HYSTERECTOMY  No date: OOPHORECTOMY  No date: TONSILLECTOMY    Current Outpatient Medications on File Prior to Visit:  amiodarone (PACERONE) 200 MG Tab, Take 1 tablet (200 mg total) by mouth 2 (two) times daily., Disp: 180 tablet, Rfl: 3  apixaban (ELIQUIS) 5 mg Tab, Take 5 mg by mouth 2 (two) times daily., Disp: , Rfl:   benzonatate (TESSALON) 100 MG capsule, Take 100 mg by mouth 3 (three) times daily as needed for Cough., Disp: , Rfl:   calcium carbonate/vitamin D3 (CALCIUM+D ORAL), Take 1 tablet by mouth once daily., Disp: , Rfl:   carvediloL (COREG) 12.5 MG tablet, Take 1 tablet (12.5 mg total) by mouth 2 (two) times daily., Disp: 60 tablet, Rfl: 11  cholecalciferol, vitamin D3, (VITAMIN D3) 125 mcg (5,000 unit) Tab, Take 5,000 Units by mouth once daily., Disp: , Rfl:   clobetasol 0.05% (TEMOVATE) 0.05 % Oint, Apply 1 g topically 2 (two) times daily., Disp: , Rfl:   clopidogreL (PLAVIX) 75 mg tablet, Take 75 mg by mouth once daily., Disp: , Rfl:   DROPLET PEN NEEDLE 31 gauge x 5/16" Ndle, USE AS DIRECTED WITH LANTUS SOLOSTAR PEN AS NEEDED, Disp: 100 each, Rfl: 3  empagliflozin (JARDIANCE) 10 mg tablet, Take 1 tablet (10 mg total) by mouth once daily., Disp: 90 tablet, Rfl: 1  furosemide (LASIX) 20 MG tablet, Take 1 tablet (20 mg total) by mouth once daily., Disp: 30 tablet, Rfl: 0  insulin (LANTUS SOLOSTAR U-100 INSULIN) glargine 100 units/mL SubQ pen, Inject 24 Units into the skin every evening., Disp: 3 mL, Rfl: 3  ketoconazole (NIZORAL) 2 % cream, AAA pannus fold bid (Patient taking differently: Apply 1 application  topically once daily. AAA " pannus fold bid), Disp: 60 g, Rfl: 3  levocetirizine (XYZAL) 5 MG tablet, Take 5 mg by mouth every evening., Disp: , Rfl:   metFORMIN (GLUCOPHAGE) 1000 MG tablet, Take 1 tablet (1,000 mg total) by mouth 2 (two) times daily with meals., Disp: 180 tablet, Rfl: 1  multivitamin (THERAGRAN) per tablet, Take 1 tablet by mouth once daily., Disp: , Rfl:   olmesartan (BENICAR) 20 MG tablet, Take 1 tablet (20 mg total) by mouth once daily., Disp: 90 tablet, Rfl: 3  spironolactone (ALDACTONE) 25 MG tablet, Take 1 tablet (25 mg total) by mouth once daily., Disp: 30 tablet, Rfl: 0  triamcinolone acetonide 0.1% (KENALOG) 0.1 % cream, AAA bid (Patient taking differently: Apply 1 g topically 2 (two) times daily. AAA bid), Disp: 454 g, Rfl: 3  [DISCONTINUED] blood sugar diagnostic (ACCU-CHEK GUIDE TEST STRIPS) Strp, 300 each by Misc.(Non-Drug; Combo Route) route 3 (three) times daily., Disp: 300 each, Rfl: 3  [DISCONTINUED] lancets (ACCU-CHEK SOFTCLIX LANCETS) Misc, New machine Guide Me Accuchek, Disp: 300 each, Rfl: 3  aspirin 81 mg Tab, Take 81 mg by mouth once daily. Every day, Disp: 30 tablet, Rfl: 0  blood-glucose meter kit, Accu-chek Marley glucometer check glucose three times daily E11.65, Disp: 1 each, Rfl: 0    No current facility-administered medications on file prior to visit.          Review of Systems   Constitutional:  Negative for chills, diaphoresis, fatigue and fever.   Respiratory:  Negative for cough, chest tightness, shortness of breath and wheezing.         No orthopnea, can sleep flat on the bed   Cardiovascular:  Negative for chest pain, palpitations and leg swelling.   Gastrointestinal:  Negative for nausea and vomiting.       Objective:      Physical Exam  Vitals and nursing note reviewed.   Constitutional:       Comments: Good blood pressure control  Normal pulse with a regular rhythm   Good weight for age with a BMI of 26.8 she is down 9.3 lb from her last visit with me August 30, 2023 she is down 17 lb from  her stated weightin the emergency room March 26, 2024   Neck:      Vascular: No carotid bruit.   Cardiovascular:      Rate and Rhythm: Normal rate and regular rhythm.      Heart sounds: Normal heart sounds. No murmur heard.     No friction rub. No gallop.   Pulmonary:      Effort: Pulmonary effort is normal. No respiratory distress.      Breath sounds: Normal breath sounds. No stridor. No wheezing, rhonchi or rales.   Chest:      Chest wall: No tenderness.   Musculoskeletal:      Cervical back: Normal range of motion and neck supple. No rigidity or tenderness.      Right lower leg: No edema.      Left lower leg: No edema.   Lymphadenopathy:      Cervical: No cervical adenopathy.   Neurological:      General: No focal deficit present.      Mental Status: She is oriented to person, place, and time. Mental status is at baseline.   Psychiatric:         Mood and Affect: Mood normal.         Behavior: Behavior normal.         Thought Content: Thought content normal.         Judgment: Judgment normal.         Assessment:       1. Acute decompensated heart failure    2. Type 2 diabetes mellitus with diabetic polyneuropathy, with long-term current use of insulin    3. Acquired hypothyroidism    4. Hypertension associated with diabetes    5. Hyperlipidemia associated with type 2 diabetes mellitus    6. Aortic atherosclerosis    7. Paroxysmal atrial fibrillation    8. Bullous pemphigoid    9. BMI 26.0-26.9,adult        Plan:       1. Type 2 diabetes mellitus with diabetic polyneuropathy, with long-term current use of insulin  Lab Results   Component Value Date    HGBA1C 6.7 (H) 01/26/2024     Previously well controlled, schedule lab for July for recheck  - blood sugar diagnostic (ACCU-CHEK GUIDE TEST STRIPS) Strp; 300 each by Misc.(Non-Drug; Combo Route) route 3 (three) times daily.  Dispense: 300 each; Refill: 3  - lancets (ACCU-CHEK SOFTCLIX LANCETS) Misc; New machine Guide Me Accuchek  Dispense: 300 each; Refill: 3  - Lipid  Panel; Future  - Hemoglobin A1C; Future  - Basic Metabolic Panel; Future    2. Acute decompensated heart failure  Resolved    3. Acquired hypothyroidism  Lab Results   Component Value Date    TSH 2.636 03/26/2024    TSH 2.572 03/26/2024     Well controlled, no changes needed    4. Hypertension associated with diabetes  Well controlled, no changes needed we will need to monitor the potassiums level with her diuretics and also with the spironolactone, Benicar, and history of chronic kidney disease for potential increases  - Lipid Panel; Future  - Basic Metabolic Panel; Future    5. Hyperlipidemia associated with type 2 diabetes mellitus  Lab Results   Component Value Date    CHOL 126 10/14/2022    CHOL 132 03/11/2022    CHOL 130 02/25/2021     Lab Results   Component Value Date    HDL 33 (L) 10/14/2022    HDL 33 (L) 03/11/2022    HDL 31 (L) 02/25/2021     Lab Results   Component Value Date    LDLCALC 64.6 10/14/2022    LDLCALC 54.4 (L) 03/11/2022    LDLCALC 37.2 (L) 02/25/2021     Lab Results   Component Value Date    TRIG 142 10/14/2022    TRIG 223 (H) 03/11/2022    TRIG 309 (H) 02/25/2021       Lab Results   Component Value Date    CHOLHDL 26.2 10/14/2022    CHOLHDL 25.0 03/11/2022    CHOLHDL 23.8 02/25/2021     Previously well controlled, will schedule repeat lab in July also  - Lipid Panel; Future    6. Aortic atherosclerosis  Asymptomatic, maintain good blood pressure control, cholesterol control, and glucose control    7. Paroxysmal atrial fibrillation  Stable, currently sinus rhythm    8. Bullous pemphigoid  Asymptomatic, patient has been taken off of immunosuppressants as of about four months ago    9. BMI 26.0-26.9,adult  Good weight for age no changes needed

## 2024-04-13 LAB
OHS QRS DURATION: 106 MS
OHS QTC CALCULATION: 467 MS

## 2024-04-22 ENCOUNTER — LAB VISIT (OUTPATIENT)
Dept: LAB | Facility: HOSPITAL | Age: 89
End: 2024-04-22
Attending: INTERNAL MEDICINE
Payer: MEDICARE

## 2024-04-22 ENCOUNTER — OFFICE VISIT (OUTPATIENT)
Dept: CARDIOLOGY | Facility: CLINIC | Age: 89
End: 2024-04-22
Payer: MEDICARE

## 2024-04-22 VITALS
HEIGHT: 67 IN | RESPIRATION RATE: 16 BRPM | SYSTOLIC BLOOD PRESSURE: 118 MMHG | BODY MASS INDEX: 26.21 KG/M2 | HEART RATE: 88 BPM | OXYGEN SATURATION: 100 % | DIASTOLIC BLOOD PRESSURE: 78 MMHG | WEIGHT: 167 LBS

## 2024-04-22 DIAGNOSIS — I70.0 AORTIC ATHEROSCLEROSIS: ICD-10-CM

## 2024-04-22 DIAGNOSIS — I48.11 LONGSTANDING PERSISTENT ATRIAL FIBRILLATION: ICD-10-CM

## 2024-04-22 DIAGNOSIS — I48.20 CHRONIC ATRIAL FIBRILLATION: ICD-10-CM

## 2024-04-22 DIAGNOSIS — E11.59 HYPERTENSION ASSOCIATED WITH DIABETES: ICD-10-CM

## 2024-04-22 DIAGNOSIS — Z95.3 STATUS POST AORTIC VALVE REPLACEMENT WITH BIOPROSTHETIC VALVE: Primary | ICD-10-CM

## 2024-04-22 DIAGNOSIS — Z79.4 TYPE 2 DIABETES MELLITUS WITH DIABETIC POLYNEUROPATHY, WITH LONG-TERM CURRENT USE OF INSULIN: ICD-10-CM

## 2024-04-22 DIAGNOSIS — N18.32 STAGE 3B CHRONIC KIDNEY DISEASE: ICD-10-CM

## 2024-04-22 DIAGNOSIS — E78.5 HYPERLIPIDEMIA ASSOCIATED WITH TYPE 2 DIABETES MELLITUS: ICD-10-CM

## 2024-04-22 DIAGNOSIS — I15.2 HYPERTENSION ASSOCIATED WITH DIABETES: ICD-10-CM

## 2024-04-22 DIAGNOSIS — E11.42 TYPE 2 DIABETES MELLITUS WITH DIABETIC POLYNEUROPATHY, WITH LONG-TERM CURRENT USE OF INSULIN: ICD-10-CM

## 2024-04-22 DIAGNOSIS — E11.69 HYPERLIPIDEMIA ASSOCIATED WITH TYPE 2 DIABETES MELLITUS: ICD-10-CM

## 2024-04-22 LAB
ANION GAP SERPL CALC-SCNC: 10 MMOL/L (ref 8–16)
BUN SERPL-MCNC: 54 MG/DL (ref 8–23)
CALCIUM SERPL-MCNC: 9.7 MG/DL (ref 8.7–10.5)
CHLORIDE SERPL-SCNC: 103 MMOL/L (ref 95–110)
CO2 SERPL-SCNC: 26 MMOL/L (ref 23–29)
CREAT SERPL-MCNC: 2.4 MG/DL (ref 0.5–1.4)
EST. GFR  (NO RACE VARIABLE): 18.8 ML/MIN/1.73 M^2
GLUCOSE SERPL-MCNC: 164 MG/DL (ref 70–110)
MAGNESIUM SERPL-MCNC: 2.2 MG/DL (ref 1.6–2.6)
POTASSIUM SERPL-SCNC: 4.6 MMOL/L (ref 3.5–5.1)
SODIUM SERPL-SCNC: 139 MMOL/L (ref 136–145)

## 2024-04-22 PROCEDURE — 36415 COLL VENOUS BLD VENIPUNCTURE: CPT | Performed by: INTERNAL MEDICINE

## 2024-04-22 PROCEDURE — 1157F ADVNC CARE PLAN IN RCRD: CPT | Mod: HCNC,CPTII,S$GLB, | Performed by: INTERNAL MEDICINE

## 2024-04-22 PROCEDURE — 3288F FALL RISK ASSESSMENT DOCD: CPT | Mod: HCNC,CPTII,S$GLB, | Performed by: INTERNAL MEDICINE

## 2024-04-22 PROCEDURE — 99999 PR PBB SHADOW E&M-EST. PATIENT-LVL IV: CPT | Mod: PBBFAC,HCNC,, | Performed by: INTERNAL MEDICINE

## 2024-04-22 PROCEDURE — 80048 BASIC METABOLIC PNL TOTAL CA: CPT | Performed by: INTERNAL MEDICINE

## 2024-04-22 PROCEDURE — 83735 ASSAY OF MAGNESIUM: CPT | Performed by: INTERNAL MEDICINE

## 2024-04-22 PROCEDURE — 1101F PT FALLS ASSESS-DOCD LE1/YR: CPT | Mod: HCNC,CPTII,S$GLB, | Performed by: INTERNAL MEDICINE

## 2024-04-22 PROCEDURE — 1159F MED LIST DOCD IN RCRD: CPT | Mod: HCNC,CPTII,S$GLB, | Performed by: INTERNAL MEDICINE

## 2024-04-22 PROCEDURE — 1160F RVW MEDS BY RX/DR IN RCRD: CPT | Mod: HCNC,CPTII,S$GLB, | Performed by: INTERNAL MEDICINE

## 2024-04-22 PROCEDURE — 93000 ELECTROCARDIOGRAM COMPLETE: CPT | Mod: HCNC,S$GLB,, | Performed by: INTERNAL MEDICINE

## 2024-04-22 PROCEDURE — 99215 OFFICE O/P EST HI 40 MIN: CPT | Mod: HCNC,S$GLB,, | Performed by: INTERNAL MEDICINE

## 2024-04-22 PROCEDURE — 1126F AMNT PAIN NOTED NONE PRSNT: CPT | Mod: HCNC,CPTII,S$GLB, | Performed by: INTERNAL MEDICINE

## 2024-04-22 NOTE — ASSESSMENT & PLAN NOTE
Patient patient is persisting in atrial fibrillation recommend to discontinue amiodarone because of side effect profile continue to maintain on oral anticoagulation therapy at Eliquis 2.5 mg p.o. b.i.d. she was also taking Plavix 75 mg a day she has discontinued aspirin already.

## 2024-04-22 NOTE — ASSESSMENT & PLAN NOTE
Type 2 diabetes and neuropathy continue on Lantus salt Solostar insulin regimen as directed by Dr. Christianson

## 2024-04-22 NOTE — PROGRESS NOTES
Subjective:    Patient ID:  Leslie Tolliver is a 89 y.o. female patient here for evaluation Follow-up (Doing well with new fluid medications. Sleeping thru the night)      History of Present Illness:     Patient is 89-year-old with history of coronary artery disease type 2 diabetes dyslipidemia  Is here for follow-up evaluation seem to be doing fairly well denies having any episodes of angina shortness of breath PND orthopnea she feels better overall.  No cough or congestion noted and no edema in the lower extremities.  Her weight is down and she feels much better          Review of patient's allergies indicates:   Allergen Reactions    Fenofibric acid (choline)      Other reaction(s): Vomiting    Iodine      Other reaction(s): lips swollen ivp dye    Rosuvastatin      Other reaction(s): muscle pain       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    GERD (gastroesophageal reflux disease)     Hyperlipidemia     Hypertension     Hypothyroidism     Paroxysmal atrial fibrillation 3/16/2023    Type 2 diabetes mellitus      Past Surgical History:   Procedure Laterality Date    ADENOIDECTOMY      AORTIC VALVE REPLACEMENT      BREAST BIOPSY Right 20 yrs ago    benign    CARDIAC SURGERY      CHOLECYSTECTOMY      COLONOSCOPY  5-    Dr Holly, 5 year recheck    EPIDURAL STEROID INJECTION N/A 3/25/2021    Procedure: Injection, Steroid, Epidural L3-4;  Surgeon: Sky Love MD;  Location: [facility] OR;  Service: Pain Management;  Laterality: N/A;  Injection, Steroid, Epidural L3-4     EPIDURAL STEROID INJECTION N/A 2021    Procedure: Injection, Steroid, Epidural L3-4;  Surgeon: Sky Love MD;  Location: [facility] OR;  Service: Pain Management;  Laterality: N/A;  Injection, Steroid, Epidural L3-4    ESOPHAGOGASTRODUODENOSCOPY N/A 2020    Procedure: EGD (ESOPHAGOGASTRODUODENOSCOPY);  Surgeon: Michael Nugent III, MD;  Location: [facility];  Service: Endoscopy;  Laterality: N/A;    HEMORRHOID SURGERY      HYSTERECTOMY      OOPHORECTOMY      TONSILLECTOMY       Social History     Tobacco Use    Smoking status: Former     Current packs/day: 0.00     Average packs/day: 0.3 packs/day for 15.0 years (3.8 ttl pk-yrs)     Types: Cigarettes     Start date: 3/30/1959     Quit date: 3/30/1974     Years since quittin.0    Smokeless tobacco: Never   Substance Use Topics    Alcohol use: No    Drug use: No        Review of Systems:    As noted in HPI in addition      REVIEW OF SYSTEMS  CARDIOVASCULAR: No recent chest pain, palpitations, arm, neck, or jaw pain  RESPIRATORY: No recent fever, cough chills, SOB or congestion  : No blood in the urine  GI: No Nausea, vomiting, constipation, diarrhea, blood, or reflux.  MUSCULOSKELETAL: No myalgias  NEURO: No lightheadedness or dizziness  EYES: No Double vision, blurry, vision or headache              Objective        Vitals:    24 0912   BP: 118/78   Pulse: 88   Resp: 16       LIPIDS - LAST 2   Lab Results   Component Value Date    CHOL 126 10/14/2022    CHOL 132 2022    HDL 33 (L) 10/14/2022    HDL 33 (L) 2022    LDLCALC 64.6 10/14/2022    LDLCALC 54.4 (L) 2022    TRIG 142 10/14/2022    TRIG 223 (H) 2022    CHOLHDL 26.2 10/14/2022    CHOLHDL 25.0 2022       CBC - LAST 2  Lab Results   Component Value Date    WBC 4.83 2024    WBC 6.41 2024    RBC 3.96 (L) 2024    RBC 3.84 (L) 2024    HGB 11.5 (L) 2024    HGB 11.5 (L) 2024    HCT 37.9 2024    HCT 37.1 2024    MCV 96  03/27/2024    MCV 97 03/26/2024    MCH 29.0 03/27/2024    MCH 29.9 03/26/2024    MCHC 30.3 (L) 03/27/2024    MCHC 31.0 (L) 03/26/2024    RDW 15.5 (H) 03/27/2024    RDW 15.8 (H) 03/26/2024     03/27/2024     03/26/2024    MPV 11.1 03/27/2024    MPV 11.2 03/26/2024    GRAN 3.4 03/27/2024    GRAN 70.8 03/27/2024    LYMPH 0.6 (L) 03/27/2024    LYMPH 13.3 (L) 03/27/2024    MONO 0.5 03/27/2024    MONO 11.2 03/27/2024    BASO 0.05 03/27/2024    BASO 0.05 03/26/2024    NRBC 0 03/27/2024    NRBC 0 03/26/2024       CHEMISTRY & LIVER FUNCTION - LAST 2  Lab Results   Component Value Date     03/27/2024     03/26/2024    K 3.4 (L) 03/27/2024    K 4.3 03/26/2024     03/27/2024     03/26/2024    CO2 30 (H) 03/27/2024    CO2 25 03/26/2024    ANIONGAP 8 03/27/2024    ANIONGAP 8 03/26/2024    BUN 26 (H) 03/27/2024    BUN 29 (H) 03/26/2024    CREATININE 1.3 03/27/2024    CREATININE 1.3 03/26/2024     (H) 03/27/2024     (H) 03/26/2024    CALCIUM 9.2 03/27/2024    CALCIUM 9.1 03/26/2024    MG 1.8 03/27/2024    MG 1.9 03/26/2024    ALBUMIN 4.0 03/27/2024    ALBUMIN 4.0 03/26/2024    PROT 6.6 03/27/2024    PROT 6.7 03/26/2024    ALKPHOS 91 03/27/2024    ALKPHOS 97 03/26/2024    ALT 26 03/27/2024    ALT 28 03/26/2024    AST 21 03/27/2024    AST 22 03/26/2024    BILITOT 2.5 (H) 03/27/2024    BILITOT 2.3 (H) 03/26/2024        CARDIAC PROFILE - LAST 2  Lab Results   Component Value Date     (H) 03/26/2024     (H) 03/25/2024     03/26/2024     03/09/2012    CPKMB 2.9 10/15/2022    TROPONINI <0.030 10/15/2022    TROPONINI 0.088 (HH) 06/03/2020    TROPONINIHS 7.3 03/26/2024    TROPONINIHS 6.8 03/26/2024        COAGULATION - LAST 2  Lab Results   Component Value Date    LABPT 13.5 10/15/2022    LABPT 14.0 (H) 10/14/2022    INR 1.1 10/15/2022    INR 1.2 10/14/2022    APTT 29.4 10/15/2022    APTT 30.9 06/03/2020       ENDOCRINE & PSA - LAST 2  Lab Results   Component  Value Date    HGBA1C 6.7 (H) 01/26/2024    HGBA1C 7.8 (H) 05/25/2023    TSH 2.636 03/26/2024    TSH 2.572 03/26/2024        ECHOCARDIOGRAM RESULTS  Results for orders placed during the hospital encounter of 11/16/23    Echo    Interpretation Summary    Left Ventricle: The left ventricle is normal in size. Mildly increased wall thickness. There is mild concentric hypertrophy. Normal wall motion. There is normal systolic function with a visually estimated ejection fraction of 60 - 65%. There is normal diastolic function.    Right Ventricle: Normal right ventricular cavity size. Wall thickness is normal. Right ventricle wall motion  is normal. Systolic function is normal.    Left Atrium: Left atrium is severely dilated.    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    Mitral Valve: There is moderate bileaflet sclerosis. Moderately calcified posterior leaflet. There is mild anterior mitral annular calcification present. There is mild stenosis. The mean pressure gradient across the mitral valve is 4 mmHg at a heart rate of  bpm. There is moderate regurgitation with a centrally directed jet.    Tricuspid Valve: There is mild regurgitation with a centrally directed jet.    IVC/SVC: Elevated venous pressure at 15 mmHg.      CURRENT/PREVIOUS VISIT EKG  Results for orders placed or performed during the hospital encounter of 03/26/24   EKG 12-lead    Collection Time: 03/26/24  3:11 PM   Result Value Ref Range    QRS Duration 106 ms    OHS QTC Calculation 467 ms    Narrative    Test Reason : R06.02,    Vent. Rate : 083 BPM     Atrial Rate : 078 BPM     P-R Int : 000 ms          QRS Dur : 106 ms      QT Int : 398 ms       P-R-T Axes : 000 098 038 degrees     QTc Int : 467 ms    Atrial fibrillation  Rightward axis  Low voltage QRS  Septal infarct (cited on or before 03-JAN-2023)  Abnormal ECG  When compared with ECG of 25-MAR-2024 15:10,  No significant change was found  Confirmed by Gertrudis CROOK, Narayan DENSON  "([id number]) on 4/13/2024 4:00:08 PM    Referred By:             Confirmed By:Narayan Caba MD     No valid procedures specified.   No results found for this or any previous visit.    No valid procedures specified.    PHYSICAL EXAM  CONSTITUTIONAL: Well built, well nourished in no apparent distress  NECK: no carotid bruit, no JVD  LUNGS: CTA  CHEST WALL: no tenderness  HEART:  Irregular rhythm systolic murmur no distinct S3 gallop  ABDOMEN: soft, non-tender; bowel sounds normal; no masses,  no organomegaly  EXTREMITIES: Extremities normal, no edema, no calf tenderness noted  NEURO: AAO X 3    I HAVE REVIEWED :    The vital signs, nurses notes, and all the pertinent radiology and labs.    Her EKG shows atrial fibrillation right axis deviation low-voltage QRS abnormal QRS angle and T-wave abnormalities noted.    Current Outpatient Medications   Medication Instructions    apixaban (ELIQUIS) 5 mg, Oral, 2 times daily    aspirin 81 mg, Oral, Daily, Every day    benzonatate (TESSALON) 100 mg, Oral, 3 times daily PRN    blood sugar diagnostic (ACCU-CHEK GUIDE TEST STRIPS) Strp 300 each, Misc.(Non-Drug; Combo Route), 3 times daily    blood-glucose meter kit Accu-chek Marley glucometer check glucose three times daily E11.65    calcium carbonate/vitamin D3 (CALCIUM+D ORAL) 1 tablet, Oral, Daily    carvediloL (COREG) 12.5 mg, Oral, 2 times daily    cholecalciferol (vitamin D3) (VITAMIN D3) 5,000 Units, Oral, Daily    clobetasol 0.05% (TEMOVATE) 1 g, Topical (Top), 2 times daily    clopidogreL (PLAVIX) 75 mg, Oral, Daily    DROPLET PEN NEEDLE 31 gauge x 5/16" Ndle USE AS DIRECTED WITH LANTUS SOLOSTAR PEN AS NEEDED    empagliflozin (JARDIANCE) 10 mg, Oral, Daily    furosemide (LASIX) 20 mg, Oral, Daily    ketoconazole (NIZORAL) 2 % cream AAA pannus fold bid    lancets (ACCU-CHEK SOFTCLIX LANCETS) Misc New machine Guide Me Accuchek    LANTUS SOLOSTAR U-100 INSULIN 24 Units, Subcutaneous, Nightly    levocetirizine (XYZAL) 5 mg, Oral, " Nightly    metFORMIN (GLUCOPHAGE) 1,000 mg, Oral, 2 times daily with meals    multivitamin (THERAGRAN) per tablet 1 tablet, Oral, Daily    olmesartan (BENICAR) 20 mg, Oral, Daily    spironolactone (ALDACTONE) 25 mg, Oral, Daily    triamcinolone acetonide 0.1% (KENALOG) 0.1 % cream AAA bid          Assessment & Plan     Status post aortic valve replacement with bioprosthetic valve  Status post aortic valve bioprosthesis implant and she is doing very well.  No occurrence of any angina shortness of breath PND orthopnea noted.    A-fib  Patient patient is persisting in atrial fibrillation recommend to discontinue amiodarone because of side effect profile continue to maintain on oral anticoagulation therapy at Eliquis 2.5 mg p.o. b.i.d. she was also taking Plavix 75 mg a day she has discontinued aspirin already.    Hypertension associated with diabetes  Blood pressure is well controlled 118/78 mm Hg continue on present therapy  Except decrease Lasix to 20 mg every other day, continue Coreg 12.5 mg p.o. b.i.d. continue on Aldactone 25 mg a day as she had hypokalemia, continue on Benicar 20 mg daily  Also recheck BNP    Hyperlipidemia associated with type 2 diabetes mellitus  Hyperlipidemia maintain on low-fat low-cholesterol diet    Aortic atherosclerosis  She was severe statin intolerance low-fat low-cholesterol diet    Stage 3b chronic kidney disease  Renal function is remained stable.  Will cut down diuretics further    Type 2 diabetes mellitus with diabetic polyneuropathy, with long-term current use of insulin  Type 2 diabetes and neuropathy continue on Lantus salt Solostar insulin regimen as directed by Dr. Christianson    I have given written instructions on this 3 following items.    1. Stop amiodarone   2. To confirm stopping aspirin completely   3. Is to reduce the Lasix to 20 mg every other day  4. Obtain blood work to evaluate magnesium and potassium      Follow up in about 6 months (around 10/22/2024).

## 2024-04-22 NOTE — ASSESSMENT & PLAN NOTE
Blood pressure is well controlled 118/78 mm Hg continue on present therapy  Except decrease Lasix to 20 mg every other day, continue Coreg 12.5 mg p.o. b.i.d. continue on Aldactone 25 mg a day as she had hypokalemia, continue on Benicar 20 mg daily  Also recheck BNP

## 2024-04-22 NOTE — ASSESSMENT & PLAN NOTE
Status post aortic valve bioprosthesis implant and she is doing very well.  No occurrence of any angina shortness of breath PND orthopnea noted.

## 2024-04-24 ENCOUNTER — TELEPHONE (OUTPATIENT)
Dept: CARDIOLOGY | Facility: CLINIC | Age: 89
End: 2024-04-24
Payer: MEDICARE

## 2024-04-24 DIAGNOSIS — I48.20 CHRONIC ATRIAL FIBRILLATION: Primary | ICD-10-CM

## 2024-04-24 NOTE — TELEPHONE ENCOUNTER
----- Message from Margot Welch RN sent at 2024  8:00 AM CDT -----  Regardin/22 EKG Order  The EKG performed on 24 is missing an order.  Please place an order so that the EKG can be read in [place].    Thank you,  Margot Welch RN  [facility]   [facility]  3rd Floor Cardiology Clinic  [phone]/ [id number]

## 2024-04-25 ENCOUNTER — TELEPHONE (OUTPATIENT)
Dept: CARDIOLOGY | Facility: CLINIC | Age: 89
End: 2024-04-25
Payer: MEDICARE

## 2024-04-25 DIAGNOSIS — I48.11 LONGSTANDING PERSISTENT ATRIAL FIBRILLATION: Primary | ICD-10-CM

## 2024-04-25 NOTE — TELEPHONE ENCOUNTER
----- Message from Silvia Zacarias sent at 4/25/2024  9:13 AM CDT -----  Regarding: FW: 4/22 EKG Order    ----- Message -----  From: Margot Welch RN  Sent: 4/24/2024   8:00 AM CDT  To: #  Subject: 4/22 EKG Order                                   The EKG performed on 4/22/24 is missing an order.  Please place an order so that the EKG can be read in [place].    Thank you,  Margot Welch, DON  [facility]   [facility] [place] [facility]  3rd Floor Cardiology Clinic  [phone]/ [id number]

## 2024-05-06 LAB
LEFT EYE DM RETINOPATHY: NEGATIVE
RIGHT EYE DM RETINOPATHY: NEGATIVE

## 2024-05-06 RX ORDER — APIXABAN 5 MG/1
5 TABLET, FILM COATED ORAL 2 TIMES DAILY
Qty: 180 TABLET | Refills: 3 | Status: SHIPPED | OUTPATIENT
Start: 2024-05-06

## 2024-05-21 ENCOUNTER — TELEPHONE (OUTPATIENT)
Dept: FAMILY MEDICINE | Facility: CLINIC | Age: 89
End: 2024-05-21
Payer: MEDICARE

## 2024-05-21 LAB
OHS QRS DURATION: 114 MS
OHS QTC CALCULATION: 460 MS

## 2024-05-21 NOTE — TELEPHONE ENCOUNTER
Left message on machine to call.       ----- Message from Anjana Metzger sent at 5/21/2024  8:23 AM CDT -----  Contact: pt  Type:  Needs Medical Advice    Who Called: pt    Would the patient rather a call back or a response via [facility]? Call back    Best Call Back Number: [phone]    Additional Information: needs clearance for cataract surgery    Please call to advise  Thanks

## 2024-05-22 ENCOUNTER — HOSPITAL ENCOUNTER (INPATIENT)
Facility: HOSPITAL | Age: 89
LOS: 9 days | Discharge: NURSING, REHAB OR LONG-TERM CARE | DRG: 481 | End: 2024-05-31
Attending: STUDENT IN AN ORGANIZED HEALTH CARE EDUCATION/TRAINING PROGRAM | Admitting: STUDENT IN AN ORGANIZED HEALTH CARE EDUCATION/TRAINING PROGRAM
Payer: MEDICARE

## 2024-05-22 ENCOUNTER — TELEPHONE (OUTPATIENT)
Dept: FAMILY MEDICINE | Facility: CLINIC | Age: 89
End: 2024-05-22
Payer: MEDICARE

## 2024-05-22 DIAGNOSIS — S72.142A CLOSED 2-PART INTERTROCHANTERIC FRACTURE OF LEFT FEMUR: ICD-10-CM

## 2024-05-22 DIAGNOSIS — S72.142A CLOSED DISPLACED INTERTROCHANTERIC FRACTURE OF LEFT FEMUR, INITIAL ENCOUNTER: Primary | ICD-10-CM

## 2024-05-22 DIAGNOSIS — D62 ACUTE BLOOD LOSS ANEMIA: ICD-10-CM

## 2024-05-22 DIAGNOSIS — R29.6 FALL IN ELDERLY PATIENT: ICD-10-CM

## 2024-05-22 LAB
ABO + RH BLD: NORMAL
ALBUMIN SERPL BCP-MCNC: 3.7 G/DL (ref 3.5–5.2)
ALP SERPL-CCNC: 101 U/L (ref 55–135)
ALT SERPL W/O P-5'-P-CCNC: 31 U/L (ref 10–44)
ANION GAP SERPL CALC-SCNC: 11 MMOL/L (ref 8–16)
AST SERPL-CCNC: 26 U/L (ref 10–40)
BASOPHILS # BLD AUTO: 0.04 K/UL (ref 0–0.2)
BASOPHILS NFR BLD: 0.6 % (ref 0–1.9)
BILIRUB SERPL-MCNC: 1.6 MG/DL (ref 0.1–1)
BLD GP AB SCN CELLS X3 SERPL QL: NORMAL
BUN SERPL-MCNC: 34 MG/DL (ref 8–23)
CALCIUM SERPL-MCNC: 9 MG/DL (ref 8.7–10.5)
CHLORIDE SERPL-SCNC: 108 MMOL/L (ref 95–110)
CO2 SERPL-SCNC: 19 MMOL/L (ref 23–29)
CREAT SERPL-MCNC: 1.3 MG/DL (ref 0.5–1.4)
DIFFERENTIAL METHOD BLD: ABNORMAL
EOSINOPHIL # BLD AUTO: 0.2 K/UL (ref 0–0.5)
EOSINOPHIL NFR BLD: 2.2 % (ref 0–8)
ERYTHROCYTE [DISTWIDTH] IN BLOOD BY AUTOMATED COUNT: 16.1 % (ref 11.5–14.5)
EST. GFR  (NO RACE VARIABLE): 39 ML/MIN/1.73 M^2
GLUCOSE SERPL-MCNC: 156 MG/DL (ref 70–110)
HCT VFR BLD AUTO: 39.3 % (ref 37–48.5)
HGB BLD-MCNC: 12.6 G/DL (ref 12–16)
IMM GRANULOCYTES # BLD AUTO: 0.05 K/UL (ref 0–0.04)
IMM GRANULOCYTES NFR BLD AUTO: 0.7 % (ref 0–0.5)
LYMPHOCYTES # BLD AUTO: 0.6 K/UL (ref 1–4.8)
LYMPHOCYTES NFR BLD: 8.8 % (ref 18–48)
MCH RBC QN AUTO: 28.8 PG (ref 27–31)
MCHC RBC AUTO-ENTMCNC: 32.1 G/DL (ref 32–36)
MCV RBC AUTO: 90 FL (ref 82–98)
MONOCYTES # BLD AUTO: 0.5 K/UL (ref 0.3–1)
MONOCYTES NFR BLD: 7.6 % (ref 4–15)
NEUTROPHILS # BLD AUTO: 5.4 K/UL (ref 1.8–7.7)
NEUTROPHILS NFR BLD: 80.1 % (ref 38–73)
NRBC BLD-RTO: 0 /100 WBC
PLATELET # BLD AUTO: 180 K/UL (ref 150–450)
PMV BLD AUTO: 10.7 FL (ref 9.2–12.9)
POCT GLUCOSE: 116 MG/DL (ref 70–110)
POCT GLUCOSE: 122 MG/DL (ref 70–110)
POCT GLUCOSE: 138 MG/DL (ref 70–110)
POTASSIUM SERPL-SCNC: 4.5 MMOL/L (ref 3.5–5.1)
PROT SERPL-MCNC: 6.8 G/DL (ref 6–8.4)
RBC # BLD AUTO: 4.37 M/UL (ref 4–5.4)
SARS-COV-2 RDRP RESP QL NAA+PROBE: NEGATIVE
SODIUM SERPL-SCNC: 138 MMOL/L (ref 136–145)
SPECIMEN OUTDATE: NORMAL
WBC # BLD AUTO: 6.7 K/UL (ref 3.9–12.7)

## 2024-05-22 PROCEDURE — 94761 N-INVAS EAR/PLS OXIMETRY MLT: CPT

## 2024-05-22 PROCEDURE — 93005 ELECTROCARDIOGRAM TRACING: CPT

## 2024-05-22 PROCEDURE — 99900035 HC TECH TIME PER 15 MIN (STAT)

## 2024-05-22 PROCEDURE — 85025 COMPLETE CBC W/AUTO DIFF WBC: CPT | Performed by: STUDENT IN AN ORGANIZED HEALTH CARE EDUCATION/TRAINING PROGRAM

## 2024-05-22 PROCEDURE — U0002 COVID-19 LAB TEST NON-CDC: HCPCS | Performed by: STUDENT IN AN ORGANIZED HEALTH CARE EDUCATION/TRAINING PROGRAM

## 2024-05-22 PROCEDURE — 25000003 PHARM REV CODE 250: Performed by: NURSE PRACTITIONER

## 2024-05-22 PROCEDURE — 93010 ELECTROCARDIOGRAM REPORT: CPT | Mod: ,,, | Performed by: INTERNAL MEDICINE

## 2024-05-22 PROCEDURE — 63600175 PHARM REV CODE 636 W HCPCS: Performed by: STUDENT IN AN ORGANIZED HEALTH CARE EDUCATION/TRAINING PROGRAM

## 2024-05-22 PROCEDURE — 11000001 HC ACUTE MED/SURG PRIVATE ROOM

## 2024-05-22 PROCEDURE — 36415 COLL VENOUS BLD VENIPUNCTURE: CPT | Performed by: STUDENT IN AN ORGANIZED HEALTH CARE EDUCATION/TRAINING PROGRAM

## 2024-05-22 PROCEDURE — 99900031 HC PATIENT EDUCATION (STAT)

## 2024-05-22 PROCEDURE — 80053 COMPREHEN METABOLIC PANEL: CPT | Performed by: STUDENT IN AN ORGANIZED HEALTH CARE EDUCATION/TRAINING PROGRAM

## 2024-05-22 PROCEDURE — 86901 BLOOD TYPING SEROLOGIC RH(D): CPT | Performed by: NURSE PRACTITIONER

## 2024-05-22 PROCEDURE — 36415 COLL VENOUS BLD VENIPUNCTURE: CPT | Performed by: NURSE PRACTITIONER

## 2024-05-22 PROCEDURE — 82962 GLUCOSE BLOOD TEST: CPT

## 2024-05-22 PROCEDURE — 96375 TX/PRO/DX INJ NEW DRUG ADDON: CPT

## 2024-05-22 PROCEDURE — 96374 THER/PROPH/DIAG INJ IV PUSH: CPT

## 2024-05-22 PROCEDURE — 99285 EMERGENCY DEPT VISIT HI MDM: CPT | Mod: 25

## 2024-05-22 RX ORDER — SODIUM CHLORIDE 0.9 % (FLUSH) 0.9 %
10 SYRINGE (ML) INJECTION
Status: DISCONTINUED | OUTPATIENT
Start: 2024-05-22 | End: 2024-05-24

## 2024-05-22 RX ORDER — ACETAMINOPHEN 325 MG/1
650 TABLET ORAL EVERY 4 HOURS PRN
Status: DISCONTINUED | OUTPATIENT
Start: 2024-05-22 | End: 2024-05-31 | Stop reason: HOSPADM

## 2024-05-22 RX ORDER — SODIUM,POTASSIUM PHOSPHATES 280-250MG
2 POWDER IN PACKET (EA) ORAL
Status: DISCONTINUED | OUTPATIENT
Start: 2024-05-22 | End: 2024-05-31 | Stop reason: HOSPADM

## 2024-05-22 RX ORDER — MORPHINE SULFATE 2 MG/ML
4 INJECTION, SOLUTION INTRAMUSCULAR; INTRAVENOUS
Status: COMPLETED | OUTPATIENT
Start: 2024-05-22 | End: 2024-05-22

## 2024-05-22 RX ORDER — INSULIN ASPART 100 [IU]/ML
1-10 INJECTION, SOLUTION INTRAVENOUS; SUBCUTANEOUS
Status: DISCONTINUED | OUTPATIENT
Start: 2024-05-22 | End: 2024-05-31 | Stop reason: HOSPADM

## 2024-05-22 RX ORDER — ACETAMINOPHEN 325 MG/1
650 TABLET ORAL EVERY 6 HOURS PRN
Status: DISCONTINUED | OUTPATIENT
Start: 2024-05-22 | End: 2024-05-22

## 2024-05-22 RX ORDER — LANOLIN ALCOHOL/MO/W.PET/CERES
800 CREAM (GRAM) TOPICAL
Status: DISCONTINUED | OUTPATIENT
Start: 2024-05-22 | End: 2024-05-31 | Stop reason: HOSPADM

## 2024-05-22 RX ORDER — SODIUM CHLORIDE 0.9 % (FLUSH) 0.9 %
10 SYRINGE (ML) INJECTION EVERY 8 HOURS PRN
Status: DISCONTINUED | OUTPATIENT
Start: 2024-05-22 | End: 2024-05-31 | Stop reason: HOSPADM

## 2024-05-22 RX ORDER — MORPHINE SULFATE 2 MG/ML
4 INJECTION, SOLUTION INTRAMUSCULAR; INTRAVENOUS EVERY 4 HOURS PRN
Status: DISCONTINUED | OUTPATIENT
Start: 2024-05-22 | End: 2024-05-31 | Stop reason: HOSPADM

## 2024-05-22 RX ORDER — IBUPROFEN 200 MG
24 TABLET ORAL
Status: DISCONTINUED | OUTPATIENT
Start: 2024-05-22 | End: 2024-05-31 | Stop reason: HOSPADM

## 2024-05-22 RX ORDER — SPIRONOLACTONE 25 MG/1
25 TABLET ORAL DAILY
Status: DISCONTINUED | OUTPATIENT
Start: 2024-05-23 | End: 2024-05-24

## 2024-05-22 RX ORDER — ALUMINUM HYDROXIDE, MAGNESIUM HYDROXIDE, AND SIMETHICONE 1200; 120; 1200 MG/30ML; MG/30ML; MG/30ML
30 SUSPENSION ORAL 4 TIMES DAILY PRN
Status: DISCONTINUED | OUTPATIENT
Start: 2024-05-22 | End: 2024-05-31 | Stop reason: HOSPADM

## 2024-05-22 RX ORDER — GLUCAGON 1 MG
1 KIT INJECTION
Status: DISCONTINUED | OUTPATIENT
Start: 2024-05-22 | End: 2024-05-31 | Stop reason: HOSPADM

## 2024-05-22 RX ORDER — CARVEDILOL 6.25 MG/1
12.5 TABLET ORAL 2 TIMES DAILY
Status: DISCONTINUED | OUTPATIENT
Start: 2024-05-22 | End: 2024-05-31 | Stop reason: HOSPADM

## 2024-05-22 RX ORDER — AMOXICILLIN 250 MG
1 CAPSULE ORAL 2 TIMES DAILY
Status: DISCONTINUED | OUTPATIENT
Start: 2024-05-22 | End: 2024-05-31 | Stop reason: HOSPADM

## 2024-05-22 RX ORDER — NALOXONE HCL 0.4 MG/ML
0.02 VIAL (ML) INJECTION
Status: DISCONTINUED | OUTPATIENT
Start: 2024-05-22 | End: 2024-05-31 | Stop reason: HOSPADM

## 2024-05-22 RX ORDER — IPRATROPIUM BROMIDE AND ALBUTEROL SULFATE 2.5; .5 MG/3ML; MG/3ML
3 SOLUTION RESPIRATORY (INHALATION) EVERY 4 HOURS PRN
Status: DISCONTINUED | OUTPATIENT
Start: 2024-05-22 | End: 2024-05-30

## 2024-05-22 RX ORDER — IBUPROFEN 200 MG
16 TABLET ORAL
Status: DISCONTINUED | OUTPATIENT
Start: 2024-05-22 | End: 2024-05-31 | Stop reason: HOSPADM

## 2024-05-22 RX ORDER — ONDANSETRON HYDROCHLORIDE 2 MG/ML
4 INJECTION, SOLUTION INTRAVENOUS EVERY 8 HOURS PRN
Status: DISCONTINUED | OUTPATIENT
Start: 2024-05-22 | End: 2024-05-31 | Stop reason: HOSPADM

## 2024-05-22 RX ORDER — ONDANSETRON HYDROCHLORIDE 2 MG/ML
4 INJECTION, SOLUTION INTRAVENOUS
Status: COMPLETED | OUTPATIENT
Start: 2024-05-22 | End: 2024-05-22

## 2024-05-22 RX ORDER — SIMETHICONE 80 MG
1 TABLET,CHEWABLE ORAL 4 TIMES DAILY PRN
Status: DISCONTINUED | OUTPATIENT
Start: 2024-05-22 | End: 2024-05-31 | Stop reason: HOSPADM

## 2024-05-22 RX ORDER — TALC
9 POWDER (GRAM) TOPICAL NIGHTLY PRN
Status: DISCONTINUED | OUTPATIENT
Start: 2024-05-22 | End: 2024-05-31 | Stop reason: HOSPADM

## 2024-05-22 RX ADMIN — CARVEDILOL 12.5 MG: 6.25 TABLET, FILM COATED ORAL at 08:05

## 2024-05-22 RX ADMIN — MORPHINE SULFATE 4 MG: 2 INJECTION, SOLUTION INTRAMUSCULAR; INTRAVENOUS at 01:05

## 2024-05-22 RX ADMIN — SENNOSIDES AND DOCUSATE SODIUM 1 TABLET: 50; 8.6 TABLET ORAL at 08:05

## 2024-05-22 RX ADMIN — ONDANSETRON 4 MG: 2 INJECTION INTRAMUSCULAR; INTRAVENOUS at 01:05

## 2024-05-22 NOTE — ASSESSMENT & PLAN NOTE
Chronic problem   Patient's FSGs are uncontrolled due to hyperglycemia on current medication regimen.  Last A1c reviewed-   Lab Results   Component Value Date    HGBA1C 6.7 (H) 01/26/2024     Most recent fingerstick glucose reviewed-   Recent Labs   Lab 05/22/24  1123 05/22/24  1620   POCTGLUCOSE 138* 116*     Current correctional scale  Medium  Maintain anti-hyperglycemic dose as follows-   Antihyperglycemics (From admission, onward)      Start     Stop Route Frequency Ordered    05/22/24 1643  insulin aspart U-100 pen 1-10 Units         -- SubQ Before meals & nightly PRN 05/22/24 1545          Hold Oral hypoglycemics while patient is in the hospital.

## 2024-05-22 NOTE — H&P
[facility] Medicine  History & Physical    Patient Name: Leslie Tolliver  MRN: [id number]  Patient Class: IP- Inpatient  Admission Date: 5/22/2024  Attending Physician: Raphael Haywood MD   Primary Care Provider: Chang Christianson MD         Patient information was obtained from patient, past medical records, and ER records.     Subjective:     Principal Problem:Closed 2-part intertrochanteric fracture of left femur    Chief Complaint:   Chief Complaint   Patient presents with    Hip Pain     Patient tripped over her friends walker and now has deformity left hip         HPI: Leslie Tolliver an 89 year female who presents emergency room complaining of left hip pain.  She endorses mechanical fall at her residence today.  She fell striking her left hip on the floor.  She denies striking her head or loss of consciousness.  Denies any cervical pain.  Pain is worse with any movement of the left leg.  No alleviating factors.  No known sick contacts or travel.  She denies fever or chills.  Previous medical history includes hypertension, diabetes, hypothyroidism, hyperlipidemia, CKD, anemia, aortic valve replacement, paroxysmal AFib.  ER workup: CBC unremarkable.  CMP with glucose of 156 and bicarb 19 otherwise unremarkable.  X-ray demonstrates a left intertrochanteric fracture.  Orthopedics was consulted.  Plans to take patient to OR tomorrow.  Patient admitted to Hospital Medicine for treatment management.  Will maintain patient NPO after midnight.              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    GERD (gastroesophageal reflux disease)     Hyperlipidemia     Hypertension     Hypothyroidism     Paroxysmal atrial fibrillation 3/16/2023    Type 2 diabetes mellitus        Past Surgical History:   Procedure Laterality Date    ADENOIDECTOMY      AORTIC VALVE REPLACEMENT      BREAST BIOPSY Right 20 yrs ago    benign    CARDIAC SURGERY      CHOLECYSTECTOMY      COLONOSCOPY  5-    Dr Holly, 5 year recheck    EPIDURAL STEROID INJECTION N/A 3/25/2021    Procedure: Injection, Steroid, Epidural L3-4;  Surgeon: Sky Love MD;  Location: [facility] OR;  Service: Pain Management;  Laterality: N/A;  Injection, Steroid, Epidural L3-4    EPIDURAL STEROID INJECTION N/A 5/20/2021    Procedure: Injection, Steroid, Epidural L3-4;  Surgeon: Sky Love MD;  Location: [facility] OR;  Service: Pain Management;  Laterality: N/A;  Injection, Steroid, Epidural L3-4    ESOPHAGOGASTRODUODENOSCOPY N/A 6/4/2020    Procedure: EGD (ESOPHAGOGASTRODUODENOSCOPY);  Surgeon: Michael Nugent III, MD;  Location: [facility];  Service: Endoscopy;  Laterality: N/A;    HEMORRHOID SURGERY      HYSTERECTOMY      OOPHORECTOMY      TONSILLECTOMY         Review of patient's allergies indicates:   Allergen Reactions    Fenofibric acid (choline)      Other reaction(s): Vomiting    Iodine      Other reaction(s): lips swollen ivp dye    Rosuvastatin      Other reaction(s): muscle pain       No current facility-administered medications on file prior to encounter.     Current Outpatient Medications on File Prior to Encounter   Medication Sig    carvediloL (COREG) 12.5 MG tablet Take 1 tablet (12.5 mg total) by mouth 2 (two) times daily.    cholecalciferol, vitamin D3, (VITAMIN D3) 125 mcg (5,000 unit) Tab Take 5,000 Units by mouth once daily.    ELIQUIS 5 mg Tab TAKE 1 TABLET(5 MG) BY MOUTH TWICE DAILY    empagliflozin (JARDIANCE) 10 mg tablet Take 1 tablet (10 mg total) by mouth once daily.    insulin  "(LANTUS SOLOSTAR U-100 INSULIN) glargine 100 units/mL SubQ pen Inject 24 Units into the skin every evening.    multivitamin (THERAGRAN) per tablet Take 1 tablet by mouth once daily.    blood sugar diagnostic (ACCU-CHEK GUIDE TEST STRIPS) Strp 300 each by Misc.(Non-Drug; Combo Route) route 3 (three) times daily.    blood-glucose meter kit Accu-chek Marley glucometer check glucose three times daily E11.65    clopidogreL (PLAVIX) 75 mg tablet Take 75 mg by mouth once daily. (Patient not taking: Reported on 5/22/2024)    DROPLET PEN NEEDLE 31 gauge x 5/16" Ndle USE AS DIRECTED WITH LANTUS SOLOSTAR PEN AS NEEDED    furosemide (LASIX) 20 MG tablet Take 1 tablet (20 mg total) by mouth once daily.    lancets (ACCU-CHEK SOFTCLIX LANCETS) Misc New machine Guide Me Accuchek    metFORMIN (GLUCOPHAGE) 1000 MG tablet Take 1 tablet (1,000 mg total) by mouth 2 (two) times daily with meals.    olmesartan (BENICAR) 20 MG tablet Take 1 tablet (20 mg total) by mouth once daily.    spironolactone (ALDACTONE) 25 MG tablet Take 1 tablet (25 mg total) by mouth once daily.    [DISCONTINUED] aspirin 81 mg Tab Take 81 mg by mouth once daily. Every day    [DISCONTINUED] benzonatate (TESSALON) 100 MG capsule Take 100 mg by mouth 3 (three) times daily as needed for Cough.    [DISCONTINUED] calcium carbonate/vitamin D3 (CALCIUM+D ORAL) Take 1 tablet by mouth once daily.    [DISCONTINUED] clobetasol 0.05% (TEMOVATE) 0.05 % Oint Apply 1 g topically 2 (two) times daily.    [DISCONTINUED] ketoconazole (NIZORAL) 2 % cream AAA pannus fold bid (Patient taking differently: Apply 1 application  topically once daily. AAA pannus fold bid)    [DISCONTINUED] levocetirizine (XYZAL) 5 MG tablet Take 5 mg by mouth every evening.    [DISCONTINUED] triamcinolone acetonide 0.1% (KENALOG) 0.1 % cream AAA bid (Patient taking differently: Apply 1 g topically 2 (two) times daily. AAA bid)     Family History       Problem Relation (Age of Onset)    Cancer Father    " Diabetes Mother, Brother    Early death Son, Son    Glaucoma Mother    Heart disease Mother    No Known Problems Daughter          Tobacco Use    Smoking status: Former     Current packs/day: 0.00     Average packs/day: 0.3 packs/day for 15.0 years (3.8 ttl pk-yrs)     Types: Cigarettes     Start date: 3/30/1959     Quit date: 3/30/1974     Years since quittin.1    Smokeless tobacco: Never   Substance and Sexual Activity    Alcohol use: No    Drug use: No    Sexual activity: Never     Review of Systems   Constitutional:  Positive for activity change. Negative for chills, diaphoresis and fever.   HENT:  Negative for congestion, nosebleeds and tinnitus.    Eyes:  Negative for photophobia and visual disturbance.   Respiratory:  Negative for cough, chest tightness, shortness of breath and wheezing.    Cardiovascular:  Negative for chest pain, palpitations and leg swelling.   Gastrointestinal:  Negative for abdominal distention, abdominal pain, constipation, diarrhea, nausea and vomiting.   Endocrine: Negative for cold intolerance and heat intolerance.   Genitourinary:  Negative for difficulty urinating, dysuria, frequency, hematuria and urgency.   Musculoskeletal:  Positive for arthralgias. Negative for back pain and myalgias.   Skin:  Negative for pallor, rash and wound.   Allergic/Immunologic: Negative for immunocompromised state.   Neurological:  Negative for dizziness, tremors, facial asymmetry, speech difficulty and weakness.   Hematological:  Negative for adenopathy. Does not bruise/bleed easily.   Psychiatric/Behavioral:  Negative for confusion and sleep disturbance. The patient is not nervous/anxious.      Objective:     Vital Signs (Most Recent):  Temp: 98.5 °F (36.9 °C) (24 170)  Pulse: 94 (24 170)  Resp: 18 (24)  BP: 131/62 (24 170)  SpO2: 97 % (24) Vital Signs (24h Range):  Temp:  [98 °F (36.7 °C)-98.5 °F (36.9 °C)] 98.5 °F (36.9 °C)  Pulse:  [85-95] 94  Resp:   [14-20] 18  SpO2:  [91 %-100 %] 97 %  BP: (111-150)/(60-90) 131/62     Weight: 79.4 kg (175 lb)  Body mass index is 27.41 kg/m².     Physical Exam  Vitals and nursing note reviewed.   Constitutional:       General: She is not in acute distress.     Appearance: She is well-developed. She is not diaphoretic.   HENT:      Head: Normocephalic.      Mouth/Throat:      Mouth: Mucous membranes are moist.      Pharynx: Oropharynx is clear.   Eyes:      General: No scleral icterus.     Conjunctiva/sclera: Conjunctivae normal.      Pupils: Pupils are equal, round, and reactive to light.   Neck:      Vascular: No JVD.   Cardiovascular:      Rate and Rhythm: Normal rate and regular rhythm.      Heart sounds: Murmur heard.      No friction rub. No gallop.   Pulmonary:      Effort: Pulmonary effort is normal. No respiratory distress.      Breath sounds: Normal breath sounds. No wheezing or rales.   Abdominal:      General: Bowel sounds are normal. There is no distension.      Palpations: Abdomen is soft.      Tenderness: There is no abdominal tenderness. There is no guarding or rebound.   Musculoskeletal:         General: Tenderness, deformity and signs of injury present. Normal range of motion.      Cervical back: Normal range of motion and neck supple.      Comments: Left leg shortened and rotated outward.  Neurovascularly intact.   Lymphadenopathy:      Cervical: No cervical adenopathy.   Skin:     General: Skin is warm and dry.      Capillary Refill: Capillary refill takes less than 2 seconds.      Coloration: Skin is not pale.      Findings: No erythema or rash.   Neurological:      Mental Status: She is alert and oriented to person, place, and time.      Cranial Nerves: No cranial nerve deficit.      Sensory: No sensory deficit.      Coordination: Coordination normal.      Deep Tendon Reflexes: Reflexes normal.   Psychiatric:         Behavior: Behavior normal.         Thought Content: Thought content normal.          Judgment: Judgment normal.              CRANIAL NERVES     CN III, IV, VI   Pupils are equal, round, and reactive to light.       Significant Labs: All pertinent labs within the past 24 hours have been reviewed.  CBC:   Recent Labs   Lab 05/22/24  1210   WBC 6.70   HGB 12.6   HCT 39.3        CMP:   Recent Labs   Lab 05/22/24  1210      K 4.5      CO2 19*   *   BUN 34*   CREATININE 1.3   CALCIUM 9.0   PROT 6.8   ALBUMIN 3.7   BILITOT 1.6*   ALKPHOS 101   AST 26   ALT 31   ANIONGAP 11       Significant Imaging: I have reviewed all pertinent imaging results/findings within the past 24 hours.    Hip    FINDINGS:  There is a comminuted fracture of the proximal left femur including an intertrochanteric portion and a spiral portion extending into the proximal femur with angulation.  The femoral head remains in the acetabulum.  The distal shaft of the femur is the intact     Impression:     Comminuted fracture of the left femur including intertrochanteric fracture and a proximal shaft spiral component.  Mild angulation noted.  Assessment/Plan:     * Closed 2-part intertrochanteric fracture of left femur  Acute problem   NPO after midnight   Morphine p.r.n. pain   Zofran p.r.n. nausea   Orthopedics consulted   Plans for OR in a.m.   PT/OT consult when appropriate         Paroxysmal atrial fibrillation  Chronic problem   Continuous telemetry monitor   Eliquis on hold for surgery in a.m.       Type 2 diabetes mellitus with diabetic polyneuropathy, with long-term current use of insulin  Chronic problem   Patient's FSGs are uncontrolled due to hyperglycemia on current medication regimen.  Last A1c reviewed-   Lab Results   Component Value Date    HGBA1C 6.7 (H) 01/26/2024     Most recent fingerstick glucose reviewed-   Recent Labs   Lab 05/22/24  1123 05/22/24  1620   POCTGLUCOSE 138* 116*     Current correctional scale  Medium  Maintain anti-hyperglycemic dose as follows-   Antihyperglycemics (From  admission, onward)      Start     Stop Route Frequency Ordered    05/22/24 1643  insulin aspart U-100 pen 1-10 Units         -- SubQ Before meals & nightly PRN 05/22/24 1545          Hold Oral hypoglycemics while patient is in the hospital.       Hypertension associated with diabetes  Chronic problem   Chronic, controlled.  Latest blood pressure and vitals reviewed-     Temp:  [98 °F (36.7 °C)-98.5 °F (36.9 °C)]   Pulse:  [85-95]   Resp:  [14-20]   BP: (111-150)/(60-90)   SpO2:  [91 %-100 %] .   Home meds for hypertension were reviewed and noted below-  Hypertension Medications               carvediloL (COREG) 12.5 MG tablet Take 1 tablet (12.5 mg total) by mouth 2 (two) times daily.    furosemide (LASIX) 20 MG tablet Take 1 tablet (20 mg total) by mouth once daily.    olmesartan (BENICAR) 20 MG tablet Take 1 tablet (20 mg total) by mouth once daily.    spironolactone (ALDACTONE) 25 MG tablet Take 1 tablet (25 mg total) by mouth once daily.            While in the hospital, will manage blood pressure as follows; Continue home antihypertensive regimen    Will utilize p.r.n. blood pressure medication only if patient's blood pressure greater than 160/100 and she develops symptoms such as worsening chest pain or shortness of breath.         VTE Risk Mitigation (From admission, onward)           Ordered     IP VTE HIGH RISK PATIENT  Once         05/22/24 1619     Place sequential compression device  Until discontinued         05/22/24 1545     Place FRANKO hose  Until discontinued         05/22/24 1545                                    Obed Joseph NP  Department of Hospital Medicine  [facility] - Med/Surg

## 2024-05-22 NOTE — SUBJECTIVE & OBJECTIVE
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    GERD (gastroesophageal reflux disease)     Hyperlipidemia     Hypertension     Hypothyroidism     Paroxysmal atrial fibrillation 3/16/2023    Type 2 diabetes mellitus        Past Surgical History:   Procedure Laterality Date    ADENOIDECTOMY      AORTIC VALVE REPLACEMENT      BREAST BIOPSY Right 20 yrs ago    benign    CARDIAC SURGERY      CHOLECYSTECTOMY      COLONOSCOPY  5-    Dr Holly, 5 year recheck    EPIDURAL STEROID INJECTION N/A 3/25/2021    Procedure: Injection, Steroid, Epidural L3-4;  Surgeon: Sky Love MD;  Location: [facility];  Service: Pain Management;  Laterality: N/A;  Injection, Steroid, Epidural L3-4    EPIDURAL STEROID INJECTION N/A 5/20/2021    Procedure: Injection, Steroid, Epidural L3-4;  Surgeon: Sky Love MD;  Location: [facility] OR;  Service: Pain Management;  Laterality: N/A;  Injection, Steroid, Epidural L3-4    ESOPHAGOGASTRODUODENOSCOPY N/A 6/4/2020    Procedure: EGD (ESOPHAGOGASTRODUODENOSCOPY);  Surgeon: Michael Nugent III, MD;  Location: [facility];  Service: Endoscopy;  Laterality: N/A;    HEMORRHOID SURGERY      HYSTERECTOMY      OOPHORECTOMY      TONSILLECTOMY         Review of patient's allergies indicates:   Allergen Reactions    Fenofibric acid (choline)      Other reaction(s): Vomiting    Iodine      Other reaction(s): lips swollen ivp dye    Rosuvastatin      Other reaction(s): muscle pain       No current facility-administered medications on file prior to encounter.     Current  "Outpatient Medications on File Prior to Encounter   Medication Sig    carvediloL (COREG) 12.5 MG tablet Take 1 tablet (12.5 mg total) by mouth 2 (two) times daily.    cholecalciferol, vitamin D3, (VITAMIN D3) 125 mcg (5,000 unit) Tab Take 5,000 Units by mouth once daily.    ELIQUIS 5 mg Tab TAKE 1 TABLET(5 MG) BY MOUTH TWICE DAILY    empagliflozin (JARDIANCE) 10 mg tablet Take 1 tablet (10 mg total) by mouth once daily.    insulin (LANTUS SOLOSTAR U-100 INSULIN) glargine 100 units/mL SubQ pen Inject 24 Units into the skin every evening.    multivitamin (THERAGRAN) per tablet Take 1 tablet by mouth once daily.    blood sugar diagnostic (ACCU-CHEK GUIDE TEST STRIPS) Strp 300 each by Misc.(Non-Drug; Combo Route) route 3 (three) times daily.    blood-glucose meter kit Accu-chek Marley glucometer check glucose three times daily E11.65    clopidogreL (PLAVIX) 75 mg tablet Take 75 mg by mouth once daily. (Patient not taking: Reported on 5/22/2024)    DROPLET PEN NEEDLE 31 gauge x 5/16" Ndle USE AS DIRECTED WITH LANTUS SOLOSTAR PEN AS NEEDED    furosemide (LASIX) 20 MG tablet Take 1 tablet (20 mg total) by mouth once daily.    lancets (ACCU-CHEK SOFTCLIX LANCETS) Misc New machine Guide Me Accuchek    metFORMIN (GLUCOPHAGE) 1000 MG tablet Take 1 tablet (1,000 mg total) by mouth 2 (two) times daily with meals.    olmesartan (BENICAR) 20 MG tablet Take 1 tablet (20 mg total) by mouth once daily.    spironolactone (ALDACTONE) 25 MG tablet Take 1 tablet (25 mg total) by mouth once daily.    [DISCONTINUED] aspirin 81 mg Tab Take 81 mg by mouth once daily. Every day    [DISCONTINUED] benzonatate (TESSALON) 100 MG capsule Take 100 mg by mouth 3 (three) times daily as needed for Cough.    [DISCONTINUED] calcium carbonate/vitamin D3 (CALCIUM+D ORAL) Take 1 tablet by mouth once daily.    [DISCONTINUED] clobetasol 0.05% (TEMOVATE) 0.05 % Oint Apply 1 g topically 2 (two) times daily.    [DISCONTINUED] ketoconazole (NIZORAL) 2 % cream AAA " pannus fold bid (Patient taking differently: Apply 1 application  topically once daily. AAA pannus fold bid)    [DISCONTINUED] levocetirizine (XYZAL) 5 MG tablet Take 5 mg by mouth every evening.    [DISCONTINUED] triamcinolone acetonide 0.1% (KENALOG) 0.1 % cream AAA bid (Patient taking differently: Apply 1 g topically 2 (two) times daily. AAA bid)     Family History       Problem Relation (Age of Onset)    Cancer Father    Diabetes Mother, Brother    Early death Son, Son    Glaucoma Mother    Heart disease Mother    No Known Problems Daughter          Tobacco Use    Smoking status: Former     Current packs/day: 0.00     Average packs/day: 0.3 packs/day for 15.0 years (3.8 ttl pk-yrs)     Types: Cigarettes     Start date: 3/30/1959     Quit date: 3/30/1974     Years since quittin.1    Smokeless tobacco: Never   Substance and Sexual Activity    Alcohol use: No    Drug use: No    Sexual activity: Never     Review of Systems   Constitutional:  Positive for activity change. Negative for chills, diaphoresis and fever.   HENT:  Negative for congestion, nosebleeds and tinnitus.    Eyes:  Negative for photophobia and visual disturbance.   Respiratory:  Negative for cough, chest tightness, shortness of breath and wheezing.    Cardiovascular:  Negative for chest pain, palpitations and leg swelling.   Gastrointestinal:  Negative for abdominal distention, abdominal pain, constipation, diarrhea, nausea and vomiting.   Endocrine: Negative for cold intolerance and heat intolerance.   Genitourinary:  Negative for difficulty urinating, dysuria, frequency, hematuria and urgency.   Musculoskeletal:  Positive for arthralgias. Negative for back pain and myalgias.   Skin:  Negative for pallor, rash and wound.   Allergic/Immunologic: Negative for immunocompromised state.   Neurological:  Negative for dizziness, tremors, facial asymmetry, speech difficulty and weakness.   Hematological:  Negative for adenopathy. Does not bruise/bleed  easily.   Psychiatric/Behavioral:  Negative for confusion and sleep disturbance. The patient is not nervous/anxious.      Objective:     Vital Signs (Most Recent):  Temp: 98.5 °F (36.9 °C) (05/22/24 1702)  Pulse: 94 (05/22/24 1702)  Resp: 18 (05/22/24 1702)  BP: 131/62 (05/22/24 1702)  SpO2: 97 % (05/22/24 1702) Vital Signs (24h Range):  Temp:  [98 °F (36.7 °C)-98.5 °F (36.9 °C)] 98.5 °F (36.9 °C)  Pulse:  [85-95] 94  Resp:  [14-20] 18  SpO2:  [91 %-100 %] 97 %  BP: (111-150)/(60-90) 131/62     Weight: 79.4 kg (175 lb)  Body mass index is 27.41 kg/m².     Physical Exam  Vitals and nursing note reviewed.   Constitutional:       General: She is not in acute distress.     Appearance: She is well-developed. She is not diaphoretic.   HENT:      Head: Normocephalic.      Mouth/Throat:      Mouth: Mucous membranes are moist.      Pharynx: Oropharynx is clear.   Eyes:      General: No scleral icterus.     Conjunctiva/sclera: Conjunctivae normal.      Pupils: Pupils are equal, round, and reactive to light.   Neck:      Vascular: No JVD.   Cardiovascular:      Rate and Rhythm: Normal rate and regular rhythm.      Heart sounds: Murmur heard.      No friction rub. No gallop.   Pulmonary:      Effort: Pulmonary effort is normal. No respiratory distress.      Breath sounds: Normal breath sounds. No wheezing or rales.   Abdominal:      General: Bowel sounds are normal. There is no distension.      Palpations: Abdomen is soft.      Tenderness: There is no abdominal tenderness. There is no guarding or rebound.   Musculoskeletal:         General: Tenderness, deformity and signs of injury present. Normal range of motion.      Cervical back: Normal range of motion and neck supple.      Comments: Left leg shortened and rotated outward.  Neurovascularly intact.   Lymphadenopathy:      Cervical: No cervical adenopathy.   Skin:     General: Skin is warm and dry.      Capillary Refill: Capillary refill takes less than 2 seconds.       Coloration: Skin is not pale.      Findings: No erythema or rash.   Neurological:      Mental Status: She is alert and oriented to person, place, and time.      Cranial Nerves: No cranial nerve deficit.      Sensory: No sensory deficit.      Coordination: Coordination normal.      Deep Tendon Reflexes: Reflexes normal.   Psychiatric:         Behavior: Behavior normal.         Thought Content: Thought content normal.         Judgment: Judgment normal.              CRANIAL NERVES     CN III, IV, VI   Pupils are equal, round, and reactive to light.       Significant Labs: All pertinent labs within the past 24 hours have been reviewed.  CBC:   Recent Labs   Lab 05/22/24  1210   WBC 6.70   HGB 12.6   HCT 39.3        CMP:   Recent Labs   Lab 05/22/24  1210      K 4.5      CO2 19*   *   BUN 34*   CREATININE 1.3   CALCIUM 9.0   PROT 6.8   ALBUMIN 3.7   BILITOT 1.6*   ALKPHOS 101   AST 26   ALT 31   ANIONGAP 11       Significant Imaging: I have reviewed all pertinent imaging results/findings within the past 24 hours.    Hip    FINDINGS:  There is a comminuted fracture of the proximal left femur including an intertrochanteric portion and a spiral portion extending into the proximal femur with angulation.  The femoral head remains in the acetabulum.  The distal shaft of the femur is the intact     Impression:     Comminuted fracture of the left femur including intertrochanteric fracture and a proximal shaft spiral component.  Mild angulation noted.

## 2024-05-22 NOTE — NURSING
Nurses Note -- 4 Eyes      5/22/2024   5:35 PM      Skin assessed during: Admit      [x] No Altered Skin Integrity Present    []Prevention Measures Documented      [] Yes- Altered Skin Integrity Present or Discovered   [] LDA Added if Not in Epic (Describe Wound)   [] New Altered Skin Integrity was Present on Admit and Documented in LDA   [] Wound Image Taken    Wound Care Consulted? No    Attending Nurse:  Toby Melton RN/Staff Member:  María DIAS RN

## 2024-05-22 NOTE — ED PROVIDER NOTES
Encounter Date: 5/22/2024       History     Chief Complaint   Patient presents with    Hip Pain     Patient tripped over her friends walker and now has deformity left hip      89 y.o. female with   CKD, CHF, DM, coronary artery disease, GERD, HLD, HTN, hypothyroid, atrial fibrillation on Eliquis, aortic valve replacement presents  via EMS after mechanical fall that occurred just prior to arrival.  Patient reports she was walking when she  tripped on a friend's walker and fell onto her left hip.  She denies hitting her head neck or back.  She denies LOC. She received fentanyl with EMS prior to arrival and reports feeling nauseated when being moved to the ED bed with 1 episode of retching.  She denies any headache, neck pain, back pain.  She does report pain in the left hip that is worse with movements.    The history is provided by the patient and medical records.     Review of patient's allergies indicates:   Allergen Reactions    Fenofibric acid (choline)      Other reaction(s): Vomiting    Iodine      Other reaction(s): lips swollen ivp dye    Rosuvastatin      Other reaction(s): muscle pain     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    GERD (gastroesophageal reflux disease)     Hyperlipidemia     Hypertension     Hypothyroidism     Paroxysmal atrial fibrillation 3/16/2023    Type 2 diabetes mellitus      Past Surgical History:   Procedure Laterality Date    ADENOIDECTOMY      AORTIC VALVE REPLACEMENT      BREAST BIOPSY Right 20 yrs ago    benign    CARDIAC SURGERY       CHOLECYSTECTOMY      COLONOSCOPY  2014    Dr Holly, 5 year recheck    EPIDURAL STEROID INJECTION N/A 3/25/2021    Procedure: Injection, Steroid, Epidural L3-4;  Surgeon: Sky Love MD;  Location: [facility] OR;  Service: Pain Management;  Laterality: N/A;  Injection, Steroid, Epidural L3-4    EPIDURAL STEROID INJECTION N/A 2021    Procedure: Injection, Steroid, Epidural L3-4;  Surgeon: Sky Love MD;  Location: [facility] OR;  Service: Pain Management;  Laterality: N/A;  Injection, Steroid, Epidural L3-4    ESOPHAGOGASTRODUODENOSCOPY N/A 2020    Procedure: EGD (ESOPHAGOGASTRODUODENOSCOPY);  Surgeon: Michael Nugent III, MD;  Location: [facility];  Service: Endoscopy;  Laterality: N/A;    HEMORRHOID SURGERY      HYSTERECTOMY      OOPHORECTOMY      TONSILLECTOMY       Family History   Problem Relation Name Age of Onset    Diabetes Mother      Heart disease Mother      Glaucoma Mother      Cancer Father          lung cancer    Early death Son          brain tumor age 3    Diabetes Brother      No Known Problems Daughter      Early death Son          MVA 25    Macular degeneration Neg Hx      Retinal detachment Neg Hx       Social History     Tobacco Use    Smoking status: Former     Current packs/day: 0.00     Average packs/day: 0.3 packs/day for 15.0 years (3.8 ttl pk-yrs)     Types: Cigarettes     Start date: 3/30/1959     Quit date: 3/30/1974     Years since quittin.1    Smokeless tobacco: Never   Substance Use Topics    Alcohol use: No    Drug use: No     Review of Systems   Reason unable to perform ROS: See HPI for relevant ROS.       Physical Exam     Initial Vitals [24 1113]   BP Pulse Resp Temp SpO2   (!) 150/90 85 20 98 °F (36.7 °C) (!) 91 %      MAP       --         Physical Exam    Nursing note and vitals reviewed.  Constitutional:   Alert, normal work of breathing, no acute distress   HENT:   Mouth/Throat: Oropharynx is clear and moist.   Eyes: Conjunctivae are normal. Pupils are  equal, round, and reactive to light. No scleral icterus.   Cardiovascular:  Normal rate and regular rhythm.             Strong bilateral pedal pulses   Pulmonary/Chest: Breath sounds normal. No stridor. No respiratory distress.   Abdominal: Abdomen is soft. She exhibits no distension. There is no abdominal tenderness.   Musculoskeletal:      Comments: No tenderness, step-offs, deformities, or tenderness to the C, T, or L-spine   Decreased range of motion of the left hip with associated hip and proximal femur tenderness, left leg shortened and rotated  Normal range of motion of all other extremities without pain  No other bony tenderness or deformity of the trunk or extremities     Neurological: She is alert and oriented to person, place, and time. She has normal strength. No cranial nerve deficit.   Skin: Skin is warm and dry.         ED Course   Procedures  Labs Reviewed   CBC W/ AUTO DIFFERENTIAL - Abnormal; Notable for the following components:       Result Value    RDW 16.1 (*)     Immature Granulocytes 0.7 (*)     Immature Grans (Abs) 0.05 (*)     Lymph # 0.6 (*)     Gran % 80.1 (*)     Lymph % 8.8 (*)     All other components within normal limits   COMPREHENSIVE METABOLIC PANEL - Abnormal; Notable for the following components:    CO2 19 (*)     Glucose 156 (*)     BUN 34 (*)     Total Bilirubin 1.6 (*)     eGFR 39 (*)     All other components within normal limits   POCT GLUCOSE - Abnormal; Notable for the following components:    POCT Glucose 138 (*)     All other components within normal limits   POCT GLUCOSE - Abnormal; Notable for the following components:    POCT Glucose 116 (*)     All other components within normal limits   SARS-COV-2 RNA AMPLIFICATION, QUAL   POCT GLUCOSE, HAND-HELD DEVICE   TYPE & SCREEN          Imaging Results              X-Ray Knee 2 View Left (Final result)  Result time 05/22/24 13:44:12      Final result by Edgard Cazares Jr., MD (05/22/24 13:44:12)                    Impression:      moderate osteoarthritis left knee.      Electronically signed by: Edgard Cazares MD  Date:    05/22/2024  Time:    13:44               Narrative:    EXAMINATION:  XR KNEE 1 OR 2 VIEW LEFT    CLINICAL HISTORY:  left hip injury;    TECHNIQUE:  One or two views of the left knee were performed.    COMPARISON:  None    FINDINGS:  There is narrowing of both the medial and lateral intercondylar joint space and pointing of the intercondylar tibial eminences.  Mild spur formation of the tibia and patella are noted.  A joint effusion or fracture at the knee is not seen.                                       X-Ray Pelvis Routine AP (Final result)  Result time 05/22/24 13:43:21      Final result by Edgard Cazares Jr., MD (05/22/24 13:43:21)                   Impression:      Comminuted fracture with angulation of the left hip proximal shaft and intertrochanteric region.      Electronically signed by: Edgard Cazares MD  Date:    05/22/2024  Time:    13:43               Narrative:    EXAMINATION:  XR PELVIS ROUTINE AP    CLINICAL HISTORY:  left hip injury;    TECHNIQUE:  AP view of the pelvis was performed.    COMPARISON:  None.    FINDINGS:  There is a comminuted fracture of the proximal left femur including a spiral component into the proximal shaft and an intertrochanteric component with angulation noted.  The femoral head remains in the acetabulum.  The bones of the pelvis and the right hip are intact.  The sacroiliac joints are sharp and symmetric.                                       X-Ray Femur 2 View Left (Final result)  Result time 05/22/24 13:39:06      Final result by Edgard Cazares Jr., MD (05/22/24 13:39:06)                   Impression:      Comminuted fracture of the left femur including intertrochanteric fracture and a proximal shaft spiral component.  Mild angulation noted.      Electronically signed by: Edgard Cazares MD  Date:    05/22/2024  Time:    13:39                Narrative:    EXAMINATION:  XR FEMUR 2 VIEW LEFT    CLINICAL HISTORY:  left hip injury;    TECHNIQUE:  AP and lateral views of the left femur were performed.    COMPARISON:  None}    FINDINGS:  There is a comminuted fracture of the proximal left femur including an intertrochanteric portion and a spiral portion extending into the proximal femur with angulation.  The femoral head remains in the acetabulum.  The distal shaft of the femur is the intact                                       Medications   sodium chloride 0.9% flush 10 mL (has no administration in time range)   albuterol-ipratropium 2.5 mg-0.5 mg/3 mL nebulizer solution 3 mL (has no administration in time range)   melatonin tablet 9 mg (has no administration in time range)   ondansetron injection 4 mg (has no administration in time range)   senna-docusate 8.6-50 mg per tablet 1 tablet (has no administration in time range)   simethicone chewable tablet 80 mg (has no administration in time range)   aluminum-magnesium hydroxide-simethicone 200-200-20 mg/5 mL suspension 30 mL (has no administration in time range)   acetaminophen tablet 650 mg (has no administration in time range)   naloxone 0.4 mg/mL injection 0.02 mg (has no administration in time range)   potassium bicarbonate disintegrating tablet 50 mEq (has no administration in time range)   potassium bicarbonate disintegrating tablet 35 mEq (has no administration in time range)   potassium bicarbonate disintegrating tablet 60 mEq (has no administration in time range)   magnesium oxide tablet 800 mg (has no administration in time range)   magnesium oxide tablet 800 mg (has no administration in time range)   potassium, sodium phosphates 280-160-250 mg packet 2 packet (has no administration in time range)   potassium, sodium phosphates 280-160-250 mg packet 2 packet (has no administration in time range)   potassium, sodium phosphates 280-160-250 mg packet 2 packet (has no administration in time range)    insulin aspart U-100 pen 1-10 Units (has no administration in time range)   glucose chewable tablet 16 g (has no administration in time range)   glucose chewable tablet 24 g (has no administration in time range)   glucagon (human recombinant) injection 1 mg (has no administration in time range)   morphine injection 4 mg (has no administration in time range)   dextrose 10% bolus 125 mL 125 mL (has no administration in time range)   dextrose 10% bolus 250 mL 250 mL (has no administration in time range)   carvediloL tablet 12.5 mg (has no administration in time range)   spironolactone tablet 25 mg (has no administration in time range)   sodium chloride 0.9% flush 10 mL (has no administration in time range)   ondansetron injection 4 mg (4 mg Intravenous Given 5/22/24 1303)   morphine injection 4 mg (4 mg Intravenous Given 5/22/24 1303)     Medical Decision Making  89 y.o. female with   CKD, CHF, DM, coronary artery disease, GERD, HLD, HTN, hypothyroid, atrial fibrillation on Eliquis, aortic valve replacement presents  via EMS after mechanical fall with left hip injury   Differentials include intertrochanteric fracture, femoral neck fracture, femur fracture, doubt neurovascular injury or TBI   Patient presenting after mechanical fall onto her left hip, did not hit her head neck or back, GCS 15 with no focal weakness on exam.  Hemodynamically stable.  Has left hip pain with shortening and rotation of the extremity concerning for hip fracture.  Distal perfusion intact.    Patient with episode of retching while being transferred to ED bed, she did receive fentanyl prior to arrival, no headache, amnesia, or head injury to suggest a TBI etiology of her nausea, suspect secondary to fentanyl, Zofran ordered    Amount and/or Complexity of Data Reviewed  Independent Historian: EMS  External Data Reviewed: labs and notes.  Labs: ordered. Decision-making details documented in ED Course.  Radiology: ordered. Decision-making  details documented in ED Course.    Risk  Prescription drug management.  Decision regarding hospitalization.               ED Course as of 05/22/24 1706   Wed May 22, 2024   1243 CBC Auto Differential(!)    No leukocytosis or anemia [OK]   1315 X-Ray Femur 2 View Left  Proximal femur fracture [OK]   1319 X-Ray Femur 2 View Left  Findings, per independent interpretation:  proximal  femur fracture [OK]   1538  Case was discussed with Orthopedic surgery who recommended admission to hospital medicine and NPO at midnight [OK]      ED Course User Index  [OK] Gus Krueger MD                             Clinical Impression:  Final diagnoses:  [R29.6] Fall in elderly patient  [S72.142A] Closed displaced intertrochanteric fracture of left femur, initial encounter (Primary)          ED Disposition Condition    Admit Stable                Gus Krueger MD  05/22/24 1706

## 2024-05-22 NOTE — HPI
Leslie Tolliver an 89 year female who presents emergency room complaining of left hip pain.  She endorses mechanical fall at her residence today.  She fell striking her left hip on the floor.  She denies striking her head or loss of consciousness.  Denies any cervical pain.  Pain is worse with any movement of the left leg.  No alleviating factors.  No known sick contacts or travel.  She denies fever or chills.  Previous medical history includes hypertension, diabetes, hypothyroidism, hyperlipidemia, CKD, anemia, aortic valve replacement, paroxysmal AFib.  ER workup: CBC unremarkable.  CMP with glucose of 156 and bicarb 19 otherwise unremarkable.  X-ray demonstrates a left intertrochanteric fracture.  Orthopedics was consulted.  Plans to take patient to OR tomorrow.  Patient admitted to Hospital Medicine for treatment management.  Will maintain patient NPO after midnight.

## 2024-05-22 NOTE — TELEPHONE ENCOUNTER
Spoke with patient  Pre-op cataract surgery scheduled on 6/10/2024 with Kp Perez    Surgeries are scheduled on 6/24/2024 and 7/8  She will bring pre-op form

## 2024-05-22 NOTE — ASSESSMENT & PLAN NOTE
Chronic problem   Chronic, controlled.  Latest blood pressure and vitals reviewed-     Temp:  [98 °F (36.7 °C)-98.5 °F (36.9 °C)]   Pulse:  [85-95]   Resp:  [14-20]   BP: (111-150)/(60-90)   SpO2:  [91 %-100 %] .   Home meds for hypertension were reviewed and noted below-  Hypertension Medications               carvediloL (COREG) 12.5 MG tablet Take 1 tablet (12.5 mg total) by mouth 2 (two) times daily.    furosemide (LASIX) 20 MG tablet Take 1 tablet (20 mg total) by mouth once daily.    olmesartan (BENICAR) 20 MG tablet Take 1 tablet (20 mg total) by mouth once daily.    spironolactone (ALDACTONE) 25 MG tablet Take 1 tablet (25 mg total) by mouth once daily.            While in the hospital, will manage blood pressure as follows; Continue home antihypertensive regimen    Will utilize p.r.n. blood pressure medication only if patient's blood pressure greater than 160/100 and she develops symptoms such as worsening chest pain or shortness of breath.

## 2024-05-22 NOTE — PLAN OF CARE
Imaging reviewed of an 89-year-old female status post fall.  X-rays showed comminuted left subtrochanteric femur fracture.  We will plan for OR tomorrow, Thursday, for intramedullary nailing of the left femur.  NPO at midnight.  Medical clearance

## 2024-05-23 ENCOUNTER — ANESTHESIA (OUTPATIENT)
Dept: SURGERY | Facility: HOSPITAL | Age: 89
DRG: 481 | End: 2024-05-23
Payer: MEDICARE

## 2024-05-23 ENCOUNTER — ANESTHESIA EVENT (OUTPATIENT)
Dept: SURGERY | Facility: HOSPITAL | Age: 89
DRG: 481 | End: 2024-05-23
Payer: MEDICARE

## 2024-05-23 PROBLEM — I50.9 ACUTE DECOMPENSATED HEART FAILURE: Status: RESOLVED | Noted: 2023-11-16 | Resolved: 2024-05-23

## 2024-05-23 PROBLEM — I48.91 A-FIB: Status: RESOLVED | Noted: 2024-03-27 | Resolved: 2024-05-23

## 2024-05-23 PROBLEM — D64.9 ANEMIA: Status: ACTIVE | Noted: 2024-05-23

## 2024-05-23 PROBLEM — E11.9 DIABETES: Status: RESOLVED | Noted: 2024-03-27 | Resolved: 2024-05-23

## 2024-05-23 PROBLEM — D64.9 ACUTE ON CHRONIC ANEMIA: Status: RESOLVED | Noted: 2019-07-24 | Resolved: 2024-05-23

## 2024-05-23 LAB
ALBUMIN SERPL BCP-MCNC: 3.2 G/DL (ref 3.5–5.2)
ALP SERPL-CCNC: 106 U/L (ref 55–135)
ALT SERPL W/O P-5'-P-CCNC: 55 U/L (ref 10–44)
ANION GAP SERPL CALC-SCNC: 13 MMOL/L (ref 8–16)
AST SERPL-CCNC: 46 U/L (ref 10–40)
BASOPHILS # BLD AUTO: 0.04 K/UL (ref 0–0.2)
BASOPHILS NFR BLD: 0.5 % (ref 0–1.9)
BILIRUB SERPL-MCNC: 2.1 MG/DL (ref 0.1–1)
BUN SERPL-MCNC: 40 MG/DL (ref 8–23)
CALCIUM SERPL-MCNC: 8.5 MG/DL (ref 8.7–10.5)
CHLORIDE SERPL-SCNC: 108 MMOL/L (ref 95–110)
CO2 SERPL-SCNC: 18 MMOL/L (ref 23–29)
CREAT SERPL-MCNC: 1.3 MG/DL (ref 0.5–1.4)
DIFFERENTIAL METHOD BLD: ABNORMAL
EOSINOPHIL # BLD AUTO: 0.1 K/UL (ref 0–0.5)
EOSINOPHIL NFR BLD: 0.8 % (ref 0–8)
ERYTHROCYTE [DISTWIDTH] IN BLOOD BY AUTOMATED COUNT: 16.3 % (ref 11.5–14.5)
EST. GFR  (NO RACE VARIABLE): 39 ML/MIN/1.73 M^2
GLUCOSE SERPL-MCNC: 94 MG/DL (ref 70–110)
HCT VFR BLD AUTO: 36 % (ref 37–48.5)
HGB BLD-MCNC: 11.3 G/DL (ref 12–16)
IMM GRANULOCYTES # BLD AUTO: 0.03 K/UL (ref 0–0.04)
IMM GRANULOCYTES NFR BLD AUTO: 0.4 % (ref 0–0.5)
LYMPHOCYTES # BLD AUTO: 0.7 K/UL (ref 1–4.8)
LYMPHOCYTES NFR BLD: 9.6 % (ref 18–48)
MAGNESIUM SERPL-MCNC: 2 MG/DL (ref 1.6–2.6)
MCH RBC QN AUTO: 29 PG (ref 27–31)
MCHC RBC AUTO-ENTMCNC: 31.4 G/DL (ref 32–36)
MCV RBC AUTO: 93 FL (ref 82–98)
MONOCYTES # BLD AUTO: 0.9 K/UL (ref 0.3–1)
MONOCYTES NFR BLD: 12.7 % (ref 4–15)
NEUTROPHILS # BLD AUTO: 5.5 K/UL (ref 1.8–7.7)
NEUTROPHILS NFR BLD: 76 % (ref 38–73)
NRBC BLD-RTO: 0 /100 WBC
PHOSPHATE SERPL-MCNC: 4.4 MG/DL (ref 2.7–4.5)
PLATELET # BLD AUTO: 182 K/UL (ref 150–450)
PMV BLD AUTO: 11 FL (ref 9.2–12.9)
POCT GLUCOSE: 101 MG/DL (ref 70–110)
POCT GLUCOSE: 222 MG/DL (ref 70–110)
POCT GLUCOSE: 96 MG/DL (ref 70–110)
POTASSIUM SERPL-SCNC: 4.4 MMOL/L (ref 3.5–5.1)
PROT SERPL-MCNC: 6.1 G/DL (ref 6–8.4)
RBC # BLD AUTO: 3.89 M/UL (ref 4–5.4)
SODIUM SERPL-SCNC: 139 MMOL/L (ref 136–145)
WBC # BLD AUTO: 7.3 K/UL (ref 3.9–12.7)

## 2024-05-23 PROCEDURE — 36000710: Performed by: ORTHOPAEDIC SURGERY

## 2024-05-23 PROCEDURE — C1713 ANCHOR/SCREW BN/BN,TIS/BN: HCPCS | Performed by: ORTHOPAEDIC SURGERY

## 2024-05-23 PROCEDURE — 85025 COMPLETE CBC W/AUTO DIFF WBC: CPT | Performed by: NURSE PRACTITIONER

## 2024-05-23 PROCEDURE — 27200651 HC AIRWAY, LMA: Performed by: NURSE ANESTHETIST, CERTIFIED REGISTERED

## 2024-05-23 PROCEDURE — 36415 COLL VENOUS BLD VENIPUNCTURE: CPT | Performed by: NURSE PRACTITIONER

## 2024-05-23 PROCEDURE — 27000221 HC OXYGEN, UP TO 24 HOURS

## 2024-05-23 PROCEDURE — 25000003 PHARM REV CODE 250: Performed by: NURSE PRACTITIONER

## 2024-05-23 PROCEDURE — C1769 GUIDE WIRE: HCPCS | Performed by: ORTHOPAEDIC SURGERY

## 2024-05-23 PROCEDURE — 25000003 PHARM REV CODE 250: Performed by: STUDENT IN AN ORGANIZED HEALTH CARE EDUCATION/TRAINING PROGRAM

## 2024-05-23 PROCEDURE — 99900035 HC TECH TIME PER 15 MIN (STAT)

## 2024-05-23 PROCEDURE — 71000033 HC RECOVERY, INTIAL HOUR: Performed by: ORTHOPAEDIC SURGERY

## 2024-05-23 PROCEDURE — 63600175 PHARM REV CODE 636 W HCPCS: Performed by: ANESTHESIOLOGY

## 2024-05-23 PROCEDURE — 27245 TREAT THIGH FRACTURE: CPT | Mod: LT,,, | Performed by: ORTHOPAEDIC SURGERY

## 2024-05-23 PROCEDURE — 83735 ASSAY OF MAGNESIUM: CPT | Performed by: NURSE PRACTITIONER

## 2024-05-23 PROCEDURE — 94799 UNLISTED PULMONARY SVC/PX: CPT

## 2024-05-23 PROCEDURE — 80053 COMPREHEN METABOLIC PANEL: CPT | Performed by: NURSE PRACTITIONER

## 2024-05-23 PROCEDURE — 25000003 PHARM REV CODE 250: Performed by: ORTHOPAEDIC SURGERY

## 2024-05-23 PROCEDURE — 25000003 PHARM REV CODE 250: Performed by: NURSE ANESTHETIST, CERTIFIED REGISTERED

## 2024-05-23 PROCEDURE — 84100 ASSAY OF PHOSPHORUS: CPT | Performed by: NURSE PRACTITIONER

## 2024-05-23 PROCEDURE — 71000039 HC RECOVERY, EACH ADD'L HOUR: Performed by: ORTHOPAEDIC SURGERY

## 2024-05-23 PROCEDURE — 37000008 HC ANESTHESIA 1ST 15 MINUTES: Performed by: ORTHOPAEDIC SURGERY

## 2024-05-23 PROCEDURE — 11000001 HC ACUTE MED/SURG PRIVATE ROOM

## 2024-05-23 PROCEDURE — 25000003 PHARM REV CODE 250: Performed by: ANESTHESIOLOGY

## 2024-05-23 PROCEDURE — 0QS736Z REPOSITION LEFT UPPER FEMUR WITH INTRAMEDULLARY INTERNAL FIXATION DEVICE, PERCUTANEOUS APPROACH: ICD-10-PCS | Performed by: ORTHOPAEDIC SURGERY

## 2024-05-23 PROCEDURE — 94761 N-INVAS EAR/PLS OXIMETRY MLT: CPT

## 2024-05-23 PROCEDURE — 63600175 PHARM REV CODE 636 W HCPCS: Performed by: ORTHOPAEDIC SURGERY

## 2024-05-23 PROCEDURE — 63600175 PHARM REV CODE 636 W HCPCS: Performed by: NURSE ANESTHETIST, CERTIFIED REGISTERED

## 2024-05-23 PROCEDURE — 37000009 HC ANESTHESIA EA ADD 15 MINS: Performed by: ORTHOPAEDIC SURGERY

## 2024-05-23 PROCEDURE — 36000711: Performed by: ORTHOPAEDIC SURGERY

## 2024-05-23 PROCEDURE — 99223 1ST HOSP IP/OBS HIGH 75: CPT | Mod: 57,,, | Performed by: ORTHOPAEDIC SURGERY

## 2024-05-23 PROCEDURE — 27201423 OPTIME MED/SURG SUP & DEVICES STERILE SUPPLY: Performed by: ORTHOPAEDIC SURGERY

## 2024-05-23 DEVICE — TFNA FENESTRATED SCREW 105MM - STERILE
Type: IMPLANTABLE DEVICE | Site: FEMUR | Status: FUNCTIONAL
Brand: TFN-ADVANCE

## 2024-05-23 DEVICE — LOCKING SCREW FOR IM NAIL Ø 5MM/ 38MM/ XL25/ STERILE: Type: IMPLANTABLE DEVICE | Site: FEMUR | Status: FUNCTIONAL

## 2024-05-23 DEVICE — 12MM/130 DEG TI CANN TFNA 380MM/LEFT-STERILE
Type: IMPLANTABLE DEVICE | Site: FEMUR | Status: FUNCTIONAL
Brand: TFN-ADVANCE

## 2024-05-23 DEVICE — LOCKING SCREW FOR IM NAIL Ø 5MM/ 40MM/ XL25/ STERILE: Type: IMPLANTABLE DEVICE | Site: FEMUR | Status: FUNCTIONAL

## 2024-05-23 RX ORDER — EPHEDRINE SULFATE 50 MG/ML
INJECTION, SOLUTION INTRAVENOUS
Status: DISCONTINUED | OUTPATIENT
Start: 2024-05-23 | End: 2024-05-23

## 2024-05-23 RX ORDER — ONDANSETRON HYDROCHLORIDE 2 MG/ML
INJECTION, SOLUTION INTRAVENOUS
Status: DISCONTINUED | OUTPATIENT
Start: 2024-05-23 | End: 2024-05-23

## 2024-05-23 RX ORDER — HYDROMORPHONE HYDROCHLORIDE 2 MG/ML
0.2 INJECTION, SOLUTION INTRAMUSCULAR; INTRAVENOUS; SUBCUTANEOUS EVERY 5 MIN PRN
Status: DISCONTINUED | OUTPATIENT
Start: 2024-05-23 | End: 2024-05-31 | Stop reason: HOSPADM

## 2024-05-23 RX ORDER — LIDOCAINE HYDROCHLORIDE 10 MG/ML
1 INJECTION, SOLUTION EPIDURAL; INFILTRATION; INTRACAUDAL; PERINEURAL ONCE
Status: DISCONTINUED | OUTPATIENT
Start: 2024-05-23 | End: 2024-05-23

## 2024-05-23 RX ORDER — PROCHLORPERAZINE EDISYLATE 5 MG/ML
5 INJECTION INTRAMUSCULAR; INTRAVENOUS EVERY 4 HOURS PRN
Status: DISCONTINUED | OUTPATIENT
Start: 2024-05-23 | End: 2024-05-31 | Stop reason: HOSPADM

## 2024-05-23 RX ORDER — DIPHENHYDRAMINE HYDROCHLORIDE 50 MG/ML
25 INJECTION INTRAMUSCULAR; INTRAVENOUS EVERY 6 HOURS PRN
Status: DISCONTINUED | OUTPATIENT
Start: 2024-05-23 | End: 2024-05-31 | Stop reason: HOSPADM

## 2024-05-23 RX ORDER — PROPOFOL 10 MG/ML
VIAL (ML) INTRAVENOUS
Status: DISCONTINUED | OUTPATIENT
Start: 2024-05-23 | End: 2024-05-23

## 2024-05-23 RX ORDER — MUPIROCIN 20 MG/G
OINTMENT TOPICAL 2 TIMES DAILY
Status: COMPLETED | OUTPATIENT
Start: 2024-05-23 | End: 2024-05-25

## 2024-05-23 RX ORDER — PHENYLEPHRINE HYDROCHLORIDE 10 MG/ML
INJECTION INTRAVENOUS
Status: DISCONTINUED | OUTPATIENT
Start: 2024-05-23 | End: 2024-05-23

## 2024-05-23 RX ORDER — MUPIROCIN 20 MG/G
OINTMENT TOPICAL
Status: DISCONTINUED | OUTPATIENT
Start: 2024-05-23 | End: 2024-05-23

## 2024-05-23 RX ORDER — HYDROCODONE BITARTRATE AND ACETAMINOPHEN 5; 325 MG/1; MG/1
1 TABLET ORAL EVERY 6 HOURS PRN
Status: DISCONTINUED | OUTPATIENT
Start: 2024-05-23 | End: 2024-05-31 | Stop reason: HOSPADM

## 2024-05-23 RX ORDER — FENTANYL CITRATE 50 UG/ML
25 INJECTION, SOLUTION INTRAMUSCULAR; INTRAVENOUS EVERY 5 MIN PRN
Status: DISCONTINUED | OUTPATIENT
Start: 2024-05-23 | End: 2024-05-31 | Stop reason: HOSPADM

## 2024-05-23 RX ORDER — LIDOCAINE HYDROCHLORIDE 20 MG/ML
INJECTION INTRAVENOUS
Status: DISCONTINUED | OUTPATIENT
Start: 2024-05-23 | End: 2024-05-23

## 2024-05-23 RX ORDER — FENTANYL CITRATE 50 UG/ML
INJECTION, SOLUTION INTRAMUSCULAR; INTRAVENOUS
Status: DISCONTINUED | OUTPATIENT
Start: 2024-05-23 | End: 2024-05-23

## 2024-05-23 RX ORDER — LOSARTAN POTASSIUM 25 MG/1
50 TABLET ORAL DAILY
Status: DISCONTINUED | OUTPATIENT
Start: 2024-05-24 | End: 2024-05-24

## 2024-05-23 RX ORDER — MEPERIDINE HYDROCHLORIDE 50 MG/ML
12.5 INJECTION INTRAMUSCULAR; INTRAVENOUS; SUBCUTANEOUS ONCE
Status: DISCONTINUED | OUTPATIENT
Start: 2024-05-23 | End: 2024-05-24

## 2024-05-23 RX ORDER — DEXAMETHASONE SODIUM PHOSPHATE 4 MG/ML
INJECTION, SOLUTION INTRA-ARTICULAR; INTRALESIONAL; INTRAMUSCULAR; INTRAVENOUS; SOFT TISSUE
Status: DISCONTINUED | OUTPATIENT
Start: 2024-05-23 | End: 2024-05-23

## 2024-05-23 RX ORDER — OXYCODONE HYDROCHLORIDE 5 MG/1
5 TABLET ORAL
Status: DISCONTINUED | OUTPATIENT
Start: 2024-05-23 | End: 2024-05-31 | Stop reason: HOSPADM

## 2024-05-23 RX ORDER — ONDANSETRON HYDROCHLORIDE 2 MG/ML
4 INJECTION, SOLUTION INTRAVENOUS ONCE
Status: COMPLETED | OUTPATIENT
Start: 2024-05-23 | End: 2024-05-23

## 2024-05-23 RX ADMIN — APIXABAN 2.5 MG: 2.5 TABLET, FILM COATED ORAL at 08:05

## 2024-05-23 RX ADMIN — EPHEDRINE SULFATE 10 MG: 50 INJECTION, SOLUTION INTRAMUSCULAR; INTRAVENOUS; SUBCUTANEOUS at 03:05

## 2024-05-23 RX ADMIN — DEXAMETHASONE SODIUM PHOSPHATE 4 MG: 4 INJECTION, SOLUTION INTRA-ARTICULAR; INTRALESIONAL; INTRAMUSCULAR; INTRAVENOUS; SOFT TISSUE at 03:05

## 2024-05-23 RX ADMIN — CEFAZOLIN 2 G: 2 INJECTION, POWDER, FOR SOLUTION INTRAMUSCULAR; INTRAVENOUS at 03:05

## 2024-05-23 RX ADMIN — SENNOSIDES AND DOCUSATE SODIUM 1 TABLET: 50; 8.6 TABLET ORAL at 09:05

## 2024-05-23 RX ADMIN — PROPOFOL 120 MG: 10 INJECTION, EMULSION INTRAVENOUS at 03:05

## 2024-05-23 RX ADMIN — MUPIROCIN 1 G: 20 OINTMENT TOPICAL at 08:05

## 2024-05-23 RX ADMIN — LIDOCAINE HYDROCHLORIDE 100 MG: 20 INJECTION, SOLUTION INTRAVENOUS at 03:05

## 2024-05-23 RX ADMIN — SODIUM CHLORIDE, SODIUM GLUCONATE, SODIUM ACETATE, POTASSIUM CHLORIDE, MAGNESIUM CHLORIDE, SODIUM PHOSPHATE, DIBASIC, AND POTASSIUM PHOSPHATE: .53; .5; .37; .037; .03; .012; .00082 INJECTION, SOLUTION INTRAVENOUS at 04:05

## 2024-05-23 RX ADMIN — EPHEDRINE SULFATE 10 MG: 50 INJECTION, SOLUTION INTRAMUSCULAR; INTRAVENOUS; SUBCUTANEOUS at 04:05

## 2024-05-23 RX ADMIN — FENTANYL CITRATE 50 MCG: 50 INJECTION, SOLUTION INTRAMUSCULAR; INTRAVENOUS at 02:05

## 2024-05-23 RX ADMIN — PHENYLEPHRINE HYDROCHLORIDE 200 MCG: 10 INJECTION INTRAVENOUS at 03:05

## 2024-05-23 RX ADMIN — FENTANYL CITRATE 50 MCG: 50 INJECTION, SOLUTION INTRAMUSCULAR; INTRAVENOUS at 03:05

## 2024-05-23 RX ADMIN — FENTANYL CITRATE 25 MCG: 50 INJECTION, SOLUTION INTRAMUSCULAR; INTRAVENOUS at 05:05

## 2024-05-23 RX ADMIN — ACETAMINOPHEN 650 MG: 325 TABLET ORAL at 06:05

## 2024-05-23 RX ADMIN — SENNOSIDES AND DOCUSATE SODIUM 1 TABLET: 50; 8.6 TABLET ORAL at 08:05

## 2024-05-23 RX ADMIN — INSULIN ASPART 2 UNITS: 100 INJECTION, SOLUTION INTRAVENOUS; SUBCUTANEOUS at 08:05

## 2024-05-23 RX ADMIN — PHENYLEPHRINE HYDROCHLORIDE 0.3 MCG/KG/MIN: 10 INJECTION INTRAVENOUS at 03:05

## 2024-05-23 RX ADMIN — PHENYLEPHRINE HYDROCHLORIDE 100 MCG: 10 INJECTION INTRAVENOUS at 04:05

## 2024-05-23 RX ADMIN — ONDANSETRON 4 MG: 2 INJECTION INTRAMUSCULAR; INTRAVENOUS at 03:05

## 2024-05-23 RX ADMIN — SODIUM CHLORIDE, SODIUM GLUCONATE, SODIUM ACETATE, POTASSIUM CHLORIDE, MAGNESIUM CHLORIDE, SODIUM PHOSPHATE, DIBASIC, AND POTASSIUM PHOSPHATE: .53; .5; .37; .037; .03; .012; .00082 INJECTION, SOLUTION INTRAVENOUS at 02:05

## 2024-05-23 RX ADMIN — ONDANSETRON 4 MG: 2 INJECTION INTRAMUSCULAR; INTRAVENOUS at 05:05

## 2024-05-23 RX ADMIN — OXYCODONE 5 MG: 5 TABLET ORAL at 04:05

## 2024-05-23 NOTE — NURSING
Returns to room 307 Left  subtrochanteric femur fracture   Intramedullary nailing left femur Per Dr Guillermo surgical dressings x 3 CDI no redness swelling or drainage noted VSS afebrile AAOX 4 tele monitor in place O2 3L Nasal Cannula tolerating regular diet denies pain denies nausea daughter present at bed side

## 2024-05-23 NOTE — PT/OT/SLP PROGRESS
Occupational Therapy      Patient Name:  Leslie Tolliver   MRN:  [id number]    Patient not seen today secondary to Other (Comment) (Pt with L hip fracture and scheduled for surgery today. Will require new OT orders post op).    5/23/2024

## 2024-05-23 NOTE — HOSPITAL COURSE
Leslie Tolliver is an 89 year old female with a past medical history of HTN, CAD, DM, anemia, and Afib who presented with a fall suffering a comminuted left hip fracture. Orthopedic Surgery performed repair 5/23. PT/OT followed. Her course has been complicated by a mild KANNAN on CKD for which IV fluids were given and stabilized. One unit packed red cells ordered 5/28. Denied IPR via peer to peer. Accepted and transferred to SNF. By time of discharge the patient was tolerating a regular diet with improving admission symptoms. Patient seen and examined on day of discharge.    Physical exam on day of discharge:  Constitutional:       General: She is not in acute distress.  HENT:      Head: Normocephalic and atraumatic.   Cardiovascular:      Rate and Rhythm: Normal rate and regular rhythm.   Pulmonary:      Effort: Pulmonary effort is normal. No respiratory distress.   Musculoskeletal:      Comments: LLE NVI  Left hip area dressed  Left thigh mild to moderately more swollen than right   Neurological:      General: No focal deficit present.      Mental Status: She is alert and oriented to person, place, and time. Mental status is at baseline.   Psychiatric:         Mood and Affect: Affect normal.         Behavior: Behavior normal.         Thought Content: Thought content normal.

## 2024-05-23 NOTE — SUBJECTIVE & OBJECTIVE
"Interval History: see "Hospital Course"    Review of Systems   Constitutional:  Positive for activity change.   Musculoskeletal:  Positive for arthralgias, gait problem and myalgias.     Objective:     Vital Signs (Most Recent):  Temp: 98.3 °F (36.8 °C) (05/23/24 0812)  Pulse: 88 (05/23/24 0812)  Resp: 17 (05/23/24 0812)  BP: (!) 116/58 (05/23/24 0812)  SpO2: 98 % (05/23/24 0812) Vital Signs (24h Range):  Temp:  [97.5 °F (36.4 °C)-98.5 °F (36.9 °C)] 98.3 °F (36.8 °C)  Pulse:  [76-95] 88  Resp:  [14-18] 17  SpO2:  [95 %-100 %] 98 %  BP: (101-141)/(53-73) 116/58     Weight: 79.4 kg (175 lb)  Body mass index is 27.41 kg/m².    Intake/Output Summary (Last 24 hours) at 5/23/2024 1145  Last data filed at 5/23/2024 0630  Gross per 24 hour   Intake 0 ml   Output 800 ml   Net -800 ml         Physical Exam  Vitals and nursing note reviewed.   Constitutional:       General: She is not in acute distress.  HENT:      Head: Normocephalic and atraumatic.      Right Ear: External ear normal.      Left Ear: External ear normal.      Nose: Nose normal.      Mouth/Throat:      Mouth: Mucous membranes are moist.      Pharynx: Oropharynx is clear.   Eyes:      Extraocular Movements: Extraocular movements intact.      Conjunctiva/sclera: Conjunctivae normal.   Cardiovascular:      Rate and Rhythm: Normal rate and regular rhythm.      Pulses: Normal pulses.      Heart sounds: Normal heart sounds.   Pulmonary:      Effort: Pulmonary effort is normal.      Breath sounds: Normal breath sounds.   Abdominal:      General: Bowel sounds are normal.      Palpations: Abdomen is soft.   Musculoskeletal:         General: Tenderness, deformity and signs of injury present.      Cervical back: Normal range of motion and neck supple.      Comments: LLE shortened and externally rotated.   Skin:     General: Skin is warm and dry.   Neurological:      Mental Status: She is alert. Mental status is at baseline.   Psychiatric:         Mood and Affect: Mood " normal.         Behavior: Behavior normal.             Significant Labs: All pertinent labs within the past 24 hours have been reviewed.    Significant Imaging: I have reviewed all pertinent imaging results/findings within the past 24 hours.

## 2024-05-23 NOTE — CONSULTS
Patient ID: Leslie Tolliver is a 89 y.o. female    Chief Complaint:   Left leg pain     History of Present Illness:    Leslie Tolliver is a pleasant 89 y.o. female who presented to the ED with a chief complaint of left hip pain. This occurred after a fall from standing height. Patient was taken to the ED and X-rays revealed a left  comminuted subtrochanteric femur fracture. She was then admitted to the medicine service for optimization with orthopedic consultation.     Social Status:  lives with daughter  Ambulatory Status:  community ambulator  Pertinent Surgical/Medical Hx:  diabetes, chronic kidney disease, atrial fibrillation on anticoagulation     PAST MEDICAL HISTORY:   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    GERD (gastroesophageal reflux disease)     Hyperlipidemia     Hypertension     Hypothyroidism     Paroxysmal atrial fibrillation 3/16/2023    Type 2 diabetes mellitus      PAST SURGICAL HISTORY:   Past Surgical History:   Procedure Laterality Date    ADENOIDECTOMY      AORTIC VALVE REPLACEMENT      BREAST BIOPSY Right 20 yrs ago    benign    CARDIAC SURGERY      CHOLECYSTECTOMY      COLONOSCOPY  5-    Dr Holly, 5 year recheck    EPIDURAL STEROID INJECTION N/A 3/25/2021    Procedure: Injection, Steroid, Epidural L3-4;  Surgeon: Sky Love MD;  Location: [facility] OR;  Service: Pain Management;  Laterality: N/A;  Injection, Steroid, Epidural L3-4    EPIDURAL STEROID INJECTION N/A 5/20/2021    Procedure: Injection, Steroid, Epidural  L3-4;  Surgeon: Sky Love MD;  Location: [facility] OR;  Service: Pain Management;  Laterality: N/A;  Injection, Steroid, Epidural L3-4    ESOPHAGOGASTRODUODENOSCOPY N/A 2020    Procedure: EGD (ESOPHAGOGASTRODUODENOSCOPY);  Surgeon: Michael Nugent III, MD;  Location: [facility];  Service: Endoscopy;  Laterality: N/A;    HEMORRHOID SURGERY      HYSTERECTOMY      OOPHORECTOMY      TONSILLECTOMY       FAMILY HISTORY:   Family History   Problem Relation Name Age of Onset    Diabetes Mother      Heart disease Mother      Glaucoma Mother      Cancer Father          lung cancer    Early death Son          brain tumor age 3    Diabetes Brother      No Known Problems Daughter      Early death Son          MVA 25    Macular degeneration Neg Hx      Retinal detachment Neg Hx       SOCIAL HISTORY:   Social History     Occupational History    Not on file   Tobacco Use    Smoking status: Former     Current packs/day: 0.00     Average packs/day: 0.3 packs/day for 15.0 years (3.8 ttl pk-yrs)     Types: Cigarettes     Start date: 3/30/1959     Quit date: 3/30/1974     Years since quittin.1    Smokeless tobacco: Never   Substance and Sexual Activity    Alcohol use: No    Drug use: No    Sexual activity: Never        MEDICATIONS: Reviewed per Epic   ALLERGIES:   Review of patient's allergies indicates:   Allergen Reactions    Fenofibric acid (choline)      Other reaction(s): Vomiting    Iodine      Other reaction(s): lips swollen ivp dye    Rosuvastatin      Other reaction(s): muscle pain       Review of Systems:  Constitutional: no fever or chills  Eyes: no visual changes  ENT: no nasal congestion or sore throat  Respiratory: no cough or shortness of breath  Cardiovascular: no chest pain  Gastrointestinal: no nausea or vomiting, tolerating diet  Musculoskeletal: pain and soreness  All others negative        Physical Exam     Vitals:    24 1256   BP: (!) 141/58   Pulse: 95   Resp: 16   Temp: 97.3 °F (36.3 °C)     Alert  and oriented to person, place and time. No acute distress. Well-groomed, not ill appearing. Pupils round and reactive, normal respiratory effort, no audible wheezing.     Hip Exam    Global tenderness to palpation of the left hip  + Logroll  Leg held in shortened/externally rotated position  No evidence of open fracture, no masses/lesions/surgical scars  Neurovascularly intact: intact peroneal, tibial, sciatic nerve function   Palpable distal pulses  Compartments soft and compressible  Warm well perfused extremity, brisk cap refill to toes       Imagin view left Hip X-rays ordered/reviewed by me showing  comminuted subtrochanteric proximal femur fracture       Assessment    89 y.o. female with   Left subtrochanteric femur fracture    Plan    --Admit to Internal Medicine service for pre-operative clearance and medical evaluation.   --To OR today for operative fixation of left hip with  intramedullary nailing, long TFN  --Written and verbal consent obtained from  patient  --Pre-operative labs and imaging reviewed by me   --Bed rest, pfeiffer, NPO     Risks and complications were discussed including but not limited to the risks of anesthetic complications, infection, wound healing complications, non-union, mal-union, hardware failure, pain, stiffness, DVT, pulmonary embolism, perioperative medical risks (cardiac, pulmonary, renal, neurologic), and death among others were discussed. No guarantees were made and the patient and family elect to proceed. They fully understand the reported 30% mortality risk within the first year of surgery.

## 2024-05-23 NOTE — CONSULTS
"  [facility]/Surg  Adult Nutrition  Consult Note    SUMMARY     Recommendations  Advance diet as tolerated post-op to 2000 calorie daibetic  Add Boost Glucose control with all meals to supplement intake  Monitor intake during meal rounds  Collaborate with health care providers    Goal: intake > 50% upon RD F/U  Comments: pt was eating well PTA; she is in some pain and decreased appetite at present d/t trauma.  She denies any N/V or difficulty chewing or swallowing. Last BM on 5/21  Nutrition Goal Status: new  Communication of RD Recs: reviewed with physician    Nutrition Related Social Determinants of Health: SDOH: Adequate food in home environment    Assessment and Plan  Nutrition Problem  Increased nutrient needs    Related to (etiology):   Pending surgical repair of left femur/hip Fx    Signs and Symptoms (as evidenced by):   Increased nutrient demands for healing     Interventions/Recommendations (treatment strategy):  Will provide Boost glucose control all meals once diet advanced to diabetic diet post-op    Nutrition Diagnosis Status:   New     Malnutrition Assessment       Pt does not meet ASPEN criteria for Malnutrition at this time but is at risk d/t decreased appetite 2/2 mechanica fall at home resulting in fractured left femur. Surgery scheduled today.She denies any recent wt loss or change in intake prior to fall. She does take " fluid Pill" at home.  Wt Readings from Last 7 Encounters:   05/23/24 79.4 kg (175 lb 0.7 oz)   04/22/24 75.8 kg (167 lb)   04/03/24 77.5 kg (170 lb 13.7 oz)   03/28/24 78 kg (172 lb)   03/26/24 84.8 kg (187 lb)   03/25/24 85 kg (187 lb 6.3 oz)   01/22/24 80.7 kg (177 lb 14.6 oz)           Reason for Assessment  Dx:  Fracture of left femur; HTN; DM2; CKD3; HLD; Anemia;  PMH:  AVR; spinal stenosis; ASCVD  Reason For Assessment: consult  Diagnosis: other (see comments)  Interdisciplinary Rounds: did not attend  Nutrition Discharge Planning: pending    Nutrition " "Risk Screen    Nutrition Risk Screen: no indicators present    Nutrition/Diet History    Patient Reported Diet/Restrictions/Preferences: diabetic diet  Spiritual, Cultural Beliefs, Religion Practices, Values that Affect Care: no  Food Allergies: NKFA  Factors Affecting Nutritional Intake: decreased appetite    Anthropometrics    Temp: 97.3 °F (36.3 °C)  Height Method: Measured  Height: 5' 7" (170.2 cm)  Height (inches): 67 in  Weight Method: Bed Scale  Weight: 79.4 kg (175 lb 0.7 oz)  Weight (lb): 175.05 lb  Ideal Body Weight (IBW), Female: 135 lb  % Ideal Body Weight, Female (lb): 129.67 %  BMI (Calculated): 27.4  BMI Grade: 25 - 29.9 - overweight     Lab/Procedures/Meds   Latest Reference Range & Units Most Recent   Hemoglobin 12.0 - 16.0 g/dL 11.3 (L)  5/23/24 04:03   Hematocrit 37.0 - 48.5 % 36.0 (L)  5/23/24 04:03   (L): Data is abnormally low   Latest Reference Range & Units Most Recent   Hemoglobin A1C External 4.5 - 6.2 % 6.7 (H)  1/26/24 14:49   (H): Data is abnormally high   Latest Reference Range & Units Most Recent   Albumin 3.5 - 5.2 g/dL 3.2 (L)  5/23/24 04:03   (L): Data is abnormally low  Pertinent Labs Reviewed: reviewed  Pertinent Medications Reviewed: reviewed; Isolyte infusing @ 10 ml/hr; aldactone; losartan; carvedilol; ss insulin aspart    Physical Findings/Assessment  No skin integrity issues   Ruiz score = 17     Estimated/Assessed Needs    Weight Used For Calorie Calculations: 79.4 kg (175 lb 0.7 oz)  Energy Calorie Requirements (kcal): 2000 calories daily;  25/kg  Energy Need Method: Kcal/kg  Protein Requirements: 95 gm protein daily;  1.2/kg  Weight Used For Protein Calculations: 79.4 kg (175 lb 0.7 oz)     Estimated Fluid Requirement Method: RDA Method  RDA Method (mL): 2000     Nutrition Prescription Ordered    Current Diet Order: NPO for surgery  Oral Nutrition Supplement: none    Evaluation of Received Nutrient/Fluid Intake    Energy Calories Required: not meeting needs  Protein " Required: not meeting needs  Fluid Required: not meeting needs  Tolerance: tolerating  % Intake of Estimated Energy Needs: Other: NPO  % Meal Intake: NPO    Nutrition Risk    Level of Risk/Frequency of Follow-up: high 2 x week    Monitor and Evaluation    Food and Nutrient Intake: food and beverage intake  Food and Nutrient Adminstration: diet order  Knowledge/Beliefs/Attitudes: beliefs and attitudes  Physical Activity and Function: nutrition-related ADLs and IADLs  Anthropometric Measurements: weight change  Biochemical Data, Medical Tests and Procedures: electrolyte and renal panel, gastrointestinal profile, glucose/endocrine profile, inflammatory profile, other (specify), lipid profile  Nutrition-Focused Physical Findings: overall appearance, skin     Nutrition Follow-Up     yes

## 2024-05-23 NOTE — OP NOTE
OPERATIVE NOTE    Date of Surgery:  05/23/2024     Pre-operative Diagnosis: Left  subtrochanteric femur fracture    Post-operative Diagnosis: Left  subtrochanteric femur fracture    Procedure:   Intramedullary nailing left femur    Surgeon:   Bravo Guillermo M.D.    Asst:    Tung Noble, Certified First Assist    He was present and scrubbed for the entirety of the procedure. They were instrumental in patient positioning, insertion of implants and retractions. Without him/her I would have been unable to safely perform the case.     Anesthesia: General    EBL:  150cc    Specimens: None    Findings: Comminuted proximal femur fracture    Dispo:  To PACU extubated/stable    Implants:    Implant Name Type Inv. Item Serial No.  Lot No. LRB No. Used Action   NAIL INTRAMED 130D L [id number] - [id number]  NAIL INTRAMED 130D L [id number]  [employer] [id number] Left 1 Implanted   SCREW FEM NECK PERF GOLD 105MM - [id number]  SCREW FEM NECK PERF GOLD 105MM  [employer] [id number] Left 1 Implanted   SCREW XL25 IM NAIL 40X5MM - [id number]  SCREW XL25 IM NAIL 40X5MM  [employer] [id number] Left 1 Implanted   WIRE GUIDE 3.2MM 400MM - [id number]  WIRE GUIDE 3.2MM 400MM  [employer]  Left 2 Implanted and Explanted   SCREW XL25 IM NAIL 38X5MM - [id number]  SCREW XL25 IM NAIL 38X5MM  [employer] [id number] Left 1 Implanted           Indications for Procedure:    The patient is a 89 y.o. female who sustained a ground level fall resulting in a left subtrochanteric femur fracture.  We are proceeding to the operating room for cephalomedullary nail fixation.  The risks, benefits and alternatives to surgery were discussed with the patient and family at length prior to going to the operating room.  Informed consent was obtained for fixation.  The patient was optimized by Internal Medicine prior to going to the operating room.    Procedure in Detail:    The patient was identified in the preoperative holding area and the site was marked.    Patient was wheeled into the operating room and general endotracheal anesthesia was induced on the patient's hospital bed.  Preoperative antibiotics were administered.  The patient was placed onto the Nicholls fracture table with all bony prominences well padded and both lower extremities in traction boots.  A time-out was undertaken to confirm patient, side, site, surgery, surgeon and the administration of preoperative antibiotics.  All agreed and we proceeded.    Closed reduction maneuvers were performed on the table gaining good alignment.  The left  hip and lower extremity were prepped and draped in sterile fashion.  A starting incision was made proximal to the greater trochanter and the guidewire for the entry reamer was placed in the appropriate position.  Entry reamer was then used.  A guide anirudh was passed distally and measurement undertaken.  The canal was then reamed with a 12.5 mm reamer through the isthmus.  A size 12 x 380 Synthes TFN nail was placed over the guidewire and seated down into the canal. X-ray was checked of the lateral distal femur to make sure we were not too anterior.     Attention was then turned proximally to the hip and the nail seated at its final position.  A guidewire for the hip screw was then placed in a center center position in the femoral head under fluoroscopic guidance.  This was then reamed and measured and a 105 mm hip screw was placed gaining good compression through the insertion handle.  Final position was confirmed under fluoroscopy and insertion handle was removed.     Attention was then turned distally and 2 distal interlocking screws were placed without difficulty.  The wounds were then copiously irrigated with normal saline solution and the proximal wound closed with 0 Vicryl suture the fascia, and all wounds closed with 2-0 Vicryl suture in the subcutaneous tissue, followed by staples in the skin.  Sterile dressings were applied.    All instrument and sponge counts  were reported correct at the end of the case.  There were no complications.  The patient was returned to the hospital bed, extubated, awakened and taken to the recovery room in stable condition.    Disposition:     Immediate physical and occupational therapy (weight bearing status:  25% weight-bearing)   Multimodal pain management limiting narcotics.  Anticoagulation x1 month.  Follow up in 2-3 weeks with X-rays of the femur and wound check.

## 2024-05-23 NOTE — PROGRESS NOTES
"[facility] Medicine  Progress Note    Patient Name: Leslie Tolliver  MRN: [id number]  Patient Class: IP- Inpatient   Admission Date: 5/22/2024  Length of Stay: 1 days  Attending Physician: Raphael Haywood MD  Primary Care Provider: Chang Christianson MD        Subjective:     Principal Problem:Closed 2-part intertrochanteric fracture of left femur        HPI:  Leslie Tolliver an 89 year female who presents emergency room complaining of left hip pain.  She endorses mechanical fall at her residence today.  She fell striking her left hip on the floor.  She denies striking her head or loss of consciousness.  Denies any cervical pain.  Pain is worse with any movement of the left leg.  No alleviating factors.  No known sick contacts or travel.  She denies fever or chills.  Previous medical history includes hypertension, diabetes, hypothyroidism, hyperlipidemia, CKD, anemia, aortic valve replacement, paroxysmal AFib.  ER workup: CBC unremarkable.  CMP with glucose of 156 and bicarb 19 otherwise unremarkable.  X-ray demonstrates a left intertrochanteric fracture.  Orthopedics was consulted.  Plans to take patient to OR tomorrow.  Patient admitted to Hospital Medicine for treatment management.  Will maintain patient NPO after midnight.    Overview/Hospital Course:  Leslie Tolliver is an 89 year old female with a past medical history of HTN, CAD, DM, anemia, and Afib who presented with a fall suffering a comminuted left hip fracture. Orthopedic Surgery has been consulted and will take the patient to the OR 5/23.    Interval History: see "Hospital Course"    Review of Systems   Constitutional:  Positive for activity change.   Musculoskeletal:  Positive for arthralgias, gait problem and myalgias.     Objective:     Vital Signs (Most Recent):  Temp: 98.3 °F (36.8 °C) (05/23/24 0812)  Pulse: 88 (05/23/24 0812)  Resp: 17 (05/23/24 0812)  BP: (!) 116/58 (05/23/24 0812)  SpO2: 98 % (05/23/24 0812) " Vital Signs (24h Range):  Temp:  [97.5 °F (36.4 °C)-98.5 °F (36.9 °C)] 98.3 °F (36.8 °C)  Pulse:  [76-95] 88  Resp:  [14-18] 17  SpO2:  [95 %-100 %] 98 %  BP: (101-141)/(53-73) 116/58     Weight: 79.4 kg (175 lb)  Body mass index is 27.41 kg/m².    Intake/Output Summary (Last 24 hours) at 5/23/2024 1145  Last data filed at 5/23/2024 0630  Gross per 24 hour   Intake 0 ml   Output 800 ml   Net -800 ml         Physical Exam  Vitals and nursing note reviewed.   Constitutional:       General: She is not in acute distress.  HENT:      Head: Normocephalic and atraumatic.      Right Ear: External ear normal.      Left Ear: External ear normal.      Nose: Nose normal.      Mouth/Throat:      Mouth: Mucous membranes are moist.      Pharynx: Oropharynx is clear.   Eyes:      Extraocular Movements: Extraocular movements intact.      Conjunctiva/sclera: Conjunctivae normal.   Cardiovascular:      Rate and Rhythm: Normal rate and regular rhythm.      Pulses: Normal pulses.      Heart sounds: Normal heart sounds.   Pulmonary:      Effort: Pulmonary effort is normal.      Breath sounds: Normal breath sounds.   Abdominal:      General: Bowel sounds are normal.      Palpations: Abdomen is soft.   Musculoskeletal:         General: Tenderness, deformity and signs of injury present.      Cervical back: Normal range of motion and neck supple.      Comments: LLE shortened and externally rotated.   Skin:     General: Skin is warm and dry.   Neurological:      Mental Status: She is alert. Mental status is at baseline.   Psychiatric:         Mood and Affect: Mood normal.         Behavior: Behavior normal.             Significant Labs: All pertinent labs within the past 24 hours have been reviewed.    Significant Imaging: I have reviewed all pertinent imaging results/findings within the past 24 hours.    Assessment/Plan:      * Closed 2-part intertrochanteric fracture of left femur  -Orthopedic Surgery  -Fall precautions  -Neurologic  checks  -PRN analgesics  -PT/OT post-operatively  -NPO  -Hold Eliquis    Anemia  Stable.  -Trend Hgb with CBC    Paroxysmal atrial fibrillation  -Coreg  -Telemetry  -Hold Eliquis    Status post aortic valve replacement with bioprosthetic valve  Stable.      Stage 3b chronic kidney disease  Stable.  -Renally dose medications  -Avoid nephrotoxic agents    Hyperlipidemia associated with type 2 diabetes mellitus        Type 2 diabetes mellitus with diabetic polyneuropathy, with long-term current use of insulin  Chronic problem   Patient's FSGs are uncontrolled due to hyperglycemia on current medication regimen.  Last A1c reviewed-   Lab Results   Component Value Date    HGBA1C 6.7 (H) 01/26/2024     Most recent fingerstick glucose reviewed-   Recent Labs   Lab 05/22/24  1620 05/22/24 2016 05/23/24  0747   POCTGLUCOSE 116* 122* 101       Current correctional scale  Medium  Maintain anti-hyperglycemic dose as follows-   Antihyperglycemics (From admission, onward)      Start     Stop Route Frequency Ordered    05/22/24 1643  insulin aspart U-100 pen 1-10 Units         -- SubQ Before meals & nightly PRN 05/22/24 1545          Hold Oral hypoglycemics while patient is in the hospital.       Hypertension associated with diabetes  Chronic problem   Chronic, controlled.  Latest blood pressure and vitals reviewed-     Temp:  [97.5 °F (36.4 °C)-98.5 °F (36.9 °C)]   Pulse:  [76-95]   Resp:  [14-18]   BP: (101-141)/(53-73)   SpO2:  [95 %-100 %] .   Home meds for hypertension were reviewed and noted below-  Hypertension Medications               carvediloL (COREG) 12.5 MG tablet Take 1 tablet (12.5 mg total) by mouth 2 (two) times daily.    furosemide (LASIX) 20 MG tablet Take 1 tablet (20 mg total) by mouth once daily.    olmesartan (BENICAR) 20 MG tablet Take 1 tablet (20 mg total) by mouth once daily.    spironolactone (ALDACTONE) 25 MG tablet Take 1 tablet (25 mg total) by mouth once daily.            While in the hospital, will  manage blood pressure as follows; Continue home antihypertensive regimen with exception of Lasix.    Will utilize p.r.n. blood pressure medication only if patient's blood pressure greater than 160/100 and she develops symptoms such as worsening chest pain or shortness of breath.         VTE Risk Mitigation (From admission, onward)           Ordered     IP VTE HIGH RISK PATIENT  Once         05/22/24 1619     Place sequential compression device  Until discontinued         05/22/24 1545     Place FRANKO hose  Until discontinued         05/22/24 1545                    Discharge Planning   MORA: 5/27/2024     Code Status: Full Code   Is the patient medically ready for discharge?:     Reason for patient still in hospital (select all that apply): Patient trending condition, Treatment, Consult recommendations, PT / OT recommendations, and Pending disposition                     Raphael Haywood MD  Department of Hospital Medicine   [facility]/Surg

## 2024-05-23 NOTE — TRANSFER OF CARE
"Anesthesia Transfer of Care Note    Patient: Leslie Tolliver    Procedure(s) Performed: Procedure(s) (LRB):  INSERTION, INTRAMEDULLARY RACQUEL, FEMUR (Left)    Patient location: PACU    Anesthesia Type: general    Transport from OR: Transported from OR on 2-3 L/min O2 by NC with adequate spontaneous ventilation    Post pain: adequate analgesia    Post assessment: no apparent anesthetic complications    Post vital signs: stable    Level of consciousness: awake    Nausea/Vomiting: no nausea/vomiting    Complications: none    Transfer of care protocol was followed      Last vitals: Visit Vitals  BP (!) 141/58 (BP Location: Left arm, Patient Position: Lying)   Pulse 95   Temp 36.3 °C (97.3 °F) (Skin)   Resp 16   Ht 5' 7" (1.702 m)   Wt 79.4 kg (175 lb 0.7 oz)   SpO2 96%   Breastfeeding No   BMI 27.42 kg/m²     "

## 2024-05-23 NOTE — PLAN OF CARE
Pt remained stable this shift. Repositioned BLE as needed. Offered pain medication, pt refused. Pure wick in place. Safety precautions maintained throughout shift. NPO since midnight.   Problem: Infection  Goal: Absence of Infection Signs and Symptoms  Outcome: Progressing     Problem: Diabetes Comorbidity  Goal: Blood Glucose Level Within Targeted Range  Outcome: Progressing     Problem: Fall Injury Risk  Goal: Absence of Fall and Fall-Related Injury  Outcome: Progressing

## 2024-05-23 NOTE — PLAN OF CARE
Patient ready for surgery. Surgery and anesthesia consents signed. Belongings in room. Daughter set up with text message notifications.

## 2024-05-23 NOTE — ANESTHESIA PREPROCEDURE EVALUATION
05/23/2024  Leslie Tolliver is a 89 y.o., female.      Pre-op Assessment    I have reviewed the Patient Summary Reports.     I have reviewed the Nursing Notes. I have reviewed the NPO Status.   I have reviewed the Medications.     Review of Systems  Anesthesia Hx:  No problems with previous Anesthesia                Social:  Former Smoker       Hematology/Oncology:       -- Anemia:                                  Cardiovascular:     Hypertension, well controlled Valvular problems/Murmurs (s/p AVR porcine)  CAD    Dysrhythmias atrial fibrillation      hyperlipidemia                             Pulmonary:  Pulmonary Normal                       Renal/:  Chronic Renal Disease (stage 3b), CKD                Hepatic/GI:     GERD             Musculoskeletal:         Spine Disorders: lumbar Chronic Pain           Neurological:    Neuromuscular Disease,                                   Endocrine:  Diabetes, well controlled, type 2 Hypothyroidism              Physical Exam  General: Well nourished, Cooperative, Alert and Oriented    Airway:  Mallampati: II   Mouth Opening: Normal  TM Distance: Normal  Neck ROM: Normal ROM    Anesthesia Plan  Type of Anesthesia, risks & benefits discussed:    Anesthesia Type: Gen ETT, Gen Supraglottic Airway, Gen Natural Airway, MAC  Intra-op Monitoring Plan: Standard ASA Monitors  Post Op Pain Control Plan: multimodal analgesia  Induction:  IV  Airway Plan: Direct, Video and Fiberoptic, Post-Induction  Informed Consent: Informed consent signed with the Patient and all parties understand the risks and agree with anesthesia plan.  All questions answered.   ASA Score: 3 Emergent    Ready For Surgery From Anesthesia Perspective.   .

## 2024-05-23 NOTE — PLAN OF CARE
Release per anesthesia vital signs stable instructed on IS no n&v  encouraged deep breaths has all belongings. Pt has glasses and necklace.

## 2024-05-23 NOTE — ASSESSMENT & PLAN NOTE
-Orthopedic Surgery  -Fall precautions  -Neurologic checks  -PRN analgesics  -PT/OT post-operatively  -NPO  -Hold Eliquis

## 2024-05-23 NOTE — ASSESSMENT & PLAN NOTE
Chronic problem   Patient's FSGs are uncontrolled due to hyperglycemia on current medication regimen.  Last A1c reviewed-   Lab Results   Component Value Date    HGBA1C 6.7 (H) 01/26/2024     Most recent fingerstick glucose reviewed-   Recent Labs   Lab 05/22/24  1620 05/22/24 2016 05/23/24  0747   POCTGLUCOSE 116* 122* 101       Current correctional scale  Medium  Maintain anti-hyperglycemic dose as follows-   Antihyperglycemics (From admission, onward)    Start     Stop Route Frequency Ordered    05/22/24 1643  insulin aspart U-100 pen 1-10 Units         -- SubQ Before meals & nightly PRN 05/22/24 1545        Hold Oral hypoglycemics while patient is in the hospital.

## 2024-05-23 NOTE — PLAN OF CARE
"Recommendations  Advance diet as tolerated post-op to 2000 calorie daibetic  Add Boost Glucose control with all meals to supplement intake  Monitor intake during meal rounds  Collaborate with health care providers     Goal: intake > 50% upon RD F/U  Comments: pt was eating well PTA; she is in some pain and decreased appetite at present d/t trauma.  She denies any N/V or difficulty chewing or swallowing. Last BM on 5/21  Nutrition Goal Status: new  Communication of RD Recs: reviewed with physician     Nutrition Related Social Determinants of Health: SDOH: Adequate food in home environment     Assessment and Plan  Nutrition Problem  Increased nutrient needs     Related to (etiology):   Pending surgical repair of left femur/hip Fx     Signs and Symptoms (as evidenced by):   Increased nutrient demands for healing      Interventions/Recommendations (treatment strategy):  Will provide Boost glucose control all meals once diet advanced to diabetic diet post-op     Nutrition Diagnosis Status:   New     Malnutrition Assessment    Pt does not meet ASPEN criteria for Malnutrition at this time but is at risk d/t decreased appetite 2/2 [employer] fall at home resulting in fractured left femur. Surgery scheduled today.She denies any recent wt loss or change in intake prior to fall. She does take " fluid Pill" at home.      Wt Readings from Last 7 Encounters:   05/23/24 79.4 kg (175 lb 0.7 oz)   04/22/24 75.8 kg (167 lb)   04/03/24 77.5 kg (170 lb 13.7 oz)   03/28/24 78 kg (172 lb)   03/26/24 84.8 kg (187 lb)   03/25/24 85 kg (187 lb 6.3 oz)   01/22/24 80.7 kg (177 lb 14.6 oz)       "

## 2024-05-23 NOTE — PT/OT/SLP PROGRESS
Physical Therapy      Patient Name:  Leslie Tolliver   MRN:  [id number]    PT orders acknowledged.pt with L femur fx and awaiting surgery. Will need post op orders.

## 2024-05-23 NOTE — PLAN OF CARE
Pt off floor for surgery, will follow up     05/23/24 0474   Discharge Assessment   Assessment Type Discharge Planning Assessment

## 2024-05-23 NOTE — BRIEF OP NOTE
[facility] Med/Surg  Brief Operative Note    SUMMARY     Surgery Date: 5/23/2024     Surgeons and Role:     * Bravo Guillermo MD - Primary    Assisting Surgeon: None    Pre-op Diagnosis:  Closed displaced intertrochanteric fracture of left femur, initial encounter [S72.142A]    Post-op Diagnosis:  Post-Op Diagnosis Codes:     * Closed displaced intertrochanteric fracture of left femur, initial encounter [S72.142A]    Procedure(s) (LRB):  INSERTION, INTRAMEDULLARY RACQUEL, FEMUR (Left)    Anesthesia: General    Implants:  Implant Name Type Inv. Item Serial No.  Lot No. LRB No. Used Action   NAIL INTRAMED 130D L [id number] - [id number]  NAIL INTRAMED 130D L [id number]  [employer] [facility] [id number] Left 1 Implanted   SCREW FEM NECK PERF GOLD 105MM - [id number]  SCREW FEM NECK PERF GOLD 105MM  [employer] [id number] Left 1 Implanted   SCREW XL25 IM NAIL 40X5MM - [id number]  SCREW XL25 IM NAIL 40X5MM  [employer] [id number] Left 1 Implanted   SCREW XL25 IM NAIL 38X5MM - [id number]  SCREW XL25 IM NAIL 38X5MM  [employer] [id number] Left 1 Implanted       Operative Findings: commiuted subtroch fx    Estimated Blood Loss: 100 mL    Estimated Blood Loss has been documented.         Specimens:   Specimen (24h ago, onward)      None            [id number]

## 2024-05-23 NOTE — ANESTHESIA PROCEDURE NOTES
Intubation    Date/Time: 5/23/2024 3:15 PM    Performed by: Valdo Henley CRNA  Authorized by: Valdo Henley CRNA    Intubation:     Induction:  Intravenous    Intubated:  N/a    Mask Ventilation:  N/a    Attempts:  1    Attempted By:  CRNA    Difficult Airway Encountered?: No      Complications:  None    Airway Device:  Supraglottic airway/LMA    Airway Device Size:  3.0    Style/Cuff Inflation:  Cuffed (inflated to minimal occlusive pressure)    Placement Verified By:  Capnometry    Complicating Factors:  None

## 2024-05-23 NOTE — CARE UPDATE
05/23/24 0804   Patient Assessment/Suction   Level of Consciousness (AVPU) alert   Respiratory Effort Unlabored;Normal   Expansion/Accessory Muscles/Retractions no use of accessory muscles;no retractions;expansion symmetric   All Lung Fields Breath Sounds Anterior:;Lateral:;clear   Rhythm/Pattern, Respiratory unlabored;pattern regular;depth regular   PRE-TX-O2   Device (Oxygen Therapy) nasal cannula   $ Is the patient on Low Flow Oxygen? Yes   Flow (L/min) (Oxygen Therapy) 2   SpO2 95 %   Pulse Oximetry Type Intermittent   $ Pulse Oximetry - Multiple Charge Pulse Oximetry - Multiple   Pulse 87   Resp 16   Aerosol Therapy   $ Aerosol Therapy Charges PRN treatment not required   Respiratory Treatment Status (SVN) PRN treatment not required   Incentive Spirometer   $ Incentive Spirometer Charges preop instruction   Incentive Spirometer Predicted Level (mL) 1680   Administration (IS) mouthpiece utilized;instruction provided, initial   Number of Repetitions (IS) 5   Level Incentive Spirometer (mL) 1500   Patient Tolerance (IS) good;no adverse signs/symptoms present

## 2024-05-23 NOTE — ASSESSMENT & PLAN NOTE
Chronic problem   Chronic, controlled.  Latest blood pressure and vitals reviewed-     Temp:  [97.5 °F (36.4 °C)-98.5 °F (36.9 °C)]   Pulse:  [76-95]   Resp:  [14-18]   BP: (101-141)/(53-73)   SpO2:  [95 %-100 %] .   Home meds for hypertension were reviewed and noted below-  Hypertension Medications               carvediloL (COREG) 12.5 MG tablet Take 1 tablet (12.5 mg total) by mouth 2 (two) times daily.    furosemide (LASIX) 20 MG tablet Take 1 tablet (20 mg total) by mouth once daily.    olmesartan (BENICAR) 20 MG tablet Take 1 tablet (20 mg total) by mouth once daily.    spironolactone (ALDACTONE) 25 MG tablet Take 1 tablet (25 mg total) by mouth once daily.            While in the hospital, will manage blood pressure as follows; Continue home antihypertensive regimen with exception of Lasix.    Will utilize p.r.n. blood pressure medication only if patient's blood pressure greater than 160/100 and she develops symptoms such as worsening chest pain or shortness of breath.

## 2024-05-23 NOTE — CARE UPDATE
05/22/24 2021   Patient Assessment/Suction   Level of Consciousness (AVPU) alert   All Lung Fields Breath Sounds Anterior:;equal bilaterally;clear   PRE-TX-O2   Device (Oxygen Therapy) nasal cannula   Flow (L/min) (Oxygen Therapy) 2   SpO2 97 %   Pulse Oximetry Type Intermittent   $ Pulse Oximetry - Multiple Charge Pulse Oximetry - Multiple   Pulse 76   Resp 18   Positioning HOB elevated 45 degrees   Aerosol Therapy   $ Aerosol Therapy Charges PRN treatment not required   Education   $ Education 15 min;Other (see comment)  (pulse oximetry)

## 2024-05-24 PROBLEM — N17.9 ACUTE ON CHRONIC KIDNEY FAILURE: Status: ACTIVE | Noted: 2019-07-24

## 2024-05-24 PROBLEM — N18.9 ACUTE ON CHRONIC KIDNEY FAILURE: Status: ACTIVE | Noted: 2019-07-24

## 2024-05-24 LAB
ALBUMIN SERPL BCP-MCNC: 2.9 G/DL (ref 3.5–5.2)
ALP SERPL-CCNC: 109 U/L (ref 55–135)
ALT SERPL W/O P-5'-P-CCNC: 39 U/L (ref 10–44)
ANION GAP SERPL CALC-SCNC: 13 MMOL/L (ref 8–16)
AST SERPL-CCNC: 28 U/L (ref 10–40)
BASOPHILS # BLD AUTO: 0.03 K/UL (ref 0–0.2)
BASOPHILS NFR BLD: 0.4 % (ref 0–1.9)
BILIRUB SERPL-MCNC: 1.3 MG/DL (ref 0.1–1)
BUN SERPL-MCNC: 47 MG/DL (ref 8–23)
CALCIUM SERPL-MCNC: 8 MG/DL (ref 8.7–10.5)
CHLORIDE SERPL-SCNC: 105 MMOL/L (ref 95–110)
CO2 SERPL-SCNC: 17 MMOL/L (ref 23–29)
CREAT SERPL-MCNC: 1.5 MG/DL (ref 0.5–1.4)
DIFFERENTIAL METHOD BLD: ABNORMAL
EOSINOPHIL # BLD AUTO: 0 K/UL (ref 0–0.5)
EOSINOPHIL NFR BLD: 0 % (ref 0–8)
ERYTHROCYTE [DISTWIDTH] IN BLOOD BY AUTOMATED COUNT: 16.8 % (ref 11.5–14.5)
EST. GFR  (NO RACE VARIABLE): 33 ML/MIN/1.73 M^2
GLUCOSE SERPL-MCNC: 297 MG/DL (ref 70–110)
HCT VFR BLD AUTO: 31.7 % (ref 37–48.5)
HGB BLD-MCNC: 9.9 G/DL (ref 12–16)
IMM GRANULOCYTES # BLD AUTO: 0.04 K/UL (ref 0–0.04)
IMM GRANULOCYTES NFR BLD AUTO: 0.5 % (ref 0–0.5)
LYMPHOCYTES # BLD AUTO: 0.5 K/UL (ref 1–4.8)
LYMPHOCYTES NFR BLD: 6.5 % (ref 18–48)
MAGNESIUM SERPL-MCNC: 2 MG/DL (ref 1.6–2.6)
MCH RBC QN AUTO: 28.7 PG (ref 27–31)
MCHC RBC AUTO-ENTMCNC: 31.2 G/DL (ref 32–36)
MCV RBC AUTO: 92 FL (ref 82–98)
MONOCYTES # BLD AUTO: 0.8 K/UL (ref 0.3–1)
MONOCYTES NFR BLD: 9.9 % (ref 4–15)
NEUTROPHILS # BLD AUTO: 6.8 K/UL (ref 1.8–7.7)
NEUTROPHILS NFR BLD: 82.7 % (ref 38–73)
NRBC BLD-RTO: 0 /100 WBC
OHS QRS DURATION: 110 MS
OHS QTC CALCULATION: 483 MS
PHOSPHATE SERPL-MCNC: 4.4 MG/DL (ref 2.7–4.5)
PLATELET # BLD AUTO: 182 K/UL (ref 150–450)
PMV BLD AUTO: 11 FL (ref 9.2–12.9)
POCT GLUCOSE: 253 MG/DL (ref 70–110)
POCT GLUCOSE: 268 MG/DL (ref 70–110)
POCT GLUCOSE: 287 MG/DL (ref 70–110)
POCT GLUCOSE: 291 MG/DL (ref 70–110)
POTASSIUM SERPL-SCNC: 4.7 MMOL/L (ref 3.5–5.1)
PROT SERPL-MCNC: 5.7 G/DL (ref 6–8.4)
RBC # BLD AUTO: 3.45 M/UL (ref 4–5.4)
SODIUM SERPL-SCNC: 135 MMOL/L (ref 136–145)
WBC # BLD AUTO: 8.19 K/UL (ref 3.9–12.7)

## 2024-05-24 PROCEDURE — 84100 ASSAY OF PHOSPHORUS: CPT | Performed by: ORTHOPAEDIC SURGERY

## 2024-05-24 PROCEDURE — 25000003 PHARM REV CODE 250: Performed by: ORTHOPAEDIC SURGERY

## 2024-05-24 PROCEDURE — 63600175 PHARM REV CODE 636 W HCPCS: Performed by: ORTHOPAEDIC SURGERY

## 2024-05-24 PROCEDURE — 86580 TB INTRADERMAL TEST: CPT | Performed by: STUDENT IN AN ORGANIZED HEALTH CARE EDUCATION/TRAINING PROGRAM

## 2024-05-24 PROCEDURE — 85025 COMPLETE CBC W/AUTO DIFF WBC: CPT | Performed by: ORTHOPAEDIC SURGERY

## 2024-05-24 PROCEDURE — 97162 PT EVAL MOD COMPLEX 30 MIN: CPT

## 2024-05-24 PROCEDURE — 97535 SELF CARE MNGMENT TRAINING: CPT

## 2024-05-24 PROCEDURE — 30200315 PPD INTRADERMAL TEST REV CODE 302: Performed by: STUDENT IN AN ORGANIZED HEALTH CARE EDUCATION/TRAINING PROGRAM

## 2024-05-24 PROCEDURE — 63600175 PHARM REV CODE 636 W HCPCS: Performed by: STUDENT IN AN ORGANIZED HEALTH CARE EDUCATION/TRAINING PROGRAM

## 2024-05-24 PROCEDURE — 36415 COLL VENOUS BLD VENIPUNCTURE: CPT | Performed by: ORTHOPAEDIC SURGERY

## 2024-05-24 PROCEDURE — 11000001 HC ACUTE MED/SURG PRIVATE ROOM

## 2024-05-24 PROCEDURE — 27000221 HC OXYGEN, UP TO 24 HOURS

## 2024-05-24 PROCEDURE — 80053 COMPREHEN METABOLIC PANEL: CPT | Performed by: ORTHOPAEDIC SURGERY

## 2024-05-24 PROCEDURE — 97166 OT EVAL MOD COMPLEX 45 MIN: CPT

## 2024-05-24 PROCEDURE — 97530 THERAPEUTIC ACTIVITIES: CPT

## 2024-05-24 PROCEDURE — 99900035 HC TECH TIME PER 15 MIN (STAT)

## 2024-05-24 PROCEDURE — 25000003 PHARM REV CODE 250: Performed by: STUDENT IN AN ORGANIZED HEALTH CARE EDUCATION/TRAINING PROGRAM

## 2024-05-24 PROCEDURE — 94799 UNLISTED PULMONARY SVC/PX: CPT | Mod: XB

## 2024-05-24 PROCEDURE — 83735 ASSAY OF MAGNESIUM: CPT | Performed by: ORTHOPAEDIC SURGERY

## 2024-05-24 PROCEDURE — 94761 N-INVAS EAR/PLS OXIMETRY MLT: CPT

## 2024-05-24 RX ORDER — SODIUM CHLORIDE 0.9 % (FLUSH) 0.9 %
10 SYRINGE (ML) INJECTION
Status: DISCONTINUED | OUTPATIENT
Start: 2024-05-24 | End: 2024-05-31 | Stop reason: HOSPADM

## 2024-05-24 RX ORDER — SODIUM CHLORIDE 9 MG/ML
INJECTION, SOLUTION INTRAVENOUS CONTINUOUS
Status: DISCONTINUED | OUTPATIENT
Start: 2024-05-24 | End: 2024-05-26

## 2024-05-24 RX ORDER — INSULIN GLARGINE 100 [IU]/ML
12 INJECTION, SOLUTION SUBCUTANEOUS DAILY
Status: DISCONTINUED | OUTPATIENT
Start: 2024-05-24 | End: 2024-05-25

## 2024-05-24 RX ADMIN — SODIUM CHLORIDE: 9 INJECTION, SOLUTION INTRAVENOUS at 11:05

## 2024-05-24 RX ADMIN — INSULIN GLARGINE 12 UNITS: 100 INJECTION, SOLUTION SUBCUTANEOUS at 12:05

## 2024-05-24 RX ADMIN — SPIRONOLACTONE 25 MG: 25 TABLET ORAL at 08:05

## 2024-05-24 RX ADMIN — APIXABAN 5 MG: 2.5 TABLET, FILM COATED ORAL at 08:05

## 2024-05-24 RX ADMIN — CEFAZOLIN 2 G: 2 INJECTION, POWDER, FOR SOLUTION INTRAMUSCULAR; INTRAVENOUS at 08:05

## 2024-05-24 RX ADMIN — INSULIN ASPART 6 UNITS: 100 INJECTION, SOLUTION INTRAVENOUS; SUBCUTANEOUS at 04:05

## 2024-05-24 RX ADMIN — CEFAZOLIN 2 G: 2 INJECTION, POWDER, FOR SOLUTION INTRAMUSCULAR; INTRAVENOUS at 12:05

## 2024-05-24 RX ADMIN — CARVEDILOL 12.5 MG: 6.25 TABLET, FILM COATED ORAL at 08:05

## 2024-05-24 RX ADMIN — INSULIN ASPART 6 UNITS: 100 INJECTION, SOLUTION INTRAVENOUS; SUBCUTANEOUS at 07:05

## 2024-05-24 RX ADMIN — TUBERCULIN PURIFIED PROTEIN DERIVATIVE 5 UNITS: 5 INJECTION, SOLUTION INTRADERMAL at 04:05

## 2024-05-24 RX ADMIN — ACETAMINOPHEN 650 MG: 325 TABLET ORAL at 12:05

## 2024-05-24 RX ADMIN — SODIUM CHLORIDE: 9 INJECTION, SOLUTION INTRAVENOUS at 02:05

## 2024-05-24 RX ADMIN — MUPIROCIN 1 G: 20 OINTMENT TOPICAL at 08:05

## 2024-05-24 RX ADMIN — LOSARTAN POTASSIUM 50 MG: 25 TABLET, FILM COATED ORAL at 08:05

## 2024-05-24 RX ADMIN — INSULIN ASPART 3 UNITS: 100 INJECTION, SOLUTION INTRAVENOUS; SUBCUTANEOUS at 08:05

## 2024-05-24 RX ADMIN — SENNOSIDES AND DOCUSATE SODIUM 1 TABLET: 50; 8.6 TABLET ORAL at 08:05

## 2024-05-24 RX ADMIN — INSULIN ASPART 6 UNITS: 100 INJECTION, SOLUTION INTRAVENOUS; SUBCUTANEOUS at 12:05

## 2024-05-24 NOTE — PT/OT/SLP EVAL
Physical Therapy Evaluation    Patient Name:  Leslie Tolliver   MRN:  [id number]    Recommendations:     Discharge Recommendations: High Intensity Therapy   Discharge Equipment Recommendations: none   Barriers to discharge: Decreased caregiver support    Assessment:     Leslie Tolliver is a 89 y.o. female admitted with a medical diagnosis of Closed displaced intertrochanteric fracture of left femur.  She presents with the following impairments/functional limitations: weakness, impaired endurance, impaired functional mobility, gait instability, impaired balance, decreased lower extremity function, pain, decreased ROM, impaired cardiopulmonary response to activity, orthopedic precautions .    Pt seen seated in chair and motivated for PT. Pt was previously active and indep- was preparing breakfast party for her girl friends to play card in her house when she tripped on her friend's walker. Pt completed thera ex in chair including isometrics. Pt explained nher weight bearing restrictions- only 25% ~45 lbs Sit to stand with max assist x2/RW and able to take 2 steps with difficulty.  Pt returned to chair.  Pt to benefit from high intensity therapies    Rehab Prognosis: Good; patient would benefit from acute skilled PT services to address these deficits and reach maximum level of function.    Recent Surgery: Procedure(s) (LRB):  INSERTION, INTRAMEDULLARY RACQUEL, FEMUR (Left) 1 Day Post-Op    Plan:     During this hospitalization, patient to be seen daily to address the identified rehab impairments via gait training, therapeutic activities, therapeutic exercises and progress toward the following goals:    Plan of Care Expires:  06/15/24    Subjective     Chief Complaint: pain LH  Patient/Family Comments/goals: get well  Pain/Comfort:  Pain Rating 1:  (not rated)  Location - Side 1: Left  Location 1: hip  Pain Addressed 1: Reposition, Distraction, Cessation of Activity, Nurse notified    Patients cultural, spiritual,  Sabianist conflicts given the current situation:      Living Environment:  Home with daughter  Prior to admission, patients level of function was indep.  Equipment used at home: none.  DME owned (not currently used): rolling walker.  Upon discharge, patient will have assistance from family.    Objective:     Communicated with nurse elke prior to session.  Patient found up in chair with peripheral IV, SCD, PureWick, oxygen, telemetry  upon PT entry to room.    General Precautions: Standard, fall  Orthopedic Precautions:LLE toe touch weight bearing   Braces: N/A  Respiratory Status: Nasal cannula, flow 2 L/min    Exams:  Postural Exam:  Patient presented with the following abnormalities:    -       Rounded shoulders  -       Forward head  -       BMI 27.42  RLE ROM: WFL  RLE Strength: WFL  LLE ROM: Deficits: limited by po pain  LLE Strength: Deficits: 3-/5    Functional Mobility:  Transfers:     Sit to Stand:  maximal assistance and of 2 persons with rolling walker and 2 steps with lots of cueing for 25% WB      AM-PAC 6 CLICK MOBILITY  Total Score:10       Treatment & Education:  Patient was educated on the importance of OOB activity and functional mobility to negate negative effects of prolonged bed rest during hospitalization, safe transfers and ambulation, and D/C planning   Thera ex with AP,QS/GS  Explained and educated 25% WB LLE  Stood with RW assist x2  Back to chair post PT and instructed on HEP    Patient left up in chair with all lines intact, call button in reach, chair alarm on, and nurseBarry notified.    GOALS:   Multidisciplinary Problems       Physical Therapy Goals          Problem: Physical Therapy    Goal Priority Disciplines Outcome Goal Variances Interventions   Physical Therapy Goal     PT, PT/OT Progressing     Description: Goals to be met by: 06-     Patient will increase functional independence with mobility by performin. Supine to sit with MInimal Assistance  2. Sit to stand  transfer with Minimal Assistance  3. Bed to chair transfer with Minimal Assistance using Rolling Walker  4. Gait  x 20 feet with Minimal Assistance using Rolling Walker.   5. Lower extremity exercise program x20 reps    L IM nailing 05-23 and 25% WB only                         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    GERD (gastroesophageal reflux disease)     Hyperlipidemia     Hypertension     Hypothyroidism     Paroxysmal atrial fibrillation 3/16/2023    Type 2 diabetes mellitus        Past Surgical History:   Procedure Laterality Date    ADENOIDECTOMY      AORTIC VALVE REPLACEMENT      BREAST BIOPSY Right 20 yrs ago    benign    CARDIAC SURGERY      CHOLECYSTECTOMY      COLONOSCOPY  5-    Dr Holly, 5 year recheck    EPIDURAL STEROID INJECTION N/A 3/25/2021    Procedure: Injection, Steroid, Epidural L3-4;  Surgeon: Sky Love MD;  Location: [facility] OR;  Service: Pain Management;  Laterality: N/A;  Injection, Steroid, Epidural L3-4    EPIDURAL STEROID INJECTION N/A 5/20/2021    Procedure: Injection, Steroid, Epidural L3-4;  Surgeon: Sky Love MD;  Location: [facility] OR;  Service: Pain Management;  Laterality: N/A;  Injection, Steroid, Epidural L3-4    ESOPHAGOGASTRODUODENOSCOPY N/A 6/4/2020    Procedure: EGD (ESOPHAGOGASTRODUODENOSCOPY);  Surgeon: Michael Nugent III, MD;  Location: [facility];  Service: Endoscopy;  Laterality: N/A;    HEMORRHOID SURGERY      HYSTERECTOMY      OOPHORECTOMY      TONSILLECTOMY         Time Tracking:      PT Received On: 05/24/24  PT Start Time: 1037     PT Stop Time: 1106  PT Total Time (min): 29 min     Billable Minutes: Evaluation 10 and Therapeutic Activity 19      05/24/2024

## 2024-05-24 NOTE — SUBJECTIVE & OBJECTIVE
"Interval History: see "Hospital COUrse"    Review of Systems   Constitutional:  Positive for activity change.   Musculoskeletal:  Positive for arthralgias, gait problem and myalgias.     Objective:     Vital Signs (Most Recent):  Temp: 98 °F (36.7 °C) (05/24/24 0742)  Pulse: 100 (05/24/24 0742)  Resp: 18 (05/24/24 0742)  BP: (!) 109/55 (05/24/24 0742)  SpO2: 99 % (05/24/24 0742) Vital Signs (24h Range):  Temp:  [97.3 °F (36.3 °C)-98.4 °F (36.9 °C)] 98 °F (36.7 °C)  Pulse:  [] 100  Resp:  [13-20] 18  SpO2:  [92 %-100 %] 99 %  BP: ()/(49-70) 109/55     Weight: 79.4 kg (175 lb 0.7 oz)  Body mass index is 27.42 kg/m².    Intake/Output Summary (Last 24 hours) at 5/24/2024 1042  Last data filed at 5/24/2024 0630  Gross per 24 hour   Intake 1580 ml   Output 600 ml   Net 980 ml         Physical Exam  Vitals and nursing note reviewed.   Constitutional:       General: She is not in acute distress.  HENT:      Head: Normocephalic and atraumatic.      Right Ear: External ear normal.      Left Ear: External ear normal.      Nose: Nose normal.      Mouth/Throat:      Mouth: Mucous membranes are moist.      Pharynx: Oropharynx is clear.   Eyes:      Extraocular Movements: Extraocular movements intact.      Conjunctiva/sclera: Conjunctivae normal.   Cardiovascular:      Rate and Rhythm: Normal rate and regular rhythm.      Pulses: Normal pulses.      Heart sounds: Normal heart sounds.   Pulmonary:      Effort: Pulmonary effort is normal.      Breath sounds: Normal breath sounds.   Abdominal:      General: Bowel sounds are normal.      Palpations: Abdomen is soft.   Musculoskeletal:         General: Tenderness and signs of injury present. No deformity.      Cervical back: Normal range of motion and neck supple.   Skin:     General: Skin is warm and dry.   Neurological:      Mental Status: She is alert. Mental status is at baseline.   Psychiatric:         Mood and Affect: Mood normal.         Behavior: Behavior normal. "             Significant Labs: All pertinent labs within the past 24 hours have been reviewed.    Significant Imaging: I have reviewed all pertinent imaging results/findings within the past 24 hours.

## 2024-05-24 NOTE — PLAN OF CARE
Pt remained stable this shift. Dressing c/d/I to left hip with ice packs in place. IV abx administered per order. Offered pain medication throughout shift, patient denied. Pure wick in place. Turn schedule in progress. Safety precautions maintained throughout shift.     Problem: Infection  Goal: Absence of Infection Signs and Symptoms  Outcome: Progressing     Problem: Diabetes Comorbidity  Goal: Blood Glucose Level Within Targeted Range  Outcome: Progressing     Problem: Fall Injury Risk  Goal: Absence of Fall and Fall-Related Injury  Outcome: Progressing

## 2024-05-24 NOTE — ASSESSMENT & PLAN NOTE
Chronic problem   Patient's FSGs are uncontrolled due to hyperglycemia on current medication regimen.  Last A1c reviewed-   Lab Results   Component Value Date    HGBA1C 6.7 (H) 01/26/2024     Most recent fingerstick glucose reviewed-   Recent Labs   Lab 05/23/24  1154 05/23/24 2007 05/24/24  0747   POCTGLUCOSE 96 222* 253*       Current correctional scale  Medium  Maintain anti-hyperglycemic dose as follows-   Antihyperglycemics (From admission, onward)    Start     Stop Route Frequency Ordered    05/24/24 1145  insulin glargine U-100 (Lantus) pen 12 Units         -- SubQ Daily 05/24/24 1040    05/22/24 1643  insulin aspart U-100 pen 1-10 Units         -- SubQ Before meals & nightly PRN 05/22/24 1545        Hold Oral hypoglycemics while patient is in the hospital.

## 2024-05-24 NOTE — PLAN OF CARE
Problem: Physical Therapy  Goal: Physical Therapy Goal  Description: Goals to be met by: 06-     Patient will increase functional independence with mobility by performin. Supine to sit with MInimal Assistance  2. Sit to stand transfer with Minimal Assistance  3. Bed to chair transfer with Minimal Assistance using Rolling Walker  4. Gait  x 20 feet with Minimal Assistance using Rolling Walker.   5. Lower extremity exercise program x20 reps    L IM nailing - and 25% WB only    Outcome: Progressing   PT eval and treat. Max assist x2 mobility and able to take 2 steps 25% WB LLE. High intensity   Statement Selected

## 2024-05-24 NOTE — PROGRESS NOTES
"[facility]  Progress Note    Patient Name: Leslie Tolliver  MRN: [id number]  Patient Class: IP- Inpatient   Admission Date: 5/22/2024  Length of Stay: 2 days  Attending Physician: Raphael Haywood MD  Primary Care Provider: Chang Christianson MD        Subjective:     Principal Problem:Closed displaced intertrochanteric fracture of left femur        HPI:  Leslie Tolliver an 89 year female who presents emergency room complaining of left hip pain.  She endorses mechanical fall at her residence today.  She fell striking her left hip on the floor.  She denies striking her head or loss of consciousness.  Denies any cervical pain.  Pain is worse with any movement of the left leg.  No alleviating factors.  No known sick contacts or travel.  She denies fever or chills.  Previous medical history includes hypertension, diabetes, hypothyroidism, hyperlipidemia, CKD, anemia, aortic valve replacement, paroxysmal AFib.  ER workup: CBC unremarkable.  CMP with glucose of 156 and bicarb 19 otherwise unremarkable.  X-ray demonstrates a left intertrochanteric fracture.  Orthopedics was consulted.  Plans to take patient to OR tomorrow.  Patient admitted to Hospital Medicine for treatment management.  Will maintain patient NPO after midnight.    Overview/Hospital Course:  Leslie Tolliver is an 89 year old female with a past medical history of HTN, CAD, DM, anemia, and Afib who presented with a fall suffering a comminuted left hip fracture. Orthopedic Surgery has been consulted and took the patient to the OR 5/23. PT/OT has been consulted. Her course has been complicated by a mild KANNAN on CKD for which IV fluids have been started.    Interval History: see "Hospital COUrse"    Review of Systems   Constitutional:  Positive for activity change.   Musculoskeletal:  Positive for arthralgias, gait problem and myalgias.     Objective:     Vital Signs (Most Recent):  Temp: 98 °F (36.7 °C) (05/24/24 " 0742)  Pulse: 100 (05/24/24 0742)  Resp: 18 (05/24/24 0742)  BP: (!) 109/55 (05/24/24 0742)  SpO2: 99 % (05/24/24 0742) Vital Signs (24h Range):  Temp:  [97.3 °F (36.3 °C)-98.4 °F (36.9 °C)] 98 °F (36.7 °C)  Pulse:  [] 100  Resp:  [13-20] 18  SpO2:  [92 %-100 %] 99 %  BP: ()/(49-70) 109/55     Weight: 79.4 kg (175 lb 0.7 oz)  Body mass index is 27.42 kg/m².    Intake/Output Summary (Last 24 hours) at 5/24/2024 1042  Last data filed at 5/24/2024 0630  Gross per 24 hour   Intake 1580 ml   Output 600 ml   Net 980 ml         Physical Exam  Vitals and nursing note reviewed.   Constitutional:       General: She is not in acute distress.  HENT:      Head: Normocephalic and atraumatic.      Right Ear: External ear normal.      Left Ear: External ear normal.      Nose: Nose normal.      Mouth/Throat:      Mouth: Mucous membranes are moist.      Pharynx: Oropharynx is clear.   Eyes:      Extraocular Movements: Extraocular movements intact.      Conjunctiva/sclera: Conjunctivae normal.   Cardiovascular:      Rate and Rhythm: Normal rate and regular rhythm.      Pulses: Normal pulses.      Heart sounds: Normal heart sounds.   Pulmonary:      Effort: Pulmonary effort is normal.      Breath sounds: Normal breath sounds.   Abdominal:      General: Bowel sounds are normal.      Palpations: Abdomen is soft.   Musculoskeletal:         General: Tenderness and signs of injury present. No deformity.      Cervical back: Normal range of motion and neck supple.   Skin:     General: Skin is warm and dry.   Neurological:      Mental Status: She is alert. Mental status is at baseline.   Psychiatric:         Mood and Affect: Mood normal.         Behavior: Behavior normal.             Significant Labs: All pertinent labs within the past 24 hours have been reviewed.    Significant Imaging: I have reviewed all pertinent imaging results/findings within the past 24 hours.    Assessment/Plan:      * Closed displaced intertrochanteric  fracture of left femur  -Orthopedic Surgery  -Fall precautions  -Neurologic checks  -PRN analgesics  -PT/OT post-operatively  -Eliquis    Acute on chronic kidney failure  Stable.  -Renally dose medications  -Avoid nephrotoxic agents  -IV fluids  -Monitor UOP and electrolytes  -Trend creatinine    Anemia  Stable.  -Trend Hgb with CBC    Paroxysmal atrial fibrillation  -Coreg  -Telemetry  -Eliquis    Status post aortic valve replacement with bioprosthetic valve  Stable.      Hyperlipidemia associated with type 2 diabetes mellitus  -      Type 2 diabetes mellitus with diabetic polyneuropathy, with long-term current use of insulin  Chronic problem   Patient's FSGs are uncontrolled due to hyperglycemia on current medication regimen.  Last A1c reviewed-   Lab Results   Component Value Date    HGBA1C 6.7 (H) 01/26/2024     Most recent fingerstick glucose reviewed-   Recent Labs   Lab 05/23/24  1154 05/23/24 2007 05/24/24  0747   POCTGLUCOSE 96 222* 253*       Current correctional scale  Medium  Maintain anti-hyperglycemic dose as follows-   Antihyperglycemics (From admission, onward)      Start     Stop Route Frequency Ordered    05/24/24 1145  insulin glargine U-100 (Lantus) pen 12 Units         -- SubQ Daily 05/24/24 1040    05/22/24 1643  insulin aspart U-100 pen 1-10 Units         -- SubQ Before meals & nightly PRN 05/22/24 1545          Hold Oral hypoglycemics while patient is in the hospital.       Hypertension associated with diabetes  -Continue Coreg  -Continue to monitor      VTE Risk Mitigation (From admission, onward)           Ordered     apixaban tablet 5 mg  2 times daily         05/23/24 1517     IP VTE HIGH RISK PATIENT  Once         05/22/24 1619     Place sequential compression device  Until discontinued         05/22/24 1545     Place FRANKO hose  Until discontinued         05/22/24 1545                    Discharge Planning   MORA: 5/28/2024     Code Status: Full Code   Is the patient medically ready for  discharge?:     Reason for patient still in hospital (select all that apply): Patient new problem, Patient trending condition, Laboratory test, Treatment, PT / OT recommendations, and Pending disposition                     Raphael Haywood MD  Department of Hospital Medicine   [facility]/Surg

## 2024-05-24 NOTE — PLAN OF CARE
[facility] - Med/Surg  Initial Discharge Assessment       Primary Care Provider: Chang Christianson MD    Admission Diagnosis: Closed displaced intertrochanteric fracture of left femur, initial encounter [S72.142A]    Admission Date: 5/22/2024  Expected Discharge Date: 5/28/2024    Spoke to pt at bedside to complete dc assessment. Verified facesheet. Pt lives at listed address with her daughter Litzy Stanley. PCP Meghan. Pharmacy [facility]. DME listed. Denies hd/hh/coumadin. Pt without recent admit. DC plan is pending PT eval. Pt is agreeable to placement.      Transition of Care Barriers: None    Payor: HU[employer] MANAGED MEDICARE / Plan: HU[employer] MEDICARE HMO / Product Type: Capitation /     Extended Emergency Contact Information  Primary Emergency Contact: Litzy Stanley  Address: [address] [place] [address] [place] [address] [facility]  Home Phone: [phone]  Mobile Phone: [phone]  Relation: Daughter  Preferred language: English   needed? No    Discharge Plan A: Rehab  Discharge Plan B: Skilled Nursing Facility      [facility]  [address]  [place] [address]  Phone: [phone] Fax: [phone]    [facility] - [facility] [address]  [address] [facility] [address]  Phone: [phone] Fax: [phone]    [id number] - [place] - [address] [facility] [address]  [address] [place] [address]  Phone: [phone] Fax: [phone]      Initial Assessment (most recent)       Adult Discharge Assessment - 05/24/24 1122          Discharge Assessment    Assessment Type Discharge Planning Assessment     Confirmed/corrected address, phone number and insurance Yes     Confirmed Demographics Correct on Facesheet     Source of Information patient     People in Home child(chin), adult     Do you expect to return to your current living situation? Yes      Do you have help at home or someone to help you manage your care at home? Yes     Prior to hospitilization cognitive status: Unable to Assess     Current cognitive status: Alert/Oriented     Walking or Climbing Stairs Difficulty yes     Walking or Climbing Stairs ambulation difficulty, requires equipment     Dressing/Bathing Difficulty yes     Dressing/Bathing bathing difficulty, requires equipment     Equipment Currently Used at Home cane, straight;walker, rolling;bath bench;glucometer     Readmission within 30 days? No     Patient currently being followed by outpatient case management? No     Do you currently have service(s) that help you manage your care at home? No     Do you take prescription medications? Yes     Do you have prescription coverage? Yes     Coverage humana     Do you have any problems affording any of your prescribed medications? No     Is the patient taking medications as prescribed? yes     How do you get to doctors appointments? family or friend will provide     Are you on dialysis? No     Do you take coumadin? No     Discharge Plan A Rehab     Discharge Plan B Skilled Nursing Facility     DME Needed Upon Discharge  none     Discharge Plan discussed with: Patient     Transition of Care Barriers None

## 2024-05-24 NOTE — PT/OT/SLP EVAL
Occupational Therapy   Evaluation    Name: Leslie Tolliver  MRN: [id number]  Admitting Diagnosis: Closed displaced intertrochanteric fracture of left femur  Recent Surgery: Procedure(s) (LRB):  INSERTION, INTRAMEDULLARY RACQUEL, FEMUR (Left) 1 Day Post-Op    Recommendations:     Discharge Recommendations: High Intensity Therapy  Discharge Equipment Recommendations:  to be determined by next level of care  Barriers to discharge:  None    Assessment:     Leslie Tolliver is a 89 y.o. female with a medical diagnosis of Closed displaced intertrochanteric fracture of left femur. Performance deficits affecting function: weakness, impaired endurance, impaired self care skills, impaired functional mobility, gait instability, impaired balance, decreased lower extremity function, pain, decreased safety awareness, decreased ROM, orthopedic precautions.      Rehab Prognosis: Good; patient would benefit from acute skilled OT services to address these deficits and reach maximum level of function.       Plan:     Patient to be seen 5 x/week to address the above listed problems via self-care/home management, therapeutic activities, therapeutic exercises  Plan of Care Expires: 06/21/24  Plan of Care Reviewed with: patient    Subjective     Chief Complaint: LLE pain  Patient/Family Comments/goals: none    Occupational Profile:  Living Environment: Patient lives with daughter in a [facility].   Previous level of function: Patient was independent with ADLs. Patient was ambulatory in home with occasional use of cane and rollator. Patient used rollator for community mobility.   Roles and Routines: Patient cooks occasionally. Patient likes to bake.   Equipment Used at Home: rollator, cane, straight  Assistance upon Discharge: Patient will receive limited assistance from daughter.     Pain/Comfort:  Pain Rating 1:  (not rated)  Location - Side 1: Left  Location - Orientation 1: generalized  Location 1: hip  Pain Addressed 1: Reposition,  Distraction  Pain Rating Post-Intervention 1:  (not rated)    Patients cultural, spiritual, Judaism conflicts given the current situation: no    Objective:     Communicated with: nurse [place] prior to session.  Patient found HOB elevated with peripheral IV, PureWick, oxygen, telemetry upon OT entry to room.    General Precautions: Standard, fall  Orthopedic Precautions: LLE toe touch weight bearing  Braces: N/A  Respiratory Status: Nasal cannula, flow 1 L/min    Occupational Performance:    Bed Mobility:    Patient completed Scooting/Bridging with moderate assistance  Patient completed Supine to Sit with moderate assistance    Functional Mobility/Transfers:  Patient completed Sit <> Stand Transfer with moderate assistance  with  rolling walker   Patient completed Bed <> Chair Transfer using Stand Pivot technique with maximal assistance with rolling walker  Functional Mobility: Patient had difficulty side stepping to L side due to LLE pain/weakness requiring Mod/Max A to maintain balance during transfers. Patient unable to fully stand upright due to LLE pain.     Activities of Daily Living:  Grooming: stand by assistance with oral and facial hygiene seated in chair   Lower Body Dressing: maximal assistance to don/doff socks seated in chair  Toileting: dependence with Purewick in place    Cognitive/Visual Perceptual:  Cognitive/Psychosocial Skills:     -       Oriented to: x4   -       Follows Commands/attention:Follows multistep  commands  -       Communication: clear/fluent  -       Safety awareness/insight to disability: impaired   -       Mood/Affect/Coping skills/emotional control: Appropriate to situation and Cooperative  Visual/Perceptual:      -Intact     Physical Exam:  Postural examination/scapula alignment:    -       Rounded shoulders  -       Forward head  Upper Extremity Range of Motion:     -       Right Upper Extremity: WFL  -       Left Upper Extremity: WFL  Upper Extremity Strength:    -        Right Upper Extremity: WFL  -       Left Upper Extremity: WFL   Strength:    -       Right Upper Extremity: WFL  -       Left Upper Extremity: WFL  Fine Motor Coordination:    -       Intact  Gross motor coordination:   WFL in BUE    AMPAC 6 Click ADL:  AMPA Total Score: 17    Treatment & Education:  OT ed pt on OT role & POC as well as discharge recommendations.  OT ed patient on safety with walker use for functional mobility with cues for hand placement & sequencing.       Patient left up in chair with all lines intact, call button in reach, and nurse notified    GOALS:   Multidisciplinary Problems       Occupational Therapy Goals          Problem: Occupational Therapy    Goal Priority Disciplines Outcome Interventions   Occupational Therapy Goal     OT, PT/OT Progressing    Description: Goals to be met by: 6/21/2024     Patient will increase functional independence with ADLs by performing:    LE Dressing with Stand-by Assistance and Assistive Devices as needed.  Grooming while seated at sink with Supervision.  Toileting from bedside commode with Minimal Assistance for hygiene and clothing management.   Supine to sit with Stand-by Assistance.  Toilet transfer to bedside commode with Minimal Assistance.                         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    GERD (gastroesophageal reflux disease)     Hyperlipidemia     Hypertension     Hypothyroidism     Paroxysmal atrial fibrillation 3/16/2023    Type 2 diabetes mellitus           Past Surgical History:   Procedure Laterality Date    ADENOIDECTOMY      AORTIC VALVE REPLACEMENT      BREAST BIOPSY Right 20 yrs ago    benign    CARDIAC SURGERY      CHOLECYSTECTOMY      COLONOSCOPY  5-    Dr Holly, 5 year recheck    EPIDURAL STEROID INJECTION N/A 3/25/2021    Procedure: Injection, Steroid, Epidural L3-4;  Surgeon: Sky Love MD;  Location: [facility] OR;  Service: Pain Management;  Laterality: N/A;  Injection, Steroid, Epidural L3-4    EPIDURAL STEROID INJECTION N/A 5/20/2021    Procedure: Injection, Steroid, Epidural L3-4;  Surgeon: Sky Love MD;  Location: [facility] OR;  Service: Pain Management;  Laterality: N/A;  Injection, Steroid, Epidural L3-4    ESOPHAGOGASTRODUODENOSCOPY N/A 6/4/2020    Procedure: EGD (ESOPHAGOGASTRODUODENOSCOPY);  Surgeon: Michael Nugent III, MD;  Location: [facility];  Service: Endoscopy;  Laterality: N/A;    HEMORRHOID SURGERY      HYSTERECTOMY      OOPHORECTOMY      TONSILLECTOMY         Time Tracking:     OT Date of Treatment: 05/24/24  OT Start Time: 0942  OT Stop Time: 1025  OT Total Time (min): 43 min    Billable Minutes:Evaluation 5  Self Care/Home Management 38    5/24/2024

## 2024-05-24 NOTE — ASSESSMENT & PLAN NOTE
-Orthopedic Surgery  -Fall precautions  -Neurologic checks  -PRN analgesics  -PT/OT post-operatively  -Eliquis

## 2024-05-24 NOTE — ASSESSMENT & PLAN NOTE
Stable.  -Renally dose medications  -Avoid nephrotoxic agents  -IV fluids  -Monitor UOP and electrolytes  -Trend creatinine

## 2024-05-24 NOTE — ASSESSMENT & PLAN NOTE
-Continue Coreg  -Continue to monitor   Pt called and continues to complain about systemic pruritus. Pt states that benadryl does not work and he would like an additional intervention.      Reinforced plan from PCP. Encouraged compliance for relief. Offered ER if symptoms worsen. Pt had no other questions or concerns.    Dupixent Pregnancy And Lactation Text: This medication likely crosses the placenta but the risk for the fetus is uncertain. This medication is excreted in breast milk.

## 2024-05-24 NOTE — PLAN OF CARE
Referral sent to [facility] for review     05/24/24 4040   Post-Acute Status   Post-Acute Authorization Placement   Post-Acute Placement Status Referrals Sent   Patient choice form signed by patient/caregiver List from [facility]Ac[facility]

## 2024-05-24 NOTE — PT/OT/SLP PROGRESS
Physical Therapy Treatment    Patient Name:  Leslie Tolliver   MRN:  [id number]    Recommendations:     Discharge Recommendations: High Intensity Therapy  Discharge Equipment Recommendations: none  Barriers to discharge: Decreased caregiver support    Assessment:     Leslie Tolliver is a 89 y.o. female admitted with a medical diagnosis of Closed displaced intertrochanteric fracture of left femur.  She presents with the following impairments/functional limitations: weakness, impaired endurance, impaired functional mobility, gait instability, impaired balance, decreased lower extremity function, pain, decreased ROM, impaired cardiopulmonary response to activity, orthopedic precautions .    Pt seen BID today. Pt tolerated OOB chair x 3 hours. Pt requiring max assist x2 to stand and transfer back to bed. Pt repositioned in bed with all needs within reach. Pt educated on AP,QS/GS that she could complete.    Rehab Prognosis: Good; patient would benefit from acute skilled PT services to address these deficits and reach maximum level of function.    Recent Surgery: Procedure(s) (LRB):  INSERTION, INTRAMEDULLARY RACQUEL, FEMUR (Left) 1 Day Post-Op    Plan:     During this hospitalization, patient to be seen daily to address the identified rehab impairments via gait training, therapeutic activities, therapeutic exercises and progress toward the following goals:    Plan of Care Expires:  06/15/24    Subjective     Chief Complaint: pain LH and weakness  Patient/Family Comments/goals: get well  Pain/Comfort:  Pain Rating 1:  (not rated)  Location - Side 1: Left  Location 1: hip  Pain Addressed 1: Reposition, Distraction, Cessation of Activity, Nurse notified      Objective:     Communicated with nurse Santana prior to session.  Patient found up in chair with peripheral IV, SCD, PureWick, oxygen, telemetry upon PT entry to room.     General Precautions: Standard, fall  Orthopedic Precautions: LLE toe touch weight bearing  Braces:  N/A  Respiratory Status: Nasal cannula, flow 2 L/min     Functional Mobility:  Bed Mobility:     Sit to Supine: maximal assistance and of 2 persons  Transfers:     Sit to Stand:  maximal assistance and of 2 persons with rolling walker      AM-PAC 6 CLICK MOBILITY  Turning over in bed (including adjusting bedclothes, sheets and blankets)?: 2  Sitting down on and standing up from a chair with arms (e.g., wheelchair, bedside commode, etc.): 2  Moving from lying on back to sitting on the side of the bed?: 2  Moving to and from a bed to a chair (including a wheelchair)?: 2  Need to walk in hospital room?: 1  Climbing 3-5 steps with a railing?: 1  Basic Mobility Total Score: 10       Treatment & Education:  Patient was educated on the importance of OOB activity and functional mobility to negate negative effects of prolonged bed rest during hospitalization, safe transfers and ambulation, and D/C planning   Assist x2 to return to bed with purewick/O2 and SCD on    Patient left HOB elevated with bed alarm on, nurse Max notified, and RT present..    GOALS:   Multidisciplinary Problems       Physical Therapy Goals          Problem: Physical Therapy    Goal Priority Disciplines Outcome Goal Variances Interventions   Physical Therapy Goal     PT, PT/OT Progressing     Description: Goals to be met by: 06-     Patient will increase functional independence with mobility by performin. Supine to sit with MInimal Assistance  2. Sit to stand transfer with Minimal Assistance  3. Bed to chair transfer with Minimal Assistance using Rolling Walker  4. Gait  x 20 feet with Minimal Assistance using Rolling Walker.   5. Lower extremity exercise program x20 reps    L IM nailing  and 25% WB only                         Time Tracking:     PT Received On: 24  PT Start Time: 1341     PT Stop Time: 1353  PT Total Time (min): 12 min     Billable Minutes: Therapeutic Activity 12    Treatment Type: Treatment  PT/PTA: PT      Number of PTA visits since last PT visit: 0     05/24/2024

## 2024-05-24 NOTE — PLAN OF CARE
Problem: Occupational Therapy  Goal: Occupational Therapy Goal  Description: Goals to be met by: 6/21/2024     Patient will increase functional independence with ADLs by performing:    LE Dressing with Stand-by Assistance and Assistive Devices as needed.  Grooming while seated at sink with Supervision.  Toileting from bedside commode with Minimal Assistance for hygiene and clothing management.   Supine to sit with Stand-by Assistance.  Toilet transfer to bedside commode with Minimal Assistance.    Outcome: Progressing

## 2024-05-24 NOTE — ANESTHESIA POSTPROCEDURE EVALUATION
Anesthesia Post Evaluation    Patient: Leslie Tolliver    Procedure(s) Performed: Procedure(s) (LRB):  INSERTION, INTRAMEDULLARY RACQUEL, FEMUR (Left)    Final Anesthesia Type: general      Patient location during evaluation: PACU  Patient participation: Yes- Able to Participate  Level of consciousness: awake and alert and oriented  Post-procedure vital signs: reviewed and stable  Pain management: adequate  Airway patency: patent    PONV status at discharge: No PONV  Anesthetic complications: no      Cardiovascular status: blood pressure returned to baseline and stable  Respiratory status: unassisted and spontaneous ventilation  Hydration status: euvolemic  Follow-up not needed.              Vitals Value Taken Time   /56 05/23/24 1741   Temp 36.6 °C (97.9 °F) 05/23/24 1741   Pulse 88 05/23/24 1741   Resp 16 05/23/24 1741   SpO2 99 % 05/23/24 1741         Event Time   Out of Recovery 05/23/2024 16:45:00         Pain/Jose Score: Pain Rating Prior to Med Admin: 6 (5/23/2024  5:11 PM)  Pain Rating Post Med Admin: 3 (5/23/2024  5:45 PM)  Jose Score: 9 (5/23/2024  5:45 PM)

## 2024-05-25 PROBLEM — D62 ACUTE BLOOD LOSS ANEMIA: Status: ACTIVE | Noted: 2024-05-23

## 2024-05-25 LAB
ALBUMIN SERPL BCP-MCNC: 2.6 G/DL (ref 3.5–5.2)
ALP SERPL-CCNC: 89 U/L (ref 55–135)
ALT SERPL W/O P-5'-P-CCNC: 18 U/L (ref 10–44)
ANION GAP SERPL CALC-SCNC: 8 MMOL/L (ref 8–16)
AST SERPL-CCNC: 18 U/L (ref 10–40)
BASOPHILS # BLD AUTO: 0.03 K/UL (ref 0–0.2)
BASOPHILS NFR BLD: 0.4 % (ref 0–1.9)
BILIRUB SERPL-MCNC: 0.9 MG/DL (ref 0.1–1)
BUN SERPL-MCNC: 57 MG/DL (ref 8–23)
CALCIUM SERPL-MCNC: 7.8 MG/DL (ref 8.7–10.5)
CHLORIDE SERPL-SCNC: 107 MMOL/L (ref 95–110)
CO2 SERPL-SCNC: 20 MMOL/L (ref 23–29)
CREAT SERPL-MCNC: 1.5 MG/DL (ref 0.5–1.4)
DIFFERENTIAL METHOD BLD: ABNORMAL
EOSINOPHIL # BLD AUTO: 0.3 K/UL (ref 0–0.5)
EOSINOPHIL NFR BLD: 3.7 % (ref 0–8)
ERYTHROCYTE [DISTWIDTH] IN BLOOD BY AUTOMATED COUNT: 16.9 % (ref 11.5–14.5)
EST. GFR  (NO RACE VARIABLE): 33 ML/MIN/1.73 M^2
GLUCOSE SERPL-MCNC: 235 MG/DL (ref 70–110)
HCT VFR BLD AUTO: 26.3 % (ref 37–48.5)
HGB BLD-MCNC: 8.3 G/DL (ref 12–16)
IMM GRANULOCYTES # BLD AUTO: 0.05 K/UL (ref 0–0.04)
IMM GRANULOCYTES NFR BLD AUTO: 0.6 % (ref 0–0.5)
LYMPHOCYTES # BLD AUTO: 0.9 K/UL (ref 1–4.8)
LYMPHOCYTES NFR BLD: 11.1 % (ref 18–48)
MAGNESIUM SERPL-MCNC: 2 MG/DL (ref 1.6–2.6)
MCH RBC QN AUTO: 28.5 PG (ref 27–31)
MCHC RBC AUTO-ENTMCNC: 31.6 G/DL (ref 32–36)
MCV RBC AUTO: 90 FL (ref 82–98)
MONOCYTES # BLD AUTO: 1 K/UL (ref 0.3–1)
MONOCYTES NFR BLD: 13.1 % (ref 4–15)
NEUTROPHILS # BLD AUTO: 5.7 K/UL (ref 1.8–7.7)
NEUTROPHILS NFR BLD: 71.1 % (ref 38–73)
NRBC BLD-RTO: 0 /100 WBC
PHOSPHATE SERPL-MCNC: 2.8 MG/DL (ref 2.7–4.5)
PLATELET # BLD AUTO: 167 K/UL (ref 150–450)
PMV BLD AUTO: 11 FL (ref 9.2–12.9)
POCT GLUCOSE: 181 MG/DL (ref 70–110)
POCT GLUCOSE: 200 MG/DL (ref 70–110)
POCT GLUCOSE: 245 MG/DL (ref 70–110)
POCT GLUCOSE: 258 MG/DL (ref 70–110)
POTASSIUM SERPL-SCNC: 4.4 MMOL/L (ref 3.5–5.1)
PROT SERPL-MCNC: 5.2 G/DL (ref 6–8.4)
RBC # BLD AUTO: 2.91 M/UL (ref 4–5.4)
SODIUM SERPL-SCNC: 135 MMOL/L (ref 136–145)
WBC # BLD AUTO: 7.94 K/UL (ref 3.9–12.7)

## 2024-05-25 PROCEDURE — 99900035 HC TECH TIME PER 15 MIN (STAT)

## 2024-05-25 PROCEDURE — 25000003 PHARM REV CODE 250: Performed by: ORTHOPAEDIC SURGERY

## 2024-05-25 PROCEDURE — 84100 ASSAY OF PHOSPHORUS: CPT | Performed by: ORTHOPAEDIC SURGERY

## 2024-05-25 PROCEDURE — 85025 COMPLETE CBC W/AUTO DIFF WBC: CPT | Performed by: ORTHOPAEDIC SURGERY

## 2024-05-25 PROCEDURE — 94761 N-INVAS EAR/PLS OXIMETRY MLT: CPT

## 2024-05-25 PROCEDURE — 63600175 PHARM REV CODE 636 W HCPCS: Performed by: STUDENT IN AN ORGANIZED HEALTH CARE EDUCATION/TRAINING PROGRAM

## 2024-05-25 PROCEDURE — 63600175 PHARM REV CODE 636 W HCPCS: Performed by: ANESTHESIOLOGY

## 2024-05-25 PROCEDURE — 80053 COMPREHEN METABOLIC PANEL: CPT | Performed by: ORTHOPAEDIC SURGERY

## 2024-05-25 PROCEDURE — 97535 SELF CARE MNGMENT TRAINING: CPT

## 2024-05-25 PROCEDURE — 94799 UNLISTED PULMONARY SVC/PX: CPT | Mod: XB

## 2024-05-25 PROCEDURE — 11000001 HC ACUTE MED/SURG PRIVATE ROOM

## 2024-05-25 PROCEDURE — 36415 COLL VENOUS BLD VENIPUNCTURE: CPT | Performed by: ORTHOPAEDIC SURGERY

## 2024-05-25 PROCEDURE — 83735 ASSAY OF MAGNESIUM: CPT | Performed by: ORTHOPAEDIC SURGERY

## 2024-05-25 PROCEDURE — 97110 THERAPEUTIC EXERCISES: CPT | Mod: CQ

## 2024-05-25 RX ORDER — INSULIN GLARGINE 100 [IU]/ML
20 INJECTION, SOLUTION SUBCUTANEOUS DAILY
Status: DISCONTINUED | OUTPATIENT
Start: 2024-05-25 | End: 2024-05-27

## 2024-05-25 RX ADMIN — ACETAMINOPHEN 650 MG: 325 TABLET ORAL at 09:05

## 2024-05-25 RX ADMIN — APIXABAN 5 MG: 2.5 TABLET, FILM COATED ORAL at 08:05

## 2024-05-25 RX ADMIN — INSULIN GLARGINE 20 UNITS: 100 INJECTION, SOLUTION SUBCUTANEOUS at 09:05

## 2024-05-25 RX ADMIN — SENNOSIDES AND DOCUSATE SODIUM 1 TABLET: 50; 8.6 TABLET ORAL at 09:05

## 2024-05-25 RX ADMIN — INSULIN ASPART 4 UNITS: 100 INJECTION, SOLUTION INTRAVENOUS; SUBCUTANEOUS at 09:05

## 2024-05-25 RX ADMIN — DIPHENHYDRAMINE HYDROCHLORIDE 25 MG: 50 INJECTION INTRAMUSCULAR; INTRAVENOUS at 05:05

## 2024-05-25 RX ADMIN — MUPIROCIN 1 G: 20 OINTMENT TOPICAL at 08:05

## 2024-05-25 RX ADMIN — INSULIN ASPART 2 UNITS: 100 INJECTION, SOLUTION INTRAVENOUS; SUBCUTANEOUS at 04:05

## 2024-05-25 RX ADMIN — SENNOSIDES AND DOCUSATE SODIUM 1 TABLET: 50; 8.6 TABLET ORAL at 08:05

## 2024-05-25 RX ADMIN — APIXABAN 5 MG: 2.5 TABLET, FILM COATED ORAL at 09:05

## 2024-05-25 RX ADMIN — MUPIROCIN 1 G: 20 OINTMENT TOPICAL at 09:05

## 2024-05-25 RX ADMIN — CARVEDILOL 12.5 MG: 6.25 TABLET, FILM COATED ORAL at 08:05

## 2024-05-25 RX ADMIN — CARVEDILOL 12.5 MG: 6.25 TABLET, FILM COATED ORAL at 09:05

## 2024-05-25 RX ADMIN — INSULIN ASPART 6 UNITS: 100 INJECTION, SOLUTION INTRAVENOUS; SUBCUTANEOUS at 12:05

## 2024-05-25 NOTE — PROGRESS NOTES
Replaced by [facility]  Progress Note    Patient Name: Leslie Tolliver  MRN: [id number]  Patient Class: IP- Inpatient   Admission Date: 5/22/2024  Length of Stay: 3 days  Attending Physician: Adiel Merritt MD  Primary Care Provider: Chang Christianson MD        Subjective:     Principal Problem:Closed displaced intertrochanteric fracture of left femur        HPI:  Leslie Tolliver an 89 year female who presents emergency room complaining of left hip pain.  She endorses mechanical fall at her residence today.  She fell striking her left hip on the floor.  She denies striking her head or loss of consciousness.  Denies any cervical pain.  Pain is worse with any movement of the left leg.  No alleviating factors.  No known sick contacts or travel.  She denies fever or chills.  Previous medical history includes hypertension, diabetes, hypothyroidism, hyperlipidemia, CKD, anemia, aortic valve replacement, paroxysmal AFib.  ER workup: CBC unremarkable.  CMP with glucose of 156 and bicarb 19 otherwise unremarkable.  X-ray demonstrates a left intertrochanteric fracture.  Orthopedics was consulted.  Plans to take patient to OR tomorrow.  Patient admitted to Hospital Medicine for treatment management.  Will maintain patient NPO after midnight.    Overview/Hospital Course:  Leslie Tolliver is an 89 year old female with a past medical history of HTN, CAD, DM, anemia, and Afib who presented with a fall suffering a comminuted left hip fracture. Orthopedic Surgery performed repair 5/23. PT/OT has been consulted. Her course has been complicated by a mild KANNAN on CKD for which IV fluids have been started. Pending SNF/IPR.     Interval History: Patient seen and examined. NAEON. Pain controlled. Will be awaiting SNF/IPR after weekend.      Objective:     Vital Signs (Most Recent):  Temp: 98.4 °F (36.9 °C) (05/25/24 1123)  Pulse: (!) 111 (05/25/24 1342)  Resp: 18 (05/25/24 1342)  BP: 131/61 (05/25/24  1123)  SpO2: 98 % (05/25/24 1342) Vital Signs (24h Range):  Temp:  [98 °F (36.7 °C)-98.4 °F (36.9 °C)] 98.4 °F (36.9 °C)  Pulse:  [] 111  Resp:  [17-18] 18  SpO2:  [94 %-99 %] 98 %  BP: ()/(52-61) 131/61     Weight: 79.4 kg (175 lb 0.7 oz)  Body mass index is 27.42 kg/m².    Intake/Output Summary (Last 24 hours) at 5/25/2024 1445  Last data filed at 5/24/2024 1839  Gross per 24 hour   Intake --   Output 550 ml   Net -550 ml         Physical Exam  Vitals reviewed.   Constitutional:       General: She is not in acute distress.  HENT:      Head: Normocephalic and atraumatic.   Cardiovascular:      Rate and Rhythm: Normal rate and regular rhythm.   Pulmonary:      Effort: Pulmonary effort is normal. No respiratory distress.   Musculoskeletal:      Comments: LLE NVI  L hip area dressed with mild-moderate bloody soaking on dressing   Neurological:      General: No focal deficit present.      Mental Status: She is alert and oriented to person, place, and time. Mental status is at baseline.   Psychiatric:         Mood and Affect: Affect normal.         Behavior: Behavior normal.         Thought Content: Thought content normal.             Significant Labs: All pertinent labs within the past 24 hours have been reviewed.    Significant Imaging: I have reviewed all pertinent imaging results/findings within the past 24 hours.    Assessment/Plan:      * Closed displaced intertrochanteric fracture of left femur  S/p repair 5/23  -Fall precautions  -PRN analgesics  -PT/OT  -Eliquis  -Dispo pending: SNF/IPR    Acute blood loss anemia  Declined post-op  -Trend Hgb with CBC and transfuse if necessary    Paroxysmal atrial fibrillation  Controlled   -Coreg  -Telemetry  -Eliquis    Acute on chronic kidney failure  Stable.  -Renally dose medications  -Avoid nephrotoxic agents  -IV fluids  -Monitor UOP and electrolytes  -Trend creatinine    Type 2 diabetes mellitus with diabetic polyneuropathy, with long-term current use of  insulin  Patient's FSGs are uncontrolled due to hyperglycemia on current medication regimen.  Last A1c reviewed-   Lab Results   Component Value Date    HGBA1C 6.7 (H) 01/26/2024     Most recent fingerstick glucose reviewed-   Recent Labs   Lab 05/24/24  1611 05/24/24  2047 05/25/24  0803 05/25/24  1156   POCTGLUCOSE 291* 268* 245* 258*       Current correctional scale  Medium  Increase anti-hyperglycemic dose as follows-   Antihyperglycemics (From admission, onward)      Start     Stop Route Frequency Ordered    05/25/24 0900  insulin glargine U-100 (Lantus) pen 20 Units         -- SubQ Daily 05/25/24 0842    05/22/24 1643  insulin aspart U-100 pen 1-10 Units         -- SubQ Before meals & nightly PRN 05/22/24 1545          Hold Oral hypoglycemics while patient is in the hospital.     Hypertension associated with diabetes  Stable   -Continue Coreg  -Continue to monitor      VTE Risk Mitigation (From admission, onward)           Ordered     apixaban tablet 5 mg  2 times daily         05/23/24 1517     IP VTE HIGH RISK PATIENT  Once         05/22/24 1619     Place sequential compression device  Until discontinued         05/22/24 1545     Place FRANKO hose  Until discontinued         05/22/24 1545                    Discharge Planning   MORA: 5/28/2024     Code Status: Full Code   Is the patient medically ready for discharge?:     Reason for patient still in hospital (select all that apply): Patient trending condition and Pending disposition  Discharge Plan A: Rehab                  Adiel Merritt MD  Department of Hospital Medicine   [facility]/Surg

## 2024-05-25 NOTE — SUBJECTIVE & OBJECTIVE
Interval History: Patient seen and examined. NAEON. Pain controlled. Will be awaiting SNF/IPR after weekend.      Objective:     Vital Signs (Most Recent):  Temp: 98.4 °F (36.9 °C) (05/25/24 1123)  Pulse: (!) 111 (05/25/24 1342)  Resp: 18 (05/25/24 1342)  BP: 131/61 (05/25/24 1123)  SpO2: 98 % (05/25/24 1342) Vital Signs (24h Range):  Temp:  [98 °F (36.7 °C)-98.4 °F (36.9 °C)] 98.4 °F (36.9 °C)  Pulse:  [] 111  Resp:  [17-18] 18  SpO2:  [94 %-99 %] 98 %  BP: ()/(52-61) 131/61     Weight: 79.4 kg (175 lb 0.7 oz)  Body mass index is 27.42 kg/m².    Intake/Output Summary (Last 24 hours) at 5/25/2024 1445  Last data filed at 5/24/2024 1839  Gross per 24 hour   Intake --   Output 550 ml   Net -550 ml         Physical Exam  Vitals reviewed.   Constitutional:       General: She is not in acute distress.  HENT:      Head: Normocephalic and atraumatic.   Cardiovascular:      Rate and Rhythm: Normal rate and regular rhythm.   Pulmonary:      Effort: Pulmonary effort is normal. No respiratory distress.   Musculoskeletal:      Comments: LLE NVI  L hip area dressed with mild-moderate bloody soaking on dressing   Neurological:      General: No focal deficit present.      Mental Status: She is alert and oriented to person, place, and time. Mental status is at baseline.   Psychiatric:         Mood and Affect: Affect normal.         Behavior: Behavior normal.         Thought Content: Thought content normal.             Significant Labs: All pertinent labs within the past 24 hours have been reviewed.    Significant Imaging: I have reviewed all pertinent imaging results/findings within the past 24 hours.

## 2024-05-25 NOTE — PT/OT/SLP PROGRESS
Occupational Therapy   Treatment    Name: Leslie Tolliver  MRN: [id number]  Admitting Diagnosis:  Closed displaced intertrochanteric fracture of left femur  2 Days Post-Op    Recommendations:     Discharge Recommendations: High Intensity Therapy  Discharge Equipment Recommendations:  to be determined by next level of care  Barriers to discharge:  None    Assessment:     Leslie Tolliver is a 89 y.o. female with a medical diagnosis of Closed displaced intertrochanteric fracture of left femur. Performance deficits affecting function are weakness, impaired endurance, impaired self care skills, impaired functional mobility, gait instability, impaired balance, decreased lower extremity function, pain, decreased ROM, decreased safety awareness, orthopedic precautions.     Rehab Prognosis:  Fair; patient would benefit from acute skilled OT services to address these deficits and reach maximum level of function.       Plan:     Patient to be seen 5 x/week to address the above listed problems via self-care/home management, therapeutic activities, therapeutic exercises  Plan of Care Expires: 06/21/24  Plan of Care Reviewed with: patient    Subjective     Chief Complaint: LLE pain  Patient/Family Comments/goals: none  Pain/Comfort:  Pain Rating 1:  (not rated)  Location - Side 1: Left  Location - Orientation 1: generalized  Location 1: hip  Pain Addressed 1: Reposition, Distraction, Cessation of Activity  Pain Rating Post-Intervention 1:  (not rated)    Objective:     Communicated with: nurse Santana prior to session.  Patient found HOB elevated with PureWick, peripheral IV, telemetry upon OT entry to room.    General Precautions: Standard, fall    Orthopedic Precautions:LLE toe touch weight bearing  Braces: N/A  Respiratory Status: Room air     Occupational Performance:     Bed Mobility:    Patient completed Scooting/Bridging with moderate assistance  Patient completed Supine to Sit with moderate assistance     Functional  Mobility/Transfers:  Patient completed Sit <> Stand Transfer with moderate assistance  with  rolling walker   Patient completed Bed <> Chair Transfer using Stand Pivot technique with maximal assistance with rolling walker  Functional Mobility: Poor standing balance/tolerance due to LLE pain. Patient still unable to fully stand upright with hands on RW handles. Noted flexed trunk with hands improperly placed on RW in standing.     Activities of Daily Living:  Grooming: supervision with oral and facial hygiene while seated in chair  Toileting: dependence with Purewick in place      [facility] 6 Click ADL:      Treatment & Education:  OT ed patient on safety with walker use for functional mobility with cues for hand placement & sequencing.       Patient left up in chair with all lines intact, call button in reach, and chair alarm on    GOALS:   Multidisciplinary Problems       Occupational Therapy Goals          Problem: Occupational Therapy    Goal Priority Disciplines Outcome Interventions   Occupational Therapy Goal     OT, PT/OT Progressing    Description: Goals to be met by: 6/21/2024     Patient will increase functional independence with ADLs by performing:    LE Dressing with Stand-by Assistance and Assistive Devices as needed.  Grooming while seated at sink with Supervision.  Toileting from bedside commode with Minimal Assistance for hygiene and clothing management.   Supine to sit with Stand-by Assistance.  Toilet transfer to bedside commode with Minimal Assistance.                         Time Tracking:     OT Date of Treatment: 05/25/24  OT Start Time: 1106  OT Stop Time: 1130  OT Total Time (min): 24 min    Billable Minutes:Self Care/Home Management 24    OT/VILMA: OT          5/25/2024

## 2024-05-25 NOTE — PT/OT/SLP PROGRESS
"Physical Therapy Treatment    Patient Name:  Leslie Tolliver   MRN:  [id number]    Recommendations:     Discharge Recommendations: High Intensity Therapy  Discharge Equipment Recommendations: none  Barriers to discharge:  WB restrictions, pain, decreased mobility     Assessment:     Leslie Tolliver is a 89 y.o. female admitted with a medical diagnosis of Closed displaced intertrochanteric fracture of left femur.  She presents with the following impairments/functional limitations: weakness, impaired endurance, impaired self care skills, impaired functional mobility, decreased lower extremity function, pain, decreased ROM, orthopedic precautions.    Per nursing, pt was just assisted back to bed with maxA x2 nurses and with great difficulty completing transfer.    Pt participated very well in supine leg exercises and bed mobility training. Pt is eager to resume independence and was appreciative of efforts to improve her strength and mobility. Was instructed to continue to perform APs, glut sets, and heel slides independently (written on whiteboard).    Will progress gait training and transfer training tomorrow as tolerated.     Rehab Prognosis: Good; patient would benefit from acute skilled PT services to address these deficits and reach maximum level of function.    Recent Surgery: Procedure(s) (LRB):  INSERTION, INTRAMEDULLARY RACQUEL, FEMUR (Left) 2 Days Post-Op    Plan:     During this hospitalization, patient to be seen daily to address the identified rehab impairments via gait training, therapeutic activities, therapeutic exercises and progress toward the following goals:    Plan of Care Expires:  06/15/24    Subjective     Chief Complaint: L hip pain  Patient/Family Comments/goals: return to PLOF; "I was ready to entertain the ladies, and I'd already cut the most delicious cantaloupe up, plus made a dessert with a nut crust. And I didn't even get to eat any of them!"   Pain/Comfort:  Pain Rating 1: other (see " comments) (did not rate)  Location - Side 1: Left  Location 1: hip  Pain Addressed 1: Reposition, Pre-medicate for activity, Distraction      Objective:     Communicated with nurse and charge nurse prior to session.  Patient found HOB elevated with peripheral IV, PureWick, telemetry upon PT entry to room.     General Precautions: Standard, fall  Orthopedic Precautions: LLE toe touch weight bearing (25% WB LLE per ortho)  Braces: N/A  Respiratory Status: Room air     Functional Mobility:  Bed Mobility:     Rolling Right: moderate assistance  Scooting: supine scooting to HOB with SBA and VC: BUE pull from headboard and RLE bridging   Bridging: RLE bridging with minimal lift of hips from bed but sufficient for bed pad adjustment      AM-PAC 6 CLICK MOBILITY          Treatment & Education:  -Pt educ: benefits of participation with therapy, regular independent therex for improved mobility, fall prevention, WB restrictions, call light  -Bed mobility training: Rolling w/ bedrail assist, technique for supine scoot to HOB, RLE bridging for adjustment of bedding and brief   -Supine BLE exercises: AP, Glut sets, heel slides (assist for LLE), hip ab/adduction (assist LLE), hip IR/ER, SAQs (assist and only 2x5 reps for LLE); 2x10 reps unless otherwise noted       Patient left HOB elevated with all lines intact, call button in reach, bed alarm on, nurse notified, and SCD's active ..    GOALS:   Multidisciplinary Problems       Physical Therapy Goals          Problem: Physical Therapy    Goal Priority Disciplines Outcome Goal Variances Interventions   Physical Therapy Goal     PT, PT/OT Progressing     Description: Goals to be met by: 06-     Patient will increase functional independence with mobility by performin. Supine to sit with MInimal Assistance  2. Sit to stand transfer with Minimal Assistance  3. Bed to chair transfer with Minimal Assistance using Rolling Walker  4. Gait  x 20 feet with Minimal Assistance  using Rolling Walker.   5. Lower extremity exercise program x20 reps    L IM nailing 05-23 and 25% WB only                         Time Tracking:     PT Received On: 05/25/24  PT Start Time: 1414     PT Stop Time: 1446  PT Total Time (min): 32 min     Billable Minutes: Therapeutic Exercise 32    Treatment Type: Treatment  PT/PTA: PTA     Number of PTA visits since last PT visit: 1     05/25/2024

## 2024-05-25 NOTE — ASSESSMENT & PLAN NOTE
Patient's FSGs are uncontrolled due to hyperglycemia on current medication regimen.  Last A1c reviewed-   Lab Results   Component Value Date    HGBA1C 6.7 (H) 01/26/2024     Most recent fingerstick glucose reviewed-   Recent Labs   Lab 05/24/24  1611 05/24/24  2047 05/25/24  0803 05/25/24  1156   POCTGLUCOSE 291* 268* 245* 258*       Current correctional scale  Medium  Increase anti-hyperglycemic dose as follows-   Antihyperglycemics (From admission, onward)      Start     Stop Route Frequency Ordered    05/25/24 0900  insulin glargine U-100 (Lantus) pen 20 Units         -- SubQ Daily 05/25/24 0842    05/22/24 1643  insulin aspart U-100 pen 1-10 Units         -- SubQ Before meals & nightly PRN 05/22/24 1545          Hold Oral hypoglycemics while patient is in the hospital.

## 2024-05-25 NOTE — PLAN OF CARE
Pt remained stable this shift. Medicated x 1 for itching. Offered pain medication but patient refused. Dressings changed to left hip. IVF infusing per order. Cardiac monitoring in place. Safety precautions maintained throughout shift.     Problem: Infection  Goal: Absence of Infection Signs and Symptoms  Outcome: Progressing     Problem: Diabetes Comorbidity  Goal: Blood Glucose Level Within Targeted Range  Outcome: Progressing     Problem: Acute Kidney Injury/Impairment  Goal: Effective Renal Function  Outcome: Progressing

## 2024-05-26 LAB
ALBUMIN SERPL BCP-MCNC: 2.4 G/DL (ref 3.5–5.2)
ALP SERPL-CCNC: 76 U/L (ref 55–135)
ALT SERPL W/O P-5'-P-CCNC: 14 U/L (ref 10–44)
ANION GAP SERPL CALC-SCNC: 7 MMOL/L (ref 8–16)
AST SERPL-CCNC: 19 U/L (ref 10–40)
BASOPHILS # BLD AUTO: 0.04 K/UL (ref 0–0.2)
BASOPHILS NFR BLD: 0.7 % (ref 0–1.9)
BILIRUB SERPL-MCNC: 0.9 MG/DL (ref 0.1–1)
BUN SERPL-MCNC: 48 MG/DL (ref 8–23)
CALCIUM SERPL-MCNC: 7.9 MG/DL (ref 8.7–10.5)
CHLORIDE SERPL-SCNC: 109 MMOL/L (ref 95–110)
CO2 SERPL-SCNC: 20 MMOL/L (ref 23–29)
CREAT SERPL-MCNC: 1.3 MG/DL (ref 0.5–1.4)
DIFFERENTIAL METHOD BLD: ABNORMAL
EOSINOPHIL # BLD AUTO: 0.2 K/UL (ref 0–0.5)
EOSINOPHIL NFR BLD: 3.9 % (ref 0–8)
ERYTHROCYTE [DISTWIDTH] IN BLOOD BY AUTOMATED COUNT: 17.2 % (ref 11.5–14.5)
EST. GFR  (NO RACE VARIABLE): 39 ML/MIN/1.73 M^2
GLUCOSE SERPL-MCNC: 157 MG/DL (ref 70–110)
HCT VFR BLD AUTO: 23.9 % (ref 37–48.5)
HGB BLD-MCNC: 7.6 G/DL (ref 12–16)
IMM GRANULOCYTES # BLD AUTO: 0.03 K/UL (ref 0–0.04)
IMM GRANULOCYTES NFR BLD AUTO: 0.5 % (ref 0–0.5)
LYMPHOCYTES # BLD AUTO: 0.9 K/UL (ref 1–4.8)
LYMPHOCYTES NFR BLD: 15.7 % (ref 18–48)
MAGNESIUM SERPL-MCNC: 1.8 MG/DL (ref 1.6–2.6)
MCH RBC QN AUTO: 29.3 PG (ref 27–31)
MCHC RBC AUTO-ENTMCNC: 31.8 G/DL (ref 32–36)
MCV RBC AUTO: 92 FL (ref 82–98)
MONOCYTES # BLD AUTO: 0.8 K/UL (ref 0.3–1)
MONOCYTES NFR BLD: 14 % (ref 4–15)
NEUTROPHILS # BLD AUTO: 3.9 K/UL (ref 1.8–7.7)
NEUTROPHILS NFR BLD: 65.2 % (ref 38–73)
NRBC BLD-RTO: 0 /100 WBC
PHOSPHATE SERPL-MCNC: 3 MG/DL (ref 2.7–4.5)
PLATELET # BLD AUTO: 146 K/UL (ref 150–450)
PMV BLD AUTO: 10.6 FL (ref 9.2–12.9)
POCT GLUCOSE: 150 MG/DL (ref 70–110)
POCT GLUCOSE: 195 MG/DL (ref 70–110)
POCT GLUCOSE: 198 MG/DL (ref 70–110)
POCT GLUCOSE: 308 MG/DL (ref 70–110)
POTASSIUM SERPL-SCNC: 4.3 MMOL/L (ref 3.5–5.1)
PROT SERPL-MCNC: 5.1 G/DL (ref 6–8.4)
RBC # BLD AUTO: 2.59 M/UL (ref 4–5.4)
SODIUM SERPL-SCNC: 136 MMOL/L (ref 136–145)
TB INDURATION 48 - 72 HR READ: 0 MM
TB SKIN TEST 48 - 72 HR READ: NEGATIVE
WBC # BLD AUTO: 5.93 K/UL (ref 3.9–12.7)

## 2024-05-26 PROCEDURE — 99900035 HC TECH TIME PER 15 MIN (STAT)

## 2024-05-26 PROCEDURE — 80053 COMPREHEN METABOLIC PANEL: CPT | Performed by: ORTHOPAEDIC SURGERY

## 2024-05-26 PROCEDURE — 94761 N-INVAS EAR/PLS OXIMETRY MLT: CPT

## 2024-05-26 PROCEDURE — 25000003 PHARM REV CODE 250: Performed by: ORTHOPAEDIC SURGERY

## 2024-05-26 PROCEDURE — 97530 THERAPEUTIC ACTIVITIES: CPT | Mod: CQ

## 2024-05-26 PROCEDURE — 11000001 HC ACUTE MED/SURG PRIVATE ROOM

## 2024-05-26 PROCEDURE — 94799 UNLISTED PULMONARY SVC/PX: CPT

## 2024-05-26 PROCEDURE — 97110 THERAPEUTIC EXERCISES: CPT | Mod: CQ

## 2024-05-26 PROCEDURE — 85025 COMPLETE CBC W/AUTO DIFF WBC: CPT | Performed by: ORTHOPAEDIC SURGERY

## 2024-05-26 PROCEDURE — 84100 ASSAY OF PHOSPHORUS: CPT | Performed by: ORTHOPAEDIC SURGERY

## 2024-05-26 PROCEDURE — 83735 ASSAY OF MAGNESIUM: CPT | Performed by: ORTHOPAEDIC SURGERY

## 2024-05-26 PROCEDURE — 25000003 PHARM REV CODE 250: Performed by: STUDENT IN AN ORGANIZED HEALTH CARE EDUCATION/TRAINING PROGRAM

## 2024-05-26 PROCEDURE — 36415 COLL VENOUS BLD VENIPUNCTURE: CPT | Performed by: ORTHOPAEDIC SURGERY

## 2024-05-26 RX ADMIN — Medication 800 MG: at 06:05

## 2024-05-26 RX ADMIN — CARVEDILOL 12.5 MG: 6.25 TABLET, FILM COATED ORAL at 08:05

## 2024-05-26 RX ADMIN — ACETAMINOPHEN 650 MG: 325 TABLET ORAL at 08:05

## 2024-05-26 RX ADMIN — INSULIN ASPART 1 UNITS: 100 INJECTION, SOLUTION INTRAVENOUS; SUBCUTANEOUS at 09:05

## 2024-05-26 RX ADMIN — INSULIN GLARGINE 20 UNITS: 100 INJECTION, SOLUTION SUBCUTANEOUS at 09:05

## 2024-05-26 RX ADMIN — INSULIN ASPART 8 UNITS: 100 INJECTION, SOLUTION INTRAVENOUS; SUBCUTANEOUS at 05:05

## 2024-05-26 RX ADMIN — SENNOSIDES AND DOCUSATE SODIUM 1 TABLET: 50; 8.6 TABLET ORAL at 08:05

## 2024-05-26 RX ADMIN — APIXABAN 5 MG: 2.5 TABLET, FILM COATED ORAL at 08:05

## 2024-05-26 RX ADMIN — APIXABAN 5 MG: 2.5 TABLET, FILM COATED ORAL at 09:05

## 2024-05-26 RX ADMIN — CARVEDILOL 12.5 MG: 6.25 TABLET, FILM COATED ORAL at 09:05

## 2024-05-26 RX ADMIN — SENNOSIDES AND DOCUSATE SODIUM 1 TABLET: 50; 8.6 TABLET ORAL at 09:05

## 2024-05-26 RX ADMIN — SODIUM CHLORIDE: 9 INJECTION, SOLUTION INTRAVENOUS at 05:05

## 2024-05-26 NOTE — PLAN OF CARE
Problem: Adult Inpatient Plan of Care  Goal: Plan of Care Review  Outcome: Progressing  Goal: Patient-Specific Goal (Individualized)  Outcome: Progressing  Goal: Absence of Hospital-Acquired Illness or Injury  Outcome: Progressing  Goal: Optimal Comfort and Wellbeing  Outcome: Progressing  Goal: Readiness for Transition of Care  Outcome: Progressing     Problem: Infection  Goal: Absence of Infection Signs and Symptoms  Outcome: Progressing     Problem: Diabetes Comorbidity  Goal: Blood Glucose Level Within Targeted Range  Outcome: Progressing     Problem: Skin Injury Risk Increased  Goal: Skin Health and Integrity  Outcome: Progressing     Problem: Fall Injury Risk  Goal: Absence of Fall and Fall-Related Injury  Outcome: Progressing     Problem: Wound  Goal: Optimal Coping  Outcome: Progressing  Goal: Optimal Functional Ability  Outcome: Progressing  Goal: Absence of Infection Signs and Symptoms  Outcome: Progressing  Goal: Improved Oral Intake  Outcome: Progressing  Goal: Optimal Pain Control and Function  Outcome: Progressing  Goal: Skin Health and Integrity  Outcome: Progressing  Goal: Optimal Wound Healing  Outcome: Progressing     Problem: Acute Kidney Injury/Impairment  Goal: Fluid and Electrolyte Balance  Outcome: Progressing  Goal: Improved Oral Intake  Outcome: Progressing  Goal: Effective Renal Function  Outcome: Progressing   Plan of care reviewed with patient. Patient verbalized understanding. All fall precautions maintained. Bed in lowest position, call light within reach, slip resistant socks maintained. Bed alarm on.

## 2024-05-26 NOTE — ASSESSMENT & PLAN NOTE
Patient's FSGs are controlled on current medication regimen.  Last A1c reviewed-   Lab Results   Component Value Date    HGBA1C 6.7 (H) 01/26/2024     Most recent fingerstick glucose reviewed-   Recent Labs   Lab 05/25/24  1624 05/25/24  2102 05/26/24  0759 05/26/24  1123   POCTGLUCOSE 200* 181* 150* 198*       Current correctional scale  Medium  Maintain anti-hyperglycemic dose as follows-   Antihyperglycemics (From admission, onward)      Start     Stop Route Frequency Ordered    05/25/24 0900  insulin glargine U-100 (Lantus) pen 20 Units         -- SubQ Daily 05/25/24 0842    05/22/24 1643  insulin aspart U-100 pen 1-10 Units         -- SubQ Before meals & nightly PRN 05/22/24 1545          Hold Oral hypoglycemics while patient is in the hospital.

## 2024-05-26 NOTE — PT/OT/SLP PROGRESS
"Physical Therapy Treatment    Patient Name:  Leslie Tolliver   MRN:  [id number]    Recommendations:     Discharge Recommendations: High Intensity Therapy  Discharge Equipment Recommendations: none  Barriers to discharge:  difficulty adhering to LLE WB restrictions during mobility; maxAx2 transfers; fall risk    Assessment:     Leslie Tolliver is a 89 y.o. female admitted with a medical diagnosis of Closed displaced intertrochanteric fracture of left femur.  She presents with the following impairments/functional limitations: weakness, impaired endurance, impaired self care skills, impaired functional mobility, gait instability, impaired balance, decreased lower extremity function, pain, decreased ROM, orthopedic precautions.    Pt highly motivated, eager to progress, and with excellent participation. Reported compliance with independent leg exercises in bed overnight.    Performed supine BLE TE prior to mobility to reduce guarding and improve tolerance. Required modAx2 for transfer to EOB.    Performed four stands to RW at EOB, with one stand abruptly returning to sit due to pt c/o R knee buckling. Required maxAx2 to perform side scoot steps toward HOB, with great difficulty limiting WB to LLE.     Returned to supine with modAx2. Encouraged to continued independent leg exercises. Pt discouraged by her difficulty in progressing mobility and is eager to improve her transfers.     Rehab Prognosis: Good; patient would benefit from acute skilled PT services to address these deficits and reach maximum level of function.    Recent Surgery: Procedure(s) (LRB):  INSERTION, INTRAMEDULLARY RACQUEL, FEMUR (Left) 3 Days Post-Op    Plan:     During this hospitalization, patient to be seen daily to address the identified rehab impairments via gait training, therapeutic activities, therapeutic exercises and progress toward the following goals:    Plan of Care Expires:  06/15/24    Subjective     Chief Complaint: "I'm not doing very " "good, am I"  Patient/Family Comments/goals: "If I keep doing these exercises like this regularly, I'll have a great body!"  Pain/Comfort:  Pain Rating 1: other (see comments) (did not rate)  Location - Side 1: Left  Location - Orientation 1: generalized  Location 1: hip  Pain Addressed 1: Reposition, Pre-medicate for activity  Pain Rating Post-Intervention 1: other (see comments) (didnot rate)      Objective:     Communicated with nurse prior to session.  Patient found HOB elevated with PureWick, peripheral IV, telemetry, bed alarm, SCD upon PT entry to room.     General Precautions: Standard, fall  Orthopedic Precautions: LLE toe touch weight bearing  Braces: N/A  Respiratory Status: Room air     Functional Mobility:  Bed Mobility:     Bridging: w/ RLE and sufficient lift to adjust brief/bedpad  Scooting: supine scoot to HOB with Azam, BUE pull from headboard, and RLE bridging  Supine to Sit: moderate assistance and of 2 persons  Sit to Supine: moderate assistance and of 2 persons  Transfers:     Sit to Stand:  maxAx1 and modAx1 with rolling walker and VC throughout; difficulty transferring hand from bedrail to RW handle  Gait: side scoot-steps (unable to clear either foot from floor) x 1-2' distance over two standing trials, with RW and maxAx2; VC/TC/assist throughout for TTWB adherence      AM-PAC 6 CLICK MOBILITY          Treatment & Education:  -Pt educ: benefits of participation with therapy, regular independent leg exercises in bed, fall prevention, call light, WB restrictions during mobility  -Bed mobility and transfer training  -Gait training via demonstration  -Supine BLE therex: AP, glut sets, heel slides (assist LLE), hip IR/ER, hip ab/add (assist LLE) 1x10  -Seated BLE therex at EOB: heel/toe raises, hip ab/adduction in tolerated range, LAQs 1x5    Patient left HOB elevated with all lines intact, call button in reach, bed alarm on, and nurse notified..    GOALS:   Multidisciplinary Problems       " Physical Therapy Goals          Problem: Physical Therapy    Goal Priority Disciplines Outcome Goal Variances Interventions   Physical Therapy Goal     PT, PT/OT Progressing     Description: Goals to be met by: 06-     Patient will increase functional independence with mobility by performin. Supine to sit with MInimal Assistance  2. Sit to stand transfer with Minimal Assistance  3. Bed to chair transfer with Minimal Assistance using Rolling Walker  4. Gait  x 20 feet with Minimal Assistance using Rolling Walker.   5. Lower extremity exercise program x20 reps    L IM nailing  and 25% WB only                         Time Tracking:     PT Received On: 24  PT Start Time: 935     PT Stop Time: 1014  PT Total Time (min): 39 min     Billable Minutes: Therapeutic Activity 26 and Therapeutic Exercise 13    Treatment Type: Treatment  PT/PTA: PTA     Number of PTA visits since last PT visit: 2     2024

## 2024-05-26 NOTE — ASSESSMENT & PLAN NOTE
Stable, near baseline  -Renally dose medications  -Avoid nephrotoxic agents  -dc IVF  -Monitor UOP and electrolytes  -Trend creatinine

## 2024-05-26 NOTE — SUBJECTIVE & OBJECTIVE
Interval History: Patient seen and examined. TORRIE. Pain is a little better today. Pending SNF/IPR.      Objective:     Vital Signs (Most Recent):  Temp: 98.5 °F (36.9 °C) (05/26/24 1123)  Pulse: 96 (05/26/24 1326)  Resp: 18 (05/26/24 1326)  BP: (!) 98/55 (05/26/24 1123)  SpO2: 99 % (05/26/24 1326) Vital Signs (24h Range):  Temp:  [97.6 °F (36.4 °C)-98.9 °F (37.2 °C)] 98.5 °F (36.9 °C)  Pulse:  [] 96  Resp:  [18] 18  SpO2:  [93 %-99 %] 99 %  BP: ()/(51-57) 98/55     Weight: 79.4 kg (175 lb 0.7 oz)  Body mass index is 27.42 kg/m².    Intake/Output Summary (Last 24 hours) at 5/26/2024 1422  Last data filed at 5/26/2024 0602  Gross per 24 hour   Intake 360 ml   Output 1601 ml   Net -1241 ml         Physical Exam  Vitals reviewed.   Constitutional:       General: She is not in acute distress.  HENT:      Head: Normocephalic and atraumatic.   Cardiovascular:      Rate and Rhythm: Normal rate and regular rhythm.   Pulmonary:      Effort: Pulmonary effort is normal. No respiratory distress.   Musculoskeletal:      Comments: LLE NVI  L hip area dressed   Neurological:      General: No focal deficit present.      Mental Status: She is alert and oriented to person, place, and time. Mental status is at baseline.   Psychiatric:         Mood and Affect: Affect normal.         Behavior: Behavior normal.         Thought Content: Thought content normal.             Significant Labs: All pertinent labs within the past 24 hours have been reviewed.    Significant Imaging: I have reviewed all pertinent imaging results/findings within the past 24 hours.

## 2024-05-26 NOTE — PROGRESS NOTES
[facility]  Progress Note    Patient Name: Leslie Tolliver  MRN: [id number]  Patient Class: IP- Inpatient   Admission Date: 5/22/2024  Length of Stay: 4 days  Attending Physician: Adiel Merritt MD  Primary Care Provider: Chang Christianson MD        Subjective:     Principal Problem:Closed displaced intertrochanteric fracture of left femur        HPI:  Leslie Tolliver an 89 year female who presents emergency room complaining of left hip pain.  She endorses mechanical fall at her residence today.  She fell striking her left hip on the floor.  She denies striking her head or loss of consciousness.  Denies any cervical pain.  Pain is worse with any movement of the left leg.  No alleviating factors.  No known sick contacts or travel.  She denies fever or chills.  Previous medical history includes hypertension, diabetes, hypothyroidism, hyperlipidemia, CKD, anemia, aortic valve replacement, paroxysmal AFib.  ER workup: CBC unremarkable.  CMP with glucose of 156 and bicarb 19 otherwise unremarkable.  X-ray demonstrates a left intertrochanteric fracture.  Orthopedics was consulted.  Plans to take patient to OR tomorrow.  Patient admitted to Hospital Medicine for treatment management.  Will maintain patient NPO after midnight.    Overview/Hospital Course:  Leslie Tolliver is an 89 year old female with a past medical history of HTN, CAD, DM, anemia, and Afib who presented with a fall suffering a comminuted left hip fracture. Orthopedic Surgery performed repair 5/23. PT/OT has been consulted. Her course has been complicated by a mild KANNAN on CKD for which IV fluids have been started. Pending SNF/IPR.     Interval History: Patient seen and examined. TORRIE. Pain is a little better today. Pending SNF/IPR.      Objective:     Vital Signs (Most Recent):  Temp: 98.5 °F (36.9 °C) (05/26/24 1123)  Pulse: 96 (05/26/24 1326)  Resp: 18 (05/26/24 1326)  BP: (!) 98/55 (05/26/24 1123)  SpO2: 99 %  (05/26/24 1326) Vital Signs (24h Range):  Temp:  [97.6 °F (36.4 °C)-98.9 °F (37.2 °C)] 98.5 °F (36.9 °C)  Pulse:  [] 96  Resp:  [18] 18  SpO2:  [93 %-99 %] 99 %  BP: ()/(51-57) 98/55     Weight: 79.4 kg (175 lb 0.7 oz)  Body mass index is 27.42 kg/m².    Intake/Output Summary (Last 24 hours) at 5/26/2024 1422  Last data filed at 5/26/2024 0602  Gross per 24 hour   Intake 360 ml   Output 1601 ml   Net -1241 ml         Physical Exam  Vitals reviewed.   Constitutional:       General: She is not in acute distress.  HENT:      Head: Normocephalic and atraumatic.   Cardiovascular:      Rate and Rhythm: Normal rate and regular rhythm.   Pulmonary:      Effort: Pulmonary effort is normal. No respiratory distress.   Musculoskeletal:      Comments: LLE NVI  L hip area dressed   Neurological:      General: No focal deficit present.      Mental Status: She is alert and oriented to person, place, and time. Mental status is at baseline.   Psychiatric:         Mood and Affect: Affect normal.         Behavior: Behavior normal.         Thought Content: Thought content normal.             Significant Labs: All pertinent labs within the past 24 hours have been reviewed.    Significant Imaging: I have reviewed all pertinent imaging results/findings within the past 24 hours.    Assessment/Plan:      * Closed displaced intertrochanteric fracture of left femur  S/p repair 5/23  -Fall precautions  -PRN analgesics  -PT/OT  -Eliquis  -Dispo pending: SNF/IPR    Acute blood loss anemia  Declining post-op  -Trend Hgb with CBC and transfuse if necessary    Paroxysmal atrial fibrillation  Controlled   -Coreg  -Telemetry  -Eliquis    Acute on chronic kidney failure  Stable, near baseline  -Renally dose medications  -Avoid nephrotoxic agents  -dc IVF  -Monitor UOP and electrolytes  -Trend creatinine    Type 2 diabetes mellitus with diabetic polyneuropathy, with long-term current use of insulin  Patient's FSGs are controlled on current  medication regimen.  Last A1c reviewed-   Lab Results   Component Value Date    HGBA1C 6.7 (H) 01/26/2024     Most recent fingerstick glucose reviewed-   Recent Labs   Lab 05/25/24  1624 05/25/24  2102 05/26/24  0759 05/26/24  1123   POCTGLUCOSE 200* 181* 150* 198*       Current correctional scale  Medium  Maintain anti-hyperglycemic dose as follows-   Antihyperglycemics (From admission, onward)      Start     Stop Route Frequency Ordered    05/25/24 0900  insulin glargine U-100 (Lantus) pen 20 Units         -- SubQ Daily 05/25/24 0842    05/22/24 1643  insulin aspart U-100 pen 1-10 Units         -- SubQ Before meals & nightly PRN 05/22/24 1545          Hold Oral hypoglycemics while patient is in the hospital.     Hypertension associated with diabetes  Stable   -Continue Coreg  -Continue to monitor      VTE Risk Mitigation (From admission, onward)           Ordered     apixaban tablet 5 mg  2 times daily         05/23/24 1517     IP VTE HIGH RISK PATIENT  Once         05/22/24 1619     Place sequential compression device  Until discontinued         05/22/24 1545     Place FRANKO hose  Until discontinued         05/22/24 1545                    Discharge Planning   MORA: 5/28/2024     Code Status: Full Code   Is the patient medically ready for discharge?:     Reason for patient still in hospital (select all that apply): Pending disposition  Discharge Plan A: Rehab                  Adiel Merritt MD  Department of Hospital Medicine   [facility]/Surg

## 2024-05-27 LAB
ALBUMIN SERPL BCP-MCNC: 2.4 G/DL (ref 3.5–5.2)
ALP SERPL-CCNC: 73 U/L (ref 55–135)
ALT SERPL W/O P-5'-P-CCNC: 16 U/L (ref 10–44)
ANION GAP SERPL CALC-SCNC: 7 MMOL/L (ref 8–16)
AST SERPL-CCNC: 20 U/L (ref 10–40)
BASOPHILS # BLD AUTO: 0.04 K/UL (ref 0–0.2)
BASOPHILS NFR BLD: 0.7 % (ref 0–1.9)
BILIRUB SERPL-MCNC: 1.2 MG/DL (ref 0.1–1)
BUN SERPL-MCNC: 49 MG/DL (ref 8–23)
CALCIUM SERPL-MCNC: 8.2 MG/DL (ref 8.7–10.5)
CHLORIDE SERPL-SCNC: 108 MMOL/L (ref 95–110)
CO2 SERPL-SCNC: 21 MMOL/L (ref 23–29)
CREAT SERPL-MCNC: 1.4 MG/DL (ref 0.5–1.4)
DIFFERENTIAL METHOD BLD: ABNORMAL
EOSINOPHIL # BLD AUTO: 0.3 K/UL (ref 0–0.5)
EOSINOPHIL NFR BLD: 4.2 % (ref 0–8)
ERYTHROCYTE [DISTWIDTH] IN BLOOD BY AUTOMATED COUNT: 17.3 % (ref 11.5–14.5)
EST. GFR  (NO RACE VARIABLE): 36 ML/MIN/1.73 M^2
GLUCOSE SERPL-MCNC: 120 MG/DL (ref 70–110)
HCT VFR BLD AUTO: 23.7 % (ref 37–48.5)
HGB BLD-MCNC: 7.6 G/DL (ref 12–16)
IMM GRANULOCYTES # BLD AUTO: 0.05 K/UL (ref 0–0.04)
IMM GRANULOCYTES NFR BLD AUTO: 0.8 % (ref 0–0.5)
LYMPHOCYTES # BLD AUTO: 0.8 K/UL (ref 1–4.8)
LYMPHOCYTES NFR BLD: 13.7 % (ref 18–48)
MAGNESIUM SERPL-MCNC: 1.9 MG/DL (ref 1.6–2.6)
MCH RBC QN AUTO: 29.1 PG (ref 27–31)
MCHC RBC AUTO-ENTMCNC: 32.1 G/DL (ref 32–36)
MCV RBC AUTO: 91 FL (ref 82–98)
MONOCYTES # BLD AUTO: 0.9 K/UL (ref 0.3–1)
MONOCYTES NFR BLD: 15.1 % (ref 4–15)
NEUTROPHILS # BLD AUTO: 3.9 K/UL (ref 1.8–7.7)
NEUTROPHILS NFR BLD: 65.5 % (ref 38–73)
NRBC BLD-RTO: 0 /100 WBC
PHOSPHATE SERPL-MCNC: 3.2 MG/DL (ref 2.7–4.5)
PLATELET # BLD AUTO: 163 K/UL (ref 150–450)
PMV BLD AUTO: 10.9 FL (ref 9.2–12.9)
POCT GLUCOSE: 154 MG/DL (ref 70–110)
POCT GLUCOSE: 216 MG/DL (ref 70–110)
POCT GLUCOSE: 238 MG/DL (ref 70–110)
POCT GLUCOSE: 258 MG/DL (ref 70–110)
POTASSIUM SERPL-SCNC: 5 MMOL/L (ref 3.5–5.1)
PROT SERPL-MCNC: 5.2 G/DL (ref 6–8.4)
RBC # BLD AUTO: 2.61 M/UL (ref 4–5.4)
SODIUM SERPL-SCNC: 136 MMOL/L (ref 136–145)
WBC # BLD AUTO: 5.9 K/UL (ref 3.9–12.7)

## 2024-05-27 PROCEDURE — 99900031 HC PATIENT EDUCATION (STAT)

## 2024-05-27 PROCEDURE — 94799 UNLISTED PULMONARY SVC/PX: CPT | Mod: XB

## 2024-05-27 PROCEDURE — 94761 N-INVAS EAR/PLS OXIMETRY MLT: CPT

## 2024-05-27 PROCEDURE — 25000003 PHARM REV CODE 250: Performed by: ORTHOPAEDIC SURGERY

## 2024-05-27 PROCEDURE — 11000001 HC ACUTE MED/SURG PRIVATE ROOM

## 2024-05-27 PROCEDURE — 36415 COLL VENOUS BLD VENIPUNCTURE: CPT | Performed by: ORTHOPAEDIC SURGERY

## 2024-05-27 PROCEDURE — 84100 ASSAY OF PHOSPHORUS: CPT | Performed by: ORTHOPAEDIC SURGERY

## 2024-05-27 PROCEDURE — 97530 THERAPEUTIC ACTIVITIES: CPT

## 2024-05-27 PROCEDURE — 97535 SELF CARE MNGMENT TRAINING: CPT

## 2024-05-27 PROCEDURE — 99900035 HC TECH TIME PER 15 MIN (STAT)

## 2024-05-27 PROCEDURE — 85025 COMPLETE CBC W/AUTO DIFF WBC: CPT | Performed by: ORTHOPAEDIC SURGERY

## 2024-05-27 PROCEDURE — 83735 ASSAY OF MAGNESIUM: CPT | Performed by: ORTHOPAEDIC SURGERY

## 2024-05-27 PROCEDURE — 80053 COMPREHEN METABOLIC PANEL: CPT | Performed by: ORTHOPAEDIC SURGERY

## 2024-05-27 RX ORDER — INSULIN GLARGINE 100 [IU]/ML
22 INJECTION, SOLUTION SUBCUTANEOUS DAILY
Status: DISCONTINUED | OUTPATIENT
Start: 2024-05-28 | End: 2024-05-28

## 2024-05-27 RX ADMIN — APIXABAN 5 MG: 2.5 TABLET, FILM COATED ORAL at 08:05

## 2024-05-27 RX ADMIN — APIXABAN 5 MG: 2.5 TABLET, FILM COATED ORAL at 09:05

## 2024-05-27 RX ADMIN — SENNOSIDES AND DOCUSATE SODIUM 1 TABLET: 50; 8.6 TABLET ORAL at 08:05

## 2024-05-27 RX ADMIN — INSULIN ASPART 4 UNITS: 100 INJECTION, SOLUTION INTRAVENOUS; SUBCUTANEOUS at 05:05

## 2024-05-27 RX ADMIN — HYDROCODONE BITARTRATE AND ACETAMINOPHEN 1 TABLET: 5; 325 TABLET ORAL at 09:05

## 2024-05-27 RX ADMIN — INSULIN ASPART 3 UNITS: 100 INJECTION, SOLUTION INTRAVENOUS; SUBCUTANEOUS at 10:05

## 2024-05-27 RX ADMIN — CARVEDILOL 12.5 MG: 6.25 TABLET, FILM COATED ORAL at 09:05

## 2024-05-27 RX ADMIN — INSULIN GLARGINE 20 UNITS: 100 INJECTION, SOLUTION SUBCUTANEOUS at 08:05

## 2024-05-27 RX ADMIN — INSULIN ASPART 4 UNITS: 100 INJECTION, SOLUTION INTRAVENOUS; SUBCUTANEOUS at 11:05

## 2024-05-27 RX ADMIN — SENNOSIDES AND DOCUSATE SODIUM 1 TABLET: 50; 8.6 TABLET ORAL at 09:05

## 2024-05-27 NOTE — PLAN OF CARE
Problem: Physical Therapy  Goal: Physical Therapy Goal  Description: Goals to be met by: 06-     Patient will increase functional independence with mobility by performin. Supine to sit with MInimal Assistance  2. Sit to stand transfer with Minimal Assistance  3. Bed to chair transfer with Minimal Assistance using Rolling Walker  4. Gait  x 20 feet with Minimal Assistance using Rolling Walker.   5. Lower extremity exercise program x20 reps    L IM nailing  and 25% WB only    Outcome: Progressing

## 2024-05-27 NOTE — PROGRESS NOTES
[facility] Medicine  Progress Note    Patient Name: Leslie Tolliver  MRN: [id number]  Patient Class: IP- Inpatient   Admission Date: 5/22/2024  Length of Stay: 5 days  Attending Physician: Adiel Merritt MD  Primary Care Provider: Chang Christianson MD        Subjective:     Principal Problem:Closed displaced intertrochanteric fracture of left femur        HPI:  Leslie Tolliver an 89 year female who presents emergency room complaining of left hip pain.  She endorses mechanical fall at her residence today.  She fell striking her left hip on the floor.  She denies striking her head or loss of consciousness.  Denies any cervical pain.  Pain is worse with any movement of the left leg.  No alleviating factors.  No known sick contacts or travel.  She denies fever or chills.  Previous medical history includes hypertension, diabetes, hypothyroidism, hyperlipidemia, CKD, anemia, aortic valve replacement, paroxysmal AFib.  ER workup: CBC unremarkable.  CMP with glucose of 156 and bicarb 19 otherwise unremarkable.  X-ray demonstrates a left intertrochanteric fracture.  Orthopedics was consulted.  Plans to take patient to OR tomorrow.  Patient admitted to Hospital Medicine for treatment management.  Will maintain patient NPO after midnight.    Overview/Hospital Course:  Leslie Tolliver is an 89 year old female with a past medical history of HTN, CAD, DM, anemia, and Afib who presented with a fall suffering a comminuted left hip fracture. Orthopedic Surgery performed repair 5/23. PT/OT has been consulted. Her course has been complicated by a mild KANNAN on CKD for which IV fluids were given and stabilized. Pending SNF/IPR.     Interval History: Patient seen and examined. NAEON. No significant changes. Pending SNF/IPR.      Objective:     Vital Signs (Most Recent):  Temp: 98.5 °F (36.9 °C) (05/27/24 1127)  Pulse: 90 (05/27/24 1127)  Resp: 18 (05/27/24 1127)  BP: 105/64 (05/27/24 1127)  SpO2: (!) 94  % (05/27/24 1127) Vital Signs (24h Range):  Temp:  [98 °F (36.7 °C)-98.7 °F (37.1 °C)] 98.5 °F (36.9 °C)  Pulse:  [77-99] 90  Resp:  [17-19] 18  SpO2:  [94 %-100 %] 94 %  BP: ()/(48-76) 105/64     Weight: 79.4 kg (175 lb 0.7 oz)  Body mass index is 27.42 kg/m².    Intake/Output Summary (Last 24 hours) at 5/27/2024 1450  Last data filed at 5/27/2024 0800  Gross per 24 hour   Intake 240 ml   Output 1500 ml   Net -1260 ml         Physical Exam  Vitals reviewed.   Constitutional:       General: She is not in acute distress.  HENT:      Head: Normocephalic and atraumatic.   Cardiovascular:      Rate and Rhythm: Normal rate and regular rhythm.   Pulmonary:      Effort: Pulmonary effort is normal. No respiratory distress.   Musculoskeletal:      Comments: LLE NVI  L hip area dressed   Neurological:      General: No focal deficit present.      Mental Status: She is alert and oriented to person, place, and time. Mental status is at baseline.   Psychiatric:         Mood and Affect: Affect normal.         Behavior: Behavior normal.         Thought Content: Thought content normal.             Significant Labs: All pertinent labs within the past 24 hours have been reviewed.    Significant Imaging: I have reviewed all pertinent imaging results/findings within the past 24 hours.    Assessment/Plan:      * Closed displaced intertrochanteric fracture of left femur  S/p repair 5/23  -Fall precautions  -PRN analgesics  -PT/OT  -Eliquis  -Dispo pending: SNF/IPR    Acute blood loss anemia  Declined post-op. Stable   -Trend Hgb with CBC and transfuse if necessary    Paroxysmal atrial fibrillation  Controlled   -Coreg  -Telemetry  -Eliquis    Acute on chronic kidney failure  Stable, near baseline  S/p IVF  -Renally dose medications  -Avoid nephrotoxic agents  -Monitor UOP and electrolytes  -Trend creatinine    Type 2 diabetes mellitus with diabetic polyneuropathy, with long-term current use of insulin  Patient's FSGs are  uncontrolled due to hyperglycemia on current medication regimen.  Last A1c reviewed-   Lab Results   Component Value Date    HGBA1C 6.7 (H) 01/26/2024     Most recent fingerstick glucose reviewed-   Recent Labs   Lab 05/26/24  1744 05/26/24  2139 05/27/24  0820 05/27/24  1143   POCTGLUCOSE 308* 195* 154* 238*       Current correctional scale  Medium  Increase anti-hyperglycemic dose as follows-   Antihyperglycemics (From admission, onward)      Start     Stop Route Frequency Ordered    05/28/24 0900  insulin glargine U-100 (Lantus) pen 22 Units         -- SubQ Daily 05/27/24 1452    05/22/24 1643  insulin aspart U-100 pen 1-10 Units         -- SubQ Before meals & nightly PRN 05/22/24 1545          Hold Oral hypoglycemics while patient is in the hospital.     Hypertension associated with diabetes  Stable   -Continue Coreg  -Continue to monitor      VTE Risk Mitigation (From admission, onward)           Ordered     apixaban tablet 5 mg  2 times daily         05/23/24 1517     IP VTE HIGH RISK PATIENT  Once         05/22/24 1619     Place sequential compression device  Until discontinued         05/22/24 1545     Place FRANKO hose  Until discontinued         05/22/24 1545                    Discharge Planning   MORA: 5/28/2024     Code Status: Full Code   Is the patient medically ready for discharge?:     Reason for patient still in hospital (select all that apply): Pending disposition  Discharge Plan A: Rehab                  Adiel Merritt MD  Department of Hospital Medicine   [facility]/Surg

## 2024-05-27 NOTE — PT/OT/SLP PROGRESS
Physical Therapy Treatment    Patient Name:  Leslie Tolliver   MRN:  [id number]    Recommendations:     Discharge Recommendations: High Intensity Therapy  Discharge Equipment Recommendations: to be determined by next level of care  Barriers to discharge:  difficulty adhering to LLE WB restrictions during mobility; maxAx2 transfers; fall risk    Assessment:     Leslie Tolliver is a 89 y.o. female admitted with a medical diagnosis of Closed displaced intertrochanteric fracture of left femur.  She presents with the following impairments/functional limitations: weakness, impaired endurance, impaired self care skills, impaired functional mobility, gait instability, impaired balance, decreased upper extremity function, decreased lower extremity function, decreased safety awareness, pain, orthopedic precautions. Patient sitting up in chair and is agreeable to participation with PT treatment. She states she has been sitting up a while and requests assistance getting back to bed. She requires MaxA x2 for sit to stand transfer with VC for hand placement and RW. She performed a stand pivot back to bed with ModA x2, RW, VC for LLE 25% WB, and assistance advancing RW. She returned to bed with bed alarm on and all needs met.     Rehab Prognosis: Good; patient would benefit from acute skilled PT services to address these deficits and reach maximum level of function.    Recent Surgery: Procedure(s) (LRB):  INSERTION, INTRAMEDULLARY RACQUEL, FEMUR (Left) 4 Days Post-Op    Plan:     During this hospitalization, patient to be seen daily to address the identified rehab impairments via gait training, therapeutic activities, therapeutic exercises and progress toward the following goals:    Plan of Care Expires:  06/15/24    Subjective     Chief Complaint: ready to lie back down   Patient/Family Comments/goals: back to bed   Pain/Comfort:  Pain Rating 1: 4/10  Location - Side 1: Left  Location 1: hip  Pain Addressed 1: Pre-medicate for  activity      Objective:     Communicated with RN Kenya prior to session.  Patient found up in chair with chair check, PureWick, telemetry upon PT entry to room.     General Precautions: Standard, fall  Orthopedic Precautions:  (LLE 25% weight-bearing per orthopedic surgeon)  Braces: N/A  Respiratory Status: Room air     Functional Mobility:  Bed Mobility:     Sit to Supine: moderate assistance and of 2 persons  Transfers:     Sit to Stand:  maximal assistance and of 2 persons with rolling walker  Chair to Bed: moderate assistance and of 2 persons with  rolling walker  using  Stand Pivot and VC for 25% WB on LLE       AM-PAC 6 CLICK MOBILITY  Turning over in bed (including adjusting bedclothes, sheets and blankets)?: 2  Sitting down on and standing up from a chair with arms (e.g., wheelchair, bedside commode, etc.): 2  Moving from lying on back to sitting on the side of the bed?: 2  Moving to and from a bed to a chair (including a wheelchair)?: 2  Need to walk in hospital room?: 1  Climbing 3-5 steps with a railing?: 1  Basic Mobility Total Score: 10       Treatment & Education:  Patient was educated on the importance of OOB activity and functional mobility to negate negative effects of prolonged bed rest during hospitalization, safe transfers and ambulation, and D/C planning     Patient left HOB elevated with all lines intact, call button in reach, bed alarm on, and RN notified..    GOALS:   Multidisciplinary Problems       Physical Therapy Goals          Problem: Physical Therapy    Goal Priority Disciplines Outcome Goal Variances Interventions   Physical Therapy Goal     PT, PT/OT Progressing     Description: Goals to be met by: 06-     Patient will increase functional independence with mobility by performin. Supine to sit with MInimal Assistance  2. Sit to stand transfer with Minimal Assistance  3. Bed to chair transfer with Minimal Assistance using Rolling Walker  4. Gait  x 20 feet with Minimal  Assistance using Rolling Walker.   5. Lower extremity exercise program x20 reps    L IM nailing 05-23 and 25% WB only                         Time Tracking:     PT Received On: 05/27/24  PT Start Time: 1104     PT Stop Time: 1123  PT Total Time (min): 19 min     Billable Minutes: Therapeutic Activity 19    Treatment Type: Treatment  PT/PTA: PT     Number of PTA visits since last PT visit: 0     05/27/2024

## 2024-05-27 NOTE — SUBJECTIVE & OBJECTIVE
Interval History: Patient seen and examined. NAEON. No significant changes. Pending SNF/IPR.      Objective:     Vital Signs (Most Recent):  Temp: 98.5 °F (36.9 °C) (05/27/24 1127)  Pulse: 90 (05/27/24 1127)  Resp: 18 (05/27/24 1127)  BP: 105/64 (05/27/24 1127)  SpO2: (!) 94 % (05/27/24 1127) Vital Signs (24h Range):  Temp:  [98 °F (36.7 °C)-98.7 °F (37.1 °C)] 98.5 °F (36.9 °C)  Pulse:  [77-99] 90  Resp:  [17-19] 18  SpO2:  [94 %-100 %] 94 %  BP: ()/(48-76) 105/64     Weight: 79.4 kg (175 lb 0.7 oz)  Body mass index is 27.42 kg/m².    Intake/Output Summary (Last 24 hours) at 5/27/2024 1450  Last data filed at 5/27/2024 0800  Gross per 24 hour   Intake 240 ml   Output 1500 ml   Net -1260 ml         Physical Exam  Vitals reviewed.   Constitutional:       General: She is not in acute distress.  HENT:      Head: Normocephalic and atraumatic.   Cardiovascular:      Rate and Rhythm: Normal rate and regular rhythm.   Pulmonary:      Effort: Pulmonary effort is normal. No respiratory distress.   Musculoskeletal:      Comments: LLE NVI  L hip area dressed   Neurological:      General: No focal deficit present.      Mental Status: She is alert and oriented to person, place, and time. Mental status is at baseline.   Psychiatric:         Mood and Affect: Affect normal.         Behavior: Behavior normal.         Thought Content: Thought content normal.             Significant Labs: All pertinent labs within the past 24 hours have been reviewed.    Significant Imaging: I have reviewed all pertinent imaging results/findings within the past 24 hours.

## 2024-05-27 NOTE — PLAN OF CARE
Problem: Adult Inpatient Plan of Care  Goal: Plan of Care Review  Outcome: Progressing     Problem: Adult Inpatient Plan of Care  Goal: Optimal Comfort and Wellbeing  Outcome: Progressing     Pt rested in bed this shift. Complaints of pain managed with prn medication. Purewick in place with good UOP noted. Continuous cardiac monitoring in place. Air pump applied to bed for weight shifting assistance. BG monitored closely, coverage provided per prn sliding scale. Safety maintained. Needs attended to.

## 2024-05-27 NOTE — ASSESSMENT & PLAN NOTE
Patient's FSGs are uncontrolled due to hyperglycemia on current medication regimen.  Last A1c reviewed-   Lab Results   Component Value Date    HGBA1C 6.7 (H) 01/26/2024     Most recent fingerstick glucose reviewed-   Recent Labs   Lab 05/26/24  1744 05/26/24  2139 05/27/24  0820 05/27/24  1143   POCTGLUCOSE 308* 195* 154* 238*       Current correctional scale  Medium  Increase anti-hyperglycemic dose as follows-   Antihyperglycemics (From admission, onward)      Start     Stop Route Frequency Ordered    05/28/24 0900  insulin glargine U-100 (Lantus) pen 22 Units         -- SubQ Daily 05/27/24 1452    05/22/24 1643  insulin aspart U-100 pen 1-10 Units         -- SubQ Before meals & nightly PRN 05/22/24 1545          Hold Oral hypoglycemics while patient is in the hospital.

## 2024-05-27 NOTE — PLAN OF CARE
Per Raysa with [facility], they are submitting for auth today.    Discussed with pt and son at bedside to update.       05/27/24 1045   Post-Acute Status   Post-Acute Authorization Placement   Post-Acute Placement Status Pending payor review/awaiting authorization (if required)

## 2024-05-27 NOTE — CARE UPDATE
05/27/24 0739   Patient Assessment/Suction   Level of Consciousness (AVPU) alert   Respiratory Effort Normal;Unlabored   Expansion/Accessory Muscles/Retractions no use of accessory muscles;no retractions;expansion symmetric   Rhythm/Pattern, Respiratory unlabored;depth regular;pattern regular;no shortness of breath reported   PRE-TX-O2   Device (Oxygen Therapy) room air   SpO2 100 %   Pulse Oximetry Type Intermittent   $ Pulse Oximetry - Multiple Charge Pulse Oximetry - Multiple   Pulse 91   Resp 18   Aerosol Therapy   $ Aerosol Therapy Charges PRN treatment not required   Respiratory Treatment Status (SVN) PRN treatment not required   Incentive Spirometer   $ Incentive Spirometer Charges done with encouragement   Incentive Spirometer Predicted Level (mL) 1680   Administration (IS) mouthpiece utilized   Number of Repetitions (IS) 5   Level Incentive Spirometer (mL) 1000   Patient Tolerance (IS) good;no adverse signs/symptoms present

## 2024-05-27 NOTE — ASSESSMENT & PLAN NOTE
Stable, near baseline  S/p IVF  -Renally dose medications  -Avoid nephrotoxic agents  -Monitor UOP and electrolytes  -Trend creatinine

## 2024-05-27 NOTE — PT/OT/SLP PROGRESS
Occupational Therapy   Treatment    Name: Leslie Tolliver  MRN: [id number]  Admitting Diagnosis:  Closed displaced intertrochanteric fracture of left femur  4 Days Post-Op    Recommendations:     Discharge Recommendations: High Intensity Therapy  Discharge Equipment Recommendations:  to be determined by next level of care  Barriers to discharge:  None    Assessment:     Leslie Tolliver is a 89 y.o. female with a medical diagnosis of Closed displaced intertrochanteric fracture of left femur.  She presents with slight improvements to BLE mobility with transfers. However, patient reported BLE fatigue during bed>chair transfer. During bed>chair transfer, patient's BLE became weak and patient suddenly descended to chair requiring Max A to safely position buttocks in chair. Patient able to perform grooming tasks in chair requiring Supervision. Performance deficits affecting function are weakness, impaired endurance, impaired self care skills, impaired functional mobility, gait instability, impaired balance, decreased lower extremity function, decreased coordination, pain, decreased ROM, orthopedic precautions.     Rehab Prognosis:  Good; patient would benefit from acute skilled OT services to address these deficits and reach maximum level of function.       Plan:     Patient to be seen 5 x/week to address the above listed problems via self-care/home management, therapeutic activities, therapeutic exercises  Plan of Care Expires: 06/21/24  Plan of Care Reviewed with: patient, son    Subjective     Chief Complaint: BLE weakness and LLE pain  Patient/Family Comments/goals: none  Pain/Comfort:  Pain Rating 1:  (not rated)  Location - Side 1: Left  Location - Orientation 1: generalized  Location 1: hip  Pain Addressed 1: Reposition, Distraction  Pain Rating Post-Intervention 1:  (not rated)    Objective:     Communicated with: nurse Zambrano prior to session.  Patient found HOB elevated with PureWick, telemetry, bed alarm,  peripheral IV upon OT entry to room.    General Precautions: Standard, fall    Orthopedic Precautions:LLE toe touch weight bearing  Braces: N/A  Respiratory Status: Room air     Occupational Performance:     Bed Mobility:    Patient completed Scooting/Bridging with moderate assistance  Patient completed Supine to Sit with moderate assistance     Functional Mobility/Transfers:  Patient completed Sit <> Stand Transfer with moderate assistance  with  rolling walker   Patient completed Bed <> Chair Transfer using Stand Pivot technique with moderate assistance with rolling walker    Activities of Daily Living:  Grooming: supervision with oral/facial hygiene, hair grooming, and shampooing of hair using a shower cap while seated in chair  Toileting: dependence with Purewick in place      [facility] 6 Click ADL:      Treatment & Education:  OT ed patient on safety with walker use for functional mobility with cues for hand placement & sequencing.       Patient left up in chair with all lines intact, call button in reach, chair alarm on, and son and friends present    GOALS:   Multidisciplinary Problems       Occupational Therapy Goals          Problem: Occupational Therapy    Goal Priority Disciplines Outcome Interventions   Occupational Therapy Goal     OT, PT/OT Progressing    Description: Goals to be met by: 6/21/2024     Patient will increase functional independence with ADLs by performing:    LE Dressing with Stand-by Assistance and Assistive Devices as needed.  Grooming while seated at sink with Supervision.  Toileting from bedside commode with Minimal Assistance for hygiene and clothing management.   Supine to sit with Stand-by Assistance.  Toilet transfer to bedside commode with Minimal Assistance.                         Time Tracking:     OT Date of Treatment: 05/27/24  OT Start Time: 0954  OT Stop Time: 1025  OT Total Time (min): 31 min    Billable Minutes:Self Care/Home Management 31    OT/VILMA: OT          5/27/2024

## 2024-05-28 LAB
ABO + RH BLD: NORMAL
ALBUMIN SERPL BCP-MCNC: 2.4 G/DL (ref 3.5–5.2)
ALP SERPL-CCNC: 70 U/L (ref 55–135)
ALT SERPL W/O P-5'-P-CCNC: 15 U/L (ref 10–44)
ANION GAP SERPL CALC-SCNC: 8 MMOL/L (ref 8–16)
AST SERPL-CCNC: 20 U/L (ref 10–40)
BASOPHILS # BLD AUTO: 0.04 K/UL (ref 0–0.2)
BASOPHILS NFR BLD: 0.7 % (ref 0–1.9)
BILIRUB SERPL-MCNC: 1.3 MG/DL (ref 0.1–1)
BLD GP AB SCN CELLS X3 SERPL QL: NORMAL
BLD PROD TYP BPU: NORMAL
BLOOD UNIT EXPIRATION DATE: NORMAL
BLOOD UNIT TYPE CODE: 9500
BLOOD UNIT TYPE: NORMAL
BUN SERPL-MCNC: 40 MG/DL (ref 8–23)
CALCIUM SERPL-MCNC: 8.3 MG/DL (ref 8.7–10.5)
CHLORIDE SERPL-SCNC: 107 MMOL/L (ref 95–110)
CO2 SERPL-SCNC: 20 MMOL/L (ref 23–29)
CODING SYSTEM: NORMAL
CREAT SERPL-MCNC: 1.2 MG/DL (ref 0.5–1.4)
CROSSMATCH INTERPRETATION: NORMAL
DIFFERENTIAL METHOD BLD: ABNORMAL
DISPENSE STATUS: NORMAL
EOSINOPHIL # BLD AUTO: 0.2 K/UL (ref 0–0.5)
EOSINOPHIL NFR BLD: 3.7 % (ref 0–8)
ERYTHROCYTE [DISTWIDTH] IN BLOOD BY AUTOMATED COUNT: 17.5 % (ref 11.5–14.5)
EST. GFR  (NO RACE VARIABLE): 43 ML/MIN/1.73 M^2
GLUCOSE SERPL-MCNC: 164 MG/DL (ref 70–110)
HCT VFR BLD AUTO: 23.4 % (ref 37–48.5)
HGB BLD-MCNC: 7.2 G/DL (ref 12–16)
IMM GRANULOCYTES # BLD AUTO: 0.05 K/UL (ref 0–0.04)
IMM GRANULOCYTES NFR BLD AUTO: 0.8 % (ref 0–0.5)
LYMPHOCYTES # BLD AUTO: 0.8 K/UL (ref 1–4.8)
LYMPHOCYTES NFR BLD: 13.3 % (ref 18–48)
MAGNESIUM SERPL-MCNC: 1.7 MG/DL (ref 1.6–2.6)
MCH RBC QN AUTO: 28.3 PG (ref 27–31)
MCHC RBC AUTO-ENTMCNC: 30.8 G/DL (ref 32–36)
MCV RBC AUTO: 92 FL (ref 82–98)
MONOCYTES # BLD AUTO: 0.9 K/UL (ref 0.3–1)
MONOCYTES NFR BLD: 14.3 % (ref 4–15)
NEUTROPHILS # BLD AUTO: 4 K/UL (ref 1.8–7.7)
NEUTROPHILS NFR BLD: 67.2 % (ref 38–73)
NRBC BLD-RTO: 0 /100 WBC
NUM UNITS TRANS PACKED RBC: NORMAL
PHOSPHATE SERPL-MCNC: 3.2 MG/DL (ref 2.7–4.5)
PLATELET # BLD AUTO: 189 K/UL (ref 150–450)
PMV BLD AUTO: 10.6 FL (ref 9.2–12.9)
POCT GLUCOSE: 196 MG/DL (ref 70–110)
POCT GLUCOSE: 201 MG/DL (ref 70–110)
POCT GLUCOSE: 216 MG/DL (ref 70–110)
POCT GLUCOSE: 248 MG/DL (ref 70–110)
POTASSIUM SERPL-SCNC: 4.7 MMOL/L (ref 3.5–5.1)
PROT SERPL-MCNC: 5.3 G/DL (ref 6–8.4)
RBC # BLD AUTO: 2.54 M/UL (ref 4–5.4)
SODIUM SERPL-SCNC: 135 MMOL/L (ref 136–145)
SPECIMEN OUTDATE: NORMAL
WBC # BLD AUTO: 5.93 K/UL (ref 3.9–12.7)

## 2024-05-28 PROCEDURE — 94799 UNLISTED PULMONARY SVC/PX: CPT | Mod: XB

## 2024-05-28 PROCEDURE — 25000003 PHARM REV CODE 250: Performed by: ORTHOPAEDIC SURGERY

## 2024-05-28 PROCEDURE — 86901 BLOOD TYPING SEROLOGIC RH(D): CPT | Performed by: STUDENT IN AN ORGANIZED HEALTH CARE EDUCATION/TRAINING PROGRAM

## 2024-05-28 PROCEDURE — 36415 COLL VENOUS BLD VENIPUNCTURE: CPT | Performed by: STUDENT IN AN ORGANIZED HEALTH CARE EDUCATION/TRAINING PROGRAM

## 2024-05-28 PROCEDURE — 97110 THERAPEUTIC EXERCISES: CPT

## 2024-05-28 PROCEDURE — 85025 COMPLETE CBC W/AUTO DIFF WBC: CPT | Performed by: ORTHOPAEDIC SURGERY

## 2024-05-28 PROCEDURE — P9016 RBC LEUKOCYTES REDUCED: HCPCS | Performed by: STUDENT IN AN ORGANIZED HEALTH CARE EDUCATION/TRAINING PROGRAM

## 2024-05-28 PROCEDURE — 86920 COMPATIBILITY TEST SPIN: CPT | Performed by: STUDENT IN AN ORGANIZED HEALTH CARE EDUCATION/TRAINING PROGRAM

## 2024-05-28 PROCEDURE — 97535 SELF CARE MNGMENT TRAINING: CPT

## 2024-05-28 PROCEDURE — 63600175 PHARM REV CODE 636 W HCPCS: Performed by: STUDENT IN AN ORGANIZED HEALTH CARE EDUCATION/TRAINING PROGRAM

## 2024-05-28 PROCEDURE — 83735 ASSAY OF MAGNESIUM: CPT | Performed by: ORTHOPAEDIC SURGERY

## 2024-05-28 PROCEDURE — 94761 N-INVAS EAR/PLS OXIMETRY MLT: CPT

## 2024-05-28 PROCEDURE — 36430 TRANSFUSION BLD/BLD COMPNT: CPT

## 2024-05-28 PROCEDURE — 97530 THERAPEUTIC ACTIVITIES: CPT

## 2024-05-28 PROCEDURE — 80053 COMPREHEN METABOLIC PANEL: CPT | Performed by: ORTHOPAEDIC SURGERY

## 2024-05-28 PROCEDURE — 84100 ASSAY OF PHOSPHORUS: CPT | Performed by: ORTHOPAEDIC SURGERY

## 2024-05-28 PROCEDURE — 36415 COLL VENOUS BLD VENIPUNCTURE: CPT | Performed by: ORTHOPAEDIC SURGERY

## 2024-05-28 PROCEDURE — 99900035 HC TECH TIME PER 15 MIN (STAT)

## 2024-05-28 PROCEDURE — 63600175 PHARM REV CODE 636 W HCPCS: Performed by: ORTHOPAEDIC SURGERY

## 2024-05-28 PROCEDURE — 11000001 HC ACUTE MED/SURG PRIVATE ROOM

## 2024-05-28 RX ORDER — HYDROCODONE BITARTRATE AND ACETAMINOPHEN 500; 5 MG/1; MG/1
TABLET ORAL
Status: DISCONTINUED | OUTPATIENT
Start: 2024-05-28 | End: 2024-05-31 | Stop reason: HOSPADM

## 2024-05-28 RX ORDER — INSULIN GLARGINE 100 [IU]/ML
24 INJECTION, SOLUTION SUBCUTANEOUS DAILY
Status: DISCONTINUED | OUTPATIENT
Start: 2024-05-29 | End: 2024-05-30

## 2024-05-28 RX ADMIN — Medication 800 MG: at 09:05

## 2024-05-28 RX ADMIN — INSULIN ASPART 4 UNITS: 100 INJECTION, SOLUTION INTRAVENOUS; SUBCUTANEOUS at 12:05

## 2024-05-28 RX ADMIN — SENNOSIDES AND DOCUSATE SODIUM 1 TABLET: 50; 8.6 TABLET ORAL at 08:05

## 2024-05-28 RX ADMIN — HYDROCODONE BITARTRATE AND ACETAMINOPHEN 1 TABLET: 5; 325 TABLET ORAL at 06:05

## 2024-05-28 RX ADMIN — INSULIN GLARGINE 22 UNITS: 100 INJECTION, SOLUTION SUBCUTANEOUS at 09:05

## 2024-05-28 RX ADMIN — CARVEDILOL 12.5 MG: 6.25 TABLET, FILM COATED ORAL at 08:05

## 2024-05-28 RX ADMIN — SENNOSIDES AND DOCUSATE SODIUM 1 TABLET: 50; 8.6 TABLET ORAL at 09:05

## 2024-05-28 RX ADMIN — APIXABAN 5 MG: 2.5 TABLET, FILM COATED ORAL at 09:05

## 2024-05-28 RX ADMIN — HYDROCODONE BITARTRATE AND ACETAMINOPHEN 1 TABLET: 5; 325 TABLET ORAL at 07:05

## 2024-05-28 RX ADMIN — INSULIN ASPART 1 UNITS: 100 INJECTION, SOLUTION INTRAVENOUS; SUBCUTANEOUS at 08:05

## 2024-05-28 RX ADMIN — INSULIN ASPART 4 UNITS: 100 INJECTION, SOLUTION INTRAVENOUS; SUBCUTANEOUS at 04:05

## 2024-05-28 RX ADMIN — APIXABAN 5 MG: 2.5 TABLET, FILM COATED ORAL at 08:05

## 2024-05-28 RX ADMIN — CARVEDILOL 12.5 MG: 6.25 TABLET, FILM COATED ORAL at 09:05

## 2024-05-28 NOTE — CARE UPDATE
05/27/24 2043   Patient Assessment/Suction   Level of Consciousness (AVPU) alert   Respiratory Effort Normal;Unlabored   Expansion/Accessory Muscles/Retractions no retractions;no use of accessory muscles;expansion symmetric   All Lung Fields Breath Sounds Anterior:;Lateral:;diminished;equal bilaterally   Rhythm/Pattern, Respiratory no shortness of breath reported;depth regular;pattern regular;unlabored   Cough Frequency no cough   PRE-TX-O2   Device (Oxygen Therapy) room air   SpO2 97 %   Pulse Oximetry Type Intermittent   $ Pulse Oximetry - Multiple Charge Pulse Oximetry - Multiple   Pulse 96   Resp 18   Positioning HOB elevated 45 degrees   Aerosol Therapy   $ Aerosol Therapy Charges PRN treatment not required   Daily Review of Necessity (SVN) other (see comments)   Respiratory Treatment Status (SVN) PRN treatment not required   Incentive Spirometer   $ Incentive Spirometer Charges done with encouragement   Incentive Spirometer Predicted Level (mL) 1680   Administration (IS) mouthpiece utilized   Number of Repetitions (IS) 5   Level Incentive Spirometer (mL) 1500   Patient Tolerance (IS) good   Education   $ Education 15 min;Other (see comment)

## 2024-05-28 NOTE — ASSESSMENT & PLAN NOTE
Declined again related to fracture and repair  -transfuse 1u pRBC 5/28  -Trend Hgb with CBC and transfuse more if necessary

## 2024-05-28 NOTE — PT/OT/SLP PROGRESS
Physical Therapy Treatment    Patient Name:  Leslie Tolliver   MRN:  [id number]    Recommendations:     Discharge Recommendations: High Intensity Therapy  Discharge Equipment Recommendations: to be determined by next level of care  Barriers to discharge: None    Assessment:     Leslie Tolliver is a 89 y.o. female admitted with a medical diagnosis of Closed displaced intertrochanteric fracture of left femur.  She presents with the following impairments/functional limitations: weakness, impaired endurance, impaired functional mobility, gait instability, impaired balance, decreased lower extremity function, pain, decreased ROM, edema, orthopedic precautions .  Patient readily agreeable to PT treatment this morning. Patient presented supine in bed and required  max assist to transfer to sitting EOB and then min assist to stand with a RW and TTWB left LE.  Patient then able to stand with a RW and CGA but had difficulty maintaining TTWB even with max verbal cues.  Patient then transferred to the chair min mod assist using a RW and a stand pivot technique.      Rehab Prognosis: Good; patient would benefit from acute skilled PT services to address these deficits and reach maximum level of function.    Recent Surgery: Procedure(s) (LRB):  INSERTION, INTRAMEDULLARY RACQUEL, FEMUR (Left) 5 Days Post-Op    Plan:     During this hospitalization, patient to be seen daily to address the identified rehab impairments via gait training, therapeutic activities, therapeutic exercises and progress toward the following goals:    Plan of Care Expires:  06/15/24    Subjective     Chief Complaint: pain and fatigue  Patient/Family Comments/goals: get stronger  Pain/Comfort:  Pain Rating 1: 1/10  Location - Side 1: Left  Location - Orientation 1: lower  Location 1: leg  Pain Addressed 1: Reposition, Cessation of Activity  Pain Rating Post-Intervention 1: 5/10      Objective:     Communicated with nurse prior to session.  Patient found supine  with bed alarm, peripheral IV, PureWick, telemetry upon PT entry to room.     General Precautions: Standard, fall  Orthopedic Precautions: LLE toe touch weight bearing  Braces: N/A  Respiratory Status: Room air     Functional Mobility:  Bed Mobility:     Supine to Sit: maximal assistance  Sit to Supine: maximal assistance  Transfers:     Sit to Stand:  minimum assistance with rolling walker and TTWB left LE  Bed to Chair: moderate assistance with  rolling walker  using  Stand Pivot and TTWB left LE      AM-PAC 6 CLICK MOBILITY          Treatment & Education:  Exercise to include ankle pumps, quad sets, hip abd and LAQ. All done x 10 reps as AAROM  Transfer training supine to sit max, sit to stand min RW TTWB, EOB to chair RW mod TTWB    Patient left up in chair with call button in reach, chair alarm on, and nurse notified..    GOALS:   Multidisciplinary Problems       Physical Therapy Goals          Problem: Physical Therapy    Goal Priority Disciplines Outcome Goal Variances Interventions   Physical Therapy Goal     PT, PT/OT Progressing     Description: Goals to be met by: 06-     Patient will increase functional independence with mobility by performin. Supine to sit with MInimal Assistance  2. Sit to stand transfer with Minimal Assistance  3. Bed to chair transfer with Minimal Assistance using Rolling Walker  4. Gait  x 20 feet with Minimal Assistance using Rolling Walker.   5. Lower extremity exercise program x20 reps    L IM nailing  and 25% WB only                         Time Tracking:     PT Received On: 24  PT Start Time: 923     PT Stop Time: 948  PT Total Time (min): 25 min     Billable Minutes: Therapeutic Activity 12 and Therapeutic Exercise 13    Treatment Type: Treatment  PT/PTA: PT     Number of PTA visits since last PT visit: 0     2024

## 2024-05-28 NOTE — CONSULTS
Skin tear to upper buttocks noted. Patient denies pain at this time. Triad in place and continue Triad every shift and leave area open to air. Wound care to follow

## 2024-05-28 NOTE — RESPIRATORY THERAPY
02 saturation 96% on room air.  Patient averaging 2000ml on IS.  PRN tx not required at this time.

## 2024-05-28 NOTE — PT/OT/SLP PROGRESS
Occupational Therapy   Treatment    Name: Leslie Tolliver  MRN: [id number]  Admitting Diagnosis:  Closed displaced intertrochanteric fracture of left femur  5 Days Post-Op    Recommendations:     Discharge Recommendations: High Intensity Therapy  Discharge Equipment Recommendations:  to be determined by next level of care  Barriers to discharge:  None    Assessment:     Leslie Tolliver is a 89 y.o. female with a medical diagnosis of Closed displaced intertrochanteric fracture of left femur. Performance deficits affecting function are weakness, impaired endurance, impaired self care skills, impaired functional mobility, gait instability, impaired balance, decreased lower extremity function, decreased coordination, decreased ROM, orthopedic precautions.     Rehab Prognosis:  Good; patient would benefit from acute skilled OT services to address these deficits and reach maximum level of function.       Plan:     Patient to be seen 5 x/week to address the above listed problems via self-care/home management, therapeutic activities, therapeutic exercises  Plan of Care Expires: 06/21/24  Plan of Care Reviewed with: patient    Subjective     Chief Complaint: none  Patient/Family Comments/goals: none  Pain/Comfort:  Pain Rating 1: 0/10  Pain Rating Post-Intervention 1: 0/10    Objective:     Communicated with: nurse Wade prior to session.  Patient found up in chair with peripheral IV, PureWick, telemetry upon OT entry to room.    General Precautions: Standard, fall    Orthopedic Precautions:LLE toe touch weight bearing  Braces: N/A  Respiratory Status: Room air     Occupational Performance:     Activities of Daily Living:  Grooming: supervision with oral and facial hygiene while seated in chair      [facility] 6 Click ADL:      Patient left up in chair with all lines intact and call button in reach    GOALS:   Multidisciplinary Problems       Occupational Therapy Goals          Problem: Occupational Therapy    Goal Priority  Disciplines Outcome Interventions   Occupational Therapy Goal     OT, PT/OT Progressing    Description: Goals to be met by: 6/21/2024     Patient will increase functional independence with ADLs by performing:    LE Dressing with Stand-by Assistance and Assistive Devices as needed.  Grooming while seated at sink with Supervision.  Toileting from bedside commode with Minimal Assistance for hygiene and clothing management.   Supine to sit with Stand-by Assistance.  Toilet transfer to bedside commode with Minimal Assistance.                         Time Tracking:     OT Date of Treatment: 05/28/24  OT Start Time: 1018  OT Stop Time: 1029  OT Total Time (min): 11 min    Billable Minutes:Self Care/Home Management 11    OT/VILMA: OT          5/28/2024

## 2024-05-28 NOTE — PLAN OF CARE
Problem: Adult Inpatient Plan of Care  Goal: Plan of Care Review  Outcome: Progressing  Goal: Patient-Specific Goal (Individualized)  Outcome: Progressing  Goal: Absence of Hospital-Acquired Illness or Injury  Outcome: Progressing  Goal: Optimal Comfort and Wellbeing  Outcome: Progressing  Goal: Readiness for Transition of Care  Outcome: Progressing     Problem: Infection  Goal: Absence of Infection Signs and Symptoms  Outcome: Progressing     Problem: Diabetes Comorbidity  Goal: Blood Glucose Level Within Targeted Range  Outcome: Progressing     Problem: Skin Injury Risk Increased  Goal: Skin Health and Integrity  Outcome: Progressing     Problem: Wound  Goal: Optimal Coping  Outcome: Progressing  Goal: Optimal Functional Ability  Outcome: Progressing  Goal: Absence of Infection Signs and Symptoms  Outcome: Progressing  Goal: Improved Oral Intake  Outcome: Progressing  Goal: Optimal Pain Control and Function  Outcome: Progressing  Goal: Skin Health and Integrity  Outcome: Progressing  Goal: Optimal Wound Healing  Outcome: Progressing     Problem: Fall Injury Risk  Goal: Absence of Fall and Fall-Related Injury  Outcome: Progressing     Problem: Acute Kidney Injury/Impairment  Goal: Fluid and Electrolyte Balance  Outcome: Progressing  Goal: Improved Oral Intake  Outcome: Progressing  Goal: Effective Renal Function  Outcome: Progressing

## 2024-05-28 NOTE — DISCHARGE INSTRUCTIONS
[facility]  Facility Transfer Orders        Admit to: SNF    Diagnoses:   Active Hospital Problems    Diagnosis  POA    *Closed displaced intertrochanteric fracture of left femur [S72.142A]  Yes    Acute blood loss anemia [D62]  No    Paroxysmal atrial fibrillation [I48.0]  Yes    Acute on chronic kidney failure [N17.9, N18.9]  Yes    Type 2 diabetes mellitus with diabetic polyneuropathy, with long-term current use of insulin [E11.42, Z79.4]  Not Applicable    Hypertension associated with diabetes [E11.59, I15.2]  Yes      Resolved Hospital Problems   No resolved problems to display.     Allergies:   Review of patient's allergies indicates:   Allergen Reactions    Fenofibric acid (choline)      Other reaction(s): Vomiting    Iodine      Other reaction(s): lips swollen ivp dye    Rosuvastatin      Other reaction(s): muscle pain       Code Status: full    Vitals: Routine       Diet: cardiac diet and diabetic diet: 2000 calorie    Activity: Activity as tolerated    Nursing Precautions: Fall, TTWB to LLE    Bed/Surface: routine    Consults: PT to evaluate and treat and OT to evaluate and treat    Oxygen: room air    Dialysis: Patient is not on dialysis.     Labs:  n/a                 Pending Diagnostic Studies:       None          Imaging: n/a    Miscellaneous Care:   Diabetes Care: Check glucose ACHS and use sliding scale as below    Wound care :Wound care to buttock:  Clean skin tear to buttock with soap and water and dry. Apply triad twice a day and leave open to air.    IV Access: n/a     Medications: Discontinue all previous medication orders, if any. See new list below.  Current Discharge Medication List        START taking these medications    Details   HYDROcodone-acetaminophen (NORCO) 5-325 mg per tablet Take 1 tablet by mouth every 6 (six) hours as needed for Pain.  Qty: 15 tablet, Refills: 0    Comments: Quantity prescribed more than 7 day supply? No      insulin aspart U-100 (NOVOLOG)  "100 unit/mL (3 mL) InPn pen Inject 1-10 Units into the skin before meals and at bedtime as needed (Hyperglycemia).    Comments: Blood Glucose      Pre-meal       2200  151-200              2 units             1 unit  201-250              4 units             2 units    251-300              6 units             3 units    301-350              8 units             4 units   >350                   10 units           5 units           CONTINUE these medications which have NOT CHANGED    Details   carvediloL (COREG) 12.5 MG tablet Take 1 tablet (12.5 mg total) by mouth 2 (two) times daily.  Qty: 60 tablet, Refills: 11    Comments: .      cholecalciferol, vitamin D3, (VITAMIN D3) 125 mcg (5,000 unit) Tab Take 5,000 Units by mouth once daily.      ELIQUIS 5 mg Tab TAKE 1 TABLET(5 MG) BY MOUTH TWICE DAILY  Qty: 180 tablet, Refills: 3      empagliflozin (JARDIANCE) 10 mg tablet Take 1 tablet (10 mg total) by mouth once daily.  Qty: 90 tablet, Refills: 1    Associated Diagnoses: Type 2 diabetes mellitus with diabetic polyneuropathy, with long-term current use of insulin      insulin (LANTUS SOLOSTAR U-100 INSULIN) glargine 100 units/mL SubQ pen Inject 24 Units into the skin every evening.  Qty: 3 mL, Refills: 3      multivitamin (THERAGRAN) per tablet Take 1 tablet by mouth once daily.      metFORMIN (GLUCOPHAGE) 1000 MG tablet Take 1 tablet (1,000 mg total) by mouth 2 (two) times daily with meals.  Qty: 180 tablet, Refills: 1           STOP taking these medications       blood sugar diagnostic (ACCU-CHEK GUIDE TEST STRIPS) Strp Comments:   Reason for Stopping:         blood-glucose meter kit Comments:   Reason for Stopping:         clopidogreL (PLAVIX) 75 mg tablet Comments:   Reason for Stopping:         DROPLET PEN NEEDLE 31 gauge x 5/16" Ndle Comments:   Reason for Stopping:         furosemide (LASIX) 20 MG tablet Comments:   Reason for Stopping:         lancets (ACCU-CHEK SOFTCLIX LANCETS) Misc Comments:   Reason for " Stopping:         olmesartan (BENICAR) 20 MG tablet Comments:   Reason for Stopping:         spironolactone (ALDACTONE) 25 MG tablet Comments:   Reason for Stopping:             Follow up:    Follow-up Information       SNF at provider. Go today.                               Immunizations Administered as of 5/31/2024       Name Date Dose VIS Date Route Exp Date    COVID-19, MRNA, LN-S, PF (Pfizer) (Purple Cap) 2/11/2021  8:52 AM 0.3 mL 12/12/2020 Intramuscular 6/30/2021    Site: Left deltoid     Given By: Sarika Zaldivar MA     : [employer]     Lot: [id number]     Comment: Meli Guerrero RN     COVID-19, MRNA, LN-S, PF (Pfizer) (Purple Cap) 1/20/2021  8:26 AM 0.3 mL 12/12/2020 Intramuscular 5/31/2021    Site: Left deltoid     Given By: Gerri Fuentes LPN     : [employer]     Lot: [id number]                  Adiel Merritt MD

## 2024-05-28 NOTE — ASSESSMENT & PLAN NOTE
Patient's FSGs are uncontrolled due to hyperglycemia on current medication regimen.  Last A1c reviewed-   Lab Results   Component Value Date    HGBA1C 6.7 (H) 01/26/2024     Most recent fingerstick glucose reviewed-   Recent Labs   Lab 05/27/24  1705 05/27/24  2159 05/28/24  0825 05/28/24  1200   POCTGLUCOSE 216* 258* 216* 248*       Current correctional scale  Medium  Increase anti-hyperglycemic dose as follows-   Antihyperglycemics (From admission, onward)      Start     Stop Route Frequency Ordered    05/29/24 0900  insulin glargine U-100 (Lantus) pen 24 Units         -- SubQ Daily 05/28/24 1220    05/22/24 1643  insulin aspart U-100 pen 1-10 Units         -- SubQ Before meals & nightly PRN 05/22/24 1545          Hold Oral hypoglycemics while patient is in the hospital.

## 2024-05-28 NOTE — PLAN OF CARE
Per Hailee with [facility], auth is pending.        05/28/24 1544   Post-Acute Status   Post-Acute Authorization Placement   Post-Acute Placement Status Pending payor review/awaiting authorization (if required)

## 2024-05-28 NOTE — PLAN OF CARE
Pt remained stable this shift. Pt had 1 large, soft formed BM overnight. Dressing changed to LLE. Pure wick in place. Safety precautions maintained throughout shift.     Problem: Infection  Goal: Absence of Infection Signs and Symptoms  Outcome: Progressing     Problem: Wound  Goal: Skin Health and Integrity  Outcome: Progressing     Problem: Acute Kidney Injury/Impairment  Goal: Fluid and Electrolyte Balance  Outcome: Progressing

## 2024-05-28 NOTE — PROGRESS NOTES
[facility]  Progress Note    Patient Name: Leslie Tolliver  MRN: [id number]  Patient Class: IP- Inpatient   Admission Date: 5/22/2024  Length of Stay: 6 days  Attending Physician: Adiel Merritt MD  Primary Care Provider: Chang Christianson MD        Subjective:     Principal Problem:Closed displaced intertrochanteric fracture of left femur        HPI:  Leslie Tolliver an 89 year female who presents emergency room complaining of left hip pain.  She endorses mechanical fall at her residence today.  She fell striking her left hip on the floor.  She denies striking her head or loss of consciousness.  Denies any cervical pain.  Pain is worse with any movement of the left leg.  No alleviating factors.  No known sick contacts or travel.  She denies fever or chills.  Previous medical history includes hypertension, diabetes, hypothyroidism, hyperlipidemia, CKD, anemia, aortic valve replacement, paroxysmal AFib.  ER workup: CBC unremarkable.  CMP with glucose of 156 and bicarb 19 otherwise unremarkable.  X-ray demonstrates a left intertrochanteric fracture.  Orthopedics was consulted.  Plans to take patient to OR tomorrow.  Patient admitted to Hospital Medicine for treatment management.  Will maintain patient NPO after midnight.    Overview/Hospital Course:  Leslie Tolliver is an 89 year old female with a past medical history of HTN, CAD, DM, anemia, and Afib who presented with a fall suffering a comminuted left hip fracture. Orthopedic Surgery performed repair 5/23. PT/OT has been consulted. Her course has been complicated by a mild KANNAN on CKD for which IV fluids were given and stabilized. One unit packed red cells ordered 5/28. Pending SNF/IPR.     Interval History: Patient seen and examined. NAEON. Feeling ok. Pain is similar.      Objective:     Vital Signs (Most Recent):  Temp: 98.5 °F (36.9 °C) (05/28/24 1129)  Pulse: 90 (05/28/24 0904)  Resp: 20 (05/28/24 1129)  BP: (!)  112/51 (05/28/24 1129)  SpO2: 96 % (05/28/24 1129) Vital Signs (24h Range):  Temp:  [98.1 °F (36.7 °C)-99 °F (37.2 °C)] 98.5 °F (36.9 °C)  Pulse:  [80-98] 90  Resp:  [18-20] 20  SpO2:  [94 %-100 %] 96 %  BP: ()/(51-58) 112/51     Weight: 79.4 kg (175 lb 0.7 oz)  Body mass index is 27.42 kg/m².    Intake/Output Summary (Last 24 hours) at 5/28/2024 1217  Last data filed at 5/28/2024 0630  Gross per 24 hour   Intake 120 ml   Output 1100 ml   Net -980 ml         Physical Exam  Vitals reviewed.   Constitutional:       General: She is not in acute distress.  HENT:      Head: Normocephalic and atraumatic.   Cardiovascular:      Rate and Rhythm: Normal rate and regular rhythm.   Pulmonary:      Effort: Pulmonary effort is normal. No respiratory distress.   Musculoskeletal:      Comments: LLE NVI  Left hip area dressed  Left thigh mild to moderately more swollen than right   Neurological:      General: No focal deficit present.      Mental Status: She is alert and oriented to person, place, and time. Mental status is at baseline.   Psychiatric:         Mood and Affect: Affect normal.         Behavior: Behavior normal.         Thought Content: Thought content normal.             Significant Labs: All pertinent labs within the past 24 hours have been reviewed.    Significant Imaging: I have reviewed all pertinent imaging results/findings within the past 24 hours.    Assessment/Plan:      * Closed displaced intertrochanteric fracture of left femur  S/p repair 5/23  -Fall precautions  -PRN analgesics  -PT/OT  -Eliquis  -Dispo pending: SNF/IPR    Acute blood loss anemia  Declined again related to fracture and repair  -transfuse 1u pRBC 5/28  -Trend Hgb with CBC and transfuse more if necessary    Paroxysmal atrial fibrillation  Controlled   -Coreg  -Telemetry  -Eliquis      Acute on chronic kidney failure  Stable, near baseline  S/p IVF  -Renally dose medications  -Avoid nephrotoxic agents  -Monitor UOP and  electrolytes  -Trend creatinine     Type 2 diabetes mellitus with diabetic polyneuropathy, with long-term current use of insulin  Patient's FSGs are uncontrolled due to hyperglycemia on current medication regimen.  Last A1c reviewed-   Lab Results   Component Value Date    HGBA1C 6.7 (H) 01/26/2024     Most recent fingerstick glucose reviewed-   Recent Labs   Lab 05/27/24  1705 05/27/24  2159 05/28/24  0825 05/28/24  1200   POCTGLUCOSE 216* 258* 216* 248*       Current correctional scale  Medium  Increase anti-hyperglycemic dose as follows-   Antihyperglycemics (From admission, onward)      Start     Stop Route Frequency Ordered    05/29/24 0900  insulin glargine U-100 (Lantus) pen 24 Units         -- SubQ Daily 05/28/24 1220    05/22/24 1643  insulin aspart U-100 pen 1-10 Units         -- SubQ Before meals & nightly PRN 05/22/24 1545          Hold Oral hypoglycemics while patient is in the hospital.     Hypertension associated with diabetes  Stable   -Continue Coreg  -Continue to monitor      VTE Risk Mitigation (From admission, onward)           Ordered     apixaban tablet 5 mg  2 times daily         05/23/24 1517     IP VTE HIGH RISK PATIENT  Once         05/22/24 1619     Place sequential compression device  Until discontinued         05/22/24 1545     Place FRANKO hose  Until discontinued         05/22/24 1545                    Discharge Planning   MORA: 5/29/2024     Code Status: Full Code   Is the patient medically ready for discharge?:     Reason for patient still in hospital (select all that apply): Patient trending condition, Treatment, and Pending disposition  Discharge Plan A: Rehab                  Adiel Merritt MD  Department of Hospital Medicine   [facility]/Surg

## 2024-05-28 NOTE — SUBJECTIVE & OBJECTIVE
Interval History: Patient seen and examined. NAEON. Feeling ok. Pain is similar.      Objective:     Vital Signs (Most Recent):  Temp: 98.5 °F (36.9 °C) (05/28/24 1129)  Pulse: 90 (05/28/24 0904)  Resp: 20 (05/28/24 1129)  BP: (!) 112/51 (05/28/24 1129)  SpO2: 96 % (05/28/24 1129) Vital Signs (24h Range):  Temp:  [98.1 °F (36.7 °C)-99 °F (37.2 °C)] 98.5 °F (36.9 °C)  Pulse:  [80-98] 90  Resp:  [18-20] 20  SpO2:  [94 %-100 %] 96 %  BP: ()/(51-58) 112/51     Weight: 79.4 kg (175 lb 0.7 oz)  Body mass index is 27.42 kg/m².    Intake/Output Summary (Last 24 hours) at 5/28/2024 1217  Last data filed at 5/28/2024 0630  Gross per 24 hour   Intake 120 ml   Output 1100 ml   Net -980 ml         Physical Exam  Vitals reviewed.   Constitutional:       General: She is not in acute distress.  HENT:      Head: Normocephalic and atraumatic.   Cardiovascular:      Rate and Rhythm: Normal rate and regular rhythm.   Pulmonary:      Effort: Pulmonary effort is normal. No respiratory distress.   Musculoskeletal:      Comments: LLE NVI  Left hip area dressed  Left thigh mild to moderately more swollen than right   Neurological:      General: No focal deficit present.      Mental Status: She is alert and oriented to person, place, and time. Mental status is at baseline.   Psychiatric:         Mood and Affect: Affect normal.         Behavior: Behavior normal.         Thought Content: Thought content normal.             Significant Labs: All pertinent labs within the past 24 hours have been reviewed.    Significant Imaging: I have reviewed all pertinent imaging results/findings within the past 24 hours.

## 2024-05-29 DIAGNOSIS — S72.142D CLOSED DISPLACED INTERTROCHANTERIC FRACTURE OF LEFT FEMUR WITH ROUTINE HEALING, SUBSEQUENT ENCOUNTER: Primary | ICD-10-CM

## 2024-05-29 LAB
ALBUMIN SERPL BCP-MCNC: 2.5 G/DL (ref 3.5–5.2)
ALP SERPL-CCNC: 80 U/L (ref 55–135)
ALT SERPL W/O P-5'-P-CCNC: 17 U/L (ref 10–44)
ANION GAP SERPL CALC-SCNC: 8 MMOL/L (ref 8–16)
AST SERPL-CCNC: 21 U/L (ref 10–40)
BASOPHILS # BLD AUTO: 0.03 K/UL (ref 0–0.2)
BASOPHILS NFR BLD: 0.5 % (ref 0–1.9)
BILIRUB SERPL-MCNC: 1.8 MG/DL (ref 0.1–1)
BUN SERPL-MCNC: 42 MG/DL (ref 8–23)
CALCIUM SERPL-MCNC: 8.6 MG/DL (ref 8.7–10.5)
CHLORIDE SERPL-SCNC: 107 MMOL/L (ref 95–110)
CO2 SERPL-SCNC: 22 MMOL/L (ref 23–29)
CREAT SERPL-MCNC: 1.2 MG/DL (ref 0.5–1.4)
DIFFERENTIAL METHOD BLD: ABNORMAL
EOSINOPHIL # BLD AUTO: 0.2 K/UL (ref 0–0.5)
EOSINOPHIL NFR BLD: 3.6 % (ref 0–8)
ERYTHROCYTE [DISTWIDTH] IN BLOOD BY AUTOMATED COUNT: 17 % (ref 11.5–14.5)
EST. GFR  (NO RACE VARIABLE): 43 ML/MIN/1.73 M^2
GLUCOSE SERPL-MCNC: 160 MG/DL (ref 70–110)
HCT VFR BLD AUTO: 28.2 % (ref 37–48.5)
HGB BLD-MCNC: 8.9 G/DL (ref 12–16)
IMM GRANULOCYTES # BLD AUTO: 0.05 K/UL (ref 0–0.04)
IMM GRANULOCYTES NFR BLD AUTO: 0.9 % (ref 0–0.5)
LYMPHOCYTES # BLD AUTO: 0.8 K/UL (ref 1–4.8)
LYMPHOCYTES NFR BLD: 13.4 % (ref 18–48)
MAGNESIUM SERPL-MCNC: 1.8 MG/DL (ref 1.6–2.6)
MCH RBC QN AUTO: 29.2 PG (ref 27–31)
MCHC RBC AUTO-ENTMCNC: 31.6 G/DL (ref 32–36)
MCV RBC AUTO: 93 FL (ref 82–98)
MONOCYTES # BLD AUTO: 0.7 K/UL (ref 0.3–1)
MONOCYTES NFR BLD: 11.6 % (ref 4–15)
NEUTROPHILS # BLD AUTO: 4.1 K/UL (ref 1.8–7.7)
NEUTROPHILS NFR BLD: 70 % (ref 38–73)
NRBC BLD-RTO: 0 /100 WBC
PHOSPHATE SERPL-MCNC: 3.4 MG/DL (ref 2.7–4.5)
PLATELET # BLD AUTO: 218 K/UL (ref 150–450)
PMV BLD AUTO: 10.5 FL (ref 9.2–12.9)
POCT GLUCOSE: 171 MG/DL (ref 70–110)
POCT GLUCOSE: 209 MG/DL (ref 70–110)
POCT GLUCOSE: 215 MG/DL (ref 70–110)
POCT GLUCOSE: 227 MG/DL (ref 70–110)
POTASSIUM SERPL-SCNC: 4.6 MMOL/L (ref 3.5–5.1)
PROT SERPL-MCNC: 5.6 G/DL (ref 6–8.4)
RBC # BLD AUTO: 3.05 M/UL (ref 4–5.4)
SODIUM SERPL-SCNC: 137 MMOL/L (ref 136–145)
WBC # BLD AUTO: 5.88 K/UL (ref 3.9–12.7)

## 2024-05-29 PROCEDURE — 11000001 HC ACUTE MED/SURG PRIVATE ROOM

## 2024-05-29 PROCEDURE — 84100 ASSAY OF PHOSPHORUS: CPT | Performed by: ORTHOPAEDIC SURGERY

## 2024-05-29 PROCEDURE — 63600175 PHARM REV CODE 636 W HCPCS: Performed by: ORTHOPAEDIC SURGERY

## 2024-05-29 PROCEDURE — 94799 UNLISTED PULMONARY SVC/PX: CPT | Mod: XB

## 2024-05-29 PROCEDURE — 97110 THERAPEUTIC EXERCISES: CPT

## 2024-05-29 PROCEDURE — 97530 THERAPEUTIC ACTIVITIES: CPT | Mod: CQ

## 2024-05-29 PROCEDURE — 99900035 HC TECH TIME PER 15 MIN (STAT)

## 2024-05-29 PROCEDURE — 36415 COLL VENOUS BLD VENIPUNCTURE: CPT | Performed by: ORTHOPAEDIC SURGERY

## 2024-05-29 PROCEDURE — 85025 COMPLETE CBC W/AUTO DIFF WBC: CPT | Performed by: ORTHOPAEDIC SURGERY

## 2024-05-29 PROCEDURE — 83735 ASSAY OF MAGNESIUM: CPT | Performed by: ORTHOPAEDIC SURGERY

## 2024-05-29 PROCEDURE — 63600175 PHARM REV CODE 636 W HCPCS: Performed by: ANESTHESIOLOGY

## 2024-05-29 PROCEDURE — 97530 THERAPEUTIC ACTIVITIES: CPT

## 2024-05-29 PROCEDURE — 25000003 PHARM REV CODE 250: Performed by: ORTHOPAEDIC SURGERY

## 2024-05-29 PROCEDURE — 80053 COMPREHEN METABOLIC PANEL: CPT | Performed by: ORTHOPAEDIC SURGERY

## 2024-05-29 PROCEDURE — 63600175 PHARM REV CODE 636 W HCPCS: Performed by: STUDENT IN AN ORGANIZED HEALTH CARE EDUCATION/TRAINING PROGRAM

## 2024-05-29 PROCEDURE — 94761 N-INVAS EAR/PLS OXIMETRY MLT: CPT

## 2024-05-29 RX ADMIN — DIPHENHYDRAMINE HYDROCHLORIDE 25 MG: 50 INJECTION INTRAMUSCULAR; INTRAVENOUS at 11:05

## 2024-05-29 RX ADMIN — CARVEDILOL 12.5 MG: 6.25 TABLET, FILM COATED ORAL at 08:05

## 2024-05-29 RX ADMIN — ACETAMINOPHEN 650 MG: 325 TABLET ORAL at 11:05

## 2024-05-29 RX ADMIN — INSULIN ASPART 3 UNITS: 100 INJECTION, SOLUTION INTRAVENOUS; SUBCUTANEOUS at 09:05

## 2024-05-29 RX ADMIN — INSULIN ASPART 2 UNITS: 100 INJECTION, SOLUTION INTRAVENOUS; SUBCUTANEOUS at 08:05

## 2024-05-29 RX ADMIN — INSULIN ASPART 4 UNITS: 100 INJECTION, SOLUTION INTRAVENOUS; SUBCUTANEOUS at 11:05

## 2024-05-29 RX ADMIN — SENNOSIDES AND DOCUSATE SODIUM 1 TABLET: 50; 8.6 TABLET ORAL at 09:05

## 2024-05-29 RX ADMIN — APIXABAN 5 MG: 2.5 TABLET, FILM COATED ORAL at 09:05

## 2024-05-29 RX ADMIN — CARVEDILOL 12.5 MG: 6.25 TABLET, FILM COATED ORAL at 09:05

## 2024-05-29 RX ADMIN — INSULIN ASPART 4 UNITS: 100 INJECTION, SOLUTION INTRAVENOUS; SUBCUTANEOUS at 05:05

## 2024-05-29 RX ADMIN — SENNOSIDES AND DOCUSATE SODIUM 1 TABLET: 50; 8.6 TABLET ORAL at 08:05

## 2024-05-29 RX ADMIN — Medication 800 MG: at 06:05

## 2024-05-29 RX ADMIN — INSULIN GLARGINE 24 UNITS: 100 INJECTION, SOLUTION SUBCUTANEOUS at 08:05

## 2024-05-29 RX ADMIN — APIXABAN 5 MG: 2.5 TABLET, FILM COATED ORAL at 08:05

## 2024-05-29 NOTE — ASSESSMENT & PLAN NOTE
S/p repair 5/23  -Fall precautions  -PRN analgesics  -PT/OT  -Eliquis  -Dispo pending: SNF/IPR. P2P scheduled tomorrow

## 2024-05-29 NOTE — SUBJECTIVE & OBJECTIVE
Interval History: Patient seen and examined. ALIVIA Peer to peer scheduled tomorrow for rehab.      Objective:     Vital Signs (Most Recent):  Temp: 98.2 °F (36.8 °C) (05/29/24 1122)  Pulse: 75 (05/29/24 1122)  Resp: 20 (05/29/24 1122)  BP: (!) 94/55 (05/29/24 1122)  SpO2: 95 % (05/29/24 1122) Vital Signs (24h Range):  Temp:  [98 °F (36.7 °C)-99.5 °F (37.5 °C)] 98.2 °F (36.8 °C)  Pulse:  [] 75  Resp:  [16-20] 20  SpO2:  [95 %-100 %] 95 %  BP: ()/(48-59) 94/55     Weight: 92.4 kg (203 lb 11.3 oz)  Body mass index is 31.9 kg/m².    Intake/Output Summary (Last 24 hours) at 5/29/2024 1246  Last data filed at 5/29/2024 0605  Gross per 24 hour   Intake --   Output 450 ml   Net -450 ml         Physical Exam  Vitals reviewed.   Constitutional:       General: She is not in acute distress.  HENT:      Head: Normocephalic and atraumatic.   Cardiovascular:      Rate and Rhythm: Normal rate and regular rhythm.   Pulmonary:      Effort: Pulmonary effort is normal. No respiratory distress.   Musculoskeletal:      Comments: LLE NVI  Left hip area dressed  Left thigh mild to moderately more swollen than right   Neurological:      General: No focal deficit present.      Mental Status: She is alert and oriented to person, place, and time. Mental status is at baseline.   Psychiatric:         Mood and Affect: Affect normal.         Behavior: Behavior normal.         Thought Content: Thought content normal.             Significant Labs: All pertinent labs within the past 24 hours have been reviewed.    Significant Imaging: I have reviewed all pertinent imaging results/findings within the past 24 hours.

## 2024-05-29 NOTE — PLAN OF CARE
[employer] requesting peer to peer, Dr. Merritt agreeable, scheduled for tomorrow 5/30 @ 12:00 PM. Dr. Merritt updated.       05/29/24 1137   Post-Acute Status   Post-Acute Authorization Placement   Post-Acute Placement Status Pending payor medical review/second level review

## 2024-05-29 NOTE — ASSESSMENT & PLAN NOTE
Patient's FSGs are controlled on current medication regimen.  Last A1c reviewed-   Lab Results   Component Value Date    HGBA1C 6.7 (H) 01/26/2024     Most recent fingerstick glucose reviewed-   Recent Labs   Lab 05/28/24  1655 05/28/24  1931 05/29/24  0735 05/29/24  1127   POCTGLUCOSE 201* 196* 171* 209*       Current correctional scale  Medium  Maintain anti-hyperglycemic dose as follows-   Antihyperglycemics (From admission, onward)      Start     Stop Route Frequency Ordered    05/29/24 0900  insulin glargine U-100 (Lantus) pen 24 Units         -- SubQ Daily 05/28/24 1220    05/22/24 1643  insulin aspart U-100 pen 1-10 Units         -- SubQ Before meals & nightly PRN 05/22/24 1545          Hold Oral hypoglycemics while patient is in the hospital.

## 2024-05-29 NOTE — PLAN OF CARE
Plan of care reviewed with pt. Pt verbalized understanding. Vitals stable. Bed in lowest position. Call light and personal belongings within reach. Safety precautions maintained.

## 2024-05-29 NOTE — PLAN OF CARE
Problem: Occupational Therapy  Goal: Occupational Therapy Goal  Description: Goals to be met by: 6/21/2024     Patient will increase functional independence with ADLs by performing:    LE Dressing with Stand-by Assistance and Assistive Devices as needed.  Grooming while seated at sink with Supervision.  Toileting from bedside commode with Minimal Assistance for hygiene and clothing management.   Supine to sit with Stand-by Assistance.  Toilet transfer to bedside commode with Minimal Assistance.    Supine<>sit with Mod (A); left lateral scooting with CGA EOB; tolerated EOB sitting with Supervision > 15 mins.    Outcome: Progressing

## 2024-05-29 NOTE — PT/OT/SLP PROGRESS
Physical Therapy Treatment    Patient Name:  Leslie Tolliver   MRN:  [id number]    Recommendations:     Discharge Recommendations: High Intensity Therapy  Discharge Equipment Recommendations: to be determined by next level of care  Barriers to discharge: None    Assessment:     Leslie Tolliver is a 89 y.o. female admitted with a medical diagnosis of Closed displaced intertrochanteric fracture of left femur.  She presents with the following impairments/functional limitations: weakness, impaired endurance, impaired self care skills, impaired functional mobility, gait instability, impaired balance, decreased lower extremity function, pain, decreased ROM, orthopedic precautions . Awake, alert, supine in bed.  Agreed to mobilize.  Requires assistance and increased verbal cues for safe technique. Sup > sit with Max A.  Mod A to scoot to EOB. Sit > stand with rw and Mod A. TTWB LLE.  SPT bed > chair with rw and Mod A , sat prematurely in spite of verbal cues.  Reviewed safety during sit <> stand with patient.  Tolerated OOB ~ 1.5 hours.  Requested to return to bed with complaint of discomfort LLE.  Scoot forward in chair with assistance.  Sit > stand with rw and A x 2 ( 2 attempts).  SPT chair > bed with rw and Mod A, TTWB LLE.  Stand > sit with Min A.  Sit > supine with Max A. Scoot HOB, total A.      Rehab Prognosis: Good; patient would benefit from acute skilled PT services to address these deficits and reach maximum level of function.    Recent Surgery: Procedure(s) (LRB):  INSERTION, INTRAMEDULLARY RACQUEL, FEMUR (Left) 6 Days Post-Op    Plan:     During this hospitalization, patient to be seen daily to address the identified rehab impairments via therapeutic exercises, gait training, therapeutic activities and progress toward the following goals:    Plan of Care Expires:  06/15/24    Subjective     Chief Complaint: pain LLE  Patient/Family Comments/goals: none stated  Pain/Comfort:  Pain Rating 1: other (see comments)  (did not rate)  Location - Side 1: Left  Location - Orientation 1: generalized  Location 1: leg  Pain Addressed 1: Reposition, Nurse notified      Objective:     Communicated with nurse Munoz prior to session.  Patient found supine with bed alarm, PureWick, telemetry, SCD upon PT entry to room.     General Precautions: Standard, fall  Orthopedic Precautions: LLE toe touch weight bearing  Braces: N/A  Respiratory Status: Room air     Functional Mobility:  Bed Mobility:     Supine to Sit: maximal assistance  Sit to Supine: maximal assistance  Transfers:     Sit to Stand:  moderate assistance with rolling walker  Bed to Chair: moderate assistance with  rolling walker  using  Stand Pivot      AM-PAC 6 CLICK MOBILITY          Treatment & Education:  Transferred bed > chair with rw and Mod A via SPT, TTWB LLE ( verbal cues for safe technique).    Sat ~ 1.5 hours.  Sit > stand with A x 2.   SPT chair > bed with rw and Mod A, TTWB LLE.  Sit > supine with Max A.     Patient left supine with all lines intact, call button in reach, bed alarm on, and nurse Munoz notified..    GOALS:   Multidisciplinary Problems       Physical Therapy Goals          Problem: Physical Therapy    Goal Priority Disciplines Outcome Goal Variances Interventions   Physical Therapy Goal     PT, PT/OT Progressing     Description: Goals to be met by: 06-     Patient will increase functional independence with mobility by performin. Supine to sit with MInimal Assistance  2. Sit to stand transfer with Minimal Assistance  3. Bed to chair transfer with Minimal Assistance using Rolling Walker  4. Gait  x 20 feet with Minimal Assistance using Rolling Walker.   5. Lower extremity exercise program x20 reps    L IM nailing  and 25% WB only                         Time Tracking:     PT Received On: 24  PT Start Time: 913     PT Stop Time: 933 (1100- 1118 transferred BTB)  PT Total Time (min): 20 min     Billable Minutes: Therapeutic  Activity 38min    Treatment Type: Treatment  PT/PTA: PTA     Number of PTA visits since last PT visit: 1 05/29/2024

## 2024-05-29 NOTE — PROGRESS NOTES
[facility] hospital Medicine  Progress Note    Patient Name: Leslie Tolliver  MRN: [id number]  Patient Class: IP- Inpatient   Admission Date: 5/22/2024  Length of Stay: 7 days  Attending Physician: Adiel Merritt MD  Primary Care Provider: Chang Christianson MD        Subjective:     Principal Problem:Closed displaced intertrochanteric fracture of left femur        HPI:  Leslie Tolliver an 89 year female who presents emergency room complaining of left hip pain.  She endorses mechanical fall at her residence today.  She fell striking her left hip on the floor.  She denies striking her head or loss of consciousness.  Denies any cervical pain.  Pain is worse with any movement of the left leg.  No alleviating factors.  No known sick contacts or travel.  She denies fever or chills.  Previous medical history includes hypertension, diabetes, hypothyroidism, hyperlipidemia, CKD, anemia, aortic valve replacement, paroxysmal AFib.  ER workup: CBC unremarkable.  CMP with glucose of 156 and bicarb 19 otherwise unremarkable.  X-ray demonstrates a left intertrochanteric fracture.  Orthopedics was consulted.  Plans to take patient to OR tomorrow.  Patient admitted to Hospital Medicine for treatment management.  Will maintain patient NPO after midnight.    Overview/Hospital Course:  Leslie Tolliver is an 89 year old female with a past medical history of HTN, CAD, DM, anemia, and Afib who presented with a fall suffering a comminuted left hip fracture. Orthopedic Surgery performed repair 5/23. PT/OT followed. Her course has been complicated by a mild KANNAN on CKD for which IV fluids were given and stabilized. One unit packed red cells ordered 5/28. Pending SNF/IPR.     Interval History: Patient seen and examined. TORRIE. Peer to peer scheduled tomorrow for rehab.      Objective:     Vital Signs (Most Recent):  Temp: 98.2 °F (36.8 °C) (05/29/24 1122)  Pulse: 75 (05/29/24 1122)  Resp: 20 (05/29/24 1122)  BP: (!)  94/55 (05/29/24 1122)  SpO2: 95 % (05/29/24 1122) Vital Signs (24h Range):  Temp:  [98 °F (36.7 °C)-99.5 °F (37.5 °C)] 98.2 °F (36.8 °C)  Pulse:  [] 75  Resp:  [16-20] 20  SpO2:  [95 %-100 %] 95 %  BP: ()/(48-59) 94/55     Weight: 92.4 kg (203 lb 11.3 oz)  Body mass index is 31.9 kg/m².    Intake/Output Summary (Last 24 hours) at 5/29/2024 1246  Last data filed at 5/29/2024 0605  Gross per 24 hour   Intake --   Output 450 ml   Net -450 ml         Physical Exam  Vitals reviewed.   Constitutional:       General: She is not in acute distress.  HENT:      Head: Normocephalic and atraumatic.   Cardiovascular:      Rate and Rhythm: Normal rate and regular rhythm.   Pulmonary:      Effort: Pulmonary effort is normal. No respiratory distress.   Musculoskeletal:      Comments: LLE NVI  Left hip area dressed  Left thigh mild to moderately more swollen than right   Neurological:      General: No focal deficit present.      Mental Status: She is alert and oriented to person, place, and time. Mental status is at baseline.   Psychiatric:         Mood and Affect: Affect normal.         Behavior: Behavior normal.         Thought Content: Thought content normal.             Significant Labs: All pertinent labs within the past 24 hours have been reviewed.    Significant Imaging: I have reviewed all pertinent imaging results/findings within the past 24 hours.    Assessment/Plan:      * Closed displaced intertrochanteric fracture of left femur  S/p repair 5/23  -Fall precautions  -PRN analgesics  -PT/OT  -Eliquis  -Dispo pending: SNF/IPR. P2P scheduled tomorrow    Acute blood loss anemia  Declined postop  S/p 1u pRBC 5/28  -Trend Hgb with CBC and transfuse more if necessary    Paroxysmal atrial fibrillation  Controlled   -Coreg  -Telemetry  -Eliquis    Status post aortic valve replacement with bioprosthetic valve  Stable.      Acute on chronic kidney failure  Stable, near baseline  S/p IVF  -Renally dose medications  -Avoid  nephrotoxic agents  -Monitor UOP and electrolytes  -Trend creatinine    Hyperlipidemia associated with type 2 diabetes mellitus  -      Type 2 diabetes mellitus with diabetic polyneuropathy, with long-term current use of insulin  Patient's FSGs are controlled on current medication regimen.  Last A1c reviewed-   Lab Results   Component Value Date    HGBA1C 6.7 (H) 01/26/2024     Most recent fingerstick glucose reviewed-   Recent Labs   Lab 05/28/24  1655 05/28/24  1931 05/29/24  0735 05/29/24  1127   POCTGLUCOSE 201* 196* 171* 209*       Current correctional scale  Medium  Maintain anti-hyperglycemic dose as follows-   Antihyperglycemics (From admission, onward)      Start     Stop Route Frequency Ordered    05/29/24 0900  insulin glargine U-100 (Lantus) pen 24 Units         -- SubQ Daily 05/28/24 1220    05/22/24 1643  insulin aspart U-100 pen 1-10 Units         -- SubQ Before meals & nightly PRN 05/22/24 1545          Hold Oral hypoglycemics while patient is in the hospital.     Hypertension associated with diabetes  Stable   -Continue Coreg  -Continue to monitor      VTE Risk Mitigation (From admission, onward)           Ordered     apixaban tablet 5 mg  2 times daily         05/23/24 1517     IP VTE HIGH RISK PATIENT  Once         05/22/24 1619     Place sequential compression device  Until discontinued         05/22/24 1545     Place FRANKO hose  Until discontinued         05/22/24 1545                    Discharge Planning   MORA: 5/30/2024     Code Status: Full Code   Is the patient medically ready for discharge?:     Reason for patient still in hospital (select all that apply): Patient trending condition and Pending disposition  Discharge Plan A: Rehab                  Adiel Merritt MD  Department of Hospital Medicine   [facility]/Surg

## 2024-05-29 NOTE — PLAN OF CARE
Problem: Physical Therapy  Goal: Physical Therapy Goal  Description: Goals to be met by: 06-     Patient will increase functional independence with mobility by performin. Supine to sit with MInimal Assistance  2. Sit to stand transfer with Minimal Assistance  3. Bed to chair transfer with Minimal Assistance using Rolling Walker  4. Gait  x 20 feet with Minimal Assistance using Rolling Walker.   5. Lower extremity exercise program x20 reps    L IM nailing  and 25% WB only    Outcome: Progressing   Therapeutic activity : bed mobility , transfers, gait with rw and assistance, LE exercises.

## 2024-05-29 NOTE — PLAN OF CARE
Problem: Adult Inpatient Plan of Care  Goal: Plan of Care Review  Outcome: Progressing     Problem: Adult Inpatient Plan of Care  Goal: Absence of Hospital-Acquired Illness or Injury  Outcome: Progressing     Problem: Infection  Goal: Absence of Infection Signs and Symptoms  Outcome: Progressing     Problem: Diabetes Comorbidity  Goal: Blood Glucose Level Within Targeted Range  Outcome: Progressing     Problem: Skin Injury Risk Increased  Goal: Skin Health and Integrity  Outcome: Progressing     Problem: Fall Injury Risk  Goal: Absence of Fall and Fall-Related Injury  Outcome: Progressing     Problem: Wound  Goal: Absence of Infection Signs and Symptoms  Outcome: Progressing     Problem: Acute Kidney Injury/Impairment  Goal: Fluid and Electrolyte Balance  Outcome: Progressing     Problem: Acute Kidney Injury/Impairment  Goal: Improved Oral Intake  Outcome: Progressing

## 2024-05-29 NOTE — PT/OT/SLP PROGRESS
"Occupational Therapy   Treatment    Name: Leslie Tolliver  MRN: [id number]  Admitting Diagnosis:  Closed displaced intertrochanteric fracture of left femur  6 Days Post-Op    Recommendations:     Discharge Recommendations: High Intensity Therapy  Discharge Equipment Recommendations:  none, to be determined by next level of care  Barriers to discharge:  None    Assessment:     Leslie Tolliver is a 89 y.o. female with a medical diagnosis of Closed displaced intertrochanteric fracture of left femur.  She presents with the following performance deficits affecting function are weakness, impaired endurance, impaired self care skills, impaired functional mobility, gait instability, impaired balance, decreased lower extremity function, pain, decreased ROM, impaired skin, edema, orthopedic precautions.     Patient performing supine<>sit with Mod (A) with instruction for technique, lateral scooting EOB with CGA with instruction for technique and in adherence with L LE TTWB precautions, anterior scooting with CGA in adherence with L LE TTWB precautions. Patient tolerated sitting EOB > 15 minutes with Supervision to maintain midline.     Patient is highly motivated and participatory in therapy; recommend high intensity therapy upon discharge.     Rehab Prognosis:  Good; patient would benefit from acute skilled OT services to address these deficits and reach maximum level of function.       Plan:     Patient to be seen 5 x/week to address the above listed problems via self-care/home management, therapeutic activities, therapeutic exercises  Plan of Care Expires: 06/21/24  Plan of Care Reviewed with: patient    Subjective   "This is tough for me because I am so used to be independent. I have never been like this before where I couldn't walk."  Chief Complaint: None  Patient/Family Comments/goals: To be able to get more therapy and get better and stronger.   Pain/Comfort:  Pain Rating 1: other (see comments) (no pain " reported)    Objective:     Communicated with: Nurse, prior to session.  Patient found HOB elevated with bed alarm, PureWick, telemetry, SCD upon OT entry to room.    General Precautions: Standard, fall    Orthopedic Precautions:LLE toe touch weight bearing  Braces: N/A  Respiratory Status: Room air     Occupational Performance:     Bed Mobility:    Patient completed Rolling/Turning to Left with  moderate assistance  Patient completed Supine to Sit with moderate assistance  Patient completed Sit to Supine with moderate assistance     Functional Mobility/Transfers:  Functional Mobility: Anterior and left lateral scooting with CGA and verbal instruction for technique; patient demonstrates good understanding of and adherence to L LE TTWB precautions with very little cues    [facility] 6 Click ADL: 15    Treatment & Education:  - OTR reinforcing education/instruction regarding OT role, POC and goals for therapy, L LE TTWB precautions and techniques for adherence during bed mobility and scooting, importance of mobilization to counteract negative effects of prolonged activity/bed rest. Patient verbalizes/demonstrates understanding and in agreement when appropriate.  - OTR reinforcing education regarding safety awareness/fall prevention including use of bed alarm and call button for staff assistance - patient verbalize/demonstrates understanding and in agreement when appropriate.    Patient left HOB elevated with all lines intact, call button in reach, and bed alarm on    GOALS:   Multidisciplinary Problems       Occupational Therapy Goals          Problem: Occupational Therapy    Goal Priority Disciplines Outcome Interventions   Occupational Therapy Goal     OT, PT/OT Progressing    Description: Goals to be met by: 6/21/2024     Patient will increase functional independence with ADLs by performing:    LE Dressing with Stand-by Assistance and Assistive Devices as needed.  Grooming while seated at sink with  Supervision.  Toileting from bedside commode with Minimal Assistance for hygiene and clothing management.   Supine to sit with Stand-by Assistance.  Toilet transfer to bedside commode with Minimal Assistance.                         Time Tracking:     OT Date of Treatment: 05/29/24  OT Start Time: 1507  OT Stop Time: 1545  OT Total Time (min): 38 min    Billable Minutes:Therapeutic Activity 28  Therapeutic Exercise 10    OT/VILMA: OT     Number of VILMA visits since last OT visit: 0    5/29/2024

## 2024-05-30 LAB
ALBUMIN SERPL BCP-MCNC: 2.4 G/DL (ref 3.5–5.2)
ALP SERPL-CCNC: 80 U/L (ref 55–135)
ALT SERPL W/O P-5'-P-CCNC: 17 U/L (ref 10–44)
ANION GAP SERPL CALC-SCNC: 7 MMOL/L (ref 8–16)
AST SERPL-CCNC: 21 U/L (ref 10–40)
BASOPHILS # BLD AUTO: 0.03 K/UL (ref 0–0.2)
BASOPHILS NFR BLD: 0.5 % (ref 0–1.9)
BILIRUB SERPL-MCNC: 1.7 MG/DL (ref 0.1–1)
BUN SERPL-MCNC: 37 MG/DL (ref 8–23)
CALCIUM SERPL-MCNC: 8.4 MG/DL (ref 8.7–10.5)
CHLORIDE SERPL-SCNC: 105 MMOL/L (ref 95–110)
CO2 SERPL-SCNC: 22 MMOL/L (ref 23–29)
CREAT SERPL-MCNC: 1.1 MG/DL (ref 0.5–1.4)
DIFFERENTIAL METHOD BLD: ABNORMAL
EOSINOPHIL # BLD AUTO: 0.2 K/UL (ref 0–0.5)
EOSINOPHIL NFR BLD: 3.4 % (ref 0–8)
ERYTHROCYTE [DISTWIDTH] IN BLOOD BY AUTOMATED COUNT: 17.3 % (ref 11.5–14.5)
EST. GFR  (NO RACE VARIABLE): 48 ML/MIN/1.73 M^2
GLUCOSE SERPL-MCNC: 141 MG/DL (ref 70–110)
HCT VFR BLD AUTO: 26.5 % (ref 37–48.5)
HGB BLD-MCNC: 8.4 G/DL (ref 12–16)
IMM GRANULOCYTES # BLD AUTO: 0.04 K/UL (ref 0–0.04)
IMM GRANULOCYTES NFR BLD AUTO: 0.7 % (ref 0–0.5)
LYMPHOCYTES # BLD AUTO: 0.7 K/UL (ref 1–4.8)
LYMPHOCYTES NFR BLD: 11.1 % (ref 18–48)
MAGNESIUM SERPL-MCNC: 1.7 MG/DL (ref 1.6–2.6)
MCH RBC QN AUTO: 29.4 PG (ref 27–31)
MCHC RBC AUTO-ENTMCNC: 31.7 G/DL (ref 32–36)
MCV RBC AUTO: 93 FL (ref 82–98)
MONOCYTES # BLD AUTO: 0.8 K/UL (ref 0.3–1)
MONOCYTES NFR BLD: 12.7 % (ref 4–15)
NEUTROPHILS # BLD AUTO: 4.4 K/UL (ref 1.8–7.7)
NEUTROPHILS NFR BLD: 71.6 % (ref 38–73)
NRBC BLD-RTO: 0 /100 WBC
PHOSPHATE SERPL-MCNC: 3 MG/DL (ref 2.7–4.5)
PLATELET # BLD AUTO: 237 K/UL (ref 150–450)
PMV BLD AUTO: 10.4 FL (ref 9.2–12.9)
POCT GLUCOSE: 141 MG/DL (ref 70–110)
POCT GLUCOSE: 206 MG/DL (ref 70–110)
POCT GLUCOSE: 320 MG/DL (ref 70–110)
POTASSIUM SERPL-SCNC: 4.5 MMOL/L (ref 3.5–5.1)
PROT SERPL-MCNC: 5.4 G/DL (ref 6–8.4)
RBC # BLD AUTO: 2.86 M/UL (ref 4–5.4)
SODIUM SERPL-SCNC: 134 MMOL/L (ref 136–145)
WBC # BLD AUTO: 6.13 K/UL (ref 3.9–12.7)

## 2024-05-30 PROCEDURE — 63600175 PHARM REV CODE 636 W HCPCS: Performed by: STUDENT IN AN ORGANIZED HEALTH CARE EDUCATION/TRAINING PROGRAM

## 2024-05-30 PROCEDURE — 83735 ASSAY OF MAGNESIUM: CPT | Performed by: ORTHOPAEDIC SURGERY

## 2024-05-30 PROCEDURE — 94761 N-INVAS EAR/PLS OXIMETRY MLT: CPT

## 2024-05-30 PROCEDURE — 25000003 PHARM REV CODE 250: Performed by: ORTHOPAEDIC SURGERY

## 2024-05-30 PROCEDURE — 99900035 HC TECH TIME PER 15 MIN (STAT)

## 2024-05-30 PROCEDURE — 85025 COMPLETE CBC W/AUTO DIFF WBC: CPT | Performed by: ORTHOPAEDIC SURGERY

## 2024-05-30 PROCEDURE — 94799 UNLISTED PULMONARY SVC/PX: CPT

## 2024-05-30 PROCEDURE — 80053 COMPREHEN METABOLIC PANEL: CPT | Performed by: ORTHOPAEDIC SURGERY

## 2024-05-30 PROCEDURE — 36415 COLL VENOUS BLD VENIPUNCTURE: CPT | Performed by: ORTHOPAEDIC SURGERY

## 2024-05-30 PROCEDURE — 97530 THERAPEUTIC ACTIVITIES: CPT | Mod: CQ

## 2024-05-30 PROCEDURE — 99900031 HC PATIENT EDUCATION (STAT)

## 2024-05-30 PROCEDURE — 84100 ASSAY OF PHOSPHORUS: CPT | Performed by: ORTHOPAEDIC SURGERY

## 2024-05-30 PROCEDURE — 11000001 HC ACUTE MED/SURG PRIVATE ROOM

## 2024-05-30 RX ORDER — INSULIN GLARGINE 100 [IU]/ML
26 INJECTION, SOLUTION SUBCUTANEOUS DAILY
Status: DISCONTINUED | OUTPATIENT
Start: 2024-05-31 | End: 2024-05-31 | Stop reason: HOSPADM

## 2024-05-30 RX ADMIN — HYDROCODONE BITARTRATE AND ACETAMINOPHEN 1 TABLET: 5; 325 TABLET ORAL at 01:05

## 2024-05-30 RX ADMIN — SENNOSIDES AND DOCUSATE SODIUM 1 TABLET: 50; 8.6 TABLET ORAL at 08:05

## 2024-05-30 RX ADMIN — INSULIN ASPART 8 UNITS: 100 INJECTION, SOLUTION INTRAVENOUS; SUBCUTANEOUS at 11:05

## 2024-05-30 RX ADMIN — Medication 800 MG: at 07:05

## 2024-05-30 RX ADMIN — APIXABAN 5 MG: 2.5 TABLET, FILM COATED ORAL at 08:05

## 2024-05-30 RX ADMIN — CARVEDILOL 12.5 MG: 6.25 TABLET, FILM COATED ORAL at 08:05

## 2024-05-30 RX ADMIN — Medication 800 MG: at 11:05

## 2024-05-30 RX ADMIN — INSULIN GLARGINE 24 UNITS: 100 INJECTION, SOLUTION SUBCUTANEOUS at 08:05

## 2024-05-30 NOTE — PLAN OF CARE
Problem: Adult Inpatient Plan of Care  Goal: Plan of Care Review  Outcome: Progressing  Goal: Patient-Specific Goal (Individualized)  Outcome: Progressing  Goal: Absence of Hospital-Acquired Illness or Injury  Outcome: Progressing  Goal: Optimal Comfort and Wellbeing  Outcome: Progressing  Goal: Readiness for Transition of Care  Outcome: Progressing     Problem: Infection  Goal: Absence of Infection Signs and Symptoms  Outcome: Progressing     Problem: Diabetes Comorbidity  Goal: Blood Glucose Level Within Targeted Range  Outcome: Progressing     Problem: Skin Injury Risk Increased  Goal: Skin Health and Integrity  Outcome: Progressing     Problem: Fall Injury Risk  Goal: Absence of Fall and Fall-Related Injury  Outcome: Progressing     Problem: Wound  Goal: Optimal Coping  Outcome: Progressing  Goal: Optimal Functional Ability  Outcome: Progressing  Goal: Absence of Infection Signs and Symptoms  Outcome: Progressing  Goal: Improved Oral Intake  Outcome: Progressing  Goal: Optimal Pain Control and Function  Outcome: Progressing  Goal: Skin Health and Integrity  Outcome: Progressing  Goal: Optimal Wound Healing  Outcome: Progressing     Problem: Acute Kidney Injury/Impairment  Goal: Fluid and Electrolyte Balance  Outcome: Progressing  Goal: Improved Oral Intake  Outcome: Progressing  Goal: Effective Renal Function  Outcome: Progressing     Problem: Infection  Goal: Absence of Infection Signs and Symptoms  Outcome: Progressing     Problem: Diabetes Comorbidity  Goal: Blood Glucose Level Within Targeted Range  Outcome: Progressing     Problem: Skin Injury Risk Increased  Goal: Skin Health and Integrity  Outcome: Progressing     Problem: Acute Kidney Injury/Impairment  Goal: Fluid and Electrolyte Balance  Outcome: Progressing

## 2024-05-30 NOTE — PLAN OF CARE
Problem: Physical Therapy  Goal: Physical Therapy Goal  Description: Goals to be met by: 06-     Patient will increase functional independence with mobility by performin. Supine to sit with MInimal Assistance  2. Sit to stand transfer with Minimal Assistance  3. Bed to chair transfer with Minimal Assistance using Rolling Walker  4. Gait  x 20 feet with Minimal Assistance using Rolling Walker.   5. Lower extremity exercise program x20 reps    L IM nailing  and 25% WB only    Outcome: Progressing   Therapeutic activity to improve functional mobility : bed mobility, transfers, gait with rw and assistance for safety, LE exercises.

## 2024-05-30 NOTE — PROGRESS NOTES
Critical access hospital Medicine  Progress Note    Patient Name: Leslie Tolliver  MRN: [id number]  Patient Class: IP- Inpatient   Admission Date: 5/22/2024  Length of Stay: 8 days  Attending Physician: Adile Merritt MD  Primary Care Provider: Chang Christianson MD        Subjective:     Principal Problem:Closed displaced intertrochanteric fracture of left femur        HPI:  Leslie Tolliver an 89 year female who presents emergency room complaining of left hip pain.  She endorses mechanical fall at her residence today.  She fell striking her left hip on the floor.  She denies striking her head or loss of consciousness.  Denies any cervical pain.  Pain is worse with any movement of the left leg.  No alleviating factors.  No known sick contacts or travel.  She denies fever or chills.  Previous medical history includes hypertension, diabetes, hypothyroidism, hyperlipidemia, CKD, anemia, aortic valve replacement, paroxysmal AFib.  ER workup: CBC unremarkable.  CMP with glucose of 156 and bicarb 19 otherwise unremarkable.  X-ray demonstrates a left intertrochanteric fracture.  Orthopedics was consulted.  Plans to take patient to OR tomorrow.  Patient admitted to Hospital Medicine for treatment management.  Will maintain patient NPO after midnight.    Overview/Hospital Course:  Leslie Tolliver is an 89 year old female with a past medical history of HTN, CAD, DM, anemia, and Afib who presented with a fall suffering a comminuted left hip fracture. Orthopedic Surgery performed repair 5/23. PT/OT followed. Her course has been complicated by a mild KANNAN on CKD for which IV fluids were given and stabilized. One unit packed red cells ordered 5/28. Denied IPR via peer to peer. Pending SNF.     Interval History: Patient seen and examined. MARIAHEON. No changes per her. Denied IPR via peer to peer. Planning for SNF.      Objective:     Vital Signs (Most Recent):  Temp: 98.2 °F (36.8 °C) (05/30/24 1134)  Pulse: 83  (05/30/24 1134)  Resp: 17 (05/30/24 1353)  BP: (!) 95/51 (05/30/24 1134)  SpO2: 98 % (05/30/24 1134) Vital Signs (24h Range):  Temp:  [98 °F (36.7 °C)-98.6 °F (37 °C)] 98.2 °F (36.8 °C)  Pulse:  [74-97] 83  Resp:  [17-19] 17  SpO2:  [94 %-99 %] 98 %  BP: ()/(50-62) 95/51     Weight: 92.4 kg (203 lb 11.3 oz)  Body mass index is 31.9 kg/m².    Intake/Output Summary (Last 24 hours) at 5/30/2024 1420  Last data filed at 5/30/2024 1200  Gross per 24 hour   Intake 600 ml   Output 2300 ml   Net -1700 ml         Physical Exam  Vitals reviewed.   Constitutional:       General: She is not in acute distress.  HENT:      Head: Normocephalic and atraumatic.   Cardiovascular:      Rate and Rhythm: Normal rate and regular rhythm.   Pulmonary:      Effort: Pulmonary effort is normal. No respiratory distress.   Musculoskeletal:      Comments: LLE NVI  Left hip area dressed  Left thigh mild to moderately more swollen than right   Neurological:      General: No focal deficit present.      Mental Status: She is alert and oriented to person, place, and time. Mental status is at baseline.   Psychiatric:         Mood and Affect: Affect normal.         Behavior: Behavior normal.         Thought Content: Thought content normal.             Significant Labs: All pertinent labs within the past 24 hours have been reviewed.    Significant Imaging: I have reviewed all pertinent imaging results/findings within the past 24 hours.    Assessment/Plan:      * Closed displaced intertrochanteric fracture of left femur  S/p repair 5/23  -Fall precautions  -PRN analgesics  -PT/OT  -Eliquis  -pending SNF    Acute blood loss anemia  Declined postop. Stable for now  S/p 1u pRBC 5/28  -Trend Hgb with CBC and transfuse more if necessary    Paroxysmal atrial fibrillation  Controlled   -Coreg  -Telemetry  -Eliquis    Acute on chronic kidney failure  Stable, near baseline  S/p IVF  -Renally dose medications  -Avoid nephrotoxic agents  -Monitor UOP and  electrolytes  -Trend creatinine    Type 2 diabetes mellitus with diabetic polyneuropathy, with long-term current use of insulin  Patient's FSGs are uncontrolled due to hyperglycemia on current medication regimen.  Last A1c reviewed-   Lab Results   Component Value Date    HGBA1C 6.7 (H) 01/26/2024     Most recent fingerstick glucose reviewed-   Recent Labs   Lab 05/29/24  1626 05/29/24  2013 05/30/24  0740 05/30/24  1143   POCTGLUCOSE 215* 227* 141* 320*       Current correctional scale  Medium  Increase anti-hyperglycemic dose as follows-   Antihyperglycemics (From admission, onward)      Start     Stop Route Frequency Ordered    05/31/24 0900  insulin glargine U-100 (Lantus) pen 26 Units         -- SubQ Daily 05/30/24 1424    05/22/24 1643  insulin aspart U-100 pen 1-10 Units         -- SubQ Before meals & nightly PRN 05/22/24 1545          Hold Oral hypoglycemics while patient is in the hospital.     Hypertension associated with diabetes  Stable   -Continue Coreg  -Continue to monitor      VTE Risk Mitigation (From admission, onward)           Ordered     apixaban tablet 5 mg  2 times daily         05/23/24 1517     IP VTE HIGH RISK PATIENT  Once         05/22/24 1619     Place sequential compression device  Until discontinued         05/22/24 1545     Place FRANKO hose  Until discontinued         05/22/24 1545                    Discharge Planning   MORA: 5/31/2024     Code Status: Full Code   Is the patient medically ready for discharge?:     Reason for patient still in hospital (select all that apply): Pending disposition  Discharge Plan A: Rehab                  Adiel Merritt MD  Department of Hospital Medicine   [facility]/Surg

## 2024-05-30 NOTE — PLAN OF CARE
RUCHI called in, completed PASRR faxed to [facility], awaiting 142.     Per Leland, they are submitting for auth today.      05/30/24 1528   Post-Acute Status   Post-Acute Authorization Placement   Post-Acute Placement Status Pending state direction/certification

## 2024-05-30 NOTE — ASSESSMENT & PLAN NOTE
Declined postop. Stable for now  S/p 1u pRBC 5/28  -Trend Hgb with CBC and transfuse more if necessary

## 2024-05-30 NOTE — NURSING
Follow up skin check. Right buttock skin tear is noted with skin pealing to healthy tissue. Skin tear is very superficial with total skin loss at this assessment. Cleaned area and applied Triad. Patient has a blister to the left thigh area from surgical dressing to left hip. There is also an abrasion noted parallel to the left hip dressing site. Applied Triad to both these areas. Patient is moving better in the bed and she is not scooting herself in bed as she was with assessment 2 days prior where patient was educated not to scoot herself on the bed. Plan of care reviewed with this patient. Wound care will continue to follow up.

## 2024-05-30 NOTE — SUBJECTIVE & OBJECTIVE
Interval History: Patient seen and examined. CHRISTINEON. No changes per her. Denied IPR via peer to peer. Planning for SNF.      Objective:     Vital Signs (Most Recent):  Temp: 98.2 °F (36.8 °C) (05/30/24 1134)  Pulse: 83 (05/30/24 1134)  Resp: 17 (05/30/24 1353)  BP: (!) 95/51 (05/30/24 1134)  SpO2: 98 % (05/30/24 1134) Vital Signs (24h Range):  Temp:  [98 °F (36.7 °C)-98.6 °F (37 °C)] 98.2 °F (36.8 °C)  Pulse:  [74-97] 83  Resp:  [17-19] 17  SpO2:  [94 %-99 %] 98 %  BP: ()/(50-62) 95/51     Weight: 92.4 kg (203 lb 11.3 oz)  Body mass index is 31.9 kg/m².    Intake/Output Summary (Last 24 hours) at 5/30/2024 1420  Last data filed at 5/30/2024 1200  Gross per 24 hour   Intake 600 ml   Output 2300 ml   Net -1700 ml         Physical Exam  Vitals reviewed.   Constitutional:       General: She is not in acute distress.  HENT:      Head: Normocephalic and atraumatic.   Cardiovascular:      Rate and Rhythm: Normal rate and regular rhythm.   Pulmonary:      Effort: Pulmonary effort is normal. No respiratory distress.   Musculoskeletal:      Comments: LLE NVI  Left hip area dressed  Left thigh mild to moderately more swollen than right   Neurological:      General: No focal deficit present.      Mental Status: She is alert and oriented to person, place, and time. Mental status is at baseline.   Psychiatric:         Mood and Affect: Affect normal.         Behavior: Behavior normal.         Thought Content: Thought content normal.             Significant Labs: All pertinent labs within the past 24 hours have been reviewed.    Significant Imaging: I have reviewed all pertinent imaging results/findings within the past 24 hours.

## 2024-05-30 NOTE — ASSESSMENT & PLAN NOTE
Patient's FSGs are uncontrolled due to hyperglycemia on current medication regimen.  Last A1c reviewed-   Lab Results   Component Value Date    HGBA1C 6.7 (H) 01/26/2024     Most recent fingerstick glucose reviewed-   Recent Labs   Lab 05/29/24  1626 05/29/24  2013 05/30/24  0740 05/30/24  1143   POCTGLUCOSE 215* 227* 141* 320*       Current correctional scale  Medium  Increase anti-hyperglycemic dose as follows-   Antihyperglycemics (From admission, onward)      Start     Stop Route Frequency Ordered    05/31/24 0900  insulin glargine U-100 (Lantus) pen 26 Units         -- SubQ Daily 05/30/24 1424    05/22/24 1643  insulin aspart U-100 pen 1-10 Units         -- SubQ Before meals & nightly PRN 05/22/24 1545          Hold Oral hypoglycemics while patient is in the hospital.

## 2024-05-30 NOTE — PLAN OF CARE
[facility]uz[facility] denied rehab, pt already has been accepted by [facility], sent them updates and requested for them to submit for auth today.        05/30/24 1202   Post-Acute Status   Post-Acute Authorization Placement   Post-Acute Placement Status Discharge Plan Changed

## 2024-05-30 NOTE — CARE UPDATE
05/30/24 0720   Patient Assessment/Suction   Level of Consciousness (AVPU) alert   Respiratory Effort Normal;Unlabored   Expansion/Accessory Muscles/Retractions no use of accessory muscles;no retractions;expansion symmetric   All Lung Fields Breath Sounds Anterior:;Lateral:;clear   Rhythm/Pattern, Respiratory unlabored;pattern regular;depth regular;no shortness of breath reported   PRE-TX-O2   Device (Oxygen Therapy) room air   SpO2 95 %   Pulse Oximetry Type Intermittent   $ Pulse Oximetry - Multiple Charge Pulse Oximetry - Multiple   Pulse 74   Resp 18   Positioning HOB elevated 30 degrees   Aerosol Therapy   $ Aerosol Therapy Charges PRN treatment not required   Incentive Spirometer   $ Incentive Spirometer Charges done with encouragement   Incentive Spirometer Predicted Level (mL) 1680   Administration (IS) mouthpiece utilized   Number of Repetitions (IS) 10   Level Incentive Spirometer (mL) 1000   Patient Tolerance (IS) good;no adverse signs/symptoms present   Education   $ Education Bronchodilator;15 min

## 2024-05-30 NOTE — PT/OT/SLP PROGRESS
Physical Therapy Treatment    Patient Name:  Leslie Tolliver   MRN:  [id number]    Recommendations:     Discharge Recommendations: High Intensity Therapy  Discharge Equipment Recommendations: to be determined by next level of care  Barriers to discharge: None    Assessment:     Leslie Tolliver is a 89 y.o. female admitted with a medical diagnosis of Closed displaced intertrochanteric fracture of left femur.  She presents with the following impairments/functional limitations: weakness, impaired endurance, impaired functional mobility, gait instability, impaired balance, decreased lower extremity function, pain, decreased ROM, orthopedic precautions . Supine in bed.  Agreed to mobilize. Eager to progress mobility.  Sup > sit with Mod A and verbal cues for technique.  Sit > stand with rw and Mod A , bed height elevated, TTWB LLE.  SPT bed > chair with rw and Mod A.  Mobility improved, less impulsive. Sat ~ 2.5 hours .  Sit > stand from chair with Max A ( 2 trials with / without rw).  SPT chair > bed with Mod A.  Sit > supine with Mod A , verbal cues for technique, using bed rail to assist.     Rehab Prognosis: Good; patient would benefit from acute skilled PT services to address these deficits and reach maximum level of function.    Recent Surgery: Procedure(s) (LRB):  INSERTION, INTRAMEDULLARY RACQUEL, FEMUR (Left) 7 Days Post-Op    Plan:     During this hospitalization, patient to be seen daily to address the identified rehab impairments via gait training, therapeutic activities, therapeutic exercises and progress toward the following goals:    Plan of Care Expires:  06/15/24    Subjective     Chief Complaint: pain L hip   Patient/Family Comments/goals: to go to facility   Pain/Comfort:  Pain Rating 1: other (see comments) (did not rate)  Location - Side 1: Left  Location - Orientation 1: generalized  Location 1: hip  Pain Addressed 1: Reposition, Nurse notified      Objective:     Communicated with nurse prior to  session.  Patient found supine with bed alarm, Kylah, FAISAL, telemetry upon PT entry to room.     General Precautions: Standard, fall  Orthopedic Precautions: LLE toe touch weight bearing  Braces: N/A  Respiratory Status: Room air     Functional Mobility:  Bed Mobility:     Supine to Sit: moderate assistance  Sit to Supine: moderate assistance  Transfers:     Sit to Stand:  moderate assistance with rolling walker  Bed to Chair: moderate assistance with  rolling walker  using  Stand Pivot      AM-PAC 6 CLICK MOBILITY          Treatment & Education:  Sup > sit with Mod A.   Sit > stand from bed with rw and Mod A ( bed height elevated), TTWB LLE.  SPT bed > chair with rw and Mod A , verbal cues for technique.    Sit > stand from chair with Max A.  SPT chair > bed with Mod A ( 2 attempts , with / without rw).   Sit > supine with Mod A.      Patient left supine with all lines intact, call button in reach, bed alarm on, and nurse Ying notified..    GOALS:   Multidisciplinary Problems       Physical Therapy Goals          Problem: Physical Therapy    Goal Priority Disciplines Outcome Goal Variances Interventions   Physical Therapy Goal     PT, PT/OT Progressing     Description: Goals to be met by: 06-     Patient will increase functional independence with mobility by performin. Supine to sit with MInimal Assistance  2. Sit to stand transfer with Minimal Assistance  3. Bed to chair transfer with Minimal Assistance using Rolling Walker  4. Gait  x 20 feet with Minimal Assistance using Rolling Walker.   5. Lower extremity exercise program x20 reps    L IM nailing  and 25% WB only                         Time Tracking:     PT Received On: 24  PT Start Time: 1035     PT Stop Time: 1050 (1325- 1340 transferred BTB)  PT Total Time (min): 15 min     Billable Minutes: Therapeutic Activity 30min    Treatment Type: Treatment  PT/PTA: PTA     Number of PTA visits since last PT visit: 2     2024

## 2024-05-30 NOTE — PROGRESS NOTES
"Recommendations  Advance diet as tolerated post-op to 2000 calorie daibetic  Add Boost Glucose control with all meals to supplement intake  Monitor intake during meal rounds  Collaborate with health care providers     Goal: intake > 50% upon RD F/U  Comments: pt was eating well PTA; she is in some pain and decreased appetite at present d/t trauma.  She denies any N/V or difficulty chewing or swallowing. Last BM on 5/21  Nutrition Goal Status: new  Communication of RD Recs: reviewed with physician   Interval note: 5/29 diet was advanced to Consistent carbohydrate ; she is  tolerating well with 75% intake of meals. Add Boost glucose control BID.  Nutrition Related Social Determinants of Health: SDOH: Adequate food in home environment     Assessment and Plan  Nutrition Problem  Increased nutrient needs     Related to (etiology):   Pending surgical repair of left femur/hip Fx     Signs and Symptoms (as evidenced by):   Increased nutrient demands for healing      Interventions/Recommendations (treatment strategy):  Will provide Boost glucose control all meals once diet advanced to diabetic diet post-op     Nutrition Diagnosis Status:   New     Malnutrition Assessment    Pt does not meet ASPEN criteria for Malnutrition at this time but is at risk d/t decreased appetite 2/2 mechanica fall at home resulting in fractured left femur. Surgery scheduled today.She denies any recent wt loss or change in intake prior to fall. She does take " fluid Pill" at home.      Wt Readings from Last 7 Encounters:   05/23/24 79.4 kg (175 lb 0.7 oz)   04/22/24 75.8 kg (167 lb)   04/03/24 77.5 kg (170 lb 13.7 oz)   03/28/24 78 kg (172 lb)   03/26/24 84.8 kg (187 lb)   03/25/24 85 kg (187 lb 6.3 oz)   01/22/24 80.7 kg (177 lb 14.6 oz)           Reason for Assessment  Dx:  Fracture of left femur; HTN; DM2; CKD3; HLD; Anemia;  PMH:  AVR; spinal stenosis; ASCVD  Reason For Assessment: consult  Diagnosis: other (see comments)  Interdisciplinary " "Rounds: did not attend  Nutrition Discharge Planning: pending     Nutrition Risk Screen     Nutrition Risk Screen: no indicators present     Nutrition/Diet History     Patient Reported Diet/Restrictions/Preferences: diabetic diet  Spiritual, Cultural Beliefs, Sabianism Practices, Values that Affect Care: no  Food Allergies: NKFA  Factors Affecting Nutritional Intake: decreased appetite     Anthropometrics     Temp: 97.3 °F (36.3 °C)  Height Method: Measured  Height: 5' 7" (170.2 cm)  Height (inches): 67 in  Weight Method: Bed Scale  Weight: 79.4 kg (175 lb 0.7 oz)  Weight (lb): 175.05 lb  Ideal Body Weight (IBW), Female: 135 lb  % Ideal Body Weight, Female (lb): 129.67 %  BMI (Calculated): 27.4  BMI Grade: 25 - 29.9 - overweight  Lab/Procedures/Meds    Latest Reference Range & Units Most Recent   Hemoglobin 12.0 - 16.0 g/dL 11.3 (L)  5/23/24 04:03   Hematocrit 37.0 - 48.5 % 36.0 (L)  5/23/24 04:03   (L): Data is abnormally low    Latest Reference Range & Units Most Recent   Hemoglobin A1C External 4.5 - 6.2 % 6.7 (H)  1/26/24 14:49   (H): Data is abnormally high    Latest Reference Range & Units Most Recent   Albumin 3.5 - 5.2 g/dL 3.2 (L)  5/23/24 04:03   (L): Data is abnormally low  Pertinent Labs Reviewed: reviewed  Pertinent Medications Reviewed: reviewed; Isolyte infusing @ 10 ml/hr; aldactone; losartan; carvedilol; ss insulin aspart     Physical Findings/Assessment  No skin integrity issues   Ruiz score = 17  Estimated/Assessed Needs     Weight Used For Calorie Calculations: 79.4 kg (175 lb 0.7 oz)  Energy Calorie Requirements (kcal): 2000 calories daily;  25/kg  Energy Need Method: Kcal/kg  Protein Requirements: 95 gm protein daily;  1.2/kg  Weight Used For Protein Calculations: 79.4 kg (175 lb 0.7 oz)  Estimated Fluid Requirement Method: RDA Method  RDA Method (mL): 2000  Nutrition Prescription Ordered     Current Diet Order: NPO for surgery  Oral Nutrition Supplement: none     Evaluation of Received " Nutrient/Fluid Intake     Energy Calories Required: not meeting needs  Protein Required: not meeting needs  Fluid Required: not meeting needs  Tolerance: tolerating  % Intake of Estimated Energy Needs: Other: NPO  % Meal Intake: NPO     Nutrition Risk     Level of Risk/Frequency of Follow-up: high 2 x week     Monitor and Evaluation     Food and Nutrient Intake: food and beverage intake  Food and Nutrient Adminstration: diet order  Knowledge/Beliefs/Attitudes: beliefs and attitudes  Physical Activity and Function: nutrition-related ADLs and IADLs  Anthropometric Measurements: weight change  Biochemical Data, Medical Tests and Procedures: electrolyte and renal panel, gastrointestinal profile, glucose/endocrine profile, inflammatory profile, other (specify), lipid profile  Nutrition-Focused Physical Findings: overall appearance, skin      Nutrition Follow-Up      yes

## 2024-05-30 NOTE — ASSESSMENT & PLAN NOTE
Noted.  Thank you. S/p repair 5/23  -Fall precautions  -PRN analgesics  -PT/OT  -Eliquis  -pending SNF

## 2024-05-31 VITALS
TEMPERATURE: 98 F | BODY MASS INDEX: 31.97 KG/M2 | WEIGHT: 203.69 LBS | OXYGEN SATURATION: 96 % | DIASTOLIC BLOOD PRESSURE: 54 MMHG | HEART RATE: 82 BPM | SYSTOLIC BLOOD PRESSURE: 91 MMHG | HEIGHT: 67 IN | RESPIRATION RATE: 19 BRPM

## 2024-05-31 PROBLEM — N18.9 ACUTE ON CHRONIC KIDNEY FAILURE: Status: RESOLVED | Noted: 2019-07-24 | Resolved: 2024-05-31

## 2024-05-31 PROBLEM — N17.9 ACUTE ON CHRONIC KIDNEY FAILURE: Status: RESOLVED | Noted: 2019-07-24 | Resolved: 2024-05-31

## 2024-05-31 LAB
ALBUMIN SERPL BCP-MCNC: 2.3 G/DL (ref 3.5–5.2)
ALP SERPL-CCNC: 86 U/L (ref 55–135)
ALT SERPL W/O P-5'-P-CCNC: 19 U/L (ref 10–44)
ANION GAP SERPL CALC-SCNC: 8 MMOL/L (ref 8–16)
AST SERPL-CCNC: 22 U/L (ref 10–40)
BASOPHILS # BLD AUTO: 0.03 K/UL (ref 0–0.2)
BASOPHILS NFR BLD: 0.5 % (ref 0–1.9)
BILIRUB SERPL-MCNC: 1.6 MG/DL (ref 0.1–1)
BUN SERPL-MCNC: 36 MG/DL (ref 8–23)
CALCIUM SERPL-MCNC: 8.3 MG/DL (ref 8.7–10.5)
CHLORIDE SERPL-SCNC: 105 MMOL/L (ref 95–110)
CO2 SERPL-SCNC: 23 MMOL/L (ref 23–29)
CREAT SERPL-MCNC: 1.1 MG/DL (ref 0.5–1.4)
DIFFERENTIAL METHOD BLD: ABNORMAL
EOSINOPHIL # BLD AUTO: 0.2 K/UL (ref 0–0.5)
EOSINOPHIL NFR BLD: 4.3 % (ref 0–8)
ERYTHROCYTE [DISTWIDTH] IN BLOOD BY AUTOMATED COUNT: 17.9 % (ref 11.5–14.5)
EST. GFR  (NO RACE VARIABLE): 48 ML/MIN/1.73 M^2
GLUCOSE SERPL-MCNC: 143 MG/DL (ref 70–110)
HCT VFR BLD AUTO: 27.7 % (ref 37–48.5)
HGB BLD-MCNC: 8.5 G/DL (ref 12–16)
IMM GRANULOCYTES # BLD AUTO: 0.06 K/UL (ref 0–0.04)
IMM GRANULOCYTES NFR BLD AUTO: 1.1 % (ref 0–0.5)
LYMPHOCYTES # BLD AUTO: 0.7 K/UL (ref 1–4.8)
LYMPHOCYTES NFR BLD: 12.4 % (ref 18–48)
MCH RBC QN AUTO: 28.9 PG (ref 27–31)
MCHC RBC AUTO-ENTMCNC: 30.7 G/DL (ref 32–36)
MCV RBC AUTO: 94 FL (ref 82–98)
MONOCYTES # BLD AUTO: 0.7 K/UL (ref 0.3–1)
MONOCYTES NFR BLD: 12.8 % (ref 4–15)
NEUTROPHILS # BLD AUTO: 3.9 K/UL (ref 1.8–7.7)
NEUTROPHILS NFR BLD: 68.9 % (ref 38–73)
NRBC BLD-RTO: 0 /100 WBC
PLATELET # BLD AUTO: 258 K/UL (ref 150–450)
PMV BLD AUTO: 10.5 FL (ref 9.2–12.9)
POCT GLUCOSE: 221 MG/DL (ref 70–110)
POTASSIUM SERPL-SCNC: 4.7 MMOL/L (ref 3.5–5.1)
PROT SERPL-MCNC: 5.4 G/DL (ref 6–8.4)
RBC # BLD AUTO: 2.94 M/UL (ref 4–5.4)
SODIUM SERPL-SCNC: 136 MMOL/L (ref 136–145)
WBC # BLD AUTO: 5.64 K/UL (ref 3.9–12.7)

## 2024-05-31 PROCEDURE — 25000003 PHARM REV CODE 250: Performed by: ORTHOPAEDIC SURGERY

## 2024-05-31 PROCEDURE — 36415 COLL VENOUS BLD VENIPUNCTURE: CPT | Performed by: ORTHOPAEDIC SURGERY

## 2024-05-31 PROCEDURE — 94761 N-INVAS EAR/PLS OXIMETRY MLT: CPT

## 2024-05-31 PROCEDURE — 97110 THERAPEUTIC EXERCISES: CPT | Mod: CQ

## 2024-05-31 PROCEDURE — 80053 COMPREHEN METABOLIC PANEL: CPT | Performed by: ORTHOPAEDIC SURGERY

## 2024-05-31 PROCEDURE — 97530 THERAPEUTIC ACTIVITIES: CPT | Mod: CQ

## 2024-05-31 PROCEDURE — 85025 COMPLETE CBC W/AUTO DIFF WBC: CPT | Performed by: ORTHOPAEDIC SURGERY

## 2024-05-31 PROCEDURE — 94760 N-INVAS EAR/PLS OXIMETRY 1: CPT

## 2024-05-31 PROCEDURE — 94799 UNLISTED PULMONARY SVC/PX: CPT

## 2024-05-31 PROCEDURE — 97110 THERAPEUTIC EXERCISES: CPT

## 2024-05-31 PROCEDURE — 99900035 HC TECH TIME PER 15 MIN (STAT)

## 2024-05-31 RX ORDER — HYDROCODONE BITARTRATE AND ACETAMINOPHEN 5; 325 MG/1; MG/1
1 TABLET ORAL EVERY 6 HOURS PRN
Qty: 15 TABLET | Refills: 0 | Status: SHIPPED | OUTPATIENT
Start: 2024-05-31

## 2024-05-31 RX ORDER — INSULIN ASPART 100 [IU]/ML
1-10 INJECTION, SOLUTION INTRAVENOUS; SUBCUTANEOUS
Start: 2024-05-31 | End: 2025-05-31

## 2024-05-31 RX ADMIN — CARVEDILOL 12.5 MG: 6.25 TABLET, FILM COATED ORAL at 08:05

## 2024-05-31 RX ADMIN — INSULIN GLARGINE 26 UNITS: 100 INJECTION, SOLUTION SUBCUTANEOUS at 08:05

## 2024-05-31 RX ADMIN — SENNOSIDES AND DOCUSATE SODIUM 1 TABLET: 50; 8.6 TABLET ORAL at 08:05

## 2024-05-31 RX ADMIN — APIXABAN 5 MG: 2.5 TABLET, FILM COATED ORAL at 08:05

## 2024-05-31 NOTE — PLAN OF CARE
DC orders sent to [facility] for review.      05/31/24 2102   Post-Acute Status   Post-Acute Authorization Placement   Post-Acute Placement Status Pending post-acute provider review/more information requested

## 2024-05-31 NOTE — PT/OT/SLP PROGRESS
Occupational Therapy   Treatment    Name: Leslie Tolliver  MRN: [id number]  Admitting Diagnosis:  Closed displaced intertrochanteric fracture of left femur  8 Days Post-Op    Recommendations:     Discharge Recommendations: High Intensity Therapy  Discharge Equipment Recommendations:  none, to be determined by next level of care  Barriers to discharge:       Assessment:     Leslie Tolliver is a 89 y.o. female with a medical diagnosis of Closed displaced intertrochanteric fracture of left femur.  She presents with the following performance deficits affecting function are weakness, impaired endurance, impaired self care skills, impaired functional mobility, gait instability, impaired balance, decreased lower extremity function, pain, orthopedic precautions.     Rehab Prognosis:  Good; patient would benefit from acute skilled OT services to address these deficits and reach maximum level of function.       Plan:     Patient to be seen 5 x/week to address the above listed problems via self-care/home management, therapeutic activities, therapeutic exercises  Plan of Care Expires: 06/21/24  Plan of Care Reviewed with: patient    Subjective     Chief Complaint: pain  Patient/Family Comments/goals:  To get better  Pain/Comfort:  Pain Rating 1: 4/10  Location - Side 1: Left  Location 1: hip  Pain Rating Post-Intervention 1: 4/10    Objective:     Communicated with: Nurse Santana prior to session.  Patient found up in chair with chair check, peripheral IV, telemetry, PureWick upon OT entry to room.    General Precautions: Standard, fall    Orthopedic Precautions:LLE toe touch weight bearing  Braces: N/A  Respiratory Status: Room air     Occupational Performance:     OT provided instruction in graded BUE therapeutic exercises to increase strength and functional activity tolerance for ADL's/IADL's. Pt required cues and demonstration to complete 3 x 10 reps with 2# dowel shoulder flex/ext, presses, horizontal ab/adduction, and  elbow flex/ext.       [facility] 6 Click ADL:      Treatment & Education:  OT provided education in role of OT. Patient verbalized understanding and participated in OT.  OT provided education in calling for assist. Patient verbalized understanding.      Patient left up in chair with all lines intact, call button in reach, and chair alarm on    GOALS:   Multidisciplinary Problems       Occupational Therapy Goals          Problem: Occupational Therapy    Goal Priority Disciplines Outcome Interventions   Occupational Therapy Goal     OT, PT/OT Progressing    Description: Goals to be met by: 6/21/2024     Patient will increase functional independence with ADLs by performing:    LE Dressing with Stand-by Assistance and Assistive Devices as needed.  Grooming while seated at sink with Supervision.  Toileting from bedside commode with Minimal Assistance for hygiene and clothing management.   Supine to sit with Stand-by Assistance.  Toilet transfer to bedside commode with Minimal Assistance.                         Time Tracking:     OT Date of Treatment: 05/31/24  OT Start Time: 1049  OT Stop Time: 1123  OT Total Time (min): 34 min    Billable Minutes:Therapeutic Exercise 34    OT/VILMA: OT     Number of VILMA visits since last OT visit: 0    5/31/2024

## 2024-05-31 NOTE — PLAN OF CARE
Problem: Adult Inpatient Plan of Care  Goal: Plan of Care Review  Outcome: Progressing     Problem: Adult Inpatient Plan of Care  Goal: Patient-Specific Goal (Individualized)  Outcome: Progressing     Problem: Skin Injury Risk Increased  Goal: Skin Health and Integrity  Outcome: Progressing     Problem: Wound  Goal: Optimal Coping  Outcome: Progressing

## 2024-05-31 NOTE — PLAN OF CARE
Problem: Occupational Therapy  Goal: Occupational Therapy Goal  Description: Goals to be met by: 6/21/2024     Patient will increase functional independence with ADLs by performing:    LE Dressing with Stand-by Assistance and Assistive Devices as needed.  Grooming while seated at sink with Supervision.  Toileting from bedside commode with Minimal Assistance for hygiene and clothing management.   Supine to sit with Stand-by Assistance.  Toilet transfer to bedside commode with Minimal Assistance.    Outcome: Progressing  Continue with POC

## 2024-05-31 NOTE — PLAN OF CARE
Problem: Physical Therapy  Goal: Physical Therapy Goal  Description: Goals to be met by: 06-     Patient will increase functional independence with mobility by performin. Supine to sit with MInimal Assistance  2. Sit to stand transfer with Minimal Assistance  3. Bed to chair transfer with Minimal Assistance using Rolling Walker  4. Gait  x 20 feet with Minimal Assistance using Rolling Walker.   5. Lower extremity exercise program x20 reps    L IM nailing  and 25% WB only    Outcome: Progressing   Therapeutic activity : bed mobility , transfers, gait with rw and assistance for safety, TTWB LLE, LE exercises.

## 2024-05-31 NOTE — PROGRESS NOTES
Discharge instructions given to [facility].  IV discontinued with catheter intact, pressure applied to site and secured with tape. Patient tolerated well.

## 2024-05-31 NOTE — PLAN OF CARE
Per Kellee Ha, auth is still pending.      05/31/24 0957   Post-Acute Status   Post-Acute Authorization Placement   Post-Acute Placement Status Pending payor review/awaiting authorization (if required)

## 2024-05-31 NOTE — PT/OT/SLP PROGRESS
Physical Therapy Treatment    Patient Name:  Leslie Tolliver   MRN:  [id number]    Recommendations:     Discharge Recommendations: High Intensity Therapy  Discharge Equipment Recommendations: none  Barriers to discharge: None    Assessment:     Leslie Tolliver is a 89 y.o. female admitted with a medical diagnosis of Closed displaced intertrochanteric fracture of left femur.  She presents with the following impairments/functional limitations: weakness, impaired endurance, impaired self care skills, impaired functional mobility, gait instability, impaired balance, decreased lower extremity function, pain, decreased ROM, orthopedic precautions . Awake, alert, supine in bed.  Agreed to mobilize. Reports feeling tired.  Sup > sit EOB with Mod A.  Sat extended period , expressing gratitude as well as emotional about possible discharge today.  Mobility improved and patient encouraged to work hard at facility in order to get home eventually.  Sit > stand with rw and Mod A ( bed height elevated), TTWB LLE.  SPT bed > chair with rw and Mod A.  Performed LE exercises seated in chair at bedside.     Rehab Prognosis: Good; patient would benefit from acute skilled PT services to address these deficits and reach maximum level of function.    Recent Surgery: Procedure(s) (LRB):  INSERTION, INTRAMEDULLARY RACQUEL, FEMUR (Left) 8 Days Post-Op    Plan:     During this hospitalization, patient to be seen daily to address the identified rehab impairments via gait training, therapeutic activities, therapeutic exercises and progress toward the following goals:    Plan of Care Expires:  06/15/24    Subjective     Chief Complaint: tired , occasional L hip discomfort with movement.  Patient/Family Comments/goals: to go to facility for therapy.  Pain/Comfort:  Pain Rating 1: other (see comments) (did not rate, does report it is improving daily.)  Location - Side 1: Left  Location - Orientation 1: generalized  Location 1: hip  Pain Addressed 1:  Reposition, Nurse notified      Objective:     Communicated with nurse Santana prior to session.  Patient found supine with bed alarm, PureWick, telemetry upon PT entry to room.     General Precautions: Standard, fall  Orthopedic Precautions: LLE toe touch weight bearing  Braces: N/A  Respiratory Status: Room air     Functional Mobility:  Bed Mobility:     Supine to Sit: moderate assistance  Transfers:     Sit to Stand:  moderate assistance with rolling walker  Bed to Chair: moderate assistance with  rolling walker  using  Stand Pivot      AM-PAC 6 CLICK MOBILITY          Treatment & Education:  Transferred EOB with Mod A.  SPT bed > chair with rw and Mod A, TTWB LLE.  BLE exercises : TKE's, AP's, SLR's, QS.  RLE only ( hip flexion ).     Patient left up in chair with all lines intact, call button in reach, chair alarm on, and nurse Max notified..    GOALS:   Multidisciplinary Problems       Physical Therapy Goals          Problem: Physical Therapy    Goal Priority Disciplines Outcome Goal Variances Interventions   Physical Therapy Goal     PT, PT/OT Progressing     Description: Goals to be met by: 06-     Patient will increase functional independence with mobility by performin. Supine to sit with MInimal Assistance  2. Sit to stand transfer with Minimal Assistance  3. Bed to chair transfer with Minimal Assistance using Rolling Walker  4. Gait  x 20 feet with Minimal Assistance using Rolling Walker.   5. Lower extremity exercise program x20 reps    L IM nailing  and 25% WB only                         Time Tracking:     PT Received On: 24  PT Start Time: 0845     PT Stop Time: 0910  PT Total Time (min): 25 min     Billable Minutes: Therapeutic Activity 15min and Therapeutic Exercise 10min    Treatment Type: Treatment  PT/PTA: PTA     Number of PTA visits since last PT visit: 3     2024

## 2024-05-31 NOTE — DISCHARGE SUMMARY
[facility] Medicine  Discharge Summary      Patient Name: Leslie Tolliver  MRN: [id number]  BIENVENIDO: [id number]  Patient Class: IP- Inpatient  Admission Date: 5/22/2024  Hospital Length of Stay: 9 days  Discharge Date and Time:  05/31/2024 12:39 PM  Attending Physician: Adiel Merritt MD   Discharging Provider: Adiel Merritt MD  Primary Care Provider: Chang Christianson MD    Primary Care Team: Networked reference to record PCT     HPI:   Leslie Tolliver an 89 year female who presents emergency room complaining of left hip pain.  She endorses mechanical fall at her residence today.  She fell striking her left hip on the floor.  She denies striking her head or loss of consciousness.  Denies any cervical pain.  Pain is worse with any movement of the left leg.  No alleviating factors.  No known sick contacts or travel.  She denies fever or chills.  Previous medical history includes hypertension, diabetes, hypothyroidism, hyperlipidemia, CKD, anemia, aortic valve replacement, paroxysmal AFib.  ER workup: CBC unremarkable.  CMP with glucose of 156 and bicarb 19 otherwise unremarkable.  X-ray demonstrates a left intertrochanteric fracture.  Orthopedics was consulted.  Plans to take patient to OR tomorrow.  Patient admitted to Hospital Medicine for treatment management.  Will maintain patient NPO after midnight.    Procedure(s) (LRB):  INSERTION, INTRAMEDULLARY RACQUEL, FEMUR (Left)      Hospital Course:   Leslie Tolliver is an 89 year old female with a past medical history of HTN, CAD, DM, anemia, and Afib who presented with a fall suffering a comminuted left hip fracture. Orthopedic Surgery performed repair 5/23. PT/OT followed. Her course has been complicated by a mild KANNAN on CKD for which IV fluids were given and stabilized. One unit packed red cells ordered 5/28. Denied IPR via peer to peer. Accepted and transferred to SNF. By time of discharge the patient was tolerating a regular diet  with improving admission symptoms. Patient seen and examined on day of discharge.    Physical exam on day of discharge:  Constitutional:       General: She is not in acute distress.  HENT:      Head: Normocephalic and atraumatic.   Cardiovascular:      Rate and Rhythm: Normal rate and regular rhythm.   Pulmonary:      Effort: Pulmonary effort is normal. No respiratory distress.   Musculoskeletal:      Comments: LLE NVI  Left hip area dressed  Left thigh mild to moderately more swollen than right   Neurological:      General: No focal deficit present.      Mental Status: She is alert and oriented to person, place, and time. Mental status is at baseline.   Psychiatric:         Mood and Affect: Affect normal.         Behavior: Behavior normal.         Thought Content: Thought content normal.        Goals of Care Treatment Preferences:  Code Status: Full Code      Consults:   Consults (From admission, onward)          Status Ordering Provider     Inpatient consult to Orthopedics  Once        Provider:  Bravo Guillermo MD    Completed JOBY THAYER     IP consult to dietary  Once        Provider:  (Not yet assigned)    JOBY Griggs            No new Assessment & Plan notes have been filed under this hospital service since the last note was generated.  Service: Hospital Medicine    Final Active Diagnoses:    Diagnosis Date Noted POA    PRINCIPAL PROBLEM:  Closed displaced intertrochanteric fracture of left femur [S72.142A] 05/22/2024 Yes    Acute blood loss anemia [D62] 05/23/2024 No    Paroxysmal atrial fibrillation [I48.0] 03/16/2023 Yes    Type 2 diabetes mellitus with diabetic polyneuropathy, with long-term current use of insulin [E11.42, Z79.4]  Not Applicable    Hypertension associated with diabetes [E11.59, I15.2]  Yes      Problems Resolved During this Admission:    Diagnosis Date Noted Date Resolved POA    Acute on chronic kidney failure [N17.9, N18.9] 07/24/2019 05/31/2024 Yes        Discharged Condition: good    Disposition: Skilled Nursing Facility    Follow Up:   Follow-up Information       SNF at provider. Go today.                           Patient Instructions:      Diet diabetic     Notify your health care provider if you experience any of the following:  temperature >100.4     Notify your health care provider if you experience any of the following:  persistent nausea and vomiting or diarrhea     Notify your health care provider if you experience any of the following:  severe uncontrolled pain     Notify your health care provider if you experience any of the following:  redness, tenderness, or signs of infection (pain, swelling, redness, odor or green/yellow discharge around incision site)     Notify your health care provider if you experience any of the following:  difficulty breathing or increased cough     Notify your health care provider if you experience any of the following:  severe persistent headache     Notify your health care provider if you experience any of the following:  worsening rash     Notify your health care provider if you experience any of the following:  persistent dizziness, light-headedness, or visual disturbances     Notify your health care provider if you experience any of the following:  increased confusion or weakness     Activity as tolerated       Significant Diagnostic Studies:     Lab Results   Component Value Date    WBC 5.64 05/31/2024    HGB 8.5 (L) 05/31/2024    HCT 27.7 (L) 05/31/2024    MCV 94 05/31/2024     05/31/2024       BMP  Lab Results   Component Value Date     05/31/2024    K 4.7 05/31/2024     05/31/2024    CO2 23 05/31/2024    BUN 36 (H) 05/31/2024    CREATININE 1.1 05/31/2024    CALCIUM 8.3 (L) 05/31/2024    ANIONGAP 8 05/31/2024    EGFRNORACEVR 48 (A) 05/31/2024       X-Ray Chest AP Portable    Result Date: 5/24/2024  EXAMINATION: XR CHEST AP PORTABLE CLINICAL HISTORY: snf; TECHNIQUE: Single frontal view of the  chest was performed. COMPARISON: 03/26/2024 FINDINGS: There has been a sternotomy.  The heart size is normal.  No consolidation, edema, or pleural effusion.     No acute process.  No significant change. Electronically signed by: Obed Lee MD Date:    05/24/2024 Time:    16:57    SURG FL Surgery Fluoro Usage    Result Date: 5/23/2024  See OP Notes for results. IMPRESSION: See OP Notes for results. This procedure was auto-finalized by: Virtual Radiologist    X-Ray Knee 2 View Left    Result Date: 5/22/2024  EXAMINATION: XR KNEE 1 OR 2 VIEW LEFT CLINICAL HISTORY: left hip injury; TECHNIQUE: One or two views of the left knee were performed. COMPARISON: None FINDINGS: There is narrowing of both the medial and lateral intercondylar joint space and pointing of the intercondylar tibial eminences.  Mild spur formation of the tibia and patella are noted.  A joint effusion or fracture at the knee is not seen.     moderate osteoarthritis left knee. Electronically signed by: Edgard Cazares MD Date:    05/22/2024 Time:    13:44    X-Ray Pelvis Routine AP    Result Date: 5/22/2024  EXAMINATION: XR PELVIS ROUTINE AP CLINICAL HISTORY: left hip injury; TECHNIQUE: AP view of the pelvis was performed. COMPARISON: None. FINDINGS: There is a comminuted fracture of the proximal left femur including a spiral component into the proximal shaft and an intertrochanteric component with angulation noted.  The femoral head remains in the acetabulum.  The bones of the pelvis and the right hip are intact.  The sacroiliac joints are sharp and symmetric.     Comminuted fracture with angulation of the left hip proximal shaft and intertrochanteric region. Electronically signed by: Edgard Cazares MD Date:    05/22/2024 Time:    13:43    X-Ray Femur 2 View Left    Result Date: 5/22/2024  EXAMINATION: XR FEMUR 2 VIEW LEFT CLINICAL HISTORY: left hip injury; TECHNIQUE: AP and lateral views of the left femur were performed. COMPARISON: None}  FINDINGS: There is a comminuted fracture of the proximal left femur including an intertrochanteric portion and a spiral portion extending into the proximal femur with angulation.  The femoral head remains in the acetabulum.  The distal shaft of the femur is the intact     Comminuted fracture of the left femur including intertrochanteric fracture and a proximal shaft spiral component.  Mild angulation noted. Electronically signed by: Edgard Cazares MD Date:    05/22/2024 Time:    13:39         Pending Diagnostic Studies:       None           Medications:  Reconciled Home Medications:      Medication List        START taking these medications      HYDROcodone-acetaminophen 5-325 mg per tablet  Commonly known as: NORCO  Take 1 tablet by mouth every 6 (six) hours as needed for Pain.     insulin aspart U-100 100 unit/mL (3 mL) Inpn pen  Commonly known as: NovoLOG  Inject 1-10 Units into the skin before meals and at bedtime as needed (Hyperglycemia).            CONTINUE taking these medications      carvediloL 12.5 MG tablet  Commonly known as: COREG  Take 1 tablet (12.5 mg total) by mouth 2 (two) times daily.     ELIQUIS 5 mg Tab  Generic drug: apixaban  TAKE 1 TABLET(5 MG) BY MOUTH TWICE DAILY     empagliflozin 10 mg tablet  Commonly known as: Jardiance  Take 1 tablet (10 mg total) by mouth once daily.     LANTUS SOLOSTAR U-100 INSULIN 100 unit/mL (3 mL) Inpn pen  Generic drug: insulin glargine U-100 (Lantus)  Inject 24 Units into the skin every evening.     metFORMIN 1000 MG tablet  Commonly known as: GLUCOPHAGE  Take 1 tablet (1,000 mg total) by mouth 2 (two) times daily with meals.     multivitamin per tablet  Commonly known as: THERAGRAN  Take 1 tablet by mouth once daily.     VITAMIN D3 125 mcg (5,000 unit) Tab  Generic drug: cholecalciferol (vitamin D3)  Take 5,000 Units by mouth once daily.            STOP taking these medications      blood sugar diagnostic Strp  Commonly known as: ACCU-CHEK GUIDE TEST  "STRIPS     blood-glucose meter kit     clopidogreL 75 mg tablet  Commonly known as: PLAVIX     DROPLET PEN NEEDLE 31 gauge x 5/16" Ndle  Generic drug: pen needle, diabetic     furosemide 20 MG tablet  Commonly known as: LASIX     lancets Misc  Commonly known as: ACCU-CHEK SOFTCLIX LANCETS     olmesartan 20 MG tablet  Commonly known as: BENICAR     spironolactone 25 MG tablet  Commonly known as: ALDACTONE              Indwelling Lines/Drains at time of discharge:   none           Time spent on the discharge of patient: 37 minutes         Adiel Merritt MD  Department of Hospital Medicine  [facility]/Surg  "

## 2024-05-31 NOTE — PLAN OF CARE
Per Caterina Ha, report can be called to nurse for A26 @ [phone]. They will  pt.     Pt clear for DC from CM standpoint. Discharging home.       05/31/24 1456   Final Note   Assessment Type Final Discharge Note   Anticipated Discharge Disposition SNF   Post-Acute Status   Post-Acute Authorization Placement   Post-Acute Placement Status Set-up Complete/Auth obtained

## 2024-05-31 NOTE — CARE UPDATE
05/31/24 0803   Patient Assessment/Suction   Level of Consciousness (AVPU) alert   Respiratory Effort Normal;Unlabored   Expansion/Accessory Muscles/Retractions no use of accessory muscles;no retractions;expansion symmetric   Rhythm/Pattern, Respiratory pattern regular;depth regular;no shortness of breath reported;unlabored   PRE-TX-O2   Device (Oxygen Therapy) room air   SpO2 97 %   Pulse Oximetry Type Intermittent   $ Pulse Oximetry - Multiple Charge Pulse Oximetry - Multiple   Pulse 97   Resp 18   Aerosol Therapy   $ Aerosol Therapy Charges PRN treatment not required   Respiratory Treatment Status (SVN) PRN treatment not required   Incentive Spirometer   $ Incentive Spirometer Charges done with encouragement   Incentive Spirometer Predicted Level (mL) 1680   Administration (IS) mouthpiece utilized   Number of Repetitions (IS) 10   Level Incentive Spirometer (mL) 1000   Patient Tolerance (IS) good;no adverse signs/symptoms present

## 2024-05-31 NOTE — CARE UPDATE
05/1935   Patient Assessment/Suction   Level of Consciousness (AVPU) alert   Respiratory Effort Unlabored;Normal   Expansion/Accessory Muscles/Retractions no use of accessory muscles   All Lung Fields Breath Sounds equal bilaterally   Rhythm/Pattern, Respiratory pattern regular;unlabored   Cough Frequency no cough   PRE-TX-O2   Device (Oxygen Therapy) room air   SpO2 95 %   Pulse Oximetry Type Intermittent   $ Pulse Oximetry - Multiple Charge Pulse Oximetry - Multiple   Pulse 85   Resp 18   Aerosol Therapy   $ Aerosol Therapy Charges PRN treatment not required   Respiratory Treatment Status (SVN) PRN treatment not required   Incentive Spirometer   $ Incentive Spirometer Charges done with encouragement

## 2024-06-04 ENCOUNTER — OFFICE VISIT (OUTPATIENT)
Dept: ORTHOPEDICS | Facility: CLINIC | Age: 89
End: 2024-06-04
Payer: MEDICARE

## 2024-06-04 ENCOUNTER — HOSPITAL ENCOUNTER (OUTPATIENT)
Dept: RADIOLOGY | Facility: HOSPITAL | Age: 89
Discharge: HOME | End: 2024-06-04
Attending: ORTHOPAEDIC SURGERY
Payer: MEDICARE

## 2024-06-04 VITALS — HEIGHT: 67 IN | BODY MASS INDEX: 31.97 KG/M2 | WEIGHT: 203.69 LBS

## 2024-06-04 DIAGNOSIS — S72.142D CLOSED DISPLACED INTERTROCHANTERIC FRACTURE OF LEFT FEMUR WITH ROUTINE HEALING, SUBSEQUENT ENCOUNTER: ICD-10-CM

## 2024-06-04 DIAGNOSIS — S72.142D CLOSED DISPLACED INTERTROCHANTERIC FRACTURE OF LEFT FEMUR WITH ROUTINE HEALING, SUBSEQUENT ENCOUNTER: Primary | ICD-10-CM

## 2024-06-04 PROCEDURE — 73552 X-RAY EXAM OF FEMUR 2/>: CPT | Mod: TC,PO,LT

## 2024-06-04 PROCEDURE — 73552 X-RAY EXAM OF FEMUR 2/>: CPT | Mod: 26,LT,, | Performed by: RADIOLOGY

## 2024-06-04 PROCEDURE — 1125F AMNT PAIN NOTED PAIN PRSNT: CPT | Mod: CPTII,S$GLB,, | Performed by: ORTHOPAEDIC SURGERY

## 2024-06-04 PROCEDURE — 1157F ADVNC CARE PLAN IN RCRD: CPT | Mod: CPTII,S$GLB,, | Performed by: ORTHOPAEDIC SURGERY

## 2024-06-04 PROCEDURE — 99024 POSTOP FOLLOW-UP VISIT: CPT | Mod: S$GLB,,, | Performed by: ORTHOPAEDIC SURGERY

## 2024-06-04 PROCEDURE — 99999 PR PBB SHADOW E&M-EST. PATIENT-LVL I: CPT | Mod: PBBFAC,,, | Performed by: ORTHOPAEDIC SURGERY

## 2024-06-04 NOTE — PROGRESS NOTES
Post-op Note    HPI    Leslie Tolliver is here 2 weeks s/p the following procedure:     Intramedullary nailing left femur    Overall doing well. Pain controlled on current regimen. She is currently enrolled in Physical Therapy, [facility]. Denies any chest pain or shortness of breathe. Denies any drainage from the incision. Denies any fevers, chills or paresthesias.  Currently toe-touch weight-bearing    DVT Prophylaxis: Y      Physical Exam:     Patient is alert and oriented no acute distress.   Assistive Device:  Wheelchair    Left hip Incision(s) are well healed.  There is no evidence of dehiscence.  There is no induration erythema or signs of infection.  Appropriate soft tissue swelling.  Compartments are soft and compressible.  Warm well-perfused extremity.  Moderate ecchymosis and bruising of the leg with thigh swelling.      Imaging:     I have personally reviewed the following imaging and these are an interpretation of my findings:     X-Ray: I have reviewed all pertinent results/findings and my personal findings are:  Status post intramedullary nailing left femur with overall good alignment.  No complication    Assessment    Leslie Tolliver is 2 weeks Post-op     Plan:    Overall doing as expected.  We discussed expectations of surgery and postoperative course.     Pain: Continued postoperative pain regimen -- Tylenol/ibuprofen  DVT prophylaxis:  30 days postop  PT/OT: Continue/Initiate physical therapy (weight bearing status:  Advanced weight-bearing as tolerated)     Follow-up: 4 weeks   X-rays next visit:  Left femur

## 2024-06-05 ENCOUNTER — PATIENT OUTREACH (OUTPATIENT)
Dept: ADMINISTRATIVE | Facility: HOSPITAL | Age: 89
End: 2024-06-05
Payer: MEDICARE

## 2024-06-05 NOTE — PROGRESS NOTES
Population Health Chart Review & Patient Outreach Details      Additional [facility] Health Notes:               Updates Requested / Reviewed:      Updated Care Coordination Note, , Care Team Updated, and Immunizations Reconciliation Completed or Queried: [facility] Topics Overdue:      [facility] Score: 1     Eye Exam    Shingles/Zoster Vaccine  RSV Vaccine                  Health Maintenance Topic(s) Outreach Outcomes & Actions Taken:    Eye Exam - Outreach Outcomes & Actions Taken  : Diabetic Eye External Records Uploaded, Care Team & History Updated if Applicable

## 2024-06-17 ENCOUNTER — TELEPHONE (OUTPATIENT)
Dept: FAMILY MEDICINE | Facility: CLINIC | Age: 89
End: 2024-06-17
Payer: MEDICARE

## 2024-06-17 NOTE — TELEPHONE ENCOUNTER
Spoke with Sonia they need lab orders that patient was scheduled outpatient for.  Advised to draw ,Lipid Panel ,Hemoglobin A1C,Basic Metabolic Panel.

## 2024-06-17 NOTE — TELEPHONE ENCOUNTER
----- Message from Prabhjot Gomez sent at 6/17/2024 11:21 AM CDT -----  Type: Needs Medical Advice  Who Called:  Sonia from [facility]   Symptoms (please be specific):  said she need the order that pt is scheduled for on 6/28--said she need to do them in house--but she need to know which ones are needed please fax orders to [phone]  Best Call Back Number: [phone]  Additional Information: thank you

## 2024-06-25 ENCOUNTER — TELEPHONE (OUTPATIENT)
Dept: FAMILY MEDICINE | Facility: CLINIC | Age: 89
End: 2024-06-25
Payer: MEDICARE

## 2024-06-25 NOTE — TELEPHONE ENCOUNTER
Spoke with Pricila in  with [facility], to notify her that Dr. Christianson is not able to schedule pt for a hospital follow up until the end of August, however our new [facility], can see pt for a rehab hospital follow up on 7/5 at 4:00 p.m. Pricila agreed to notify pt's daughter will transport pt to this visit.

## 2024-06-25 NOTE — TELEPHONE ENCOUNTER
----- Message from Pinky Teresa sent at 6/25/2024  1:07 PM CDT -----  Type:  Sooner Appointment Request    Caller is requesting a sooner appointment.  Caller declined first available appointment listed below.  Caller will not accept being placed on the waitlist and is requesting a message be sent to doctor.    Name of Caller:sukhdeep with [facility]    When is the first available appointment?8/28    Symptoms:discharging from [facility] on 6/28    Would the patient rather a call back or a response [facility]? Call back    Best Call Back Number:. [phone] ext [phone]    Additional Information: pt is being discharged from their care on 6/28 and is needing to be seen by 7/5     Please call Back to advise. Thanks!

## 2024-06-27 DIAGNOSIS — S72.142D CLOSED DISPLACED INTERTROCHANTERIC FRACTURE OF LEFT FEMUR WITH ROUTINE HEALING, SUBSEQUENT ENCOUNTER: Primary | ICD-10-CM

## 2024-07-02 ENCOUNTER — OFFICE VISIT (OUTPATIENT)
Dept: ORTHOPEDICS | Facility: CLINIC | Age: 89
End: 2024-07-02
Payer: MEDICARE

## 2024-07-02 ENCOUNTER — HOSPITAL ENCOUNTER (OUTPATIENT)
Dept: RADIOLOGY | Facility: HOSPITAL | Age: 89
Discharge: HOME | End: 2024-07-02
Attending: ORTHOPAEDIC SURGERY
Payer: MEDICARE

## 2024-07-02 VITALS — HEIGHT: 67 IN | BODY MASS INDEX: 31.97 KG/M2 | WEIGHT: 203.69 LBS

## 2024-07-02 DIAGNOSIS — S72.142D CLOSED DISPLACED INTERTROCHANTERIC FRACTURE OF LEFT FEMUR WITH ROUTINE HEALING, SUBSEQUENT ENCOUNTER: Primary | ICD-10-CM

## 2024-07-02 DIAGNOSIS — S72.142D CLOSED DISPLACED INTERTROCHANTERIC FRACTURE OF LEFT FEMUR WITH ROUTINE HEALING, SUBSEQUENT ENCOUNTER: ICD-10-CM

## 2024-07-02 PROCEDURE — 1126F AMNT PAIN NOTED NONE PRSNT: CPT | Mod: HCNC,CPTII,S$GLB, | Performed by: ORTHOPAEDIC SURGERY

## 2024-07-02 PROCEDURE — 73552 X-RAY EXAM OF FEMUR 2/>: CPT | Mod: TC,HCNC,PO,LT

## 2024-07-02 PROCEDURE — 1157F ADVNC CARE PLAN IN RCRD: CPT | Mod: HCNC,CPTII,S$GLB, | Performed by: ORTHOPAEDIC SURGERY

## 2024-07-02 PROCEDURE — 99999 PR PBB SHADOW E&M-EST. PATIENT-LVL I: CPT | Mod: PBBFAC,HCNC,, | Performed by: ORTHOPAEDIC SURGERY

## 2024-07-02 PROCEDURE — 73552 X-RAY EXAM OF FEMUR 2/>: CPT | Mod: 26,HCNC,LT, | Performed by: RADIOLOGY

## 2024-07-02 PROCEDURE — 99024 POSTOP FOLLOW-UP VISIT: CPT | Mod: HCNC,S$GLB,, | Performed by: ORTHOPAEDIC SURGERY

## 2024-07-02 NOTE — PROGRESS NOTES
Post-op Note    HPI    Leslie Tolliver is here 6 weeks s/p the following procedure:     Intramedullary nailing left femur    Overall doing well.  Pain controlled.  She is home now.  Weightbearing as tolerated.  Feels like she is can better week to week    Physical Exam:     Patient is alert and oriented no acute distress.   Assistive Device:  Wheelchair    Left hip Incision(s) are well healed.  There is no evidence of dehiscence.  There is no induration erythema or signs of infection.  Appropriate soft tissue swelling.  Compartments are soft and compressible.  Warm well-perfused extremity.  Improved ecchymosis and swelling of the thigh.  Mild pain with passive range of motion of the hip.  No gross deformity.    Imaging:     I have personally reviewed the following imaging and these are an interpretation of my findings:     X-Ray: I have reviewed all pertinent results/findings and my personal findings are:  Status post intramedullary nailing left femur with overall good alignment.  No complication.  Fracture seems to be a healing appropriately    Assessment    Leslie Tolliver is 6 weeks Post-op     Plan:    Overall doing as expected.  We discussed expectations of surgery and postoperative course.     Pain: Continued postoperative pain regimen -- Tylenol/ibuprofen  DVT prophylaxis:  30 days postop complete  PT/OT: Continue/Initiate physical therapy (weight bearing status:  As tolerated)     Follow-up: 8 weeks   X-rays next visit:  Left femur

## 2024-07-05 ENCOUNTER — TELEPHONE (OUTPATIENT)
Dept: FAMILY MEDICINE | Facility: CLINIC | Age: 89
End: 2024-07-05
Payer: MEDICARE

## 2024-07-05 NOTE — TELEPHONE ENCOUNTER
----- Message from Anjana Metzger sent at 7/5/2024  3:19 PM CDT -----  Contact: pt  Type:  Sooner Apoointment Request    Caller is requesting a sooner appointment.  Caller declined first available appointment listed below.  Caller will not accept being placed on the waitlist and is requesting a message be sent to doctor.    Name of Caller: Anny    When is the first available appointment? Current 7/5    Symptoms: reschedule    Would the patient rather a call back or a response via [facility]?  Call back    Best Call Back Number:     Additional Information: needs to reschedule    Please call to reschedule  Thanks

## 2024-07-26 ENCOUNTER — DOCUMENT SCAN (OUTPATIENT)
Dept: HOME HEALTH SERVICES | Facility: HOSPITAL | Age: 89
End: 2024-07-26
Payer: MEDICARE

## 2024-07-29 ENCOUNTER — TELEPHONE (OUTPATIENT)
Dept: FAMILY MEDICINE | Facility: CLINIC | Age: 89
End: 2024-07-29
Payer: MEDICARE

## 2024-07-29 NOTE — TELEPHONE ENCOUNTER
Left message on machine to call.         ----- Message from Melody Faulkner sent at 7/29/2024  3:22 PM CDT -----  Regarding: Needs sooner appt  Type:  Sooner Appointment Request    Caller is requesting a sooner appointment.  Caller declined first available appointment listed below.  Caller will not accept being placed on the waitlist and is requesting a message be sent to doctor.    Name of Caller:  Pt  When is the first available appointment?  september  Symptoms:  pt had recent injury to hips   Would the patient rather a call back or a response via [facility]? callback  Best Call Back Number:  [phone]    Additional Information:  pt asked if she could get something sooner scheduled on a money

## 2024-08-06 ENCOUNTER — PATIENT MESSAGE (OUTPATIENT)
Dept: FAMILY MEDICINE | Facility: CLINIC | Age: 89
End: 2024-08-06
Payer: MEDICARE

## 2024-08-08 ENCOUNTER — TELEPHONE (OUTPATIENT)
Dept: ORTHOPEDICS | Facility: CLINIC | Age: 89
End: 2024-08-08

## 2024-08-08 ENCOUNTER — DOCUMENT SCAN (OUTPATIENT)
Dept: HOME HEALTH SERVICES | Facility: HOSPITAL | Age: 89
End: 2024-08-08
Payer: MEDICARE

## 2024-08-08 DIAGNOSIS — S72.142D CLOSED DISPLACED INTERTROCHANTERIC FRACTURE OF LEFT FEMUR WITH ROUTINE HEALING, SUBSEQUENT ENCOUNTER: Primary | ICD-10-CM

## 2024-08-08 DIAGNOSIS — R26.9 GAIT DISTURBANCE: ICD-10-CM

## 2024-09-03 DIAGNOSIS — S72.142D CLOSED DISPLACED INTERTROCHANTERIC FRACTURE OF LEFT FEMUR WITH ROUTINE HEALING, SUBSEQUENT ENCOUNTER: Primary | ICD-10-CM

## 2024-09-12 NOTE — TELEPHONE ENCOUNTER
Care Due:                  Date            Visit Type   Department     Provider  --------------------------------------------------------------------------------                                EP -                              PRIMARY      [facility] FAMILY  Last Visit: 03-      CARE ([facility])   PRACTICE       Chang Christianson                               -                              PRIMARY      [facility]  Next Visit: 08-      CARE ([facility])   PRACTICE       Chang Christianson                                                            Last  Test          Frequency    Reason                     Performed    Due Date  --------------------------------------------------------------------------------    HBA1C.......  6 months...  LANTUS, empagliflozin,     05- 11-                             metFORMIN................    Lipid Panel.  12 months..  PREVALITE................  10-   10-    [facility] Embedded Care Due Messages. Reference number: [id number].   8/25/2023 11:33:43 AM CDT   Discharge Note    Patient discharged at 1505. Explained discharge instructions and follow ups to patient. Verbalizes understanding, IV removed, tele monitor discontinued. Patient transported via wheelchair to [facility].

## 2024-09-16 DIAGNOSIS — Z79.4 TYPE 2 DIABETES MELLITUS WITH DIABETIC POLYNEUROPATHY, WITH LONG-TERM CURRENT USE OF INSULIN: ICD-10-CM

## 2024-09-16 DIAGNOSIS — E11.42 TYPE 2 DIABETES MELLITUS WITH DIABETIC POLYNEUROPATHY, WITH LONG-TERM CURRENT USE OF INSULIN: ICD-10-CM

## 2024-09-16 NOTE — TELEPHONE ENCOUNTER
Care Due:                  Date            Visit Type   Department     Provider  --------------------------------------------------------------------------------                                HOSPITAL     [facility] FAMILY  Last Visit: 04-      FOLLOW UP    PRACTICE       Chang Christianson                               -                              PRIMARY      [facility] FAM[facility]  Next Visit: 09-      CARE ([facility])   PRACTICE       Chang Christianson                                                            Last  Test          Frequency    Reason                     Performed    Due Date  --------------------------------------------------------------------------------    HBA1C.......  6 months...  empagliflozin, insulin,    01- 07-                             metFORMIN................    [employer] Embedded Care Due Messages. Reference number: [id number].   9/16/2024 9:07:53 AM CDT

## 2024-09-16 NOTE — TELEPHONE ENCOUNTER
----- Message from Jovon Melton sent at 9/14/2024 11:57 AM CDT -----  Regarding: refill  Contact: elvis at [phone]  Type:  RX Refill Request    Who Called:  rey  Refill or New Rx:  refill    RX Name and Strength:    ELIQUIS 5 mg Tab 180 tablet 3 5/6/2024 -   Sig - Route: TAKE 1 TABLET(5 MG) BY MOUTH TWICE DAILY - Oral   Sent to pharmacy as: ELIQUIS 5 mg Tab   E-Prescribing Status: Receipt confirmed by pharmacy (5/6/2024  4:41 PM CDT)     How is the patient currently taking it? (ex. 1XDay):  see above  Is this a 30 day or 90 day RX:  see above    Preferred Pharmacy with phone number:    [id number] - [place] - [address] AT [address]  [address]  [place] [address]  Phone: [phone] Fax: [phone]    Local or Mail Order:  local    Ordering Provider:  Dr Bradley Mary Call Back Number:  [phone]    Additional Information:

## 2024-09-16 NOTE — TELEPHONE ENCOUNTER
----- Message from Jovon Melton sent at 9/14/2024 11:59 AM CDT -----  Regarding: refill  Contact: [phone] / rey  Type:  RX Refill Request    Who Called:  rey  Refill or New Rx:  refill    RX Name and Strength:    empagliflozin (JARDIANCE) 10 mg tablet 90 tablet 1 8/28/2023 -   Sig - Route: Take 1 tablet (10 mg total) by mouth once daily. - Oral   Sent to pharmacy as: empagliflozin (JARDIANCE) 10 mg tablet   E-Prescribing Status: Receipt confirmed by pharmacy (8/28/2023  8:41 PM CDT)    How is the patient currently taking it? (ex. 1XDay):  see above  Is this a 30 day or 90 day RX:  see above    Preferred Pharmacy with phone number:    [id number] - [place] - [address] AT [facility] [address]  [facility] [place] [address]  Phone: [phone] Fax: [phone]    Local or Mail Order:  local    Ordering Provider:  Dr Bradley Mary Call Back Number:  [phone]    Additional Information:

## 2024-09-17 NOTE — TELEPHONE ENCOUNTER
Refill Routing Note   Medication(s) are not appropriate for processing by [facility] for the following reason(s):        Required labs outdated  ED/Hospital Visit since last OV with provider    [facility] action(s):  Defer     Requires labs : Yes             Appointments  past 12m or future 3m with PCP    Date Provider   Last Visit   4/3/2024 Chang Christianson MD   Next Visit   9/30/2024 Chang Christianson MD   ED visits in past 90 days: 0        Note composed:12:08 AM 09/17/2024

## 2024-09-18 ENCOUNTER — TELEPHONE (OUTPATIENT)
Dept: FAMILY MEDICINE | Facility: CLINIC | Age: 89
End: 2024-09-18
Payer: MEDICARE

## 2024-09-18 NOTE — TELEPHONE ENCOUNTER
----- Message from Dayana Faith sent at 9/18/2024 10:13 AM CDT -----  Type: Needs Medical Advice  Who Called:  pt  Symptoms (please be specific):    How long has patient had these symptoms:    Pharmacy name and phone #:        [id number] - [place] - [address] [facility] [address]  [address] [address]  Phone: [phone] Fax: [phone]          Best Call Back Number: [phone]    Additional Information: pt is in need of a script to be sent in for 5mg Eloquis  Please call to advise

## 2024-09-19 ENCOUNTER — HOSPITAL ENCOUNTER (OUTPATIENT)
Facility: HOSPITAL | Age: 89
Discharge: HOME | End: 2024-09-20
Attending: STUDENT IN AN ORGANIZED HEALTH CARE EDUCATION/TRAINING PROGRAM | Admitting: INTERNAL MEDICINE
Payer: MEDICARE

## 2024-09-19 ENCOUNTER — NURSE TRIAGE (OUTPATIENT)
Dept: ADMINISTRATIVE | Facility: CLINIC | Age: 89
End: 2024-09-19
Payer: MEDICARE

## 2024-09-19 DIAGNOSIS — R06.02 SHORTNESS OF BREATH: ICD-10-CM

## 2024-09-19 DIAGNOSIS — I50.9 ACUTE ON CHRONIC CONGESTIVE HEART FAILURE, UNSPECIFIED HEART FAILURE TYPE: Primary | ICD-10-CM

## 2024-09-19 DIAGNOSIS — I50.9 ACUTE EXACERBATION OF CHF (CONGESTIVE HEART FAILURE): ICD-10-CM

## 2024-09-19 DIAGNOSIS — R60.0 BILATERAL LOWER EXTREMITY EDEMA: ICD-10-CM

## 2024-09-19 DIAGNOSIS — I48.20 CHRONIC A-FIB: ICD-10-CM

## 2024-09-19 PROBLEM — L89.152 PRESSURE INJURY OF SACRAL REGION, STAGE 2: Status: ACTIVE | Noted: 2024-09-19

## 2024-09-19 PROBLEM — N39.0 UTI (URINARY TRACT INFECTION): Status: ACTIVE | Noted: 2024-09-19

## 2024-09-19 PROBLEM — I48.91 ATRIAL FIBRILLATION: Status: ACTIVE | Noted: 2024-09-19

## 2024-09-19 PROBLEM — M79.89 LEG SWELLING: Status: ACTIVE | Noted: 2024-09-19

## 2024-09-19 LAB
ALBUMIN SERPL BCP-MCNC: 3.4 G/DL (ref 3.5–5.2)
ALP SERPL-CCNC: 109 U/L (ref 55–135)
ALT SERPL W/O P-5'-P-CCNC: 9 U/L (ref 10–44)
ANION GAP SERPL CALC-SCNC: 10 MMOL/L (ref 8–16)
AST SERPL-CCNC: 16 U/L (ref 10–40)
BACTERIA #/AREA URNS HPF: ABNORMAL /HPF
BASOPHILS # BLD AUTO: 0.02 K/UL (ref 0–0.2)
BASOPHILS NFR BLD: 0.4 % (ref 0–1.9)
BILIRUB SERPL-MCNC: 1.5 MG/DL (ref 0.1–1)
BILIRUB UR QL STRIP: NEGATIVE
BNP SERPL-MCNC: 535 PG/ML (ref 0–99)
BUN SERPL-MCNC: 28 MG/DL (ref 8–23)
CALCIUM SERPL-MCNC: 8.7 MG/DL (ref 8.7–10.5)
CHLORIDE SERPL-SCNC: 110 MMOL/L (ref 95–110)
CLARITY UR: CLEAR
CO2 SERPL-SCNC: 20 MMOL/L (ref 23–29)
COLOR UR: YELLOW
CREAT SERPL-MCNC: 1.2 MG/DL (ref 0.5–1.4)
DIFFERENTIAL METHOD BLD: ABNORMAL
EOSINOPHIL # BLD AUTO: 0.1 K/UL (ref 0–0.5)
EOSINOPHIL NFR BLD: 2.8 % (ref 0–8)
ERYTHROCYTE [DISTWIDTH] IN BLOOD BY AUTOMATED COUNT: 16 % (ref 11.5–14.5)
EST. GFR  (NO RACE VARIABLE): 43 ML/MIN/1.73 M^2
ESTIMATED AVG GLUCOSE: 146 MG/DL (ref 68–131)
GLUCOSE SERPL-MCNC: 133 MG/DL (ref 70–110)
GLUCOSE UR QL STRIP: ABNORMAL
HBA1C MFR BLD: 6.7 % (ref 4.5–6.2)
HCT VFR BLD AUTO: 34.2 % (ref 37–48.5)
HGB BLD-MCNC: 10.2 G/DL (ref 12–16)
HGB UR QL STRIP: ABNORMAL
IMM GRANULOCYTES # BLD AUTO: 0.02 K/UL (ref 0–0.04)
IMM GRANULOCYTES NFR BLD AUTO: 0.4 % (ref 0–0.5)
KETONES UR QL STRIP: NEGATIVE
LEUKOCYTE ESTERASE UR QL STRIP: ABNORMAL
LYMPHOCYTES # BLD AUTO: 0.6 K/UL (ref 1–4.8)
LYMPHOCYTES NFR BLD: 11.8 % (ref 18–48)
MAGNESIUM SERPL-MCNC: 1.8 MG/DL (ref 1.6–2.6)
MCH RBC QN AUTO: 26.8 PG (ref 27–31)
MCHC RBC AUTO-ENTMCNC: 29.8 G/DL (ref 32–36)
MCV RBC AUTO: 90 FL (ref 82–98)
MICROSCOPIC COMMENT: ABNORMAL
MONOCYTES # BLD AUTO: 0.5 K/UL (ref 0.3–1)
MONOCYTES NFR BLD: 9.7 % (ref 4–15)
NEUTROPHILS # BLD AUTO: 3.5 K/UL (ref 1.8–7.7)
NEUTROPHILS NFR BLD: 74.9 % (ref 38–73)
NITRITE UR QL STRIP: NEGATIVE
NRBC BLD-RTO: 0 /100 WBC
PH UR STRIP: 6 [PH] (ref 5–8)
PHOSPHATE SERPL-MCNC: 3.7 MG/DL (ref 2.7–4.5)
PLATELET # BLD AUTO: 168 K/UL (ref 150–450)
PMV BLD AUTO: 10.9 FL (ref 9.2–12.9)
POCT GLUCOSE: 175 MG/DL (ref 70–110)
POTASSIUM SERPL-SCNC: 3.9 MMOL/L (ref 3.5–5.1)
PROT SERPL-MCNC: 6.5 G/DL (ref 6–8.4)
PROT UR QL STRIP: NEGATIVE
RBC # BLD AUTO: 3.8 M/UL (ref 4–5.4)
RBC #/AREA URNS HPF: 10 /HPF (ref 0–4)
SODIUM SERPL-SCNC: 140 MMOL/L (ref 136–145)
SP GR UR STRIP: 1.01 (ref 1–1.03)
SQUAMOUS #/AREA URNS HPF: 0 /HPF
TROPONIN I SERPL DL<=0.01 NG/ML-MCNC: <0.006 NG/ML (ref 0–0.03)
TROPONIN I SERPL DL<=0.01 NG/ML-MCNC: <0.006 NG/ML (ref 0–0.03)
URN SPEC COLLECT METH UR: ABNORMAL
UROBILINOGEN UR STRIP-ACNC: NEGATIVE EU/DL
WBC # BLD AUTO: 4.65 K/UL (ref 3.9–12.7)
WBC #/AREA URNS HPF: 13 /HPF (ref 0–5)
YEAST URNS QL MICRO: ABNORMAL

## 2024-09-19 PROCEDURE — G0378 HOSPITAL OBSERVATION PER HR: HCPCS

## 2024-09-19 PROCEDURE — 63600175 PHARM REV CODE 636 W HCPCS

## 2024-09-19 PROCEDURE — 93010 ELECTROCARDIOGRAM REPORT: CPT | Mod: ,,, | Performed by: INTERNAL MEDICINE

## 2024-09-19 PROCEDURE — 94761 N-INVAS EAR/PLS OXIMETRY MLT: CPT

## 2024-09-19 PROCEDURE — 93005 ELECTROCARDIOGRAM TRACING: CPT

## 2024-09-19 PROCEDURE — 36415 COLL VENOUS BLD VENIPUNCTURE: CPT | Performed by: NURSE PRACTITIONER

## 2024-09-19 PROCEDURE — 25000003 PHARM REV CODE 250

## 2024-09-19 PROCEDURE — 84484 ASSAY OF TROPONIN QUANT: CPT | Performed by: NURSE PRACTITIONER

## 2024-09-19 PROCEDURE — 83735 ASSAY OF MAGNESIUM: CPT

## 2024-09-19 PROCEDURE — 80053 COMPREHEN METABOLIC PANEL: CPT | Performed by: NURSE PRACTITIONER

## 2024-09-19 PROCEDURE — 85025 COMPLETE CBC W/AUTO DIFF WBC: CPT | Performed by: NURSE PRACTITIONER

## 2024-09-19 PROCEDURE — 36415 COLL VENOUS BLD VENIPUNCTURE: CPT

## 2024-09-19 PROCEDURE — 83036 HEMOGLOBIN GLYCOSYLATED A1C: CPT

## 2024-09-19 PROCEDURE — 94760 N-INVAS EAR/PLS OXIMETRY 1: CPT

## 2024-09-19 PROCEDURE — 84484 ASSAY OF TROPONIN QUANT: CPT | Mod: 91

## 2024-09-19 PROCEDURE — 99285 EMERGENCY DEPT VISIT HI MDM: CPT | Mod: 25

## 2024-09-19 PROCEDURE — 96375 TX/PRO/DX INJ NEW DRUG ADDON: CPT

## 2024-09-19 PROCEDURE — 81000 URINALYSIS NONAUTO W/SCOPE: CPT

## 2024-09-19 PROCEDURE — 87186 SC STD MICRODIL/AGAR DIL: CPT

## 2024-09-19 PROCEDURE — 83880 ASSAY OF NATRIURETIC PEPTIDE: CPT | Performed by: NURSE PRACTITIONER

## 2024-09-19 PROCEDURE — 96372 THER/PROPH/DIAG INJ SC/IM: CPT

## 2024-09-19 PROCEDURE — 96365 THER/PROPH/DIAG IV INF INIT: CPT

## 2024-09-19 PROCEDURE — 87086 URINE CULTURE/COLONY COUNT: CPT

## 2024-09-19 PROCEDURE — 84100 ASSAY OF PHOSPHORUS: CPT

## 2024-09-19 RX ORDER — FUROSEMIDE 10 MG/ML
20 INJECTION INTRAMUSCULAR; INTRAVENOUS ONCE
Status: COMPLETED | OUTPATIENT
Start: 2024-09-19 | End: 2024-09-20

## 2024-09-19 RX ORDER — IBUPROFEN 200 MG
24 TABLET ORAL
Status: DISCONTINUED | OUTPATIENT
Start: 2024-09-19 | End: 2024-09-20 | Stop reason: HOSPADM

## 2024-09-19 RX ORDER — SODIUM CHLORIDE 0.9 % (FLUSH) 0.9 %
10 SYRINGE (ML) INJECTION
Status: DISCONTINUED | OUTPATIENT
Start: 2024-09-19 | End: 2024-09-20 | Stop reason: HOSPADM

## 2024-09-19 RX ORDER — CARVEDILOL 6.25 MG/1
12.5 TABLET ORAL 2 TIMES DAILY
Status: DISCONTINUED | OUTPATIENT
Start: 2024-09-19 | End: 2024-09-20 | Stop reason: HOSPADM

## 2024-09-19 RX ORDER — FUROSEMIDE 10 MG/ML
20 INJECTION INTRAMUSCULAR; INTRAVENOUS DAILY
Status: DISCONTINUED | OUTPATIENT
Start: 2024-09-20 | End: 2024-09-20 | Stop reason: HOSPADM

## 2024-09-19 RX ORDER — POLYETHYLENE GLYCOL 3350 17 G/17G
17 POWDER, FOR SOLUTION ORAL DAILY
Status: DISCONTINUED | OUTPATIENT
Start: 2024-09-20 | End: 2024-09-20 | Stop reason: HOSPADM

## 2024-09-19 RX ORDER — IBUPROFEN 200 MG
16 TABLET ORAL
Status: DISCONTINUED | OUTPATIENT
Start: 2024-09-19 | End: 2024-09-20 | Stop reason: HOSPADM

## 2024-09-19 RX ORDER — HYDROCODONE BITARTRATE AND ACETAMINOPHEN 5; 325 MG/1; MG/1
1 TABLET ORAL EVERY 6 HOURS PRN
Status: DISCONTINUED | OUTPATIENT
Start: 2024-09-19 | End: 2024-09-20 | Stop reason: HOSPADM

## 2024-09-19 RX ORDER — FUROSEMIDE 10 MG/ML
20 INJECTION INTRAMUSCULAR; INTRAVENOUS
Status: COMPLETED | OUTPATIENT
Start: 2024-09-19 | End: 2024-09-19

## 2024-09-19 RX ORDER — KETOCONAZOLE 20 MG/G
1 CREAM TOPICAL 2 TIMES DAILY
COMMUNITY

## 2024-09-19 RX ORDER — GLUCAGON 1 MG
1 KIT INJECTION
Status: DISCONTINUED | OUTPATIENT
Start: 2024-09-19 | End: 2024-09-20 | Stop reason: HOSPADM

## 2024-09-19 RX ORDER — INSULIN ASPART 100 [IU]/ML
0-5 INJECTION, SOLUTION INTRAVENOUS; SUBCUTANEOUS
Status: DISCONTINUED | OUTPATIENT
Start: 2024-09-19 | End: 2024-09-20 | Stop reason: HOSPADM

## 2024-09-19 RX ORDER — ONDANSETRON 4 MG/1
4 TABLET, ORALLY DISINTEGRATING ORAL EVERY 8 HOURS PRN
Status: DISCONTINUED | OUTPATIENT
Start: 2024-09-19 | End: 2024-09-20 | Stop reason: HOSPADM

## 2024-09-19 RX ORDER — INSULIN GLARGINE 100 [IU]/ML
20 INJECTION, SOLUTION SUBCUTANEOUS NIGHTLY
Status: DISCONTINUED | OUTPATIENT
Start: 2024-09-19 | End: 2024-09-20 | Stop reason: HOSPADM

## 2024-09-19 RX ORDER — FUROSEMIDE 10 MG/ML
40 INJECTION INTRAMUSCULAR; INTRAVENOUS DAILY
Status: DISCONTINUED | OUTPATIENT
Start: 2024-09-20 | End: 2024-09-19

## 2024-09-19 RX ADMIN — HYDROCODONE BITARTRATE AND ACETAMINOPHEN 1 TABLET: 5; 325 TABLET ORAL at 10:09

## 2024-09-19 RX ADMIN — INSULIN GLARGINE 20 UNITS: 100 INJECTION, SOLUTION SUBCUTANEOUS at 11:09

## 2024-09-19 RX ADMIN — POTASSIUM BICARBONATE 40 MEQ: 391 TABLET, EFFERVESCENT ORAL at 06:09

## 2024-09-19 RX ADMIN — CARVEDILOL 12.5 MG: 6.25 TABLET, FILM COATED ORAL at 10:09

## 2024-09-19 RX ADMIN — CEFTRIAXONE SODIUM 1 G: 1 INJECTION, POWDER, FOR SOLUTION INTRAMUSCULAR; INTRAVENOUS at 11:09

## 2024-09-19 RX ADMIN — FUROSEMIDE 20 MG: 10 INJECTION, SOLUTION INTRAMUSCULAR; INTRAVENOUS at 06:09

## 2024-09-19 RX ADMIN — APIXABAN 5 MG: 2.5 TABLET, FILM COATED ORAL at 10:09

## 2024-09-19 NOTE — FIRST PROVIDER EVALUATION
Emergency Department TeleTriage Encounter Note      CHIEF COMPLAINT    Chief Complaint   Patient presents with    Leg Swelling     Bilateral leg swelling starting last night.       VITAL SIGNS   Initial Vitals [09/19/24 1413]   BP Pulse Resp Temp SpO2   (!) 110/55 95 20 98 °F (36.7 °C) 95 %      MAP       --            ALLERGIES    Review of patient's allergies indicates:   Allergen Reactions    Fenofibric acid (choline)      Other reaction(s): Vomiting    Iodine      Other reaction(s): lips swollen ivp dye    Rosuvastatin      Other reaction(s): muscle pain       PROVIDER TRIAGE NOTE  This is a teletriage evaluation of a 89 y.o. female presenting to the ED complaining of weeping BLE since last night. Pmhx of CHF. Denies pain, SOB, and CP. Compliant with medications. No decreased UOP.     Alert, no distress.     Initial orders will be placed and care will be transferred to an alternate provider when patient is roomed for a full evaluation. Any additional orders and the final disposition will be determined by that provider.         ORDERS  Labs Reviewed - No data to display    ED Orders (720h ago, onward)      None              Virtual Visit Note: The provider triage portion of this emergency department evaluation and documentation was performed via [employer], a HIPAA-compliant telemedicine application, in concert with a tele-presenter in the room. A face to face patient evaluation with one of my colleagues will occur once the patient is placed in an emergency department room.      DISCLAIMER: This note was prepared with [employer] voice recognition transcription software. Garbled syntax, mangled pronouns, and other bizarre constructions may be attributed to that software system.

## 2024-09-19 NOTE — ED NOTES
Pt incontinent of stool/urine. Incontinent care provided. Brief changed. Pure wick applied. Pt turned/repositioned.  Pictures taken of redness to sacrum/buttocks, under bilateral breasts, & wallace/groin area . Pictures uploaded to epic chart. Pt turned/repositioned. Pt/family updated on plan of care. No needs at this time. Will cont to monitor.

## 2024-09-19 NOTE — ED PROVIDER NOTES
Encounter Date: 9/19/2024       History     Chief Complaint   Patient presents with    Leg Swelling     Bilateral leg swelling starting last night.     89-year-old female with a past medical history CKD, diabetes, hypertension, hyperlipidemia, GERD, CAD, AFib on Eliquis 5 mg presents to ED for bilateral leg swelling.  Patient states this started about 2 months ago.  Patient states yesterday she started noticing fluid filled blisters that were leaking clear discharge.  Patient denies any pain.  Patient denies any falls.  Patient denies any changes in routine.  Patient denies any recent antibiotic use.  Patient also admits to intermittent paresthesias.  Patient denies fever, sweats, chills, shortness breath, chest pain, abdominal pain, nausea, vomiting, diarrhea, constipation, urinary symptoms, numbness, headaches, dizziness, and lightheadedness.        Review of patient's allergies indicates:   Allergen Reactions    Fenofibric acid (choline)      Other reaction(s): Vomiting    Iodine      Other reaction(s): lips swollen ivp dye    Rosuvastatin      Other reaction(s): muscle pain     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    GERD (gastroesophageal reflux disease)     Hyperlipidemia     Hypertension     Hypothyroidism     Paroxysmal atrial fibrillation 3/16/2023    Type 2 diabetes mellitus      Past Surgical History:   Procedure Laterality Date    ADENOIDECTOMY      AORTIC VALVE REPLACEMENT      BREAST BIOPSY Right 20 yrs ago    benign    CARDIAC SURGERY       CHOLECYSTECTOMY      COLONOSCOPY  2014    Dr Holly, 5 year recheck    EPIDURAL STEROID INJECTION N/A 3/25/2021    Procedure: Injection, Steroid, Epidural L3-4;  Surgeon: Sky Love MD;  Location: [facility] OR;  Service: Pain Management;  Laterality: N/A;  Injection, Steroid, Epidural L3-4    EPIDURAL STEROID INJECTION N/A 2021    Procedure: Injection, Steroid, Epidural L3-4;  Surgeon: Sky Love MD;  Location: [facility] OR;  Service: Pain Management;  Laterality: N/A;  Injection, Steroid, Epidural L3-4    ESOPHAGOGASTRODUODENOSCOPY N/A 2020    Procedure: EGD (ESOPHAGOGASTRODUODENOSCOPY);  Surgeon: Michael Nugent III, MD;  Location: [facility];  Service: Endoscopy;  Laterality: N/A;    HEMORRHOID SURGERY      HYSTERECTOMY      INTRAMEDULLARY RODDING OF FEMUR Left 2024    Procedure: INSERTION, INTRAMEDULLARY RACQUEL, FEMUR;  Surgeon: Bravo Guillermo MD;  Location: [facility] OR;  Service: Orthopedics;  Laterality: Left;    OOPHORECTOMY      TONSILLECTOMY       Family History   Problem Relation Name Age of Onset    Diabetes Mother      Heart disease Mother      Glaucoma Mother      Cancer Father          lung cancer    Early death Son          brain tumor age 3    Diabetes Brother      No Known Problems Daughter      Early death Son          MVA 25    Macular degeneration Neg Hx      Retinal detachment Neg Hx       Social History     Tobacco Use    Smoking status: Former     Current packs/day: 0.00     Average packs/day: 0.3 packs/day for 15.0 years (3.8 ttl pk-yrs)     Types: Cigarettes     Start date: 3/30/1959     Quit date: 3/30/1974     Years since quittin.5    Smokeless tobacco: Never   Substance Use Topics    Alcohol use: No    Drug use: No     Review of Systems   Constitutional:  Negative for activity change, appetite change, chills, diaphoresis and fever.   Respiratory:  Negative for shortness of breath.    Cardiovascular:  Positive for leg swelling. Negative for chest pain.    Gastrointestinal:  Negative for abdominal pain, constipation, diarrhea, nausea and vomiting.   Genitourinary:  Negative for decreased urine volume, difficulty urinating, dysuria, frequency and urgency.   Musculoskeletal:  Negative for arthralgias and myalgias.   Skin:  Negative for rash.   Neurological:  Negative for dizziness, weakness, light-headedness, numbness and headaches.        (+) intermittent bilateral feet paresthesias       Physical Exam     Initial Vitals [09/19/24 1413]   BP Pulse Resp Temp SpO2   (!) 110/55 95 20 98 °F (36.7 °C) 95 %      MAP       --         Physical Exam    Nursing note and vitals reviewed.  Constitutional: Vital signs are normal. She appears well-developed and well-nourished. She is not diaphoretic. She is active. She does not appear ill. No distress.   HENT:   Head: Normocephalic and atraumatic.   Right Ear: External ear normal.   Left Ear: External ear normal.   Nose: Nose normal.   Eyes: Conjunctivae, EOM and lids are normal. Pupils are equal, round, and reactive to light. Right eye exhibits no discharge. Left eye exhibits no discharge.   Neck: Phonation normal. Neck supple.   Normal range of motion.   Full passive range of motion without pain.     Cardiovascular:  Normal rate. A regularly irregular rhythm present.           Pulses:       Dorsalis pedis pulses are 2+ on the right side and 2+ on the left side.   Pulmonary/Chest: Effort normal and breath sounds normal. No respiratory distress.   Abdominal: She exhibits no distension.   Musculoskeletal:         General: Normal range of motion.      Cervical back: Full passive range of motion without pain, normal range of motion and neck supple.      Right lower leg: Swelling present. No tenderness or bony tenderness. 1+ Pitting Edema present.      Left lower leg: Swelling present. No tenderness or bony tenderness. 1+ Pitting Edema present.      Right ankle: Swelling present. No deformity, ecchymosis or lacerations. No tenderness.  Normal range of motion.      Right Achilles Tendon: Normal.      Left ankle: Swelling present. No deformity, ecchymosis or lacerations. No tenderness. Normal range of motion.      Left Achilles Tendon: Normal.      Right foot: Normal range of motion. Swelling present. No tenderness or bony tenderness.      Left foot: Normal range of motion. Swelling present. No tenderness or bony tenderness.      Comments: Multiple clear fluid filled blisters to bilateral lower extremities with mild erythema. No purulent or honey crust discharge. Normal sensation. 2+ DP pulses. Normal ROM. No TTP.      Neurological: She is alert and oriented to person, place, and time. She has normal strength. No sensory deficit. GCS eye subscore is 4. GCS verbal subscore is 5. GCS motor subscore is 6.   Skin: Skin is dry. Capillary refill takes less than 2 seconds.         ED Course   Procedures  Labs Reviewed   CBC W/ AUTO DIFFERENTIAL - Abnormal       Result Value    WBC 4.65      RBC 3.80 (*)     Hemoglobin 10.2 (*)     Hematocrit 34.2 (*)     MCV 90      MCH 26.8 (*)     MCHC 29.8 (*)     RDW 16.0 (*)     Platelets 168      MPV 10.9      Immature Granulocytes 0.4      Gran # (ANC) 3.5      Immature Grans (Abs) 0.02      Lymph # 0.6 (*)     Mono # 0.5      Eos # 0.1      Baso # 0.02      nRBC 0      Gran % 74.9 (*)     Lymph % 11.8 (*)     Mono % 9.7      Eosinophil % 2.8      Basophil % 0.4      Differential Method Automated     COMPREHENSIVE METABOLIC PANEL - Abnormal    Sodium 140      Potassium 3.9      Chloride 110      CO2 20 (*)     Glucose 133 (*)     BUN 28 (*)     Creatinine 1.2      Calcium 8.7      Total Protein 6.5      Albumin 3.4 (*)     Total Bilirubin 1.5 (*)     Alkaline Phosphatase 109      AST 16      ALT 9 (*)     eGFR 43 (*)     Anion Gap 10     B-TYPE NATRIURETIC PEPTIDE - Abnormal     (*)    HEMOGLOBIN A1C - Abnormal    Hemoglobin A1C 6.7 (*)     Estimated Avg Glucose 146 (*)    TROPONIN I    Troponin I <0.006      MAGNESIUM    Magnesium 1.8     PHOSPHORUS    Phosphorus 3.7     URINALYSIS, REFLEX TO URINE CULTURE   POCT GLUCOSE MONITORING CONTINUOUS   POCT GLUCOSE MONITORING CONTINUOUS     EKG Readings: (Independently Interpreted)   EKG showed a-fib rhythm with 94 bpm. QRS 98 ms.  ms. Mo stemi noted. Signed by Dr. Archibald.        Imaging Results              US Lower Extremity Veins Bilateral (In process)                      X-Ray Chest AP Portable (Final result)  Result time 09/19/24 16:02:08      Final result by Edgard Cazares Jr., MD (09/19/24 16:02:08)                   Impression:      Mild cardiomegaly.  Prior sternotomy.      Electronically signed by: Edgard Cazares MD  Date:    09/19/2024  Time:    16:02               Narrative:    EXAMINATION:  XR CHEST AP PORTABLE    CLINICAL HISTORY:  CHF;    TECHNIQUE:  Single frontal view of the chest was performed.    COMPARISON:  Chest x-ray of May 24, 2024    FINDINGS:  Patient has had a prior sternotomy.  There is mild cardiomegaly.  An intrapulmonary mass or infiltrate is not seen.  There is no pneumothorax or pleural effusion.                                       Medications   sodium chloride 0.9% flush 10 mL (has no administration in time range)   ondansetron disintegrating tablet 4 mg (has no administration in time range)   polyethylene glycol packet 17 g (has no administration in time range)   glucose chewable tablet 16 g (has no administration in time range)   glucose chewable tablet 24 g (has no administration in time range)   glucagon (human recombinant) injection 1 mg (has no administration in time range)   insulin aspart U-100 pen 0-5 Units (has no administration in time range)   dextrose 10% bolus 125 mL 125 mL (has no administration in time range)   dextrose 10% bolus 250 mL 250 mL (has no administration in time range)   carvediloL tablet 12.5 mg (has no administration in time range)   insulin glargine U-100 (Lantus) pen 20 Units (has no  administration in time range)   furosemide injection 20 mg (has no administration in time range)   furosemide injection 20 mg (has no administration in time range)   potassium bicarbonate disintegrating tablet 40 mEq (40 mEq Oral Given 9/19/24 1843)   furosemide injection 20 mg (20 mg Intravenous Given 9/19/24 1843)     Medical Decision Making  89-year-old female with a past medical history CKD, diabetes, hypertension, hyperlipidemia, GERD, CAD, AFib on Eliquis 5 mg presents to ED for bilateral leg swelling.    Patient's chart and medical history reviewed.    Ddx:  CHF  Dependent edema  PVD  Cellulitis    Patient's vitals reviewed.  Afebrile, no respiratory distress, and nontoxic-appearing in the ED. Patient had bilateral lower extremity swelling with 1+ pitting edema with multiple clear fluid filled blisters to bilateral lower extremities with mild erythema. No purulent or honey crust discharge. Normal sensation. 2+ DP pulses. Normal ROM. No TTP. EKG showed a-fib rhythm with 94 bpm. QRS 98 ms.  ms. Mo stemi noted. Signed by Dr. Archibald. Chest x-ray was unremarkable per my personal interpretation. Official chest x-ray interpretation showed Mild cardiomegaly. Prior sternotomy. CBC showed anemia of 3.80 with an H&H of 10.2 and 34.2 respectively, no need for transfusion at this time.  No leukocytosis.  Troponin in normal range, MI unlikely.  CMP overall at baseline, glucose of 133, elevated BUN of 28 and a GFR of 43.  Patient has known CKD. BNP is elevated to 535.  Patient given a dose of Lasix and potassium in the ED.  Discussed this case with Dr. Archibald.  Discussed this case with [facility] medicine Iris Mancilla NP patient will be admitted for further management and diuresis. Patient agrees with this plan.     Amount and/or Complexity of Data Reviewed  External Data Reviewed: labs, radiology, ECG and notes.  Labs: ordered.  Radiology: ordered.  ECG/medicine tests: ordered.    Risk  Prescription drug  management.              Attending Attestation:     Physician Attestation Statement for NP/PA:   I personally made/approved the management plan and take responsibility for the patient management.    Other NP/PA Attestation Additions:    History of Present Illness: 89-year-old female presents for evaluation of leg swelling, multiple comorbidities   Physical Exam: Bilateral lower extremity swelling, chronically ill-appearing GCS 15   Medical Decision Makin-year-old female history of AFib, EKG confirms AFib with no STEMI, rate 94, QRS 98, interpreted by myself.  Elevated BNP, cardiomegaly on chest x-ray.  Potassium 3.9.  Through shared decision-making with patient and family will plan for observation for diuresis, electrolyte replacement and evaluation.  Physician assistant spoke with hospitalist team who will admit for further management evaluation                                    Clinical Impression:  Final diagnoses:  [R06.02] Shortness of breath  [I50.9] Acute on chronic congestive heart failure, unspecified heart failure type (Primary)  [R60.0] Bilateral lower extremity edema  [I48.20] Chronic a-fib          ED Disposition Condition    Observation Stable                Holdsworth, Alayna, PA-C  24       Jose Manuel Archibald Jr.,   24

## 2024-09-19 NOTE — TELEPHONE ENCOUNTER
Pt has a broken femur per her daughter. She is a DM. Her legs and feet are swollen and leaking fluid since last night. Top of foot by the bunion on left foot is blue.swelling from foot to calves on both legs more so on the left leg. Care advice recommend pt go to Er. Cg verbalized understanding.   Reason for Disposition   SEVERE swelling (e.g., swelling extends above knee, entire leg is swollen, weeping fluid)    Additional Information   Negative: Sounds like a life-threatening emergency to the triager   Negative: Difficulty breathing at rest   Negative: Entire foot is cool or blue in comparison to other side    Protocols used: Leg Swelling and Edema-A-OH

## 2024-09-20 VITALS
TEMPERATURE: 98 F | OXYGEN SATURATION: 100 % | BODY MASS INDEX: 29.66 KG/M2 | WEIGHT: 189 LBS | HEIGHT: 67 IN | RESPIRATION RATE: 18 BRPM | SYSTOLIC BLOOD PRESSURE: 129 MMHG | DIASTOLIC BLOOD PRESSURE: 69 MMHG | HEART RATE: 84 BPM

## 2024-09-20 LAB
ALBUMIN SERPL BCP-MCNC: 3.1 G/DL (ref 3.5–5.2)
ALP SERPL-CCNC: 93 U/L (ref 55–135)
ALT SERPL W/O P-5'-P-CCNC: 8 U/L (ref 10–44)
ANION GAP SERPL CALC-SCNC: 9 MMOL/L (ref 8–16)
AORTIC ROOT ANNULUS: 2.82 CM
AORTIC VALVE CUSP SEPERATION: 0.79 CM
APICAL FOUR CHAMBER EJECTION FRACTION: 45 %
APICAL TWO CHAMBER EJECTION FRACTION: 54 %
ASCENDING AORTA: 2.75 CM
AST SERPL-CCNC: 17 U/L (ref 10–40)
AV INDEX (PROSTH): 0.56
AV MEAN GRADIENT: 12 MMHG
AV PEAK GRADIENT: 21 MMHG
AV VALVE AREA BY VELOCITY RATIO: 1.51 CM²
AV VALVE AREA: 1.95 CM²
AV VELOCITY RATIO: 0.43
BILIRUB SERPL-MCNC: 1.5 MG/DL (ref 0.1–1)
BSA FOR ECHO PROCEDURE: 2.01 M2
BUN SERPL-MCNC: 25 MG/DL (ref 8–23)
CALCIUM SERPL-MCNC: 8.6 MG/DL (ref 8.7–10.5)
CHLORIDE SERPL-SCNC: 109 MMOL/L (ref 95–110)
CO2 SERPL-SCNC: 22 MMOL/L (ref 23–29)
CREAT SERPL-MCNC: 1.1 MG/DL (ref 0.5–1.4)
CV ECHO LV RWT: 0.37 CM
DOP CALC AO PEAK VEL: 2.31 M/S
DOP CALC AO VTI: 32 CM
DOP CALC LVOT AREA: 3.5 CM2
DOP CALC LVOT DIAMETER: 2.11 CM
DOP CALC LVOT PEAK VEL: 1 M/S
DOP CALC LVOT STROKE VOLUME: 62.56 CM3
DOP CALC MV VTI: 33.6 CM
DOP CALCLVOT PEAK VEL VTI: 17.9 CM
E WAVE DECELERATION TIME: 152.98 MSEC
E/E' RATIO: 29.67 M/S
ECHO LV POSTERIOR WALL: 0.75 CM (ref 0.6–1.1)
ERYTHROCYTE [DISTWIDTH] IN BLOOD BY AUTOMATED COUNT: 15.9 % (ref 11.5–14.5)
EST. GFR  (NO RACE VARIABLE): 48 ML/MIN/1.73 M^2
ESTIMATED AVG GLUCOSE: 143 MG/DL (ref 68–131)
FRACTIONAL SHORTENING: 20 % (ref 28–44)
GLUCOSE SERPL-MCNC: 101 MG/DL (ref 70–110)
HBA1C MFR BLD: 6.6 % (ref 4.5–6.2)
HCT VFR BLD AUTO: 31.9 % (ref 37–48.5)
HGB BLD-MCNC: 9.7 G/DL (ref 12–16)
HR MV ECHO: 88 BPM
INTERVENTRICULAR SEPTUM: 0.81 CM (ref 0.6–1.1)
IVRT: 68.51 MSEC
LA MAJOR: 4.94 CM
LEFT ATRIUM AREA SYSTOLIC (APICAL 2 CHAMBER): 26.52 CM2
LEFT ATRIUM AREA SYSTOLIC (APICAL 4 CHAMBER): 18.01 CM2
LEFT ATRIUM VOLUME INDEX MOD: 39.8 ML/M2
LEFT ATRIUM VOLUME MOD: 78.49 CM3
LEFT INTERNAL DIMENSION IN SYSTOLE: 3.3 CM (ref 2.1–4)
LEFT VENTRICLE DIASTOLIC VOLUME INDEX: 37.61 ML/M2
LEFT VENTRICLE DIASTOLIC VOLUME: 74.1 ML
LEFT VENTRICLE END DIASTOLIC VOLUME APICAL 2 CHAMBER: 39.38 ML
LEFT VENTRICLE END DIASTOLIC VOLUME APICAL 4 CHAMBER: 46.34 ML
LEFT VENTRICLE END SYSTOLIC VOLUME APICAL 2 CHAMBER: 84.05 ML
LEFT VENTRICLE END SYSTOLIC VOLUME APICAL 4 CHAMBER: 53.86 ML
LEFT VENTRICLE MASS INDEX: 48 G/M2
LEFT VENTRICLE SYSTOLIC VOLUME INDEX: 22.4 ML/M2
LEFT VENTRICLE SYSTOLIC VOLUME: 44.12 ML
LEFT VENTRICULAR INTERNAL DIMENSION IN DIASTOLE: 4.1 CM (ref 3.5–6)
LEFT VENTRICULAR MASS: 94.12 G
LV LATERAL E/E' RATIO: 22.25 M/S
LV SEPTAL E/E' RATIO: 44.5 M/S
LVED V (TEICH): 74.1 ML
LVES V (TEICH): 44.12 ML
LVOT MG: 8.27 MMHG
LVOT MV: 1.34 CM/S
MAGNESIUM SERPL-MCNC: 1.7 MG/DL (ref 1.6–2.6)
MCH RBC QN AUTO: 26.9 PG (ref 27–31)
MCHC RBC AUTO-ENTMCNC: 30.4 G/DL (ref 32–36)
MCV RBC AUTO: 89 FL (ref 82–98)
MR PISA EROA: 0.21 CM2
MV MEAN GRADIENT: 4 MMHG
MV PEAK E VEL: 1.78 M/S
MV PEAK GRADIENT: 12 MMHG
MV STENOSIS PRESSURE HALF TIME: 44.73 MS
MV VALVE AREA BY CONTINUITY EQUATION: 1.86 CM2
MV VALVE AREA P 1/2 METHOD: 4.92 CM2
OHS CV RV/LV RATIO: 1.02 CM
OHS LV EJECTION FRACTION SIMPSONS BIPLANE MOD: 49 %
PHOSPHATE SERPL-MCNC: 3.5 MG/DL (ref 2.7–4.5)
PISA MRMAX VEL: 5.21 M/S
PISA RADIUS: 0.69 CM
PISA TR MAX VEL: 3.1 M/S
PISA VN NYQUIST MS: 0.37 M/S
PISA VN NYQUIST: 0.37 M/S
PLATELET # BLD AUTO: 172 K/UL (ref 150–450)
PMV BLD AUTO: 10.7 FL (ref 9.2–12.9)
POCT GLUCOSE: 134 MG/DL (ref 70–110)
POCT GLUCOSE: 196 MG/DL (ref 70–110)
POCT GLUCOSE: 77 MG/DL (ref 70–110)
POTASSIUM SERPL-SCNC: 4 MMOL/L (ref 3.5–5.1)
PROT SERPL-MCNC: 5.8 G/DL (ref 6–8.4)
PV MV: 0.66 M/S
PV PEAK GRADIENT: 3 MMHG
PV PEAK VELOCITY: 0.92 M/S
RA MAJOR: 4.35 CM
RA PRESSURE ESTIMATED: 15 MMHG
RBC # BLD AUTO: 3.6 M/UL (ref 4–5.4)
RIGHT VENTRICLE DIASTOLIC BASEL DIMENSION: 4 CM
RIGHT VENTRICLE DIASTOLIC LENGTH: 5.1 CM
RIGHT VENTRICULAR END-DIASTOLIC DIMENSION: 4.2 CM
RIGHT VENTRICULAR LENGTH IN DIASTOLE (APICAL 4-CHAMBER VIEW): 5.08 CM
RV TB RVSP: 18 MMHG
RV TISSUE DOPPLER FREE WALL SYSTOLIC VELOCITY 1 (APICAL 4 CHAMBER VIEW): 8.81 CM/S
SODIUM SERPL-SCNC: 140 MMOL/L (ref 136–145)
STJ: 2.74 CM
TDI LATERAL: 0.08 M/S
TDI SEPTAL: 0.04 M/S
TDI: 0.06 M/S
TR MAX PG: 38 MMHG
TRICUSPID ANNULAR PLANE SYSTOLIC EXCURSION: 2.05 CM
TROPONIN I SERPL DL<=0.01 NG/ML-MCNC: <0.006 NG/ML (ref 0–0.03)
TV REST PULMONARY ARTERY PRESSURE: 53 MMHG
WBC # BLD AUTO: 4.32 K/UL (ref 3.9–12.7)
Z-SCORE OF LEFT VENTRICULAR DIMENSION IN END DIASTOLE: -3.23
Z-SCORE OF LEFT VENTRICULAR DIMENSION IN END SYSTOLE: -0.42

## 2024-09-20 PROCEDURE — 25000003 PHARM REV CODE 250

## 2024-09-20 PROCEDURE — 97161 PT EVAL LOW COMPLEX 20 MIN: CPT

## 2024-09-20 PROCEDURE — 97535 SELF CARE MNGMENT TRAINING: CPT

## 2024-09-20 PROCEDURE — 80053 COMPREHEN METABOLIC PANEL: CPT

## 2024-09-20 PROCEDURE — 83036 HEMOGLOBIN GLYCOSYLATED A1C: CPT

## 2024-09-20 PROCEDURE — 36415 COLL VENOUS BLD VENIPUNCTURE: CPT

## 2024-09-20 PROCEDURE — 84100 ASSAY OF PHOSPHORUS: CPT

## 2024-09-20 PROCEDURE — 83735 ASSAY OF MAGNESIUM: CPT

## 2024-09-20 PROCEDURE — 63600175 PHARM REV CODE 636 W HCPCS

## 2024-09-20 PROCEDURE — 97165 OT EVAL LOW COMPLEX 30 MIN: CPT

## 2024-09-20 PROCEDURE — 85027 COMPLETE CBC AUTOMATED: CPT

## 2024-09-20 PROCEDURE — 84484 ASSAY OF TROPONIN QUANT: CPT

## 2024-09-20 PROCEDURE — 94761 N-INVAS EAR/PLS OXIMETRY MLT: CPT

## 2024-09-20 PROCEDURE — G0378 HOSPITAL OBSERVATION PER HR: HCPCS

## 2024-09-20 PROCEDURE — 96376 TX/PRO/DX INJ SAME DRUG ADON: CPT

## 2024-09-20 RX ORDER — SODIUM,POTASSIUM PHOSPHATES 280-250MG
2 POWDER IN PACKET (EA) ORAL
Status: DISCONTINUED | OUTPATIENT
Start: 2024-09-20 | End: 2024-09-20 | Stop reason: HOSPADM

## 2024-09-20 RX ORDER — LANOLIN ALCOHOL/MO/W.PET/CERES
800 CREAM (GRAM) TOPICAL
Status: DISCONTINUED | OUTPATIENT
Start: 2024-09-20 | End: 2024-09-20 | Stop reason: HOSPADM

## 2024-09-20 RX ORDER — FUROSEMIDE 20 MG/1
20 TABLET ORAL DAILY
Qty: 30 TABLET | Refills: 0 | Status: SHIPPED | OUTPATIENT
Start: 2024-09-20 | End: 2024-10-20

## 2024-09-20 RX ADMIN — APIXABAN 5 MG: 2.5 TABLET, FILM COATED ORAL at 10:09

## 2024-09-20 RX ADMIN — FUROSEMIDE 20 MG: 10 INJECTION, SOLUTION INTRAMUSCULAR; INTRAVENOUS at 10:09

## 2024-09-20 RX ADMIN — HYDROCODONE BITARTRATE AND ACETAMINOPHEN 1 TABLET: 5; 325 TABLET ORAL at 10:09

## 2024-09-20 NOTE — ASSESSMENT & PLAN NOTE
"Multiple areas noted for altered skin integrity that appear to be candidiasis of skin that patient states "Won't go away"    -IP consult to wound care nurse for recommendations    "

## 2024-09-20 NOTE — ASSESSMENT & PLAN NOTE
"Patient's FSGs are controlled on current medication regimen.  Last A1c reviewed-   Lab Results   Component Value Date    HGBA1C 6.7 (H) 09/19/2024     Most recent fingerstick glucose reviewed- No results for input(s): "POCTGLUCOSE" in the last 24 hours.  Current correctional scale  Low  Decrease anti-hyperglycemic dose as follows-   Antihyperglycemics (From admission, onward)      Start     Stop Route Frequency Ordered    09/19/24 2100  insulin glargine U-100 (Lantus) pen 20 Units         -- SubQ Nightly 09/19/24 1857 09/19/24 1951  insulin aspart U-100 pen 0-5 Units         -- SubQ Before meals & nightly PRN 09/19/24 1851          Hold Oral hypoglycemics while patient is in the hospital.     "

## 2024-09-20 NOTE — PLAN OF CARE
Problem: Physical Therapy  Goal: Physical Therapy Goal  Description: Goals to be met by: 2024     Patient will increase functional independence with mobility by performin. Supine to sit with Modified [place]  2. Sit to stand transfer with Modified [place]  3. Bed to chair transfer with Contact Guard Assistance using Rolling Walker  4. Gait  x 150 feet with Contact Guard Assistance using Rolling Walker.   5. Lower extremity exercise program x20 reps   Outcome: Progressing   PT eval and treat. Gait 20ft x2 with RW min assist. LIT

## 2024-09-20 NOTE — ASSESSMENT & PLAN NOTE
Patient is identified as having  heart failure that is Acute on chronic. CHF is currently uncontrolled due to Continued edema of extremities. Latest ECHO performed and demonstrates- Results for orders placed during the hospital encounter of 11/16/23    Echo    Interpretation Summary    Left Ventricle: The left ventricle is normal in size. Mildly increased wall thickness. There is mild concentric hypertrophy. Normal wall motion. There is normal systolic function with a visually estimated ejection fraction of 60 - 65%. There is normal diastolic function.    Right Ventricle: Normal right ventricular cavity size. Wall thickness is normal. Right ventricle wall motion  is normal. Systolic function is normal.    Left Atrium: Left atrium is severely dilated.    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    Mitral Valve: There is moderate bileaflet sclerosis. Moderately calcified posterior leaflet. There is mild anterior mitral annular calcification present. There is mild stenosis. The mean pressure gradient across the mitral valve is 4 mmHg at a heart rate of  bpm. There is moderate regurgitation with a centrally directed jet.    Tricuspid Valve: There is mild regurgitation with a centrally directed jet.    IVC/SVC: Elevated venous pressure at 15 mmHg.  . Continue Beta Blocker and Furosemide and monitor clinical status closely. Monitor on telemetry. Patient is on CHF pathway.  Monitor strict Is&Os and daily weights.  Place on fluid restriction of 1.5 L. Cardiology has not been consulted. Continue to stress to patient importance of self efficacy and  on diet for CHF. Last BNP reviewed- and noted below   Recent Labs   Lab 09/19/24  1617   *

## 2024-09-20 NOTE — ASSESSMENT & PLAN NOTE
Patient's FSGs are controlled on current medication regimen.  Last A1c reviewed-   Lab Results   Component Value Date    HGBA1C 6.6 (H) 09/20/2024     Most recent fingerstick glucose reviewed-   Recent Labs   Lab 09/19/24  2304   POCTGLUCOSE 175*     Resume home Lantus and metformin

## 2024-09-20 NOTE — NURSING
Awake, alert and oriented x4. Reviewed room/unit assigned. Questions answered and encouraged.Questions answered and encouraged. 20g to lt dorsal aspect with site free of s/s of infiltration.  HR, 131/69, 99% RA. Denies any complaints.

## 2024-09-20 NOTE — DISCHARGE INSTRUCTIONS
MASD of the abdominal folds, groin, breasts, thighs  BLE stasis dermatitis with venous ulcers  Stage 1 sacral pressure ulcer     Plan:  1. Triad to all skin folds every shift or twice a day  2.  Aquaphor to the Bilateral lower extremities daily  3. Triad to the sacrum every shift or twice a day

## 2024-09-20 NOTE — DISCHARGE SUMMARY
[facility]  Discharge Summary      Patient Name: Leslie Tolliver  MRN: [id number]  [facility]: [id number]  Patient Class: OP- Observation  Admission Date: 9/19/2024  Hospital Length of Stay: 0 days  Discharge Date and Time:  09/20/2024 10:49 AM  Attending Physician: Candido Jenkins MD   Discharging Provider: Candido Jenkins MD  Primary Care Provider: Chang Christianson MD    Primary Care Team: Networked reference to record PCT     HPI:   Leslie Tolliver is a 89 year old female with a previous medical history of CHF, TAVR, DMII, CAD, HTN, Hypothyroidism, Afib on Eliquis, CKD3, and left hip fracture who presented to the emergency room for leg swelling with weeping wounds. The patient endorses that both of legs have been swollen since being discharged from SNF after her hip fracture. She does not take a daily diuretic. She reports compliance with all of her other medications but has been eating more salt than usual. She is wheelchair bound but denies any shortness of breath when she attempts to stand and walk twice daily. What changed was that yesterday she began having blisters to both of her lower legs along with redness and then the blisters popped leaving her blankets and socks completely wet. She denies any chest pain, fevers or chills. Initial ED evaluation showed included a chest xray read as mild cardiomegaly only, BNP elevated at 535, Troponin negative, CMP showed elevated bilirubin at 1.5 with normal creatinine, CBC showed stable anemia and normal WBC. Urine studies positive for infection. She was given oral K for a potassium of 3.9 along with 20mg of IV lasix. She was given another 20mg of lasix upon admit with good urine output noted. Patient continued on 20mg of lasix IV daily. Urine studies positive for infection and she was initiated on IV rocephin. Patient admitted by hospital medicine for further evaluation and management.     * No surgery found *       Hospital Course:   Patient is a 89 year old female, baseline wheelchair bound, presented with worsening bilateral leg swelling. Patient states she recently had a hip fracture and was in [facility] for a month, after discharge she noticed her legs are progressively getting swollen. Previous echo from Nov 2023 shows normal EF and normal diastolic function. Repeat echo is pending. Patient was given IV Lasix in the hospital, with improvement of swelling and DC with PO lasix 20 mg daily. Her UA shows some WBC, but patient does not have any urinary symptoms. Therefore I will not continue antibiotics on discharge      Goals of Care Treatment Preferences:  Code Status: Full Code    Physical Exam  Vitals reviewed.   Constitutional:       Appearance: Normal appearance. She is obese.   HENT:      Head: Normocephalic and atraumatic.      Nose: Nose normal.      Mouth/Throat:      Mouth: Mucous membranes are dry.      Pharynx: Oropharynx is clear.   Eyes:      Extraocular Movements: Extraocular movements intact.      Pupils: Pupils are equal, round, and reactive to light.   Cardiovascular:      Rate and Rhythm: Normal rate. Rhythm irregular.      Heart sounds: Normal heart sounds.   Pulmonary:      Effort: Pulmonary effort is normal.      Breath sounds: Normal breath sounds.   Abdominal:      General: Bowel sounds are normal. There is no distension.      Palpations: Abdomen is soft.   Musculoskeletal:         General: Tenderness present. Normal range of motion.      Cervical back: Normal range of motion and neck supple.      Right lower leg: trace+ Edema present.      Left lower leg: trace+ Edema present.   Skin:     General: Skin is warm and dry.      Capillary Refill: Capillary refill takes less than 2 seconds.      Findings: Erythema and wounds present       Consults:   Consults (From admission, onward)          Status Ordering Provider     Inpatient consult to Social Work/Case Management  Once        Provider:  (Not yet  assigned)    Completed GUS ABDI     Inpatient consult to Registered Dietitian/Nutritionist  Once        Provider:  (Not yet assigned)    Acknowledged GUS ABDI            Cardiac/Vascular  * Acute exacerbation of CHF (congestive heart failure)  Results for orders placed during the hospital encounter of 11/16/23    Echo    Interpretation Summary    Left Ventricle: The left ventricle is normal in size. Mildly increased wall thickness. There is mild concentric hypertrophy. Normal wall motion. There is normal systolic function with a visually estimated ejection fraction of 60 - 65%. There is normal diastolic function.    Right Ventricle: Normal right ventricular cavity size. Wall thickness is normal. Right ventricle wall motion  is normal. Systolic function is normal.    Left Atrium: Left atrium is severely dilated.    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    Mitral Valve: There is moderate bileaflet sclerosis. Moderately calcified posterior leaflet. There is mild anterior mitral annular calcification present. There is mild stenosis. The mean pressure gradient across the mitral valve is 4 mmHg at a heart rate of  bpm. There is moderate regurgitation with a centrally directed jet.    Tricuspid Valve: There is mild regurgitation with a centrally directed jet.    IVC/SVC: Elevated venous pressure at 15 mmHg.    Recent Labs   Lab 09/19/24  1617   *     - received IV Lasix, start PO Lasix 20 mg daily on discharge   - rpt echo pending   - cont Coreg   - compression stockings     Atrial fibrillation  Patient with Long standing persistent (>12 months) atrial fibrillation which is controlled currently with Beta Blocker. Patient is currently in atrial fibrillation.Eliquis 5mg BID continued    Status post aortic valve replacement with bioprosthetic valve  Chronic condition that is stable      Hypertension associated with diabetes  Chronic, controlled. Latest blood pressure and  vitals reviewed-       Home meds for hypertension were reviewed and noted below.   Hypertension Medications               carvediloL (COREG) 12.5 MG tablet Take 1 tablet (12.5 mg total) by mouth 2 (two) times daily.          Resume Coreg  Added Lasix       Renal/  UTI (urinary tract infection)  Ruled out  No antibiotics on discharge       Endocrine  Type 2 diabetes mellitus with diabetic polyneuropathy, with long-term current use of insulin  Patient's FSGs are controlled on current medication regimen.  Last A1c reviewed-   Lab Results   Component Value Date    HGBA1C 6.6 (H) 09/20/2024     Most recent fingerstick glucose reviewed-   Recent Labs   Lab 09/19/24  2304   POCTGLUCOSE 175*     Resume home Lantus and metformin       Orthopedic  Pressure injury of sacral region, stage 2  Wound care       Other  Leg swelling  Probably lymphedema.  US negative for DVT  Lasix and compression stockings on discharge as above       Candidiasis of skin  - cont wound care         Final Active Diagnoses:    Diagnosis Date Noted POA    PRINCIPAL PROBLEM:  Acute exacerbation of CHF (congestive heart failure) [I50.9] 09/19/2024 Yes    Pressure injury of sacral region, stage 2 [L89.152] 09/19/2024 Yes    Leg swelling [M79.89] 09/19/2024 Yes    Atrial fibrillation [I48.91] 09/19/2024 Yes    UTI (urinary tract infection) [N39.0] 09/19/2024 Yes    Candidiasis of skin [B37.2] 11/19/2023 Yes    Status post aortic valve replacement with bioprosthetic valve [Z95.3] 06/03/2020 Not Applicable    Type 2 diabetes mellitus with diabetic polyneuropathy, with long-term current use of insulin [E11.42, Z79.4]  Not Applicable    Hypertension associated with diabetes [E11.59, I15.2]  Yes      Problems Resolved During this Admission:       Discharged Condition: good    Disposition: Home or Self Care    Follow Up:   Follow-up Information       Chang Christianson MD Follow up in 1 week(s).    Specialty: Family Medicine  Contact information:  [address]  [facility] [address]  [facility] [address]  [phone]                           Patient Instructions:      Ambulatory referral/consult to Outpatient Case Management   Referral Priority: Routine Referral Type: Consultation   Referral Reason: Specialty Services Required   Number of Visits Requested: 1       Significant Diagnostic Studies: Labs: CMP   Recent Labs   Lab 09/19/24  1617 09/20/24  0512    140   K 3.9 4.0    109   CO2 20* 22*   * 101   BUN 28* 25*   CREATININE 1.2 1.1   CALCIUM 8.7 8.6*   PROT 6.5 5.8*   ALBUMIN 3.4* 3.1*   BILITOT 1.5* 1.5*   ALKPHOS 109 93   AST 16 17   ALT 9* 8*   ANIONGAP 10 9    and CBC   Recent Labs   Lab 09/19/24 1617 09/20/24 0512   WBC 4.65 4.32   HGB 10.2* 9.7*   HCT 34.2* 31.9*    172       Pending Diagnostic Studies:       Procedure Component Value Units Date/Time    Echo [[id number]]     Order Status: Sent Lab Status: No result            Medications:  Reconciled Home Medications:      Medication List        START taking these medications      furosemide 20 MG tablet  Commonly known as: LASIX  Take 1 tablet (20 mg total) by mouth once daily.            CONTINUE taking these medications      apixaban 5 mg Tab  Commonly known as: ELIQUIS  Take 1 tablet (5 mg total) by mouth 2 (two) times daily.     carvediloL 12.5 MG tablet  Commonly known as: COREG  Take 1 tablet (12.5 mg total) by mouth 2 (two) times daily.     empagliflozin 10 mg tablet  Commonly known as: Jardiance  Take 1 tablet (10 mg total) by mouth once daily.     ketoconazole 2 % cream  Commonly known as: NIZORAL  Apply 1 application  topically 2 (two) times daily.     LANTUS SOLOSTAR U-100 INSULIN 100 unit/mL (3 mL) Inpn pen  Generic drug: insulin glargine U-100 (Lantus)  Inject 24 Units into the skin every evening.     metFORMIN 1000 MG tablet  Commonly known as: GLUCOPHAGE  Take 1 tablet (1,000 mg total) by mouth 2 (two) times daily with meals.     multivitamin per tablet  Commonly known as:  THERAGRAN  Take 1 tablet by mouth once daily.     VITAMIN D3 125 mcg (5,000 unit) Tab  Generic drug: cholecalciferol (vitamin D3)  Take 5,000 Units by mouth once daily.            STOP taking these medications      insulin aspart U-100 100 unit/mL (3 mL) Inpn pen  Commonly known as: NovoLOG              Indwelling Lines/Drains at time of discharge:   Lines/Drains/Airways       Drain  Duration             Female External Urinary Catheter w/ Suction 09/19/24 1900 <1 day                    Time spent on the discharge of patient: 40 minutes         Candido Jenkins MD  Department of Hospital Medicine  [facility]/Surg

## 2024-09-20 NOTE — ASSESSMENT & PLAN NOTE
Results for orders placed during the hospital encounter of 11/16/23    Echo    Interpretation Summary    Left Ventricle: The left ventricle is normal in size. Mildly increased wall thickness. There is mild concentric hypertrophy. Normal wall motion. There is normal systolic function with a visually estimated ejection fraction of 60 - 65%. There is normal diastolic function.    Right Ventricle: Normal right ventricular cavity size. Wall thickness is normal. Right ventricle wall motion  is normal. Systolic function is normal.    Left Atrium: Left atrium is severely dilated.    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    Mitral Valve: There is moderate bileaflet sclerosis. Moderately calcified posterior leaflet. There is mild anterior mitral annular calcification present. There is mild stenosis. The mean pressure gradient across the mitral valve is 4 mmHg at a heart rate of  bpm. There is moderate regurgitation with a centrally directed jet.    Tricuspid Valve: There is mild regurgitation with a centrally directed jet.    IVC/SVC: Elevated venous pressure at 15 mmHg.    Recent Labs   Lab 09/19/24  1617   *     - received IV Lasix, start PO Lasix 20 mg daily on discharge   - rpt echo pending   - cont Coreg   - compression stockings

## 2024-09-20 NOTE — H&P
[facility] Medicine  History & Physical    Patient Name: Leslie Tolliver  MRN: [id number]  Patient Class: OP- Observation  Admission Date: 9/19/2024  Attending Physician: Candido Jenkins MD   Primary Care Provider: Chang Christianson MD         Patient information was obtained from patient, past medical records, and ER records.     Subjective:     Principal Problem:Acute exacerbation of CHF (congestive heart failure)    Chief Complaint:   Chief Complaint   Patient presents with    Leg Swelling     Bilateral leg swelling starting last night.        HPI: Leslie Tolliver is a 89 year old female with a previous medical history of CHF, TAVR, DMII, CAD, HTN, Hypothyroidism, Afib on Eliquis, CKD3, and left hip fracture who presented to the emergency room for leg swelling with weeping wounds. The patient endorses that both of legs have been swollen since being discharged from SNF after her hip fracture. She does not take a daily diuretic. She reports compliance with all of her other medications but has been eating more salt than usual. She is wheelchair bound but denies any shortness of breath when she attempts to stand and walk twice daily. What changed was that yesterday she began having blisters to both of her lower legs along with redness and then the blisters popped leaving her blankets and socks completely wet. She denies any chest pain, fevers or chills. Initial ED evaluation showed included a chest xray read as mild cardiomegaly only, BNP elevated at 535, Troponin negative, CMP showed elevated bilirubin at 1.5 with normal creatinine, CBC showed stable anemia and normal WBC. Urine studies positive for infection. She was given oral K for a potassium of 3.9 along with 20mg of IV lasix. \Patient continued on 20mg of lasix IV daily. Urine studies positive for infection and she was initiated on IV rocephin. Patient admitted by hospital medicine for further evaluation and  management.     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    GERD (gastroesophageal reflux disease)     Hyperlipidemia     Hypertension     Hypothyroidism     Paroxysmal atrial fibrillation 3/16/2023    Type 2 diabetes mellitus        Past Surgical History:   Procedure Laterality Date    ADENOIDECTOMY      AORTIC VALVE REPLACEMENT      BREAST BIOPSY Right 20 yrs ago    benign    CARDIAC SURGERY      CHOLECYSTECTOMY      COLONOSCOPY  5-    Dr Holly, 5 year recheck    EPIDURAL STEROID INJECTION N/A 3/25/2021    Procedure: Injection, Steroid, Epidural L3-4;  Surgeon: Sky Love MD;  Location: [facility];  Service: Pain Management;  Laterality: N/A;  Injection, Steroid, Epidural L3-4    EPIDURAL STEROID INJECTION N/A 5/20/2021    Procedure: Injection, Steroid, Epidural L3-4;  Surgeon: Sky Love MD;  Location: [facility];  Service: Pain Management;  Laterality: N/A;  Injection, Steroid, Epidural L3-4    ESOPHAGOGASTRODUODENOSCOPY N/A 6/4/2020    Procedure: EGD (ESOPHAGOGASTRODUODENOSCOPY);  Surgeon: Michael Nugent III, MD;  Location: [facility];  Service: Endoscopy;  Laterality: N/A;    HEMORRHOID SURGERY      HYSTERECTOMY      INTRAMEDULLARY RODDING OF FEMUR Left 5/23/2024    Procedure: INSERTION, INTRAMEDULLARY RACQUEL, FEMUR;  Surgeon: Bravo Guillermo MD;  Location: [facility];  Service: Orthopedics;  Laterality: Left;    OOPHORECTOMY      TONSILLECTOMY         Review of patient's allergies indicates:   Allergen Reactions    Fenofibric acid (choline)       Other reaction(s): Vomiting    Iodine      Other reaction(s): lips swollen ivp dye    Rosuvastatin      Other reaction(s): muscle pain       No current facility-administered medications on file prior to encounter.     Current Outpatient Medications on File Prior to Encounter   Medication Sig    apixaban (ELIQUIS) 5 mg Tab Take 1 tablet (5 mg total) by mouth 2 (two) times daily.    carvediloL (COREG) 12.5 MG tablet Take 1 tablet (12.5 mg total) by mouth 2 (two) times daily.    cholecalciferol, vitamin D3, (VITAMIN D3) 125 mcg (5,000 unit) Tab Take 5,000 Units by mouth once daily.    empagliflozin (JARDIANCE) 10 mg tablet Take 1 tablet (10 mg total) by mouth once daily.    insulin (LANTUS SOLOSTAR U-100 INSULIN) glargine 100 units/mL SubQ pen Inject 24 Units into the skin every evening.    ketoconazole (NIZORAL) 2 % cream Apply 1 application  topically 2 (two) times daily.    metFORMIN (GLUCOPHAGE) 1000 MG tablet Take 1 tablet (1,000 mg total) by mouth 2 (two) times daily with meals.    multivitamin (THERAGRAN) per tablet Take 1 tablet by mouth once daily.    insulin aspart U-100 (NOVOLOG) 100 unit/mL (3 mL) InPn pen Inject 1-10 Units into the skin before meals and at bedtime as needed (Hyperglycemia). (Patient not taking: Reported on 2024)    [DISCONTINUED] HYDROcodone-acetaminophen (NORCO) 5-325 mg per tablet Take 1 tablet by mouth every 6 (six) hours as needed for Pain.     Family History       Problem Relation (Age of Onset)    Cancer Father    Diabetes Mother, Brother    Early death Son, Son    Glaucoma Mother    Heart disease Mother    No Known Problems Daughter          Tobacco Use    Smoking status: Former     Current packs/day: 0.00     Average packs/day: 0.3 packs/day for 15.0 years (3.8 ttl pk-yrs)     Types: Cigarettes     Start date: 3/30/1959     Quit date: 3/30/1974     Years since quittin.5    Smokeless tobacco: Never   Substance and Sexual Activity    Alcohol use: No    Drug use: No     Sexual activity: Never     Review of Systems   Cardiovascular:  Positive for leg swelling.   Musculoskeletal:  Positive for arthralgias and gait problem.   Skin:  Positive for color change and wound.   All other systems reviewed and are negative.    Objective:     Vital Signs (Most Recent):  Temp: 97.9 °F (36.6 °C) (24)  Pulse: 100 (24)  Resp: 20 (24)  BP: 112/72 (24)  SpO2: 100 % (24) Vital Signs (24h Range):  Temp:  [97.9 °F (36.6 °C)-98 °F (36.7 °C)] 97.9 °F (36.6 °C)  Pulse:  [] 100  Resp:  [16-20] 20  SpO2:  [95 %-100 %] 100 %  BP: (107-145)/(55-74) 112/72     Weight: 85.9 kg (189 lb 6 oz)  Body mass index is 29.66 kg/m².     Physical Exam  Vitals reviewed.   Constitutional:       Appearance: Normal appearance. She is obese.   HENT:      Head: Normocephalic and atraumatic.      Nose: Nose normal.      Mouth/Throat:      Mouth: Mucous membranes are dry.      Pharynx: Oropharynx is clear.   Eyes:      Extraocular Movements: Extraocular movements intact.      Pupils: Pupils are equal, round, and reactive to light.   Cardiovascular:      Rate and Rhythm: Normal rate. Rhythm irregular.      Pulses:           Radial pulses are 2+ on the right side and 2+ on the left side.        Dorsalis pedis pulses are detected w/ Doppler on the right side and detected w/ Doppler on the left side.        Posterior tibial pulses are detected w/ Doppler on the right side and detected w/ Doppler on the left side.      Heart sounds: Normal heart sounds.   Pulmonary:      Effort: Pulmonary effort is normal.      Breath sounds: Normal breath sounds.   Abdominal:      General: Bowel sounds are normal. There is no distension.      Palpations: Abdomen is soft.   Musculoskeletal:         General: Tenderness present. Normal range of motion.      Cervical back: Normal range of motion and neck supple.      Right lower le+ Edema present.      Left lower le+ Edema present.    Skin:     General: Skin is warm and dry.      Capillary Refill: Capillary refill takes less than 2 seconds.      Findings: Erythema and rash present.             Comments: Swelling/Erythema noted from just below knees to bilateral feet with swelling bullae and weeping blisters.   Please see media for pictures of groin rash, rash under right breast and sacral decubitus  Left surgical scar from previous antwon placement  Mid-line chest surgical scar from sternotomy    Neurological:      General: No focal deficit present.      Mental Status: She is alert and oriented to person, place, and time. Mental status is at baseline.   Psychiatric:         Mood and Affect: Mood normal.         Behavior: Behavior normal.         Thought Content: Thought content normal.         Judgment: Judgment normal.              CRANIAL NERVES     CN III, IV, VI   Pupils are equal, round, and reactive to light.       Significant Labs: All pertinent labs within the past 24 hours have been reviewed.  A1C:   Recent Labs   Lab 09/19/24  1617   HGBA1C 6.7*       Bilirubin:   Recent Labs   Lab 09/19/24  1617   BILITOT 1.5*       BMP:   Recent Labs   Lab 09/19/24  1617   *      K 3.9      CO2 20*   BUN 28*   CREATININE 1.2   CALCIUM 8.7   MG 1.8     CBC:   Recent Labs   Lab 09/19/24  1617   WBC 4.65   HGB 10.2*   HCT 34.2*        CMP:   Recent Labs   Lab 09/19/24  1617      K 3.9      CO2 20*   *   BUN 28*   CREATININE 1.2   CALCIUM 8.7   PROT 6.5   ALBUMIN 3.4*   BILITOT 1.5*   ALKPHOS 109   AST 16   ALT 9*   ANIONGAP 10     Cardiac Markers:   Recent Labs   Lab 09/19/24  1617   *     Magnesium:   Recent Labs   Lab 09/19/24  1617   MG 1.8       Troponin:   Recent Labs   Lab 09/19/24  1617   TROPONINI <0.006     TSH:   Recent Labs   Lab 03/26/24  1549   TSH 2.572  2.636       Urine Studies:   Recent Labs   Lab 09/19/24 2020   COLORU Yellow   APPEARANCEUA Clear   PHUR 6.0   SPECGRAV 1.010    PROTEINUA Negative   GLUCUA 3+*   KETONESU Negative   BILIRUBINUA Negative   OCCULTUA 1+*   NITRITE Negative   UROBILINOGEN Negative   LEUKOCYTESUR 2+*   RBCUA 10*   WBCUA 13*   BACTERIA None   SQUAMEPITHEL 0       Significant Imaging: I have reviewed all pertinent imaging results/findings within the past 24 hours.  EXAMINATION:  XR CHEST AP PORTABLE     CLINICAL HISTORY:  CHF;     TECHNIQUE:  Single frontal view of the chest was performed.     COMPARISON:  Chest x-ray of May 24, 2024     FINDINGS:  Patient has had a prior sternotomy.  There is mild cardiomegaly.  An intrapulmonary mass or infiltrate is not seen.  There is no pneumothorax or pleural effusion.     Impression:     Mild cardiomegaly.  Prior sternotomy.        Electronically signed by:Edgard Cazares MD  Date:                                            09/19/2024  Time:                                           16:02      VRAD Read of  lower extremity venous ultrasound  IMPRESSION: No evidence of deep vein thrombosis.     Assessment/Plan:     * Acute exacerbation of CHF (congestive heart failure)  Patient is identified as having  heart failure that is Acute on chronic. CHF is currently uncontrolled due to Continued edema of extremities. Latest ECHO performed and demonstrates- Results for orders placed during the hospital encounter of 11/16/23    Echo    Interpretation Summary    Left Ventricle: The left ventricle is normal in size. Mildly increased wall thickness. There is mild concentric hypertrophy. Normal wall motion. There is normal systolic function with a visually estimated ejection fraction of 60 - 65%. There is normal diastolic function.    Right Ventricle: Normal right ventricular cavity size. Wall thickness is normal. Right ventricle wall motion  is normal. Systolic function is normal.    Left Atrium: Left atrium is severely dilated.    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    Mitral Valve: There is moderate  "bileaflet sclerosis. Moderately calcified posterior leaflet. There is mild anterior mitral annular calcification present. There is mild stenosis. The mean pressure gradient across the mitral valve is 4 mmHg at a heart rate of  bpm. There is moderate regurgitation with a centrally directed jet.    Tricuspid Valve: There is mild regurgitation with a centrally directed jet.    IVC/SVC: Elevated venous pressure at 15 mmHg.  . Continue Beta Blocker and Furosemide and monitor clinical status closely. Monitor on telemetry. Patient is on CHF pathway.  Monitor strict Is&Os and daily weights.  Place on fluid restriction of 1.5 L. Cardiology has not been consulted. Continue to stress to patient importance of self efficacy and  on diet for CHF. Last BNP reviewed- and noted below   Recent Labs   Lab 09/19/24  1617   *       UTI (urinary tract infection)  -urine culture pending  -IV rocephin daily      Atrial fibrillation  Patient with Long standing persistent (>12 months) atrial fibrillation which is controlled currently with Beta Blocker. Patient is currently in atrial fibrillation.Eliquis 5mg BID continued    Leg swelling  Probably lymphedema.    -IP consult to wound care nurse  -Ultrasound of bilateral lower extremities ordered to rule out DVT      Pressure injury of sacral region, stage 2  -pressure injury prevention bundle ordered  -IP consult to wound care nurse       Candidiasis of skin  Multiple areas noted for altered skin integrity that appear to be candidiasis of skin that patient states "Won't go away"    -IP consult to wound care nurse for recommendations      Status post aortic valve replacement with bioprosthetic valve  Chronic condition that is stable      Type 2 diabetes mellitus with diabetic polyneuropathy, with long-term current use of insulin  Patient's FSGs are controlled on current medication regimen.  Last A1c reviewed-   Lab Results   Component Value Date    HGBA1C 6.7 (H) 09/19/2024 " "    Most recent fingerstick glucose reviewed- No results for input(s): "POCTGLUCOSE" in the last 24 hours.  Current correctional scale  Low  Decrease anti-hyperglycemic dose as follows-   Antihyperglycemics (From admission, onward)      Start     Stop Route Frequency Ordered    09/19/24 2100  insulin glargine U-100 (Lantus) pen 20 Units         -- SubQ Nightly 09/19/24 1857 09/19/24 1951  insulin aspart U-100 pen 0-5 Units         -- SubQ Before meals & nightly PRN 09/19/24 1851          Hold Oral hypoglycemics while patient is in the hospital.       Hypertension associated with diabetes  Chronic, controlled. Latest blood pressure and vitals reviewed-     Temp:  [97.9 °F (36.6 °C)-98 °F (36.7 °C)]   Pulse:  []   Resp:  [16-20]   BP: (107-145)/(55-74)   SpO2:  [95 %-100 %] .   Home meds for hypertension were reviewed and noted below.   Hypertension Medications               carvediloL (COREG) 12.5 MG tablet Take 1 tablet (12.5 mg total) by mouth 2 (two) times daily.            While in the hospital, will manage blood pressure as follows; Continue home antihypertensive regimen    Will utilize p.r.n. blood pressure medication only if patient's blood pressure greater than 180/110 and she develops symptoms such as worsening chest pain or shortness of breath.         VTE Risk Mitigation (From admission, onward)           Ordered     apixaban tablet 5 mg  2 times daily         09/19/24 2118     IP VTE HIGH RISK PATIENT  Once         09/19/24 1853     Place sequential compression device  Until discontinued         09/19/24 1853     Reason for No Pharmacological VTE Prophylaxis  Once        Question:  Reasons:  Answer:  Already adequately anticoagulated on oral Anticoagulants    09/19/24 1853                         On 09/19/2024, patient should be placed in hospital observation services under my care in collaboration with Dr. Candido Jenkins MD.           Iris Mancilla NP  Department of Hospital " Medicine  [facility]/Surg

## 2024-09-20 NOTE — PLAN OF CARE
LM for pt's daughter to complete assessment/get moon       09/20/24 8504   Discharge Assessment   Assessment Type Discharge Planning Assessment

## 2024-09-20 NOTE — SUBJECTIVE & OBJECTIVE
Interval History: Patient is feeling better.     Review of Systems  Objective:     Vital Signs (Most Recent):  Temp: 97.9 °F (36.6 °C) (09/20/24 0322)  Pulse: 97 (09/20/24 0719)  Resp: 17 (09/20/24 1009)  BP: (!) 100/57 (09/20/24 0322)  SpO2: 100 % (09/20/24 0719) Vital Signs (24h Range):  Temp:  [97.8 °F (36.6 °C)-98 °F (36.7 °C)] 97.9 °F (36.6 °C)  Pulse:  [] 97  Resp:  [16-22] 17  SpO2:  [95 %-100 %] 100 %  BP: (100-145)/(55-74) 100/57     Weight: 85.9 kg (189 lb 6 oz)  Body mass index is 29.66 kg/m².    Intake/Output Summary (Last 24 hours) at 9/20/2024 1048  Last data filed at 9/19/2024 2358  Gross per 24 hour   Intake 120 ml   Output 2000 ml   Net -1880 ml         Physical Exam        Significant Labs: All pertinent labs within the past 24 hours have been reviewed.  CBC:   Recent Labs   Lab 09/19/24  1617 09/20/24  0512   WBC 4.65 4.32   HGB 10.2* 9.7*   HCT 34.2* 31.9*    172     CMP:   Recent Labs   Lab 09/19/24  1617 09/20/24  0512    140   K 3.9 4.0    109   CO2 20* 22*   * 101   BUN 28* 25*   CREATININE 1.2 1.1   CALCIUM 8.7 8.6*   PROT 6.5 5.8*   ALBUMIN 3.4* 3.1*   BILITOT 1.5* 1.5*   ALKPHOS 109 93   AST 16 17   ALT 9* 8*   ANIONGAP 10 9       Significant Imaging: I have reviewed all pertinent imaging results/findings within the past 24 hours.

## 2024-09-20 NOTE — ASSESSMENT & PLAN NOTE
Probably lymphedema.    -IP consult to wound care nurse  -Ultrasound of bilateral lower extremities ordered to rule out DVT

## 2024-09-20 NOTE — CONSULTS
Chief complaint:  Leg Swelling (Bilateral leg swelling starting last night.)      HPI:  Leslie Tolliver is a 89 y.o. female presenting with MASD of the abdominal folds, groin, breasts, thighs  BLE stasis dermatitis with venous ulcers  Stage 1 sacral pressure ulcer, All POA. Pt has been sedentary since surgery in March on her leg and has develped the MASD and pressure ulcers of the sacrum. Pt also has venous ulcers that ome and go, but have not become severe enough to see wound care. 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    GERD (gastroesophageal reflux disease)     Hyperlipidemia     Hypertension     Hypothyroidism     Paroxysmal atrial fibrillation 3/16/2023    Type 2 diabetes mellitus        Social History     Socioeconomic History    Marital status:    Tobacco Use    Smoking status: Former     Current packs/day: 0.00     Average packs/day: 0.3 packs/day for 15.0 years (3.8 ttl pk-yrs)     Types: Cigarettes     Start date: 3/30/1959     Quit date: 3/30/1974     Years since quittin.5    Smokeless tobacco: Never   Substance and Sexual Activity    Alcohol use: No    Drug use: No    Sexual activity: Never     Social Determinants of Health     Financial Resource Strain: Unknown (10/14/2022)    Overall Financial Resource Strain (CARDIA)     Difficulty of Paying Living Expenses: Patient declined   Food Insecurity: Unknown (10/14/2022)    Hunger Vital Sign     Worried About Running Out of  Food in the Last Year: Patient declined     Ran Out of Food in the Last Year: Patient declined   Transportation Needs: Unknown (10/14/2022)    PRAPARE - Transportation     Lack of Transportation (Medical): Patient declined     Lack of Transportation (Non-Medical): Patient declined   Physical Activity: Unknown (10/14/2022)    Exercise Vital Sign     Days of Exercise per Week: Patient declined     Minutes of Exercise per Session: Patient declined   Stress: Unknown (10/14/2022)    Citizen of [place] of Occupational [employer] - Occupational Stress Questionnaire     Feeling of Stress : Patient declined   Housing Stability: Unknown (10/14/2022)    Housing Stability Vital Sign     Unable to Pay for Housing in the Last Year: Patient refused     Unstable Housing in the Last Year: Patient refused       Past Surgical History:   Procedure Laterality Date    ADENOIDECTOMY      AORTIC VALVE REPLACEMENT      BREAST BIOPSY Right 20 yrs ago    benign    CARDIAC SURGERY      CHOLECYSTECTOMY      COLONOSCOPY  5-    Dr Holly, 5 year recheck    EPIDURAL STEROID INJECTION N/A 3/25/2021    Procedure: Injection, Steroid, Epidural L3-4;  Surgeon: Sky Love MD;  Location: [facility];  Service: Pain Management;  Laterality: N/A;  Injection, Steroid, Epidural L3-4    EPIDURAL STEROID INJECTION N/A 5/20/2021    Procedure: Injection, Steroid, Epidural L3-4;  Surgeon: Sky Love MD;  Location: [facility] OR;  Service: Pain Management;  Laterality: N/A;  Injection, Steroid, Epidural L3-4    ESOPHAGOGASTRODUODENOSCOPY N/A 6/4/2020    Procedure: EGD (ESOPHAGOGASTRODUODENOSCOPY);  Surgeon: Michael Nugent III, MD;  Location: [facility];  Service: Endoscopy;  Laterality: N/A;    HEMORRHOID SURGERY      HYSTERECTOMY      INTRAMEDULLARY RODDING OF FEMUR Left 5/23/2024    Procedure: INSERTION, INTRAMEDULLARY RACQUEL, FEMUR;  Surgeon: Bravo Guillermo MD;  Location: [facility] OR;  Service: Orthopedics;  Laterality: Left;    OOPHORECTOMY      TONSILLECTOMY          Family History   Problem Relation Name Age of Onset    Diabetes Mother      Heart disease Mother      Glaucoma Mother      Cancer Father          lung cancer    Early death Son          brain tumor age 3    Diabetes Brother      No Known Problems Daughter      Early death Son          MVA 25    Macular degeneration Neg Hx      Retinal detachment Neg Hx         Review of patient's allergies indicates:   Allergen Reactions    Fenofibric acid (choline)      Other reaction(s): Vomiting    Iodine      Other reaction(s): lips swollen ivp dye    Rosuvastatin      Other reaction(s): muscle pain       No current facility-administered medications on file prior to encounter.     Current Outpatient Medications on File Prior to Encounter   Medication Sig Dispense Refill    apixaban (ELIQUIS) 5 mg Tab Take 1 tablet (5 mg total) by mouth 2 (two) times daily. 180 tablet 3    carvediloL (COREG) 12.5 MG tablet Take 1 tablet (12.5 mg total) by mouth 2 (two) times daily. 60 tablet 11    cholecalciferol, vitamin D3, (VITAMIN D3) 125 mcg (5,000 unit) Tab Take 5,000 Units by mouth once daily.      empagliflozin (JARDIANCE) 10 mg tablet Take 1 tablet (10 mg total) by mouth once daily. 90 tablet 3    insulin (LANTUS SOLOSTAR U-100 INSULIN) glargine 100 units/mL SubQ pen Inject 24 Units into the skin every evening. 3 mL 3    ketoconazole (NIZORAL) 2 % cream Apply 1 application  topically 2 (two) times daily.      metFORMIN (GLUCOPHAGE) 1000 MG tablet Take 1 tablet (1,000 mg total) by mouth 2 (two) times daily with meals. 180 tablet 1    multivitamin (THERAGRAN) per tablet Take 1 tablet by mouth once daily.      insulin aspart U-100 (NOVOLOG) 100 unit/mL (3 mL) InPn pen Inject 1-10 Units into the skin before meals and at bedtime as needed (Hyperglycemia). (Patient not taking: Reported on 9/19/2024)         ROS: As per HPI and below:  Pertinent items are noted in HPI.      Physical Exam:     Vitals:    09/19/24 2252 09/19/24 2325  "24 0322 24 0719   BP: 112/72 113/72 (!) 100/57    BP Location:  Right arm Right arm    Patient Position:  Lying Lying    Pulse: 100 (!) 111 93 97   Resp: 20 (!) 22 18 18   Temp:  97.8 °F (36.6 °C) 97.9 °F (36.6 °C)    TempSrc:  Oral Oral    SpO2:  99% 97% 100%   Weight:       Height:           BP  Min: 100/57  Max: 145/69  Temp  Av.9 °F (36.6 °C)  Min: 97.8 °F (36.6 °C)  Max: 98 °F (36.7 °C)  Pulse  Av  Min: 87  Max: 111  Resp  Av.2  Min: 16  Max: 22  SpO2  Av.6 %  Min: 95 %  Max: 100 %  Height  Av' 7" (170.2 cm)  Min: 5' 7" (170.2 cm)  Max: 5' 7" (170.2 cm)  Weight  Av.8 kg (184 lb 11 oz)  Min: 81.6 kg (180 lb)  Max: 85.9 kg (189 lb 6 oz)    Body mass index is 29.66 kg/m².          General:             Well developed, well nourished, no apparent distress  HEENT:              NCAT, no JVD, mucous membranes moist, EOM intact  Cardiovascular:  Regular rate and rhythm, normal S1, normal S2, No murmurs, rubs, or gallops  Respiratory:        Normal breath sounds, no wheezes, no rales, no rhonchi  Abdomen:           Bowel sounds present, non tender, non distended, no masses, no hepatojugular reflux  Extremities:        No clubbing, no cyanosis, no edema  Vascular:            2+ b/l radial.  Peripheral pulses intact.  No carotid bruits.  Neurological:      No focal deficits  Skin:                   No obvious rashes or erythema, MASD of the abdominal folds, groin, breasts, thighs  BLE stasis dermatitis with venous ulcers, stage 1 sacral pressure ulcer                                                        Lab Results   Component Value Date    WBC 4.32 2024    HGB 9.7 (L) 2024    HCT 31.9 (L) 2024    MCV 89 2024     2024     Lab Results   Component Value Date    CHOL 126 10/14/2022    CHOL 132 2022    CHOL 130 2021     Lab Results   Component Value Date    HDL 33 (L) 10/14/2022    HDL 33 (L) 2022    HDL 31 (L) 2021     Lab " Results   Component Value Date    LDLCALC 64.6 10/14/2022    LDLCALC 54.4 (L) 03/11/2022    LDLCALC 37.2 (L) 02/25/2021     Lab Results   Component Value Date    TRIG 142 10/14/2022    TRIG 223 (H) 03/11/2022    TRIG 309 (H) 02/25/2021     Lab Results   Component Value Date    CHOLHDL 26.2 10/14/2022    CHOLHDL 25.0 03/11/2022    CHOLHDL 23.8 02/25/2021     CMP  Recent Labs   Lab 09/20/24  0512      CALCIUM 8.6*   ALBUMIN 3.1*   PROT 5.8*      K 4.0   CO2 22*      BUN 25*   CREATININE 1.1   ALKPHOS 93   ALT 8*   AST 17   BILITOT 1.5*      Lab Results   Component Value Date    TSH 2.636 03/26/2024    TSH 2.572 03/26/2024   US Lower Extremity Veins Bilateral  Order: [id number]  Status: Final result       Visible to patient: Yes (not seen)       Next appt: 09/24/2024 at 10:15 AM in Orthopedics (Bravo Guillermo MD)    0 Result Notes  Details    Reading Physician Reading Date Result Priority   Edgard Cazares Jr., MD  [phone] 9/20/2024 STAT     Narrative & Impression  EXAMINATION:  US LOWER EXTREMITY VEINS BILATERAL     CLINICAL HISTORY:  rule out DVT;     TECHNIQUE:  Duplex and color flow Doppler and dynamic compression was performed of the bilateral lower extremity veins was performed.     COMPARISON:  None     FINDINGS:  Right thigh veins: The common femoral, femoral, popliteal, upper greater saphenous, and deep femoral veins are patent and free of thrombus. The veins are normally compressible and have normal phasic flow and augmentation response.     Right calf veins: The visualized calf veins are patent.     Left thigh veins: The common femoral, femoral, popliteal, upper greater saphenous, and deep femoral veins are patent and free of thrombus. The veins are normally compressible and have normal phasic flow and augmentation response.     Left calf veins: The visualized calf veins are patent.     Miscellaneous: None     Impression:     No evidence of deep venous thrombosis in either  lower extremity.        Electronically signed by:Edgard Cazares MD  Date:                                            09/20/2024  Time:                                           07:50           Exam Ended: 09/19/24 19:26 CDT Last Resulted: 09/20/24 07:50 CDT               Assessment and Recommendations       Diagnoses:     MASD of the abdominal folds, groin, breasts, thighs  BLE stasis dermatitis with venous ulcers  Stage 1 sacral pressure ulcer    Plan:  1. Triad to all skin folds q shift  2.  Aquaphor to the BLE daily  3. Reviewed the venous dopplers  4. Triad to the sacrum q shift    Complexity:    moderate

## 2024-09-20 NOTE — CARE UPDATE
Contacted by floor nurse about patient having right hip pain and that patient mentioned she fell some days ago. Patient did not mention right hip pain or fall during admit exam. Right hip xray ordered and norco 5mg Q 6 PRN pain.

## 2024-09-20 NOTE — ASSESSMENT & PLAN NOTE
Probably lymphedema.  US negative for DVT  Lasix and compression stockings on discharge as above

## 2024-09-20 NOTE — NURSING
Nurses Note -- 4 Eyes      9/19/2024  8:59 PM    Skin assessed during: Admit      [] No Altered Skin Integrity Present    []Prevention Measures Documented      [x] Yes- Altered Skin Integrity Present or Discovered   [x] LDA Added if Not in Epic (Describe Wound)   [x] New Altered Skin Integrity was Present on Admit and Documented in LDA   [x] Wound Image Taken    Wound Care Consulted? Yes    Attending Nurse:  DON Kennedy    Second RN/Staff Member:   DON Bull

## 2024-09-20 NOTE — CARE UPDATE
09/20/24 0719   Patient Assessment/Suction   Level of Consciousness (AVPU) alert   Respiratory Effort Normal;Unlabored   Expansion/Accessory Muscles/Retractions no use of accessory muscles;no retractions;expansion symmetric   Rhythm/Pattern, Respiratory unlabored;pattern regular;depth regular;no shortness of breath reported   PRE-TX-O2   Device (Oxygen Therapy) room air   SpO2 100 %   Pulse Oximetry Type Intermittent   $ Pulse Oximetry - Multiple Charge Pulse Oximetry - Multiple   Pulse 97   Resp 18

## 2024-09-20 NOTE — HOSPITAL COURSE
Patient is a 89 year old female, baseline wheelchair bound, presented with worsening bilateral leg swelling. Patient states she recently had a hip fracture and was in [place] for a month, after discharge she noticed her legs are progressively getting swollen. Previous echo from Nov 2023 shows normal EF and normal diastolic function. Repeat echo is pending. Patient was given IV Lasix in the hospital, with improvement of swelling and DC with PO lasix 20 mg daily. Her UA shows some WBC, but patient does not have any urinary symptoms. Therefore I will not continue antibiotics on discharge

## 2024-09-20 NOTE — PLAN OF CARE
Pt requests HH with [facility]. Referral sent    Pt is clear for dc from CM standpoint         09/20/24 1323   Final Note   Assessment Type Final Discharge Note   Anticipated Discharge Disposition Home-Health   Hospital Resources/Appts/Education Provided Appointments scheduled and added to AVS   Post-Acute Status   Post-Acute Authorization Home Health   Home Health Status Referrals Sent

## 2024-09-20 NOTE — HPI
Leslie Tolliver is a 89 year old female with a previous medical history of CHF, TAVR, DMII, CAD, HTN, Hypothyroidism, Afib on Eliquis, CKD3, and left hip fracture who presented to the emergency room for leg swelling with weeping wounds. The patient endorses that both of legs have been swollen since being discharged from SNF after her hip fracture. She does not take a daily diuretic. She reports compliance with all of her other medications but has been eating more salt than usual. She is wheelchair bound but denies any shortness of breath when she attempts to stand and walk twice daily. What changed was that yesterday she began having blisters to both of her lower legs along with redness and then the blisters popped leaving her blankets and socks completely wet. She denies any chest pain, fevers or chills. Initial ED evaluation showed included a chest xray read as mild cardiomegaly only, BNP elevated at 535, Troponin negative, CMP showed elevated bilirubin at 1.5 with normal creatinine, CBC showed stable anemia and normal WBC. Urine studies positive for infection. She was given oral K for a potassium of 3.9 along with 20mg of IV lasix. She was given another 20mg of lasix upon admit with good urine output noted. Patient continued on 20mg of lasix IV daily. Urine studies positive for infection and she was initiated on IV rocephin. Patient admitted by hospital medicine for further evaluation and management.

## 2024-09-20 NOTE — PLAN OF CARE
Problem: Skin Injury Risk Increased  Goal: Skin Health and Integrity  Outcome: Progressing     Problem: Adult Inpatient Plan of Care  Goal: Plan of Care Review  Outcome: Progressing  Goal: Absence of Hospital-Acquired Illness or Injury  Outcome: Progressing  Goal: Optimal Comfort and Wellbeing  Outcome: Progressing     Problem: Diabetes Comorbidity  Goal: Blood Glucose Level Within Targeted Range  Outcome: Progressing     Problem: Wound  Goal: Optimal Functional Ability  Outcome: Progressing  Goal: Absence of Infection Signs and Symptoms  Outcome: Progressing  Goal: Optimal Pain Control and Function  Outcome: Progressing  Goal: Skin Health and Integrity  Outcome: Progressing     Problem: Fall Injury Risk  Goal: Absence of Fall and Fall-Related Injury  Outcome: Progressing     Problem: Heart Failure  Goal: Optimal Cardiac Output  Outcome: Progressing  Goal: Stable Heart Rate and Rhythm  Outcome: Progressing  Goal: Fluid and Electrolyte Balance  Outcome: Progressing

## 2024-09-20 NOTE — ASSESSMENT & PLAN NOTE
Chronic, controlled. Latest blood pressure and vitals reviewed-       Home meds for hypertension were reviewed and noted below.   Hypertension Medications               carvediloL (COREG) 12.5 MG tablet Take 1 tablet (12.5 mg total) by mouth 2 (two) times daily.          Resume Coreg  Added Lasix

## 2024-09-20 NOTE — PT/OT/SLP EVAL
Physical Therapy Evaluation    Patient Name:  Leslie Tolliver   MRN:  [id number]    Recommendations:     Discharge Recommendations: Low Intensity Therapy   Discharge Equipment Recommendations: none   Barriers to discharge: None    Assessment:     Leslie Tolliver is a 89 y.o. female admitted with a medical diagnosis of Acute exacerbation of CHF (congestive heart failure).  She presents with the following impairments/functional limitations: weakness, impaired endurance, gait instability, impaired cardiopulmonary response to activity .    Pt seen supine in bed and agreeable to PT. Pt requiring min assist for mobility and ambulated 20ft x2 with RW with chair following. OOB chair.  LIT.    Rehab Prognosis: Fair; patient would benefit from acute skilled PT services to address these deficits and reach maximum level of function.    Recent Surgery: * No surgery found *      Plan:     During this hospitalization, patient to be seen 6 x/week to address the identified rehab impairments via gait training, therapeutic activities, therapeutic exercises and progress toward the following goals:    Plan of Care Expires:  09/30/24    Subjective   Stated was at [place] following hip fx 4 months ago and did well but still weak  Chief Complaint: none  Patient/Family Comments/goals: get well  Pain/Comfort:  Pain Rating 1: 0/10    Patients cultural, spiritual, Sabianist conflicts given the current situation:      Living Environment:  Home with daughter  Dtr has a hair salon at home  Prior to admission, patients level of function was amb with RW.  Equipment used at home: walker, rolling.  DME owned (not currently used): none.  Upon discharge, patient will have assistance from family.    Objective:     Communicated with nurse El prior to session.  Patient found HOB elevated with telemetry, PureWick, bed alarm  upon PT entry to room.    General Precautions: Standard, fall  Orthopedic Precautions:N/A   Braces: N/A  Respiratory  Status: Room air    Exams:  Postural Exam:  Patient presented with the following abnormalities:    -       Rounded shoulders  -       Forward head  -       BMI 29.60  RLE ROM: WFL  RLE Strength: WFL  LLE ROM: WFL  LLE Strength: WFL    Functional Mobility:  Bed Mobility:     Scooting: minimum assistance  Supine to Sit: minimum assistance  Transfers:     Sit to Stand:  minimum assistance with rolling walker  Bed to Chair: minimum assistance with  rolling walker  using  Stand Pivot  Gait: 20ft x2 with RW min assist with chair following      AM-PAC 6 CLICK MOBILITY  Total Score:17       Treatment & Education:  Patient was educated on the importance of OOB activity and functional mobility to negate negative effects of prolonged bed rest during hospitalization, safe transfers and ambulation, and D/C planning   OOb chair post PT with all needs within reach    Patient left up in chair with all lines intact, call button in reach, chair alarm on, and nurse Sienna notified.    GOALS:   Multidisciplinary Problems       Physical Therapy Goals          Problem: Physical Therapy    Goal Priority Disciplines Outcome Goal Variances Interventions   Physical Therapy Goal     PT, PT/OT Progressing     Description: Goals to be met by: 2024     Patient will increase functional independence with mobility by performin. Supine to sit with Modified [place]  2. Sit to stand transfer with Modified [place]  3. Bed to chair transfer with Contact Guard Assistance using Rolling Walker  4. Gait  x 150 feet with Contact Guard Assistance using Rolling Walker.   5. Lower extremity exercise program x20 reps                        History:     Past Medical History:   Diagnosis Date    Acute decompensated heart failure 2023    Anemia 2024    Anemia due to stage 3 chronic kidney disease 2019    Arthritis     Bullous pemphigoid 2022    Chronic bilateral low back pain without sciatica 2019    CKD (chronic  kidney disease) stage 3, GFR 30-59 ml/min 07/24/2019    Colon polyps     Coronary artery disease     Diabetes mellitus type II     Diabetes with neurologic complications     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    GERD (gastroesophageal reflux disease)     Hyperlipidemia     Hypertension     Hypothyroidism     Paroxysmal atrial fibrillation 3/16/2023    Type 2 diabetes mellitus        Past Surgical History:   Procedure Laterality Date    ADENOIDECTOMY      AORTIC VALVE REPLACEMENT      BREAST BIOPSY Right 20 yrs ago    benign    CARDIAC SURGERY      CHOLECYSTECTOMY      COLONOSCOPY  5-    Dr Holly, 5 year recheck    EPIDURAL STEROID INJECTION N/A 3/25/2021    Procedure: Injection, Steroid, Epidural L3-4;  Surgeon: Sky Love MD;  Location: [facility] OR;  Service: Pain Management;  Laterality: N/A;  Injection, Steroid, Epidural L3-4    EPIDURAL STEROID INJECTION N/A 5/20/2021    Procedure: Injection, Steroid, Epidural L3-4;  Surgeon: Sky Love MD;  Location: [facility] OR;  Service: Pain Management;  Laterality: N/A;  Injection, Steroid, Epidural L3-4    ESOPHAGOGASTRODUODENOSCOPY N/A 6/4/2020    Procedure: EGD (ESOPHAGOGASTRODUODENOSCOPY);  Surgeon: Michael Nugent III, MD;  Location: [facility];  Service: Endoscopy;  Laterality: N/A;    HEMORRHOID SURGERY      HYSTERECTOMY      INTRAMEDULLARY RODDING OF FEMUR Left 5/23/2024    Procedure: INSERTION, INTRAMEDULLARY RACQUEL, FEMUR;  Surgeon: Bravo Guillermo MD;  Location: [facility] OR;  Service: Orthopedics;  Laterality: Left;    OOPHORECTOMY      TONSILLECTOMY         Time Tracking:     PT Received On: 09/20/24  PT Start Time: 1611     PT Stop Time: 1623  PT Total Time (min): 12 min     Billable Minutes: Evaluation 12 09/20/2024

## 2024-09-20 NOTE — ASSESSMENT & PLAN NOTE
Patient with Long standing persistent (>12 months) atrial fibrillation which is controlled currently with Beta Blocker. Patient is currently in atrial fibrillation.Eliquis 5mg BID continued

## 2024-09-20 NOTE — PLAN OF CARE
09/20/24 1417   HARGROVE Message   Medicare Outpatient and Observation Notification regarding financial responsibility Explained to patient/caregiver;Signed/date by patient/caregiver   Date HARGROVE was signed 09/20/24   Time HARGROVE was signed 1300

## 2024-09-20 NOTE — CONSULTS
RD consult for CHF pathway; working remote. Pt has T2DM as well.    Attached to AVS nutrition education materials on Diabetic meal planning and heart healthy eating guidelines.     F/U prn

## 2024-09-20 NOTE — CONSULTS
Inpatient wound care consult completed by Dr. Loo. Added wound care to AVS for discharge planning.

## 2024-09-20 NOTE — PT/OT/SLP EVAL
Occupational Therapy   Evaluation and Discharge Note    Name: Leslie Tolliver  MRN: [id number]  Admitting Diagnosis: Acute exacerbation of CHF (congestive heart failure)  Recent Surgery: * No surgery found *      Recommendations:     Discharge Recommendations: Low Intensity Therapy  Discharge Equipment Recommendations: none  Barriers to discharge:  None    Assessment:     Leslie Tolliver is a 89 y.o. female with a medical diagnosis of Acute exacerbation of CHF (congestive heart failure). At this time, patient is Supervision level with ADLs. No acute OT needs. Patient would benefit from home health OT to assess home environment to ensure safe and independent participation with ADLs.    Plan:     During this hospitalization, patient does not require further acute OT services.  Please re-consult if situation changes.    Plan of Care Reviewed with: patient    Subjective     Chief Complaint: none  Patient/Family Comments/goals: none    Occupational Profile:  Living Environment: Patient lives with daughter.   Previous level of function: Patient was Mod I with ADLs. Patient was ambulatory mostly in w/c. Patient was able to stand and transfer using RW w/o assist.   Equipment Used at home: wheelchair, walker, rolling, shower chair  Assistance upon Discharge: Patient will receive assistance from daughter.    Pain/Comfort:  Pain Rating 1: 0/10  Pain Rating Post-Intervention 1: 0/10  Pain Rating Post-Intervention 2: 0/10    Patients cultural, spiritual, Protestant conflicts given the current situation:      Objective:     Communicated with: nurse El prior to session.  Patient found HOB elevated with telemetry, PureWick, peripheral IV upon OT entry to room.    General Precautions: Standard, fall  Orthopedic Precautions: N/A  Braces: N/A  Respiratory Status: Room air     Occupational Performance:    Bed Mobility:    Patient completed Scooting/Bridging with supervision  Patient completed Supine to Sit with  supervision  Patient completed Sit to Supine with supervision    Functional Mobility/Transfers:  Patient completed Sit <> Stand Transfer with supervision  with  rolling walker   Patient completed Bed <> Chair Transfer using Stand Pivot technique with supervision with rolling walker  Patient completed Chair <> Mat Stand Pivot technique with supervision with no assistive device    Activities of Daily Living:  Grooming: supervision with oral and facial hygiene seated in chair  Lower Body Dressing: supervision to don/doff socks seated EOB  Toileting: supervision with BM hygiene while standing over BSC; Min A with donning new diaper    Cognitive/Visual Perceptual:  Cognitive/Psychosocial Skills:     -       Oriented to: x4   -       Follows Commands/attention:Follows multistep  commands  -       Communication: clear/fluent  -       Safety awareness/insight to disability: intact   -       Mood/Affect/Coping skills/emotional control: Appropriate to situation and Cooperative  Visual/Perceptual:      -Intact     Physical Exam:  Postural examination/scapula alignment:    -       Rounded shoulders  -       Forward head  Upper Extremity Range of Motion:     -       Right Upper Extremity: WFL  -       Left Upper Extremity: WFL  Upper Extremity Strength:    -       Right Upper Extremity: WFL  -       Left Upper Extremity: WFL   Strength:    -       Right Upper Extremity: WFL  -       Left Upper Extremity: WFL  Fine Motor Coordination:    -       Intact  Gross motor coordination:   WFL in BUE    AMPAC 6 Click ADL:  AMPAC Total Score: 24    Treatment & Education:  Pt educated on role of OT/POC, importance of time EOB/OOB, safety with ADLs, importance of intermittent mobility, safe techniques for transfers/ambulation using RW, discharge recommendations/options, and use of call light for assistance and fall prevention.         Patient left up in chair with all lines intact, call button in reach, chair alarm on, and nurse  present    GOALS:   Multidisciplinary Problems       Occupational Therapy Goals       Not on file                    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    GERD (gastroesophageal reflux disease)     Hyperlipidemia     Hypertension     Hypothyroidism     Paroxysmal atrial fibrillation 3/16/2023    Type 2 diabetes mellitus          Past Surgical History:   Procedure Laterality Date    ADENOIDECTOMY      AORTIC VALVE REPLACEMENT      BREAST BIOPSY Right 20 yrs ago    benign    CARDIAC SURGERY      CHOLECYSTECTOMY      COLONOSCOPY  5-    Dr Holly, 5 year recheck    EPIDURAL STEROID INJECTION N/A 3/25/2021    Procedure: Injection, Steroid, Epidural L3-4;  Surgeon: Sky Love MD;  Location: [facility] OR;  Service: Pain Management;  Laterality: N/A;  Injection, Steroid, Epidural L3-4    EPIDURAL STEROID INJECTION N/A 5/20/2021    Procedure: Injection, Steroid, Epidural L3-4;  Surgeon: Sky Love MD;  Location: [facility] OR;  Service: Pain Management;  Laterality: N/A;  Injection, Steroid, Epidural L3-4    ESOPHAGOGASTRODUODENOSCOPY N/A 6/4/2020    Procedure: EGD (ESOPHAGOGASTRODUODENOSCOPY);  Surgeon: Michael Nugent III, MD;  Location: [facility];  Service: Endoscopy;  Laterality: N/A;    HEMORRHOID SURGERY      HYSTERECTOMY      INTRAMEDULLARY RODDING OF FEMUR Left 5/23/2024    Procedure: INSERTION, INTRAMEDULLARY RACQUEL, FEMUR;  Surgeon: Bravo Guillermo MD;  Location: [facility] OR;  Service: Orthopedics;  Laterality: Left;    OOPHORECTOMY       TONSILLECTOMY         Time Tracking:     OT Date of Treatment: 09/20/24  OT Start Time: 0926  OT Stop Time: 1012  OT Total Time (min): 46 min    Billable Minutes:Evaluation 8  Self Care/Home Management 38    9/20/2024

## 2024-09-20 NOTE — ASSESSMENT & PLAN NOTE
Patient noted to be in atrial fibrillation for many months per chart review. She was recently called in 5mg of Eliquis BID by Dr. Christianson but has not started medication.

## 2024-09-20 NOTE — ASSESSMENT & PLAN NOTE
Chronic, controlled. Latest blood pressure and vitals reviewed-     Temp:  [97.9 °F (36.6 °C)-98 °F (36.7 °C)]   Pulse:  []   Resp:  [16-20]   BP: (107-145)/(55-74)   SpO2:  [95 %-100 %] .   Home meds for hypertension were reviewed and noted below.   Hypertension Medications               carvediloL (COREG) 12.5 MG tablet Take 1 tablet (12.5 mg total) by mouth 2 (two) times daily.            While in the hospital, will manage blood pressure as follows; Continue home antihypertensive regimen    Will utilize p.r.n. blood pressure medication only if patient's blood pressure greater than 180/110 and she develops symptoms such as worsening chest pain or shortness of breath.

## 2024-09-20 NOTE — SUBJECTIVE & OBJECTIVE
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    GERD (gastroesophageal reflux disease)     Hyperlipidemia     Hypertension     Hypothyroidism     Paroxysmal atrial fibrillation 3/16/2023    Type 2 diabetes mellitus        Past Surgical History:   Procedure Laterality Date    ADENOIDECTOMY      AORTIC VALVE REPLACEMENT      BREAST BIOPSY Right 20 yrs ago    benign    CARDIAC SURGERY      CHOLECYSTECTOMY      COLONOSCOPY  5-    Dr Holly, 5 year recheck    EPIDURAL STEROID INJECTION N/A 3/25/2021    Procedure: Injection, Steroid, Epidural L3-4;  Surgeon: Sky Love MD;  Location: [facility];  Service: Pain Management;  Laterality: N/A;  Injection, Steroid, Epidural L3-4    EPIDURAL STEROID INJECTION N/A 5/20/2021    Procedure: Injection, Steroid, Epidural L3-4;  Surgeon: Sky Love MD;  Location: [facility];  Service: Pain Management;  Laterality: N/A;  Injection, Steroid, Epidural L3-4    ESOPHAGOGASTRODUODENOSCOPY N/A 6/4/2020    Procedure: EGD (ESOPHAGOGASTRODUODENOSCOPY);  Surgeon: Michael Nugent III, MD;  Location: [facility];  Service: Endoscopy;  Laterality: N/A;    HEMORRHOID SURGERY      HYSTERECTOMY      INTRAMEDULLARY RODDING OF FEMUR Left 5/23/2024    Procedure: INSERTION, INTRAMEDULLARY RACQUEL, FEMUR;  Surgeon: Bravo Guillermo MD;  Location: [facility];  Service: Orthopedics;  Laterality: Left;    OOPHORECTOMY      TONSILLECTOMY         Review of patient's allergies indicates:   Allergen Reactions    Fenofibric acid (choline)      Other  reaction(s): Vomiting    Iodine      Other reaction(s): lips swollen ivp dye    Rosuvastatin      Other reaction(s): muscle pain       No current facility-administered medications on file prior to encounter.     Current Outpatient Medications on File Prior to Encounter   Medication Sig    apixaban (ELIQUIS) 5 mg Tab Take 1 tablet (5 mg total) by mouth 2 (two) times daily.    carvediloL (COREG) 12.5 MG tablet Take 1 tablet (12.5 mg total) by mouth 2 (two) times daily.    cholecalciferol, vitamin D3, (VITAMIN D3) 125 mcg (5,000 unit) Tab Take 5,000 Units by mouth once daily.    empagliflozin (JARDIANCE) 10 mg tablet Take 1 tablet (10 mg total) by mouth once daily.    insulin (LANTUS SOLOSTAR U-100 INSULIN) glargine 100 units/mL SubQ pen Inject 24 Units into the skin every evening.    ketoconazole (NIZORAL) 2 % cream Apply 1 application  topically 2 (two) times daily.    metFORMIN (GLUCOPHAGE) 1000 MG tablet Take 1 tablet (1,000 mg total) by mouth 2 (two) times daily with meals.    multivitamin (THERAGRAN) per tablet Take 1 tablet by mouth once daily.    insulin aspart U-100 (NOVOLOG) 100 unit/mL (3 mL) InPn pen Inject 1-10 Units into the skin before meals and at bedtime as needed (Hyperglycemia). (Patient not taking: Reported on 2024)    [DISCONTINUED] HYDROcodone-acetaminophen (NORCO) 5-325 mg per tablet Take 1 tablet by mouth every 6 (six) hours as needed for Pain.     Family History       Problem Relation (Age of Onset)    Cancer Father    Diabetes Mother, Brother    Early death Son, Son    Glaucoma Mother    Heart disease Mother    No Known Problems Daughter          Tobacco Use    Smoking status: Former     Current packs/day: 0.00     Average packs/day: 0.3 packs/day for 15.0 years (3.8 ttl pk-yrs)     Types: Cigarettes     Start date: 3/30/1959     Quit date: 3/30/1974     Years since quittin.5    Smokeless tobacco: Never   Substance and Sexual Activity    Alcohol use: No    Drug use: No    Sexual  activity: Never     Review of Systems   Cardiovascular:  Positive for leg swelling.   Musculoskeletal:  Positive for arthralgias and gait problem.   Skin:  Positive for color change and wound.   All other systems reviewed and are negative.    Objective:     Vital Signs (Most Recent):  Temp: 97.9 °F (36.6 °C) (24)  Pulse: 100 (24)  Resp: 20 (24)  BP: 112/72 (24)  SpO2: 100 % (24) Vital Signs (24h Range):  Temp:  [97.9 °F (36.6 °C)-98 °F (36.7 °C)] 97.9 °F (36.6 °C)  Pulse:  [] 100  Resp:  [16-20] 20  SpO2:  [95 %-100 %] 100 %  BP: (107-145)/(55-74) 112/72     Weight: 85.9 kg (189 lb 6 oz)  Body mass index is 29.66 kg/m².     Physical Exam  Vitals reviewed.   Constitutional:       Appearance: Normal appearance. She is obese.   HENT:      Head: Normocephalic and atraumatic.      Nose: Nose normal.      Mouth/Throat:      Mouth: Mucous membranes are dry.      Pharynx: Oropharynx is clear.   Eyes:      Extraocular Movements: Extraocular movements intact.      Pupils: Pupils are equal, round, and reactive to light.   Cardiovascular:      Rate and Rhythm: Normal rate. Rhythm irregular.      Pulses:           Radial pulses are 2+ on the right side and 2+ on the left side.        Dorsalis pedis pulses are detected w/ Doppler on the right side and detected w/ Doppler on the left side.        Posterior tibial pulses are detected w/ Doppler on the right side and detected w/ Doppler on the left side.      Heart sounds: Normal heart sounds.   Pulmonary:      Effort: Pulmonary effort is normal.      Breath sounds: Normal breath sounds.   Abdominal:      General: Bowel sounds are normal. There is no distension.      Palpations: Abdomen is soft.   Musculoskeletal:         General: Tenderness present. Normal range of motion.      Cervical back: Normal range of motion and neck supple.      Right lower le+ Edema present.      Left lower le+ Edema present.   Skin:      General: Skin is warm and dry.      Capillary Refill: Capillary refill takes less than 2 seconds.      Findings: Erythema and rash present.             Comments: Swelling/Erythema noted from just below knees to bilateral feet with swelling bullae and weeping blisters.   Please see media for pictures of groin rash, rash under right breast and sacral decubitus  Left surgical scar from previous antwon placement  Mid-line chest surgical scar from sternotomy    Neurological:      General: No focal deficit present.      Mental Status: She is alert and oriented to person, place, and time. Mental status is at baseline.   Psychiatric:         Mood and Affect: Mood normal.         Behavior: Behavior normal.         Thought Content: Thought content normal.         Judgment: Judgment normal.              CRANIAL NERVES     CN III, IV, VI   Pupils are equal, round, and reactive to light.       Significant Labs: All pertinent labs within the past 24 hours have been reviewed.  A1C:   Recent Labs   Lab 09/19/24  1617   HGBA1C 6.7*       Bilirubin:   Recent Labs   Lab 09/19/24  1617   BILITOT 1.5*       BMP:   Recent Labs   Lab 09/19/24  1617   *      K 3.9      CO2 20*   BUN 28*   CREATININE 1.2   CALCIUM 8.7   MG 1.8     CBC:   Recent Labs   Lab 09/19/24  1617   WBC 4.65   HGB 10.2*   HCT 34.2*        CMP:   Recent Labs   Lab 09/19/24  1617      K 3.9      CO2 20*   *   BUN 28*   CREATININE 1.2   CALCIUM 8.7   PROT 6.5   ALBUMIN 3.4*   BILITOT 1.5*   ALKPHOS 109   AST 16   ALT 9*   ANIONGAP 10     Cardiac Markers:   Recent Labs   Lab 09/19/24  1617   *     Magnesium:   Recent Labs   Lab 09/19/24  1617   MG 1.8       Troponin:   Recent Labs   Lab 09/19/24  1617   TROPONINI <0.006     TSH:   Recent Labs   Lab 03/26/24  1549   TSH 2.572  2.636       Urine Studies:   Recent Labs   Lab 09/19/24 2020   COLORU Yellow   APPEARANCEUA Clear   PHUR 6.0   SPECGRAV 1.010   PROTEINUA Negative    GLUCUA 3+*   KETONESU Negative   BILIRUBINUA Negative   OCCULTUA 1+*   NITRITE Negative   UROBILINOGEN Negative   LEUKOCYTESUR 2+*   RBCUA 10*   WBCUA 13*   BACTERIA None   SQUAMEPITHEL 0       Significant Imaging: I have reviewed all pertinent imaging results/findings within the past 24 hours.  EXAMINATION:  XR CHEST AP PORTABLE     CLINICAL HISTORY:  CHF;     TECHNIQUE:  Single frontal view of the chest was performed.     COMPARISON:  Chest x-ray of May 24, 2024     FINDINGS:  Patient has had a prior sternotomy.  There is mild cardiomegaly.  An intrapulmonary mass or infiltrate is not seen.  There is no pneumothorax or pleural effusion.     Impression:     Mild cardiomegaly.  Prior sternotomy.        Electronically signed by:Edgard Cazares MD  Date:                                            09/19/2024  Time:                                           16:02      VRAD Read of BL lower extremity venous ultrasound  IMPRESSION: No evidence of deep vein thrombosis.

## 2024-09-20 NOTE — PLAN OF CARE
Problem: Skin Injury Risk Increased  Goal: Skin Health and Integrity  Outcome: Met     Problem: Adult Inpatient Plan of Care  Goal: Plan of Care Review  Outcome: Met  Goal: Patient-Specific Goal (Individualized)  Outcome: Met  Goal: Absence of Hospital-Acquired Illness or Injury  Outcome: Met  Goal: Optimal Comfort and Wellbeing  Outcome: Met  Goal: Readiness for Transition of Care  Outcome: Met     Problem: Diabetes Comorbidity  Goal: Blood Glucose Level Within Targeted Range  Outcome: Met     Problem: Wound  Goal: Optimal Coping  Outcome: Met  Goal: Optimal Functional Ability  Outcome: Met  Goal: Absence of Infection Signs and Symptoms  Outcome: Met  Goal: Improved Oral Intake  Outcome: Met  Goal: Optimal Pain Control and Function  Outcome: Met  Goal: Skin Health and Integrity  Outcome: Met  Goal: Optimal Wound Healing  Outcome: Met     Problem: Fall Injury Risk  Goal: Absence of Fall and Fall-Related Injury  Outcome: Met     Problem: Heart Failure  Goal: Optimal Cardiac Output  Outcome: Met  Goal: Stable Heart Rate and Rhythm  Outcome: Met  Goal: Fluid and Electrolyte Balance  Outcome: Met     Problem: Physical Therapy  Goal: Physical Therapy Goal  Description: Goals to be met by: 2024     Patient will increase functional independence with mobility by performin. Supine to sit with Modified Johnston  2. Sit to stand transfer with Modified Johnston  3. Bed to chair transfer with Contact Guard Assistance using Rolling Walker  4. Gait  x 150 feet with Contact Guard Assistance using Rolling Walker.   5. Lower extremity exercise program x20 reps   Outcome: Met

## 2024-09-22 LAB
BACTERIA UR CULT: ABNORMAL
OHS QRS DURATION: 98 MS
OHS QTC CALCULATION: 467 MS

## 2024-09-23 ENCOUNTER — TELEPHONE (OUTPATIENT)
Dept: FAMILY MEDICINE | Facility: CLINIC | Age: 89
End: 2024-09-23
Payer: MEDICARE

## 2024-09-23 ENCOUNTER — PATIENT OUTREACH (OUTPATIENT)
Dept: ADMINISTRATIVE | Facility: CLINIC | Age: 89
End: 2024-09-23
Payer: MEDICARE

## 2024-09-23 DIAGNOSIS — S72.142D CLOSED DISPLACED INTERTROCHANTERIC FRACTURE OF LEFT FEMUR WITH ROUTINE HEALING, SUBSEQUENT ENCOUNTER: Primary | ICD-10-CM

## 2024-09-23 PROCEDURE — G0180 MD CERTIFICATION HHA PATIENT: HCPCS | Mod: ,,, | Performed by: FAMILY MEDICINE

## 2024-09-23 NOTE — TELEPHONE ENCOUNTER
----- Message from Aurelia Crockett sent at 9/23/2024 12:37 PM CDT -----  Regarding: advice  Contact: Cindy with [facility]  Type: Needs Medical Advice  Who Called:  Cindy with [facility]  Symptoms (please be specific):    How long has patient had these symptoms:    Pharmacy name and phone #:    Best Call Back Number: [phone] (home)     Additional Information: Sophia states patient is on Plavix and Eliquis.  Please call patient to advise. Thanks!

## 2024-09-23 NOTE — TELEPHONE ENCOUNTER
----- Message from Rupal Sanchez sent at 9/23/2024 11:49 AM CDT -----  Regarding: Refill  Type:  RX Refill Request    Who Called: Pt    Refill or New Rx:Refill    RX Name and Strength:empagliflozin (JARDIANCE) 10 mg tablet     How is the patient currently taking it? (ex. 1XDay):1xday    Is this a 30 day or 90 day RX:90    Preferred Pharmacy with phone number:[facility] or Mail Order:Mail      Would the patient rather a call back or a response via [facility]? Call back    Best Call Back Number: [phone]    Additional Information: Pt sts she is almost out of meds. Thank you

## 2024-09-23 NOTE — PROGRESS NOTES
C3 nurse spoke with Leslie Tolliver for a TCC post hospital discharge follow up call. The patient has a scheduled \[facility] [place] [facility]\"" appointment with Chang Christianson On 09/30/24 @ 3010.  Of note: The pt. States she has been taking Plavix 75 mg once a day.  She has not picked up the Eliquis from the pharmacy.  Instructed the pt. To speak with PCP concerning the use of Plavix and Eliquis.  She stated understanding.  C3 nurse spoke with the call center and left a message for the PCP to contact the pt. Concerning the above medications.

## 2024-09-24 ENCOUNTER — HOSPITAL ENCOUNTER (OUTPATIENT)
Dept: RADIOLOGY | Facility: HOSPITAL | Age: 89
Discharge: HOME | End: 2024-09-24
Attending: ORTHOPAEDIC SURGERY
Payer: MEDICARE

## 2024-09-24 ENCOUNTER — OFFICE VISIT (OUTPATIENT)
Dept: FAMILY MEDICINE | Facility: CLINIC | Age: 89
End: 2024-09-24
Payer: MEDICARE

## 2024-09-24 ENCOUNTER — OFFICE VISIT (OUTPATIENT)
Dept: ORTHOPEDICS | Facility: CLINIC | Age: 89
End: 2024-09-24
Payer: MEDICARE

## 2024-09-24 VITALS
HEIGHT: 67 IN | DIASTOLIC BLOOD PRESSURE: 50 MMHG | SYSTOLIC BLOOD PRESSURE: 110 MMHG | OXYGEN SATURATION: 94 % | BODY MASS INDEX: 28.25 KG/M2 | WEIGHT: 180 LBS | HEART RATE: 89 BPM | RESPIRATION RATE: 16 BRPM

## 2024-09-24 DIAGNOSIS — R60.0 BILATERAL LEG EDEMA: Primary | ICD-10-CM

## 2024-09-24 DIAGNOSIS — S72.142D CLOSED DISPLACED INTERTROCHANTERIC FRACTURE OF LEFT FEMUR WITH ROUTINE HEALING, SUBSEQUENT ENCOUNTER: Primary | ICD-10-CM

## 2024-09-24 DIAGNOSIS — R60.0 BILATERAL LOWER EXTREMITY EDEMA: ICD-10-CM

## 2024-09-24 DIAGNOSIS — I48.0 PAROXYSMAL ATRIAL FIBRILLATION: ICD-10-CM

## 2024-09-24 DIAGNOSIS — S72.142D CLOSED DISPLACED INTERTROCHANTERIC FRACTURE OF LEFT FEMUR WITH ROUTINE HEALING, SUBSEQUENT ENCOUNTER: ICD-10-CM

## 2024-09-24 DIAGNOSIS — I50.40 COMBINED SYSTOLIC AND DIASTOLIC CONGESTIVE HEART FAILURE, UNSPECIFIED HF CHRONICITY: ICD-10-CM

## 2024-09-24 PROCEDURE — 2023F DILAT RTA XM W/O RTNOPTHY: CPT | Mod: HCNC,CPTII,S$GLB, | Performed by: FAMILY MEDICINE

## 2024-09-24 PROCEDURE — 1157F ADVNC CARE PLAN IN RCRD: CPT | Mod: HCNC,CPTII,S$GLB, | Performed by: FAMILY MEDICINE

## 2024-09-24 PROCEDURE — 73552 X-RAY EXAM OF FEMUR 2/>: CPT | Mod: TC,LT

## 2024-09-24 PROCEDURE — 3288F FALL RISK ASSESSMENT DOCD: CPT | Mod: HCNC,CPTII,S$GLB, | Performed by: FAMILY MEDICINE

## 2024-09-24 PROCEDURE — 99213 OFFICE O/P EST LOW 20 MIN: CPT | Mod: HCNC,S$GLB,, | Performed by: FAMILY MEDICINE

## 2024-09-24 PROCEDURE — 99999 PR PBB SHADOW E&M-EST. PATIENT-LVL III: CPT | Mod: PBBFAC,HCNC,, | Performed by: FAMILY MEDICINE

## 2024-09-24 PROCEDURE — 1100F PTFALLS ASSESS-DOCD GE2>/YR: CPT | Mod: HCNC,CPTII,S$GLB, | Performed by: FAMILY MEDICINE

## 2024-09-24 PROCEDURE — 1125F AMNT PAIN NOTED PAIN PRSNT: CPT | Mod: HCNC,CPTII,S$GLB, | Performed by: FAMILY MEDICINE

## 2024-09-24 PROCEDURE — 1157F ADVNC CARE PLAN IN RCRD: CPT | Mod: HCNC,CPTII,S$GLB, | Performed by: ORTHOPAEDIC SURGERY

## 2024-09-24 PROCEDURE — 73552 X-RAY EXAM OF FEMUR 2/>: CPT | Mod: 26,LT,, | Performed by: RADIOLOGY

## 2024-09-24 PROCEDURE — 99213 OFFICE O/P EST LOW 20 MIN: CPT | Mod: HCNC,S$GLB,, | Performed by: ORTHOPAEDIC SURGERY

## 2024-09-24 PROCEDURE — 1159F MED LIST DOCD IN RCRD: CPT | Mod: HCNC,CPTII,S$GLB, | Performed by: FAMILY MEDICINE

## 2024-09-24 NOTE — PATIENT INSTRUCTIONS
Lasix (furosemide) - you can take it each AM - AS NEEDED - for swelling and once improved, you can hold it and use it later if needed.    Push fluids intake.  Drink plenty of water.   Stay away from any salt that you can avoid.

## 2024-09-24 NOTE — PROGRESS NOTES
Subjective:       Patient ID: Leslie Tolliver is a 89 y.o. female.    Chief Complaint: No chief complaint on file.    Chronic/recurrent leg edema; weeps thru skin.  Some improvement with more salt restriction past week.  Has Lasix 20 mg to use prn.  Hx CHF, no increased SOB or weight gain.  Hx Chr A Fib.        Review of Systems   Constitutional:  Negative for fever.   Respiratory:  Negative for shortness of breath.    Cardiovascular:  Positive for leg swelling. Negative for chest pain.   Gastrointestinal:  Negative for abdominal pain and nausea.   Skin:  Negative for rash.   All other systems reviewed and are negative.      Objective:      Physical Exam  Vitals reviewed.   Constitutional:       Appearance: She is well-developed. She is not ill-appearing.   Eyes:      General: No scleral icterus.     Pupils: Pupils are equal, round, and reactive to light.   Cardiovascular:      Rate and Rhythm: Normal rate. Rhythm irregularly irregular.      Heart sounds: No murmur heard.  Pulmonary:      Effort: Pulmonary effort is normal.      Breath sounds: Normal breath sounds. No rales.   Musculoskeletal:         General: No tenderness.      Cervical back: Neck supple.      Right lower leg: Edema present.      Left lower leg: Edema present.   Lymphadenopathy:      Cervical: No cervical adenopathy.   Skin:     General: Skin is warm and dry.   Neurological:      Mental Status: She is alert.         Assessment:       1. Bilateral leg edema    2. Paroxysmal atrial fibrillation    3. Combined systolic and diastolic congestive heart failure, unspecified HF chronicity        Plan:       Bilateral leg edema    Paroxysmal atrial fibrillation    Combined systolic and diastolic congestive heart failure, unspecified HF chronicity      Patient Instructions   Lasix (furosemide) - you can take it each AM - AS NEEDED - for swelling and once improved, you can hold it and use it later if needed.    Push fluids intake.  Drink plenty of water.    Stay away from any salt that you can avoid.

## 2024-09-26 ENCOUNTER — OUTPATIENT CASE MANAGEMENT (OUTPATIENT)
Dept: ADMINISTRATIVE | Facility: OTHER | Age: 89
End: 2024-09-26
Payer: MEDICARE

## 2024-09-26 NOTE — PROGRESS NOTES
9/26/2024  1st attempt to complete Initial Assessment  for Outpatient Care Management, left message.  Will send via mail -  unable to assess letter.

## 2024-09-26 NOTE — LETTER
Leslie Tolliver  [address] [place] [id number]      Dear Leslie Tolliver,     I work with [facility]'s Outpatient Care Management Department. We received a referral to call you to discuss your medical history. These services are free of charge and are offered to [facility] patients who have recently been discharged from any of our facilities or who have medical conditions that may require the skill of a nurse to assist with management.     I am a Registered Nurse who specializes in connecting patients with available medical and financial resources as well as addressing any educational needs that may be indicated.    I attempted to reach you by telephone, but I was unsuccessful. Please call our department so that we can go over some questions with you, regarding your health.    The Outpatient Care Management Department can be reached at [phone], from 8:00AM to 4:30 PM, on Monday thru Friday.     Additionally, [facility] also has a program where a nurse is available 24/7 to answer questions or provide medical advice, their number is [phone].      Thanks,        Christiane Vergara RN  Outpatient Care Management  Phone #: [phone]

## 2024-10-02 ENCOUNTER — TELEPHONE (OUTPATIENT)
Dept: FAMILY MEDICINE | Facility: CLINIC | Age: 89
End: 2024-10-02
Payer: MEDICARE

## 2024-10-02 NOTE — TELEPHONE ENCOUNTER
Patient needs to be seen.  I dont not know what I would be treating or if she has infection.  Does not look like she had this when evaluated by Dr. Sam last week

## 2024-10-02 NOTE — TELEPHONE ENCOUNTER
----- Message from Saige sent at 10/2/2024  2:37 PM CDT -----  Contact: [facility]  Type:  Needs Medical Advice    Who Called:  [facility]   Symptoms (please be specific): ORDERS NEEDED TO TREAT OPEN ULCERS THAT ARE WEEPING ON BOTH LEGS. WEEPING IS SOAKING COMPRESSION SOCKS      How long has patient had these symptoms:1 1/2 WEEKS   Pharmacy name and phone #:    [id number] - [address] [facility] [address]  [address]  [place] [address]  Phone: [phone] Fax: [phone]  Would the patient rather a call back or a response via [facility]? CALL   Best Call Back Number:  [phone]  Additional Information: THANK YOU

## 2024-10-02 NOTE — TELEPHONE ENCOUNTER
Spoke with Ronda EPSTEIN [facility].   Instructed patient would need an appointment to be evaluated   She will have her daughter to schedule.

## 2024-10-03 ENCOUNTER — OUTPATIENT CASE MANAGEMENT (OUTPATIENT)
Dept: ADMINISTRATIVE | Facility: OTHER | Age: 89
End: 2024-10-03
Payer: MEDICARE

## 2024-10-04 NOTE — PROGRESS NOTES
10/3/2024  2nd attempt to complete Initial Assessment  for Outpatient Care Management, left message.

## 2024-10-07 ENCOUNTER — TELEPHONE (OUTPATIENT)
Dept: FAMILY MEDICINE | Facility: CLINIC | Age: 89
End: 2024-10-07
Payer: MEDICARE

## 2024-10-07 ENCOUNTER — HOSPITAL ENCOUNTER (EMERGENCY)
Facility: HOSPITAL | Age: 89
Discharge: HOME | End: 2024-10-07
Attending: STUDENT IN AN ORGANIZED HEALTH CARE EDUCATION/TRAINING PROGRAM
Payer: MEDICARE

## 2024-10-07 VITALS
BODY MASS INDEX: 28.19 KG/M2 | WEIGHT: 180 LBS | TEMPERATURE: 98 F | OXYGEN SATURATION: 97 % | SYSTOLIC BLOOD PRESSURE: 108 MMHG | HEART RATE: 76 BPM | DIASTOLIC BLOOD PRESSURE: 68 MMHG | RESPIRATION RATE: 14 BRPM

## 2024-10-07 DIAGNOSIS — I50.9 CONGESTIVE HEART FAILURE, UNSPECIFIED HF CHRONICITY, UNSPECIFIED HEART FAILURE TYPE: Primary | ICD-10-CM

## 2024-10-07 DIAGNOSIS — M79.89 LEG SWELLING: ICD-10-CM

## 2024-10-07 DIAGNOSIS — R00.0 TACHYCARDIA: ICD-10-CM

## 2024-10-07 LAB
ALBUMIN SERPL BCP-MCNC: 3.8 G/DL (ref 3.5–5.2)
ALP SERPL-CCNC: 102 U/L (ref 55–135)
ALT SERPL W/O P-5'-P-CCNC: 11 U/L (ref 10–44)
ANION GAP SERPL CALC-SCNC: 6 MMOL/L (ref 8–16)
AST SERPL-CCNC: 14 U/L (ref 10–40)
BASOPHILS # BLD AUTO: 0.02 K/UL (ref 0–0.2)
BASOPHILS NFR BLD: 0.4 % (ref 0–1.9)
BILIRUB SERPL-MCNC: 1.1 MG/DL (ref 0.1–1)
BNP SERPL-MCNC: 507 PG/ML (ref 0–99)
BUN SERPL-MCNC: 35 MG/DL (ref 8–23)
CALCIUM SERPL-MCNC: 8.8 MG/DL (ref 8.7–10.5)
CHLORIDE SERPL-SCNC: 108 MMOL/L (ref 95–110)
CO2 SERPL-SCNC: 23 MMOL/L (ref 23–29)
CREAT SERPL-MCNC: 1.2 MG/DL (ref 0.5–1.4)
CRP SERPL-MCNC: 1.3 MG/DL
DIFFERENTIAL METHOD BLD: ABNORMAL
EOSINOPHIL # BLD AUTO: 0.2 K/UL (ref 0–0.5)
EOSINOPHIL NFR BLD: 3.1 % (ref 0–8)
ERYTHROCYTE [DISTWIDTH] IN BLOOD BY AUTOMATED COUNT: 16.3 % (ref 11.5–14.5)
EST. GFR  (NO RACE VARIABLE): 43 ML/MIN/1.73 M^2
GLUCOSE SERPL-MCNC: 191 MG/DL (ref 70–110)
HCT VFR BLD AUTO: 33.5 % (ref 37–48.5)
HGB BLD-MCNC: 10.2 G/DL (ref 12–16)
IMM GRANULOCYTES # BLD AUTO: 0.01 K/UL (ref 0–0.04)
IMM GRANULOCYTES NFR BLD AUTO: 0.2 % (ref 0–0.5)
LACTATE SERPL-SCNC: 0.7 MMOL/L (ref 0.5–1.9)
LYMPHOCYTES # BLD AUTO: 0.6 K/UL (ref 1–4.8)
LYMPHOCYTES NFR BLD: 11.1 % (ref 18–48)
MCH RBC QN AUTO: 26.6 PG (ref 27–31)
MCHC RBC AUTO-ENTMCNC: 30.4 G/DL (ref 32–36)
MCV RBC AUTO: 87 FL (ref 82–98)
MONOCYTES # BLD AUTO: 0.5 K/UL (ref 0.3–1)
MONOCYTES NFR BLD: 8.8 % (ref 4–15)
NEUTROPHILS # BLD AUTO: 4 K/UL (ref 1.8–7.7)
NEUTROPHILS NFR BLD: 76.4 % (ref 38–73)
NRBC BLD-RTO: 0 /100 WBC
PLATELET # BLD AUTO: 237 K/UL (ref 150–450)
PMV BLD AUTO: 10.8 FL (ref 9.2–12.9)
POTASSIUM SERPL-SCNC: 4.2 MMOL/L (ref 3.5–5.1)
PROT SERPL-MCNC: 6.8 G/DL (ref 6–8.4)
RBC # BLD AUTO: 3.84 M/UL (ref 4–5.4)
SODIUM SERPL-SCNC: 137 MMOL/L (ref 136–145)
WBC # BLD AUTO: 5.21 K/UL (ref 3.9–12.7)

## 2024-10-07 PROCEDURE — 83605 ASSAY OF LACTIC ACID: CPT | Performed by: NURSE PRACTITIONER

## 2024-10-07 PROCEDURE — 93005 ELECTROCARDIOGRAM TRACING: CPT | Performed by: GENERAL PRACTICE

## 2024-10-07 PROCEDURE — 99285 EMERGENCY DEPT VISIT HI MDM: CPT | Mod: 25

## 2024-10-07 PROCEDURE — 96374 THER/PROPH/DIAG INJ IV PUSH: CPT

## 2024-10-07 PROCEDURE — 63600175 PHARM REV CODE 636 W HCPCS: Performed by: STUDENT IN AN ORGANIZED HEALTH CARE EDUCATION/TRAINING PROGRAM

## 2024-10-07 PROCEDURE — 86140 C-REACTIVE PROTEIN: CPT | Performed by: NURSE PRACTITIONER

## 2024-10-07 PROCEDURE — 85025 COMPLETE CBC W/AUTO DIFF WBC: CPT | Performed by: NURSE PRACTITIONER

## 2024-10-07 PROCEDURE — 36415 COLL VENOUS BLD VENIPUNCTURE: CPT | Performed by: NURSE PRACTITIONER

## 2024-10-07 PROCEDURE — 93010 ELECTROCARDIOGRAM REPORT: CPT | Mod: ,,, | Performed by: GENERAL PRACTICE

## 2024-10-07 PROCEDURE — 87040 BLOOD CULTURE FOR BACTERIA: CPT | Performed by: NURSE PRACTITIONER

## 2024-10-07 PROCEDURE — 80053 COMPREHEN METABOLIC PANEL: CPT | Performed by: NURSE PRACTITIONER

## 2024-10-07 PROCEDURE — 83880 ASSAY OF NATRIURETIC PEPTIDE: CPT | Performed by: NURSE PRACTITIONER

## 2024-10-07 RX ORDER — FUROSEMIDE 10 MG/ML
40 INJECTION INTRAMUSCULAR; INTRAVENOUS
Status: COMPLETED | OUTPATIENT
Start: 2024-10-07 | End: 2024-10-07

## 2024-10-07 RX ADMIN — FUROSEMIDE 40 MG: 10 INJECTION, SOLUTION INTRAMUSCULAR; INTRAVENOUS at 04:10

## 2024-10-07 NOTE — TELEPHONE ENCOUNTER
Spoke with Ronda and she states that she sent the patient to the ER due to cellulitis in legs are looking worse.

## 2024-10-07 NOTE — DISCHARGE INSTRUCTIONS
Recommend taking Lasix daily for lower extremity swelling.  Also recommend elevating legs and wearing compression stockings.  Follow up with Cardiology within 1 week for reassessment.  Return with any worsening swelling, fever, shortness of breath, any other concerns

## 2024-10-07 NOTE — ED PROVIDER NOTES
Encounter Date: 10/7/2024       History     Chief Complaint   Patient presents with    Leg Swelling     Sent by  nurse for increased edema and weaping to ble     HPI    Patient is a 89-year-old female with history of hypertension, hyperlipidemia, diabetes, CAD, CKD presenting with worsening lower extremity swelling.  Patient states she has had lower extremity swelling with wounds for the past several months.  Reports that the swelling has progressively gotten worse.  Reports associated leaking from her lower extremity legs.  Denies any unilateral swelling, worsening pain, fever.  Patient was seen by home health who advised her to come to the ED for evaluation.  States she was prescribed Lasix however was told to stop this because she is on Eliquis.  Patient does take Eliquis for AFib.  Denies any shortness of breath or orthopnea.    Review of patient's allergies indicates:   Allergen Reactions    Fenofibric acid (choline)      Other reaction(s): Vomiting    Iodine      Other reaction(s): lips swollen ivp dye    Rosuvastatin      Other reaction(s): muscle pain     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    GERD (gastroesophageal reflux disease)     Hyperlipidemia     Hypertension     Hypothyroidism     Paroxysmal atrial fibrillation 3/16/2023    Type 2 diabetes mellitus      Past Surgical History:   Procedure Laterality Date    ADENOIDECTOMY      AORTIC VALVE REPLACEMENT      BREAST BIOPSY Right 20 yrs ago    benign    CARDIAC SURGERY      CHOLECYSTECTOMY       COLONOSCOPY  2014    Dr Holly, 5 year recheck    EPIDURAL STEROID INJECTION N/A 3/25/2021    Procedure: Injection, Steroid, Epidural L3-4;  Surgeon: Sky Love MD;  Location: [facility] OR;  Service: Pain Management;  Laterality: N/A;  Injection, Steroid, Epidural L3-4    EPIDURAL STEROID INJECTION N/A 2021    Procedure: Injection, Steroid, Epidural L3-4;  Surgeon: Sky Love MD;  Location: [facility] OR;  Service: Pain Management;  Laterality: N/A;  Injection, Steroid, Epidural L3-4    ESOPHAGOGASTRODUODENOSCOPY N/A 2020    Procedure: EGD (ESOPHAGOGASTRODUODENOSCOPY);  Surgeon: Michael Nugent III, MD;  Location: [facility] ENDO;  Service: Endoscopy;  Laterality: N/A;    HEMORRHOID SURGERY      HYSTERECTOMY      INTRAMEDULLARY RODDING OF FEMUR Left 2024    Procedure: INSERTION, INTRAMEDULLARY RACQUEL, FEMUR;  Surgeon: Bravo Guillermo MD;  Location: [facility] OR;  Service: Orthopedics;  Laterality: Left;    OOPHORECTOMY      TONSILLECTOMY       Family History   Problem Relation Name Age of Onset    Diabetes Mother      Heart disease Mother      Glaucoma Mother      Cancer Father          lung cancer    Early death Son          brain tumor age 3    Diabetes Brother      No Known Problems Daughter      Early death Son          MVA 25    Macular degeneration Neg Hx      Retinal detachment Neg Hx       Social History     Tobacco Use    Smoking status: Former     Current packs/day: 0.00     Average packs/day: 0.3 packs/day for 15.0 years (3.8 ttl pk-yrs)     Types: Cigarettes     Start date: 3/30/1959     Quit date: 3/30/1974     Years since quittin.5    Smokeless tobacco: Never   Substance Use Topics    Alcohol use: No    Drug use: No     Review of Systems    As noted above    Physical Exam     Initial Vitals [10/07/24 1420]   BP Pulse Resp Temp SpO2   100/63 109 20 98.7 °F (37.1 °C) 96 %      MAP       --         Physical Exam    Constitutional: She appears well-developed and well-nourished. She is not  diaphoretic.   HENT:   Head: Normocephalic and atraumatic.   Eyes: EOM are normal. Pupils are equal, round, and reactive to light.   Neck:   JVP estimated at 10 cm H2O   Cardiovascular:  Normal rate.           No murmur heard.  Pulmonary/Chest: Breath sounds normal. No respiratory distress. She has no wheezes. She has no rhonchi. She has no rales.   Abdominal: Abdomen is soft. She exhibits no distension. There is no abdominal tenderness.   Musculoskeletal:         General: Edema present. Normal range of motion.      Comments: Patient with bilateral lower extremity pitting edema with superficial, shallow wounds with serous drainage and leakage, pitting edema, extremities warm with good cap refill.  Symptoms appear to be symmetric and located below the knee.      Neurological: She is alert and oriented to person, place, and time.   Skin: Skin is warm and dry. Capillary refill takes less than 2 seconds.             ED Course   Procedures  Labs Reviewed   CBC W/ AUTO DIFFERENTIAL - Abnormal       Result Value    WBC 5.21      RBC 3.84 (*)     Hemoglobin 10.2 (*)     Hematocrit 33.5 (*)     MCV 87      MCH 26.6 (*)     MCHC 30.4 (*)     RDW 16.3 (*)     Platelets 237      MPV 10.8      Immature Granulocytes 0.2      Gran # (ANC) 4.0      Immature Grans (Abs) 0.01      Lymph # 0.6 (*)     Mono # 0.5      Eos # 0.2      Baso # 0.02      nRBC 0      Gran % 76.4 (*)     Lymph % 11.1 (*)     Mono % 8.8      Eosinophil % 3.1      Basophil % 0.4      Differential Method Automated     COMPREHENSIVE METABOLIC PANEL - Abnormal    Sodium 137      Potassium 4.2      Chloride 108      CO2 23      Glucose 191 (*)     BUN 35 (*)     Creatinine 1.2      Calcium 8.8      Total Protein 6.8      Albumin 3.8      Total Bilirubin 1.1 (*)     Alkaline Phosphatase 102      AST 14      ALT 11      eGFR 43.0 (*)     Anion Gap 6 (*)    C-REACTIVE PROTEIN - Abnormal    CRP 1.30 (*)    B-TYPE NATRIURETIC PEPTIDE - Abnormal     (*)     CULTURE, BLOOD   CULTURE, BLOOD   LACTIC ACID, PLASMA    Lactate (Lactic Acid) 0.7     B-TYPE NATRIURETIC PEPTIDE   URINALYSIS, REFLEX TO URINE CULTURE        ECG Results              EKG 12-lead (In process)        Collection Time Result Time QRS Duration OHS QTC Calculation    10/07/24 15:06:31 10/07/24 15:43:23 92 459                     In process by Interface, Lab In [facility] (10/07/24 15:43:26)                   Narrative:    Test Reason : R00.0,    Vent. Rate : 087 BPM     Atrial Rate : 000 BPM     P-R Int : 000 ms          QRS Dur : 092 ms      QT Int : 382 ms       P-R-T Axes : 000 032 000 degrees     QTc Int : 459 ms    Atrial fibrillation with premature ventricular or aberrantly conducted  complexes  Low voltage QRS  Incomplete right bundle branch block  Septal infarct ,age undetermined  Abnormal ECG  When compared with ECG of 19-SEP-2024 15:21,  Incomplete right bundle branch block is now Present  Septal infarct is now Present    Referred By: [email or web address]   SELF           Confirmed By:                                   Imaging Results              X-Ray Chest AP Portable (Final result)  Result time 10/07/24 15:56:40      Final result by Neto Bautista DO (10/07/24 15:56:40)                   Impression:      No acute cardiopulmonary abnormality.      Electronically signed by: Neto Bautista  Date:    10/07/2024  Time:    15:56               Narrative:    EXAMINATION:  XR CHEST AP PORTABLE    CLINICAL HISTORY:  Other specified soft tissue disorders    FINDINGS:  Portable chest with comparison chest x-ray 09/19/2024.  Normal cardiomediastinal silhouette.  Median sternotomy wires noted.  Lungs are clear. Pulmonary vasculature is normal. No acute osseous abnormality.                                       US Lower Extremity Veins Bilateral (Final result)  Result time 10/07/24 15:47:09      Final result by Neto Bautista DO (10/07/24 15:47:09)                   Impression:      Negative for DVT throughout  the bilateral lower extremity veins.      Electronically signed by: Neto Bautista  Date:    10/07/2024  Time:    15:47               Narrative:    EXAMINATION:  US LOWER EXTREMITY VEINS BILATERAL    CLINICAL HISTORY:  Other specified soft tissue disorders    COMPARISON:  None    FINDINGS:  Grayscale, color Doppler, and spectral Doppler analysis was performed of the bilateral lower extremity veins.    Bilateral common femoral, femoral, popliteal, and proximal great saphenous veins show normal compressibility, augmentation, and color Doppler flow.    Bilateral posterior tibial, anterior tibial, and peroneal veins are unremarkable. Right Caba's cyst noted.                                       Medications   furosemide injection 40 mg (has no administration in time range)     Medical Decision Making  89-year-old presenting with worsening lower extremity edema, wounds, and drainage.  No fever.  Vital signs with mild tachycardia but otherwise stable.  Exam as above appears to have severe venous stasis and lower extremity edema.  Differential includes venous stasis, CHF exacerbation, DVT.  Low suspicion for cellulitis on exam.  Lab work shows BNP of 500, lactic acid within normal limits.  No significant electrolyte abnormality or KANNAN.  Blood counts are stable.  Chest x-ray does not show any evidence of pulmonary edema or pneumonia.  Suspect symptoms related to venous stasis and CHF.  Patient does have Lasix at home which she has not been taking.  Advised her to take this daily and have close follow up with Cardiology.  DVT ultrasound is negative.  Patient feels comfortable with going home.  Plan was relayed to daughter who also agrees with this plan.  Return precautions discussed.    Colton Schwartz MD  Emergency Medicine      Amount and/or Complexity of Data Reviewed  Labs: ordered. Decision-making details documented in ED Course.  Radiology: ordered.    Risk  Prescription drug management.               ED Course as of  10/07/24 1638   Mon Oct 07, 2024   1608 Glucose(!): 191 [KH]      ED Course User Index  [KH] Colton Schwartz MD                           Clinical Impression:  Final diagnoses:  [R00.0] Tachycardia  [M79.89] Leg swelling  [I50.9] Congestive heart failure, unspecified HF chronicity, unspecified heart failure type (Primary)          ED Disposition Condition    Discharge Stable          ED Prescriptions    None       Follow-up Information       Follow up With Specialties Details Why Contact Info Additional Information    [facility] - Emergency Dept Emergency Medicine  As needed, If symptoms worsen including worsening swelling, chest pain, shortness of breath, any other concerns [address] [address]  [phone] 1st floor    Delfino Huerta MD Interventional Cardiology, Cardiology Schedule an appointment as soon as possible for a visit in 2 days For follow-up on CHF and lower extremity swelling [address]  [address]  [facility] [address]  [phone]                Colton Schwartz MD  10/07/24 1638

## 2024-10-07 NOTE — TELEPHONE ENCOUNTER
----- Message from Mallory sent at 10/7/2024  1:05 PM CDT -----  Type: Needs Medical Advice  Who Called:  mary / pt home nurse     Best Call Back Number: [phone]  Additional Information: is calling to let office know that be has cellulitis , wants to know if dr wants pt to get antibiotics or go to e.r please advise

## 2024-10-07 NOTE — FIRST PROVIDER EVALUATION
Emergency Department TeleTriage Encounter Note      CHIEF COMPLAINT    Chief Complaint   Patient presents with    Leg Swelling     Sent by  nurse for increased edema and weaping to ble       VITAL SIGNS   Initial Vitals [10/07/24 1420]   BP Pulse Resp Temp SpO2   100/63 109 20 98.7 °F (37.1 °C) 96 %      MAP       --            ALLERGIES    Review of patient's allergies indicates:   Allergen Reactions    Fenofibric acid (choline)      Other reaction(s): Vomiting    Iodine      Other reaction(s): lips swollen ivp dye    Rosuvastatin      Other reaction(s): muscle pain       PROVIDER TRIAGE NOTE  Verbal consent for the teletriage evaluation was given by the patient at the start of the evaluation.  All efforts will be made to maintain patient's privacy during the evaluation.      This is a teletriage evaluation of a 89 y.o. female presenting to the ED with c/o possible cellulitis to BLE with swelling and pain for 2 months.   sent patient in for evaluation. Limited physical exam via telehealth: The patient is awake, alert, answering questions appropriately and is not in respiratory distress.  As the Teletriage provider, I performed an initial assessment and ordered appropriate labs and imaging studies, if any, to facilitate the patient's care once placed in the ED. Once a room is available, care and a full evaluation will be completed by an alternate ED provider.  Any additional orders and the final disposition will be determined by that provider.  All imaging and labs will not be followed-up by the Teletriage Team, including myself.          ORDERS  Labs Reviewed   CULTURE, BLOOD   CULTURE, BLOOD   CBC W/ AUTO DIFFERENTIAL   COMPREHENSIVE METABOLIC PANEL   URINALYSIS, REFLEX TO URINE CULTURE   LACTIC ACID, PLASMA       ED Orders (720h ago, onward)      Start Ordered     Status Ordering Provider    10/07/24 1431 10/07/24 1430   Lower Extremity Veins Bilateral  1 time imaging         Ordered SHANTE NIETO     10/07/24 1430 10/07/24 1430  Saline lock IV  Once         Ordered SHANTE NIETO    10/07/24 1430 10/07/24 1430  Pulse Oximetry Continuous  Continuous         Ordered SHANTE NIETO    10/07/24 1430 10/07/24 1430  Cardiac Monitoring - Adult  Continuous        Comments: Notify Physician If:    Ordered SHANTE NIETO    10/07/24 1430 10/07/24 1430  EKG 12-lead  Once         Ordered SHANTE NIETO    10/07/24 1430 10/07/24 1430  CBC auto differential  STAT         Ordered SHANTE NIETO    10/07/24 1430 10/07/24 1430  Comprehensive metabolic panel  STAT         Ordered SHANTE NIETO    10/07/24 1430 10/07/24 1430  Urinalysis, Reflex to Urine Culture Urine, Clean Catch  STAT         Ordered SHANTE NIETO    10/07/24 1430 10/07/24 1430  Lactic acid, plasma  STAT         Ordered SHANTE NIETO    10/07/24 1430 10/07/24 1430  Blood culture #1  STAT        Comments: Blood Culture #1      Ordered SHANTE NIETO    10/07/24 1430 10/07/24 1430  Blood culture #2  STAT        Comments: Blood Culture #2      Ordered GERSON SHANTE JANG    Unscheduled 10/07/24 1430  C-reactive protein  STAT         Ordered GERSONSHANTE              Virtual Visit Note: The provider triage portion of this emergency department evaluation and documentation was performed via [employer], a HIPAA-compliant telemedicine application, in concert with a tele-presenter in the room. A face to face patient evaluation with one of my colleagues will occur once the patient is placed in an emergency department room.      DISCLAIMER: This note was prepared with [employer] voice recognition transcription software. Garbled syntax, mangled pronouns, and other bizarre constructions may be attributed to that software system.

## 2024-10-08 ENCOUNTER — NURSE TRIAGE (OUTPATIENT)
Dept: ADMINISTRATIVE | Facility: CLINIC | Age: 89
End: 2024-10-08
Payer: MEDICARE

## 2024-10-08 ENCOUNTER — OUTPATIENT CASE MANAGEMENT (OUTPATIENT)
Dept: ADMINISTRATIVE | Facility: OTHER | Age: 89
End: 2024-10-08
Payer: MEDICARE

## 2024-10-08 NOTE — TELEPHONE ENCOUNTER
Pt calling, went to hospital yesterday   Hx HF  Diabetic  Excruciating pain in the legs 8-9/10, has worsened since being seen in the ER    Was told to wear stockings and is currently wearing them  Was given lasix in hospital        Was told that she could not take lasix with eliquis by Dr. Christianson's nurse.; per pt.    Doctor in ER told her to take lasix    Unsure if she can take lasix and eliquis together    Denies SOB  Denies fever      Reason for Disposition   Caller has URGENT question and triager unable to answer question   Unable to complete triage due to phone connection issues    Additional Information   Negative: Shock suspected (e.g., cold/pale/clammy skin, too weak to stand, low BP, rapid pulse)   Negative: Difficult to awaken or acting confused (e.g., disoriented, slurred speech)   Negative: Sounds like a life-threatening emergency to the triager   Negative: Drinking very little and dehydration suspected (e.g., no urine > 12 hours, very dry mouth, very lightheaded)   Negative: Patient sounds very sick or weak to the triager   Negative: Fever > 104 F  (40 C)   Negative: Looks like a broken bone or dislocated joint (e.g., crooked or deformed)   Negative: Sounds like a life-threatening emergency to the triager   Negative: Chest pain   Negative: Difficulty breathing   Negative: Entire foot is cool or blue in comparison to other side   Negative: Unable to walk   Negative: Fever and red area (or area very tender to touch)   Negative: Swollen joint and fever   Negative: Thigh or calf pain in only one leg and present > 1 hour   Negative: Thigh, calf, or ankle swelling in only one leg    Protocols used: Recent Medical Visit for Illness Follow-up Call-A-OH, Leg Pain-A-OH, No Contact or Duplicate Contact Call-A-OH

## 2024-10-08 NOTE — TELEPHONE ENCOUNTER
Left message on machine to call.    Patient was instructed 9/23/2024   Not to take Eliquis and Plavix together      Plavix was discontinued

## 2024-10-08 NOTE — PROGRESS NOTES
Outpatient Care Management  Patient Does Not Consent    Patient: Leslie Tolilver  MRN:  [id number]  Date of Service:  10/8/2024  Completed by:  Christiane Vergara RN    Chief Complaint   Patient presents with    Case Closure     Unable to reach.       Patient Summary           Consent Received:  Decline  Unable to reach.

## 2024-10-11 LAB
OHS QRS DURATION: 92 MS
OHS QTC CALCULATION: 459 MS

## 2024-10-12 LAB
BACTERIA BLD CULT: NORMAL
BACTERIA BLD CULT: NORMAL

## 2024-10-17 ENCOUNTER — TELEPHONE (OUTPATIENT)
Dept: FAMILY MEDICINE | Facility: CLINIC | Age: 89
End: 2024-10-17

## 2024-10-17 NOTE — TELEPHONE ENCOUNTER
----- Message from Coco sent at 10/17/2024 10:27 AM CDT -----  Contact: self  Type:  Patient Returning Call    Who Called:self  Who Left Message for Patient:n/a  Does the patient know what this is regarding?:no   Would the patient rather a call back or a response via [facility]? call  Best Call Back Number:[phone] (home)     Additional Information: please advise and thank you.

## 2024-10-22 ENCOUNTER — DOCUMENT SCAN (OUTPATIENT)
Dept: HOME HEALTH SERVICES | Facility: HOSPITAL | Age: 89
End: 2024-10-22
Payer: MEDICARE

## 2024-11-04 ENCOUNTER — OFFICE VISIT (OUTPATIENT)
Dept: FAMILY MEDICINE | Facility: CLINIC | Age: 89
End: 2024-11-04
Attending: FAMILY MEDICINE
Payer: MEDICARE

## 2024-11-04 VITALS
OXYGEN SATURATION: 100 % | HEIGHT: 67 IN | SYSTOLIC BLOOD PRESSURE: 104 MMHG | DIASTOLIC BLOOD PRESSURE: 62 MMHG | TEMPERATURE: 98 F | WEIGHT: 167.69 LBS | HEART RATE: 93 BPM | BODY MASS INDEX: 26.32 KG/M2

## 2024-11-04 DIAGNOSIS — Z95.3 STATUS POST AORTIC VALVE REPLACEMENT WITH BIOPROSTHETIC VALVE: ICD-10-CM

## 2024-11-04 DIAGNOSIS — M54.50 CHRONIC BILATERAL LOW BACK PAIN WITHOUT SCIATICA: ICD-10-CM

## 2024-11-04 DIAGNOSIS — Z79.4 TYPE 2 DIABETES MELLITUS WITH DIABETIC POLYNEUROPATHY, WITH LONG-TERM CURRENT USE OF INSULIN: ICD-10-CM

## 2024-11-04 DIAGNOSIS — D50.9 IRON DEFICIENCY ANEMIA, UNSPECIFIED IRON DEFICIENCY ANEMIA TYPE: ICD-10-CM

## 2024-11-04 DIAGNOSIS — Z23 FLU VACCINE NEED: ICD-10-CM

## 2024-11-04 DIAGNOSIS — E11.42 TYPE 2 DIABETES MELLITUS WITH DIABETIC POLYNEUROPATHY, WITH LONG-TERM CURRENT USE OF INSULIN: ICD-10-CM

## 2024-11-04 DIAGNOSIS — E55.9 VITAMIN D DEFICIENCY: ICD-10-CM

## 2024-11-04 DIAGNOSIS — R53.81 DEBILITY: ICD-10-CM

## 2024-11-04 DIAGNOSIS — I48.0 PAROXYSMAL ATRIAL FIBRILLATION: Primary | ICD-10-CM

## 2024-11-04 DIAGNOSIS — E78.5 HYPERLIPIDEMIA ASSOCIATED WITH TYPE 2 DIABETES MELLITUS: ICD-10-CM

## 2024-11-04 DIAGNOSIS — G89.29 CHRONIC BILATERAL LOW BACK PAIN WITHOUT SCIATICA: ICD-10-CM

## 2024-11-04 DIAGNOSIS — M16.0 ARTHRITIS OF BOTH HIPS: ICD-10-CM

## 2024-11-04 DIAGNOSIS — E11.59 HYPERTENSION ASSOCIATED WITH DIABETES: ICD-10-CM

## 2024-11-04 DIAGNOSIS — L12.0 BULLOUS PEMPHIGOID: ICD-10-CM

## 2024-11-04 DIAGNOSIS — I15.2 HYPERTENSION ASSOCIATED WITH DIABETES: ICD-10-CM

## 2024-11-04 DIAGNOSIS — E11.69 HYPERLIPIDEMIA ASSOCIATED WITH TYPE 2 DIABETES MELLITUS: ICD-10-CM

## 2024-11-04 PROCEDURE — 1125F AMNT PAIN NOTED PAIN PRSNT: CPT | Mod: HCNC,CPTII,S$GLB, | Performed by: FAMILY MEDICINE

## 2024-11-04 PROCEDURE — 1157F ADVNC CARE PLAN IN RCRD: CPT | Mod: HCNC,CPTII,S$GLB, | Performed by: FAMILY MEDICINE

## 2024-11-04 PROCEDURE — 1101F PT FALLS ASSESS-DOCD LE1/YR: CPT | Mod: HCNC,CPTII,S$GLB, | Performed by: FAMILY MEDICINE

## 2024-11-04 PROCEDURE — 90653 IIV ADJUVANT VACCINE IM: CPT | Mod: HCNC,S$GLB,, | Performed by: FAMILY MEDICINE

## 2024-11-04 PROCEDURE — 1159F MED LIST DOCD IN RCRD: CPT | Mod: HCNC,CPTII,S$GLB, | Performed by: FAMILY MEDICINE

## 2024-11-04 PROCEDURE — 2023F DILAT RTA XM W/O RTNOPTHY: CPT | Mod: HCNC,CPTII,S$GLB, | Performed by: FAMILY MEDICINE

## 2024-11-04 PROCEDURE — 99214 OFFICE O/P EST MOD 30 MIN: CPT | Mod: HCNC,S$GLB,, | Performed by: FAMILY MEDICINE

## 2024-11-04 PROCEDURE — 99999 PR PBB SHADOW E&M-EST. PATIENT-LVL III: CPT | Mod: PBBFAC,HCNC,, | Performed by: FAMILY MEDICINE

## 2024-11-04 PROCEDURE — 3288F FALL RISK ASSESSMENT DOCD: CPT | Mod: HCNC,CPTII,S$GLB, | Performed by: FAMILY MEDICINE

## 2024-11-04 PROCEDURE — G0008 ADMIN INFLUENZA VIRUS VAC: HCPCS | Mod: HCNC,S$GLB,, | Performed by: FAMILY MEDICINE

## 2024-11-04 PROCEDURE — 1160F RVW MEDS BY RX/DR IN RCRD: CPT | Mod: HCNC,CPTII,S$GLB, | Performed by: FAMILY MEDICINE

## 2024-11-04 RX ORDER — CARVEDILOL 12.5 MG/1
12.5 TABLET ORAL 2 TIMES DAILY
Qty: 90 TABLET | Refills: 3 | Status: SHIPPED | OUTPATIENT
Start: 2024-11-04 | End: 2025-11-04

## 2024-11-04 RX ORDER — ACETAMINOPHEN 500 MG
5000 TABLET ORAL DAILY
Qty: 90 TABLET | Refills: 3 | Status: SHIPPED | OUTPATIENT
Start: 2024-11-04

## 2024-11-04 NOTE — PROGRESS NOTES
Subjective:       Patient ID: Leslie Tolliver is a 89 y.o. female.    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  She has had an aortic valve replacement with a bioprosthetic valve and she is on Eliquis.  She also takes vitamin-D for deficiency and needs refills on Coreg and her vitamin-D.  She needs a new handicap license permit her old when having .  She also needs the flu vaccine and will consider getting the shingles series at the pharmacy.  She has no major complaints at this time.  No orthopnea or unusual shortness of breath.  She does have chronic bilateral edema dependent in nature but not significantly worse than background and takes Lasix 20 mg prn.  She is also on Jardiance 10 mg daily for her diabetes which is helping with her nephropathy and heart failure as well.  Her renal function has deteriorated slightly and she may have to reduce or eliminate her metformin with an increase in the Jardiance being an option.  She is currently taking 20 units of Lantus insulin daily.    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  No date: GERD  (gastroesophageal reflux disease)  No date: Hyperlipidemia  No date: Hypertension  No date: Hypothyroidism  3/16/2023: Paroxysmal atrial fibrillation  No date: Type 2 diabetes mellitus    Past Surgical History:  No date: ADENOIDECTOMY  No date: AORTIC VALVE REPLACEMENT  20 yrs ago: BREAST BIOPSY; Right      Comment:  benign  No date: CARDIAC SURGERY  No date: CHOLECYSTECTOMY  5-: COLONOSCOPY      Comment:  Dr Holly, 5 year recheck  3/25/2021: EPIDURAL STEROID INJECTION; N/A      Comment:  Procedure: Injection, Steroid, Epidural L3-4;  Surgeon:                Sky Love MD;  Location: [facility] OR;  Service: Pain                Management;  Laterality: N/A;  Injection, Steroid,                Epidural L3-4  5/20/2021: EPIDURAL STEROID INJECTION; N/A      Comment:  Procedure: Injection, Steroid, Epidural L3-4;  Surgeon:                Sky Love MD;  Location: [facility] OR;  Service: Pain                Management;  Laterality: N/A;  Injection, Steroid,                Epidural L3-4  6/4/2020: ESOPHAGOGASTRODUODENOSCOPY; N/A      Comment:  Procedure: EGD (ESOPHAGOGASTRODUODENOSCOPY);  Surgeon:                Michael Nugent III, MD;  Location: [facility];                 Service: Endoscopy;  Laterality: N/A;  No date: HEMORRHOID SURGERY  No date: HYSTERECTOMY  5/23/2024: INTRAMEDULLARY RODDING OF FEMUR; Left      Comment:  Procedure: INSERTION, INTRAMEDULLARY RACQUEL, FEMUR;                 Surgeon: Bravo Guillermo MD;  Location: [facility];                 Service: Orthopedics;  Laterality: Left;  No date: OOPHORECTOMY  No date: TONSILLECTOMY    Current Outpatient Medications on File Prior to Visit:  apixaban (ELIQUIS) 5 mg Tab, Take 1 tablet (5 mg total) by mouth 2 (two) times daily., Disp: 180 tablet, Rfl: 3  empagliflozin (JARDIANCE) 10 mg tablet, Take 1 tablet (10 mg total) by mouth once daily., Disp: 90 tablet, Rfl: 3  furosemide (LASIX) 20 MG tablet, Take 1 tablet (20 mg total) by mouth once daily., Disp: 30  tablet, Rfl: 0  insulin (LANTUS SOLOSTAR U-100 INSULIN) glargine 100 units/mL SubQ pen, Inject 24 Units into the skin every evening., Disp: 3 mL, Rfl: 3  ketoconazole (NIZORAL) 2 % cream, Apply 1 application  topically 2 (two) times daily., Disp: , Rfl:   metFORMIN (GLUCOPHAGE) 1000 MG tablet, Take 1 tablet (1,000 mg total) by mouth 2 (two) times daily with meals., Disp: 180 tablet, Rfl: 1  multivitamin (THERAGRAN) per tablet, Take 1 tablet by mouth once daily., Disp: , Rfl:   [DISCONTINUED] carvediloL (COREG) 12.5 MG tablet, Take 1 tablet (12.5 mg total) by mouth 2 (two) times daily., Disp: 60 tablet, Rfl: 11  [DISCONTINUED] cholecalciferol, vitamin D3, (VITAMIN D3) 125 mcg (5,000 unit) Tab, Take 5,000 Units by mouth once daily., Disp: , Rfl:     No current facility-administered medications on file prior to visit.          Review of Systems   Constitutional:  Negative for chills, fatigue and fever.   Respiratory:  Negative for cough, chest tightness, shortness of breath, wheezing and stridor.    Cardiovascular:  Positive for leg swelling. Negative for chest pain and palpitations.   Gastrointestinal:  Negative for abdominal pain, constipation, diarrhea and nausea.   Endocrine: Negative for polydipsia and polyuria.   Genitourinary:  Negative for dysuria, frequency and hematuria.   Musculoskeletal:  Positive for arthralgias and back pain.   Skin:  Positive for wound (Bullous lesions on the lower extremities consistent with her known bullous pemphigoid.).   Psychiatric/Behavioral:  Negative for decreased concentration and dysphoric mood. The patient is not nervous/anxious.        Objective:      Physical Exam  Vitals and nursing note reviewed.   Constitutional:       General: She is not in acute distress.     Appearance: Normal appearance. She is normal weight. She is not ill-appearing, toxic-appearing or diaphoretic.      Comments: Good blood pressure control   Irregular irregular heart rhythm with a controlled  rate  Normal weight for age with a BMI of 26.3 she is down 3.1 lb from her last visit with me April 3, 2024   Cardiovascular:      Rate and Rhythm: Normal rate. Rhythm irregular.      Pulses: Normal pulses.      Heart sounds: Normal heart sounds. No murmur heard.     No friction rub. No gallop.   Pulmonary:      Effort: Pulmonary effort is normal. No respiratory distress.      Breath sounds: Normal breath sounds. No stridor. No wheezing, rhonchi or rales.   Chest:      Chest wall: No tenderness.   Musculoskeletal:         General: No swelling, tenderness, deformity or signs of injury.      Right lower leg: Edema (1+) present.      Left lower leg: Edema (1+) present.   Skin:     General: Skin is warm and dry.      Comments: Large bullous lesions scattered on the lower extremities predominantly around the knees on the medial aspect   Neurological:      General: No focal deficit present.      Mental Status: She is alert and oriented to person, place, and time. Mental status is at baseline.   Psychiatric:         Mood and Affect: Mood normal.         Behavior: Behavior normal.         Thought Content: Thought content normal.         Judgment: Judgment normal.         Assessment:       1. Paroxysmal atrial fibrillation    2. Type 2 diabetes mellitus with diabetic polyneuropathy, with long-term current use of insulin    3. Hypertension associated with diabetes    4. Hyperlipidemia associated with type 2 diabetes mellitus    5. Status post aortic valve replacement with bioprosthetic valve    6. Bullous pemphigoid    7. Chronic bilateral low back pain without sciatica    8. Arthritis of both hips    9. Iron deficiency anemia, unspecified iron deficiency anemia type    10. Debility    11. Flu vaccine need    12. Vitamin D deficiency        Plan:       1. Paroxysmal atrial fibrillation (Primary)  Stable with controlled rate, currently in atrial fibrillation on Eliquis and controlled with Coreg  - carvediloL (COREG) 12.5 MG  tablet; Take 1 tablet (12.5 mg total) by mouth 2 (two) times daily.  Dispense: 90 tablet; Refill: 3    2. Type 2 diabetes mellitus with diabetic polyneuropathy, with long-term current use of insulin  Lab Results   Component Value Date    HGBA1C 6.6 (H) 09/20/2024     Well controlled, she may need to reduce metformin due to slowly declining renal function with the option of increasing the Jardiance to the 25 mg.  At this point nothing is necessary so continue to monitor    3. Hypertension associated with diabetes  Well controlled    4. Hyperlipidemia associated with type 2 diabetes mellitus  Lab Results   Component Value Date    CHOL 126 10/14/2022    CHOL 132 03/11/2022    CHOL 130 02/25/2021     Lab Results   Component Value Date    HDL 33 (L) 10/14/2022    HDL 33 (L) 03/11/2022    HDL 31 (L) 02/25/2021     Lab Results   Component Value Date    LDLCALC 64.6 10/14/2022    LDLCALC 54.4 (L) 03/11/2022    LDLCALC 37.2 (L) 02/25/2021     Lab Results   Component Value Date    TRIG 142 10/14/2022    TRIG 223 (H) 03/11/2022    TRIG 309 (H) 02/25/2021       Lab Results   Component Value Date    CHOLHDL 26.2 10/14/2022    CHOLHDL 25.0 03/11/2022    CHOLHDL 23.8 02/25/2021     Not currently on statins    5. Status post aortic valve replacement with bioprosthetic valve  Stable    6. Bullous pemphigoid  Followed by Dermatology regularly    7. Chronic bilateral low back pain without sciatica  Stable, needs medical alert device and handicap parking permit form given  - MEDICAL ALERT BRACELET    8. Arthritis of both hips  See above  - MEDICAL ALERT BRACELET    9. Iron deficiency anemia, unspecified iron deficiency anemia type  Lab Results   Component Value Date    WBC 5.21 10/07/2024    HGB 10.2 (L) 10/07/2024    HCT 33.5 (L) 10/07/2024    MCV 87 10/07/2024     10/07/2024       Improving    10. Debility  See above  - MEDICAL ALERT BRACELET    11. Flu vaccine need  Given  - influenza (adjuvanted) (Fluad) 45 mcg/0.5 mL IM  vaccine (> or = 64 yo) 0.5 mL    12. Vitamin D deficiency  Refill vitamin-D sent to pharmacy  - cholecalciferol, vitamin D3, (VITAMIN D3) 125 mcg (5,000 unit) Tab; Take 1 tablet (5,000 Units total) by mouth once daily.  Dispense: 90 tablet; Refill: 3

## 2024-11-06 ENCOUNTER — DOCUMENT SCAN (OUTPATIENT)
Dept: HOME HEALTH SERVICES | Facility: HOSPITAL | Age: 89
End: 2024-11-06
Payer: MEDICARE

## 2024-11-07 ENCOUNTER — DOCUMENT SCAN (OUTPATIENT)
Dept: HOME HEALTH SERVICES | Facility: HOSPITAL | Age: 89
End: 2024-11-07
Payer: MEDICARE

## 2024-11-12 ENCOUNTER — EXTERNAL HOME HEALTH (OUTPATIENT)
Dept: HOME HEALTH SERVICES | Facility: HOSPITAL | Age: 89
End: 2024-11-12
Payer: MEDICARE

## 2024-11-20 ENCOUNTER — TELEPHONE (OUTPATIENT)
Dept: FAMILY MEDICINE | Facility: CLINIC | Age: 89
End: 2024-11-20
Payer: MEDICARE

## 2024-11-20 NOTE — TELEPHONE ENCOUNTER
Left message Dr Christianson's nurse did not call her.   Left message to call if needed        ----- Message from Rupal sent at 11/20/2024  3:41 PM CST -----  Regarding: Call back  Type:  Patient Returning Call    Who Called:Pt    Who Left Message for Patient:Nurse    Does the patient know what this is regarding?:n/a    Would the patient rather a call back or a response via [facility]? Call back    Best Call Back Number:[phone]    Additional Information: Pt sts she missed a call and is requesting a call back . Thank you

## 2024-11-27 ENCOUNTER — OFFICE VISIT (OUTPATIENT)
Dept: PAIN MEDICINE | Facility: CLINIC | Age: 89
End: 2024-11-27
Payer: MEDICARE

## 2024-11-27 VITALS
HEIGHT: 67 IN | SYSTOLIC BLOOD PRESSURE: 104 MMHG | DIASTOLIC BLOOD PRESSURE: 62 MMHG | HEART RATE: 93 BPM | BODY MASS INDEX: 26.33 KG/M2 | WEIGHT: 167.75 LBS

## 2024-11-27 DIAGNOSIS — L12.0 BULLOUS PEMPHIGOID: Primary | ICD-10-CM

## 2024-11-27 DIAGNOSIS — M48.062 SPINAL STENOSIS OF LUMBAR REGION WITH NEUROGENIC CLAUDICATION: ICD-10-CM

## 2024-11-27 DIAGNOSIS — I48.91 ATRIAL FIBRILLATION, UNSPECIFIED TYPE: ICD-10-CM

## 2024-11-27 DIAGNOSIS — S72.142D CLOSED DISPLACED INTERTROCHANTERIC FRACTURE OF LEFT FEMUR WITH ROUTINE HEALING, SUBSEQUENT ENCOUNTER: ICD-10-CM

## 2024-11-27 DIAGNOSIS — M54.16 LUMBAR RADICULOPATHY: ICD-10-CM

## 2024-11-27 PROCEDURE — 3288F FALL RISK ASSESSMENT DOCD: CPT | Mod: HCNC,CPTII,S$GLB, | Performed by: ANESTHESIOLOGY

## 2024-11-27 PROCEDURE — 1100F PTFALLS ASSESS-DOCD GE2>/YR: CPT | Mod: HCNC,CPTII,S$GLB, | Performed by: ANESTHESIOLOGY

## 2024-11-27 PROCEDURE — 1159F MED LIST DOCD IN RCRD: CPT | Mod: HCNC,CPTII,S$GLB, | Performed by: ANESTHESIOLOGY

## 2024-11-27 PROCEDURE — 99999 PR PBB SHADOW E&M-EST. PATIENT-LVL III: CPT | Mod: PBBFAC,HCNC,, | Performed by: ANESTHESIOLOGY

## 2024-11-27 PROCEDURE — 1157F ADVNC CARE PLAN IN RCRD: CPT | Mod: HCNC,CPTII,S$GLB, | Performed by: ANESTHESIOLOGY

## 2024-11-27 PROCEDURE — 1125F AMNT PAIN NOTED PAIN PRSNT: CPT | Mod: HCNC,CPTII,S$GLB, | Performed by: ANESTHESIOLOGY

## 2024-11-27 PROCEDURE — 99205 OFFICE O/P NEW HI 60 MIN: CPT | Mod: HCNC,S$GLB,, | Performed by: ANESTHESIOLOGY

## 2024-11-27 RX ORDER — HYDROCODONE BITARTRATE AND ACETAMINOPHEN 5; 325 MG/1; MG/1
1 TABLET ORAL EVERY 12 HOURS PRN
Qty: 60 TABLET | Refills: 0 | Status: SHIPPED | OUTPATIENT
Start: 2024-11-27 | End: 2024-12-27

## 2024-11-27 NOTE — PROGRESS NOTES
This note was completed with dictation software and grammatical errors may exist.    Referring Physician: Roberto Fisher    PCP: Chang Christianson MD      CC:  Bullous pemphigoid    HPI:   Leslie Tolliver is a 89 y.o. female who presents with diffuse pain in her arms and legs due to bullous pemphigoid.  She has had these lesions in the past about 4 years ago after having both the flu and COVID vaccine which he decided the lesions.  This improved with treatment with dermatology.  She recently had a left femur fracture as well as hospitalizations for CHF.  This brought on delusions again in her arms and legs.  Pain in his a constant aching, sharp pain throughout.  She currently takes Norco very sparingly with some mild to moderate benefit.  She is accompanied by her daughter today.  She has been undergoing home health therapy.  Of note, patient does have history of lumbar stenosis and radiculopathy underwent a lumbar epidural steroid injection in 2020 which provided over 60% benefit.  Lower back pain in his currently not bothersome.  She denies any worsening weakness.  No bowel bladder changes.    ROS:  CONSTITUTIONAL: No fevers, chills, night sweats, wt. loss, appetite changes  SKIN:  Positive HPI   ENT: No headaches, head trauma, vision changes, or eye pain  LYMPH NODES: None noticed   CV: No chest pain, palpitations.   RESP: No shortness of breath, dyspnea on exertion, cough, wheezing, or hemoptysis  GI: No nausea, emesis, diarrhea, constipation, melena, hematochezia, pain.    : No dysuria, hematuria, urgency, or frequency   HEME: No easy bruising, bleeding problems  PSYCHIATRIC: No depression, anxiety, psychosis, hallucinations.  NEURO: No seizures, memory loss, dizziness or difficulty sleeping  MSK: no joint pain      Past Medical History:   Diagnosis Date    Acute decompensated heart failure 11/16/2023    Anemia 5/23/2024    Anemia due to stage 3 chronic kidney disease 07/24/2019    Arthritis      Bullous pemphigoid 8/29/2022    Chronic bilateral low back pain without sciatica 07/24/2019    CKD (chronic kidney disease) stage 3, GFR 30-59 ml/min 07/24/2019    Colon polyps     Coronary artery disease     Diabetes mellitus type II     Diabetes with neurologic complications     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    GERD (gastroesophageal reflux disease)     Hyperlipidemia     Hypertension     Hypothyroidism     Paroxysmal atrial fibrillation 3/16/2023    Type 2 diabetes mellitus      Past Surgical History:   Procedure Laterality Date    ADENOIDECTOMY      AORTIC VALVE REPLACEMENT      BREAST BIOPSY Right 20 yrs ago    benign    CARDIAC SURGERY      CHOLECYSTECTOMY      COLONOSCOPY  5-    Dr Holly, 5 year recheck    EPIDURAL STEROID INJECTION N/A 3/25/2021    Procedure: Injection, Steroid, Epidural L3-4;  Surgeon: Sky Love MD;  Location: [facility] OR;  Service: Pain Management;  Laterality: N/A;  Injection, Steroid, Epidural L3-4    EPIDURAL STEROID INJECTION N/A 5/20/2021    Procedure: Injection, Steroid, Epidural L3-4;  Surgeon: Sky Love MD;  Location: [facility] OR;  Service: Pain Management;  Laterality: N/A;  Injection, Steroid, Epidural L3-4    ESOPHAGOGASTRODUODENOSCOPY N/A 6/4/2020    Procedure: EGD (ESOPHAGOGASTRODUODENOSCOPY);  Surgeon: Michael Nugent III, MD;  Location: [facility];  Service: Endoscopy;  Laterality: N/A;    HEMORRHOID SURGERY      HYSTERECTOMY      INTRAMEDULLARY RODDING OF FEMUR Left 5/23/2024    Procedure: INSERTION, INTRAMEDULLARY RACQUEL, FEMUR;  Surgeon: Bravo Guillermo MD;  Location: [facility] OR;  Service: Orthopedics;  Laterality: Left;    OOPHORECTOMY      TONSILLECTOMY       Family History   Problem Relation Name Age of Onset    Diabetes Mother      Heart disease Mother      Glaucoma Mother      Cancer Father          lung cancer    Early death Son          brain tumor age 3    Diabetes Brother      No Known Problems Daughter      Early death Son          MVA 25     "Macular degeneration Neg Hx      Retinal detachment Neg Hx       Social History     Socioeconomic History    Marital status:    Tobacco Use    Smoking status: Former     Current packs/day: 0.00     Average packs/day: 0.3 packs/day for 15.0 years (3.8 ttl pk-yrs)     Types: Cigarettes     Start date: 3/30/1959     Quit date: 3/30/1974     Years since quittin.6    Smokeless tobacco: Never   Substance and Sexual Activity    Alcohol use: No    Drug use: No    Sexual activity: Not Currently     Social Drivers of Health     Financial Resource Strain: Unknown (10/14/2022)    Overall Financial Resource Strain (CARDIA)     Difficulty of Paying Living Expenses: Patient declined   Food Insecurity: Unknown (10/14/2022)    Hunger Vital Sign     Worried About Running Out of Food in the Last Year: Patient declined     Ran Out of Food in the Last Year: Patient declined   Transportation Needs: Unknown (10/14/2022)    PRAPARE - Transportation     Lack of Transportation (Medical): Patient declined     Lack of Transportation (Non-Medical): Patient declined   Physical Activity: Unknown (10/14/2022)    Exercise Vital Sign     Days of Exercise per Week: Patient declined     Minutes of Exercise per Session: Patient declined   Stress: Unknown (10/14/2022)    [employer] - Occupational Stress Questionnaire     Feeling of Stress : Patient declined   Housing Stability: Unknown (10/14/2022)    Housing Stability Vital Sign     Unable to Pay for Housing in the Last Year: Patient refused     Unstable Housing in the Last Year: Patient refused         Medications/Allergies: See med card    Vitals:    24 0942   BP: 104/62   Pulse: 93   Weight: 76.1 kg (167 lb 12.3 oz)   Height: 5' 7" (1.702 m)   PainSc:   8         Physical exam:    GENERAL: A and O x3, the patient appears well groomed and is in no acute distress.  Skin: There is multiple bullous pemphigoid lesions on her upper extremities as well as her " inner thighs.  HEENT: normocephalic, atraumatic  CARDIOVASCULAR:  Palpable peripheral pulses  LUNGS: easy work of breathing  ABDOMEN: soft, nontender   UPPER EXTREMITIES: Normal alignment, normal range of motion, no atrophy, no skin changes,  hair growth and nail growth normal and equal bilaterally. No swelling, no tenderness.    LOWER EXTREMITIES:  Normal alignment, normal range of motion, no atrophy, no skin changes,  hair growth and nail growth normal and equal bilaterally. No swelling, no tenderness.    LUMBAR SPINE  Lumbar spine: ROM is very limited with flexion extension and oblique extension with moderate increased pain.  +facet loading  Rocco's test causes no increased pain on either side.    Supine straight leg raise is negative bilaterally.  +femoral stretch bilaterally  Internal and external rotation of the hip causes no increased pain on either side.  Myofascial exam: No tenderness to palpation across lumbar paraspinous muscles.      MENTAL STATUS: normal orientation, speech, language, and fund of knowledge for social situation.  Emotional state appropriate.    CRANIAL NERVES:  II:  PERRL bilaterally,   III,IV,VI: EOMI.    V:  Facial sensation equal bilaterally  VII:  Facial motor function normal.  VIII:  Hearing equal to finger rub bilaterally  IX/X: Gag normal, palate symmetric  XI:  Shoulder shrug equal, head turn equal  XII:  Tongue midline without fasciculations      MOTOR: Tone and bulk: normal bilateral upper and lower Strength: normal   Delt Bi Tri WE WF     R 5 5 5 5 5 5   L 5 5 5 5 5 5     IP ADD ABD Quad TA Gas HAM  R 5 5 5 5 5 5 5  L 5 5 5 5 5 5 5    SENSATION: Light touch and pinprick intact bilaterally  REFLEXES: normal, symmetric, nonbrisk.  Toes down, no clonus. No hoffmans.  GAIT: normal rise, base, steps, and arm swing.        Imaging:  MRI L-spine 2020  L2-3: There is moderate degenerative facet and ligamentum flavum  hypertrophy with mild spinal stenosis but no significant  foraminal  narrowing.     L3-4: Broad-based moderate sized disc bulge combines with severe  ligamentum flavum and facet hypertrophy to result in severe spinal  stenosis and mild bilateral foraminal narrowing.     L4-5: Grade 1 degenerative L4 anterolisthesis combines with severe  facet and moderate ligamentum flavum hypertrophy to result in severe  spinal stenosis with mild right and moderate left foraminal narrowing.     L5-S1: There is moderate bilateral degenerative facet hypertrophy. In  combination with mild broad-based disc/osteophyte complex, there is  mild narrowing of the central canal and moderate bilateral foraminal  stenosis.    Assessment:  Patient presents with   1. Bullous pemphigoid    2. Closed displaced intertrochanteric fracture of left femur with routine healing, subsequent encounter    3. Atrial fibrillation, unspecified type    4. Lumbar radiculopathy    5. Spinal stenosis of lumbar region with neurogenic claudication          Plan:  I have stressed the importance of physical activity and exercise to improve overall health  Reviewed pertinent imaging and records with patient  Patient has evaluation with dermatology in near future.  We will continue her opioid medication.  Norco 5 mg q.12 hours as needed.   reviewed.  She will contact us for further refills.  Follow up in 2 months.  Thank you for referring this interesting patient, and I look forward to continuing to collaborate in her care.

## 2024-12-02 ENCOUNTER — DOCUMENT SCAN (OUTPATIENT)
Dept: HOME HEALTH SERVICES | Facility: HOSPITAL | Age: 89
End: 2024-12-02
Payer: MEDICARE

## 2024-12-08 ENCOUNTER — HOSPITAL ENCOUNTER (INPATIENT)
Facility: HOSPITAL | Age: 89
LOS: 4 days | Discharge: NURSING, REHAB OR LONG-TERM CARE | DRG: 603 | End: 2024-12-13
Attending: STUDENT IN AN ORGANIZED HEALTH CARE EDUCATION/TRAINING PROGRAM
Payer: MEDICARE

## 2024-12-08 DIAGNOSIS — L08.9 SKIN INFECTION: Primary | ICD-10-CM

## 2024-12-08 DIAGNOSIS — I48.91 A-FIB: ICD-10-CM

## 2024-12-08 DIAGNOSIS — R33.9 URINARY RETENTION: ICD-10-CM

## 2024-12-08 DIAGNOSIS — L03.119 CELLULITIS OF LEG: ICD-10-CM

## 2024-12-08 DIAGNOSIS — R65.10 SIRS (SYSTEMIC INFLAMMATORY RESPONSE SYNDROME): ICD-10-CM

## 2024-12-08 DIAGNOSIS — L03.119 CELLULITIS OF LOWER EXTREMITY, UNSPECIFIED LATERALITY: ICD-10-CM

## 2024-12-08 DIAGNOSIS — L12.0 BULLOUS PEMPHIGOID: ICD-10-CM

## 2024-12-08 DIAGNOSIS — R07.9 CHEST PAIN: ICD-10-CM

## 2024-12-08 DIAGNOSIS — I95.9 HYPOTENSION: ICD-10-CM

## 2024-12-08 LAB
ALBUMIN SERPL BCP-MCNC: 3.3 G/DL (ref 3.5–5.2)
ALP SERPL-CCNC: 93 U/L (ref 55–135)
ALT SERPL W/O P-5'-P-CCNC: 17 U/L (ref 10–44)
ANION GAP SERPL CALC-SCNC: 9 MMOL/L (ref 8–16)
AST SERPL-CCNC: 27 U/L (ref 10–40)
BASOPHILS # BLD AUTO: 0.06 K/UL (ref 0–0.2)
BASOPHILS NFR BLD: 0.4 % (ref 0–1.9)
BILIRUB SERPL-MCNC: 1 MG/DL (ref 0.1–1)
BNP SERPL-MCNC: 157 PG/ML (ref 0–99)
BUN SERPL-MCNC: 57 MG/DL (ref 8–23)
CALCIUM SERPL-MCNC: 8.3 MG/DL (ref 8.7–10.5)
CHLORIDE SERPL-SCNC: 103 MMOL/L (ref 95–110)
CO2 SERPL-SCNC: 20 MMOL/L (ref 23–29)
CREAT SERPL-MCNC: 1.7 MG/DL (ref 0.5–1.4)
DIFFERENTIAL METHOD BLD: ABNORMAL
EOSINOPHIL # BLD AUTO: 3.7 K/UL (ref 0–0.5)
EOSINOPHIL NFR BLD: 24.5 % (ref 0–8)
ERYTHROCYTE [DISTWIDTH] IN BLOOD BY AUTOMATED COUNT: 18.6 % (ref 11.5–14.5)
EST. GFR  (NO RACE VARIABLE): 28.3 ML/MIN/1.73 M^2
GLUCOSE SERPL-MCNC: 174 MG/DL (ref 70–110)
HCT VFR BLD AUTO: 37.9 % (ref 37–48.5)
HGB BLD-MCNC: 11.8 G/DL (ref 12–16)
IMM GRANULOCYTES # BLD AUTO: 0.09 K/UL (ref 0–0.04)
IMM GRANULOCYTES NFR BLD AUTO: 0.6 % (ref 0–0.5)
LDH SERPL L TO P-CCNC: 2.15 MMOL/L (ref 0.5–2.2)
LYMPHOCYTES # BLD AUTO: 1 K/UL (ref 1–4.8)
LYMPHOCYTES NFR BLD: 6.7 % (ref 18–48)
MAGNESIUM SERPL-MCNC: 2.3 MG/DL (ref 1.6–2.6)
MCH RBC QN AUTO: 24.6 PG (ref 27–31)
MCHC RBC AUTO-ENTMCNC: 31.1 G/DL (ref 32–36)
MCV RBC AUTO: 79 FL (ref 82–98)
MONOCYTES # BLD AUTO: 1.1 K/UL (ref 0.3–1)
MONOCYTES NFR BLD: 7.5 % (ref 4–15)
NEUTROPHILS # BLD AUTO: 9.1 K/UL (ref 1.8–7.7)
NEUTROPHILS NFR BLD: 60.3 % (ref 38–73)
NRBC BLD-RTO: 0 /100 WBC
PLATELET # BLD AUTO: 410 K/UL (ref 150–450)
PMV BLD AUTO: 9.7 FL (ref 9.2–12.9)
POTASSIUM SERPL-SCNC: 5.2 MMOL/L (ref 3.5–5.1)
PROT SERPL-MCNC: 6.8 G/DL (ref 6–8.4)
RBC # BLD AUTO: 4.8 M/UL (ref 4–5.4)
SAMPLE: NORMAL
SODIUM SERPL-SCNC: 132 MMOL/L (ref 136–145)
TROPONIN I SERPL HS-MCNC: 10.9 PG/ML (ref 0–14.9)
WBC # BLD AUTO: 15 K/UL (ref 3.9–12.7)

## 2024-12-08 PROCEDURE — 83735 ASSAY OF MAGNESIUM: CPT | Performed by: STUDENT IN AN ORGANIZED HEALTH CARE EDUCATION/TRAINING PROGRAM

## 2024-12-08 PROCEDURE — 96365 THER/PROPH/DIAG IV INF INIT: CPT

## 2024-12-08 PROCEDURE — 93005 ELECTROCARDIOGRAM TRACING: CPT | Performed by: INTERNAL MEDICINE

## 2024-12-08 PROCEDURE — 93010 ELECTROCARDIOGRAM REPORT: CPT | Mod: ,,, | Performed by: INTERNAL MEDICINE

## 2024-12-08 PROCEDURE — 87040 BLOOD CULTURE FOR BACTERIA: CPT | Performed by: STUDENT IN AN ORGANIZED HEALTH CARE EDUCATION/TRAINING PROGRAM

## 2024-12-08 PROCEDURE — 99285 EMERGENCY DEPT VISIT HI MDM: CPT | Mod: 25

## 2024-12-08 PROCEDURE — 80053 COMPREHEN METABOLIC PANEL: CPT | Performed by: STUDENT IN AN ORGANIZED HEALTH CARE EDUCATION/TRAINING PROGRAM

## 2024-12-08 PROCEDURE — 84484 ASSAY OF TROPONIN QUANT: CPT | Performed by: STUDENT IN AN ORGANIZED HEALTH CARE EDUCATION/TRAINING PROGRAM

## 2024-12-08 PROCEDURE — 36415 COLL VENOUS BLD VENIPUNCTURE: CPT | Performed by: STUDENT IN AN ORGANIZED HEALTH CARE EDUCATION/TRAINING PROGRAM

## 2024-12-08 PROCEDURE — 85025 COMPLETE CBC W/AUTO DIFF WBC: CPT | Performed by: STUDENT IN AN ORGANIZED HEALTH CARE EDUCATION/TRAINING PROGRAM

## 2024-12-08 PROCEDURE — 96375 TX/PRO/DX INJ NEW DRUG ADDON: CPT

## 2024-12-08 PROCEDURE — 63600175 PHARM REV CODE 636 W HCPCS: Performed by: STUDENT IN AN ORGANIZED HEALTH CARE EDUCATION/TRAINING PROGRAM

## 2024-12-08 PROCEDURE — 25000003 PHARM REV CODE 250: Performed by: STUDENT IN AN ORGANIZED HEALTH CARE EDUCATION/TRAINING PROGRAM

## 2024-12-08 PROCEDURE — 83880 ASSAY OF NATRIURETIC PEPTIDE: CPT | Performed by: STUDENT IN AN ORGANIZED HEALTH CARE EDUCATION/TRAINING PROGRAM

## 2024-12-08 RX ORDER — SODIUM,POTASSIUM PHOSPHATES 280-250MG
2 POWDER IN PACKET (EA) ORAL
Status: DISCONTINUED | OUTPATIENT
Start: 2024-12-09 | End: 2024-12-13 | Stop reason: HOSPADM

## 2024-12-08 RX ORDER — ONDANSETRON HYDROCHLORIDE 2 MG/ML
4 INJECTION, SOLUTION INTRAVENOUS EVERY 6 HOURS PRN
Status: DISCONTINUED | OUTPATIENT
Start: 2024-12-09 | End: 2024-12-13 | Stop reason: HOSPADM

## 2024-12-08 RX ORDER — MORPHINE SULFATE 2 MG/ML
2 INJECTION, SOLUTION INTRAMUSCULAR; INTRAVENOUS
Status: COMPLETED | OUTPATIENT
Start: 2024-12-08 | End: 2024-12-08

## 2024-12-08 RX ORDER — CEFEPIME HYDROCHLORIDE 1 G/1
1 INJECTION, POWDER, FOR SOLUTION INTRAMUSCULAR; INTRAVENOUS
Status: DISCONTINUED | OUTPATIENT
Start: 2024-12-09 | End: 2024-12-11

## 2024-12-08 RX ORDER — DOXYLAMINE SUCCINATE 25 MG
TABLET ORAL 2 TIMES DAILY
Status: DISCONTINUED | OUTPATIENT
Start: 2024-12-09 | End: 2024-12-10

## 2024-12-08 RX ORDER — SODIUM CHLORIDE 0.9 % (FLUSH) 0.9 %
3 SYRINGE (ML) INJECTION EVERY 12 HOURS PRN
Status: DISCONTINUED | OUTPATIENT
Start: 2024-12-09 | End: 2024-12-13 | Stop reason: HOSPADM

## 2024-12-08 RX ORDER — CEFEPIME HYDROCHLORIDE 2 G/1
2 INJECTION, POWDER, FOR SOLUTION INTRAVENOUS
Status: COMPLETED | OUTPATIENT
Start: 2024-12-08 | End: 2024-12-08

## 2024-12-08 RX ORDER — ACETAMINOPHEN 500 MG
5000 TABLET ORAL DAILY
Status: DISCONTINUED | OUTPATIENT
Start: 2024-12-09 | End: 2024-12-13 | Stop reason: HOSPADM

## 2024-12-08 RX ORDER — FUROSEMIDE 20 MG/1
20 TABLET ORAL DAILY
Status: DISCONTINUED | OUTPATIENT
Start: 2024-12-09 | End: 2024-12-09

## 2024-12-08 RX ORDER — NALOXONE HCL 0.4 MG/ML
0.02 VIAL (ML) INJECTION
Status: DISCONTINUED | OUTPATIENT
Start: 2024-12-09 | End: 2024-12-13 | Stop reason: HOSPADM

## 2024-12-08 RX ORDER — CARVEDILOL 12.5 MG/1
12.5 TABLET ORAL 2 TIMES DAILY
Status: DISCONTINUED | OUTPATIENT
Start: 2024-12-09 | End: 2024-12-09

## 2024-12-08 RX ORDER — HYDROCODONE BITARTRATE AND ACETAMINOPHEN 5; 325 MG/1; MG/1
1 TABLET ORAL EVERY 6 HOURS PRN
Status: DISCONTINUED | OUTPATIENT
Start: 2024-12-09 | End: 2024-12-09

## 2024-12-08 RX ORDER — HYDROXYZINE PAMOATE 25 MG/1
25 CAPSULE ORAL
Status: COMPLETED | OUTPATIENT
Start: 2024-12-08 | End: 2024-12-08

## 2024-12-08 RX ORDER — MAGNESIUM SULFATE HEPTAHYDRATE 40 MG/ML
2 INJECTION, SOLUTION INTRAVENOUS ONCE
Status: COMPLETED | OUTPATIENT
Start: 2024-12-08 | End: 2024-12-09

## 2024-12-08 RX ORDER — IBUPROFEN 200 MG
24 TABLET ORAL
Status: DISCONTINUED | OUTPATIENT
Start: 2024-12-09 | End: 2024-12-13 | Stop reason: HOSPADM

## 2024-12-08 RX ORDER — ACETAMINOPHEN 325 MG/1
650 TABLET ORAL EVERY 4 HOURS PRN
Status: DISCONTINUED | OUTPATIENT
Start: 2024-12-09 | End: 2024-12-13 | Stop reason: HOSPADM

## 2024-12-08 RX ORDER — LANOLIN ALCOHOL/MO/W.PET/CERES
800 CREAM (GRAM) TOPICAL
Status: DISCONTINUED | OUTPATIENT
Start: 2024-12-09 | End: 2024-12-13 | Stop reason: HOSPADM

## 2024-12-08 RX ORDER — HYDROXYZINE PAMOATE 25 MG/1
25 CAPSULE ORAL EVERY 8 HOURS PRN
Status: DISCONTINUED | OUTPATIENT
Start: 2024-12-09 | End: 2024-12-09

## 2024-12-08 RX ORDER — METOPROLOL TARTRATE 1 MG/ML
5 INJECTION, SOLUTION INTRAVENOUS
Status: COMPLETED | OUTPATIENT
Start: 2024-12-08 | End: 2024-12-08

## 2024-12-08 RX ORDER — HYDROCODONE BITARTRATE AND ACETAMINOPHEN 5; 325 MG/1; MG/1
1 TABLET ORAL
Status: COMPLETED | OUTPATIENT
Start: 2024-12-08 | End: 2024-12-08

## 2024-12-08 RX ORDER — HYDROMORPHONE HYDROCHLORIDE 1 MG/ML
1 INJECTION, SOLUTION INTRAMUSCULAR; INTRAVENOUS; SUBCUTANEOUS EVERY 4 HOURS PRN
Status: DISCONTINUED | OUTPATIENT
Start: 2024-12-09 | End: 2024-12-09

## 2024-12-08 RX ORDER — IBUPROFEN 200 MG
16 TABLET ORAL
Status: DISCONTINUED | OUTPATIENT
Start: 2024-12-09 | End: 2024-12-13 | Stop reason: HOSPADM

## 2024-12-08 RX ORDER — INSULIN GLARGINE 100 [IU]/ML
15 INJECTION, SOLUTION SUBCUTANEOUS NIGHTLY
Status: DISCONTINUED | OUTPATIENT
Start: 2024-12-09 | End: 2024-12-13 | Stop reason: HOSPADM

## 2024-12-08 RX ORDER — CEFEPIME HYDROCHLORIDE 1 G/1
1 INJECTION, POWDER, FOR SOLUTION INTRAMUSCULAR; INTRAVENOUS
Status: DISCONTINUED | OUTPATIENT
Start: 2024-12-09 | End: 2024-12-08

## 2024-12-08 RX ORDER — ACETAMINOPHEN 325 MG/1
650 TABLET ORAL EVERY 8 HOURS PRN
Status: DISCONTINUED | OUTPATIENT
Start: 2024-12-09 | End: 2024-12-13 | Stop reason: HOSPADM

## 2024-12-08 RX ORDER — METOPROLOL TARTRATE 1 MG/ML
5 INJECTION, SOLUTION INTRAVENOUS
Status: COMPLETED | OUTPATIENT
Start: 2024-12-08 | End: 2024-12-09

## 2024-12-08 RX ORDER — INSULIN ASPART 100 [IU]/ML
0-5 INJECTION, SOLUTION INTRAVENOUS; SUBCUTANEOUS
Status: DISCONTINUED | OUTPATIENT
Start: 2024-12-09 | End: 2024-12-13 | Stop reason: HOSPADM

## 2024-12-08 RX ORDER — SODIUM CHLORIDE 0.9 % (FLUSH) 0.9 %
10 SYRINGE (ML) INJECTION EVERY 12 HOURS PRN
Status: DISCONTINUED | OUTPATIENT
Start: 2024-12-09 | End: 2024-12-13 | Stop reason: HOSPADM

## 2024-12-08 RX ORDER — GLUCAGON 1 MG
1 KIT INJECTION
Status: DISCONTINUED | OUTPATIENT
Start: 2024-12-09 | End: 2024-12-13 | Stop reason: HOSPADM

## 2024-12-08 RX ADMIN — CEFEPIME 2 G: 2 INJECTION, POWDER, FOR SOLUTION INTRAVENOUS at 10:12

## 2024-12-08 RX ADMIN — METOROPROLOL TARTRATE 5 MG: 5 INJECTION, SOLUTION INTRAVENOUS at 10:12

## 2024-12-08 RX ADMIN — VANCOMYCIN HYDROCHLORIDE 1500 MG: 1.5 INJECTION, POWDER, LYOPHILIZED, FOR SOLUTION INTRAVENOUS at 10:12

## 2024-12-08 RX ADMIN — HYDROCODONE BITARTRATE AND ACETAMINOPHEN 1 TABLET: 5; 325 TABLET ORAL at 09:12

## 2024-12-08 RX ADMIN — SODIUM CHLORIDE, POTASSIUM CHLORIDE, SODIUM LACTATE AND CALCIUM CHLORIDE 500 ML: 600; 310; 30; 20 INJECTION, SOLUTION INTRAVENOUS at 09:12

## 2024-12-08 RX ADMIN — HYDROXYZINE PAMOATE 25 MG: 25 CAPSULE ORAL at 10:12

## 2024-12-08 RX ADMIN — MORPHINE SULFATE 2 MG: 2 INJECTION, SOLUTION INTRAMUSCULAR; INTRAVENOUS at 10:12

## 2024-12-09 PROBLEM — E87.1 HYPONATREMIA: Status: ACTIVE | Noted: 2024-12-09

## 2024-12-09 PROBLEM — E87.5 HYPERKALEMIA: Status: ACTIVE | Noted: 2024-12-09

## 2024-12-09 PROBLEM — N17.9 AKI (ACUTE KIDNEY INJURY): Status: ACTIVE | Noted: 2024-12-09

## 2024-12-09 PROBLEM — D64.9 ANEMIA: Status: ACTIVE | Noted: 2024-12-09

## 2024-12-09 PROBLEM — E11.9 TYPE 2 DIABETES MELLITUS: Status: ACTIVE | Noted: 2024-12-09

## 2024-12-09 PROBLEM — L03.119 CELLULITIS OF LEG: Status: ACTIVE | Noted: 2024-12-09

## 2024-12-09 LAB
ALBUMIN SERPL BCP-MCNC: 3.1 G/DL (ref 3.5–5.2)
ALP SERPL-CCNC: 82 U/L (ref 55–135)
ALT SERPL W/O P-5'-P-CCNC: 14 U/L (ref 10–44)
ANION GAP SERPL CALC-SCNC: 8 MMOL/L (ref 8–16)
AST SERPL-CCNC: 17 U/L (ref 10–40)
BACTERIA #/AREA URNS HPF: ABNORMAL /HPF
BASOPHILS # BLD AUTO: 0.05 K/UL (ref 0–0.2)
BASOPHILS # BLD AUTO: 0.06 K/UL (ref 0–0.2)
BASOPHILS NFR BLD: 0.4 % (ref 0–1.9)
BASOPHILS NFR BLD: 0.5 % (ref 0–1.9)
BILIRUB SERPL-MCNC: 1.2 MG/DL (ref 0.1–1)
BILIRUB UR QL STRIP: NEGATIVE
BUN SERPL-MCNC: 52 MG/DL (ref 8–23)
CALCIUM SERPL-MCNC: 8.1 MG/DL (ref 8.7–10.5)
CHLORIDE SERPL-SCNC: 107 MMOL/L (ref 95–110)
CLARITY UR: CLEAR
CO2 SERPL-SCNC: 19 MMOL/L (ref 23–29)
COLOR UR: YELLOW
CORTIS SERPL-MCNC: 16.9 UG/DL (ref 4.3–22.4)
CREAT SERPL-MCNC: 1.6 MG/DL (ref 0.5–1.4)
DIFFERENTIAL METHOD BLD: ABNORMAL
DIFFERENTIAL METHOD BLD: ABNORMAL
EOSINOPHIL # BLD AUTO: 1.4 K/UL (ref 0–0.5)
EOSINOPHIL # BLD AUTO: 2.3 K/UL (ref 0–0.5)
EOSINOPHIL NFR BLD: 13.5 % (ref 0–8)
EOSINOPHIL NFR BLD: 16.6 % (ref 0–8)
ERYTHROCYTE [DISTWIDTH] IN BLOOD BY AUTOMATED COUNT: 18.6 % (ref 11.5–14.5)
ERYTHROCYTE [DISTWIDTH] IN BLOOD BY AUTOMATED COUNT: 18.8 % (ref 11.5–14.5)
EST. GFR  (NO RACE VARIABLE): 30.4 ML/MIN/1.73 M^2
GLUCOSE SERPL-MCNC: 127 MG/DL (ref 70–110)
GLUCOSE UR QL STRIP: ABNORMAL
HCT VFR BLD AUTO: 34.5 % (ref 37–48.5)
HCT VFR BLD AUTO: 36.5 % (ref 37–48.5)
HGB BLD-MCNC: 10.5 G/DL (ref 12–16)
HGB BLD-MCNC: 11 G/DL (ref 12–16)
HGB UR QL STRIP: NEGATIVE
IMM GRANULOCYTES # BLD AUTO: 0.03 K/UL (ref 0–0.04)
IMM GRANULOCYTES # BLD AUTO: 0.08 K/UL (ref 0–0.04)
IMM GRANULOCYTES NFR BLD AUTO: 0.3 % (ref 0–0.5)
IMM GRANULOCYTES NFR BLD AUTO: 0.6 % (ref 0–0.5)
KETONES UR QL STRIP: NEGATIVE
LACTATE SERPL-SCNC: 1.3 MMOL/L (ref 0.5–1.9)
LEUKOCYTE ESTERASE UR QL STRIP: ABNORMAL
LYMPHOCYTES # BLD AUTO: 0.8 K/UL (ref 1–4.8)
LYMPHOCYTES # BLD AUTO: 0.8 K/UL (ref 1–4.8)
LYMPHOCYTES NFR BLD: 5.6 % (ref 18–48)
LYMPHOCYTES NFR BLD: 7.5 % (ref 18–48)
MCH RBC QN AUTO: 24.4 PG (ref 27–31)
MCH RBC QN AUTO: 24.4 PG (ref 27–31)
MCHC RBC AUTO-ENTMCNC: 30.1 G/DL (ref 32–36)
MCHC RBC AUTO-ENTMCNC: 30.4 G/DL (ref 32–36)
MCV RBC AUTO: 80 FL (ref 82–98)
MCV RBC AUTO: 81 FL (ref 82–98)
MICROSCOPIC COMMENT: ABNORMAL
MONOCYTES # BLD AUTO: 1 K/UL (ref 0.3–1)
MONOCYTES # BLD AUTO: 1.2 K/UL (ref 0.3–1)
MONOCYTES NFR BLD: 8.3 % (ref 4–15)
MONOCYTES NFR BLD: 9.4 % (ref 4–15)
NEUTROPHILS # BLD AUTO: 7.3 K/UL (ref 1.8–7.7)
NEUTROPHILS # BLD AUTO: 9.5 K/UL (ref 1.8–7.7)
NEUTROPHILS NFR BLD: 68.5 % (ref 38–73)
NEUTROPHILS NFR BLD: 68.8 % (ref 38–73)
NITRITE UR QL STRIP: NEGATIVE
NRBC BLD-RTO: 0 /100 WBC
NRBC BLD-RTO: 0 /100 WBC
OHS QRS DURATION: 86 MS
OHS QTC CALCULATION: 465 MS
PH UR STRIP: 5 [PH] (ref 5–8)
PLATELET # BLD AUTO: 272 K/UL (ref 150–450)
PLATELET # BLD AUTO: 322 K/UL (ref 150–450)
PMV BLD AUTO: 9.3 FL (ref 9.2–12.9)
PMV BLD AUTO: 9.6 FL (ref 9.2–12.9)
POCT GLUCOSE: 112 MG/DL (ref 70–110)
POCT GLUCOSE: 116 MG/DL (ref 70–110)
POCT GLUCOSE: 137 MG/DL (ref 70–110)
POCT GLUCOSE: 170 MG/DL (ref 70–110)
POTASSIUM SERPL-SCNC: 4.5 MMOL/L (ref 3.5–5.1)
PROT SERPL-MCNC: 6.1 G/DL (ref 6–8.4)
PROT UR QL STRIP: NEGATIVE
RBC # BLD AUTO: 4.31 M/UL (ref 4–5.4)
RBC # BLD AUTO: 4.51 M/UL (ref 4–5.4)
RBC #/AREA URNS HPF: 17 /HPF (ref 0–4)
SODIUM SERPL-SCNC: 134 MMOL/L (ref 136–145)
SP GR UR STRIP: 1.01 (ref 1–1.03)
UNIDENT CRYS URNS QL MICRO: 3
URN SPEC COLLECT METH UR: ABNORMAL
UROBILINOGEN UR STRIP-ACNC: NEGATIVE EU/DL
VANCOMYCIN SERPL-MCNC: 8.3 UG/ML
WBC # BLD AUTO: 10.62 K/UL (ref 3.9–12.7)
WBC # BLD AUTO: 13.91 K/UL (ref 3.9–12.7)
WBC #/AREA URNS HPF: 29 /HPF (ref 0–5)
YEAST URNS QL MICRO: ABNORMAL

## 2024-12-09 PROCEDURE — 99900031 HC PATIENT EDUCATION (STAT)

## 2024-12-09 PROCEDURE — 25000003 PHARM REV CODE 250: Performed by: INTERNAL MEDICINE

## 2024-12-09 PROCEDURE — 25000003 PHARM REV CODE 250: Performed by: NURSE PRACTITIONER

## 2024-12-09 PROCEDURE — 63600175 PHARM REV CODE 636 W HCPCS

## 2024-12-09 PROCEDURE — 51701 INSERT BLADDER CATHETER: CPT

## 2024-12-09 PROCEDURE — 82533 TOTAL CORTISOL: CPT | Performed by: NURSE PRACTITIONER

## 2024-12-09 PROCEDURE — 51798 US URINE CAPACITY MEASURE: CPT

## 2024-12-09 PROCEDURE — 25000003 PHARM REV CODE 250: Performed by: STUDENT IN AN ORGANIZED HEALTH CARE EDUCATION/TRAINING PROGRAM

## 2024-12-09 PROCEDURE — 25000003 PHARM REV CODE 250

## 2024-12-09 PROCEDURE — 85025 COMPLETE CBC W/AUTO DIFF WBC: CPT | Mod: 91 | Performed by: NURSE PRACTITIONER

## 2024-12-09 PROCEDURE — 83605 ASSAY OF LACTIC ACID: CPT | Performed by: NURSE PRACTITIONER

## 2024-12-09 PROCEDURE — 81001 URINALYSIS AUTO W/SCOPE: CPT | Performed by: STUDENT IN AN ORGANIZED HEALTH CARE EDUCATION/TRAINING PROGRAM

## 2024-12-09 PROCEDURE — 63600175 PHARM REV CODE 636 W HCPCS: Performed by: INTERNAL MEDICINE

## 2024-12-09 PROCEDURE — 85025 COMPLETE CBC W/AUTO DIFF WBC: CPT | Performed by: NURSE PRACTITIONER

## 2024-12-09 PROCEDURE — 36415 COLL VENOUS BLD VENIPUNCTURE: CPT | Performed by: NURSE PRACTITIONER

## 2024-12-09 PROCEDURE — 80202 ASSAY OF VANCOMYCIN: CPT | Performed by: INTERNAL MEDICINE

## 2024-12-09 PROCEDURE — 21000000 HC CCU ICU ROOM CHARGE

## 2024-12-09 PROCEDURE — 63600175 PHARM REV CODE 636 W HCPCS: Performed by: NURSE PRACTITIONER

## 2024-12-09 PROCEDURE — 80053 COMPREHEN METABOLIC PANEL: CPT | Performed by: NURSE PRACTITIONER

## 2024-12-09 PROCEDURE — 94761 N-INVAS EAR/PLS OXIMETRY MLT: CPT

## 2024-12-09 PROCEDURE — 99221 1ST HOSP IP/OBS SF/LOW 40: CPT | Mod: ,,, | Performed by: FAMILY MEDICINE

## 2024-12-09 PROCEDURE — 63600175 PHARM REV CODE 636 W HCPCS: Performed by: STUDENT IN AN ORGANIZED HEALTH CARE EDUCATION/TRAINING PROGRAM

## 2024-12-09 PROCEDURE — 87086 URINE CULTURE/COLONY COUNT: CPT | Performed by: STUDENT IN AN ORGANIZED HEALTH CARE EDUCATION/TRAINING PROGRAM

## 2024-12-09 RX ORDER — FUROSEMIDE 20 MG/1
20 TABLET ORAL DAILY
Status: DISCONTINUED | OUTPATIENT
Start: 2024-12-10 | End: 2024-12-09

## 2024-12-09 RX ORDER — HYDROCODONE BITARTRATE AND ACETAMINOPHEN 5; 325 MG/1; MG/1
1 TABLET ORAL 2 TIMES DAILY PRN
Status: DISCONTINUED | OUTPATIENT
Start: 2024-12-09 | End: 2024-12-09

## 2024-12-09 RX ORDER — METRONIDAZOLE 500 MG/100ML
500 INJECTION, SOLUTION INTRAVENOUS
Status: DISCONTINUED | OUTPATIENT
Start: 2024-12-09 | End: 2024-12-10

## 2024-12-09 RX ORDER — CARVEDILOL 6.25 MG/1
6.25 TABLET ORAL 2 TIMES DAILY
Status: DISCONTINUED | OUTPATIENT
Start: 2024-12-09 | End: 2024-12-13 | Stop reason: HOSPADM

## 2024-12-09 RX ORDER — SODIUM CHLORIDE 9 MG/ML
INJECTION, SOLUTION INTRAVENOUS CONTINUOUS
Status: ACTIVE | OUTPATIENT
Start: 2024-12-09 | End: 2024-12-09

## 2024-12-09 RX ORDER — PSEUDOEPHEDRINE HCL 30 MG
60 TABLET ORAL EVERY 6 HOURS PRN
Status: DISCONTINUED | OUTPATIENT
Start: 2024-12-09 | End: 2024-12-13 | Stop reason: HOSPADM

## 2024-12-09 RX ORDER — CARVEDILOL 12.5 MG/1
12.5 TABLET ORAL 2 TIMES DAILY
Status: DISCONTINUED | OUTPATIENT
Start: 2024-12-09 | End: 2024-12-09

## 2024-12-09 RX ADMIN — THERA TABS 1 TABLET: TAB at 08:12

## 2024-12-09 RX ADMIN — APIXABAN 2.5 MG: 2.5 TABLET, FILM COATED ORAL at 12:12

## 2024-12-09 RX ADMIN — SODIUM CHLORIDE 500 ML: 9 INJECTION, SOLUTION INTRAVENOUS at 05:12

## 2024-12-09 RX ADMIN — METRONIDAZOLE 500 MG: 500 INJECTION, SOLUTION INTRAVENOUS at 11:12

## 2024-12-09 RX ADMIN — SODIUM CHLORIDE: 9 INJECTION, SOLUTION INTRAVENOUS at 02:12

## 2024-12-09 RX ADMIN — FUROSEMIDE 20 MG: 20 TABLET ORAL at 08:12

## 2024-12-09 RX ADMIN — TRAZODONE HYDROCHLORIDE 25 MG: 50 TABLET ORAL at 12:12

## 2024-12-09 RX ADMIN — MICONAZOLE NITRATE: 20 CREAM TOPICAL at 03:12

## 2024-12-09 RX ADMIN — CEFEPIME 1 G: 1 INJECTION, POWDER, FOR SOLUTION INTRAMUSCULAR; INTRAVENOUS at 10:12

## 2024-12-09 RX ADMIN — METOPROLOL TARTRATE 5 MG: 1 INJECTION, SOLUTION INTRAVENOUS at 12:12

## 2024-12-09 RX ADMIN — APIXABAN 2.5 MG: 2.5 TABLET, FILM COATED ORAL at 08:12

## 2024-12-09 RX ADMIN — VANCOMYCIN HYDROCHLORIDE 1500 MG: 1.5 INJECTION, POWDER, LYOPHILIZED, FOR SOLUTION INTRAVENOUS at 11:12

## 2024-12-09 RX ADMIN — HYDROXYZINE PAMOATE 25 MG: 25 CAPSULE ORAL at 08:12

## 2024-12-09 RX ADMIN — MICONAZOLE NITRATE: 20 CREAM TOPICAL at 08:12

## 2024-12-09 RX ADMIN — CARVEDILOL 12.5 MG: 12.5 TABLET, FILM COATED ORAL at 08:12

## 2024-12-09 RX ADMIN — MAGNESIUM SULFATE HEPTAHYDRATE 2 G: 40 INJECTION, SOLUTION INTRAVENOUS at 12:12

## 2024-12-09 RX ADMIN — HYDROMORPHONE HYDROCHLORIDE 1 MG: 1 INJECTION, SOLUTION INTRAMUSCULAR; INTRAVENOUS; SUBCUTANEOUS at 03:12

## 2024-12-09 RX ADMIN — CHOLECALCIFEROL TAB 125 MCG (5000 UNIT) 5000 UNITS: 125 TAB at 08:12

## 2024-12-09 RX ADMIN — SODIUM CHLORIDE 500 ML: 9 INJECTION, SOLUTION INTRAVENOUS at 10:12

## 2024-12-09 RX ADMIN — METRONIDAZOLE 500 MG: 500 INJECTION, SOLUTION INTRAVENOUS at 08:12

## 2024-12-09 RX ADMIN — SODIUM ZIRCONIUM CYCLOSILICATE 5 G: 5 POWDER, FOR SUSPENSION ORAL at 12:12

## 2024-12-09 RX ADMIN — HYDROCODONE BITARTRATE AND ACETAMINOPHEN 1 TABLET: 5; 325 TABLET ORAL at 08:12

## 2024-12-09 RX ADMIN — ACETAMINOPHEN 325MG 650 MG: 325 TABLET ORAL at 04:12

## 2024-12-09 RX ADMIN — INSULIN GLARGINE 15 UNITS: 100 INJECTION, SOLUTION SUBCUTANEOUS at 09:12

## 2024-12-09 RX ADMIN — CARVEDILOL 12.5 MG: 12.5 TABLET, FILM COATED ORAL at 12:12

## 2024-12-09 RX ADMIN — METRONIDAZOLE 500 MG: 500 INJECTION, SOLUTION INTRAVENOUS at 03:12

## 2024-12-09 NOTE — ASSESSMENT & PLAN NOTE
. Baseline creatinine is  1.2 . Most recent creatinine and eGFR are listed below.  Recent Labs     12/08/24  2112 12/09/24  0401   CREATININE 1.7* 1.6*   EGFRNORACEVR 28.3* 30.4*        Plan  - KANNAN is  new admit  - Avoid nephrotoxins and renally dose meds for GFR listed above  - Monitor urine output, serial BMP, and adjust therapy as needed  resolved

## 2024-12-09 NOTE — ASSESSMENT & PLAN NOTE
Patient has paroxysmal (<7 days) atrial fibrillation. Patient is currently in atrial fibrillation. [id number] Score: 4. The patients heart rate in the last 24 hours is as follows:  Pulse  Min: 105  Max: 134     Antiarrhythmics  metoprolol injection 5 mg, ED 1 Time, Intravenous    Anticoagulants  apixaban tablet 2.5 mg, 2 times daily, Oral    Plan  - Replete lytes with a goal of K>4, Mg >2  - Patient is anticoagulated, see medications listed above.  - Patient's afib is currently controlled  -

## 2024-12-09 NOTE — SUBJECTIVE & OBJECTIVE
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    GERD (gastroesophageal reflux disease)     Hyperlipidemia     Hypertension     Hypothyroidism     Paroxysmal atrial fibrillation 3/16/2023    Type 2 diabetes mellitus        Past Surgical History:   Procedure Laterality Date    ADENOIDECTOMY      AORTIC VALVE REPLACEMENT      BREAST BIOPSY Right 20 yrs ago    benign    CARDIAC SURGERY      CHOLECYSTECTOMY      COLONOSCOPY  5-    Dr Holly, 5 year recheck    EPIDURAL STEROID INJECTION N/A 3/25/2021    Procedure: Injection, Steroid, Epidural L3-4;  Surgeon: Sky Love MD;  Location: [facility];  Service: Pain Management;  Laterality: N/A;  Injection, Steroid, Epidural L3-4    EPIDURAL STEROID INJECTION N/A 5/20/2021    Procedure: Injection, Steroid, Epidural L3-4;  Surgeon: Sky Love MD;  Location: [facility];  Service: Pain Management;  Laterality: N/A;  Injection, Steroid, Epidural L3-4    ESOPHAGOGASTRODUODENOSCOPY N/A 6/4/2020    Procedure: EGD (ESOPHAGOGASTRODUODENOSCOPY);  Surgeon: Michael Nugent III, MD;  Location: [facility];  Service: Endoscopy;  Laterality: N/A;    HEMORRHOID SURGERY      HYSTERECTOMY      INTRAMEDULLARY RODDING OF FEMUR Left 5/23/2024    Procedure: INSERTION, INTRAMEDULLARY RACQUEL, FEMUR;  Surgeon: Bravo Guillermo MD;  Location: [facility];  Service: Orthopedics;  Laterality: Left;    OOPHORECTOMY      TONSILLECTOMY         Review of patient's allergies indicates:   Allergen Reactions    Fenofibric acid (choline)      Other  reaction(s): Vomiting    Iodine      Other reaction(s): lips swollen ivp dye    Rosuvastatin      Other reaction(s): muscle pain       No current facility-administered medications on file prior to encounter.     Current Outpatient Medications on File Prior to Encounter   Medication Sig    apixaban (ELIQUIS) 5 mg Tab Take 1 tablet (5 mg total) by mouth 2 (two) times daily.    carvediloL (COREG) 12.5 MG tablet Take 1 tablet (12.5 mg total) by mouth 2 (two) times daily.    cholecalciferol, vitamin D3, (VITAMIN D3) 125 mcg (5,000 unit) Tab Take 1 tablet (5,000 Units total) by mouth once daily.    empagliflozin (JARDIANCE) 10 mg tablet Take 1 tablet (10 mg total) by mouth once daily.    furosemide (LASIX) 20 MG tablet Take 1 tablet (20 mg total) by mouth once daily.    HYDROcodone-acetaminophen (NORCO) 5-325 mg per tablet Take 1 tablet by mouth every 12 (twelve) hours as needed for Pain.    insulin (LANTUS SOLOSTAR U-100 INSULIN) glargine 100 units/mL SubQ pen Inject 24 Units into the skin every evening.    ketoconazole (NIZORAL) 2 % cream Apply 1 application  topically 2 (two) times daily.    metFORMIN (GLUCOPHAGE) 1000 MG tablet Take 1 tablet (1,000 mg total) by mouth 2 (two) times daily with meals.    multivitamin (THERAGRAN) per tablet Take 1 tablet by mouth once daily.     Family History       Problem Relation (Age of Onset)    Cancer Father    Diabetes Mother, Brother    Early death Son, Son    Glaucoma Mother    Heart disease Mother    No Known Problems Daughter          Tobacco Use    Smoking status: Former     Current packs/day: 0.00     Average packs/day: 0.3 packs/day for 15.0 years (3.8 ttl pk-yrs)     Types: Cigarettes     Start date: 3/30/1959     Quit date: 3/30/1974     Years since quittin.7    Smokeless tobacco: Never   Substance and Sexual Activity    Alcohol use: No    Drug use: No    Sexual activity: Not Currently     Review of Systems   HENT: Negative.     Cardiovascular:  Positive for  palpitations.   Skin:  Positive for color change, rash and wound.   Neurological: Negative.    Psychiatric/Behavioral: Negative.       Objective:     Vital Signs (Most Recent):  Temp: 97.9 °F (36.6 °C) (24)  Pulse: (!) 130 (24)  Resp: 20 (24)  BP: 122/60 (24)  SpO2: 98 % (24) Vital Signs (24h Range):  Temp:  [97.9 °F (36.6 °C)] 97.9 °F (36.6 °C)  Pulse:  [105-134] 130  Resp:  [17-20] 20  SpO2:  [97 %-100 %] 98 %  BP: (100-126)/(50-91) 122/60     Weight: 77.1 kg (170 lb)  Body mass index is 26.63 kg/m².     Physical Exam  Vitals reviewed.   Constitutional:       Appearance: Normal appearance. She is obese.   HENT:      Head: Normocephalic and atraumatic.      Nose: Nose normal.      Mouth/Throat:      Mouth: Mucous membranes are dry.      Pharynx: Oropharynx is clear.   Eyes:      Extraocular Movements: Extraocular movements intact.      Pupils: Pupils are equal, round, and reactive to light.   Cardiovascular:      Rate and Rhythm: Normal rate. Rhythm irregular.      Pulses:           Radial pulses are 2+ on the right side and 2+ on the left side.        Dorsalis pedis pulses are detected w/ Doppler on the right side and detected w/ Doppler on the left side.        Posterior tibial pulses are detected w/ Doppler on the right side and detected w/ Doppler on the left side.      Heart sounds: Normal heart sounds.   Pulmonary:      Effort: Pulmonary effort is normal.      Breath sounds: Normal breath sounds.   Abdominal:      General: Bowel sounds are normal. There is no distension.      Palpations: Abdomen is soft.   Musculoskeletal:         General: Tenderness present. Normal range of motion.      Cervical back: Normal range of motion and neck supple.      Right lower le+ Edema present.      Left lower le+ Edema present.   Skin:     General: Skin is warm and dry.      Capillary Refill: Capillary refill takes less than 2 seconds.      Findings: Erythema and  rash present.      Comments: MASD of the abdominal folds, groin, breasts, thighs  BLE stasis dermatitis with venous ulcers  sacral pressure ulcer           Significant Labs: All pertinent labs within the past 24 hours have been reviewed.  Recent Lab Results         12/08/24  2223   12/08/24  2112        Albumin   3.3       ALP   93       ALT   17       Anion Gap   9       AST   27       Baso #   0.06       Basophil %   0.4       BILIRUBIN TOTAL   1.0  Comment: For infants and newborns, interpretation of results should be based  on gestational age, weight and in agreement with clinical  observations.    Premature Infant recommended reference ranges:  Up to 24 hours.............<8.0 mg/dL  Up to 48 hours............<12.0 mg/dL  3-5 days..................<15.0 mg/dL  6-29 days.................<15.0 mg/dL         BNP   157  Comment: Values of less than 100 pg/ml are consistent with non-CHF populations.       BUN   57       Calcium   8.3       Chloride   103       CO2   20       Creatinine   1.7       Differential Method   Automated       eGFR   28.3       Eos #   3.7       Eos %   24.5       Glucose   174       Gran # (ANC)   9.1       Gran %   60.3       Hematocrit   37.9       Hemoglobin   11.8       Immature Grans (Abs)   0.09  Comment: Mild elevation in immature granulocytes is non specific and   can be seen in a variety of conditions including stress response,   acute inflammation, trauma and pregnancy. Correlation with other   laboratory and clinical findings is essential.         Immature Granulocytes   0.6       Lymph #   1.0       Lymph %   6.7       Magnesium    2.3       MCH   24.6       MCHC   31.1       MCV   79       Mono #   1.1       Mono %   7.5       MPV   9.7       nRBC   0       Platelet Count   410       POC Lactate 2.15         Potassium   5.2       PROTEIN TOTAL   6.8       RBC   4.80       RDW   18.6       Sample VENOUS         Sodium   132       Troponin I High Sensitivity   10.9  Comment:  Troponin results differ between methods. Do not use   results between Troponin methods interchangeably.    Alkaline Phospatase levels above 400 U/L may   cause false positive results.    Access hsTnI should not be used for patients taking   Asfotase raul (Strensiq).         WBC   15.00               Significant Imaging: I have reviewed all pertinent imaging results/findings within the past 24 hours.  Imaging Results              X-Ray Chest AP Portable (Final result)  Result time 12/08/24 22:54:45      Final result by Ezequiel Hinton MD (12/08/24 22:54:45)                   Impression:      No acute intrathoracic process.      Electronically signed by: Ezequiel Hinton MD  Date:    12/08/2024  Time:    22:54               Narrative:    EXAMINATION:  XR CHEST AP PORTABLE    CLINICAL HISTORY:  Chest Pain;    TECHNIQUE:  Single frontal view of the chest was performed.    COMPARISON:  10/07/2023.    FINDINGS:  There are postoperative changes of median sternotomy.  The sternal wires are intact.  The trachea is unremarkable.  There are calcifications of the aortic knob.  The cardiomediastinal silhouette is within normal limits.  There is no evidence of free air beneath the hemidiaphragms.  There are no pleural effusions.  There is no evidence of a pneumothorax.  There is no evidence of pneumomediastinum.  No airspace opacity is present.  There are degenerative changes in the osseous structures.

## 2024-12-09 NOTE — PLAN OF CARE
Inpatient Upgrade Note    Leslie Tolliver has warranted treatment spanning two or more midnights of hospital level care for the management of celluitis and skin infection . She continues to require IV antibiotics, daily labs, further testing/imaging, monitoring of vital signs, IV pain medication, medication adjustments, and further evaluation by consultants. Her condition is also complicated by the following comorbidities: history of bullous pemphigoid for the last 4 years, CAD, diabetes, hypertension, hypothyroidism, Afib, stents .

## 2024-12-09 NOTE — ED PROVIDER NOTES
Encounter Date: 12/8/2024       History     Chief Complaint   Patient presents with    Wound Check     Lesions throughout body from bullous pemphigoid that are worse and more painful tonight.      HPI  89-year-old woman with a history of bullous pemphigoid for the last 4 years, CAD, diabetes, hypertension, hypothyroidism, Afib, she comes in because her wounds are hurting more.  She denies fever chills chest pain shortness of breath belly pain nausea or vomiting change in bowel or bladder habits.  She is not sure the last time she was on antibiotics.  She has seen a dermatologist in [place] for this.  She takes hydrocodone at home for this.  Review of patient's allergies indicates:   Allergen Reactions    Fenofibric acid (choline)      Other reaction(s): Vomiting    Iodine      Other reaction(s): lips swollen ivp dye    Rosuvastatin      Other reaction(s): muscle pain     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    GERD (gastroesophageal reflux disease)     Hyperlipidemia     Hypertension     Hypothyroidism     Paroxysmal atrial fibrillation 3/16/2023    Type 2 diabetes mellitus      Past Surgical History:   Procedure Laterality Date    ADENOIDECTOMY      AORTIC VALVE REPLACEMENT      BREAST BIOPSY Right 20 yrs ago    benign    CARDIAC SURGERY      CHOLECYSTECTOMY      COLONOSCOPY  5-    Dr Holly, 5 year recheck    EPIDURAL STEROID INJECTION N/A 3/25/2021    Procedure: Injection, Steroid, Epidural L3-4;  Surgeon: Sky Love MD;  Location: [facility];   Service: Pain Management;  Laterality: N/A;  Injection, Steroid, Epidural L3-4    EPIDURAL STEROID INJECTION N/A 2021    Procedure: Injection, Steroid, Epidural L3-4;  Surgeon: Sky Love MD;  Location: [facility] OR;  Service: Pain Management;  Laterality: N/A;  Injection, Steroid, Epidural L3-4    ESOPHAGOGASTRODUODENOSCOPY N/A 2020    Procedure: EGD (ESOPHAGOGASTRODUODENOSCOPY);  Surgeon: Michael Nugent III, MD;  Location: [facility];  Service: Endoscopy;  Laterality: N/A;    HEMORRHOID SURGERY      HYSTERECTOMY      INTRAMEDULLARY RODDING OF FEMUR Left 2024    Procedure: INSERTION, INTRAMEDULLARY RACQUEL, FEMUR;  Surgeon: Bravo Guillermo MD;  Location: [facility];  Service: Orthopedics;  Laterality: Left;    OOPHORECTOMY      TONSILLECTOMY       Family History   Problem Relation Name Age of Onset    Diabetes Mother      Heart disease Mother      Glaucoma Mother      Cancer Father          lung cancer    Early death Son          brain tumor age 3    Diabetes Brother      No Known Problems Daughter      Early death Son          MVA 25    Macular degeneration Neg Hx      Retinal detachment Neg Hx       Social History     Tobacco Use    Smoking status: Former     Current packs/day: 0.00     Average packs/day: 0.3 packs/day for 15.0 years (3.8 ttl pk-yrs)     Types: Cigarettes     Start date: 3/30/1959     Quit date: 3/30/1974     Years since quittin.7    Smokeless tobacco: Never   Substance Use Topics    Alcohol use: No    Drug use: No     Review of Systems    Physical Exam     Initial Vitals [24]   BP Pulse Resp Temp SpO2   (!) 100/50 105 17 97.9 °F (36.6 °C) 99 %      MAP       --         Physical Exam    ED Course   Procedures  Labs Reviewed   CBC W/ AUTO DIFFERENTIAL - Abnormal       Result Value    WBC 15.00 (*)     RBC 4.80      Hemoglobin 11.8 (*)     Hematocrit 37.9      MCV 79 (*)     MCH 24.6 (*)     MCHC 31.1 (*)     RDW 18.6 (*)     Platelets 410      MPV 9.7      Immature  Granulocytes 0.6 (*)     Gran # (ANC) 9.1 (*)     Immature Grans (Abs) 0.09 (*)     Lymph # 1.0      Mono # 1.1 (*)     Eos # 3.7 (*)     Baso # 0.06      nRBC 0      Gran % 60.3      Lymph % 6.7 (*)     Mono % 7.5      Eosinophil % 24.5 (*)     Basophil % 0.4      Differential Method Automated     COMPREHENSIVE METABOLIC PANEL - Abnormal    Sodium 132 (*)     Potassium 5.2 (*)     Chloride 103      CO2 20 (*)     Glucose 174 (*)     BUN 57 (*)     Creatinine 1.7 (*)     Calcium 8.3 (*)     Total Protein 6.8      Albumin 3.3 (*)     Total Bilirubin 1.0      Alkaline Phosphatase 93      AST 27      ALT 17      eGFR 28.3 (*)     Anion Gap 9     B-TYPE NATRIURETIC PEPTIDE - Abnormal     (*)    CULTURE, BLOOD   CULTURE, BLOOD   MAGNESIUM    Magnesium 2.3     TROPONIN I HIGH SENSITIVITY    Troponin I High Sensitivity 10.9     URINALYSIS, REFLEX TO URINE CULTURE   COMPREHENSIVE METABOLIC PANEL   CBC W/ AUTO DIFFERENTIAL   ISTAT LACTATE    POC Lactate 2.15      Sample VENOUS     POCT LACTATE   POCT GLUCOSE MONITORING CONTINUOUS          Imaging Results              X-Ray Chest AP Portable (Final result)  Result time 12/08/24 22:54:45      Final result by Ezequiel Hinton MD (12/08/24 22:54:45)                   Impression:      No acute intrathoracic process.      Electronically signed by: Ezequiel Hinton MD  Date:    12/08/2024  Time:    22:54               Narrative:    EXAMINATION:  XR CHEST AP PORTABLE    CLINICAL HISTORY:  Chest Pain;    TECHNIQUE:  Single frontal view of the chest was performed.    COMPARISON:  10/07/2023.    FINDINGS:  There are postoperative changes of median sternotomy.  The sternal wires are intact.  The trachea is unremarkable.  There are calcifications of the aortic knob.  The cardiomediastinal silhouette is within normal limits.  There is no evidence of free air beneath the hemidiaphragms.  There are no pleural effusions.  There is no evidence of a pneumothorax.  There is no evidence of  pneumomediastinum.  No airspace opacity is present.  There are degenerative changes in the osseous structures.                                       Medications   carvediloL tablet 12.5 mg (12.5 mg Oral Given 12/9/24 0011)   cholecalciferol (vitamin D3) 125 mcg (5,000 unit) tablet 5,000 Units (has no administration in time range)   furosemide tablet 20 mg (has no administration in time range)   insulin glargine U-100 (Lantus) pen 15 Units (has no administration in time range)   miconazole 2 % cream (has no administration in time range)   multivitamin tablet (has no administration in time range)   apixaban tablet 2.5 mg (2.5 mg Oral Given 12/9/24 0012)   sodium chloride 0.9% flush 10 mL (has no administration in time range)   vancomycin - pharmacy to dose (has no administration in time range)   acetaminophen tablet 650 mg (has no administration in time range)   HYDROcodone-acetaminophen 5-325 mg per tablet 1 tablet (has no administration in time range)   HYDROmorphone injection 1 mg (has no administration in time range)   ondansetron injection 4 mg (has no administration in time range)   acetaminophen tablet 650 mg (has no administration in time range)   trazodone split tablet 25 mg (25 mg Oral Given 12/9/24 0012)   sodium chloride 0.9% flush 3 mL (has no administration in time range)   naloxone 0.4 mg/mL injection 0.02 mg (has no administration in time range)   magnesium oxide tablet 800 mg (has no administration in time range)   magnesium oxide tablet 800 mg (has no administration in time range)   potassium, sodium phosphates 280-160-250 mg packet 2 packet (has no administration in time range)   potassium, sodium phosphates 280-160-250 mg packet 2 packet (has no administration in time range)   potassium, sodium phosphates 280-160-250 mg packet 2 packet (has no administration in time range)   glucose chewable tablet 16 g (has no administration in time range)   glucose chewable tablet 24 g (has no administration in  time range)   dextrose 50% injection 12.5 g (has no administration in time range)   dextrose 50% injection 25 g (has no administration in time range)   glucagon (human recombinant) injection 1 mg (has no administration in time range)   insulin aspart U-100 pen 0-5 Units (has no administration in time range)   hydrOXYzine pamoate capsule 25 mg (has no administration in time range)   ceFEPIme injection 1 g (has no administration in time range)   metronidazole IVPB 500 mg (has no administration in time range)   HYDROcodone-acetaminophen 5-325 mg per tablet 1 tablet (1 tablet Oral Given 12/8/24 2114)   magnesium sulfate 2g in water 50mL IVPB (premix) (0 g Intravenous Stopped 12/9/24 0113)   lactated ringers bolus 500 mL (0 mLs Intravenous Stopped 12/8/24 2214)   vancomycin 1,500 mg in 0.9% NaCl 250 mL IVPB (admixture device) (0 mg Intravenous Stopped 12/8/24 2355)   ceFEPIme injection 2 g (2 g Intravenous Given 12/8/24 2223)   morphine injection 2 mg (2 mg Intravenous Given 12/8/24 2256)   metoprolol injection 5 mg (5 mg Intravenous Given 12/8/24 2255)   hydrOXYzine pamoate capsule 25 mg (25 mg Oral Given 12/8/24 2256)   metoprolol injection 5 mg (5 mg Intravenous Given 12/9/24 0011)   sodium zirconium cyclosilicate packet 5 g (5 g Oral Given 12/9/24 0012)     Medical Decision Making  89-year-old woman worsening bilateral leg pain and lesions, she is tachycardic to 120 normotensive afebrile satting well on room air she has a extensive wounds, in various stages of healing.  She has dopplerable DP pulses bilaterally Differential includes metabolic or endocrine derangement superimposed infection, arrhythmia.  She is in AFib with RVR.  I will give her magnesium.  We will give her a small fluid bolus.  I anticipate she will likely need admission and antibiotics.  She takes Coreg at home per chart review.      This patient does have evidence of infective focus  My overall impression is sepsis.  Source: Skin and Soft Tissue  "(location legs)  Antibiotics given- Antibiotics (72h ago, onward)    Start     Stop Route Frequency Ordered    12/09/24 2200  ceFEPIme injection 1 g         -- IV Every 24 hours (non-standard times) 12/08/24 2357    12/09/24 0115  metronidazole IVPB 500 mg         -- IV Every 8 hours (non-standard times) 12/09/24 0009    12/09/24 0050  vancomycin - pharmacy to dose  (vancomycin IVPB (PEDS and   ADULTS))        Placed in "And" Linked Group    -- IV pharmacy to manage frequency 12/08/24 2354      Latest lactate reviewed-  Lab             12/08/24                       2223          POCLAC       2.15          Organ dysfunction indicated by Acute kidney injury    Fluid challenge Contraindicated- Fluid bolus is contraindicated in this patient due to Congestive Heart Failure     Post- resuscitation assessment Yes Perfusion exam was performed within 6 hours of septic shock presentation after bolus shows Adequate tissue perfusion assessed by non-invasive monitoring       Will Not start Pressors- Levophed for MAP of 65  Source control achieved by: abx        Amount and/or Complexity of Data Reviewed  Independent Historian: caregiver     Details: Daughter reports her leg pain has gotten worse over the last couple of days  External Data Reviewed: notes.     Details: Leg swelling admission 09/20/2024 at [facility] discharge summary  Labs: ordered.  Radiology: ordered and independent interpretation performed. Decision-making details documented in ED Course.  ECG/medicine tests: ordered and independent interpretation performed. Decision-making details documented in ED Course.     Details: EKG on my independent interpretation rapid irregular heartbeat consistent with AFib with RVR, proximally 120, no STEMI    Risk  Prescription drug management.  Decision regarding hospitalization.               ED Course as of 12/09/24 0225   Sun Dec 08, 2024   2134 Chest x-ray on my independent interpretation does not appear grossly " volume overloaded [IC]      ED Course User Index  [IC] Surinder Curtis MD                           Clinical Impression:  Final diagnoses:  [I48.91] A-fib  [L08.9] Skin infection (Primary)  [R65.10] SIRS (systemic inflammatory response syndrome)          ED Disposition Condition    Observation                 Surinder Curtis MD  12/09/24 0560

## 2024-12-09 NOTE — SUBJECTIVE & OBJECTIVE
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  Blood pressure is lower side but stable and asymptomatic.  She does not have PEG tube currently      Review of Systems   Constitutional:  Positive for activity change. Negative for appetite change, fatigue and fever.   Respiratory:  Negative for shortness of breath.    Cardiovascular:  Negative for chest pain.   Gastrointestinal:  Negative for abdominal distention, abdominal pain, nausea and vomiting.   Skin:  Positive for rash and wound.   Neurological:  Negative for dizziness, syncope, weakness and light-headedness.     Objective:     Vital Signs (Most Recent):  Temp: 97.9 °F (36.6 °C) (12/09/24 1032)  Pulse: (!) 125 (12/09/24 1153)  Resp: 16 (12/09/24 1032)  BP: (!) 82/47 (12/09/24 1153)  SpO2: 98 % (12/09/24 1039) Vital Signs (24h Range):  Temp:  [97.8 °F (36.6 °C)-98.1 °F (36.7 °C)] 97.9 °F (36.6 °C)  Pulse:  [] 125  Resp:  [16-20] 16  SpO2:  [96 %-100 %] 98 %  BP: ()/(38-92) 82/47     Weight: 76.8 kg (169 lb 5 oz)  Body mass index is 26.52 kg/m².    Intake/Output Summary (Last 24 hours) at 12/9/2024 1227  Last data filed at 12/9/2024 1220  Gross per 24 hour   Intake 1360.05 ml   Output --   Net 1360.05 ml         Physical Exam  Vitals and nursing note reviewed.   Constitutional:       Appearance: Normal appearance.   HENT:      Head: Normocephalic and atraumatic.      Nose: Nose normal.      Mouth/Throat:      Mouth: Mucous membranes are dry.   Eyes:      Extraocular Movements: Extraocular movements intact.   Neck:      Vascular: No JVD.   Cardiovascular:      Rate and Rhythm: Rhythm irregular.      Pulses: Normal pulses.      Heart sounds: Murmur heard.   Pulmonary:      Effort: Pulmonary effort is normal.      Breath sounds: Normal breath sounds.   Abdominal:      General: There is no distension.      Palpations: Abdomen is soft.       Tenderness: There is no abdominal tenderness. There is no guarding.   Musculoskeletal:         General: Normal range of motion.   Skin:     General: Skin is warm.      Capillary Refill: Capillary refill takes less than 2 seconds.      Comments: See wound care note for full assessment     Neurological:      General: No focal deficit present.      Mental Status: She is alert and oriented to person, place, and time. Mental status is at baseline.   Psychiatric:         Mood and Affect: Mood normal.             Significant Labs: All pertinent labs within the past 24 hours have been reviewed.  Recent Lab Results  (Last 5 results in the past 24 hours)        12/09/24  1105   12/09/24  0715   12/09/24  0709   12/09/24  0401   12/08/24  2223        Albumin       3.1         ALP       82         ALT       14         Anion Gap       8         AST       17         Baso #       0.06         Basophil %       0.4         BILIRUBIN TOTAL       1.2  Comment: For infants and newborns, interpretation of results should be based  on gestational age, weight and in agreement with clinical  observations.    Premature Infant recommended reference ranges:  Up to 24 hours.............<8.0 mg/dL  Up to 48 hours............<12.0 mg/dL  3-5 days..................<15.0 mg/dL  6-29 days.................<15.0 mg/dL           BUN       52         Calcium       8.1         Chloride       107         CO2       19         Creatinine       1.6         Differential Method       Automated         eGFR       30.4         Eos #       2.3         Eos %       16.6         Glucose       127         Gran # (ANC)       9.5         Gran %       68.5         Hematocrit       36.5         Hemoglobin       11.0         Immature Grans (Abs)       0.08  Comment: Mild elevation in immature granulocytes is non specific and   can be seen in a variety of conditions including stress response,   acute inflammation, trauma and pregnancy. Correlation with other   laboratory  and clinical findings is essential.           Immature Granulocytes       0.6         Lymph #       0.8         Lymph %       5.6         MCH       24.4         MCHC       30.1         MCV       81         Mono #       1.2         Mono %       8.3         MPV       9.6         nRBC       0         Platelet Count       322         POC Lactate         2.15       POCT Glucose 137   116   112           Potassium       4.5         PROTEIN TOTAL       6.1         RBC       4.51         RDW       18.8         Sample         VENOUS       Sodium       134         WBC       13.91                                Significant Imaging: I have reviewed all pertinent imaging results/findings within the past 24 hours.

## 2024-12-09 NOTE — PROGRESS NOTES
Infectious Disease  Consult Note//  progress note    Patient Name: Leslie Tolliver   MRN: [id number]   Admission Date: 12/8/2024   Hospital Length of Stay: 0 days  Attending Physician: Colleen Alegre MD   Primary Care Provider: Chang Christianson MD     Isolation Status: No active isolations       Impression     Bilateral lower extremity cellulitis and Case of bullous pemphigoid   Patient is now on vanco and cefepime     Patient with a history of CAD, diabetes and hypertension and hypothyroidism   Patient is on chronic anticoagulation   She came to the hospital with worsening redness of the lower limbs   Creatinine 1.7, this is acute renal failure   Lactate 2   Chest x-ray negative       Patient told me the redness is worse in the legs compared to what she has at baseline   Denies fevers, chills, rigors or night sweats   So far microbiology pending       Recommendations      I am not sure if this is more of an inflammatory than infectious process   Nevertheless, we will leave her on this broad-spectrum coverage   Tomorrow with the cultures are negative, we can DC vancomycin   Tdap shot   We will continue to follow her course       Portions of this note dictated using EMR integrated voice recognition software, and may be subject to voice recognition errors not corrected at proofreading. Please contact writer for clarification if needed.    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     GERD (gastroesophageal reflux disease)     Hyperlipidemia     Hypertension     Hypothyroidism     Paroxysmal atrial fibrillation 3/16/2023    Type 2 diabetes mellitus         Past Surgical History:   Procedure Laterality Date    ADENOIDECTOMY      AORTIC VALVE REPLACEMENT      BREAST BIOPSY Right 20 yrs ago    benign    CARDIAC SURGERY      CHOLECYSTECTOMY      COLONOSCOPY  5-    Dr Holly, 5 year recheck    EPIDURAL STEROID INJECTION N/A 3/25/2021    Procedure: Injection, Steroid, Epidural L3-4;  Surgeon: Sky Love MD;  Location: [facility] OR;  Service: Pain Management;  Laterality: N/A;  Injection, Steroid, Epidural L3-4    EPIDURAL STEROID INJECTION N/A 5/20/2021    Procedure: Injection, Steroid, Epidural L3-4;  Surgeon: Sky Love MD;  Location: [facility] OR;  Service: Pain Management;  Laterality: N/A;  Injection, Steroid, Epidural L3-4    ESOPHAGOGASTRODUODENOSCOPY N/A 6/4/2020    Procedure: EGD (ESOPHAGOGASTRODUODENOSCOPY);  Surgeon: Michael Nugent III, MD;  Location: [facility];  Service: Endoscopy;  Laterality: N/A;    HEMORRHOID SURGERY      HYSTERECTOMY      INTRAMEDULLARY RODDING OF FEMUR Left 5/23/2024    Procedure: INSERTION, INTRAMEDULLARY RACQUEL, FEMUR;  Surgeon: Bravo Guillermo MD;  Location: [facility] OR;  Service: Orthopedics;  Laterality: Left;    OOPHORECTOMY      TONSILLECTOMY         Review of patient's allergies indicates:   Allergen Reactions    Fenofibric acid (choline)      Other reaction(s): Vomiting    Iodine      Other reaction(s): lips swollen ivp dye    Rosuvastatin      Other reaction(s): muscle pain        Family History       Problem Relation (Age of Onset)    Cancer Father    Diabetes Mother, Brother    Early death Son, Son    Glaucoma Mother    Heart disease Mother    No Known Problems Daughter               Social History     Socioeconomic History    Marital status:    Tobacco Use    Smoking status: Former     Current packs/day: 0.00     Average packs/day: 0.3  packs/day for 15.0 years (3.8 ttl pk-yrs)     Types: Cigarettes     Start date: 3/30/1959     Quit date: 3/30/1974     Years since quittin.7    Smokeless tobacco: Never   Substance and Sexual Activity    Alcohol use: No    Drug use: No    Sexual activity: Not Currently     Social Drivers of Health     Financial Resource Strain: Low Risk  (2024)    Overall Financial Resource Strain (CARDIA)     Difficulty of Paying Living Expenses: Not hard at all   Food Insecurity: No Food Insecurity (2024)    Hunger Vital Sign     Worried About Running Out of Food in the Last Year: Never true     Ran Out of Food in the Last Year: Never true   Transportation Needs: No Transportation Needs (2024)    TRANSPORTATION NEEDS     Transportation : No   Physical Activity: Unknown (10/14/2022)    Exercise Vital Sign     Days of Exercise per Week: Patient declined     Minutes of Exercise per Session: Patient declined   Stress: No Stress Concern Present (2024)    [employer] - Occupational Stress Questionnaire     Feeling of Stress : Not at all   Housing Stability: Low Risk  (2024)    Housing Stability Vital Sign     Unable to Pay for Housing in the Last Year: No     Homeless in the Last Year: No        Lines/Drains/Airways       Peripheral Intravenous Line  Duration                  Peripheral IV - Single Lumen 24 20 G Distal;Posterior;Right Forearm <1 day                     Medication:  Medications Prior to Admission   Medication Sig    apixaban (ELIQUIS) 5 mg Tab Take 1 tablet (5 mg total) by mouth 2 (two) times daily.    carvediloL (COREG) 12.5 MG tablet Take 1 tablet (12.5 mg total) by mouth 2 (two) times daily.    cholecalciferol, vitamin D3, (VITAMIN D3) 125 mcg (5,000 unit) Tab Take 1 tablet (5,000 Units total) by mouth once daily.    empagliflozin (JARDIANCE) 10 mg tablet Take 1 tablet (10 mg total) by mouth once daily.    furosemide (LASIX) 20 MG tablet Take 1  "tablet (20 mg total) by mouth once daily.    HYDROcodone-acetaminophen (NORCO) 5-325 mg per tablet Take 1 tablet by mouth every 12 (twelve) hours as needed for Pain.    insulin (LANTUS SOLOSTAR U-100 INSULIN) glargine 100 units/mL SubQ pen Inject 24 Units into the skin every evening.    ketoconazole (NIZORAL) 2 % cream Apply 1 application  topically 2 (two) times daily.    metFORMIN (GLUCOPHAGE) 1000 MG tablet Take 1 tablet (1,000 mg total) by mouth 2 (two) times daily with meals.    multivitamin (THERAGRAN) per tablet Take 1 tablet by mouth once daily.          Antimicrobials:  Antibiotics (From admission, onward)      Start     Stop Route Frequency Ordered    12/09/24 2200  ceFEPIme injection 1 g         -- IV Every 24 hours (non-standard times) 12/08/24 2357    12/09/24 0115  metronidazole IVPB 500 mg         -- IV Every 8 hours (non-standard times) 12/09/24 0009    12/09/24 0050  vancomycin - pharmacy to dose  (vancomycin IVPB (PEDS and ADULTS))        Placed in "And" Linked Group    -- IV pharmacy to manage frequency 12/08/24 2354             Antifungals (From admission, onward)      Start     Stop Route Frequency Ordered    12/09/24 0000  miconazole 2 % cream         -- Top 2 times daily 12/08/24 2349            Antivirals (From admission, onward)      None               Review of Systems   ROS      Objective:     Vital Signs (Most Recent):  Temp: 97.9 °F (36.6 °C) (12/09/24 1032)  Pulse: 92 (12/09/24 1232)  Resp: 16 (12/09/24 1032)  BP: (!) 97/56 (12/09/24 1232)  SpO2: 98 % (12/09/24 1039)  Vital Signs (24h Range):  Temp:  [97.8 °F (36.6 °C)-98.1 °F (36.7 °C)] 97.9 °F (36.6 °C)  Pulse:  [] 92  Resp:  [16-20] 16  SpO2:  [96 %-100 %] 98 %  BP: ()/(38-92) 97/56      Weight:   Wt Readings from Last 1 Encounters:   12/09/24 76.8 kg (169 lb 5 oz)      Body mass index is Body mass index is 26.52 kg/m².     Estimated Creatinine Clearance: Estimated Creatinine Clearance: 25 mL/min (A) (based on SCr of 1.6 " mg/dL (H)).     Physical Exam  Physical Exam  Afebrile   She is on room air   Not in distress lungs are clear to auscultation  S1-S2 heard   Diffuse skin lesions noted       Significant Labs: Blood Culture:   Recent Labs   Lab 10/07/24  1440 10/07/24  1449 12/08/24 2210 12/08/24 2217   LABBLOO No growth after 5 days. No growth after 5 days. No Growth to date No Growth to date         Microbiology Results (last 7 days)       Procedure Component Value Units Date/Time    Blood culture x two cultures. Draw prior to antibiotics. [[id number]] Collected: 12/08/24 2210    Order Status: Completed Specimen: Blood from Peripheral, Forearm, Left Updated: 12/09/24 0517     Blood Culture, Routine No Growth to date    Narrative:      Aerobic and anaerobic    Blood culture x two cultures. Draw prior to antibiotics. [[id number]] Collected: 12/08/24 2217    Order Status: Completed Specimen: Blood from Peripheral, Antecubital, Right Updated: 12/09/24 0517     Blood Culture, Routine No Growth to date    Narrative:      Aerobic and anaerobic             Significant Imaging: I have reviewed all pertinent imaging results/findings within the past 24 hours.      ZIYAD CALVILLO MD  Infectious Disease

## 2024-12-09 NOTE — HPI
89-year-old woman with a history of bullous pemphigoid for the last 4 years, CAD, diabetes, hypertension, hypothyroidism, Afib, stents to the emergency room with reports of increased pain to her wounds on both lower extremities. Denies fever, chills chest pain shortness of breath belly pain nausea or vomiting change in bowel or bladder habits.  She is not sure the last time she was on antibiotics.  She has seen a dermatologist in [place] for this.      In the ER, tachycardia, EKG with atrial fibrillation. Metoprolol 5 mg IV x2 doses given, leukocytosis 15, sodium 132, K 5.2,  KANNAN BUN 57, cr 1.7, baseline 1.2, glucose 174, , troponin I high sensitivity 10.9, Mag 2.3, blood culture sent lactic 2.15, chest x-ray with No acute intrathoracic process.  IV normal saline at 500 ml, cefepime, vancomycin, magnesium, Hydrocodone, and morphine given in ER  Admit to hospital medicine for AFib RVR, skin infection

## 2024-12-09 NOTE — ASSESSMENT & PLAN NOTE
Hyperkalemia is likely due to KANNAN.The patients most recent potassium results are listed below.  Recent Labs     12/08/24  2112 12/09/24  0401   K 5.2* 4.5       Plan  - Monitor for arrhythmias with EKG and/or continuous telemetry.   - Treat the hyperkalemia with Potassium Binders.   - Monitor potassium: Daily  - The patient's hyperkalemia is  new admit    Resolved now

## 2024-12-09 NOTE — ASSESSMENT & PLAN NOTE
The patient's most recent sodium results are listed below.  Recent Labs     12/08/24  2112   *     Plan  - Correct the sodium by 4-6mEq in 24 hours.   - Will treat the hyponatremia with IV fluids as follows: LR and fluid restriction  - Monitor sodium Daily.   - Patient hyponatremia is  new admit

## 2024-12-09 NOTE — CARE UPDATE
Patient evaluated at bedside. Sleeping, awakens easily. No focal deficits on exam. BP 80/47 - HR 98 afib. Afebrile. Non-toxic on exam but drowsy.     Patient received  cc bolus at 1130 am with improvement in BP.    Echo from 9/24 reviewed - EF 40-50% with severe MR.     Hold lasix at this time. Gentle hydration as patient looks dry on labs and on exam with dry mucous membranes -  cc/hr x 500 cc. Monitor for overload.     Suspect hypotension also worsened by sedating medications which included Dilaudid 1 mg iv, Norco and hydroxyzine that pt received this am. Hold sedating medications.    Decreased coreg to 6.25 mg bid (from 12.5) at this time due to hypotension.     Repeat CBC with improvement in leukocytosis, mild anemia. Lactic wnl.     Upgrade patient to SCU status due to hypotension.     Discussed with nursing staff.

## 2024-12-09 NOTE — ASSESSMENT & PLAN NOTE
Chronic  On cellcept outpatient, followed by dermatology.  We will try to contact patient's dermatologist and possible started CellCept.  Currently there is no CellCept in her active home medication  High dose of steroid  Doxycycline  Consult wound care

## 2024-12-09 NOTE — ASSESSMENT & PLAN NOTE
Hyperkalemia is likely due to KANNAN.The patients most recent potassium results are listed below.  Recent Labs     12/08/24  2112   K 5.2*     Plan  - Monitor for arrhythmias with EKG and/or continuous telemetry.   - Treat the hyperkalemia with Potassium Binders.   - Monitor potassium: Daily  - The patient's hyperkalemia is  new admit

## 2024-12-09 NOTE — CARE UPDATE
12/09/24 0900   Patient Assessment/Suction   Level of Consciousness (AVPU) alert   Respiratory Effort Unlabored   Expansion/Accessory Muscles/Retractions no use of accessory muscles   All Lung Fields Breath Sounds Anterior:;Lateral:   Rhythm/Pattern, Respiratory no shortness of breath reported   PRE-TX-O2   Device (Oxygen Therapy) room air   SpO2 96 %   Pulse Oximetry Type Intermittent   $ Pulse Oximetry - Multiple Charge Pulse Oximetry - Multiple   Pulse 108   Resp 20   Education   $ Education Oxygen;15 min

## 2024-12-09 NOTE — PROGRESS NOTES
"Pharmacist Renal Dose Adjustment Note    Leslie Tolliver is a 89 y.o. female being treated with the medication Cefepime    Patient Data:    Vital Signs (Most Recent):  Temp: 97.9 °F (36.6 °C) (12/08/24 2033)  Pulse: (!) 130 (12/08/24 2230)  Resp: 20 (12/08/24 2255)  BP: 122/60 (12/08/24 2230)  SpO2: 98 % (12/08/24 2230) Vital Signs (72h Range):  Temp:  [97.9 °F (36.6 °C)]   Pulse:  [105-134]   Resp:  [17-20]   BP: (100-126)/(50-91)   SpO2:  [97 %-100 %]        Ht: 5' 7" (1.702 m)  Wt: 77.1 kg (170 lb)  Estimated Creatinine Clearance: 23.5 mL/min (A) (based on SCr of 1.7 mg/dL (H)).  Body mass index is 26.63 kg/m².    Per [facility] renal dosing protocol:     Previous Order: Cefepime 1 g Q12H    Will be changed to:     New Order: Cefepime 1 g Q24H,    Due to: CrCl < 30 mL/min    Renal dose adjustments performed as noted above.    We will continue monitoring and adjusting as necessary.    Pharmacist: Bautista Muniz, PharmD  Ext: [phone]      "

## 2024-12-09 NOTE — CONSULTS
Complete skin assessment done, bilateral arms and legs with erupted blisters, fluid filled blisters, thick dry scabs, redness, bilateral arms and hands skin lesions, scabs dry, fluid filled blisters and erupted blisters, red rash to side and back, bilateral buttock also red rash cleaned and dried all, applied Triad to left side and left leg blisters. And buttock.  Right side cleaned and dried elevated on pillows, right no Triad will reevaluate tomorrow TIGRE and Dr. Loo.  Red rash beneath bilatral breast abd and groin, cleaned and dried and applied Miconazole powder.

## 2024-12-09 NOTE — ASSESSMENT & PLAN NOTE
"Patient's FSGs are uncontrolled due to hyperglycemia on current medication regimen.  Last A1c reviewed-   Lab Results   Component Value Date    HGBA1C 6.6 (H) 09/20/2024     Most recent fingerstick glucose reviewed- No results for input(s): "POCTGLUCOSE" in the last 24 hours.  Current correctional scale  Low  Maintain anti-hyperglycemic dose as follows-   Antihyperglycemics (From admission, onward)      Start     Stop Route Frequency Ordered    12/09/24 2100  insulin glargine U-100 (Lantus) pen 15 Units         -- SubQ Nightly 12/08/24 2349    12/09/24 0054  insulin aspart U-100 pen 0-5 Units         -- SubQ Before meals & nightly PRN 12/08/24 2355          Hold Oral hypoglycemics while patient is in the hospital.  " WDL

## 2024-12-09 NOTE — ASSESSMENT & PLAN NOTE
Patient has paroxysmal (<7 days) atrial fibrillation. Patient is currently in atrial fibrillation. [id number] Score: 4. The patients heart rate in the last 24 hours is as follows:  Pulse  Min: 66  Max: 134     Antiarrhythmics       Anticoagulants  apixaban tablet 2.5 mg, 2 times daily, Oral    Plan  - Replete lytes with a goal of K>4, Mg >2  - Patient is anticoagulated, see medications listed above.  - Patient's afib is currently controlled  -

## 2024-12-09 NOTE — PLAN OF CARE
Problem: Adult Inpatient Plan of Care  Goal: Plan of Care Review  Outcome: Progressing  Goal: Patient-Specific Goal (Individualized)  Outcome: Progressing  Goal: Absence of Hospital-Acquired Illness or Injury  Outcome: Progressing  Goal: Optimal Comfort and Wellbeing  Outcome: Progressing  Goal: Readiness for Transition of Care  Outcome: Progressing     Problem: Suicide Risk  Goal: Absence of Self-Harm  Outcome: Progressing     Problem: Wound  Goal: Optimal Coping  Outcome: Progressing  Goal: Optimal Functional Ability  Outcome: Progressing  Goal: Absence of Infection Signs and Symptoms  Outcome: Progressing  Goal: Improved Oral Intake  Outcome: Progressing  Goal: Optimal Pain Control and Function  Outcome: Progressing  Goal: Skin Health and Integrity  Outcome: Progressing  Goal: Optimal Wound Healing  Outcome: Progressing     Problem: Acute Kidney Injury/Impairment  Goal: Fluid and Electrolyte Balance  Outcome: Progressing  Goal: Improved Oral Intake  Outcome: Progressing  Goal: Effective Renal Function  Outcome: Progressing     Problem: Diabetes Comorbidity  Goal: Blood Glucose Level Within Targeted Range  Outcome: Progressing     Problem: Fall Injury Risk  Goal: Absence of Fall and Fall-Related Injury  Outcome: Progressing     Problem: Skin Injury Risk Increased  Goal: Skin Health and Integrity  Outcome: Progressing

## 2024-12-09 NOTE — PT/OT/SLP PROGRESS
Physical Therapy      Patient Name:  Leslie Tolliver   MRN:  [id number]    Patient not seen today secondary to RN hold due to hypotension. Will follow-up 12/10/24.

## 2024-12-09 NOTE — ASSESSMENT & PLAN NOTE
BLE  Hx of bullous pemphigoid  Start vancomycin, cefepime, metronidazole,   IV pain meds  Consult wound care, podiatry, ID  Turn q2 hrs.

## 2024-12-09 NOTE — ASSESSMENT & PLAN NOTE
BLE possible part of  bullous pemphigoid  DC vancomycin, cefepime to continue   IV pain meds  Turn q2 hrs.

## 2024-12-09 NOTE — ASSESSMENT & PLAN NOTE
Patient's FSGs are uncontrolled due to hyperglycemia on current medication regimen.  Last A1c reviewed-   Lab Results   Component Value Date    HGBA1C 6.6 (H) 09/20/2024     Most recent fingerstick glucose reviewed-   Recent Labs   Lab 12/09/24  0709 12/09/24  0715 12/09/24  1105   POCTGLUCOSE 112* 116* 137*     Current correctional scale  Low  Maintain anti-hyperglycemic dose as follows-   Antihyperglycemics (From admission, onward)    Start     Stop Route Frequency Ordered    12/09/24 2100  insulin glargine U-100 (Lantus) pen 15 Units         -- SubQ Nightly 12/08/24 2349    12/09/24 0054  insulin aspart U-100 pen 0-5 Units         -- SubQ Before meals & nightly PRN 12/08/24 2355        Hold Oral hypoglycemics while patient is in the hospital.

## 2024-12-09 NOTE — CONSULTS
Chief complaint:  Wound Check (Lesions throughout body from bullous pemphigoid that are worse and more painful tonight. )      HPI:  Leslie Tolliver is a 89 y.o. female presenting with Bullous Pemphigoid  Multiple open blisters of the BLE and BLM UE and trunk  Multiple areas of MASD with yeast infection of the skin folds. All wounds are POA. Pt has been treating for nearly 5 years with the blisters recurring. No other  complaints today    PMH:  As per HPI and below:  Past Medical History:   Diagnosis Date    Acute decompensated heart failure 2023    Anemia 2024    Anemia due to stage 3 chronic kidney disease 2019    Arthritis     Bullous pemphigoid 2022    Chronic bilateral low back pain without sciatica 2019    CKD (chronic kidney disease) stage 3, GFR 30-59 ml/min 2019    Colon polyps     Coronary artery disease     Diabetes mellitus type II     Diabetes with neurologic complications     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    GERD (gastroesophageal reflux disease)     Hyperlipidemia     Hypertension     Hypothyroidism     Paroxysmal atrial fibrillation 3/16/2023    Type 2 diabetes mellitus        Social History     Socioeconomic History    Marital status:    Tobacco Use    Smoking status: Former     Current packs/day: 0.00     Average packs/day: 0.3 packs/day for 15.0 years (3.8 ttl pk-yrs)     Types: Cigarettes     Start date: 3/30/1959     Quit date: 3/30/1974     Years since quittin.7    Smokeless tobacco: Never   Substance and Sexual Activity    Alcohol use: No    Drug use: No    Sexual activity: Not Currently     Social Drivers of Health     Financial Resource Strain: Low Risk  (2024)    Overall Financial Resource Strain (CARDIA)     Difficulty of Paying Living Expenses: Not hard at all   Food Insecurity: No Food Insecurity (2024)    Hunger Vital Sign     Worried About Running Out of Food in the Last Year: Never true     Ran Out of Food in the  Last Year: Never true   Transportation Needs: No Transportation Needs (12/9/2024)    TRANSPORTATION NEEDS     Transportation : No   Physical Activity: Unknown (10/14/2022)    Exercise Vital Sign     Days of Exercise per Week: Patient declined     Minutes of Exercise per Session: Patient declined   Stress: No Stress Concern Present (12/9/2024)    [employer] - Occupational Stress Questionnaire     Feeling of Stress : Not at all   Housing Stability: Low Risk  (12/9/2024)    Housing Stability Vital Sign     Unable to Pay for Housing in the Last Year: No     Homeless in the Last Year: No       Past Surgical History:   Procedure Laterality Date    ADENOIDECTOMY      AORTIC VALVE REPLACEMENT      BREAST BIOPSY Right 20 yrs ago    benign    CARDIAC SURGERY      CHOLECYSTECTOMY      COLONOSCOPY  5-    Dr Holly, 5 year recheck    EPIDURAL STEROID INJECTION N/A 3/25/2021    Procedure: Injection, Steroid, Epidural L3-4;  Surgeon: Sky Love MD;  Location: [facility];  Service: Pain Management;  Laterality: N/A;  Injection, Steroid, Epidural L3-4    EPIDURAL STEROID INJECTION N/A 5/20/2021    Procedure: Injection, Steroid, Epidural L3-4;  Surgeon: Sky Love MD;  Location: [facility] OR;  Service: Pain Management;  Laterality: N/A;  Injection, Steroid, Epidural L3-4    ESOPHAGOGASTRODUODENOSCOPY N/A 6/4/2020    Procedure: EGD (ESOPHAGOGASTRODUODENOSCOPY);  Surgeon: Michael Nugent III, MD;  Location: [facility];  Service: Endoscopy;  Laterality: N/A;    HEMORRHOID SURGERY      HYSTERECTOMY      INTRAMEDULLARY RODDING OF FEMUR Left 5/23/2024    Procedure: INSERTION, INTRAMEDULLARY RACQUEL, FEMUR;  Surgeon: Bravo Guillermo MD;  Location: [facility] OR;  Service: Orthopedics;  Laterality: Left;    OOPHORECTOMY      TONSILLECTOMY         Family History   Problem Relation Name Age of Onset    Diabetes Mother      Heart disease Mother      Glaucoma Mother      Cancer Father          lung cancer    Early  death Son          brain tumor age 3    Diabetes Brother      No Known Problems Daughter      Early death Son          MVA 25    Macular degeneration Neg Hx      Retinal detachment Neg Hx         Review of patient's allergies indicates:   Allergen Reactions    Fenofibric acid (choline)      Other reaction(s): Vomiting    Iodine      Other reaction(s): lips swollen ivp dye    Rosuvastatin      Other reaction(s): muscle pain       No current facility-administered medications on file prior to encounter.     Current Outpatient Medications on File Prior to Encounter   Medication Sig Dispense Refill    apixaban (ELIQUIS) 5 mg Tab Take 1 tablet (5 mg total) by mouth 2 (two) times daily. 180 tablet 3    carvediloL (COREG) 12.5 MG tablet Take 1 tablet (12.5 mg total) by mouth 2 (two) times daily. 90 tablet 3    cholecalciferol, vitamin D3, (VITAMIN D3) 125 mcg (5,000 unit) Tab Take 1 tablet (5,000 Units total) by mouth once daily. 90 tablet 3    empagliflozin (JARDIANCE) 10 mg tablet Take 1 tablet (10 mg total) by mouth once daily. 90 tablet 3    furosemide (LASIX) 20 MG tablet Take 1 tablet (20 mg total) by mouth once daily. 30 tablet 0    HYDROcodone-acetaminophen (NORCO) 5-325 mg per tablet Take 1 tablet by mouth every 12 (twelve) hours as needed for Pain. 60 tablet 0    insulin (LANTUS SOLOSTAR U-100 INSULIN) glargine 100 units/mL SubQ pen Inject 24 Units into the skin every evening. 3 mL 3    ketoconazole (NIZORAL) 2 % cream Apply 1 application  topically 2 (two) times daily.      metFORMIN (GLUCOPHAGE) 1000 MG tablet Take 1 tablet (1,000 mg total) by mouth 2 (two) times daily with meals. 180 tablet 1    multivitamin (THERAGRAN) per tablet Take 1 tablet by mouth once daily.         ROS: As per HPI and below:  Pertinent items are noted in HPI.      Physical Exam:     Vitals:    12/09/24 1119 12/09/24 1153 12/09/24 1230 12/09/24 1232   BP: (!) 88/48 (!) 82/47 (!) 73/42 (!) 97/56   BP Location:       Patient Position:      "  Pulse: 108 (!) 125 96 92   Resp:       Temp:       TempSrc:       SpO2:       Weight:       Height:           BP  Min: 73/42  Max: 144/92  Temp  Av.9 °F (36.6 °C)  Min: 97.8 °F (36.6 °C)  Max: 98.1 °F (36.7 °C)  Pulse  Av.4  Min: 66  Max: 134  Resp  Av.7  Min: 16  Max: 20  SpO2  Av.6 %  Min: 96 %  Max: 100 %  Height  Av' 7" (170.2 cm)  Min: 5' 7" (170.2 cm)  Max: 5' 7" (170.2 cm)  Weight  Av kg (169 lb 10.5 oz)  Min: 76.8 kg (169 lb 5 oz)  Max: 77.1 kg (170 lb)    Body mass index is 26.52 kg/m².          General:             Well developed, well nourished, no apparent distress  HEENT:              NCAT, no JVD, mucous membranes moist, EOM intact  Cardiovascular:  Regular rate and rhythm, normal S1, normal S2, No murmurs, rubs, or gallops  Respiratory:        Normal breath sounds, no wheezes, no rales, no rhonchi  Abdomen:           Bowel sounds present, non tender, non distended, no masses, no hepatojugular reflux  Extremities:        No clubbing, no cyanosis, no edema  Vascular:            2+ b/l radial.  Peripheral pulses intact.  No carotid bruits.  Neurological:      No focal deficits  Skin:                   No obvious rashes or erythema, Bullous Pemphigoid  Multiple open blisters of the BLE and BLM UE and trunk  Multiple areas of MASD with yeast infection of the skin folds                                                            Lab Results   Component Value Date    WBC 10.62 2024    HGB 10.5 (L) 2024    HCT 34.5 (L) 2024    MCV 80 (L) 2024     2024     Lab Results   Component Value Date    CHOL 126 10/14/2022    CHOL 132 2022    CHOL 130 2021     Lab Results   Component Value Date    HDL 33 (L) 10/14/2022    HDL 33 (L) 2022    HDL 31 (L) 2021     Lab Results   Component Value Date    LDLCALC 64.6 10/14/2022    LDLCALC 54.4 (L) 2022    LDLCALC 37.2 (L) 2021     Lab Results   Component Value Date    TRIG " 142 10/14/2022    TRIG 223 (H) 03/11/2022    TRIG 309 (H) 02/25/2021     Lab Results   Component Value Date    CHOLHDL 26.2 10/14/2022    CHOLHDL 25.0 03/11/2022    CHOLHDL 23.8 02/25/2021     CMP  Recent Labs   Lab 12/09/24  0401   *   CALCIUM 8.1*   ALBUMIN 3.1*   PROT 6.1   *   K 4.5   CO2 19*      BUN 52*   CREATININE 1.6*   ALKPHOS 82   ALT 14   AST 17   BILITOT 1.2*      Lab Results   Component Value Date    TSH 2.636 03/26/2024    TSH 2.572 03/26/2024           Assessment and Recommendations       Diagnoses:    Bullous Pemphigoid  Multiple open blisters of the BLE and BLM UE and trunk  Multiple areas of MASD with yeast infection of the skin folds      Plan:  Miconazole to the skin folds of the groin, abdomen and breasts  Will re-evaluate tomorrow with wound nurse    Complexity:    moderate

## 2024-12-09 NOTE — H&P
[facility] - Emergency Dept  Hospital Medicine  History & Physical    Patient Name: Leslie Tolliver  MRN: [id number]  Patient Class: OP- Observation  Admission Date: 12/8/2024  Attending Physician: Thaddeus Lee MD   Primary Care Provider: Chang Christianson MD         Patient information was obtained from patient, relative(s), and ER records.     Subjective:     Principal Problem:Cellulitis of leg    Chief Complaint:   Chief Complaint   Patient presents with    Wound Check     Lesions throughout body from bullous pemphigoid that are worse and more painful tonight.         HPI: 89-year-old woman with a history of bullous pemphigoid for the last 4 years, CAD, diabetes, hypertension, hypothyroidism, Afib, stents to the emergency room with reports of increased pain to her wounds on both lower extremities. Denies fever, chills chest pain shortness of breath belly pain nausea or vomiting change in bowel or bladder habits.  She is not sure the last time she was on antibiotics.  She has seen a dermatologist in [place] for this.      In the ER, tachycardia, EKG with atrial fibrillation. Metoprolol 5 mg IV x2 doses given, leukocytosis 15, sodium 132, K 5.2,  KANNAN BUN 57, cr 1.7, baseline 1.2, glucose 174, , troponin I high sensitivity 10.9, Mag 2.3, blood culture sent lactic 2.15, chest x-ray with No acute intrathoracic process.  IV normal saline at 500 ml, cefepime, vancomycin, magnesium, Hydrocodone, and morphine given in ER  Admit to hospital medicine for AFib RVR, skin infection    Past Medical History:   Diagnosis Date    Acute decompensated heart failure 11/16/2023    Anemia 5/23/2024    Anemia due to stage 3 chronic kidney disease 07/24/2019    Arthritis     Bullous pemphigoid 8/29/2022    Chronic bilateral low back pain without sciatica 07/24/2019    CKD (chronic kidney disease) stage 3, GFR 30-59 ml/min 07/24/2019    Colon polyps     Coronary artery disease     Diabetes mellitus type II      Diabetes with neurologic complications     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    GERD (gastroesophageal reflux disease)     Hyperlipidemia     Hypertension     Hypothyroidism     Paroxysmal atrial fibrillation 3/16/2023    Type 2 diabetes mellitus        Past Surgical History:   Procedure Laterality Date    ADENOIDECTOMY      AORTIC VALVE REPLACEMENT      BREAST BIOPSY Right 20 yrs ago    benign    CARDIAC SURGERY      CHOLECYSTECTOMY      COLONOSCOPY  5-    Dr Holly, 5 year recheck    EPIDURAL STEROID INJECTION N/A 3/25/2021    Procedure: Injection, Steroid, Epidural L3-4;  Surgeon: Sky Love MD;  Location: [facility] OR;  Service: Pain Management;  Laterality: N/A;  Injection, Steroid, Epidural L3-4    EPIDURAL STEROID INJECTION N/A 5/20/2021    Procedure: Injection, Steroid, Epidural L3-4;  Surgeon: Sky Love MD;  Location: [facility] OR;  Service: Pain Management;  Laterality: N/A;  Injection, Steroid, Epidural L3-4    ESOPHAGOGASTRODUODENOSCOPY N/A 6/4/2020    Procedure: EGD (ESOPHAGOGASTRODUODENOSCOPY);  Surgeon: Michael Nugent III, MD;  Location: [facility] ENDO;  Service: Endoscopy;  Laterality: N/A;    HEMORRHOID SURGERY      HYSTERECTOMY      INTRAMEDULLARY RODDING OF FEMUR Left 5/23/2024    Procedure: INSERTION, INTRAMEDULLARY RACQUEL, FEMUR;  Surgeon: Bravo Guillermo MD;  Location: [facility] OR;  Service: Orthopedics;  Laterality: Left;    OOPHORECTOMY      TONSILLECTOMY         Review of patient's allergies indicates:   Allergen Reactions    Fenofibric acid (choline)      Other reaction(s): Vomiting    Iodine      Other reaction(s): lips swollen ivp dye    Rosuvastatin      Other reaction(s): muscle pain       No current facility-administered medications on file prior to encounter.     Current Outpatient Medications on File Prior to Encounter   Medication Sig    apixaban (ELIQUIS) 5 mg Tab Take 1 tablet (5 mg total) by mouth 2 (two) times daily.    carvediloL (COREG) 12.5 MG tablet Take 1 tablet  (12.5 mg total) by mouth 2 (two) times daily.    cholecalciferol, vitamin D3, (VITAMIN D3) 125 mcg (5,000 unit) Tab Take 1 tablet (5,000 Units total) by mouth once daily.    empagliflozin (JARDIANCE) 10 mg tablet Take 1 tablet (10 mg total) by mouth once daily.    furosemide (LASIX) 20 MG tablet Take 1 tablet (20 mg total) by mouth once daily.    HYDROcodone-acetaminophen (NORCO) 5-325 mg per tablet Take 1 tablet by mouth every 12 (twelve) hours as needed for Pain.    insulin (LANTUS SOLOSTAR U-100 INSULIN) glargine 100 units/mL SubQ pen Inject 24 Units into the skin every evening.    ketoconazole (NIZORAL) 2 % cream Apply 1 application  topically 2 (two) times daily.    metFORMIN (GLUCOPHAGE) 1000 MG tablet Take 1 tablet (1,000 mg total) by mouth 2 (two) times daily with meals.    multivitamin (THERAGRAN) per tablet Take 1 tablet by mouth once daily.     Family History       Problem Relation (Age of Onset)    Cancer Father    Diabetes Mother, Brother    Early death Son, Son    Glaucoma Mother    Heart disease Mother    No Known Problems Daughter          Tobacco Use    Smoking status: Former     Current packs/day: 0.00     Average packs/day: 0.3 packs/day for 15.0 years (3.8 ttl pk-yrs)     Types: Cigarettes     Start date: 3/30/1959     Quit date: 3/30/1974     Years since quittin.7    Smokeless tobacco: Never   Substance and Sexual Activity    Alcohol use: No    Drug use: No    Sexual activity: Not Currently     Review of Systems   HENT: Negative.     Cardiovascular:  Positive for palpitations.   Skin:  Positive for color change, rash and wound.   Neurological: Negative.    Psychiatric/Behavioral: Negative.       Objective:     Vital Signs (Most Recent):  Temp: 97.9 °F (36.6 °C) (24)  Pulse: (!) 130 (24)  Resp: 20 (24)  BP: 122/60 (24)  SpO2: 98 % (24) Vital Signs (24h Range):  Temp:  [97.9 °F (36.6 °C)] 97.9 °F (36.6 °C)  Pulse:  [105-134] 130  Resp:   [17-20] 20  SpO2:  [97 %-100 %] 98 %  BP: (100-126)/(50-91) 122/60     Weight: 77.1 kg (170 lb)  Body mass index is 26.63 kg/m².     Physical Exam  Vitals reviewed.   Constitutional:       Appearance: Normal appearance. She is obese.   HENT:      Head: Normocephalic and atraumatic.      Nose: Nose normal.      Mouth/Throat:      Mouth: Mucous membranes are dry.      Pharynx: Oropharynx is clear.   Eyes:      Extraocular Movements: Extraocular movements intact.      Pupils: Pupils are equal, round, and reactive to light.   Cardiovascular:      Rate and Rhythm: Normal rate. Rhythm irregular.      Pulses:           Radial pulses are 2+ on the right side and 2+ on the left side.        Dorsalis pedis pulses are detected w/ Doppler on the right side and detected w/ Doppler on the left side.        Posterior tibial pulses are detected w/ Doppler on the right side and detected w/ Doppler on the left side.      Heart sounds: Normal heart sounds.   Pulmonary:      Effort: Pulmonary effort is normal.      Breath sounds: Normal breath sounds.   Abdominal:      General: Bowel sounds are normal. There is no distension.      Palpations: Abdomen is soft.   Musculoskeletal:         General: Tenderness present. Normal range of motion.      Cervical back: Normal range of motion and neck supple.      Right lower le+ Edema present.      Left lower le+ Edema present.   Skin:     General: Skin is warm and dry.      Capillary Refill: Capillary refill takes less than 2 seconds.      Findings: Erythema and rash present.      Comments: MASD of the abdominal folds, groin, breasts, thighs  BLE stasis dermatitis with venous ulcers  sacral pressure ulcer           Significant Labs: All pertinent labs within the past 24 hours have been reviewed.  Recent Lab Results         243   24  2112        Albumin   3.3       ALP   93       ALT   17       Anion Gap   9       AST   27       Baso #   0.06       Basophil %   0.4        BILIRUBIN TOTAL   1.0  Comment: For infants and newborns, interpretation of results should be based  on gestational age, weight and in agreement with clinical  observations.    Premature Infant recommended reference ranges:  Up to 24 hours.............<8.0 mg/dL  Up to 48 hours............<12.0 mg/dL  3-5 days..................<15.0 mg/dL  6-29 days.................<15.0 mg/dL         BNP   157  Comment: Values of less than 100 pg/ml are consistent with non-CHF populations.       BUN   57       Calcium   8.3       Chloride   103       CO2   20       Creatinine   1.7       Differential Method   Automated       eGFR   28.3       Eos #   3.7       Eos %   24.5       Glucose   174       Gran # (ANC)   9.1       Gran %   60.3       Hematocrit   37.9       Hemoglobin   11.8       Immature Grans (Abs)   0.09  Comment: Mild elevation in immature granulocytes is non specific and   can be seen in a variety of conditions including stress response,   acute inflammation, trauma and pregnancy. Correlation with other   laboratory and clinical findings is essential.         Immature Granulocytes   0.6       Lymph #   1.0       Lymph %   6.7       Magnesium    2.3       MCH   24.6       MCHC   31.1       MCV   79       Mono #   1.1       Mono %   7.5       MPV   9.7       nRBC   0       Platelet Count   410       POC Lactate 2.15         Potassium   5.2       PROTEIN TOTAL   6.8       RBC   4.80       RDW   18.6       Sample VENOUS         Sodium   132       Troponin I High Sensitivity   10.9  Comment: Troponin results differ between methods. Do not use   results between Troponin methods interchangeably.    Alkaline Phospatase levels above 400 U/L may   cause false positive results.    Access hsTnI should not be used for patients taking   Asfotase raul (Strensiq).         WBC   15.00               Significant Imaging: I have reviewed all pertinent imaging results/findings within the past 24 hours.  Imaging Results              X-Ray  Chest AP Portable (Final result)  Result time 12/08/24 22:54:45      Final result by Ezequiel Hinton MD (12/08/24 22:54:45)                   Impression:      No acute intrathoracic process.      Electronically signed by: Ezequiel Hinton MD  Date:    12/08/2024  Time:    22:54               Narrative:    EXAMINATION:  XR CHEST AP PORTABLE    CLINICAL HISTORY:  Chest Pain;    TECHNIQUE:  Single frontal view of the chest was performed.    COMPARISON:  10/07/2023.    FINDINGS:  There are postoperative changes of median sternotomy.  The sternal wires are intact.  The trachea is unremarkable.  There are calcifications of the aortic knob.  The cardiomediastinal silhouette is within normal limits.  There is no evidence of free air beneath the hemidiaphragms.  There are no pleural effusions.  There is no evidence of a pneumothorax.  There is no evidence of pneumomediastinum.  No airspace opacity is present.  There are degenerative changes in the osseous structures.                                      Assessment/Plan:     * Cellulitis of leg  BLE  Hx of bullous pemphigoid  Start vancomycin, cefepime, metronidazole,   IV pain meds  Consult wound care, podiatry, ID  Turn q2 hrs.         Atrial fibrillation  Patient has paroxysmal (<7 days) atrial fibrillation. Patient is currently in atrial fibrillation. [id number] Score: 4. The patients heart rate in the last 24 hours is as follows:  Pulse  Min: 105  Max: 134     Antiarrhythmics  metoprolol injection 5 mg, ED 1 Time, Intravenous    Anticoagulants  apixaban tablet 2.5 mg, 2 times daily, Oral    Plan  - Replete lytes with a goal of K>4, Mg >2  - Patient is anticoagulated, see medications listed above.  - Patient's afib is currently controlled  -         KANNAN (acute kidney injury)  . Baseline creatinine is  1.2 . Most recent creatinine and eGFR are listed below.  Recent Labs     12/08/24 2112   CREATININE 1.7*   EGFRNORACEVR 28.3*      Plan  - KANNAN is  new admit  - Avoid nephrotoxins  "and renally dose meds for GFR listed above  - Monitor urine output, serial BMP, and adjust therapy as needed      Hyperkalemia  Hyperkalemia is likely due to KANNAN.The patients most recent potassium results are listed below.  Recent Labs     12/08/24 2112   K 5.2*     Plan  - Monitor for arrhythmias with EKG and/or continuous telemetry.   - Treat the hyperkalemia with Potassium Binders.   - Monitor potassium: Daily  - The patient's hyperkalemia is  new admit            Hyponatremia  The patient's most recent sodium results are listed below.  Recent Labs     12/08/24 2112   *     Plan  - Correct the sodium by 4-6mEq in 24 hours.   - Will treat the hyponatremia with IV fluids as follows: LR and fluid restriction  - Monitor sodium Daily.   - Patient hyponatremia is  new admit      Type 2 diabetes mellitus  Patient's FSGs are uncontrolled due to hyperglycemia on current medication regimen.  Last A1c reviewed-   Lab Results   Component Value Date    HGBA1C 6.6 (H) 09/20/2024     Most recent fingerstick glucose reviewed- No results for input(s): "POCTGLUCOSE" in the last 24 hours.  Current correctional scale  Low  Maintain anti-hyperglycemic dose as follows-   Antihyperglycemics (From admission, onward)      Start     Stop Route Frequency Ordered    12/09/24 2100  insulin glargine U-100 (Lantus) pen 15 Units         -- SubQ Nightly 12/08/24 2349    12/09/24 0054  insulin aspart U-100 pen 0-5 Units         -- SubQ Before meals & nightly PRN 12/08/24 2355          Hold Oral hypoglycemics while patient is in the hospital.    Bullous pemphigoid  Chronic  On cellcept outpatient, followed by dermatology  Consult wound care        VTE Risk Mitigation (From admission, onward)           Ordered     apixaban tablet 2.5 mg  2 times daily         12/08/24 2349     IP VTE HIGH RISK PATIENT  Once         12/08/24 2354     Place sequential compression device  Until discontinued         12/08/24 2354                         On " "12/09/2024, patient should be placed in hospital observation services under my care in collaboration with Dr. Lee.      Pharmacist Renal Dose Adjustment Note    Leslie Tolliver is a 89 y.o. female being treated with the medication Cefepime    Patient Data:    Vital Signs (Most Recent):  Temp: 97.9 °F (36.6 °C) (12/08/24 2033)  Pulse: (!) 130 (12/08/24 2230)  Resp: 20 (12/08/24 2255)  BP: 122/60 (12/08/24 2230)  SpO2: 98 % (12/08/24 2230) Vital Signs (72h Range):  Temp:  [97.9 °F (36.6 °C)]   Pulse:  [105-134]   Resp:  [17-20]   BP: (100-126)/(50-91)   SpO2:  [97 %-100 %]        Ht: 5' 7" (1.702 m)  Wt: 77.1 kg (170 lb)  Estimated Creatinine Clearance: 23.5 mL/min (A) (based on SCr of 1.7 mg/dL (H)).  Body mass index is 26.63 kg/m².    Per [facility] renal dosing protocol:     Previous Order: Cefepime 1 g Q12H    Will be changed to:     New Order: Cefepime 1 g Q24H,    Due to: CrCl < 30 mL/min    Renal dose adjustments performed as noted above.    We will continue monitoring and adjusting as necessary.    Pharmacist: Bautista Muniz, PharmD  Ext: [phone]           Sindy Osullivan NP  Department of Hospital Medicine  [facility] - Emergency Dept          "

## 2024-12-09 NOTE — ASSESSMENT & PLAN NOTE
The patient's most recent sodium results are listed below.  Recent Labs     12/08/24  2112 12/09/24  0401   * 134*       Plan  - Correct the sodium by 4-6mEq in 24 hours.   - Will treat the hyponatremia with IV fluids as follows: LR and fluid restriction  - Monitor sodium Daily.   - Patient hyponatremia is  new admit  resolved

## 2024-12-09 NOTE — NURSING
Received pt from [facility]. Pt assessed upon arrival. 2 RN skin check complete. Pt's pads and sheets saturated with urine. Pt changed and a new purewick applied. BP 78/57 (62) on arrival. MD Alegre notified. Call light in reach, bed alarm on, and bed locked and in lowest position. Attempted to notify pt's daughter. No answer. Safety maintained.

## 2024-12-09 NOTE — NURSING
Patient arrived to unit via stretcher with belongings. Transferred patient from stretcher to bed. 4 eyes completed with the assistance of RN. Puss filled blisters as well as open wounds are on patients lower and upper extremities. Yeast rash under breasts and in groin are also present. Calmoseptine cream was applied in ED. Patient has miconazole ointment ordered. Stated it hurts to bad to clean and apply ointment. Ointment applied in other places that she could tolerate. Patient was orientated to room and given call light. Educated to call for assistance.Bed in lowest position and locked. Bed alarm activated.

## 2024-12-09 NOTE — PROGRESS NOTES
"Pharmacokinetic Initial Assessment: IV Vancomycin    Assessment/Plan:    Initiate intravenous vancomycin with loading dose of 1500 mg once with subsequent doses when random concentrations are less than 20 mcg/mL  Desired empiric serum trough concentration is 10 to 15 mcg/mL  Draw vancomycin random level on 12/09 at 2100.  Pharmacy will continue to follow and monitor vancomycin.      Please contact pharmacy at extension [phone] with any questions regarding this assessment.     Thank you for the consult,   Bautista Muniz       Patient brief summary:  Leslie Tolliver is a 89 y.o. female initiated on antimicrobial therapy with IV Vancomycin for treatment of suspected skin & soft tissue infection    Drug Allergies:   Review of patient's allergies indicates:   Allergen Reactions    Fenofibric acid (choline)      Other reaction(s): Vomiting    Iodine      Other reaction(s): lips swollen ivp dye    Rosuvastatin      Other reaction(s): muscle pain       Actual Body Weight:   76.8 kg    Renal Function:   Estimated Creatinine Clearance: 25 mL/min (A) (based on SCr of 1.6 mg/dL (H)).,     CBC (last 72 hours):  Recent Labs   Lab Result Units 12/08/24 2112 12/09/24  0401   WBC K/uL 15.00* 13.91*   Hemoglobin g/dL 11.8* 11.0*   Hematocrit % 37.9 36.5*   Platelets K/uL 410 322   Gran % % 60.3 68.5   Lymph % % 6.7* 5.6*   Mono % % 7.5 8.3   Eosinophil % % 24.5* 16.6*   Basophil % % 0.4 0.4   Differential Method  Automated Automated       Metabolic Panel (last 72 hours):  Recent Labs   Lab Result Units 12/08/24 2112 12/09/24  0401   Sodium mmol/L 132* 134*   Potassium mmol/L 5.2* 4.5   Chloride mmol/L 103 107   CO2 mmol/L 20* 19*   Glucose mg/dL 174* 127*   BUN mg/dL 57* 52*   Creatinine mg/dL 1.7* 1.6*   Albumin g/dL 3.3* 3.1*   Total Bilirubin mg/dL 1.0 1.2*   Alkaline Phosphatase U/L 93 82   AST U/L 27 17   ALT U/L 17 14   Magnesium mg/dL 2.3  --        Drug levels (last 3 results):  No results for input(s): "VANCOMYCINRA", " ""VANCORANDOM", "VANCOMYCINPE", "VANCOPEAK", "VANCOMYCINTR", "VANCOTROUGH" in the last 72 hours.    Microbiologic Results:  Microbiology Results (last 7 days)       Procedure Component Value Units Date/Time    Blood culture x two cultures. Draw prior to antibiotics. [[id number]] Collected: 12/08/24 2210    Order Status: Completed Specimen: Blood from Peripheral, Forearm, Left Updated: 12/09/24 0517     Blood Culture, Routine No Growth to date    Narrative:      Aerobic and anaerobic    Blood culture x two cultures. Draw prior to antibiotics. [[id number]] Collected: 12/08/24 2217    Order Status: Completed Specimen: Blood from Peripheral, Antecubital, Right Updated: 12/09/24 0517     Blood Culture, Routine No Growth to date    Narrative:      Aerobic and anaerobic            "

## 2024-12-09 NOTE — ED NOTES
Calmoseptine ointment applied under pt's breast folds, bilateral arms and bilateral flanks, as requested. Pt reports relief from itching in these areas at this time.

## 2024-12-09 NOTE — ASSESSMENT & PLAN NOTE
. Baseline creatinine is  1.2 . Most recent creatinine and eGFR are listed below.  Recent Labs     12/08/24 2112   CREATININE 1.7*   EGFRNORACEVR 28.3*      Plan  - KANNAN is  new admit  - Avoid nephrotoxins and renally dose meds for GFR listed above  - Monitor urine output, serial BMP, and adjust therapy as needed

## 2024-12-09 NOTE — HOSPITAL COURSE
89 year old female with bullous phemphigoid (dx 4 years ago), CAD with stenting, DM, HTN, hypothyroidism, afib (on Eliquis), bioprosthetic AVR. 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 Patient evaluated in the ER and noted to have leukocytosis, EKG with Afib, CMP with KANNAN with K 5.2 and was admitted to hospital medicine service for further evaluation.   The patient was started on cefipime and vancomycin. ID and wound care consulted. Wound care evaluated patient and recommended nystatin powder to skin folds under bilateral breasts and bilateral groin and Triad to be applied to LLE blisters to evaluate response to treatment and later added clobetasol cream as well . Blood culture negative and vanco DC and  added doxy . She was pec after patient arrived to the hospital room and possible suicide ideation.  But patient was under stress with all these different medical problems and she is totally intelligent and awake alert and good insight and later denied suicidal or homicidal ideation and very grateful and discontinued PEC.  Patient cellulitis gradually better and physical therapy assessed and recommended skilled nursing placement and discharged to SNF in stable condtion .  She was DC with doxy and cefdinir for 7 days and follow up with urology as OP ( seen by urology and wants to keep u cath 1 week and will see as OP )

## 2024-12-09 NOTE — PROGRESS NOTES
"Pharmacist Renal Dose Adjustment Note    Leslie Tolliver is a 89 y.o. female being treated with the medication Apixaban    Patient Data:    Vital Signs (Most Recent):  Temp: 97.9 °F (36.6 °C) (12/08/24 2033)  Pulse: (!) 130 (12/08/24 2230)  Resp: 20 (12/08/24 2255)  BP: 122/60 (12/08/24 2230)  SpO2: 98 % (12/08/24 2230) Vital Signs (72h Range):  Temp:  [97.9 °F (36.6 °C)]   Pulse:  [105-134]   Resp:  [17-20]   BP: (100-126)/(50-91)   SpO2:  [97 %-100 %]        Ht: 5' 7" (1.702 m)  Wt: 77.1 kg (170 lb)  Estimated Creatinine Clearance: 23.5 mL/min (A) (based on SCr of 1.7 mg/dL (H)).  Body mass index is 26.63 kg/m².    Per [facility] renal dosing protocol:     Previous Order: Apixaban 5 mg BiD    Will be changed to:     New Order: Apixaban 2.5 mg BiD,    Due to: Per Pharmacy Protocol    Renal dose adjustments performed as noted above.    We will continue monitoring and adjusting as necessary.    Pharmacist: Bautista Muniz PharmD  Ext: [phone]      "

## 2024-12-10 ENCOUNTER — CLINICAL SUPPORT (OUTPATIENT)
Dept: CARDIOLOGY | Facility: HOSPITAL | Age: 89
End: 2024-12-10
Attending: STUDENT IN AN ORGANIZED HEALTH CARE EDUCATION/TRAINING PROGRAM
Payer: MEDICARE

## 2024-12-10 LAB
ALBUMIN SERPL BCP-MCNC: 2.7 G/DL (ref 3.5–5.2)
ALP SERPL-CCNC: 71 U/L (ref 55–135)
ALT SERPL W/O P-5'-P-CCNC: 12 U/L (ref 10–44)
ANION GAP SERPL CALC-SCNC: 7 MMOL/L (ref 8–16)
APICAL FOUR CHAMBER EJECTION FRACTION: 55 %
AST SERPL-CCNC: 18 U/L (ref 10–40)
BASOPHILS # BLD AUTO: 0.03 K/UL (ref 0–0.2)
BASOPHILS NFR BLD: 0.3 % (ref 0–1.9)
BILIRUB SERPL-MCNC: 1 MG/DL (ref 0.1–1)
BSA FOR ECHO PROCEDURE: 1.91 M2
BUN SERPL-MCNC: 44 MG/DL (ref 8–23)
CALCIUM SERPL-MCNC: 7.6 MG/DL (ref 8.7–10.5)
CHLORIDE SERPL-SCNC: 111 MMOL/L (ref 95–110)
CO2 SERPL-SCNC: 19 MMOL/L (ref 23–29)
CREAT SERPL-MCNC: 1.4 MG/DL (ref 0.5–1.4)
CV ECHO LV RWT: 0.47 CM
DIFFERENTIAL METHOD BLD: ABNORMAL
DOP CALC MV VTI: 39.9 CM
ECHO LV POSTERIOR WALL: 1.1 CM (ref 0.6–1.1)
EOSINOPHIL # BLD AUTO: 2.1 K/UL (ref 0–0.5)
EOSINOPHIL NFR BLD: 19.5 % (ref 0–8)
ERYTHROCYTE [DISTWIDTH] IN BLOOD BY AUTOMATED COUNT: 18.8 % (ref 11.5–14.5)
EST. GFR  (NO RACE VARIABLE): 35.7 ML/MIN/1.73 M^2
FRACTIONAL SHORTENING: 29.8 % (ref 28–44)
GLUCOSE SERPL-MCNC: 94 MG/DL (ref 70–110)
HCT VFR BLD AUTO: 32.5 % (ref 37–48.5)
HGB BLD-MCNC: 10.1 G/DL (ref 12–16)
IMM GRANULOCYTES # BLD AUTO: 0.04 K/UL (ref 0–0.04)
IMM GRANULOCYTES NFR BLD AUTO: 0.4 % (ref 0–0.5)
INTERVENTRICULAR SEPTUM: 1.1 CM (ref 0.6–1.1)
IVC DIAMETER: 2.1 CM
LEFT INTERNAL DIMENSION IN SYSTOLE: 3.3 CM (ref 2.1–4)
LEFT VENTRICLE DIASTOLIC VOLUME INDEX: 54.45 ML/M2
LEFT VENTRICLE DIASTOLIC VOLUME: 102.36 ML
LEFT VENTRICLE END DIASTOLIC VOLUME APICAL 4 CHAMBER: 72.3 ML
LEFT VENTRICLE MASS INDEX: 99.8 G/M2
LEFT VENTRICLE SYSTOLIC VOLUME INDEX: 23.5 ML/M2
LEFT VENTRICLE SYSTOLIC VOLUME: 44.13 ML
LEFT VENTRICULAR INTERNAL DIMENSION IN DIASTOLE: 4.7 CM (ref 3.5–6)
LEFT VENTRICULAR MASS: 187.5 G
LVED V (TEICH): 102.36 ML
LVES V (TEICH): 44.13 ML
LYMPHOCYTES # BLD AUTO: 0.7 K/UL (ref 1–4.8)
LYMPHOCYTES NFR BLD: 6.2 % (ref 18–48)
MCH RBC QN AUTO: 24.9 PG (ref 27–31)
MCHC RBC AUTO-ENTMCNC: 31.1 G/DL (ref 32–36)
MCV RBC AUTO: 80 FL (ref 82–98)
MONOCYTES # BLD AUTO: 0.8 K/UL (ref 0.3–1)
MONOCYTES NFR BLD: 7.1 % (ref 4–15)
MV MEAN GRADIENT: 5 MMHG
MV PEAK GRADIENT: 14 MMHG
NEUTROPHILS # BLD AUTO: 7.2 K/UL (ref 1.8–7.7)
NEUTROPHILS NFR BLD: 66.5 % (ref 38–73)
NRBC BLD-RTO: 0 /100 WBC
PISA TR MAX VEL: 2.87 M/S
PLATELET # BLD AUTO: 269 K/UL (ref 150–450)
PMV BLD AUTO: 9.6 FL (ref 9.2–12.9)
POCT GLUCOSE: 107 MG/DL (ref 70–110)
POCT GLUCOSE: 155 MG/DL (ref 70–110)
POCT GLUCOSE: 200 MG/DL (ref 70–110)
POCT GLUCOSE: 85 MG/DL (ref 70–110)
POTASSIUM SERPL-SCNC: 4.1 MMOL/L (ref 3.5–5.1)
PROT SERPL-MCNC: 5.6 G/DL (ref 6–8.4)
RA PRESSURE ESTIMATED: 15 MMHG
RBC # BLD AUTO: 4.06 M/UL (ref 4–5.4)
RV TB RVSP: 18 MMHG
SODIUM SERPL-SCNC: 137 MMOL/L (ref 136–145)
TR MAX PG: 33 MMHG
TV REST PULMONARY ARTERY PRESSURE: 48 MMHG
WBC # BLD AUTO: 10.78 K/UL (ref 3.9–12.7)
Z-SCORE OF LEFT VENTRICULAR DIMENSION IN END DIASTOLE: -1.11
Z-SCORE OF LEFT VENTRICULAR DIMENSION IN END SYSTOLE: 0.15

## 2024-12-10 PROCEDURE — 93308 TTE F-UP OR LMTD: CPT

## 2024-12-10 PROCEDURE — 93308 TTE F-UP OR LMTD: CPT | Mod: 26,,, | Performed by: GENERAL PRACTICE

## 2024-12-10 PROCEDURE — 36415 COLL VENOUS BLD VENIPUNCTURE: CPT | Performed by: NURSE PRACTITIONER

## 2024-12-10 PROCEDURE — 25000003 PHARM REV CODE 250: Performed by: INTERNAL MEDICINE

## 2024-12-10 PROCEDURE — 97530 THERAPEUTIC ACTIVITIES: CPT

## 2024-12-10 PROCEDURE — 99232 SBSQ HOSP IP/OBS MODERATE 35: CPT | Mod: ,,, | Performed by: INTERNAL MEDICINE

## 2024-12-10 PROCEDURE — 85025 COMPLETE CBC W/AUTO DIFF WBC: CPT | Performed by: NURSE PRACTITIONER

## 2024-12-10 PROCEDURE — 94761 N-INVAS EAR/PLS OXIMETRY MLT: CPT

## 2024-12-10 PROCEDURE — 21000000 HC CCU ICU ROOM CHARGE

## 2024-12-10 PROCEDURE — 97162 PT EVAL MOD COMPLEX 30 MIN: CPT

## 2024-12-10 PROCEDURE — 25000003 PHARM REV CODE 250: Performed by: NURSE PRACTITIONER

## 2024-12-10 PROCEDURE — 63600175 PHARM REV CODE 636 W HCPCS: Mod: JZ,JG | Performed by: NURSE PRACTITIONER

## 2024-12-10 PROCEDURE — 51798 US URINE CAPACITY MEASURE: CPT

## 2024-12-10 PROCEDURE — 80053 COMPREHEN METABOLIC PANEL: CPT | Performed by: NURSE PRACTITIONER

## 2024-12-10 PROCEDURE — 51702 INSERT TEMP BLADDER CATH: CPT

## 2024-12-10 PROCEDURE — 99231 SBSQ HOSP IP/OBS SF/LOW 25: CPT | Mod: ,,, | Performed by: FAMILY MEDICINE

## 2024-12-10 PROCEDURE — 63600175 PHARM REV CODE 636 W HCPCS: Performed by: INTERNAL MEDICINE

## 2024-12-10 RX ORDER — HYDROCODONE BITARTRATE AND ACETAMINOPHEN 5; 325 MG/1; MG/1
1 TABLET ORAL EVERY 6 HOURS PRN
Status: DISCONTINUED | OUTPATIENT
Start: 2024-12-10 | End: 2024-12-13 | Stop reason: HOSPADM

## 2024-12-10 RX ORDER — CLOBETASOL PROPIONATE 0.5 MG/G
CREAM TOPICAL 2 TIMES DAILY
Status: DISCONTINUED | OUTPATIENT
Start: 2024-12-10 | End: 2024-12-13 | Stop reason: HOSPADM

## 2024-12-10 RX ORDER — DOXYCYCLINE 100 MG/1
100 CAPSULE ORAL EVERY 12 HOURS
Status: DISCONTINUED | OUTPATIENT
Start: 2024-12-10 | End: 2024-12-13 | Stop reason: HOSPADM

## 2024-12-10 RX ADMIN — THERA TABS 1 TABLET: TAB at 08:12

## 2024-12-10 RX ADMIN — DOXYCYCLINE HYCLATE 100 MG: 100 CAPSULE ORAL at 12:12

## 2024-12-10 RX ADMIN — METRONIDAZOLE 500 MG: 500 INJECTION, SOLUTION INTRAVENOUS at 05:12

## 2024-12-10 RX ADMIN — APIXABAN 2.5 MG: 2.5 TABLET, FILM COATED ORAL at 08:12

## 2024-12-10 RX ADMIN — CLOBETASOL PROPIONATE: 0.5 CREAM TOPICAL at 08:12

## 2024-12-10 RX ADMIN — INSULIN GLARGINE 15 UNITS: 100 INJECTION, SOLUTION SUBCUTANEOUS at 08:12

## 2024-12-10 RX ADMIN — ACETAMINOPHEN 325MG 650 MG: 325 TABLET ORAL at 08:12

## 2024-12-10 RX ADMIN — CHOLECALCIFEROL TAB 125 MCG (5000 UNIT) 5000 UNITS: 125 TAB at 08:12

## 2024-12-10 RX ADMIN — CLOBETASOL PROPIONATE: 0.5 CREAM TOPICAL at 01:12

## 2024-12-10 RX ADMIN — HYDROCODONE BITARTRATE AND ACETAMINOPHEN 1 TABLET: 5; 325 TABLET ORAL at 02:12

## 2024-12-10 RX ADMIN — DOXYCYCLINE HYCLATE 100 MG: 100 CAPSULE ORAL at 09:12

## 2024-12-10 RX ADMIN — MICONAZOLE NITRATE: 20 CREAM TOPICAL at 09:12

## 2024-12-10 RX ADMIN — CEFEPIME 1 G: 1 INJECTION, POWDER, FOR SOLUTION INTRAMUSCULAR; INTRAVENOUS at 09:12

## 2024-12-10 RX ADMIN — ACETAMINOPHEN 325MG 650 MG: 325 TABLET ORAL at 05:12

## 2024-12-10 NOTE — PLAN OF CARE
Goals to be met by: 25     Patient will increase functional independence with mobility by performin. Supine to sit with Supervision using bed rail  2. Sit to stand transfer with Minimal Assistance  3. Bed to chair transfer with Minimal Assistance using Rolling Walker  4. Gait  x 25 feet with Minimal Assistance using Rolling Walker.

## 2024-12-10 NOTE — PLAN OF CARE
Problem: Adult Inpatient Plan of Care  Goal: Plan of Care Review  Outcome: Progressing  Goal: Patient-Specific Goal (Individualized)  Outcome: Progressing  Goal: Absence of Hospital-Acquired Illness or Injury  Outcome: Progressing  Goal: Optimal Comfort and Wellbeing  Outcome: Progressing  Goal: Readiness for Transition of Care  Outcome: Progressing     Problem: Suicide Risk  Goal: Absence of Self-Harm  Outcome: Progressing     Problem: Wound  Goal: Optimal Coping  Outcome: Progressing  Goal: Optimal Functional Ability  Outcome: Progressing  Goal: Absence of Infection Signs and Symptoms  Outcome: Progressing  Goal: Improved Oral Intake  Outcome: Progressing  Goal: Optimal Pain Control and Function  Outcome: Progressing  Goal: Skin Health and Integrity  Outcome: Progressing  Goal: Optimal Wound Healing  Outcome: Progressing     Problem: Acute Kidney Injury/Impairment  Goal: Fluid and Electrolyte Balance  Outcome: Progressing  Goal: Improved Oral Intake  Outcome: Progressing  Goal: Effective Renal Function  Outcome: Progressing     Problem: Diabetes Comorbidity  Goal: Blood Glucose Level Within Targeted Range  Outcome: Progressing     Problem: Fall Injury Risk  Goal: Absence of Fall and Fall-Related Injury  Outcome: Progressing     Problem: Skin Injury Risk Increased  Goal: Skin Health and Integrity  Outcome: Progressing     Problem: Infection  Goal: Absence of Infection Signs and Symptoms  Outcome: Progressing

## 2024-12-10 NOTE — CONSULTS
Patient resting, sitter at bedside, little change from yesterday with skin, blisters fluid filled bilateral hands, legs, redness scabs bilateral arms and hands, redness in abd. Folds, groin, periarea. Heels elevated at this time.

## 2024-12-10 NOTE — PROGRESS NOTES
[facility] Medicine  Progress Note    Patient Name: Leslie Tolliver  MRN: [id number]  Patient Class: IP- Inpatient   Admission Date: 12/8/2024  Length of Stay: 1 days  Attending Physician: Sebastian Jimenez MD  Primary Care Provider: Chang Christianson MD        Subjective     Principal Problem:Cellulitis of leg        HPI:  89-year-old woman with a history of bullous pemphigoid for the last 4 years, CAD, diabetes, hypertension, hypothyroidism, Afib, stents to the emergency room with reports of increased pain to her wounds on both lower extremities. Denies fever, chills chest pain shortness of breath belly pain nausea or vomiting change in bowel or bladder habits.  She is not sure the last time she was on antibiotics.  She has seen a dermatologist in [place] for this.      In the ER, tachycardia, EKG with atrial fibrillation. Metoprolol 5 mg IV x2 doses given, leukocytosis 15, sodium 132, K 5.2,  KANNAN BUN 57, cr 1.7, baseline 1.2, glucose 174, , troponin I high sensitivity 10.9, Mag 2.3, blood culture sent lactic 2.15, chest x-ray with No acute intrathoracic process.  IV normal saline at 500 ml, cefepime, vancomycin, magnesium, Hydrocodone, and morphine given in ER  Admit to hospital medicine for AFib RVR, skin infection    Overview/Hospital Course:  89 year old female with bullous phemphigoid (dx 4 years ago), CAD with stenting, DM, HTN, hypothyroidism, afib (on Eliquis), bioprosthetic AVR. 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 Patient evaluated in the ER and noted to have leukocytosis, EKG with Afib, CMP with KANNAN with K 5.2 and was admitted to hospital medicine service for further evaluation. The patient was started on cefipime and vancomycin. ID and wound care consulted. Wound care evaluated patient and recommended nystatin powder to skin folds under bilateral breasts and bilateral groin and Triad to  be applied to LLE blisters to evaluate response to treatment.   High dose clobetasol cream ordered and added doxy . Cultures negative and DC vanco .    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  Blood pressure is lower side but stable and asymptomatic.  She does not have PEG tube currently      Review of Systems   Constitutional:  Positive for activity change. Negative for appetite change, fatigue and fever.   Respiratory:  Negative for shortness of breath.    Cardiovascular:  Negative for chest pain.   Gastrointestinal:  Negative for abdominal distention, abdominal pain, nausea and vomiting.   Skin:  Positive for rash and wound.   Neurological:  Negative for dizziness, syncope, weakness and light-headedness.     Objective:     Vital Signs (Most Recent):  Temp: 97.9 °F (36.6 °C) (12/09/24 1032)  Pulse: (!) 125 (12/09/24 1153)  Resp: 16 (12/09/24 1032)  BP: (!) 82/47 (12/09/24 1153)  SpO2: 98 % (12/09/24 1039) Vital Signs (24h Range):  Temp:  [97.8 °F (36.6 °C)-98.1 °F (36.7 °C)] 97.9 °F (36.6 °C)  Pulse:  [] 125  Resp:  [16-20] 16  SpO2:  [96 %-100 %] 98 %  BP: ()/(38-92) 82/47     Weight: 76.8 kg (169 lb 5 oz)  Body mass index is 26.52 kg/m².    Intake/Output Summary (Last 24 hours) at 12/9/2024 1227  Last data filed at 12/9/2024 1220  Gross per 24 hour   Intake 1360.05 ml   Output --   Net 1360.05 ml         Physical Exam  Vitals and nursing note reviewed.   Constitutional:       Appearance: Normal appearance.   HENT:      Head: Normocephalic and atraumatic.      Nose: Nose normal.      Mouth/Throat:      Mouth: Mucous membranes are dry.   Eyes:      Extraocular Movements: Extraocular movements intact.   Neck:      Vascular: No JVD.   Cardiovascular:      Rate and Rhythm: Rhythm irregular.      Pulses: Normal pulses.      Heart sounds: Murmur heard.   Pulmonary:      Effort: Pulmonary  effort is normal.      Breath sounds: Normal breath sounds.   Abdominal:      General: There is no distension.      Palpations: Abdomen is soft.      Tenderness: There is no abdominal tenderness. There is no guarding.   Musculoskeletal:         General: Normal range of motion.   Skin:     General: Skin is warm.      Capillary Refill: Capillary refill takes less than 2 seconds.      Comments: See wound care note for full assessment     Neurological:      General: No focal deficit present.      Mental Status: She is alert and oriented to person, place, and time. Mental status is at baseline.   Psychiatric:         Mood and Affect: Mood normal.             Significant Labs: All pertinent labs within the past 24 hours have been reviewed.  Recent Lab Results  (Last 5 results in the past 24 hours)        12/09/24  1105   12/09/24  0715   12/09/24  0709   12/09/24  0401   12/08/24  2223        Albumin       3.1         ALP       82         ALT       14         Anion Gap       8         AST       17         Baso #       0.06         Basophil %       0.4         BILIRUBIN TOTAL       1.2  Comment: For infants and newborns, interpretation of results should be based  on gestational age, weight and in agreement with clinical  observations.    Premature Infant recommended reference ranges:  Up to 24 hours.............<8.0 mg/dL  Up to 48 hours............<12.0 mg/dL  3-5 days..................<15.0 mg/dL  6-29 days.................<15.0 mg/dL           BUN       52         Calcium       8.1         Chloride       107         CO2       19         Creatinine       1.6         Differential Method       Automated         eGFR       30.4         Eos #       2.3         Eos %       16.6         Glucose       127         Gran # (ANC)       9.5         Gran %       68.5         Hematocrit       36.5         Hemoglobin       11.0         Immature Grans (Abs)       0.08  Comment: Mild elevation in immature granulocytes is non specific and    can be seen in a variety of conditions including stress response,   acute inflammation, trauma and pregnancy. Correlation with other   laboratory and clinical findings is essential.           Immature Granulocytes       0.6         Lymph #       0.8         Lymph %       5.6         MCH       24.4         MCHC       30.1         MCV       81         Mono #       1.2         Mono %       8.3         MPV       9.6         nRBC       0         Platelet Count       322         POC Lactate         2.15       POCT Glucose 137   116   112           Potassium       4.5         PROTEIN TOTAL       6.1         RBC       4.51         RDW       18.8         Sample         VENOUS       Sodium       134         WBC       13.91                                Significant Imaging: I have reviewed all pertinent imaging results/findings within the past 24 hours.    Assessment and Plan     * Cellulitis of leg  BLE possible part of  bullous pemphigoid  DC vancomycin, cefepime to continue   IV pain meds  Turn q2 hrs.         Anemia  Anemia is likely due to chronic condition  Most recent hemoglobin and hematocrit are listed below.  Recent Labs     12/09/24  0401 12/09/24  1252 12/10/24  0302   HGB 11.0* 10.5* 10.1*   HCT 36.5* 34.5* 32.5*     Plan  Stable     KANNAN (acute kidney injury)  . Baseline creatinine is  1.2 . Most recent creatinine and eGFR are listed below.  Recent Labs     12/08/24 2112 12/09/24  0401   CREATININE 1.7* 1.6*   EGFRNORACEVR 28.3* 30.4*        Plan  - KANNAN is  new admit  - Avoid nephrotoxins and renally dose meds for GFR listed above  - Monitor urine output, serial BMP, and adjust therapy as needed  resolved      Hyperkalemia  Hyperkalemia is likely due to KANNAN.The patients most recent potassium results are listed below.  Recent Labs     12/08/24  2112 12/09/24  0401   K 5.2* 4.5       Plan  - Monitor for arrhythmias with EKG and/or continuous telemetry.   - Treat the hyperkalemia with Potassium Binders.   -  Monitor potassium: Daily  - The patient's hyperkalemia is  new admit    Resolved now           Hyponatremia  The patient's most recent sodium results are listed below.  Recent Labs     12/08/24  2112 12/09/24  0401   * 134*       Plan  - Correct the sodium by 4-6mEq in 24 hours.   - Will treat the hyponatremia with IV fluids as follows: LR and fluid restriction  - Monitor sodium Daily.   - Patient hyponatremia is  new admit  resolved      Type 2 diabetes mellitus  Patient's FSGs are uncontrolled due to hyperglycemia on current medication regimen.  Last A1c reviewed-   Lab Results   Component Value Date    HGBA1C 6.6 (H) 09/20/2024     Most recent fingerstick glucose reviewed-   Recent Labs   Lab 12/09/24  0709 12/09/24  0715 12/09/24  1105   POCTGLUCOSE 112* 116* 137*     Current correctional scale  Low  Maintain anti-hyperglycemic dose as follows-   Antihyperglycemics (From admission, onward)      Start     Stop Route Frequency Ordered    12/09/24 2100  insulin glargine U-100 (Lantus) pen 15 Units         -- SubQ Nightly 12/08/24 2349    12/09/24 0054  insulin aspart U-100 pen 0-5 Units         -- SubQ Before meals & nightly PRN 12/08/24 2355          Hold Oral hypoglycemics while patient is in the hospital.    Atrial fibrillation  Patient has paroxysmal (<7 days) atrial fibrillation. Patient is currently in atrial fibrillation. [id number] Score: 4. The patients heart rate in the last 24 hours is as follows:  Pulse  Min: 66  Max: 134     Antiarrhythmics       Anticoagulants  apixaban tablet 2.5 mg, 2 times daily, Oral    Plan  - Replete lytes with a goal of K>4, Mg >2  - Patient is anticoagulated, see medications listed above.  - Patient's afib is currently controlled  -         Bullous pemphigoid  Chronic  On cellcept outpatient, followed by dermatology.  We will try to contact patient's dermatologist and possible started CellCept.  Currently there is no CellCept in her active home medication  High dose of  steroid  Doxycycline  Consult wound care        PEC status for suicidal ideation            Await psych review       VTE Risk Mitigation (From admission, onward)           Ordered     apixaban tablet 2.5 mg  2 times daily         12/08/24 2349     IP VTE HIGH RISK PATIENT  Once         12/08/24 2354     Place sequential compression device  Until discontinued         12/08/24 2354                    Discharge Planning   MORA:      Code Status: Full Code   Medical Readiness for Discharge Date:   Discharge Plan A: Home Health                        Sebastian Jimenez MD  Department of Hospital Medicine   [facility]

## 2024-12-10 NOTE — PROGRESS NOTES
Pharmacy Consult Discontinuation: Vancomycin    Leslie Tolliver [id number] is a 89 y.o. female was consulted for vancomycin pharmacotherapy management by pharmacy.    Pharmacy consult for vancomycin dosing is no longer required.  Vancomycin was discontinued 12/10/2024.    Thank you for allowing us to participate in this patient's care. Should you have any questions or concerns please feel free to contact the pharmacy department at [phone].    Blue Chavez, RicardoD

## 2024-12-10 NOTE — NURSING
Spoke with Chris at the [facility] 's office about the PEC. Stated she needs the PEC to be sent again and she will reach out to . PEC sent over at 1022. Safety sitter at bedside. Safety maintained.

## 2024-12-10 NOTE — PLAN OF CARE
Mariela-delayed entry    Was requested by nursing staff to attend to the patient because of repeated statements involving suicidal ideation.  Briefly, the patient is a 89-year-old  female with a history of bullous pemphigoid and resultant chronic pain as well as atrial fibrillation.      She was admitted for acute renal failure, hyperkalemia, and cellulitis of the leg.    Per nursing staff, the patient has a history of depression but is untreated for this condition.      This evening on night shift, the patient had made numerous comments to nursing staff about wanting to end her life.  She appears to be in severe pain at times but when offered treatment, she refused pain medication treatment.       She is frustrated with her Religious ([employer]) as her missy does not permit her to commit suicide.  Later, she does indicate she would be amenable to talking with a .  After my evaluation, she did request pain treatment, and Norco has been added to her regimen (after my review of her chart, see below).    Per my independent assessment:    General: Patient awake, alert, no distress  HEENT:  Hard of hearing  Neck: No retractions  Psychiatric:  Oriented to self only.  Answers inappropriately (unsure if this is related to her hearing impairment); for example, when I asked her a question she then indicates that she and her family have enough money.  Mood appears depressed, affect appears flat/blunted, but she did appear tearful at one point when she was lamenting over her loss of her youthful appearance    Patient denies auditory/visual hallucinations.  Denies homicidal ideation.  When asked about wanting to hurt herself, she states if I could find me a drug and give myself that is how she indicated she would hurt herself.    Assessment and plan:    Suicidal ideation    -Difficult to assess patient, as she is not fully oriented, and is hard of hearing  -Clearly is depressed, most likely from untreated  depression in the context of chronic/unmitigated pain  -Patient would benefit from a psychiatric evaluation for depression/suicidal ideation-> consult pending  -Patient does not appear to be a risk of harm to others  -Given repeated comments about hurting herself, and even elaborating on a specific plan how she would do it (by taking a pill), do believe a PEC is warranted at this time-> see paperwork  -Further, if patient decided she wanted to leave, she is not decisional, not only on psychiatric grounds, but also on the fact that she is not completely oriented  -Will treat chronic pain; prn Norco  -Per patient request, will arrange for  to see

## 2024-12-10 NOTE — PROGRESS NOTES
Infectious Disease  Consult Note//  progress note    Patient Name: Leslie Tolliver   MRN: [id number]   Admission Date: 12/8/2024   Hospital Length of Stay: 1 days  Attending Physician: Sebastian Jimenez MD   Primary Care Provider: Chang Christinason MD     Isolation Status: No active isolations       Impression     ID is following this patient for bilateral lower extremity cellulitis  A case of bullous pemphigoid  She is on vanco and cefepime     Today morning, when I examined her, she is doing good   No diarrhea   No fevers and white cell count came down to 10   Even her acute renal failure is stabilized   So far microbiology negative       Recommendations    We will continue with the broad-spectrum coverage for 1 more day   Tomorrow we should be able to simplify the antibiotics to oral regimen based on the microbiology     Portions of this note dictated using EMR integrated voice recognition software, and may be subject to voice recognition errors not corrected at proofreading. Please contact writer for clarification if needed.    Subjective:     Principal Problem: Cellulitis of leg       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    GERD (gastroesophageal reflux disease)     Hyperlipidemia     Hypertension     Hypothyroidism     Paroxysmal atrial fibrillation 3/16/2023    Type 2 diabetes mellitus         Past Surgical History:   Procedure Laterality Date    ADENOIDECTOMY      AORTIC VALVE REPLACEMENT      BREAST BIOPSY Right 20 yrs ago    benign    CARDIAC  SURGERY      CHOLECYSTECTOMY      COLONOSCOPY  2014    Dr Holly, 5 year recheck    EPIDURAL STEROID INJECTION N/A 3/25/2021    Procedure: Injection, Steroid, Epidural L3-4;  Surgeon: Sky Love MD;  Location: [facility] OR;  Service: Pain Management;  Laterality: N/A;  Injection, Steroid, Epidural L3-4    EPIDURAL STEROID INJECTION N/A 2021    Procedure: Injection, Steroid, Epidural L3-4;  Surgeon: Sky Love MD;  Location: [facility] OR;  Service: Pain Management;  Laterality: N/A;  Injection, Steroid, Epidural L3-4    ESOPHAGOGASTRODUODENOSCOPY N/A 2020    Procedure: EGD (ESOPHAGOGASTRODUODENOSCOPY);  Surgeon: Michael Nugent III, MD;  Location: [facility];  Service: Endoscopy;  Laterality: N/A;    HEMORRHOID SURGERY      HYSTERECTOMY      INTRAMEDULLARY RODDING OF FEMUR Left 2024    Procedure: INSERTION, INTRAMEDULLARY RACQUEL, FEMUR;  Surgeon: Bravo Guillermo MD;  Location: [facility] OR;  Service: Orthopedics;  Laterality: Left;    OOPHORECTOMY      TONSILLECTOMY         Review of patient's allergies indicates:   Allergen Reactions    Fenofibric acid (choline)      Other reaction(s): Vomiting    Iodine      Other reaction(s): lips swollen ivp dye    Rosuvastatin      Other reaction(s): muscle pain        Family History       Problem Relation (Age of Onset)    Cancer Father    Diabetes Mother, Brother    Early death Son, Son    Glaucoma Mother    Heart disease Mother    No Known Problems Daughter               Social History     Socioeconomic History    Marital status:    Tobacco Use    Smoking status: Former     Current packs/day: 0.00     Average packs/day: 0.3 packs/day for 15.0 years (3.8 ttl pk-yrs)     Types: Cigarettes     Start date: 3/30/1959     Quit date: 3/30/1974     Years since quittin.7    Smokeless tobacco: Never   Substance and Sexual Activity    Alcohol use: No    Drug use: No    Sexual activity: Not Currently     Social Drivers of Health     Financial Resource Strain: Low  Risk  (12/9/2024)    Overall Financial Resource Strain (CARDIA)     Difficulty of Paying Living Expenses: Not hard at all   Food Insecurity: No Food Insecurity (12/9/2024)    Hunger Vital Sign     Worried About Running Out of Food in the Last Year: Never true     Ran Out of Food in the Last Year: Never true   Transportation Needs: No Transportation Needs (12/9/2024)    TRANSPORTATION NEEDS     Transportation : No   Physical Activity: Unknown (10/14/2022)    Exercise Vital Sign     Days of Exercise per Week: Patient declined     Minutes of Exercise per Session: Patient declined   Stress: No Stress Concern Present (12/9/2024)    [employer] - Occupational Stress Questionnaire     Feeling of Stress : Not at all   Housing Stability: Low Risk  (12/9/2024)    Housing Stability Vital Sign     Unable to Pay for Housing in the Last Year: No     Homeless in the Last Year: No        Lines/Drains/Airways       Peripheral Intravenous Line  Duration                  Peripheral IV - Single Lumen 12/08/24 2050 20 G Distal;Posterior;Right Forearm 1 day                     Medication:  Medications Prior to Admission   Medication Sig    apixaban (ELIQUIS) 5 mg Tab Take 1 tablet (5 mg total) by mouth 2 (two) times daily.    carvediloL (COREG) 12.5 MG tablet Take 1 tablet (12.5 mg total) by mouth 2 (two) times daily.    cholecalciferol, vitamin D3, (VITAMIN D3) 125 mcg (5,000 unit) Tab Take 1 tablet (5,000 Units total) by mouth once daily.    empagliflozin (JARDIANCE) 10 mg tablet Take 1 tablet (10 mg total) by mouth once daily.    furosemide (LASIX) 20 MG tablet Take 1 tablet (20 mg total) by mouth once daily.    HYDROcodone-acetaminophen (NORCO) 5-325 mg per tablet Take 1 tablet by mouth every 12 (twelve) hours as needed for Pain.    insulin (LANTUS SOLOSTAR U-100 INSULIN) glargine 100 units/mL SubQ pen Inject 24 Units into the skin every evening.    ketoconazole (NIZORAL) 2 % cream Apply 1 application   "topically 2 (two) times daily.    metFORMIN (GLUCOPHAGE) 1000 MG tablet Take 1 tablet (1,000 mg total) by mouth 2 (two) times daily with meals.    multivitamin (THERAGRAN) per tablet Take 1 tablet by mouth once daily.          Antimicrobials:  Antibiotics (From admission, onward)      Start     Stop Route Frequency Ordered    12/10/24 1130  doxycycline capsule 100 mg         -- Oral Every 12 hours 12/10/24 1125    12/09/24 2200  ceFEPIme injection 1 g         -- IV Every 24 hours (non-standard times) 12/08/24 2357    12/09/24 0115  metronidazole IVPB 500 mg         -- IV Every 8 hours (non-standard times) 12/09/24 0009    12/09/24 0050  vancomycin - pharmacy to dose  (vancomycin IVPB (PEDS and ADULTS))        Placed in "And" Linked Group    -- IV pharmacy to manage frequency 12/08/24 2354             Antifungals (From admission, onward)      Start     Stop Route Frequency Ordered    12/09/24 0000  miconazole 2 % cream         -- Top 2 times daily 12/08/24 2349            Antivirals (From admission, onward)      None               Review of Systems   ROS      Objective:     Vital Signs (Most Recent):  Temp: 97.8 °F (36.6 °C) (12/10/24 0745)  Pulse: 109 (12/10/24 1000)  Resp: (!) 26 (12/10/24 1000)  BP: 110/60 (12/10/24 1000)  SpO2: 99 % (12/10/24 1000)  Vital Signs (24h Range):  Temp:  [97.7 °F (36.5 °C)-98.5 °F (36.9 °C)] 97.8 °F (36.6 °C)  Pulse:  [] 109  Resp:  [15-29] 26  SpO2:  [92 %-100 %] 99 %  BP: ()/(42-70) 110/60      Weight:   Wt Readings from Last 1 Encounters:   12/09/24 76.8 kg (169 lb 5 oz)      Body mass index is Body mass index is 26.52 kg/m².     Estimated Creatinine Clearance: Estimated Creatinine Clearance: 28.5 mL/min (based on SCr of 1.4 mg/dL).     Physical Exam  Physical Exam    She is afebrile   She is on room air   Not in distress   No diarrhea   Abdomen is soft   Lower extremity erythema and swelling is better     Significant Labs: Blood Culture:   Recent Labs   Lab " 10/07/24  1440 10/07/24  1449 12/08/24 2210 12/08/24 2217   LABBLOO No growth after 5 days. No growth after 5 days. No Growth to date  No Growth to date No Growth to date  No Growth to date         Microbiology Results (last 7 days)       Procedure Component Value Units Date/Time    Urine culture [[id number]] Collected: 12/09/24 1842    Order Status: Completed Specimen: Urine Updated: 12/10/24 0626     Urine Culture, Routine No growth to date    Narrative:      Specimen Source->Urine    Blood culture x two cultures. Draw prior to antibiotics. [[id number]] Collected: 12/08/24 2210    Order Status: Completed Specimen: Blood from Peripheral, Forearm, Left Updated: 12/10/24 0232     Blood Culture, Routine No Growth to date      No Growth to date    Narrative:      Aerobic and anaerobic    Blood culture x two cultures. Draw prior to antibiotics. [[id number]] Collected: 12/08/24 2217    Order Status: Completed Specimen: Blood from Peripheral, Antecubital, Right Updated: 12/10/24 0232     Blood Culture, Routine No Growth to date      No Growth to date    Narrative:      Aerobic and anaerobic             Significant Imaging: I have reviewed all pertinent imaging results/findings within the past 24 hours.      ZIYAD CALVILLO MD  Infectious Disease

## 2024-12-10 NOTE — CARE UPDATE
12/10/24 0720   Patient Assessment/Suction   Level of Consciousness (AVPU) alert   Respiratory Effort Normal   Rhythm/Pattern, Respiratory unlabored   PRE-TX-O2   Device (Oxygen Therapy) room air   SpO2 96 %   Pulse Oximetry Type Intermittent   $ Pulse Oximetry - Multiple Charge Pulse Oximetry - Multiple   Pulse 106   Resp 18   Positioning HOB elevated 30 degrees

## 2024-12-10 NOTE — PROGRESS NOTES
Pharmacokinetic Assessment Follow Up: IV Vancomycin    Vancomycin serum concentration assessment(s):    The random level was drawn correctly and can be used to guide therapy at this time. The measurement is below the desired definitive target range of 10 to 15 mcg/mL.    Vancomycin Regimen Plan:  Continue regimen to Vancomycin 1500 mg IV once with next serum random concentration measured at 2200 prior to next dose on 12/10/24      Drug levels (last 3 results):  Recent Labs   Lab Result Units 12/09/24  2050   Vancomycin, Random ug/mL 8.3       Pharmacy will continue to follow and monitor vancomycin.    Please contact pharmacy at extension [phone] for questions regarding this assessment.    Thank you for the consult,   Alisia Denny       Patient brief summary:  Leslie Tolliver is a 89 y.o. female initiated on antimicrobial therapy with IV Vancomycin for treatment of skin & soft tissue infection    The patient's current regimen is pulse dosing    Drug Allergies:   Review of patient's allergies indicates:   Allergen Reactions    Fenofibric acid (choline)      Other reaction(s): Vomiting    Iodine      Other reaction(s): lips swollen ivp dye    Rosuvastatin      Other reaction(s): muscle pain       Actual Body Weight:   76.8 K    Renal Function:   Estimated Creatinine Clearance: 25 mL/min (A) (based on SCr of 1.6 mg/dL (H)).,     Dialysis Method (if applicable):  N/A    CBC (last 72 hours):  Recent Labs   Lab Result Units 12/08/24 2112 12/09/24  0401 12/09/24  1252   WBC K/uL 15.00* 13.91* 10.62   Hemoglobin g/dL 11.8* 11.0* 10.5*   Hematocrit % 37.9 36.5* 34.5*   Platelets K/uL 410 322 272   Gran % % 60.3 68.5 68.8   Lymph % % 6.7* 5.6* 7.5*   Mono % % 7.5 8.3 9.4   Eosinophil % % 24.5* 16.6* 13.5*   Basophil % % 0.4 0.4 0.5   Differential Method  Automated Automated Automated       Metabolic Panel (last 72 hours):  Recent Labs   Lab Result Units 12/08/24 2112 12/09/24  0401 12/09/24  1842   Sodium mmol/L 132* 134*   --    Potassium mmol/L 5.2* 4.5  --    Chloride mmol/L 103 107  --    CO2 mmol/L 20* 19*  --    Glucose mg/dL 174* 127*  --    Glucose, UA   --   --  3+*   BUN mg/dL 57* 52*  --    Creatinine mg/dL 1.7* 1.6*  --    Albumin g/dL 3.3* 3.1*  --    Total Bilirubin mg/dL 1.0 1.2*  --    Alkaline Phosphatase U/L 93 82  --    AST U/L 27 17  --    ALT U/L 17 14  --    Magnesium mg/dL 2.3  --   --        Vancomycin Administrations:  vancomycin given in the last 96 hours                     vancomycin 1,500 mg in 0.9% NaCl 250 mL IVPB (admixture device) (mg) 1,500 mg New Bag 12/08/24 2225                    Microbiologic Results:  Microbiology Results (last 7 days)       Procedure Component Value Units Date/Time    Urine culture [[id number]] Collected: 12/09/24 1842    Order Status: No result Specimen: Urine Updated: 12/09/24 1922    Blood culture x two cultures. Draw prior to antibiotics. [[id number]] Collected: 12/08/24 2210    Order Status: Completed Specimen: Blood from Peripheral, Forearm, Left Updated: 12/09/24 0517     Blood Culture, Routine No Growth to date    Narrative:      Aerobic and anaerobic    Blood culture x two cultures. Draw prior to antibiotics. [[id number]] Collected: 12/08/24 2217    Order Status: Completed Specimen: Blood from Peripheral, Antecubital, Right Updated: 12/09/24 0517     Blood Culture, Routine No Growth to date    Narrative:      Aerobic and anaerobic

## 2024-12-10 NOTE — PLAN OF CARE
NEIDA met with Pt at bedside to see what services Pt feels she would benefit from at discharge. Pt stated she would like to continue receiving services from [facility] after discharge.  NEIDA called [facility] to confirm Pt is current with home health services. NEIDA spoke to Meli who confirmed that she is.

## 2024-12-10 NOTE — PT/OT/SLP EVAL
"Physical Therapy Evaluation    Patient Name:  Leslie Tolliver   MRN:  [id number]    Recommendations:     Discharge Recommendations: Moderate Intensity Therapy   Discharge Equipment Recommendations: to be determined by next level of care   Barriers to discharge:  medical needs    Assessment:     Leslie Tolliver is a 89 y.o. female admitted with a medical diagnosis of Cellulitis of leg.  She presents with the following impairments/functional limitations: weakness, impaired endurance, impaired self care skills, impaired functional mobility, gait instability, impaired balance, decreased lower extremity function, decreased upper extremity function, decreased safety awareness, pain, impaired skin.  Pt agreeable to PT session to attempt mobilizing out of bed, but once standing with PT did not feel she was able to walk and became fatigued within 30 seconds/needed to sit. Pt miserable and limited in her activity by the extent of her skin condition, but was cooperative with PT and very appreciative of the assistance to move around a little by the end of the session.     Rehab Prognosis: Fair; patient would benefit from acute skilled PT services to address these deficits and reach maximum level of function.    Recent Surgery: * No surgery found *      Plan:     During this hospitalization, patient to be seen 6 x/week to address the identified rehab impairments via gait training, therapeutic activities, therapeutic exercises, neuromuscular re-education and progress toward the following goals:    Plan of Care Expires:  01/07/25    Subjective     Chief Complaint: pain and itching B LEs from skin condition and itching on her back from being in bed.  Patient/Family Comments/goals: Pt stated "I should not still be on this earth at 89." When PT said to her that it's not up to her it's up to God how long she lives she stated, but I'm upset with him."   Pain/Comfort:  Pain Rating 1:  (not rated)  Location - Side 1: " Bilateral  Location - Orientation 1: generalized  Location 1: leg  Pain Addressed 1: Reposition, Distraction, Cessation of Activity, Nurse notified    Patients cultural, spiritual, [employer]ish conflicts given the current situation:      Living Environment:  Pt's daughter lives with pt in a H.  Prior to admission, patients level of function was unknown as pt is not a good historian.  Pt reported she hasn't been able to walk lately.  Equipment used at home: wheelchair, shower chair, walker, rolling.  DME owned (not currently used): none.  Upon discharge, patient will have assistance from facility staff and her daughter.    Objective:     Communicated with DON Pisano prior to and during session.  Patient found HOB elevated with bed alarm, PureWick, peripheral IV, telemetry  upon PT entry to room.    General Precautions: Standard, diabetic, fall  Orthopedic Precautions:N/A   Braces: N/A  Respiratory Status: Room air    Exams:  Cognitive Exam:  Patient is oriented to Person and Place  RLE ROM: WFL  RLE Strength: NT due to open/draining blisters on skin  LLE ROM: WFL  LLE Strength: NT due to open/draining blisters on skin    Functional Mobility:  Bed Mobility:     Scooting: moderate assistance, maximal assistance, and in sitting  Supine to Sit: moderate assistance, maximal assistance, and of 1-2 persons  Sit to Supine: moderate assistance, maximal assistance, and of 1-2 persons  Transfers:     Sit to Stand:  moderate assistance and of 1 persons with hand-held assist and with pt standing approximately 30 seconds then needed to sit  Gait: pt unable to take steps       AM-PAC 6 CLICK MOBILITY  Total Score:10       Treatment & Education:  Pt was educated on the following: call light use, importance of OOB activity and functional mobility to negate the negative effects of prolonged bed rest during this hospitalization, safe transfers/ambulation and discharge planning recommendations/options.      Patient left HOB elevated  with all lines intact, call button in reach, bed alarm on, RN notified, and sitter present.    GOALS:   Multidisciplinary Problems       Physical Therapy Goals          Problem: Physical Therapy    Goal Priority Disciplines Outcome Interventions   Physical Therapy Goal     PT, PT/OT     Description: Goals to be met by: 25     Patient will increase functional independence with mobility by performin. Supine to sit with Supervision using bed rail  2. Sit to stand transfer with Minimal Assistance  3. Bed to chair transfer with Minimal Assistance using Rolling Walker  4. Gait  x 25 feet with Minimal Assistance using Rolling Walker.                             History:     Past Medical History:   Diagnosis Date    Acute decompensated heart failure 2023    Anemia 2024    Anemia due to stage 3 chronic kidney disease 2019    Arthritis     Bullous pemphigoid 2022    Chronic bilateral low back pain without sciatica 2019    CKD (chronic kidney disease) stage 3, GFR 30-59 ml/min 2019    Colon polyps     Coronary artery disease     Diabetes mellitus type II     Diabetes with neurologic complications     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    GERD (gastroesophageal reflux disease)     Hyperlipidemia     Hypertension     Hypothyroidism     Paroxysmal atrial fibrillation 3/16/2023    Type 2 diabetes mellitus        Past Surgical History:   Procedure Laterality Date    ADENOIDECTOMY      AORTIC VALVE REPLACEMENT      BREAST BIOPSY Right 20 yrs ago    benign    CARDIAC SURGERY      CHOLECYSTECTOMY      COLONOSCOPY  2014    Dr Holly, 5 year recheck    EPIDURAL STEROID INJECTION N/A 3/25/2021    Procedure: Injection, Steroid, Epidural L3-4;  Surgeon: Sky Love MD;  Location: [facility] OR;  Service: Pain Management;  Laterality: N/A;  Injection, Steroid, Epidural L3-4    EPIDURAL STEROID INJECTION N/A 2021    Procedure: Injection, Steroid, Epidural L3-4;  Surgeon: Sky Love  MD;  Location: [facility] OR;  Service: Pain Management;  Laterality: N/A;  Injection, Steroid, Epidural L3-4    ESOPHAGOGASTRODUODENOSCOPY N/A 6/4/2020    Procedure: EGD (ESOPHAGOGASTRODUODENOSCOPY);  Surgeon: Michael Nugent III, MD;  Location: [facility];  Service: Endoscopy;  Laterality: N/A;    HEMORRHOID SURGERY      HYSTERECTOMY      INTRAMEDULLARY RODDING OF FEMUR Left 5/23/2024    Procedure: INSERTION, INTRAMEDULLARY RACQUEL, FEMUR;  Surgeon: Bravo Guillermo MD;  Location: [facility];  Service: Orthopedics;  Laterality: Left;    OOPHORECTOMY      TONSILLECTOMY         Time Tracking:     PT Received On: 12/10/24  PT Start Time: 1149     PT Stop Time: 1214  PT Total Time (min): 25 min     Billable Minutes: Evaluation 10 and Therapeutic Activity 15      12/10/2024

## 2024-12-10 NOTE — ASSESSMENT & PLAN NOTE
Anemia is likely due to chronic condition  Most recent hemoglobin and hematocrit are listed below.  Recent Labs     12/09/24  0401 12/09/24  1252 12/10/24  0302   HGB 11.0* 10.5* 10.1*   HCT 36.5* 34.5* 32.5*     Plan  Stable

## 2024-12-11 PROBLEM — Z71.89 ACP (ADVANCE CARE PLANNING): Status: ACTIVE | Noted: 2024-12-11

## 2024-12-11 PROBLEM — Z71.89 GOALS OF CARE, COUNSELING/DISCUSSION: Status: ACTIVE | Noted: 2024-12-11

## 2024-12-11 LAB
ALBUMIN SERPL BCP-MCNC: 2.7 G/DL (ref 3.5–5.2)
ALP SERPL-CCNC: 72 U/L (ref 55–135)
ALT SERPL W/O P-5'-P-CCNC: 12 U/L (ref 10–44)
ANION GAP SERPL CALC-SCNC: 7 MMOL/L (ref 8–16)
AST SERPL-CCNC: 18 U/L (ref 10–40)
BASOPHILS # BLD AUTO: 0.03 K/UL (ref 0–0.2)
BASOPHILS NFR BLD: 0.3 % (ref 0–1.9)
BILIRUB SERPL-MCNC: 0.9 MG/DL (ref 0.1–1)
BUN SERPL-MCNC: 34 MG/DL (ref 8–23)
CALCIUM SERPL-MCNC: 8 MG/DL (ref 8.7–10.5)
CHLORIDE SERPL-SCNC: 111 MMOL/L (ref 95–110)
CO2 SERPL-SCNC: 20 MMOL/L (ref 23–29)
CREAT SERPL-MCNC: 1.2 MG/DL (ref 0.5–1.4)
DIFFERENTIAL METHOD BLD: ABNORMAL
EOSINOPHIL # BLD AUTO: 2.5 K/UL (ref 0–0.5)
EOSINOPHIL NFR BLD: 24.9 % (ref 0–8)
ERYTHROCYTE [DISTWIDTH] IN BLOOD BY AUTOMATED COUNT: 19 % (ref 11.5–14.5)
EST. GFR  (NO RACE VARIABLE): 43 ML/MIN/1.73 M^2
GLUCOSE SERPL-MCNC: 92 MG/DL (ref 70–110)
HCT VFR BLD AUTO: 32.6 % (ref 37–48.5)
HGB BLD-MCNC: 10.1 G/DL (ref 12–16)
IMM GRANULOCYTES # BLD AUTO: 0.05 K/UL (ref 0–0.04)
IMM GRANULOCYTES NFR BLD AUTO: 0.5 % (ref 0–0.5)
LYMPHOCYTES # BLD AUTO: 0.7 K/UL (ref 1–4.8)
LYMPHOCYTES NFR BLD: 7.2 % (ref 18–48)
MCH RBC QN AUTO: 24.7 PG (ref 27–31)
MCHC RBC AUTO-ENTMCNC: 31 G/DL (ref 32–36)
MCV RBC AUTO: 80 FL (ref 82–98)
MONOCYTES # BLD AUTO: 0.8 K/UL (ref 0.3–1)
MONOCYTES NFR BLD: 8.1 % (ref 4–15)
NEUTROPHILS # BLD AUTO: 5.9 K/UL (ref 1.8–7.7)
NEUTROPHILS NFR BLD: 59 % (ref 38–73)
NRBC BLD-RTO: 0 /100 WBC
PLATELET # BLD AUTO: 257 K/UL (ref 150–450)
PMV BLD AUTO: 9.6 FL (ref 9.2–12.9)
POCT GLUCOSE: 122 MG/DL (ref 70–110)
POCT GLUCOSE: 142 MG/DL (ref 70–110)
POCT GLUCOSE: 194 MG/DL (ref 70–110)
POCT GLUCOSE: 85 MG/DL (ref 70–110)
POTASSIUM SERPL-SCNC: 4.1 MMOL/L (ref 3.5–5.1)
PROT SERPL-MCNC: 5.7 G/DL (ref 6–8.4)
RBC # BLD AUTO: 4.09 M/UL (ref 4–5.4)
SODIUM SERPL-SCNC: 138 MMOL/L (ref 136–145)
WBC # BLD AUTO: 10.04 K/UL (ref 3.9–12.7)

## 2024-12-11 PROCEDURE — 25000003 PHARM REV CODE 250: Performed by: INTERNAL MEDICINE

## 2024-12-11 PROCEDURE — 99497 ADVNCD CARE PLAN 30 MIN: CPT | Mod: 25,,, | Performed by: NURSE PRACTITIONER

## 2024-12-11 PROCEDURE — 99223 1ST HOSP IP/OBS HIGH 75: CPT | Mod: ,,, | Performed by: NURSE PRACTITIONER

## 2024-12-11 PROCEDURE — 85025 COMPLETE CBC W/AUTO DIFF WBC: CPT | Performed by: NURSE PRACTITIONER

## 2024-12-11 PROCEDURE — 25000003 PHARM REV CODE 250: Performed by: NURSE PRACTITIONER

## 2024-12-11 PROCEDURE — 80053 COMPREHEN METABOLIC PANEL: CPT | Performed by: NURSE PRACTITIONER

## 2024-12-11 PROCEDURE — 36415 COLL VENOUS BLD VENIPUNCTURE: CPT | Performed by: NURSE PRACTITIONER

## 2024-12-11 PROCEDURE — 94799 UNLISTED PULMONARY SVC/PX: CPT

## 2024-12-11 PROCEDURE — 99900035 HC TECH TIME PER 15 MIN (STAT)

## 2024-12-11 PROCEDURE — 21000000 HC CCU ICU ROOM CHARGE

## 2024-12-11 PROCEDURE — 63600175 PHARM REV CODE 636 W HCPCS: Performed by: INTERNAL MEDICINE

## 2024-12-11 PROCEDURE — 94761 N-INVAS EAR/PLS OXIMETRY MLT: CPT

## 2024-12-11 RX ORDER — MYCOPHENOLATE MOFETIL 250 MG/1
1000 CAPSULE ORAL 2 TIMES DAILY
Status: DISCONTINUED | OUTPATIENT
Start: 2024-12-11 | End: 2024-12-13 | Stop reason: HOSPADM

## 2024-12-11 RX ORDER — GABAPENTIN 100 MG/1
100 CAPSULE ORAL NIGHTLY
Status: DISCONTINUED | OUTPATIENT
Start: 2024-12-11 | End: 2024-12-13 | Stop reason: HOSPADM

## 2024-12-11 RX ORDER — MUPIROCIN 20 MG/G
OINTMENT TOPICAL 2 TIMES DAILY
Status: DISCONTINUED | OUTPATIENT
Start: 2024-12-11 | End: 2024-12-13 | Stop reason: HOSPADM

## 2024-12-11 RX ORDER — DULOXETIN HYDROCHLORIDE 20 MG/1
20 CAPSULE, DELAYED RELEASE ORAL DAILY
Status: DISCONTINUED | OUTPATIENT
Start: 2024-12-11 | End: 2024-12-13 | Stop reason: HOSPADM

## 2024-12-11 RX ORDER — CEFEPIME HYDROCHLORIDE 1 G/1
1 INJECTION, POWDER, FOR SOLUTION INTRAMUSCULAR; INTRAVENOUS
Status: DISCONTINUED | OUTPATIENT
Start: 2024-12-11 | End: 2024-12-12

## 2024-12-11 RX ORDER — QUETIAPINE FUMARATE 25 MG/1
25 TABLET, FILM COATED ORAL NIGHTLY PRN
Status: DISCONTINUED | OUTPATIENT
Start: 2024-12-11 | End: 2024-12-13 | Stop reason: HOSPADM

## 2024-12-11 RX ADMIN — DOXYCYCLINE HYCLATE 100 MG: 100 CAPSULE ORAL at 09:12

## 2024-12-11 RX ADMIN — CARVEDILOL 6.25 MG: 6.25 TABLET, FILM COATED ORAL at 09:12

## 2024-12-11 RX ADMIN — THERA TABS 1 TABLET: TAB at 08:12

## 2024-12-11 RX ADMIN — CLOBETASOL PROPIONATE: 0.5 CREAM TOPICAL at 08:12

## 2024-12-11 RX ADMIN — CEFEPIME 1 G: 1 INJECTION, POWDER, FOR SOLUTION INTRAMUSCULAR; INTRAVENOUS at 03:12

## 2024-12-11 RX ADMIN — MYCOPHENOLATE MOFETIL 1000 MG: 250 CAPSULE ORAL at 03:12

## 2024-12-11 RX ADMIN — CHOLECALCIFEROL TAB 125 MCG (5000 UNIT) 5000 UNITS: 125 TAB at 08:12

## 2024-12-11 RX ADMIN — CLOBETASOL PROPIONATE: 0.5 CREAM TOPICAL at 09:12

## 2024-12-11 RX ADMIN — DOXYCYCLINE HYCLATE 100 MG: 100 CAPSULE ORAL at 08:12

## 2024-12-11 RX ADMIN — APIXABAN 2.5 MG: 2.5 TABLET, FILM COATED ORAL at 08:12

## 2024-12-11 RX ADMIN — GABAPENTIN 100 MG: 100 CAPSULE ORAL at 09:12

## 2024-12-11 RX ADMIN — MUPIROCIN 1 G: 20 OINTMENT TOPICAL at 08:12

## 2024-12-11 RX ADMIN — APIXABAN 5 MG: 5 TABLET, FILM COATED ORAL at 09:12

## 2024-12-11 RX ADMIN — ACETAMINOPHEN 325MG 650 MG: 325 TABLET ORAL at 11:12

## 2024-12-11 RX ADMIN — INSULIN GLARGINE 15 UNITS: 100 INJECTION, SOLUTION SUBCUTANEOUS at 09:12

## 2024-12-11 RX ADMIN — ACETAMINOPHEN 325MG 650 MG: 325 TABLET ORAL at 04:12

## 2024-12-11 RX ADMIN — MUPIROCIN 1 G: 20 OINTMENT TOPICAL at 09:12

## 2024-12-11 RX ADMIN — DULOXETINE HYDROCHLORIDE 20 MG: 20 CAPSULE, DELAYED RELEASE ORAL at 08:12

## 2024-12-11 NOTE — PLAN OF CARE
4:09  SW met with Pt at bedside to discuss the possibility of placement after discharge. SW informed Pt that her recommendations are for SNF. Pt stated she is agreeable if SNF is an option. Pt stated her only preferences would be is to stay in [place].  ANNI Easley reached out to COLLETTE Lopez and NEIAD RIVAS via secure chat to discuss possibly lifting the CEC, a suggestion by Dr. Jimenez who feels llike Pt was depressed because of the disease process..  SW will wait until provider decides if CEC will remain in place before sending referrals.

## 2024-12-11 NOTE — CONSULTS
[facility] hospital  Palliative Medicine  Consult Note    Patient Name: Leslie Tolliver  MRN: [id number]  Admission Date: 12/8/2024  Hospital Length of Stay: 2 days  Code Status: DNR   Attending Provider: Sebastian Jimenez MD  Consulting Provider: Remedios Easley NP  Primary Care Physician: Chang Christianson MD  Principal Problem:Cellulitis of leg    Patient information was obtained from patient, relative(s), past medical records, ER records, and primary team.      Inpatient consult to Palliative Care  Consult performed by: Remedios Easley NP  Consult ordered by: Sebastian Jimenez MD        Assessment/Plan:     Palliative Care  Goals of care, counseling/discussion  I reviewed the patient's chart and discussed the case with the patient's team.      I examined Leslie Tolliver at bedside.  The patient maintains capacity for complex medical decision-making.  I spoke with pt and her daughter at bedside.    I introduced myself and my role as palliative care NP. They were agreeable to speaking.    We discussed the patient's medical illness, prognosis, and values in detail.  Below is a brief summary of our discussion.     They are up-to-date on patient's current medical conditions.  We talked about her journey over the past 4 years dealing with her bullous pemphigoid.  We also talked about her underlying chronic conditions.    We discussed prognosis.  I let her know that if she continues on her current trajectory I would not be surprised if she experienced another complication in the coming weeks or months that resulted in her death.  She was not surprised by this news.    We discussed her values.  She is feeling much better over the past couple days since her lesions and LE ext pain are improving. She is now able to find hope that she can cont to improve.  She has no fears or worries.  She does not fear dying.  She finds strength in her missy.  She expressed to the medical staff that she wanted to die and no  "longer wanted to live in her current condition.  This has led to her undergoing PEC and evaluation by inpatient psych.  We discussed this further.  She denies any suicidal ideations.  She has no plan for suicide.  She admits that she was severely depressed and was speaking out of context.  She stated "I know I said it but I would never do anything to hurt myself".  She now regrets making that statement.  Since she is feeling better she now has better insight and is willing to continue with treatment.  She is hopeful that her lesions will continue to improve.  She was told by Dermatology that her lesions could persist for up to 5 years.  She is now on year 4 and feels that she can hang in for another year to see if she will be able to recover from this.  She would like to continue with therapy in hopes to get stronger and be able to regain some independence.  If she can achieve this she feels that her quality of life will be improved.     We discussed two paths moving forward.  One path is to continue with treatment. I recommended that she would be best served in a facility for continued wound care, physical and occupational therapy. She is willing to go through this as a time limited trial. The other path is to pursue home hospice services.  We discussed continuing treatment at a facility and if she is not progressing or feels that she is no longer willing to go through anymore treatment, hospitalizations or procedures then she will enlist in hospice services.    I did this use this opportunity to discuss hospice and hospice philosophy.  Her goals today are not aligned with hospice but she understands that she will possibly need the service in the near future.  She is agreeable to hospice if she is not able to regain adequate strength or her diseases progress.    Her daughter at bedside is supportive of her decision.    I feel we had an open and honest conversation.  Patient and daughter are asking good questions.  " For now patient is hopeful that she can get to a facility for continued treatment and therapy.  She understands that if this is not successful then my recommendation is to pursue home hospice services.    I do not feel that patient is suicidal.  Her statements were coming from her depressed mood.  She is in much better spirits today and she has good insight.    I updated patient's medical team and case management.    I appreciate being involved in patient's care.  We will remain available.  Please reach out if we can be of any assistance.    ACP (advance care planning)  Advance Care Planning    Date: 12/11/2024    Code Status  In light of the patients advanced and life limiting illness,I engaged the the patient in a voluntary conversation about the patient's preferences for care  at the very end of life. The patient wishes to have a natural, peaceful death.  Along those lines, the patient does not wish to have CPR or other invasive treatments performed when her heart and/or breathing stops. I communicated to the patient that a DNR order would be placed in her medical record to reflect this preference.    [facility]  I engaged the patient in a voluntary conversation about advance care planning and we specifically addressed what the goals of care would be moving forward, in light of the patient's change in clinical status, specifically severe bullous pemphigiod, heart disease, kidney disease and debilitated state.  We did specifically address the patient's likely prognosis, which is poor.  We explored the patient's values and preferences for future care.  The patient endorses that what is most important right now is to focus on spending time at home, remaining as independent as possible, symptom/pain control, and quality of life, even if it means sacrificing a little time    Accordingly, we have decided that the best plan to meet the patient's goals includes continuing with treatment    Hospice  I did explain the role for  hospice care at this stage of the patient's illness, including its ability to help the patient live with the best quality of life possible.  We will not be making a hospice referral.              Thank you for your consult.       Subjective:     HPI:   From admission HPI:  89-year-old woman with a history of bullous pemphigoid for the last 4 years, CAD, diabetes, hypertension, hypothyroidism, Afib, stents to the emergency room with reports of increased pain to her wounds on both lower extremities. Denies fever, chills chest pain shortness of breath belly pain nausea or vomiting change in bowel or bladder habits.  She is not sure the last time she was on antibiotics.  She has seen a dermatologist in [place] for this.     In the ER, tachycardia, EKG with atrial fibrillation. Metoprolol 5 mg IV x2 doses given, leukocytosis 15, sodium 132, K 5.2,  KANNAN BUN 57, cr 1.7, baseline 1.2, glucose 174, , troponin I high sensitivity 10.9, Mag 2.3, blood culture sent lactic 2.15, chest x-ray with No acute intrathoracic process.  IV normal saline at 500 ml, cefepime, vancomycin, magnesium, Hydrocodone, and morphine given in ER  Admit to hospital medicine for AFib RVR, skin infection    Palliative medicine consult:  Pt currently admitted and being tx for exac of bullous phemphigoid with assoc cellulitis, KANNAN, afib, hypotension and debilitated state. Pt expressed that she is tired and no longer wants to live in her current condition. We have been consulted for goals of care discussion.        Hospital Course:  No notes on file    Interval History: Pt seen today for palliative medicine consult to discuss goals of care.     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    GERD (gastroesophageal reflux disease)     Hyperlipidemia     Hypertension     Hypothyroidism     Paroxysmal atrial fibrillation 3/16/2023    Type 2 diabetes mellitus        Past Surgical History:   Procedure Laterality Date    ADENOIDECTOMY      AORTIC VALVE REPLACEMENT      BREAST BIOPSY Right 20 yrs ago    benign    CARDIAC SURGERY      CHOLECYSTECTOMY      COLONOSCOPY  5-    Dr Holly, 5 year recheck    EPIDURAL STEROID INJECTION N/A 3/25/2021    Procedure: Injection, Steroid, Epidural L3-4;  Surgeon: Sky Love MD;  Location: [facility] OR;  Service: Pain Management;  Laterality: N/A;  Injection, Steroid, Epidural L3-4    EPIDURAL STEROID INJECTION N/A 5/20/2021    Procedure: Injection, Steroid, Epidural L3-4;  Surgeon: Sky Love MD;  Location: [facility] OR;  Service: Pain Management;  Laterality: N/A;  Injection, Steroid, Epidural L3-4    ESOPHAGOGASTRODUODENOSCOPY N/A 6/4/2020    Procedure: EGD (ESOPHAGOGASTRODUODENOSCOPY);  Surgeon: Michael Nugent III, MD;  Location: [facility];  Service: Endoscopy;  Laterality: N/A;    HEMORRHOID SURGERY      HYSTERECTOMY      INTRAMEDULLARY RODDING OF FEMUR Left 5/23/2024    Procedure: INSERTION, INTRAMEDULLARY RACQUEL, FEMUR;  Surgeon: Bravo Guillermo MD;  Location: [facility] OR;  Service: Orthopedics;  Laterality: Left;    OOPHORECTOMY      TONSILLECTOMY         Review of patient's allergies indicates:   Allergen Reactions    Fenofibric acid (choline)      Other reaction(s): Vomiting    Iodine      Other reaction(s): lips swollen ivp dye    Rosuvastatin      Other reaction(s): muscle pain       Medications:  Continuous Infusions:  Scheduled Meds:   apixaban  5 mg Oral BID    carvediloL  6.25 mg Oral BID    ceFEPime IV (PEDS and ADULTS)  1 g Intravenous Q12H     cholecalciferol (vitamin D3)  5,000 Units Oral Daily    clobetasoL   Topical (Top) BID    doxycycline  100 mg Oral Q12H    DULoxetine  20 mg Oral Daily    gabapentin  100 mg Oral QHS    insulin glargine U-100 (Lantus)  15 Units Subcutaneous QHS    multivitamin  1 tablet Oral Daily    mupirocin   Nasal BID    mycophenolate  1,000 mg Oral BID     PRN Meds:  Current Facility-Administered Medications:     acetaminophen, 650 mg, Oral, Q4H PRN    acetaminophen, 650 mg, Oral, Q8H PRN    dextrose 50%, 12.5 g, Intravenous, PRN    dextrose 50%, 25 g, Intravenous, PRN    glucagon (human recombinant), 1 mg, Intramuscular, PRN    glucose, 16 g, Oral, PRN    glucose, 24 g, Oral, PRN    HYDROcodone-acetaminophen, 1 tablet, Oral, Q6H PRN    insulin aspart U-100, 0-5 Units, Subcutaneous, QID (AC + HS) PRN    magnesium oxide, 800 mg, Oral, PRN    magnesium oxide, 800 mg, Oral, PRN    naloxone, 0.02 mg, Intravenous, PRN    ondansetron, 4 mg, Intravenous, Q6H PRN    potassium, sodium phosphates, 2 packet, Oral, PRN    potassium, sodium phosphates, 2 packet, Oral, PRN    potassium, sodium phosphates, 2 packet, Oral, PRN    pseudoephedrine, 60 mg, Oral, Q6H PRN    QUEtiapine, 25 mg, Oral, Nightly PRN    sodium chloride 0.9%, 10 mL, Intravenous, Q12H PRN    sodium chloride 0.9%, 3 mL, Intravenous, Q12H PRN    DIPH,PERTUSS(ACELL),TET VACCINE (ADULT)(BOOSTRIX,ADACEL), 0.5 mL, Intramuscular, vaccine x 1 dose    Family History       Problem Relation (Age of Onset)    Cancer Father    Diabetes Mother, Brother    Early death Son, Son    Glaucoma Mother    Heart disease Mother    No Known Problems Daughter          Tobacco Use    Smoking status: Former     Current packs/day: 0.00     Average packs/day: 0.3 packs/day for 15.0 years (3.8 ttl pk-yrs)     Types: Cigarettes     Start date: 3/30/1959     Quit date: 3/30/1974     Years since quittin.7    Smokeless tobacco: Never   Substance and Sexual Activity    Alcohol use: No    Drug use: No     Sexual activity: Not Currently       Review of Systems   Constitutional:  Positive for activity change.   Respiratory:  Negative for shortness of breath.    Cardiovascular:  Negative for chest pain.   Genitourinary:  Positive for difficulty urinating.   Musculoskeletal:  Positive for arthralgias, gait problem and myalgias.   Psychiatric/Behavioral:  Positive for dysphoric mood.      Objective:     Vital Signs (Most Recent):  Temp: 97.6 °F (36.4 °C) (12/11/24 0745)  Pulse: 103 (12/11/24 1500)  Resp: (!) 21 (12/11/24 1500)  BP: (!) 100/53 (12/11/24 1500)  SpO2: 96 % (12/11/24 1500) Vital Signs (24h Range):  Temp:  [97.2 °F (36.2 °C)-98.4 °F (36.9 °C)] 97.6 °F (36.4 °C)  Pulse:  [] 103  Resp:  [18-39] 21  SpO2:  [92 %-99 %] 96 %  BP: ()/(46-59) 100/53     Weight: 76.8 kg (169 lb 5 oz)  Body mass index is 26.52 kg/m².       Physical Exam  Vitals and nursing note reviewed.   Constitutional:       General: She is not in acute distress.     Appearance: She is ill-appearing.   HENT:      Mouth/Throat:      Mouth: Mucous membranes are dry.   Eyes:      General: No scleral icterus.     Pupils: Pupils are equal, round, and reactive to light.   Cardiovascular:      Rate and Rhythm: Normal rate. Rhythm irregular.      Comments: Weak pulses  Pulmonary:      Effort: Pulmonary effort is normal. No respiratory distress.   Musculoskeletal:         General: No swelling.   Skin:     General: Skin is warm and dry.      Findings: Lesion present.      Comments: Multiple scattered bullous lesions to entire body with scabs to most areas. Scattered areas of MAD and erythema to skin folds. Wound pics reviewed   Neurological:      General: No focal deficit present.      Mental Status: She is alert and oriented to person, place, and time.   Psychiatric:         Mood and Affect: Mood normal.         Behavior: Behavior normal.         Thought Content: Thought content normal.         Judgment: Judgment normal.            Review of  Symptoms      Symptom Assessment (ESAS 0-10 Scale)  Pain:  0  Dyspnea:  0  Anxiety:  0  Nausea:  0  Depression:  3  Anorexia:  0  Fatigue:  0  Insomnia:  0  Restlessness:  0  Agitation:  0         Performance Status:  40    Living Arrangements:  Lives in home and Lives with family    Psychosocial/Cultural:   See Palliative Psychosocial Note: No  **Primary  to Follow**  Palliative Care  Consult: No        Advance Care Planning  Advance Directives:   Living Will: Yes        Copy on chart: No    Do Not Resuscitate Status: Yes    Medical Power of : Yes      Decision Making:  Patient answered questions  Goals of Care: The patient endorses that what is most important right now is to focus on remaining as independent as possible, symptom/pain control, and quality of life, even if it means sacrificing a little time    Accordingly, we have decided that the best plan to meet the patient's goals includes continuing with treatment.         Significant Labs: All pertinent labs within the past 24 hours have been reviewed.  CBC:   Recent Labs   Lab 12/11/24  0400   WBC 10.04   HGB 10.1*   HCT 32.6*   MCV 80*        BMP:  Recent Labs   Lab 12/11/24  0400   GLU 92      K 4.1   *   CO2 20*   BUN 34*   CREATININE 1.2   CALCIUM 8.0*     LFT:  Lab Results   Component Value Date    AST 18 12/11/2024    ALKPHOS 72 12/11/2024    BILITOT 0.9 12/11/2024     Albumin:   Albumin   Date Value Ref Range Status   12/11/2024 2.7 (L) 3.5 - 5.2 g/dL Final     Protein:   Total Protein   Date Value Ref Range Status   12/11/2024 5.7 (L) 6.0 - 8.4 g/dL Final     Lactic acid:   Lab Results   Component Value Date    LACTATE 1.3 12/09/2024    LACTATE 0.7 10/07/2024       Significant Imaging: I have reviewed all pertinent imaging results/findings within the past 24 hours.      I spent a total of 75 minutes on the day of the visit. This includes face to face time in discussion of goals of care, symptom  assessment, coordination of care and emotional support.  This also includes non-face to face time preparing to see the patient (eg, review of tests/imaging), obtaining and/or reviewing separately obtained history, documenting clinical information in the electronic or other health record, independently interpreting results and communicating results to the patient/family/caregiver, or care coordinator.    I spent an additonal 20 minutes discussing advance care planning.     Remedios Easley NP  Palliative Medicine  [facility]

## 2024-12-11 NOTE — SUBJECTIVE & OBJECTIVE
Interval History: Pt seen today for palliative medicine consult to discuss goals of care.     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    GERD (gastroesophageal reflux disease)     Hyperlipidemia     Hypertension     Hypothyroidism     Paroxysmal atrial fibrillation 3/16/2023    Type 2 diabetes mellitus        Past Surgical History:   Procedure Laterality Date    ADENOIDECTOMY      AORTIC VALVE REPLACEMENT      BREAST BIOPSY Right 20 yrs ago    benign    CARDIAC SURGERY      CHOLECYSTECTOMY      COLONOSCOPY  5-    Dr Holly, 5 year recheck    EPIDURAL STEROID INJECTION N/A 3/25/2021    Procedure: Injection, Steroid, Epidural L3-4;  Surgeon: Sky Love MD;  Location: [facility] OR;  Service: Pain Management;  Laterality: N/A;  Injection, Steroid, Epidural L3-4    EPIDURAL STEROID INJECTION N/A 5/20/2021    Procedure: Injection, Steroid, Epidural L3-4;  Surgeon: Sky Love MD;  Location: [facility] OR;  Service: Pain Management;  Laterality: N/A;  Injection, Steroid, Epidural L3-4    ESOPHAGOGASTRODUODENOSCOPY N/A 6/4/2020    Procedure: EGD (ESOPHAGOGASTRODUODENOSCOPY);  Surgeon: Michael Nugent III, MD;  [facility]: [facility];  Service: Endoscopy;  Laterality: N/A;    HEMORRHOID SURGERY      HYSTERECTOMY      INTRAMEDULLARY RODDING OF FEMUR Left 5/23/2024    Procedure: INSERTION, INTRAMEDULLARY RACQUEL, FEMUR;  Surgeon: Bravo Guillermo MD;  Location: [facility] OR;  Service: Orthopedics;  Laterality: Left;    OOPHORECTOMY      TONSILLECTOMY         Review of  patient's allergies indicates:   Allergen Reactions    Fenofibric acid (choline)      Other reaction(s): Vomiting    Iodine      Other reaction(s): lips swollen ivp dye    Rosuvastatin      Other reaction(s): muscle pain       Medications:  Continuous Infusions:  Scheduled Meds:   apixaban  5 mg Oral BID    carvediloL  6.25 mg Oral BID    ceFEPime IV (PEDS and ADULTS)  1 g Intravenous Q12H    cholecalciferol (vitamin D3)  5,000 Units Oral Daily    clobetasoL   Topical (Top) BID    doxycycline  100 mg Oral Q12H    DULoxetine  20 mg Oral Daily    gabapentin  100 mg Oral QHS    insulin glargine U-100 (Lantus)  15 Units Subcutaneous QHS    multivitamin  1 tablet Oral Daily    mupirocin   Nasal BID    mycophenolate  1,000 mg Oral BID     PRN Meds:  Current Facility-Administered Medications:     acetaminophen, 650 mg, Oral, Q4H PRN    acetaminophen, 650 mg, Oral, Q8H PRN    dextrose 50%, 12.5 g, Intravenous, PRN    dextrose 50%, 25 g, Intravenous, PRN    glucagon (human recombinant), 1 mg, Intramuscular, PRN    glucose, 16 g, Oral, PRN    glucose, 24 g, Oral, PRN    HYDROcodone-acetaminophen, 1 tablet, Oral, Q6H PRN    insulin aspart U-100, 0-5 Units, Subcutaneous, QID (AC + HS) PRN    magnesium oxide, 800 mg, Oral, PRN    magnesium oxide, 800 mg, Oral, PRN    naloxone, 0.02 mg, Intravenous, PRN    ondansetron, 4 mg, Intravenous, Q6H PRN    potassium, sodium phosphates, 2 packet, Oral, PRN    potassium, sodium phosphates, 2 packet, Oral, PRN    potassium, sodium phosphates, 2 packet, Oral, PRN    pseudoephedrine, 60 mg, Oral, Q6H PRN    QUEtiapine, 25 mg, Oral, Nightly PRN    sodium chloride 0.9%, 10 mL, Intravenous, Q12H PRN    sodium chloride 0.9%, 3 mL, Intravenous, Q12H PRN    DIPH,PERTUSS(ACELL),TET VACCINE (ADULT)(BOOSTRIX,ADACEL), 0.5 mL, Intramuscular, vaccine x 1 dose    Family History       Problem Relation (Age of Onset)    Cancer Father    Diabetes Mother, Brother    Early death Son, Son    Glaucoma Mother     Heart disease Mother    No Known Problems Daughter          Tobacco Use    Smoking status: Former     Current packs/day: 0.00     Average packs/day: 0.3 packs/day for 15.0 years (3.8 ttl pk-yrs)     Types: Cigarettes     Start date: 3/30/1959     Quit date: 3/30/1974     Years since quittin.7    Smokeless tobacco: Never   Substance and Sexual Activity    Alcohol use: No    Drug use: No    Sexual activity: Not Currently       Review of Systems   Constitutional:  Positive for activity change.   Respiratory:  Negative for shortness of breath.    Cardiovascular:  Negative for chest pain.   Genitourinary:  Positive for difficulty urinating.   Musculoskeletal:  Positive for arthralgias, gait problem and myalgias.   Psychiatric/Behavioral:  Positive for dysphoric mood.      Objective:     Vital Signs (Most Recent):  Temp: 97.6 °F (36.4 °C) (24 0745)  Pulse: 103 (24 1500)  Resp: (!) 21 (24 1500)  BP: (!) 100/53 (24 1500)  SpO2: 96 % (24 1500) Vital Signs (24h Range):  Temp:  [97.2 °F (36.2 °C)-98.4 °F (36.9 °C)] 97.6 °F (36.4 °C)  Pulse:  [] 103  Resp:  [18-39] 21  SpO2:  [92 %-99 %] 96 %  BP: ()/(46-59) 100/53     Weight: 76.8 kg (169 lb 5 oz)  Body mass index is 26.52 kg/m².       Physical Exam  Vitals and nursing note reviewed.   Constitutional:       General: She is not in acute distress.     Appearance: She is ill-appearing.   HENT:      Mouth/Throat:      Mouth: Mucous membranes are dry.   Eyes:      General: No scleral icterus.     Pupils: Pupils are equal, round, and reactive to light.   Cardiovascular:      Rate and Rhythm: Normal rate. Rhythm irregular.      Comments: Weak pulses  Pulmonary:      Effort: Pulmonary effort is normal. No respiratory distress.   Musculoskeletal:         General: No swelling.   Skin:     General: Skin is warm and dry.      Findings: Lesion present.      Comments: Multiple scattered bullous lesions to entire body with scabs to most areas.  Scattered areas of MAD and erythema to skin folds. Wound pics reviewed   Neurological:      General: No focal deficit present.      Mental Status: She is alert and oriented to person, place, and time.   Psychiatric:         Mood and Affect: Mood normal.         Behavior: Behavior normal.         Thought Content: Thought content normal.         Judgment: Judgment normal.            Review of Symptoms      Symptom Assessment (ESAS 0-10 Scale)  Pain:  0  Dyspnea:  0  Anxiety:  0  Nausea:  0  Depression:  3  Anorexia:  0  Fatigue:  0  Insomnia:  0  Restlessness:  0  Agitation:  0         Performance Status:  40    Living Arrangements:  Lives in home and Lives with family    Psychosocial/Cultural:   See Palliative Psychosocial Note: No  **Primary  to Follow**  Palliative Care  Consult: No        Advance Care Planning   Advance Directives:   Living Will: Yes        Copy on chart: No    Do Not Resuscitate Status: Yes    Medical Power of : Yes      Decision Making:  Patient answered questions  Goals of Care: The patient endorses that what is most important right now is to focus on remaining as independent as possible, symptom/pain control, and quality of life, even if it means sacrificing a little time    Accordingly, we have decided that the best plan to meet the patient's goals includes continuing with treatment.         Significant Labs: All pertinent labs within the past 24 hours have been reviewed.  CBC:   Recent Labs   Lab 12/11/24  0400   WBC 10.04   HGB 10.1*   HCT 32.6*   MCV 80*        BMP:  Recent Labs   Lab 12/11/24  0400   GLU 92      K 4.1   *   CO2 20*   BUN 34*   CREATININE 1.2   CALCIUM 8.0*     LFT:  Lab Results   Component Value Date    AST 18 12/11/2024    ALKPHOS 72 12/11/2024    BILITOT 0.9 12/11/2024     Albumin:   Albumin   Date Value Ref Range Status   12/11/2024 2.7 (L) 3.5 - 5.2 g/dL Final     Protein:   Total Protein   Date Value Ref Range  Status   12/11/2024 5.7 (L) 6.0 - 8.4 g/dL Final     Lactic acid:   Lab Results   Component Value Date    LACTATE 1.3 12/09/2024    LACTATE 0.7 10/07/2024       Significant Imaging: I have reviewed all pertinent imaging results/findings within the past 24 hours.

## 2024-12-11 NOTE — ASSESSMENT & PLAN NOTE
"I reviewed the patient's chart and discussed the case with the patient's team.      I examined Leslie Tolliver at bedside.  The patient maintains capacity for complex medical decision-making.  I spoke with pt and her daughter at bedside.    I introduced myself and my role as palliative care NP. They were agreeable to speaking.    We discussed the patient's medical illness, prognosis, and values in detail.  Below is a brief summary of our discussion.     They are up-to-date on patient's current medical conditions.  We talked about her journey over the past 4 years dealing with her bullous pemphigoid.  We also talked about her underlying chronic conditions.    We discussed prognosis.  I let her know that if she continues on her current trajectory I would not be surprised if she experienced another complication in the coming weeks or months that resulted in her death.  She was not surprised by this news.    We discussed her values.  She is feeling much better over the past couple days since her lesions and LE ext pain are improving. She is now able to find hope that she can cont to improve.  She has no fears or worries.  She does not fear dying.  She finds strength in her missy.  She expressed to the medical staff that she wanted to die and no longer wanted to live in her current condition.  This has led to her undergoing PEC and evaluation by inpatient psych.  We discussed this further.  She denies any suicidal ideations.  She has no plan for suicide.  She admits that she was severely depressed and was speaking out of context.  She stated "I know I said it but I would never do anything to hurt myself".  She now regrets making that statement.  Since she is feeling better she now has better insight and is willing to continue with treatment.  She is hopeful that her lesions will continue to improve.  She was told by Dermatology that her lesions could persist for up to 5 years.  She is now on year 4 and feels that she " can hang in for another year to see if she will be able to recover from this.  She would like to continue with therapy in hopes to get stronger and be able to regain some independence.  If she can achieve this she feels that her quality of life will be improved.     We discussed two paths moving forward.  One path is to continue with treatment. I recommended that she would be best served in a facility for continued wound care, physical and occupational therapy. She is willing to go through this as a time limited trial. The other path is to pursue home hospice services.  We discussed continuing treatment at a facility and if she is not progressing or feels that she is no longer willing to go through anymore treatment, hospitalizations or procedures then she will enlist in hospice services.    I did this use this opportunity to discuss hospice and hospice philosophy.  Her goals today are not aligned with hospice but she understands that she will possibly need the service in the near future.  She is agreeable to hospice if she is not able to regain adequate strength or her diseases progress.    Her daughter at bedside is supportive of her decision.    I feel we had an open and honest conversation.  Patient and daughter are asking good questions.  For now patient is hopeful that she can get to a facility for continued treatment and therapy.  She understands that if this is not successful then my recommendation is to pursue home hospice services.    I do not feel that patient is suicidal.  Her statements were coming from her depressed mood.  She is in much better spirits today and she has good insight.    I updated patient's medical team and case management.    I appreciate being involved in patient's care.  We will remain available.  Please reach out if we can be of any assistance.

## 2024-12-11 NOTE — HPI
12/10/24  Identifying information  89-year-old white female, who lives at [address] [place] [address], her telephone number is [phone].  Patient was admitted on formal voluntary admission on 2024 however her legal status had been changed to [facility] because having expressed suicidal thoughts.  Information obtained from medical records and from patient herself,    History of presenting problems  According to patient she was diagnosed with a skin disease which she says has a very ugly name and that she wanted me to look it up and explained to her what it is.  She states it was diagnosed by a dermatologist here in [place] who did not have good bedside manners and she decided not to seek any treatment from her however her daughter was able to get her to a dermatologist in [place].  The skin eruptions that patient has is diagnosed as bullous pemphigoid.  She has a few bullae on her hands and both right and leg thigh which are extremely painful as if someone was putting a knife through her because of this pain patient states that she has not been able to sleep, does get agitated, irritable, having crying episodes, and wanting to die.  She states that if she had the means to do away with herself she would have done it.  That this is no way of living, having lived for 89 years, she had both good and not so good days but mostly had lived as satisfied life.  She denies having any hallucinations and states that there are things she remembers and there are few things she does not care to.  Patient states that this particular affliction that she has comes and goes but it is extremely painful to bear.     Past psychiatric history  Negative    Family history  Negative    Social history  Patient was born in [place] and moved to [place] after she finished her 8th grade, she finished high school at the age of 17,  when she was 18 years old she had 4 children, 2 of her children  1 at the age of 4 and  the 2nd at the age of 24 or her surviving son is a retired  and she has 1 daughter who lives with her.  Patient denies any alcohol drug or tobacco use.  Her   secondary to cancer after 50 years marriage.    Medical surgical history  Hypertension coronary artery disease atrial fib diabetes hypothyroidism hearing impairment GERD hand bullous pemphigoid    Allergies  Choline iodine rosuvastatin    Current psychotropics  None    Mental status examination  A 89-year-old feisty white female, who spoke unhesitatingly about her physical condition, severe pain that she endorse and no longer acceptable to her and ask God nearly every day to let her die.  She has loud volume speech.  Her thought processes are organized with flight of ideas jumping from her physical condition her son, who is now adviser having retired as a  and having a granddaughter that runs the [employer] in [place] having 136 client's that she charges 40 dollars per client every month.  Her daughter who is always busy, running her own business.  She gives this information with considerable pride and goes on in great details about their accomplishments.  Her mood is even, and affect is appropriate to the situation.  Patient is alert and cooperative, she knows being at [facility], that today is Tuesday month of 2024, that she is 89 years old and her date of birth 1934 in [place], and Jason Love was alive at that time.  Patient's memory was not formally tested but they were she gives the information it seems to be adequate for a 89-year-old proud lady.  She has fairly good insight to her issues with a reasonably fair judgment as well as the statements that she made about death and dying well understood.     Impression  1. 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  2. Pain  disorder    Recommendations  If okay with the attendings   1. Cymbalta 20 mg p.o. q.d.  2. Neurontin 100 mg p.o. q.h.s. Starting tonight  3. Seroquel 25 mg p.o. HS p.r.n. sleep  4. With the skin lesions that she has no psychiatric hospital is going to accept her for inpatient treatment  5.  to contact patient's daughter about safety plans regarding her thoughts of wanting to die.   Please reconsult as needed  Thank you Dr Keith Jimenez

## 2024-12-11 NOTE — PLAN OF CARE
Problem: Adult Inpatient Plan of Care  Goal: Plan of Care Review  Outcome: Progressing  Goal: Patient-Specific Goal (Individualized)  Outcome: Progressing  Goal: Absence of Hospital-Acquired Illness or Injury  Outcome: Progressing  Goal: Optimal Comfort and Wellbeing  Outcome: Progressing  Goal: Readiness for Transition of Care  Outcome: Progressing     Problem: Suicide Risk  Goal: Absence of Self-Harm  Outcome: Progressing     Problem: Wound  Goal: Optimal Coping  Outcome: Progressing  Goal: Optimal Functional Ability  Outcome: Progressing  Goal: Absence of Infection Signs and Symptoms  Outcome: Progressing  Goal: Improved Oral Intake  Outcome: Progressing  Goal: Optimal Pain Control and Function  Outcome: Progressing  Goal: Skin Health and Integrity  Outcome: Progressing  Goal: Optimal Wound Healing  Outcome: Progressing     Problem: Acute Kidney Injury/Impairment  Goal: Fluid and Electrolyte Balance  Outcome: Progressing  Goal: Improved Oral Intake  Outcome: Progressing  Goal: Effective Renal Function  Outcome: Progressing     Problem: Diabetes Comorbidity  Goal: Blood Glucose Level Within Targeted Range  Outcome: Progressing     Problem: Fall Injury Risk  Goal: Absence of Fall and Fall-Related Injury  Outcome: Progressing     Problem: Skin Injury Risk Increased  Goal: Skin Health and Integrity  Outcome: Progressing     Problem: Infection  Goal: Absence of Infection Signs and Symptoms  Outcome: Progressing     Problem: Coping Ineffective  Goal: Effective Coping  Outcome: Progressing

## 2024-12-11 NOTE — ASSESSMENT & PLAN NOTE
Patient has paroxysmal (<7 days) atrial fibrillation. Patient is currently in atrial fibrillation. [id number] Score: 4. The patients heart rate in the last 24 hours is as follows:  Pulse  Min: 95  Max: 120     Antiarrhythmics       Anticoagulants  apixaban tablet 5 mg, 2 times daily, Oral    Plan  - Replete lytes with a goal of K>4, Mg >2  - Patient is anticoagulated, see medications listed above.  - Patient's afib is currently controlled  -

## 2024-12-11 NOTE — PT/OT/SLP PROGRESS
Physical Therapy      Patient Name:  Leslie Tolliver   MRN:  [id number]    Patient not seen today secondary to Other (Comment) (pt BP 85/51 with HOB at 20 degrees and per sitter pt very sleepy this am.). Will follow-up 12/12/24.

## 2024-12-11 NOTE — ASSESSMENT & PLAN NOTE
. Baseline creatinine is  1.2 . Most recent creatinine and eGFR are listed below.  Recent Labs     12/09/24  0401 12/10/24  0302 12/11/24  0400   CREATININE 1.6* 1.4 1.2   EGFRNORACEVR 30.4* 35.7* 43.0*        Plan  - KANNAN is  new admit  - Avoid nephrotoxins and renally dose meds for GFR listed above  - Monitor urine output, serial BMP, and adjust therapy as needed  resolved

## 2024-12-11 NOTE — CONSULTS
Chief complaint:  Wound Check (Lesions throughout body from bullous pemphigoid that are worse and more painful tonight. )      HPI:  Leslie Tolliver is a 89 y.o. female presenting with Bullous Pemphigoid  Multiple open blisters of the BLE and BLM UE and trunk  Multiple areas of MASD with yeast infection of the skin folds. All wounds are POA. Pt has been treating for nearly 5 years with the blisters recurring. No other  complaints today    12/10/24  Pt seen at bedside for a FU visit for multiple blisters from bullous pemphigoid. There has been visible improvement with the triad from first evaluation, but Dr. Jimenez has now changed the treatment to steroid cream. 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    GERD (gastroesophageal reflux disease)     Hyperlipidemia     Hypertension     Hypothyroidism     Paroxysmal atrial fibrillation 3/16/2023    Type 2 diabetes mellitus        Social History     Socioeconomic History    Marital status:    Tobacco Use    Smoking status: Former     Current packs/day: 0.00     Average packs/day: 0.3 packs/day for 15.0 years (3.8 ttl pk-yrs)     Types: Cigarettes     Start date: 3/30/1959     Quit date: 3/30/1974     Years since quittin.7    Smokeless tobacco: Never   Substance and Sexual Activity    Alcohol use: No    Drug use: No    Sexual activity: Not Currently     Social Drivers of Health     Financial Resource Strain: Low Risk  (2024)    Overall  Financial Resource Strain (CARDIA)     Difficulty of Paying Living Expenses: Not hard at all   Food Insecurity: No Food Insecurity (12/9/2024)    Hunger Vital Sign     Worried About Running Out of Food in the Last Year: Never true     Ran Out of Food in the Last Year: Never true   Transportation Needs: No Transportation Needs (12/9/2024)    TRANSPORTATION NEEDS     Transportation : No   Physical Activity: Unknown (10/14/2022)    Exercise Vital Sign     Days of Exercise per Week: Patient declined     Minutes of Exercise per Session: Patient declined   Stress: No Stress Concern Present (12/9/2024)    Citizen of [place] [employer] - Occupational Stress Questionnaire     Feeling of Stress : Not at all   Housing Stability: Low Risk  (12/9/2024)    Housing Stability Vital Sign     Unable to Pay for Housing in the Last Year: No     Homeless in the Last Year: No       Past Surgical History:   Procedure Laterality Date    ADENOIDECTOMY      AORTIC VALVE REPLACEMENT      BREAST BIOPSY Right 20 yrs ago    benign    CARDIAC SURGERY      CHOLECYSTECTOMY      COLONOSCOPY  5-    Dr Hloly, 5 year recheck    EPIDURAL STEROID INJECTION N/A 3/25/2021    Procedure: Injection, Steroid, Epidural L3-4;  Surgeon: Sky Love MD;  [facility];  Service: Pain Management;  Laterality: N/A;  Injection, Steroid, Epidural L3-4    EPIDURAL STEROID INJECTION N/A 5/20/2021    Procedure: Injection, Steroid, Epidural L3-4;  Surgeon: Sky Love MD;  [facility];  Service: Pain Management;  Laterality: N/A;  Injection, Steroid, Epidural L3-4    ESOPHAGOGASTRODUODENOSCOPY N/A 6/4/2020    Procedure: EGD (ESOPHAGOGASTRODUODENOSCOPY);  Surgeon: Michael Nugent III, MD;  [facility]: [facility];  Service: Endoscopy;  Laterality: N/A;    HEMORRHOID SURGERY      HYSTERECTOMY      INTRAMEDULLARY RODDING OF FEMUR Left 5/23/2024    Procedure: INSERTION, INTRAMEDULLARY RACQUEL, FEMUR;  Surgeon: Bravo Guillermo MD;  Location:  [facility] OR;  Service: Orthopedics;  Laterality: Left;    OOPHORECTOMY      TONSILLECTOMY         Family History   Problem Relation Name Age of Onset    Diabetes Mother      Heart disease Mother      Glaucoma Mother      Cancer Father          lung cancer    Early death Son          brain tumor age 3    Diabetes Brother      No Known Problems Daughter      Early death Son          MVA 25    Macular degeneration Neg Hx      Retinal detachment Neg Hx         Review of patient's allergies indicates:   Allergen Reactions    Fenofibric acid (choline)      Other reaction(s): Vomiting    Iodine      Other reaction(s): lips swollen ivp dye    Rosuvastatin      Other reaction(s): muscle pain       No current facility-administered medications on file prior to encounter.     Current Outpatient Medications on File Prior to Encounter   Medication Sig Dispense Refill    apixaban (ELIQUIS) 5 mg Tab Take 1 tablet (5 mg total) by mouth 2 (two) times daily. 180 tablet 3    carvediloL (COREG) 12.5 MG tablet Take 1 tablet (12.5 mg total) by mouth 2 (two) times daily. 90 tablet 3    cholecalciferol, vitamin D3, (VITAMIN D3) 125 mcg (5,000 unit) Tab Take 1 tablet (5,000 Units total) by mouth once daily. 90 tablet 3    empagliflozin (JARDIANCE) 10 mg tablet Take 1 tablet (10 mg total) by mouth once daily. 90 tablet 3    furosemide (LASIX) 20 MG tablet Take 1 tablet (20 mg total) by mouth once daily. 30 tablet 0    HYDROcodone-acetaminophen (NORCO) 5-325 mg per tablet Take 1 tablet by mouth every 12 (twelve) hours as needed for Pain. 60 tablet 0    insulin (LANTUS SOLOSTAR U-100 INSULIN) glargine 100 units/mL SubQ pen Inject 24 Units into the skin every evening. 3 mL 3    ketoconazole (NIZORAL) 2 % cream Apply 1 application  topically 2 (two) times daily.      metFORMIN (GLUCOPHAGE) 1000 MG tablet Take 1 tablet (1,000 mg total) by mouth 2 (two) times daily with meals. 180 tablet 1    multivitamin (THERAGRAN) per tablet Take 1 tablet by mouth  "once daily.         ROS: As per HPI and below:  Pertinent items are noted in HPI.      Physical Exam:     Vitals:    12/10/24 1500 12/10/24 1600 12/10/24 1700 12/10/24 1804   BP: 92/75 (!) 91/50 (!) 100/48 (!) 101/58   Pulse: (!) 117 (!) 117 (!) 113 (!) 115   Resp: (!) 27 20 18 19   Temp:  97.9 °F (36.6 °C)     TempSrc:  Oral     SpO2: 98% 97% 95% 97%   Weight:       Height:           BP  Min: 73/42  Max: 144/92  Temp  Av °F (36.7 °C)  Min: 97.7 °F (36.5 °C)  Max: 98.5 °F (36.9 °C)  Pulse  Av.7  Min: 66  Max: 134  Resp  Av.2  Min: 15  Max: 29  SpO2  Av.2 %  Min: 92 %  Max: 100 %  Height  Av' 7" (170.2 cm)  Min: 5' 7" (170.2 cm)  Max: 5' 7" (170.2 cm)  Weight  Av kg (169 lb 10.5 oz)  Min: 76.8 kg (169 lb 5 oz)  Max: 77.1 kg (170 lb)    Body mass index is 26.52 kg/m².          General:             Well developed, well nourished, no apparent distress  HEENT:              NCAT, no JVD, mucous membranes moist, EOM intact  Cardiovascular:  Regular rate and rhythm, normal S1, normal S2, No murmurs, rubs, or gallops  Respiratory:        Normal breath sounds, no wheezes, no rales, no rhonchi  Abdomen:           Bowel sounds present, non tender, non distended, no masses, no hepatojugular reflux  Extremities:        No clubbing, no cyanosis, no edema  Vascular:            2+ b/l radial.  Peripheral pulses intact.  No carotid bruits.  Neurological:      No focal deficits  Skin:                   No obvious rashes or erythema, Bullous Pemphigoid  Multiple open blisters of the BLE and BLM UE and trunk  Multiple areas of MASD with yeast infection of the skin folds                                                            Lab Results   Component Value Date    WBC 10.78 12/10/2024    HGB 10.1 (L) 12/10/2024    HCT 32.5 (L) 12/10/2024    MCV 80 (L) 12/10/2024     12/10/2024     Lab Results   Component Value Date    CHOL 126 10/14/2022    CHOL 132 2022    CHOL 130 2021     Lab Results "   Component Value Date    HDL 33 (L) 10/14/2022    HDL 33 (L) 03/11/2022    HDL 31 (L) 02/25/2021     Lab Results   Component Value Date    LDLCALC 64.6 10/14/2022    LDLCALC 54.4 (L) 03/11/2022    LDLCALC 37.2 (L) 02/25/2021     Lab Results   Component Value Date    TRIG 142 10/14/2022    TRIG 223 (H) 03/11/2022    TRIG 309 (H) 02/25/2021     Lab Results   Component Value Date    CHOLHDL 26.2 10/14/2022    CHOLHDL 25.0 03/11/2022    CHOLHDL 23.8 02/25/2021     CMP  Recent Labs   Lab 12/10/24  0302   GLU 94   CALCIUM 7.6*   ALBUMIN 2.7*   PROT 5.6*      K 4.1   CO2 19*   *   BUN 44*   CREATININE 1.4   ALKPHOS 71   ALT 12   AST 18   BILITOT 1.0      Lab Results   Component Value Date    TSH 2.636 03/26/2024    TSH 2.572 03/26/2024           Assessment and Recommendations       Diagnoses:    Bullous Pemphigoid  Multiple open blisters of the BLE and BLM UE and trunk  Multiple areas of MASD with yeast infection of the skin folds      Plan:  Miconazole to the skin folds of the groin, abdomen and breasts      Complexity:    moderate

## 2024-12-11 NOTE — CONSULTS
[facility]  Psychiatry  Consult Note    Patient Name: Leslie Tolliver  MRN: [id number]   Code Status: Full Code  Admission Date: 12/8/2024  Hospital Length of Stay: 1 days  Attending Physician: Sebastian Jimenez MD  Primary Care Provider: Chang Christianson MD    [place] [facility] [place]    Patient information was obtained from patient and ER records.   Consults  Subjective:     Principal Problem:Cellulitis of leg    Chief Complaint:  SI    HPI: 12/10/24  Identifying information  89-year-old white female, who lives at [address] [place] [address], her telephone number is [phone].  Patient was admitted on formal voluntary admission on 12/08/2024 however her legal status had been changed to Critical access hospital because having expressed suicidal thoughts.  Information obtained from medical records and from patient herself,    History of presenting problems  According to patient she was diagnosed with a skin disease which she says has a very ugly name and that she wanted me to look it up and explained to her what it is.  She states it was diagnosed by a dermatologist here in [place] who did not have good bedside manners and she decided not to seek any treatment from her however her daughter was able to get her to a dermatologist in [place].  The skin eruptions that patient has is diagnosed as bullous pemphigoid.  She has a few bullae on her hands and both right and leg thigh which are extremely painful as if someone was putting a knife through her because of this pain patient states that she has not been able to sleep, does get agitated, irritable, having crying episodes, and wanting to die.  She states that if she had the means to do away with herself she would have done it.  That this is no way of living, having lived for 89 years, she had both good and not so good days but mostly had lived as satisfied life.  She denies having any hallucinations and states that there are things she remembers and there  are few things she does not care to.  Patient states that this particular affliction that she has comes and goes but it is extremely painful to bear.     Past psychiatric history  Negative    Family history  Negative    Social history  Patient was born in [place] and moved to [place] after she finished her 8th grade, she finished high school at the age of 17,  when she was 18 years old she had 4 children, 2 of her children  1 at the age of 4 and the 2nd at the age of 24 or her surviving son is a retired  and she has 1 daughter who lives with her.  Patient denies any alcohol drug or tobacco use.  Her   secondary to cancer after 50 years marriage.    Medical surgical history  Hypertension coronary artery disease atrial fib diabetes hypothyroidism hearing impairment GERD hand bullous pemphigoid    Allergies  Choline iodine rosuvastatin    Current psychotropics  None    Mental status examination  A 89-year-old feisty white female, who spoke unhesitatingly about her physical condition, severe pain that she endorse and no longer acceptable to her and ask God nearly every day to let her die.  She has loud volume speech.  Her thought processes are organized with flight of ideas jumping from her physical condition her son, who is now adviser having retired as a  and having a granddaughter that runs the [employer] in [place] having 136 client's that she charges 40 dollars per client every month.  Her daughter who is always busy, running her own business.  She gives this information with considerable pride and goes on in great details about their accomplishments.  Her mood is even, and affect is appropriate to the situation.  Patient is alert and cooperative, she knows being at [facility], that today is Tuesday month of 2024, that she is 89 years old and her date of birth 1934 in [place], and Jason Love was alive at that time.   Patient's memory was not formally tested but they were she gives the information it seems to be adequate for a 89-year-old proud lady.  She has fairly good insight to her issues with a reasonably fair judgment as well as the statements that she made about death and dying well understood.     Impression  1. 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  2. Pain disorder    Recommendations  If okay with the attendings   1. Cymbalta 20 mg p.o. q.d.  2. Neurontin 100 mg p.o. q.h.s. Starting tonight  3. Seroquel 25 mg p.o. HS p.r.n. sleep  4. With the skin lesions that she has no psychiatric hospital is going to accept her for inpatient treatment  5.  to contact patient's daughter about safety plans regarding her thoughts of wanting to die.   Please reconsult as needed  Thank you Dr Parker [facility] Course: No notes on file    No new subjective & objective note has been filed under this hospital service since the last note was generated.    Assessment/Plan:     No notes have been filed under this hospital service.  Service: Psychiatry       Total Time:  60 minutes      Sheree Jimenez MD   Psychiatry  [facility]

## 2024-12-11 NOTE — CARE UPDATE
12/11/24 0704   Patient Assessment/Suction   Level of Consciousness (AVPU) alert   Respiratory Effort Normal;Unlabored   Expansion/Accessory Muscles/Retractions no retractions;expansion symmetric;no use of accessory muscles   PRE-TX-O2   Device (Oxygen Therapy) room air   SpO2 95 %   Pulse Oximetry Type Continuous   $ Pulse Oximetry - Multiple Charge Pulse Oximetry - Multiple   Pulse (!) 115   Resp (!) 39

## 2024-12-11 NOTE — ASSESSMENT & PLAN NOTE
Chronic  On cellcept outpatient, followed by dermatology.  We will try to contact patient's dermatologist and possible started CellCept.  Currently there is no CellCept in her active home medication  High dose of steroid  Doxycycline  Consult wound care and follow recommendation   Tried to reached out to dermatologist in the given and number and not getting through   Patient literally confirmed that she was taking cellcept which was not in her home meds . Restart

## 2024-12-11 NOTE — ASSESSMENT & PLAN NOTE
Anemia is likely due to chronic condition  Most recent hemoglobin and hematocrit are listed below.  Recent Labs     12/09/24  1252 12/10/24  0302 12/11/24  0400   HGB 10.5* 10.1* 10.1*   HCT 34.5* 32.5* 32.6*       Plan  Stable

## 2024-12-11 NOTE — ASSESSMENT & PLAN NOTE
BLE possible part of  bullous pemphigoid  DC vancomycin, cefepime to continue   IV pain meds  Turn q2 hrs.   doxy

## 2024-12-11 NOTE — SUBJECTIVE & OBJECTIVE
"Interval History:     Patient slightly drowsy . Got new psych meds  Legs cellulitis mild better    Review of Systems  Objective:     Vital Signs (Most Recent):  Temp: 97.6 °F (36.4 °C) (12/11/24 0745)  Pulse: 95 (12/11/24 1400)  Resp: (!) 27 (12/11/24 1400)  BP: (!) 92/53 (12/11/24 1400)  SpO2: (!) 94 % (12/11/24 1400) Vital Signs (24h Range):  Temp:  [97.2 °F (36.2 °C)-98.4 °F (36.9 °C)] 97.6 °F (36.4 °C)  Pulse:  [] 95  Resp:  [18-39] 27  SpO2:  [92 %-99 %] 94 %  BP: ()/(46-75) 92/53     Weight: 76.8 kg (169 lb 5 oz)  Body mass index is 26.52 kg/m².    Intake/Output Summary (Last 24 hours) at 12/11/2024 1416  Last data filed at 12/11/2024 1330  Gross per 24 hour   Intake 900 ml   Output 1250 ml   Net -350 ml         Physical Exam  Vitals and nursing note reviewed.   HENT:      Head: Normocephalic and atraumatic.   Eyes:      Conjunctiva/sclera: Conjunctivae normal.   Neck:      Vascular: No JVD.   Cardiovascular:      Heart sounds: Normal heart sounds.   Pulmonary:      Effort: Pulmonary effort is normal.      Breath sounds: Normal breath sounds.   Abdominal:      Palpations: Abdomen is soft.   Musculoskeletal:         General: Normal range of motion.   Skin:     General: Skin is warm.   Neurological:      Mental Status: She is alert and oriented to person, place, and time.   Psychiatric:         Mood and Affect: Mood normal.             Significant Labs: All pertinent labs within the past 24 hours have been reviewed.  CBC:   Recent Labs   Lab 12/10/24  0302 12/11/24  0400   WBC 10.78 10.04   HGB 10.1* 10.1*   HCT 32.5* 32.6*    257     CMP:   Recent Labs   Lab 12/10/24  0302 12/11/24  0400    138   K 4.1 4.1   * 111*   CO2 19* 20*   GLU 94 92   BUN 44* 34*   CREATININE 1.4 1.2   CALCIUM 7.6* 8.0*   PROT 5.6* 5.7*   ALBUMIN 2.7* 2.7*   BILITOT 1.0 0.9   ALKPHOS 71 72   AST 18 18   ALT 12 12   ANIONGAP 7* 7*     Cardiac Markers: No results for input(s): "CKMB", "MYOGLOBIN", "BNP", " ""TROPISTAT" in the last 48 hours.    Significant Imaging: I have reviewed all pertinent imaging results/findings within the past 24 hours.  "

## 2024-12-11 NOTE — PROGRESS NOTES
[facility] Medicine  Progress Note    Patient Name: Leslie Tolliver  MRN: [id number]  Patient Class: IP- Inpatient   Admission Date: 12/8/2024  Length of Stay: 2 days  Attending Physician: Sebastian Jimenez MD  Primary Care Provider: Chang Christianson MD        Subjective     Principal Problem:Cellulitis of leg        HPI:  89-year-old woman with a history of bullous pemphigoid for the last 4 years, CAD, diabetes, hypertension, hypothyroidism, Afib, stents to the emergency room with reports of increased pain to her wounds on both lower extremities. Denies fever, chills chest pain shortness of breath belly pain nausea or vomiting change in bowel or bladder habits.  She is not sure the last time she was on antibiotics.  She has seen a dermatologist in [place] for this.      In the ER, tachycardia, EKG with atrial fibrillation. Metoprolol 5 mg IV x2 doses given, leukocytosis 15, sodium 132, K 5.2,  KANNAN BUN 57, cr 1.7, baseline 1.2, glucose 174, , troponin I high sensitivity 10.9, Mag 2.3, blood culture sent lactic 2.15, chest x-ray with No acute intrathoracic process.  IV normal saline at 500 ml, cefepime, vancomycin, magnesium, Hydrocodone, and morphine given in ER  Admit to hospital medicine for AFib RVR, skin infection    Overview/Hospital Course:  89 year old female with bullous phemphigoid (dx 4 years ago), CAD with stenting, DM, HTN, hypothyroidism, afib (on Eliquis), bioprosthetic AVR. 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 Patient evaluated in the ER and noted to have leukocytosis, EKG with Afib, CMP with KANNAN with K 5.2 and was admitted to hospital medicine service for further evaluation. The patient was started on cefipime and vancomycin. ID and wound care consulted. Wound care evaluated patient and recommended nystatin powder to skin folds under bilateral breasts and bilateral groin and Triad to  be applied to LLE blisters to evaluate response to treatment.   High dose clobetasol cream ordered and added doxy . Cultures negative and DC vanco .    Interval History:     Patient slightly drowsy . Got new psych meds  Legs cellulitis mild better    Review of Systems  Objective:     Vital Signs (Most Recent):  Temp: 97.6 °F (36.4 °C) (12/11/24 0745)  Pulse: 95 (12/11/24 1400)  Resp: (!) 27 (12/11/24 1400)  BP: (!) 92/53 (12/11/24 1400)  SpO2: (!) 94 % (12/11/24 1400) Vital Signs (24h Range):  Temp:  [97.2 °F (36.2 °C)-98.4 °F (36.9 °C)] 97.6 °F (36.4 °C)  Pulse:  [] 95  Resp:  [18-39] 27  SpO2:  [92 %-99 %] 94 %  BP: ()/(46-75) 92/53     Weight: 76.8 kg (169 lb 5 oz)  Body mass index is 26.52 kg/m².    Intake/Output Summary (Last 24 hours) at 12/11/2024 1416  Last data filed at 12/11/2024 1330  Gross per 24 hour   Intake 900 ml   Output 1250 ml   Net -350 ml         Physical Exam  Vitals and nursing note reviewed.   HENT:      Head: Normocephalic and atraumatic.   Eyes:      Conjunctiva/sclera: Conjunctivae normal.   Neck:      Vascular: No JVD.   Cardiovascular:      Heart sounds: Normal heart sounds.   Pulmonary:      Effort: Pulmonary effort is normal.      Breath sounds: Normal breath sounds.   Abdominal:      Palpations: Abdomen is soft.   Musculoskeletal:         General: Normal range of motion.   Skin:     General: Skin is warm.   Neurological:      Mental Status: She is alert and oriented to person, place, and time.   Psychiatric:         Mood and Affect: Mood normal.             Significant Labs: All pertinent labs within the past 24 hours have been reviewed.  CBC:   Recent Labs   Lab 12/10/24  0302 12/11/24  0400   WBC 10.78 10.04   HGB 10.1* 10.1*   HCT 32.5* 32.6*    257     CMP:   Recent Labs   Lab 12/10/24  0302 12/11/24  0400    138   K 4.1 4.1   * 111*   CO2 19* 20*   GLU 94 92   BUN 44* 34*   CREATININE 1.4 1.2   CALCIUM 7.6* 8.0*   PROT 5.6* 5.7*   ALBUMIN 2.7* 2.7*  "  BILITOT 1.0 0.9   ALKPHOS 71 72   AST 18 18   ALT 12 12   ANIONGAP 7* 7*     Cardiac Markers: No results for input(s): "CKMB", "MYOGLOBIN", "BNP", "TROPISTAT" in the last 48 hours.    Significant Imaging: I have reviewed all pertinent imaging results/findings within the past 24 hours.    Assessment and Plan     * Cellulitis of leg  BLE possible part of  bullous pemphigoid  DC vancomycin, cefepime to continue   IV pain meds  Turn q2 hrs.   doxy        Anemia  Anemia is likely due to chronic condition  Most recent hemoglobin and hematocrit are listed below.  Recent Labs     12/09/24  1252 12/10/24  0302 12/11/24  0400   HGB 10.5* 10.1* 10.1*   HCT 34.5* 32.5* 32.6*       Plan  Stable     KANNAN (acute kidney injury)  . Baseline creatinine is  1.2 . Most recent creatinine and eGFR are listed below.  Recent Labs     12/09/24  0401 12/10/24  0302 12/11/24 0400   CREATININE 1.6* 1.4 1.2   EGFRNORACEVR 30.4* 35.7* 43.0*        Plan  - KANNAN is  new admit  - Avoid nephrotoxins and renally dose meds for GFR listed above  - Monitor urine output, serial BMP, and adjust therapy as needed  resolved      Hyperkalemia  Hyperkalemia is likely due to KANNAN.The patients most recent potassium results are listed below.  Recent Labs     12/09/24  0401 12/10/24  0302 12/11/24 0400   K 4.5 4.1 4.1       Plan  - Monitor for arrhythmias with EKG and/or continuous telemetry.   - Treat the hyperkalemia with Potassium Binders.   - Monitor potassium: Daily  - The patient's hyperkalemia is  new admit    Resolved now           Hyponatremia  The patient's most recent sodium results are listed below.  Recent Labs     12/09/24  0401 12/10/24  0302 12/11/24 0400   * 137 138       Plan  - Correct the sodium by 4-6mEq in 24 hours.   - Will treat the hyponatremia with IV fluids as follows: LR and fluid restriction  - Monitor sodium Daily.   - Patient hyponatremia is  new admit  resolved      Type 2 diabetes mellitus  Patient's FSGs are uncontrolled " due to hyperglycemia on current medication regimen.  Last A1c reviewed-   Lab Results   Component Value Date    HGBA1C 6.6 (H) 09/20/2024     Most recent fingerstick glucose reviewed-   Recent Labs   Lab 12/10/24  1711 12/10/24  2030 12/11/24  0751 12/11/24  1112   POCTGLUCOSE 155* 200* 85 194*       Current correctional scale  Low  Maintain anti-hyperglycemic dose as follows-   Antihyperglycemics (From admission, onward)      Start     Stop Route Frequency Ordered    12/09/24 2100  insulin glargine U-100 (Lantus) pen 15 Units         -- SubQ Nightly 12/08/24 2349    12/09/24 0054  insulin aspart U-100 pen 0-5 Units         -- SubQ Before meals & nightly PRN 12/08/24 2355          Hold Oral hypoglycemics while patient is in the hospital.    Atrial fibrillation  Patient has paroxysmal (<7 days) atrial fibrillation. Patient is currently in atrial fibrillation. [id number] Score: 4. The patients heart rate in the last 24 hours is as follows:  Pulse  Min: 95  Max: 120     Antiarrhythmics       Anticoagulants  apixaban tablet 5 mg, 2 times daily, Oral    Plan  - Replete lytes with a goal of K>4, Mg >2  - Patient is anticoagulated, see medications listed above.  - Patient's afib is currently controlled  -         Bullous pemphigoid  Chronic  On cellcept outpatient, followed by dermatology.  We will try to contact patient's dermatologist and possible started CellCept.  Currently there is no CellCept in her active home medication  High dose of steroid  Doxycycline  Consult wound care and follow recommendation   Tried to reached out to dermatologist in the given and number and not getting through   Patient literally confirmed that she was taking cellcept which was not in her home meds . Restart         VTE Risk Mitigation (From admission, onward)           Ordered     apixaban tablet 5 mg  2 times daily         12/11/24 0952     IP VTE HIGH RISK PATIENT  Once         12/08/24 2354     Place sequential compression device   Until discontinued         12/08/24 8503                    Discharge Planning   MORA:      Code Status: Full Code   Medical Readiness for Discharge Date:   Discharge Plan A: Home Health                        Sebastian Jimenez MD  Department of Hospital Medicine   Formerly [facility], [facility]

## 2024-12-11 NOTE — ASSESSMENT & PLAN NOTE
The patient's most recent sodium results are listed below.  Recent Labs     12/09/24  0401 12/10/24  0302 12/11/24  0400   * 137 138       Plan  - Correct the sodium by 4-6mEq in 24 hours.   - Will treat the hyponatremia with IV fluids as follows: LR and fluid restriction  - Monitor sodium Daily.   - Patient hyponatremia is  new admit  resolved

## 2024-12-11 NOTE — ASSESSMENT & PLAN NOTE
Advance Care Planning     Date: 12/11/2024    Code Status  In light of the patients advanced and life limiting illness,I engaged the the patient in a voluntary conversation about the patient's preferences for care  at the very end of life. The patient wishes to have a natural, peaceful death.  Along those lines, the patient does not wish to have CPR or other invasive treatments performed when her heart and/or breathing stops. I communicated to the patient that a DNR order would be placed in her medical record to reflect this preference.    [facility]  I engaged the patient in a voluntary conversation about advance care planning and we specifically addressed what the goals of care would be moving forward, in light of the patient's change in clinical status, specifically severe bullous pemphigiod, heart disease, kidney disease and debilitated state.  We did specifically address the patient's likely prognosis, which is poor.  We explored the patient's values and preferences for future care.  The patient endorses that what is most important right now is to focus on spending time at home, remaining as independent as possible, symptom/pain control, and quality of life, even if it means sacrificing a little time    Accordingly, we have decided that the best plan to meet the patient's goals includes continuing with treatment    Hospice  I did explain the role for hospice care at this stage of the patient's illness, including its ability to help the patient live with the best quality of life possible.  We will not be making a hospice referral.

## 2024-12-11 NOTE — PROGRESS NOTES
Pharmacist Renal Dose Adjustment Note    Leslie Tolliver is a 89 y.o. female being treated with the medication CEFEPIME    Patient Data:    Vital Signs (Most Recent):  Temp: 97.6 °F (36.4 °C) (12/11/24 0745)  Pulse: 95 (12/11/24 1400)  Resp: (!) 27 (12/11/24 1400)  BP: (!) 92/53 (12/11/24 1400)  SpO2: (!) 94 % (12/11/24 1400) Vital Signs (72h Range):  Temp:  [97.2 °F (36.2 °C)-98.5 °F (36.9 °C)]   Pulse:  []   Resp:  [15-39]   BP: ()/(38-92)   SpO2:  [92 %-100 %]      Recent Labs   Lab 12/09/24  0401 12/10/24  0302 12/11/24  0400   CREATININE 1.6* 1.4 1.2     Serum creatinine: 1.2 mg/dL 12/11/24 0400  Estimated creatinine clearance: 33.3 mL/min    CEFEPIME 1 GM EVERY 24 HR will be changed to CEFEPIME 1 GM EVERY 12 HR    Pharmacist's Name: Lucero Tillman  Pharmacist's Extension: [phone]

## 2024-12-11 NOTE — PROGRESS NOTES
Pharmacist Renal Dose Adjustment Note    Leslie Tolliver is a 89 y.o. female being treated with Apixaban    Patient Data:    Vital Signs (Most Recent):  Temp: 97.6 °F (36.4 °C) (12/11/24 0745)  Pulse: 110 (12/11/24 0900)  Resp: (!) 22 (12/11/24 0900)  BP: (!) 103/56 (12/11/24 0900)  SpO2: 96 % (12/11/24 0900) Vital Signs (72h Range):  Temp:  [97.2 °F (36.2 °C)-98.5 °F (36.9 °C)]   Pulse:  []   Resp:  [15-39]   BP: ()/(38-92)   SpO2:  [92 %-100 %]      Recent Labs   Lab 12/09/24  0401 12/10/24  0302 12/11/24  0400   CREATININE 1.6* 1.4 1.2     Serum creatinine: 1.2 mg/dL 12/11/24 0400  Estimated creatinine clearance: 33.3 mL/min    Apixaban 2.5 mg BID will be changed to Apixaban 5 mg BID due to Scr < 1.5 and weight > 60 kg per [facility] policy.    Pharmacist's Name: Jessica Archibald  Pharmacist's Extension: [phone]

## 2024-12-11 NOTE — ASSESSMENT & PLAN NOTE
Hyperkalemia is likely due to KANNAN.The patients most recent potassium results are listed below.  Recent Labs     12/09/24  0401 12/10/24  0302 12/11/24  0400   K 4.5 4.1 4.1       Plan  - Monitor for arrhythmias with EKG and/or continuous telemetry.   - Treat the hyperkalemia with Potassium Binders.   - Monitor potassium: Daily  - The patient's hyperkalemia is  new admit    Resolved now

## 2024-12-12 PROBLEM — R33.9 URINARY RETENTION: Status: ACTIVE | Noted: 2024-12-12

## 2024-12-12 LAB
ALBUMIN SERPL BCP-MCNC: 2.6 G/DL (ref 3.5–5.2)
ALP SERPL-CCNC: 67 U/L (ref 55–135)
ALT SERPL W/O P-5'-P-CCNC: 12 U/L (ref 10–44)
ANION GAP SERPL CALC-SCNC: 3 MMOL/L (ref 8–16)
AST SERPL-CCNC: 16 U/L (ref 10–40)
BACTERIA UR CULT: NO GROWTH
BASOPHILS # BLD AUTO: 0.04 K/UL (ref 0–0.2)
BASOPHILS NFR BLD: 0.4 % (ref 0–1.9)
BILIRUB SERPL-MCNC: 0.7 MG/DL (ref 0.1–1)
BUN SERPL-MCNC: 34 MG/DL (ref 8–23)
CALCIUM SERPL-MCNC: 7.9 MG/DL (ref 8.7–10.5)
CHLORIDE SERPL-SCNC: 111 MMOL/L (ref 95–110)
CO2 SERPL-SCNC: 21 MMOL/L (ref 23–29)
CREAT SERPL-MCNC: 1.2 MG/DL (ref 0.5–1.4)
DIFFERENTIAL METHOD BLD: ABNORMAL
EOSINOPHIL # BLD AUTO: 2 K/UL (ref 0–0.5)
EOSINOPHIL NFR BLD: 21.8 % (ref 0–8)
ERYTHROCYTE [DISTWIDTH] IN BLOOD BY AUTOMATED COUNT: 19 % (ref 11.5–14.5)
EST. GFR  (NO RACE VARIABLE): 43 ML/MIN/1.73 M^2
GLUCOSE SERPL-MCNC: 147 MG/DL (ref 70–110)
HCT VFR BLD AUTO: 32.3 % (ref 37–48.5)
HGB BLD-MCNC: 9.7 G/DL (ref 12–16)
IMM GRANULOCYTES # BLD AUTO: 0.03 K/UL (ref 0–0.04)
IMM GRANULOCYTES NFR BLD AUTO: 0.3 % (ref 0–0.5)
LYMPHOCYTES # BLD AUTO: 0.7 K/UL (ref 1–4.8)
LYMPHOCYTES NFR BLD: 7.4 % (ref 18–48)
MCH RBC QN AUTO: 24.8 PG (ref 27–31)
MCHC RBC AUTO-ENTMCNC: 30 G/DL (ref 32–36)
MCV RBC AUTO: 83 FL (ref 82–98)
MONOCYTES # BLD AUTO: 0.7 K/UL (ref 0.3–1)
MONOCYTES NFR BLD: 8 % (ref 4–15)
NEUTROPHILS # BLD AUTO: 5.6 K/UL (ref 1.8–7.7)
NEUTROPHILS NFR BLD: 62.1 % (ref 38–73)
NRBC BLD-RTO: 0 /100 WBC
PLATELET # BLD AUTO: 223 K/UL (ref 150–450)
PMV BLD AUTO: 9.6 FL (ref 9.2–12.9)
POCT GLUCOSE: 133 MG/DL (ref 70–110)
POCT GLUCOSE: 136 MG/DL (ref 70–110)
POCT GLUCOSE: 234 MG/DL (ref 70–110)
POCT GLUCOSE: 262 MG/DL (ref 70–110)
POTASSIUM SERPL-SCNC: 4.7 MMOL/L (ref 3.5–5.1)
PROT SERPL-MCNC: 5.7 G/DL (ref 6–8.4)
RBC # BLD AUTO: 3.91 M/UL (ref 4–5.4)
SODIUM SERPL-SCNC: 135 MMOL/L (ref 136–145)
WBC # BLD AUTO: 9.08 K/UL (ref 3.9–12.7)

## 2024-12-12 PROCEDURE — 97530 THERAPEUTIC ACTIVITIES: CPT

## 2024-12-12 PROCEDURE — 86580 TB INTRADERMAL TEST: CPT | Performed by: INTERNAL MEDICINE

## 2024-12-12 PROCEDURE — 25000003 PHARM REV CODE 250: Performed by: INTERNAL MEDICINE

## 2024-12-12 PROCEDURE — 25000003 PHARM REV CODE 250: Performed by: NURSE PRACTITIONER

## 2024-12-12 PROCEDURE — 94761 N-INVAS EAR/PLS OXIMETRY MLT: CPT

## 2024-12-12 PROCEDURE — 80053 COMPREHEN METABOLIC PANEL: CPT | Performed by: NURSE PRACTITIONER

## 2024-12-12 PROCEDURE — 30200315 PPD INTRADERMAL TEST REV CODE 302: Performed by: INTERNAL MEDICINE

## 2024-12-12 PROCEDURE — 99449 NTRPROF PH1/NTRNET/EHR 31/>: CPT | Mod: ,,, | Performed by: UROLOGY

## 2024-12-12 PROCEDURE — 36415 COLL VENOUS BLD VENIPUNCTURE: CPT | Performed by: NURSE PRACTITIONER

## 2024-12-12 PROCEDURE — 99232 SBSQ HOSP IP/OBS MODERATE 35: CPT | Mod: ,,, | Performed by: INTERNAL MEDICINE

## 2024-12-12 PROCEDURE — 21000000 HC CCU ICU ROOM CHARGE

## 2024-12-12 PROCEDURE — 63600175 PHARM REV CODE 636 W HCPCS: Performed by: INTERNAL MEDICINE

## 2024-12-12 PROCEDURE — 85025 COMPLETE CBC W/AUTO DIFF WBC: CPT | Performed by: NURSE PRACTITIONER

## 2024-12-12 RX ORDER — POLYETHYLENE GLYCOL 3350 17 G/17G
17 POWDER, FOR SOLUTION ORAL DAILY
Status: DISCONTINUED | OUTPATIENT
Start: 2024-12-12 | End: 2024-12-13 | Stop reason: HOSPADM

## 2024-12-12 RX ORDER — CEFDINIR 300 MG/1
300 CAPSULE ORAL EVERY 12 HOURS
Status: DISCONTINUED | OUTPATIENT
Start: 2024-12-12 | End: 2024-12-13 | Stop reason: HOSPADM

## 2024-12-12 RX ADMIN — MYCOPHENOLATE MOFETIL 1000 MG: 250 CAPSULE ORAL at 08:12

## 2024-12-12 RX ADMIN — CEFDINIR 300 MG: 300 CAPSULE ORAL at 11:12

## 2024-12-12 RX ADMIN — CEFEPIME 1 G: 1 INJECTION, POWDER, FOR SOLUTION INTRAMUSCULAR; INTRAVENOUS at 04:12

## 2024-12-12 RX ADMIN — CEFDINIR 300 MG: 300 CAPSULE ORAL at 08:12

## 2024-12-12 RX ADMIN — CLOBETASOL PROPIONATE: 0.5 CREAM TOPICAL at 09:12

## 2024-12-12 RX ADMIN — CARVEDILOL 6.25 MG: 6.25 TABLET, FILM COATED ORAL at 08:12

## 2024-12-12 RX ADMIN — DOXYCYCLINE HYCLATE 100 MG: 100 CAPSULE ORAL at 08:12

## 2024-12-12 RX ADMIN — TUBERCULIN PURIFIED PROTEIN DERIVATIVE 5 UNITS: 5 INJECTION INTRADERMAL at 02:12

## 2024-12-12 RX ADMIN — MUPIROCIN 1 G: 20 OINTMENT TOPICAL at 08:12

## 2024-12-12 RX ADMIN — CARVEDILOL 6.25 MG: 6.25 TABLET, FILM COATED ORAL at 11:12

## 2024-12-12 RX ADMIN — CLOBETASOL PROPIONATE: 0.5 CREAM TOPICAL at 08:12

## 2024-12-12 RX ADMIN — APIXABAN 5 MG: 5 TABLET, FILM COATED ORAL at 08:12

## 2024-12-12 RX ADMIN — CHOLECALCIFEROL TAB 125 MCG (5000 UNIT) 5000 UNITS: 125 TAB at 08:12

## 2024-12-12 RX ADMIN — THERA TABS 1 TABLET: TAB at 08:12

## 2024-12-12 RX ADMIN — HYDROCODONE BITARTRATE AND ACETAMINOPHEN 1 TABLET: 5; 325 TABLET ORAL at 08:12

## 2024-12-12 RX ADMIN — POLYETHYLENE GLYCOL (3350) 17 G: 17 POWDER, FOR SOLUTION ORAL at 04:12

## 2024-12-12 RX ADMIN — DULOXETINE HYDROCHLORIDE 20 MG: 20 CAPSULE, DELAYED RELEASE ORAL at 08:12

## 2024-12-12 RX ADMIN — HYDROCODONE BITARTRATE AND ACETAMINOPHEN 1 TABLET: 5; 325 TABLET ORAL at 03:12

## 2024-12-12 RX ADMIN — GABAPENTIN 100 MG: 100 CAPSULE ORAL at 08:12

## 2024-12-12 RX ADMIN — INSULIN GLARGINE 15 UNITS: 100 INJECTION, SOLUTION SUBCUTANEOUS at 08:12

## 2024-12-12 NOTE — PLAN OF CARE
Present during SIBR. Attending discussed with pt dropping involuntary status and looking towards SNF placement. Pt states that she has no SI, was just frustrated with dealing with medical issues and feels that these are improving at this time.  She is open to rehab and this was discussed with SW following rounding. Will continue to follow.    PPD for SNF placement ordered       12/12/24 1015   Post-Acute Status   Post-Acute Authorization Placement

## 2024-12-12 NOTE — ASSESSMENT & PLAN NOTE
Anemia is likely due to chronic condition  Most recent hemoglobin and hematocrit are listed below.  Recent Labs     12/10/24  0302 12/11/24  0400 12/12/24  0449   HGB 10.1* 10.1* 9.7*   HCT 32.5* 32.6* 32.3*       Plan  Stable

## 2024-12-12 NOTE — SUBJECTIVE & OBJECTIVE
"Interval History:     No chest pain or shortness for breath.  Cellulitis better      Review of Systems  Objective:     Vital Signs (Most Recent):  Temp: 97.6 °F (36.4 °C) (12/12/24 1101)  Pulse: 105 (12/12/24 1600)  Resp: (!) 24 (12/12/24 1100)  BP: (!) 99/57 (12/12/24 1600)  SpO2: 99 % (12/12/24 1600) Vital Signs (24h Range):  Temp:  [97.6 °F (36.4 °C)-98.2 °F (36.8 °C)] 97.6 °F (36.4 °C)  Pulse:  [] 105  Resp:  [16-50] 24  SpO2:  [93 %-100 %] 99 %  BP: ()/(48-62) 99/57     Weight: 76.8 kg (169 lb 5 oz)  Body mass index is 26.52 kg/m².    Intake/Output Summary (Last 24 hours) at 12/12/2024 1638  Last data filed at 12/12/2024 1524  Gross per 24 hour   Intake 780 ml   Output 475 ml   Net 305 ml         Physical Exam  Vitals and nursing note reviewed.   HENT:      Head: Normocephalic and atraumatic.   Eyes:      Conjunctiva/sclera: Conjunctivae normal.   Neck:      Vascular: No JVD.   Cardiovascular:      Heart sounds: Normal heart sounds.   Pulmonary:      Effort: Pulmonary effort is normal.      Breath sounds: Normal breath sounds.   Abdominal:      Palpations: Abdomen is soft.   Musculoskeletal:         General: Normal range of motion.   Skin:     General: Skin is warm.   Neurological:      Mental Status: She is alert and oriented to person, place, and time.   Psychiatric:         Mood and Affect: Mood normal.             Significant Labs: All pertinent labs within the past 24 hours have been reviewed.  CBC:   Recent Labs   Lab 12/11/24  0400 12/12/24  0449   WBC 10.04 9.08   HGB 10.1* 9.7*   HCT 32.6* 32.3*    223     CMP:   Recent Labs   Lab 12/11/24  0400 12/12/24  0449    135*   K 4.1 4.7   * 111*   CO2 20* 21*   GLU 92 147*   BUN 34* 34*   CREATININE 1.2 1.2   CALCIUM 8.0* 7.9*   PROT 5.7* 5.7*   ALBUMIN 2.7* 2.6*   BILITOT 0.9 0.7   ALKPHOS 72 67   AST 18 16   ALT 12 12   ANIONGAP 7* 3*     Cardiac Markers: No results for input(s): "CKMB", "MYOGLOBIN", "BNP", "TROPISTAT" in the " last 48 hours.    Significant Imaging: I have reviewed all pertinent imaging results/findings within the past 24 hours.

## 2024-12-12 NOTE — ASSESSMENT & PLAN NOTE
Patient's FSGs are uncontrolled due to hyperglycemia on current medication regimen.  Last A1c reviewed-   Lab Results   Component Value Date    HGBA1C 6.6 (H) 09/20/2024     Most recent fingerstick glucose reviewed-   Recent Labs   Lab 12/11/24  2104 12/12/24  0701 12/12/24  1200   POCTGLUCOSE 122* 133* 136*       Current correctional scale  Low  Maintain anti-hyperglycemic dose as follows-   Antihyperglycemics (From admission, onward)    Start     Stop Route Frequency Ordered    12/09/24 2100  insulin glargine U-100 (Lantus) pen 15 Units         -- SubQ Nightly 12/08/24 2349    12/09/24 0054  insulin aspart U-100 pen 0-5 Units         -- SubQ Before meals & nightly PRN 12/08/24 2355        Hold Oral hypoglycemics while patient is in the hospital.

## 2024-12-12 NOTE — PROGRESS NOTES
Formerly [facility], later [facility] Medicine  Progress Note    Patient Name: Leslie Tolliver  MRN: [id number]  Patient Class: IP- Inpatient   Admission Date: 12/8/2024  Length of Stay: 3 days  Attending Physician: Sebastian Jimenez MD  Primary Care Provider: Chang Christianson MD        Subjective     Principal Problem:Cellulitis of leg        HPI:  89-year-old woman with a history of bullous pemphigoid for the last 4 years, CAD, diabetes, hypertension, hypothyroidism, Afib, stents to the emergency room with reports of increased pain to her wounds on both lower extremities. Denies fever, chills chest pain shortness of breath belly pain nausea or vomiting change in bowel or bladder habits.  She is not sure the last time she was on antibiotics.  She has seen a dermatologist in [place] for this.      In the ER, tachycardia, EKG with atrial fibrillation. Metoprolol 5 mg IV x2 doses given, leukocytosis 15, sodium 132, K 5.2,  KANNAN BUN 57, cr 1.7, baseline 1.2, glucose 174, , troponin I high sensitivity 10.9, Mag 2.3, blood culture sent lactic 2.15, chest x-ray with No acute intrathoracic process.  IV normal saline at 500 ml, cefepime, vancomycin, magnesium, Hydrocodone, and morphine given in ER  Admit to hospital medicine for AFib RVR, skin infection    Overview/Hospital Course:  89 year old female with bullous phemphigoid (dx 4 years ago), CAD with stenting, DM, HTN, hypothyroidism, afib (on Eliquis), bioprosthetic AVR. 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 Patient evaluated in the ER and noted to have leukocytosis, EKG with Afib, CMP with KANNAN with K 5.2 and was admitted to hospital medicine service for further evaluation.   The patient was started on cefipime and vancomycin. ID and wound care consulted. Wound care evaluated patient and recommended nystatin powder to skin folds under bilateral breasts and bilateral groin and Triad  to be applied to LLE blisters to evaluate response to treatment and later added clobetasol cream as well . Blood culture negative and vanco DC and  added doxy . She was pec after patient arrived to the hospital room and possible suicide ideation.  But patient was under stress with all these different medical problems and she is totally intelligent and awake alert and good insight and later denied suicidal or homicidal ideation and very grateful and discontinued PEC.  Patient has cellulitis gradually better and physical therapy assessed and recommended skilled nursing placement and awaited for the placement      Interval History:     No chest pain or shortness for breath.  Cellulitis better      Review of Systems  Objective:     Vital Signs (Most Recent):  Temp: 97.6 °F (36.4 °C) (12/12/24 1101)  Pulse: 105 (12/12/24 1600)  Resp: (!) 24 (12/12/24 1100)  BP: (!) 99/57 (12/12/24 1600)  SpO2: 99 % (12/12/24 1600) Vital Signs (24h Range):  Temp:  [97.6 °F (36.4 °C)-98.2 °F (36.8 °C)] 97.6 °F (36.4 °C)  Pulse:  [] 105  Resp:  [16-50] 24  SpO2:  [93 %-100 %] 99 %  BP: ()/(48-62) 99/57     Weight: 76.8 kg (169 lb 5 oz)  Body mass index is 26.52 kg/m².    Intake/Output Summary (Last 24 hours) at 12/12/2024 1638  Last data filed at 12/12/2024 1524  Gross per 24 hour   Intake 780 ml   Output 475 ml   Net 305 ml         Physical Exam  Vitals and nursing note reviewed.   HENT:      Head: Normocephalic and atraumatic.   Eyes:      Conjunctiva/sclera: Conjunctivae normal.   Neck:      Vascular: No JVD.   Cardiovascular:      Heart sounds: Normal heart sounds.   Pulmonary:      Effort: Pulmonary effort is normal.      Breath sounds: Normal breath sounds.   Abdominal:      Palpations: Abdomen is soft.   Musculoskeletal:         General: Normal range of motion.   Skin:     General: Skin is warm.   Neurological:      Mental Status: She is alert and oriented to person, place, and time.   Psychiatric:         Mood and  "Affect: Mood normal.             Significant Labs: All pertinent labs within the past 24 hours have been reviewed.  CBC:   Recent Labs   Lab 12/11/24  0400 12/12/24 0449   WBC 10.04 9.08   HGB 10.1* 9.7*   HCT 32.6* 32.3*    223     CMP:   Recent Labs   Lab 12/11/24  0400 12/12/24 0449    135*   K 4.1 4.7   * 111*   CO2 20* 21*   GLU 92 147*   BUN 34* 34*   CREATININE 1.2 1.2   CALCIUM 8.0* 7.9*   PROT 5.7* 5.7*   ALBUMIN 2.7* 2.6*   BILITOT 0.9 0.7   ALKPHOS 72 67   AST 18 16   ALT 12 12   ANIONGAP 7* 3*     Cardiac Markers: No results for input(s): "CKMB", "MYOGLOBIN", "BNP", "TROPISTAT" in the last 48 hours.    Significant Imaging: I have reviewed all pertinent imaging results/findings within the past 24 hours.    Assessment and Plan     * Cellulitis of leg  BLE possible part of  bullous pemphigoid  DC vancomycin, cefepime to continue   IV pain meds  Turn q2 hrs.   doxy        Urinary retention  Seen by urology and want to keep until discharge   Follow up in 1 to 2 weeks as per urology      Goals of care, counseling/discussion  Advance Care Planning           ACP (advance care planning)  Full code       Anemia  Anemia is likely due to chronic condition  Most recent hemoglobin and hematocrit are listed below.  Recent Labs     12/10/24  0302 12/11/24 0400 12/12/24 0449   HGB 10.1* 10.1* 9.7*   HCT 32.5* 32.6* 32.3*       Plan  Stable     KANNAN (acute kidney injury)  . Baseline creatinine is  1.2 . Most recent creatinine and eGFR are listed below.  Recent Labs     12/10/24  0302 12/11/24 0400 12/12/24 0449   CREATININE 1.4 1.2 1.2   EGFRNORACEVR 35.7* 43.0* 43.0*        Plan  - KANNAN is  new admit  - Avoid nephrotoxins and renally dose meds for GFR listed above  - Monitor urine output, serial BMP, and adjust therapy as needed  resolved      Hyperkalemia  Hyperkalemia is likely due to KANNAN.The patients most recent potassium results are listed below.  Recent Labs     12/10/24  0302 12/11/24  0400 " 12/12/24  0449   K 4.1 4.1 4.7       Plan  - Monitor for arrhythmias with EKG and/or continuous telemetry.   - Treat the hyperkalemia with Potassium Binders.   - Monitor potassium: Daily  - The patient's hyperkalemia is  new admit    Resolved now           Hyponatremia  The patient's most recent sodium results are listed below.  Recent Labs     12/10/24  0302 12/11/24  0400 12/12/24  0449    138 135*       Plan  - Correct the sodium by 4-6mEq in 24 hours.   - Will treat the hyponatremia with IV fluids as follows: LR and fluid restriction  - Monitor sodium Daily.   - Patient hyponatremia is  new admit  resolved      Type 2 diabetes mellitus  Patient's FSGs are uncontrolled due to hyperglycemia on current medication regimen.  Last A1c reviewed-   Lab Results   Component Value Date    HGBA1C 6.6 (H) 09/20/2024     Most recent fingerstick glucose reviewed-   Recent Labs   Lab 12/11/24  2104 12/12/24  0701 12/12/24  1200   POCTGLUCOSE 122* 133* 136*       Current correctional scale  Low  Maintain anti-hyperglycemic dose as follows-   Antihyperglycemics (From admission, onward)      Start     Stop Route Frequency Ordered    12/09/24 2100  insulin glargine U-100 (Lantus) pen 15 Units         -- SubQ Nightly 12/08/24 2349    12/09/24 0054  insulin aspart U-100 pen 0-5 Units         -- SubQ Before meals & nightly PRN 12/08/24 2355          Hold Oral hypoglycemics while patient is in the hospital.    Atrial fibrillation  Patient has paroxysmal (<7 days) atrial fibrillation. Patient is currently in atrial fibrillation. [id number] Score: 4. The patients heart rate in the last 24 hours is as follows:  Pulse  Min: 96  Max: 111     Antiarrhythmics       Anticoagulants  apixaban tablet 5 mg, 2 times daily, Oral    Plan  - Replete lytes with a goal of K>4, Mg >2  - Patient is anticoagulated, see medications listed above.  - Patient's afib is currently controlled  -         Bullous pemphigoid  Chronic  On cellcept outpatient,  followed by dermatology.  We will try to contact patient's dermatologist and possible started CellCept.  Currently there is no CellCept in her active home medication  High dose of steroid  Doxycycline  Consult wound care and follow recommendation   Tried to reached out to dermatologist in the given and number and not getting through   Patient literally confirmed that she was taking cellcept which was not in her home meds . Restarted        VTE Risk Mitigation (From admission, onward)           Ordered     apixaban tablet 5 mg  2 times daily         12/11/24 0952     IP VTE HIGH RISK PATIENT  Once         12/08/24 2354     Place sequential compression device  Until discontinued         12/08/24 2354                    Discharge Planning   MORA: 12/13/2024     Code Status: DNR   Medical Readiness for Discharge Date:   Discharge Plan A: Skilled Nursing Facility   Discharge Delays: None known at this time                    Sebastian Jimenez MD  Department of Hospital Medicine   [facility]

## 2024-12-12 NOTE — PLAN OF CARE
3:31  Kellee with  and  contacted [facility] and stated Pt was denied by  Luna, NEETA, and Adam due to her wounds.    2:23  Latrice at [facility] stated she can accept Pt. SW met with PT at bedside to discuss the possibility of [place]. Pt stated she does not want to go to [place] and added she doesn't want to go to [place]. SW stated she will still advocate for her going to the other two facilities in [place].    NEIDA contacted Ronda at  and asked for an answer since there is no answer in Epic. Awaiting response. Rimma at [facility] stated she cannot accept Pt unless she has 3 in network denials.    10:34  NEIDA met with Pt at bedside and spoke about our conversation yesterday. Pt stated she did not recall our conversation. SW explained again the process of the PEC and CEC, and the directions in which both can go and the results.     Pt verbalized understanding and stated she had no sincere intent of hurting herself, she felt frustrated at been in pain and hurting for so long and she made the remark.   SW spoke to her about recommendations if the CEC is lifted and that recs are for SNF. Pt is agreeable and stated her preference is [facility] since she lives in [place] and has been to  and doesn't like the food there.     NEIDA obtained Pt choice and sent referrals in [facility].

## 2024-12-12 NOTE — ASSESSMENT & PLAN NOTE
Chronic  On cellcept outpatient, followed by dermatology.  We will try to contact patient's dermatologist and possible started CellCept.  Currently there is no CellCept in her active home medication  High dose of steroid  Doxycycline  Consult wound care and follow recommendation   Tried to reached out to dermatologist in the given and number and not getting through   Patient literally confirmed that she was taking cellcept which was not in her home meds . Restarted

## 2024-12-12 NOTE — ASSESSMENT & PLAN NOTE
The patient's most recent sodium results are listed below.  Recent Labs     12/10/24  0302 12/11/24  0400 12/12/24  0449    138 135*       Plan  - Correct the sodium by 4-6mEq in 24 hours.   - Will treat the hyponatremia with IV fluids as follows: LR and fluid restriction  - Monitor sodium Daily.   - Patient hyponatremia is  new admit  resolved

## 2024-12-12 NOTE — PLAN OF CARE
Problem: Adult Inpatient Plan of Care  Goal: Plan of Care Review  Outcome: Progressing  Goal: Patient-Specific Goal (Individualized)  Outcome: Progressing  Goal: Absence of Hospital-Acquired Illness or Injury  Outcome: Progressing  Goal: Optimal Comfort and Wellbeing  Outcome: Progressing  Goal: Readiness for Transition of Care  Outcome: Progressing     Problem: Suicide Risk  Goal: Absence of Self-Harm  Outcome: Progressing     Problem: Wound  Goal: Optimal Coping  Outcome: Progressing  Goal: Optimal Functional Ability  Outcome: Progressing  Goal: Absence of Infection Signs and Symptoms  Outcome: Progressing  Goal: Improved Oral Intake  Outcome: Progressing  Goal: Optimal Pain Control and Function  Outcome: Progressing  Goal: Skin Health and Integrity  Outcome: Progressing  Goal: Optimal Wound Healing  Outcome: Progressing     Problem: Acute Kidney Injury/Impairment  Goal: Fluid and Electrolyte Balance  Outcome: Progressing  Goal: Improved Oral Intake  Outcome: Progressing  Goal: Effective Renal Function  Outcome: Progressing     Problem: Fall Injury Risk  Goal: Absence of Fall and Fall-Related Injury  Outcome: Progressing     Problem: Skin Injury Risk Increased  Goal: Skin Health and Integrity  Outcome: Progressing     Problem: Infection  Goal: Absence of Infection Signs and Symptoms  Outcome: Progressing

## 2024-12-12 NOTE — ASSESSMENT & PLAN NOTE
Hyperkalemia is likely due to KANNAN.The patients most recent potassium results are listed below.  Recent Labs     12/10/24  0302 12/11/24  0400 12/12/24  0449   K 4.1 4.1 4.7       Plan  - Monitor for arrhythmias with EKG and/or continuous telemetry.   - Treat the hyperkalemia with Potassium Binders.   - Monitor potassium: Daily  - The patient's hyperkalemia is  new admit    Resolved now

## 2024-12-12 NOTE — PLAN OF CARE
Pt choice obtained, HM notes as preference. Referrals sent.     12/12/24 1039   Discharge Reassessment   Assessment Type Discharge Planning Reassessment   Did the patient's condition or plan change since previous assessment? Yes   Discharge Plan discussed with: Patient   Communicated MORA with patient/caregiver Yes   Discharge Plan A Skilled Nursing Facility   Discharge Plan B Home Health   DME Needed Upon Discharge  none   Transition of Care Barriers None   Post-Acute Status   Post-Acute Authorization Placement   Post-Acute Placement Status Referrals Sent   Patient choice form signed by patient/caregiver List from [employer]   Discharge Delays None known at this time

## 2024-12-12 NOTE — PT/OT/SLP PROGRESS
Physical Therapy Treatment    Patient Name:  Leslie Tolliver   MRN:  [id number]    Recommendations:     Discharge Recommendations: Moderate Intensity Therapy  Discharge Equipment Recommendations: to be determined by next level of care  Barriers to discharge:  medical status    Assessment:     Leslie Tolliver is a 89 y.o. female admitted with a medical diagnosis of Cellulitis of leg.  She presents with the following impairments/functional limitations: weakness, impaired endurance, impaired functional mobility, gait instability, impaired balance, decreased lower extremity function, decreased safety awareness, impaired cardiopulmonary response to activity Patient found resting and c/o pain due to her sores. She was agreeable to sitting up and performing exercises. Patient performed ankle pumps, quad sets and glute sets supine 5 reps x 2 to encourage blood flow and lessen pain. Bed mobility was modA x2 to manage her wound covered LE and lines/leads. Sat EOB unsupported with initial dizziness that subsided. Patient performed seated LAQs 5 reps x2 each LE to lessen pain, encourage blood flow, and improve strength and mobility. As patient sat unsupported, wounds were noted be be bleeding and LE too painful for socks. Patient assisted back to bed modA x2.    Rehab Prognosis: Good; patient would benefit from acute skilled PT services to address these deficits and reach maximum level of function.    Recent Surgery: * No surgery found *      Plan:     During this hospitalization, patient to be seen 6 x/week to address the identified rehab impairments via gait training, therapeutic activities, therapeutic exercises, neuromuscular re-education and progress toward the following goals:    Plan of Care Expires:  01/07/25    Subjective     Chief Complaint: pain from my disease  Patient/Family Comments/goals: I will sit up  Pain/Comfort:         Objective:     Communicated with nurse prior to session.  Patient found HOB elevated  with bed alarm, PureWick, peripheral IV, telemetry upon PT entry to room.     General Precautions: Standard, diabetic, fall  Orthopedic Precautions: N/A  Braces: N/A  Respiratory Status: Room air     Functional Mobility:  Bed Mobility:     Supine to Sit: maximal assistance  Sit to Supine: maximal assistance  Balance: sitting EOB  good balance      AM-PAC 6 CLICK MOBILITY  Turning over in bed (including adjusting bedclothes, sheets and blankets)?: 2  Sitting down on and standing up from a chair with arms (e.g., wheelchair, bedside commode, etc.): 2  Moving from lying on back to sitting on the side of the bed?: 2  Moving to and from a bed to a chair (including a wheelchair)?: 2  Need to walk in hospital room?: 1  Climbing 3-5 steps with a railing?: 1  Basic Mobility Total Score: 10       Treatment & Education:  Pt educated on POC, discharge recommendation, need for assist with mobility, use of call bell to seek assistance as needed and fall prevention      Patient left HOB elevated with all lines intact, call button in reach, bed alarm on, and nurse notified..    GOALS:   Multidisciplinary Problems       Physical Therapy Goals          Problem: Physical Therapy    Goal Priority Disciplines Outcome Interventions   Physical Therapy Goal     PT, PT/OT Progressing    Description: Goals to be met by: 25     Patient will increase functional independence with mobility by performin. Supine to sit with Supervision using bed rail  2. Sit to stand transfer with Minimal Assistance  3. Bed to chair transfer with Minimal Assistance using Rolling Walker  4. Gait  x 25 feet with Minimal Assistance using Rolling Walker.                             Time Tracking:     PT Received On: 24  PT Start Time: 1112     PT Stop Time: 1137  PT Total Time (min): 25 min     Billable Minutes: Therapeutic Activity 25    Treatment Type: Treatment  PT/PTA: PT           2024

## 2024-12-12 NOTE — CARE UPDATE
12/11/24 1930   Patient Assessment/Suction   Level of Consciousness (AVPU) alert   Respiratory Effort Unlabored   Expansion/Accessory Muscles/Retractions no use of accessory muscles   All Lung Fields Breath Sounds clear;equal bilaterally   Rhythm/Pattern, Respiratory no shortness of breath reported   Cough Frequency no cough   PRE-TX-O2   Device (Oxygen Therapy) room air   SpO2 100 %   Pulse Oximetry Type Continuous   $ Pulse Oximetry - Multiple Charge Pulse Oximetry - Multiple   Pulse 100   Resp (!) 23   Positioning   Head of Bed (HOB) Positioning HOB elevated;HOB at 30 degrees

## 2024-12-12 NOTE — PROGRESS NOTES
Infectious Disease  Consult Note//  progress note    Patient Name: Leslie Tolliver   MRN: [id number]   Admission Date: 12/8/2024   Hospital Length of Stay: 3 days  Attending Physician: Sebastian Jimenez MD   Primary Care Provider: Chang Christianson MD     Isolation Status: No active isolations       Impression     ID is following this patient for bilateral lower extremity cellulitis  A case of bullous pemphigoid  She was on vanco and cefepime     Since there is symptomatic improvement of the redness and swelling of both the lower limbs, primary team stopped vancomycin switched to doxycycline orally   She is still on cefepime     She is now developing urine retention status post Grajeda catheter   Urology consult pending   No fevers   No white cell count   No diarrhea     WBCs 9   Creatinine 1.2    Recommendations    She has shown significant improvement in the cellulitis   I will DC cefepime   I will put her on cefdinir, thin and mg a day   Continue the doxycycline   She can get this regimen for the next 7 days   She should not mix doxy with dairy products, vitamins and minerals   Id will sign off   Please re-consult us if need be      Portions of this note dictated using EMR integrated voice recognition software, and may be subject to voice recognition errors not corrected at proofreading. Please contact writer for clarification if needed.    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    GERD  (gastroesophageal reflux disease)     Hyperlipidemia     Hypertension     Hypothyroidism     Paroxysmal atrial fibrillation 3/16/2023    Type 2 diabetes mellitus         Past Surgical History:   Procedure Laterality Date    ADENOIDECTOMY      AORTIC VALVE REPLACEMENT      BREAST BIOPSY Right 20 yrs ago    benign    CARDIAC SURGERY      CHOLECYSTECTOMY      COLONOSCOPY  5-    Dr Holly, 5 year recheck    EPIDURAL STEROID INJECTION N/A 3/25/2021    Procedure: Injection, Steroid, Epidural L3-4;  Surgeon: Sky Love MD;  Location: [facility] OR;  Service: Pain Management;  Laterality: N/A;  Injection, Steroid, Epidural L3-4    EPIDURAL STEROID INJECTION N/A 5/20/2021    Procedure: Injection, Steroid, Epidural L3-4;  Surgeon: Sky Love MD;  Location: [facility] OR;  Service: Pain Management;  Laterality: N/A;  Injection, Steroid, Epidural L3-4    ESOPHAGOGASTRODUODENOSCOPY N/A 6/4/2020    Procedure: EGD (ESOPHAGOGASTRODUODENOSCOPY);  Surgeon: Michael Nugent III, MD;  Location: [facility] ENDO;  Service: Endoscopy;  Laterality: N/A;    HEMORRHOID SURGERY      HYSTERECTOMY      INTRAMEDULLARY RODDING OF FEMUR Left 5/23/2024    Procedure: INSERTION, INTRAMEDULLARY RACQUEL, FEMUR;  Surgeon: Bravo Guillermo MD;  Location: [facility] OR;  Service: Orthopedics;  Laterality: Left;    OOPHORECTOMY      TONSILLECTOMY         Review of patient's allergies indicates:   Allergen Reactions    Fenofibric acid (choline)      Other reaction(s): Vomiting    Iodine      Other reaction(s): lips swollen ivp dye    Rosuvastatin      Other reaction(s): muscle pain        Family History       Problem Relation (Age of Onset)    Cancer Father    Diabetes Mother, Brother    Early death Son, Son    Glaucoma Mother    Heart disease Mother    No Known Problems Daughter               Social History     Socioeconomic History    Marital status:    Tobacco Use    Smoking status: Former     Current packs/day: 0.00     Average packs/day: 0.3 packs/day for  15.0 years (3.8 ttl pk-yrs)     Types: Cigarettes     Start date: 3/30/1959     Quit date: 3/30/1974     Years since quittin.7    Smokeless tobacco: Never   Substance and Sexual Activity    Alcohol use: No    Drug use: No    Sexual activity: Not Currently     Social Drivers of Health     Financial Resource Strain: Low Risk  (2024)    Overall Financial Resource Strain (CARDIA)     Difficulty of Paying Living Expenses: Not hard at all   Food Insecurity: No Food Insecurity (2024)    Hunger Vital Sign     Worried About Running Out of Food in the Last Year: Never true     Ran Out of Food in the Last Year: Never true   Transportation Needs: No Transportation Needs (2024)    TRANSPORTATION NEEDS     Transportation : No   Physical Activity: Unknown (10/14/2022)    Exercise Vital Sign     Days of Exercise per Week: Patient declined     Minutes of Exercise per Session: Patient declined   Stress: No Stress Concern Present (2024)    [employer] - Occupational Stress Questionnaire     Feeling of Stress : Not at all   Housing Stability: Low Risk  (2024)    Housing Stability Vital Sign     Unable to Pay for Housing in the Last Year: No     Homeless in the Last Year: No        Lines/Drains/Airways       Drain  Duration                  Urethral Catheter 12/10/24 1802 16 Fr. 1 day              Peripheral Intravenous Line  Duration                  Peripheral IV - Single Lumen 24 20 G Distal;Posterior;Right Forearm 3 days                     Medication:  Medications Prior to Admission   Medication Sig    apixaban (ELIQUIS) 5 mg Tab Take 1 tablet (5 mg total) by mouth 2 (two) times daily.    carvediloL (COREG) 12.5 MG tablet Take 1 tablet (12.5 mg total) by mouth 2 (two) times daily.    cholecalciferol, vitamin D3, (VITAMIN D3) 125 mcg (5,000 unit) Tab Take 1 tablet (5,000 Units total) by mouth once daily.    empagliflozin (JARDIANCE) 10 mg tablet Take 1 tablet (10  mg total) by mouth once daily.    furosemide (LASIX) 20 MG tablet Take 1 tablet (20 mg total) by mouth once daily.    HYDROcodone-acetaminophen (NORCO) 5-325 mg per tablet Take 1 tablet by mouth every 12 (twelve) hours as needed for Pain.    insulin (LANTUS SOLOSTAR U-100 INSULIN) glargine 100 units/mL SubQ pen Inject 24 Units into the skin every evening.    ketoconazole (NIZORAL) 2 % cream Apply 1 application  topically 2 (two) times daily.    metFORMIN (GLUCOPHAGE) 1000 MG tablet Take 1 tablet (1,000 mg total) by mouth 2 (two) times daily with meals.    multivitamin (THERAGRAN) per tablet Take 1 tablet by mouth once daily.          Antimicrobials:  Antibiotics (From admission, onward)      Start     Stop Route Frequency Ordered    12/12/24 1115  cefdinir capsule 300 mg         12/19/24 0859 Oral Every 12 hours 12/12/24 1014    12/11/24 0900  mupirocin 2 % ointment         12/16/24 0859 Nasl 2 times daily 12/11/24 0736    12/10/24 1130  doxycycline capsule 100 mg         -- Oral Every 12 hours 12/10/24 1125             Antifungals (From admission, onward)      None            Antivirals (From admission, onward)      None               Review of Systems   ROS        Objective:     Vital Signs (Most Recent):  Temp: 97.9 °F (36.6 °C) (12/12/24 0704)  Pulse: 99 (12/12/24 0900)  Resp: (!) 27 (12/12/24 0900)  BP: (!) 90/53 (12/12/24 0900)  SpO2: 99 % (12/12/24 0900)  Vital Signs (24h Range):  Temp:  [97.6 °F (36.4 °C)-98.2 °F (36.8 °C)] 97.9 °F (36.6 °C)  Pulse:  [] 99  Resp:  [16-50] 27  SpO2:  [93 %-100 %] 99 %  BP: ()/(46-61) 90/53      Weight:   Wt Readings from Last 1 Encounters:   12/09/24 76.8 kg (169 lb 5 oz)      Body mass index is Body mass index is 26.52 kg/m².     Estimated Creatinine Clearance: Estimated Creatinine Clearance: 33.3 mL/min (based on SCr of 1.2 mg/dL).     Physical Exam  Physical Exam    Room air   She is not in distress   She is afebrile   Hemodynamically stable   Bilateral lower  extremity swelling and redness is much better  Significant Labs: Blood Culture:   Recent Labs   Lab 10/07/24  1440 10/07/24  1449 12/08/24 2210 12/08/24 2217   LABBLOO No growth after 5 days. No growth after 5 days. No Growth to date  No Growth to date  No Growth to date  No Growth to date No Growth to date  No Growth to date  No Growth to date  No Growth to date         Microbiology Results (last 7 days)       Procedure Component Value Units Date/Time    Urine culture [[id number]] Collected: 12/09/24 1842    Order Status: Completed Specimen: Urine Updated: 12/12/24 0709     Urine Culture, Routine No growth    Narrative:      Specimen Source->Urine    Blood culture x two cultures. Draw prior to antibiotics. [[id number]] Collected: 12/08/24 2210    Order Status: Completed Specimen: Blood from Peripheral, Forearm, Left Updated: 12/12/24 0232     Blood Culture, Routine No Growth to date      No Growth to date      No Growth to date      No Growth to date    Narrative:      Aerobic and anaerobic    Blood culture x two cultures. Draw prior to antibiotics. [[id number]] Collected: 12/08/24 2217    Order Status: Completed Specimen: Blood from Peripheral, Antecubital, Right Updated: 12/12/24 0232     Blood Culture, Routine No Growth to date      No Growth to date      No Growth to date      No Growth to date    Narrative:      Aerobic and anaerobic             Significant Imaging: I have reviewed all pertinent imaging results/findings within the past 24 hours.      ZIYAD CALVILLO MD  Infectious Disease

## 2024-12-12 NOTE — CONSULTS
Interprofessional Telephone Consult Note  Specialty Consulted: Urology  Staff Consulted: Dr. Kayleigh Millard  Date of Service: 12/12/2024      Consulting Physician: Dr. Jimenez  Reason for Consult: Urinary retention       HPI:    Patient with a history of CAD, DM, HTN, hypothyroidism and atrial fibrillation who is admitted with cellulitis. During admission, patient noted to have significant difficulty voiding.     She underwent CIC per nursing, but remained unable to void. PVR elevated and nursing attempted to place pfeiffer, but could not stating that she was swollen on 12/10/24. Walked team through placing catheter and finally able to place indwelling pfeiffer. Urology consulted to assist with recs.     No known prior history of urinary retention.     Plan:    Urinary retention      My recommendation is to:  Continue pfeiffer x 5 days   If the patient is still here in the next 5 days, the patient can potentially have another voiding trial. If voiding trial attempted the catheter must be removed first thing in the morning and bladder scan must be done post void and recorded in a nurses note x 3. If residuals >300 then pfeiffer catheter should be inserted and a nurses note should document if the patient had the urge to void prior to pfeiffer catheter and how much drained. The patient can then follow up with urology nurse practitioner in 1-2 weeks after discharge for another voiding trial and plan.   If pt is discharged to a skilled nursing facility, should have a residual checked post void with in and out catheter to document if patient is emptying. Evidence of voiding does not guarantee that the patient is emptying.   The patient should have urology f/u 1-2 months post-discharge with a post void residual if patient voids while still inpatient. If no appointment made prior to discharge, patient should contact our office to schedule appointment or with the urologist they have seen previously if they have one.           Time spent  reviewing records, making plan and formulating plan: 35 min. More than half the time was devoted to medical consultative verbal/internet discussion. >50% in discussion with provider about recommendations, provided verbally and in wiriting, and pt gave verbal consent to discuss care with subsepcialists    Each patient to whom he or she provides medical services by telemedicine is:    (1)The patient is aware of and consented to the discussion.   (2) informed of the relationship between the physician and patient and the respective role of any other health care provider with respect to management of the patient; and   (3) notified that he or she may decline to receive medical services by telemedicine and may withdraw from such care at any time.    This service was not originating from a related E/M service provided within the previous 7 days nor will  to an E/M service or procedure within the next 24 hours or my soonest available appointment.  Prevailing standard of care was able to be met in this  audio-only visit.    Both a written plan and a verbal/internet discussion were discussed with the consulting physician  Coding: Verbal and Written Plan

## 2024-12-12 NOTE — ASSESSMENT & PLAN NOTE
Patient has paroxysmal (<7 days) atrial fibrillation. Patient is currently in atrial fibrillation. [id number] Score: 4. The patients heart rate in the last 24 hours is as follows:  Pulse  Min: 96  Max: 111     Antiarrhythmics       Anticoagulants  apixaban tablet 5 mg, 2 times daily, Oral    Plan  - Replete lytes with a goal of K>4, Mg >2  - Patient is anticoagulated, see medications listed above.  - Patient's afib is currently controlled  -

## 2024-12-12 NOTE — ASSESSMENT & PLAN NOTE
. Baseline creatinine is  1.2 . Most recent creatinine and eGFR are listed below.  Recent Labs     12/10/24  0302 12/11/24  0400 12/12/24  0449   CREATININE 1.4 1.2 1.2   EGFRNORACEVR 35.7* 43.0* 43.0*        Plan  - [facility] is  new admit  - Avoid nephrotoxins and renally dose meds for GFR listed above  - Monitor urine output, serial BMP, and adjust therapy as needed  resolved

## 2024-12-13 VITALS
WEIGHT: 169.31 LBS | HEIGHT: 67 IN | OXYGEN SATURATION: 97 % | BODY MASS INDEX: 26.57 KG/M2 | RESPIRATION RATE: 20 BRPM | TEMPERATURE: 98 F | DIASTOLIC BLOOD PRESSURE: 74 MMHG | HEART RATE: 100 BPM | SYSTOLIC BLOOD PRESSURE: 119 MMHG

## 2024-12-13 LAB
ALBUMIN SERPL BCP-MCNC: 2.7 G/DL (ref 3.5–5.2)
ALP SERPL-CCNC: 73 U/L (ref 55–135)
ALT SERPL W/O P-5'-P-CCNC: 12 U/L (ref 10–44)
ANION GAP SERPL CALC-SCNC: 3 MMOL/L (ref 8–16)
AST SERPL-CCNC: 14 U/L (ref 10–40)
BASOPHILS # BLD AUTO: 0.03 K/UL (ref 0–0.2)
BASOPHILS NFR BLD: 0.4 % (ref 0–1.9)
BILIRUB SERPL-MCNC: 0.6 MG/DL (ref 0.1–1)
BUN SERPL-MCNC: 35 MG/DL (ref 8–23)
CALCIUM SERPL-MCNC: 7.9 MG/DL (ref 8.7–10.5)
CHLORIDE SERPL-SCNC: 111 MMOL/L (ref 95–110)
CO2 SERPL-SCNC: 20 MMOL/L (ref 23–29)
CREAT SERPL-MCNC: 1.1 MG/DL (ref 0.5–1.4)
DIFFERENTIAL METHOD BLD: ABNORMAL
EOSINOPHIL # BLD AUTO: 1.6 K/UL (ref 0–0.5)
EOSINOPHIL NFR BLD: 19.1 % (ref 0–8)
ERYTHROCYTE [DISTWIDTH] IN BLOOD BY AUTOMATED COUNT: 19 % (ref 11.5–14.5)
EST. GFR  (NO RACE VARIABLE): 47.7 ML/MIN/1.73 M^2
GLUCOSE SERPL-MCNC: 160 MG/DL (ref 70–110)
HCT VFR BLD AUTO: 31.7 % (ref 37–48.5)
HGB BLD-MCNC: 9.8 G/DL (ref 12–16)
IMM GRANULOCYTES # BLD AUTO: 0.04 K/UL (ref 0–0.04)
IMM GRANULOCYTES NFR BLD AUTO: 0.5 % (ref 0–0.5)
LYMPHOCYTES # BLD AUTO: 0.7 K/UL (ref 1–4.8)
LYMPHOCYTES NFR BLD: 8.8 % (ref 18–48)
MCH RBC QN AUTO: 25.2 PG (ref 27–31)
MCHC RBC AUTO-ENTMCNC: 30.9 G/DL (ref 32–36)
MCV RBC AUTO: 82 FL (ref 82–98)
MONOCYTES # BLD AUTO: 0.7 K/UL (ref 0.3–1)
MONOCYTES NFR BLD: 8.2 % (ref 4–15)
NEUTROPHILS # BLD AUTO: 5.3 K/UL (ref 1.8–7.7)
NEUTROPHILS NFR BLD: 63 % (ref 38–73)
NRBC BLD-RTO: 0 /100 WBC
PLATELET # BLD AUTO: 231 K/UL (ref 150–450)
PMV BLD AUTO: 10 FL (ref 9.2–12.9)
POCT GLUCOSE: 144 MG/DL (ref 70–110)
POCT GLUCOSE: 201 MG/DL (ref 70–110)
POCT GLUCOSE: 256 MG/DL (ref 70–110)
POTASSIUM SERPL-SCNC: 4.8 MMOL/L (ref 3.5–5.1)
PROT SERPL-MCNC: 5.8 G/DL (ref 6–8.4)
RBC # BLD AUTO: 3.89 M/UL (ref 4–5.4)
SODIUM SERPL-SCNC: 134 MMOL/L (ref 136–145)
WBC # BLD AUTO: 8.38 K/UL (ref 3.9–12.7)

## 2024-12-13 PROCEDURE — 36415 COLL VENOUS BLD VENIPUNCTURE: CPT | Performed by: NURSE PRACTITIONER

## 2024-12-13 PROCEDURE — 63600175 PHARM REV CODE 636 W HCPCS: Performed by: INTERNAL MEDICINE

## 2024-12-13 PROCEDURE — 82962 GLUCOSE BLOOD TEST: CPT

## 2024-12-13 PROCEDURE — 25000003 PHARM REV CODE 250: Performed by: NURSE PRACTITIONER

## 2024-12-13 PROCEDURE — 80053 COMPREHEN METABOLIC PANEL: CPT | Performed by: NURSE PRACTITIONER

## 2024-12-13 PROCEDURE — 25000003 PHARM REV CODE 250: Performed by: INTERNAL MEDICINE

## 2024-12-13 PROCEDURE — 94761 N-INVAS EAR/PLS OXIMETRY MLT: CPT

## 2024-12-13 PROCEDURE — 63600175 PHARM REV CODE 636 W HCPCS: Performed by: NURSE PRACTITIONER

## 2024-12-13 PROCEDURE — 85025 COMPLETE CBC W/AUTO DIFF WBC: CPT | Performed by: NURSE PRACTITIONER

## 2024-12-13 RX ORDER — CLOBETASOL PROPIONATE 0.5 MG/G
CREAM TOPICAL 2 TIMES DAILY
Qty: 2 G | Refills: 0 | Status: SHIPPED | OUTPATIENT
Start: 2024-12-13 | End: 2025-01-12

## 2024-12-13 RX ORDER — DOXYCYCLINE 100 MG/1
100 CAPSULE ORAL EVERY 12 HOURS
Qty: 14 CAPSULE | Refills: 0 | Status: SHIPPED | OUTPATIENT
Start: 2024-12-13

## 2024-12-13 RX ORDER — QUETIAPINE FUMARATE 25 MG/1
25 TABLET, FILM COATED ORAL NIGHTLY PRN
Qty: 30 TABLET | Refills: 0 | Status: SHIPPED | OUTPATIENT
Start: 2024-12-13 | End: 2025-12-13

## 2024-12-13 RX ORDER — CARVEDILOL 6.25 MG/1
6.25 TABLET ORAL 2 TIMES DAILY
Qty: 120 TABLET | Refills: 0 | Status: SHIPPED | OUTPATIENT
Start: 2024-12-13 | End: 2025-02-11

## 2024-12-13 RX ORDER — HYDROCODONE BITARTRATE AND ACETAMINOPHEN 5; 325 MG/1; MG/1
1 TABLET ORAL EVERY 12 HOURS PRN
Qty: 12 TABLET | Refills: 0 | Status: SHIPPED | OUTPATIENT
Start: 2024-12-13 | End: 2025-01-12

## 2024-12-13 RX ORDER — CEFDINIR 300 MG/1
300 CAPSULE ORAL EVERY 12 HOURS
Qty: 14 CAPSULE | Refills: 0 | Status: SHIPPED | OUTPATIENT
Start: 2024-12-13 | End: 2024-12-20

## 2024-12-13 RX ADMIN — INSULIN ASPART 3 UNITS: 100 INJECTION, SOLUTION INTRAVENOUS; SUBCUTANEOUS at 11:12

## 2024-12-13 RX ADMIN — THERA TABS 1 TABLET: TAB at 09:12

## 2024-12-13 RX ADMIN — MUPIROCIN 1 G: 20 OINTMENT TOPICAL at 09:12

## 2024-12-13 RX ADMIN — POLYETHYLENE GLYCOL (3350) 17 G: 17 POWDER, FOR SOLUTION ORAL at 09:12

## 2024-12-13 RX ADMIN — CHOLECALCIFEROL TAB 125 MCG (5000 UNIT) 5000 UNITS: 125 TAB at 09:12

## 2024-12-13 RX ADMIN — APIXABAN 5 MG: 5 TABLET, FILM COATED ORAL at 09:12

## 2024-12-13 RX ADMIN — CARVEDILOL 6.25 MG: 6.25 TABLET, FILM COATED ORAL at 09:12

## 2024-12-13 RX ADMIN — MYCOPHENOLATE MOFETIL 1000 MG: 250 CAPSULE ORAL at 09:12

## 2024-12-13 RX ADMIN — DOXYCYCLINE HYCLATE 100 MG: 100 CAPSULE ORAL at 09:12

## 2024-12-13 RX ADMIN — CLOBETASOL PROPIONATE: 0.5 CREAM TOPICAL at 09:12

## 2024-12-13 RX ADMIN — DULOXETINE HYDROCHLORIDE 20 MG: 20 CAPSULE, DELAYED RELEASE ORAL at 09:12

## 2024-12-13 RX ADMIN — CEFDINIR 300 MG: 300 CAPSULE ORAL at 09:12

## 2024-12-13 NOTE — NURSING
Report called to Edward OCHOA at [facility]. IV removed and pt tolerated well. Pt taken to facility via wheelchair transportation. Discharge paperwork and all belongings sent with pt.

## 2024-12-13 NOTE — ASSESSMENT & PLAN NOTE
. Baseline creatinine is  1.2 . Most recent creatinine and eGFR are listed below.  Recent Labs     12/11/24  0400 12/12/24  0449 12/13/24  0353   CREATININE 1.2 1.2 1.1   EGFRNORACEVR 43.0* 43.0* 47.7*        Plan  - [facility] is  new admit  - Avoid nephrotoxins and renally dose meds for GFR listed above  - Monitor urine output, serial BMP, and adjust therapy as needed  resolved

## 2024-12-13 NOTE — PLAN OF CARE
Discharge orders and chart reviewed with no further post-acute discharge needs identified at this time.     Pt is discharging to [facility]. [facility] is setting up transport,  is on his way. Call report to [phone] to station 2. Floor nurse notified. SW spoke to Pt's daughter Litzy and notified her of Pt's discharge to [facility].    At this time, patient is cleared for discharge from Case Management standpoint.       12/13/24 1602   Final Note   Assessment Type Final Discharge Note   Anticipated Discharge Disposition SNF   What phone number can be called within the next 1-3 days to see how you are doing after discharge? [phone]   Post-Acute Status   Post-Acute Authorization Placement   Post-Acute Placement Status Set-up Complete/Auth obtained   Discharge Delays None known at this time

## 2024-12-13 NOTE — ASSESSMENT & PLAN NOTE
Patient has paroxysmal (<7 days) atrial fibrillation. Patient is currently in atrial fibrillation. [id number] Score: 4. The patients heart rate in the last 24 hours is as follows:  Pulse  Min: 95  Max: 110     Antiarrhythmics       Anticoagulants  apixaban tablet 5 mg, 2 times daily, Oral    Plan  - Replete lytes with a goal of K>4, Mg >2  - Patient is anticoagulated, see medications listed above.  - Patient's afib is currently controlled  -

## 2024-12-13 NOTE — ASSESSMENT & PLAN NOTE
Chronic  On cellcept outpatient, followed by dermatology.  We will try to contact patient's dermatologist and possible started CellCept.  Currently there is no CellCept in her active home medication  High dose of steroid  Doxycycline  Consult wound care and follow recommendation   Tried to reached out to dermatologist in the given and number and not getting through   Patient literally confirmed that she was taking cellcept which was not in her home meds . Restarted  Trying to confirmed with the daughter about her medication but not able to reach and can not leave a message  Awaited insurance approval for [facility] placement

## 2024-12-13 NOTE — PT/OT/SLP PROGRESS
"Physical Therapy      Patient Name:  Leslie Tolliver   MRN:  [id number]    Patient not seen today secondary to Pt attempted participation but was unable to tolerate transfer to EOB due to severity of leg pain with all attempts at movement. Pt tearful and emotional due to level of pain and mobility deficits. "I might never be able to walk again." 11 minutes spent with pt, but no PT charges due to inability to complete transfer to EOB. Will follow-up 12/14/24.    "

## 2024-12-13 NOTE — PROGRESS NOTES
[facility] Medicine  Progress Note    Patient Name: Leslie Tolliver  MRN: [id number]  Patient Class: IP- Inpatient   Admission Date: 12/8/2024  Length of Stay: 4 days  Attending Physician: Sebastian Jimenez MD  Primary Care Provider: Chang Christianson MD        Subjective     Principal Problem:Cellulitis of leg        HPI:  89-year-old woman with a history of bullous pemphigoid for the last 4 years, CAD, diabetes, hypertension, hypothyroidism, Afib, stents to the emergency room with reports of increased pain to her wounds on both lower extremities. Denies fever, chills chest pain shortness of breath belly pain nausea or vomiting change in bowel or bladder habits.  She is not sure the last time she was on antibiotics.  She has seen a dermatologist in [place] for this.      In the ER, tachycardia, EKG with atrial fibrillation. Metoprolol 5 mg IV x2 doses given, leukocytosis 15, sodium 132, K 5.2,  KANNAN BUN 57, cr 1.7, baseline 1.2, glucose 174, , troponin I high sensitivity 10.9, Mag 2.3, blood culture sent lactic 2.15, chest x-ray with No acute intrathoracic process.  IV normal saline at 500 ml, cefepime, vancomycin, magnesium, Hydrocodone, and morphine given in ER  Admit to hospital medicine for AFib RVR, skin infection    Overview/Hospital Course:  89 year old female with bullous phemphigoid (dx 4 years ago), CAD with stenting, DM, HTN, hypothyroidism, afib (on Eliquis), bioprosthetic AVR. 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 Patient evaluated in the ER and noted to have leukocytosis, EKG with Afib, CMP with KANNAN with K 5.2 and was admitted to hospital medicine service for further evaluation.   The patient was started on cefipime and vancomycin. ID and wound care consulted. Wound care evaluated patient and recommended nystatin powder to skin folds under bilateral breasts and bilateral groin and Triad  to be applied to LLE blisters to evaluate response to treatment and later added clobetasol cream as well . Blood culture negative and vanco DC and  added doxy . She was pec after patient arrived to the hospital room and possible suicide ideation.  But patient was under stress with all these different medical problems and she is totally intelligent and awake alert and good insight and later denied suicidal or homicidal ideation and very grateful and discontinued PEC.  Patient has cellulitis gradually better and physical therapy assessed and recommended skilled nursing placement and awaited for the placement. She was DC with doxy and cefdinir for 7 days and follow up with urology as OP ( seen by urology and wants to keep 1 week and will see as OP )      Interval History:     Seen   DC planning   On exam , leg cellulitis improved   Called daughter call and did not  and no voice mail set up     Review of Systems  Objective:     Vital Signs (Most Recent):  Temp: 99.1 °F (37.3 °C) (12/13/24 0701)  Pulse: 101 (12/13/24 0900)  Resp: 20 (12/13/24 0900)  BP: (!) 128/59 (12/13/24 0913)  SpO2: 97 % (12/13/24 0900) Vital Signs (24h Range):  Temp:  [97.6 °F (36.4 °C)-99.1 °F (37.3 °C)] 99.1 °F (37.3 °C)  Pulse:  [] 101  Resp:  [18-30] 20  SpO2:  [95 %-99 %] 97 %  BP: ()/(53-66) 128/59     Weight: 76.8 kg (169 lb 5 oz)  Body mass index is 26.52 kg/m².    Intake/Output Summary (Last 24 hours) at 12/13/2024 1034  Last data filed at 12/13/2024 0602  Gross per 24 hour   Intake 300 ml   Output 625 ml   Net -325 ml         Physical Exam  Vitals and nursing note reviewed.   HENT:      Head: Normocephalic and atraumatic.   Eyes:      Conjunctiva/sclera: Conjunctivae normal.   Neck:      Vascular: No JVD.   Cardiovascular:      Rate and Rhythm: Normal rate.      Heart sounds: Normal heart sounds.   Pulmonary:      Effort: Pulmonary effort is normal.      Breath sounds: Normal breath sounds.   Abdominal:      Palpations:  Abdomen is soft.   Musculoskeletal:         General: Normal range of motion.   Skin:     General: Skin is warm.   Neurological:      Mental Status: She is alert and oriented to person, place, and time.   Psychiatric:         Mood and Affect: Mood normal.             Significant Labs: All pertinent labs within the past 24 hours have been reviewed.  BMP:   Recent Labs   Lab 12/13/24  0353   *   *   K 4.8   *   CO2 20*   BUN 35*   CREATININE 1.1   CALCIUM 7.9*     CBC:   Recent Labs   Lab 12/12/24 0449 12/13/24 0352   WBC 9.08 8.38   HGB 9.7* 9.8*   HCT 32.3* 31.7*    231     CMP:   Recent Labs   Lab 12/12/24 0449 12/13/24 0353   * 134*   K 4.7 4.8   * 111*   CO2 21* 20*   * 160*   BUN 34* 35*   CREATININE 1.2 1.1   CALCIUM 7.9* 7.9*   PROT 5.7* 5.8*   ALBUMIN 2.6* 2.7*   BILITOT 0.7 0.6   ALKPHOS 67 73   AST 16 14   ALT 12 12   ANIONGAP 3* 3*       Significant Imaging: I have reviewed all pertinent imaging results/findings within the past 24 hours.    Assessment and Plan     * Cellulitis of leg  BLE possible part of  bullous pemphigoid  IV pain meds  Turn q2 hrs.   Doxy and cefdinir total 7 days more        Urinary retention  Seen by urology and want to keep until discharge   Follow up in 1 to 2 weeks as per urology      Goals of care, counseling/discussion  Advance Care Planning          ACP (advance care planning)  Full code       Anemia  Anemia is likely due to chronic condition  Most recent hemoglobin and hematocrit are listed below.  Recent Labs     12/11/24  0400 12/12/24 0449 12/13/24 0352   HGB 10.1* 9.7* 9.8*   HCT 32.6* 32.3* 31.7*       Plan  Stable     KANNAN (acute kidney injury)  . Baseline creatinine is  1.2 . Most recent creatinine and eGFR are listed below.  Recent Labs     12/11/24  0400 12/12/24 0449 12/13/24 0353   CREATININE 1.2 1.2 1.1   EGFRNORACEVR 43.0* 43.0* 47.7*        Plan  - KANNAN is  new admit  - Avoid nephrotoxins and renally dose meds for  GFR listed above  - Monitor urine output, serial BMP, and adjust therapy as needed  resolved      Hyperkalemia  Hyperkalemia is likely due to KANNAN.The patients most recent potassium results are listed below.  Recent Labs     12/11/24  0400 12/12/24 0449 12/13/24  0353   K 4.1 4.7 4.8       Plan  - Monitor for arrhythmias with EKG and/or continuous telemetry.   - Treat the hyperkalemia with Potassium Binders.   - Monitor potassium: Daily  - The patient's hyperkalemia is  new admit    Resolved now           Hyponatremia  The patient's most recent sodium results are listed below.  Recent Labs     12/11/24  0400 12/12/24 0449 12/13/24  0353    135* 134*       Plan  - Correct the sodium by 4-6mEq in 24 hours.   - Will treat the hyponatremia with IV fluids as follows: LR and fluid restriction  - Monitor sodium Daily.   - Patient hyponatremia is  new admit  resolved      Type 2 diabetes mellitus  Patient's FSGs are uncontrolled due to hyperglycemia on current medication regimen.  Last A1c reviewed-   Lab Results   Component Value Date    HGBA1C 6.6 (H) 09/20/2024     Most recent fingerstick glucose reviewed-   Recent Labs   Lab 12/12/24  1200 12/12/24  1655 12/12/24  1914 12/13/24  0654   POCTGLUCOSE 136* 234* 262* 144*       Current correctional scale  Low  Maintain anti-hyperglycemic dose as follows-   Antihyperglycemics (From admission, onward)      Start     Stop Route Frequency Ordered    12/09/24 2100  insulin glargine U-100 (Lantus) pen 15 Units         -- SubQ Nightly 12/08/24 2349    12/09/24 0054  insulin aspart U-100 pen 0-5 Units         -- SubQ Before meals & nightly PRN 12/08/24 2355          Hold Oral hypoglycemics while patient is in the hospital.    Atrial fibrillation  Patient has paroxysmal (<7 days) atrial fibrillation. Patient is currently in atrial fibrillation. [id number] Score: 4. The patients heart rate in the last 24 hours is as follows:  Pulse  Min: 95  Max: 110     Antiarrhythmics        Anticoagulants  apixaban tablet 5 mg, 2 times daily, Oral    Plan  - Replete lytes with a goal of K>4, Mg >2  - Patient is anticoagulated, see medications listed above.  - Patient's afib is currently controlled  -         Bullous pemphigoid  Chronic  On cellcept outpatient, followed by dermatology.  We will try to contact patient's dermatologist and possible started CellCept.  Currently there is no CellCept in her active home medication  High dose of steroid  Doxycycline  Consult wound care and follow recommendation   Tried to reached out to dermatologist in the given and number and not getting through   Patient literally confirmed that she was taking cellcept which was not in her home meds . Restarted  Trying to confirmed with the daughter about her medication but not able to reach and can not leave a message  Awaited insurance approval for [facility] re[facility] placement        VTE Risk Mitigation (From admission, onward)           Ordered     apixaban tablet 5 mg  2 times daily         12/11/24 0952     IP VTE HIGH RISK PATIENT  Once         12/08/24 2354     Place sequential compression device  Until discontinued         12/08/24 2354                    Discharge Planning   MORA: 12/13/2024     Code Status: DNR   Medical Readiness for Discharge Date: 12/13/2024  Discharge Plan A: Skilled Nursing Facility   Discharge Delays: (!) Ambulance Transport/Facility Transport                    Sebastian Jimenez MD  Department of Hospital Medicine   [facility]

## 2024-12-13 NOTE — ASSESSMENT & PLAN NOTE
The patient's most recent sodium results are listed below.  Recent Labs     12/11/24  0400 12/12/24  0449 12/13/24  0353    135* 134*       Plan  - Correct the sodium by 4-6mEq in 24 hours.   - Will treat the hyponatremia with IV fluids as follows: LR and fluid restriction  - Monitor sodium Daily.   - Patient hyponatremia is  new admit  resolved

## 2024-12-13 NOTE — ASSESSMENT & PLAN NOTE
BLE possible part of  bullous pemphigoid  IV pain meds  Turn q2 hrs.   Doxy and cefdinir total 7 days more

## 2024-12-13 NOTE — DISCHARGE SUMMARY
[facility] Medicine  Discharge Summary      Patient Name: Leslie Tolliver  MRN: [id number]  BIENVENIDO: [id number]  Patient Class: IP- Inpatient  Admission Date: 12/8/2024  Hospital Length of Stay: 4 days  Discharge Date and Time:  12/13/2024 5:28 PM  Attending Physician: No att. providers found   Discharging Provider: Sebastian Jimenez MD  Primary Care Provider: Chang Christianson MD    Primary Care Team: Networked reference to record PCT     HPI:   89-year-old woman with a history of bullous pemphigoid for the last 4 years, CAD, diabetes, hypertension, hypothyroidism, Afib, stents to the emergency room with reports of increased pain to her wounds on both lower extremities. Denies fever, chills chest pain shortness of breath belly pain nausea or vomiting change in bowel or bladder habits.  She is not sure the last time she was on antibiotics.  She has seen a dermatologist in [place] for this.      In the ER, tachycardia, EKG with atrial fibrillation. Metoprolol 5 mg IV x2 doses given, leukocytosis 15, sodium 132, K 5.2,  KANNAN BUN 57, cr 1.7, baseline 1.2, glucose 174, , troponin I high sensitivity 10.9, Mag 2.3, blood culture sent lactic 2.15, chest x-ray with No acute intrathoracic process.  IV normal saline at 500 ml, cefepime, vancomycin, magnesium, Hydrocodone, and morphine given in ER  Admit to hospital medicine for AFib RVR, skin infection    * No surgery found *      Hospital Course:   89 year old female with bullous phemphigoid (dx 4 years ago), CAD with stenting, DM, HTN, hypothyroidism, afib (on Eliquis), bioprosthetic AVR. 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 Patient evaluated in the ER and noted to have leukocytosis, EKG with Afib, CMP with KANNAN with K 5.2 and was admitted to hospital medicine service for further evaluation.   The patient was started on cefipime and vancomycin. ID and wound care  consulted. Wound care evaluated patient and recommended nystatin powder to skin folds under bilateral breasts and bilateral groin and Triad to be applied to LLE blisters to evaluate response to treatment and later added clobetasol cream as well . Blood culture negative and vanco DC and  added doxy . She was pec after patient arrived to the hospital room and possible suicide ideation.  But patient was under stress with all these different medical problems and she is totally intelligent and awake alert and good insight and later denied suicidal or homicidal ideation and very grateful and discontinued PEC.  Patient cellulitis gradually better and physical therapy assessed and recommended skilled nursing placement and discharged to SNF in stable condtion .  She was DC with doxy and cefdinir for 7 days and follow up with urology as OP ( seen by urology and wants to keep u cath 1 week and will see as OP )       Goals of Care Treatment Preferences:  Code Status: DNR    Living Will: Yes     What is most important right now is to focus on spending time at home, remaining as independent as possible, symptom/pain control, quality of life, even if it means sacrificing a little time.  Accordingly, we have decided that the best plan to meet the patient's goals includes continuing with treatment.      SDOH Screening:  The patient was screened for utility difficulties, food insecurity, transport difficulties, housing insecurity, and interpersonal safety and there were no concerns identified this admission.     Consults:   Consults (From admission, onward)          Status Ordering Provider     Inpatient consult to Urology  Once        Provider:  Kayleigh Millard Jr., MD    Completed MALU DE JESUS     Inpatient consult to Palliative Care  Once        Provider:  Remedios Easley NP    Completed MALU DE JESUS     Inpatient consult to Psychiatry  Once        Provider:  Sheree Jimenez MD    Acknowledged MANNY MANUEL      Inpatient consult to Podiatry  Once        Provider:  Jerome Keenan DPM    Acknowledged KANDACE LEVINE     Inpatient consult to Infectious Diseases  Once        Provider:  Faustino Santiago MD    Completed KANDACE LEVINE            * Cellulitis of leg  BLE possible part of  bullous pemphigoid  IV pain meds  Turn q2 hrs.   Doxy and cefdinir total 7 days more        Urinary retention  Seen by urology and want to keep until discharge   Follow up in 1 to 2 weeks as per urology      Goals of care, counseling/discussion  Advance Care Planning          ACP (advance care planning)  Full code       Anemia  Anemia is likely due to chronic condition  Most recent hemoglobin and hematocrit are listed below.  Recent Labs     12/11/24 0400 12/12/24 0449 12/13/24  0352   HGB 10.1* 9.7* 9.8*   HCT 32.6* 32.3* 31.7*       Plan  Stable     KANNAN (acute kidney injury)  . Baseline creatinine is  1.2 . Most recent creatinine and eGFR are listed below.  Recent Labs     12/11/24 0400 12/12/24 0449 12/13/24 0353   CREATININE 1.2 1.2 1.1   EGFRNORACEVR 43.0* 43.0* 47.7*        Plan  - KANNAN is  new admit  - Avoid nephrotoxins and renally dose meds for GFR listed above  - Monitor urine output, serial BMP, and adjust therapy as needed  resolved      Hyperkalemia  Hyperkalemia is likely due to KANNAN.The patients most recent potassium results are listed below.  Recent Labs     12/11/24  0400 12/12/24 0449 12/13/24 0353   K 4.1 4.7 4.8       Plan  - Monitor for arrhythmias with EKG and/or continuous telemetry.   - Treat the hyperkalemia with Potassium Binders.   - Monitor potassium: Daily  - The patient's hyperkalemia is  new admit    Resolved now           Hyponatremia  The patient's most recent sodium results are listed below.  Recent Labs     12/11/24 0400 12/12/24 0449 12/13/24  0353    135* 134*       Plan  - Correct the sodium by 4-6mEq in 24 hours.   - Will treat the hyponatremia with IV fluids as follows: LR and fluid  restriction  - Monitor sodium Daily.   - Patient hyponatremia is  new admit  resolved      Type 2 diabetes mellitus  Patient's FSGs are uncontrolled due to hyperglycemia on current medication regimen.  Last A1c reviewed-   Lab Results   Component Value Date    HGBA1C 6.6 (H) 09/20/2024     Most recent fingerstick glucose reviewed-   Recent Labs   Lab 12/12/24  1200 12/12/24  1655 12/12/24  1914 12/13/24  0654   POCTGLUCOSE 136* 234* 262* 144*       Current correctional scale  Low  Maintain anti-hyperglycemic dose as follows-   Antihyperglycemics (From admission, onward)      Start     Stop Route Frequency Ordered    12/09/24 2100  insulin glargine U-100 (Lantus) pen 15 Units         -- SubQ Nightly 12/08/24 2349    12/09/24 0054  insulin aspart U-100 pen 0-5 Units         -- SubQ Before meals & nightly PRN 12/08/24 2355          Hold Oral hypoglycemics while patient is in the hospital.    Atrial fibrillation  Patient has paroxysmal (<7 days) atrial fibrillation. Patient is currently in atrial fibrillation. [id number] Score: 4. The patients heart rate in the last 24 hours is as follows:  Pulse  Min: 95  Max: 110     Antiarrhythmics       Anticoagulants  apixaban tablet 5 mg, 2 times daily, Oral    Plan  - Replete lytes with a goal of K>4, Mg >2  - Patient is anticoagulated, see medications listed above.  - Patient's afib is currently controlled  -         Bullous pemphigoid  Chronic  On cellcept outpatient, followed by dermatology.  We will try to contact patient's dermatologist and possible started CellCept.  Currently there is no CellCept in her active home medication  High dose of steroid  Doxycycline  Consult wound care and follow recommendation   Tried to reached out to dermatologist in the given and number and not getting through   Patient literally confirmed that she was taking cellcept which was not in her home meds . Restarted  Trying to confirmed with the daughter about her medication but not able to reach  and can not leave a message  Awaited insurance approval for [facility] re[facility] placement        Final Active Diagnoses:    Diagnosis Date Noted POA    PRINCIPAL PROBLEM:  Cellulitis of leg [L03.119] 12/09/2024 Yes    Urinary retention [R33.9] 12/12/2024 No    ACP (advance care planning) [Z71.89] 12/11/2024 Not Applicable    Goals of care, counseling/discussion [Z71.89] 12/11/2024 Not Applicable    Type 2 diabetes mellitus [E11.9] 12/09/2024 Yes    Hyponatremia [E87.1] 12/09/2024 Yes    Hyperkalemia [E87.5] 12/09/2024 Yes    KANNAN (acute kidney injury) [N17.9] 12/09/2024 Yes    Anemia [D64.9] 12/09/2024 Yes    Atrial fibrillation [I48.91] 09/19/2024 Yes    Bullous pemphigoid [L12.0] 08/29/2022 Yes      Problems Resolved During this Admission:       Discharged Condition: good    Disposition: Skilled Nursing Facility    Follow Up:   Follow-up Information       Chang Christianson MD Follow up.    Specialty: Family Medicine  Contact information:  [address]  [address]  [place] [id number] [phone]               Kayleigh Millard Jr., MD. Schedule an appointment as soon as possible for a visit in 1 month(s).    Specialty: Urology  Contact information:  [address] [place] [address] [address]  [address]  [place] [address]  [phone]                           Patient Instructions:      Diet Cardiac     Diet Cardiac     Activity as tolerated     Activity as tolerated       Significant Diagnostic Studies: Labs: CMP   Recent Labs   Lab 12/12/24  0449 12/13/24  0353   * 134*   K 4.7 4.8   * 111*   CO2 21* 20*   * 160*   BUN 34* 35*   CREATININE 1.2 1.1   CALCIUM 7.9* 7.9*   PROT 5.7* 5.8*   ALBUMIN 2.6* 2.7*   BILITOT 0.7 0.6   ALKPHOS 67 73   AST 16 14   ALT 12 12   ANIONGAP 3* 3*    and CBC   Recent Labs   Lab 12/12/24  0449 12/13/24  0352   WBC 9.08 8.38   HGB 9.7* 9.8*   HCT 32.3* 31.7*    231       Pending Diagnostic Studies:       Procedure Component Value Units Date/Time    EKG 12-lead [[id number]]     Order  Status: Sent Lab Status: No result            Medications:  Reconciled Home Medications:      Medication List        START taking these medications      cefdinir 300 MG capsule  Commonly known as: OMNICEF  Take 1 capsule (300 mg total) by mouth every 12 (twelve) hours. for 7 days     clobetasoL 0.05 % cream  Commonly known as: TEMOVATE  Apply topically 2 (two) times daily.     doxycycline 100 MG Cap  Commonly known as: VIBRAMYCIN  Take 1 capsule (100 mg total) by mouth every 12 (twelve) hours.     QUEtiapine 25 MG Tab  Commonly known as: SEROQUEL  Take 1 tablet (25 mg total) by mouth nightly as needed (sleep).            CHANGE how you take these medications      carvediloL 6.25 MG tablet  Commonly known as: COREG  Take 1 tablet (6.25 mg total) by mouth 2 (two) times daily.  What changed:   medication strength  how much to take            CONTINUE taking these medications      cholecalciferol (vitamin D3) 125 mcg (5,000 unit) Tab  Commonly known as: VITAMIN D3  Take 1 tablet (5,000 Units total) by mouth once daily.     ELIQUIS 5 mg Tab  Generic drug: apixaban  Take 1 tablet (5 mg total) by mouth 2 (two) times daily.     furosemide 20 MG tablet  Commonly known as: LASIX  Take 1 tablet (20 mg total) by mouth once daily.     HYDROcodone-acetaminophen 5-325 mg per tablet  Commonly known as: NORCO  Take 1 tablet by mouth every 12 (twelve) hours as needed for Pain.     JARDIANCE 10 mg tablet  Generic drug: empagliflozin  Take 1 tablet (10 mg total) by mouth once daily.     ketoconazole 2 % cream  Commonly known as: NIZORAL  Apply 1 application  topically 2 (two) times daily.     LANTUS SOLOSTAR U-100 INSULIN 100 unit/mL (3 mL) Inpn pen  Generic drug: insulin glargine U-100 (Lantus)  Inject 24 Units into the skin every evening.     metFORMIN 1000 MG tablet  Commonly known as: GLUCOPHAGE  Take 1 tablet (1,000 mg total) by mouth 2 (two) times daily with meals.     multivitamin per tablet  Commonly known as: THERAGRAN  Take 1  tablet by mouth once daily.              Indwelling Lines/Drains at time of discharge:   Lines/Drains/Airways       Drain  Duration                  Urethral Catheter 12/10/24 1802 16 Fr. 2 days                  General: Patient resting comfortably in no acute distress. Appears as stated age. Calm  Eyes: EOM intact. No conjunctivae injection. No scleral icterus.  ENT: Hearing grossly intact. No discharge from ears. No nasal discharge.   CVS: RRR. No LE edema BL.  Lungs: CTA BL, no wheezing or crackles. Good breath sounds. No accessory muscle use. No acute respiratory distress  Neuro: non focal , Follows commands. Responds appropriately   Time spent on the discharge of patient: 31 minutes         Sebastian Jimenez MD  Department of Hospital Medicine  [facility]

## 2024-12-13 NOTE — ASSESSMENT & PLAN NOTE
Anemia is likely due to chronic condition  Most recent hemoglobin and hematocrit are listed below.  Recent Labs     12/11/24  0400 12/12/24  0449 12/13/24  0352   HGB 10.1* 9.7* 9.8*   HCT 32.6* 32.3* 31.7*       Plan  Stable

## 2024-12-13 NOTE — PLAN OF CARE
Problem: Adult Inpatient Plan of Care  Goal: Plan of Care Review  Outcome: Met  Goal: Patient-Specific Goal (Individualized)  Outcome: Met  Goal: Absence of Hospital-Acquired Illness or Injury  Outcome: Met  Goal: Optimal Comfort and Wellbeing  Outcome: Met  Goal: Readiness for Transition of Care  Outcome: Met     Problem: Suicide Risk  Goal: Absence of Self-Harm  Outcome: Met     Problem: Wound  Goal: Optimal Coping  Outcome: Met  Goal: Optimal Functional Ability  Outcome: Met  Goal: Absence of Infection Signs and Symptoms  Outcome: Met  Goal: Improved Oral Intake  Outcome: Met  Goal: Optimal Pain Control and Function  Outcome: Met  Goal: Skin Health and Integrity  Outcome: Met  Goal: Optimal Wound Healing  Outcome: Met     Problem: Acute Kidney Injury/Impairment  Goal: Fluid and Electrolyte Balance  Outcome: Met  Goal: Improved Oral Intake  Outcome: Met  Goal: Effective Renal Function  Outcome: Met     Problem: Diabetes Comorbidity  Goal: Blood Glucose Level Within Targeted Range  Outcome: Met     Problem: Fall Injury Risk  Goal: Absence of Fall and Fall-Related Injury  Outcome: Met     Problem: Skin Injury Risk Increased  Goal: Skin Health and Integrity  Outcome: Met     Problem: Infection  Goal: Absence of Infection Signs and Symptoms  Outcome: Met     Problem: Coping Ineffective  Goal: Effective Coping  Outcome: Met

## 2024-12-13 NOTE — ASSESSMENT & PLAN NOTE
Hyperkalemia is likely due to KANNAN.The patients most recent potassium results are listed below.  Recent Labs     12/11/24  0400 12/12/24  0449 12/13/24  0353   K 4.1 4.7 4.8       Plan  - Monitor for arrhythmias with EKG and/or continuous telemetry.   - Treat the hyperkalemia with Potassium Binders.   - Monitor potassium: Daily  - The patient's hyperkalemia is  new admit    Resolved now

## 2024-12-13 NOTE — PLAN OF CARE
3:13  Latrice called [facility] to inform she has auth. NEIDA told her we had orders in and could send them ASAP. Latrice stated it was late in the day and we could postpone discharge until Monday. NEIDA insisted we attempt D/C today. [facility] got clearance for orders and faxed them.      1:49  Latrice contacted [facility] and stated that Suzan wants the last 48 hours worth of Pt's PT and OT notes ASAP. [facility] faxed requested documents.    9:13  NEIDA met with Pt at bedside to discuss options of discharge. NEIDA explained that the only accepting facility is [facility]. NEIDA asked if her feelings had changed since yesterday and if she is willing to go today. She stated she is agreeable to go to [facility]. [facility] spoke to Latrice and requested she send for auth.    [facility] attempted to call Litzy, Pt's daughter but no VM was available. NEIDA believes the number listed is a house phone.

## 2024-12-13 NOTE — ASSESSMENT & PLAN NOTE
Patient's FSGs are uncontrolled due to hyperglycemia on current medication regimen.  Last A1c reviewed-   Lab Results   Component Value Date    HGBA1C 6.6 (H) 09/20/2024     Most recent fingerstick glucose reviewed-   Recent Labs   Lab 12/12/24  1200 12/12/24  1655 12/12/24  1914 12/13/24  0654   POCTGLUCOSE 136* 234* 262* 144*       Current correctional scale  Low  Maintain anti-hyperglycemic dose as follows-   Antihyperglycemics (From admission, onward)    Start     Stop Route Frequency Ordered    12/09/24 2100  insulin glargine U-100 (Lantus) pen 15 Units         -- SubQ Nightly 12/08/24 2349    12/09/24 0054  insulin aspart U-100 pen 0-5 Units         -- SubQ Before meals & nightly PRN 12/08/24 2355        Hold Oral hypoglycemics while patient is in the hospital.

## 2024-12-13 NOTE — CARE UPDATE
12/12/24 2105   Patient Assessment/Suction   Level of Consciousness (AVPU) alert   Respiratory Effort Unlabored   Expansion/Accessory Muscles/Retractions no use of accessory muscles   All Lung Fields Breath Sounds clear;equal bilaterally   Rhythm/Pattern, Respiratory no shortness of breath reported   Cough Frequency no cough   PRE-TX-O2   Device (Oxygen Therapy) room air   SpO2 98 %   Pulse Oximetry Type Continuous   $ Pulse Oximetry - Multiple Charge Pulse Oximetry - Multiple   Pulse 100   Positioning   Head of Bed (HOB) Positioning HOB elevated;HOB at 30 degrees

## 2024-12-13 NOTE — SUBJECTIVE & OBJECTIVE
Interval History:     Seen   DC planning   On exam , leg cellulitis improved   Called daughter call and did not  and no voice mail set up     Review of Systems  Objective:     Vital Signs (Most Recent):  Temp: 99.1 °F (37.3 °C) (12/13/24 0701)  Pulse: 101 (12/13/24 0900)  Resp: 20 (12/13/24 0900)  BP: (!) 128/59 (12/13/24 0913)  SpO2: 97 % (12/13/24 0900) Vital Signs (24h Range):  Temp:  [97.6 °F (36.4 °C)-99.1 °F (37.3 °C)] 99.1 °F (37.3 °C)  Pulse:  [] 101  Resp:  [18-30] 20  SpO2:  [95 %-99 %] 97 %  BP: ()/(53-66) 128/59     Weight: 76.8 kg (169 lb 5 oz)  Body mass index is 26.52 kg/m².    Intake/Output Summary (Last 24 hours) at 12/13/2024 1034  Last data filed at 12/13/2024 0602  Gross per 24 hour   Intake 300 ml   Output 625 ml   Net -325 ml         Physical Exam  Vitals and nursing note reviewed.   HENT:      Head: Normocephalic and atraumatic.   Eyes:      Conjunctiva/sclera: Conjunctivae normal.   Neck:      Vascular: No JVD.   Cardiovascular:      Rate and Rhythm: Normal rate.      Heart sounds: Normal heart sounds.   Pulmonary:      Effort: Pulmonary effort is normal.      Breath sounds: Normal breath sounds.   Abdominal:      Palpations: Abdomen is soft.   Musculoskeletal:         General: Normal range of motion.   Skin:     General: Skin is warm.   Neurological:      Mental Status: She is alert and oriented to person, place, and time.   Psychiatric:         Mood and Affect: Mood normal.             Significant Labs: All pertinent labs within the past 24 hours have been reviewed.  BMP:   Recent Labs   Lab 12/13/24  0353   *   *   K 4.8   *   CO2 20*   BUN 35*   CREATININE 1.1   CALCIUM 7.9*     CBC:   Recent Labs   Lab 12/12/24  0449 12/13/24  0352   WBC 9.08 8.38   HGB 9.7* 9.8*   HCT 32.3* 31.7*    231     CMP:   Recent Labs   Lab 12/12/24  0449 12/13/24  0353   * 134*   K 4.7 4.8   * 111*   CO2 21* 20*   * 160*   BUN 34* 35*   CREATININE  1.2 1.1   CALCIUM 7.9* 7.9*   PROT 5.7* 5.8*   ALBUMIN 2.6* 2.7*   BILITOT 0.7 0.6   ALKPHOS 67 73   AST 16 14   ALT 12 12   ANIONGAP 3* 3*       Significant Imaging: I have reviewed all pertinent imaging results/findings within the past 24 hours.

## 2024-12-13 NOTE — DISCHARGE INSTRUCTIONS
[facility]  Facility Transfer Orders        Admit to: SNF    Diagnoses:   Active Hospital Problems    Diagnosis  POA    *Cellulitis of leg [L03.119]  Yes    Urinary retention [R33.9]  No    ACP (advance care planning) [Z71.89]  Not Applicable    Goals of care, counseling/discussion [Z71.89]  Not Applicable    Type 2 diabetes mellitus [E11.9]  Yes    Hyponatremia [E87.1]  Yes    Hyperkalemia [E87.5]  Yes    KANNAN (acute kidney injury) [N17.9]  Yes    Anemia [D64.9]  Yes    Atrial fibrillation [I48.91]  Yes    Bullous pemphigoid [L12.0]  Yes      Resolved Hospital Problems   No resolved problems to display.     Allergies:   Review of patient's allergies indicates:   Allergen Reactions    Fenofibric acid (choline)      Other reaction(s): Vomiting    Iodine      Other reaction(s): lips swollen ivp dye    Rosuvastatin      Other reaction(s): muscle pain       Code Status:full    Vitals: Routine       Diet: cardiac diet    Activity: Activity as tolerated      Pending Diagnostic Studies:       Procedure Component Value Units Date/Time    EKG 12-lead [[id number]]     Order Status: Sent Lab Status: No result           Continue current wound care       IV Access:      Medications: Discontinue all previous medication orders, if any. See new list below.  Current Discharge Medication List        START taking these medications    Details   cefdinir (OMNICEF) 300 MG capsule Take 1 capsule (300 mg total) by mouth every 12 (twelve) hours. for 7 days  Qty: 14 capsule, Refills: 0      clobetasoL (TEMOVATE) 0.05 % cream Apply topically 2 (two) times daily.  Qty: 2 g, Refills: 0      doxycycline (VIBRAMYCIN) 100 MG Cap Take 1 capsule (100 mg total) by mouth every 12 (twelve) hours.  Qty: 14 capsule, Refills: 0      QUEtiapine (SEROQUEL) 25 MG Tab Take 1 tablet (25 mg total) by mouth nightly as needed (sleep).  Qty: 30 tablet, Refills: 0           CONTINUE these medications which have CHANGED    Details   carvediloL (COREG)  6.25 MG tablet Take 1 tablet (6.25 mg total) by mouth 2 (two) times daily.  Qty: 120 tablet, Refills: 0    Comments: .           CONTINUE these medications which have NOT CHANGED    Details   apixaban (ELIQUIS) 5 mg Tab Take 1 tablet (5 mg total) by mouth 2 (two) times daily.  Qty: 180 tablet, Refills: 3      cholecalciferol, vitamin D3, (VITAMIN D3) 125 mcg (5,000 unit) Tab Take 1 tablet (5,000 Units total) by mouth once daily.  Qty: 90 tablet, Refills: 3    Associated Diagnoses: Vitamin D deficiency      empagliflozin (JARDIANCE) 10 mg tablet Take 1 tablet (10 mg total) by mouth once daily.  Qty: 90 tablet, Refills: 3    Associated Diagnoses: Type 2 diabetes mellitus with diabetic polyneuropathy, with long-term current use of insulin      furosemide (LASIX) 20 MG tablet Take 1 tablet (20 mg total) by mouth once daily.  Qty: 30 tablet, Refills: 0      HYDROcodone-acetaminophen (NORCO) 5-325 mg per tablet Take 1 tablet by mouth every 12 (twelve) hours as needed for Pain.  Qty: 60 tablet, Refills: 0    Comments: Medically necessary for greater than 7 days for chronic pain  Associated Diagnoses: Bullous pemphigoid      insulin (LANTUS SOLOSTAR U-100 INSULIN) glargine 100 units/mL SubQ pen Inject 24 Units into the skin every evening.  Qty: 3 mL, Refills: 3      ketoconazole (NIZORAL) 2 % cream Apply 1 application  topically 2 (two) times daily.      metFORMIN (GLUCOPHAGE) 1000 MG tablet Take 1 tablet (1,000 mg total) by mouth 2 (two) times daily with meals.  Qty: 180 tablet, Refills: 1      multivitamin (THERAGRAN) per tablet Take 1 tablet by mouth once daily.         She should not mix doxy with dairy products, vitamins and minerals     Follow up:    Follow-up Information       Chang Christianson MD Follow up.    Specialty: Family Medicine  Contact information:  [address]  [place] [address]  [phone]               Kayleigh Millard Jr., MD. Schedule an appointment as soon as possible for a visit in 1  month(s).    Specialty: Urology  Contact information:  [address] [place] [address] [address] [facility] [address]  [phone]                               Immunizations Administered as of 12/13/2024       Name Date Dose VIS Date Route Exp Date    COVID-19, MRNA, LN-S, PF (Pfizer) (Purple Cap) 11/16/2021 0.3 mL 60357 Intramuscular --    Site: Left arm     : [employer]     Lot: [id number]     Comment: Adminis     COVID-19, MRNA, LN-S, PF (Pfizer) (Purple Cap) 2/11/2021  8:52 AM 0.3 mL 12/12/2020 Intramuscular 6/30/2021    Site: Left deltoid     Given By: Sarika Zaldivar MA     : [employer]     Lot: [id number]     Comment: Meli Guerrero RN     COVID-19, MRNA, LN-S, PF (Pfizer) (Purple Cap) 1/20/2021  8:26 AM 0.3 mL 12/12/2020 Intramuscular 5/31/2021    Site: Left deltoid     Given By: Gerri Fuentes LPN     : [employer]     Lot: [id number]               Some patients may experience side effects after vaccination.  These may include fever, headache, muscle or joint aches.  Most symptoms resolve with 24-48 hours and do not require urgent medical evaluation unless they persist for more than 72 hours or symptoms are concerning for an unrelated medical condition.          Sebastian Jimenez MD

## 2024-12-13 NOTE — PHYSICIAN QUERY
Please specify the diagnosis associated with the clinical findings.  Chronic diastolic heart failure (HFpEF)

## 2024-12-14 LAB
BACTERIA BLD CULT: NORMAL
BACTERIA BLD CULT: NORMAL

## 2024-12-26 ENCOUNTER — DOCUMENT SCAN (OUTPATIENT)
Dept: HOME HEALTH SERVICES | Facility: HOSPITAL | Age: 89
End: 2024-12-26
Payer: MEDICARE

## 2025-03-18 NOTE — H&P
[facility] - Emergency Dept  Hospital Medicine  History & Physical    Patient Name: Leslie Tolliver  MRN: [id number]  Patient Class: OP- Observation  Admission Date: 3/26/2024  Attending Physician: Thaddeus Lee MD  Primary Care Provider: Chang Christianson MD         Patient information was obtained from patient and ER records.     Subjective:     Principal Problem:<principal problem not specified>    Chief Complaint:   Chief Complaint   Patient presents with    Tachycardia     Said she was told to come to ER for elevated heart rate after visiting primary care        HPI: 89 WF with pmh of HTN and CHF hx ,  ( normal LV EF)  ,  DMII on insulin , afib on eliquis ,  also on asa / plavix ?? ,  hx Aortic valve replacement bovine    Non smoker  Non drinker    She lives at her home with her Daughter     She came in because of her legs swelling and being SOB when she tries to lay flat.      She says for last few weeks she can't lay flat because her breathing gets worse     But the swelling legs is not new and has gone on for long time.      She was hospitalized here with heart Failure last year she says and lost about 7-9 pounds     And her legs did improve.  But then slowly has filled back up .      She sees a cardiologist and they have not put her on any diuretics she says at home.     Her PCP has not either.         Her doctor had ordered some blood work lately she says because of all this and called and told her to go in to the ER last night.      The patient was not acutely worse , she just was told to come in because of her new complaints .  The PCP office saw that her BNP was high which made them suspect Heart failure .       Patient also says she has been crying a lot lately thinking about losing her  and 2 sons.   She tells me her   over 10 years ago . And her One son  at age 25 and the other  at age 3 .      But for some reason has started thinking about them and crying  a lot and then she decided she was not going to cry anymore and started to feel better last few days.         She does say she tries to cut out salt but food often tastes bad to her and sometimes she cheats and adds salt.       Her weight she thinks normally is about 170 and now she is 187 she says       In our ER her labs were ok    renal function ok  CXR ok  BNP was up   Troponin normal     They gave her some IV Lasix      And we are admitting her for the CHF     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    GERD (gastroesophageal reflux disease)     Hyperlipidemia     Hypertension     Hypothyroidism     Paroxysmal atrial fibrillation 3/16/2023    Type 2 diabetes mellitus        Past Surgical History:   Procedure Laterality Date    ADENOIDECTOMY      AORTIC VALVE REPLACEMENT      BREAST BIOPSY Right 20 yrs ago    benign    CARDIAC SURGERY      CHOLECYSTECTOMY      COLONOSCOPY  5-    Dr Holly, 5 year recheck    EPIDURAL STEROID INJECTION N/A 3/25/2021    Procedure: Injection, Steroid, Epidural L3-4;  Surgeon: Sky Love MD;  Location: [facility] OR;  Service: Pain Management;  Laterality: N/A;  Injection, Steroid, Epidural L3-4    EPIDURAL STEROID INJECTION N/A 5/20/2021    Procedure: Injection, Steroid, Epidural L3-4;  Surgeon: Sky Love MD;  Location: [facility] OR;  Service: Pain Management;  Laterality: N/A;  Injection, Steroid, Epidural L3-4    ESOPHAGOGASTRODUODENOSCOPY N/A 6/4/2020    Procedure: EGD (ESOPHAGOGASTRODUODENOSCOPY);  Surgeon: Michael Nugent III, MD;   Location: [facility];  Service: Endoscopy;  Laterality: N/A;    HEMORRHOID SURGERY      HYSTERECTOMY      OOPHORECTOMY      TONSILLECTOMY         Review of patient's allergies indicates:   Allergen Reactions    Fenofibric acid (choline)      Other reaction(s): Vomiting    Iodine      Other reaction(s): lips swollen ivp dye    Rosuvastatin      Other reaction(s): muscle pain       No current facility-administered medications on file prior to encounter.     Current Outpatient Medications on File Prior to Encounter   Medication Sig    amiodarone (PACERONE) 200 MG Tab Take 1 tablet (200 mg total) by mouth 2 (two) times daily.    apixaban (ELIQUIS) 5 mg Tab Take 5 mg by mouth 2 (two) times daily.    aspirin 81 mg Tab Take 81 mg by mouth once daily. Every day    benzonatate (TESSALON) 100 MG capsule Take 100 mg by mouth 3 (three) times daily as needed for Cough.    calcium carbonate/vitamin D3 (CALCIUM+D ORAL) Take 1 tablet by mouth once daily.    carvediloL (COREG) 12.5 MG tablet Take 1 tablet (12.5 mg total) by mouth 2 (two) times daily.    cholecalciferol, vitamin D3, (VITAMIN D3) 125 mcg (5,000 unit) Tab Take 5,000 Units by mouth once daily.    clopidogreL (PLAVIX) 75 mg tablet Take 75 mg by mouth once daily.    insulin (LANTUS SOLOSTAR U-100 INSULIN) glargine 100 units/mL SubQ pen Inject 24 Units into the skin every evening. (Patient taking differently: Inject 20 Units into the skin every evening.)    ketoconazole (NIZORAL) 2 % cream AAA pannus fold bid (Patient taking differently: Apply 1 application  topically once daily. AAA pannus fold bid)    levocetirizine (XYZAL) 5 MG tablet Take 5 mg by mouth every evening.    metFORMIN (GLUCOPHAGE) 1000 MG tablet Take 1 tablet (1,000 mg total) by mouth 2 (two) times daily with meals.    multivitamin (THERAGRAN) per tablet Take 1 tablet by mouth once daily.    blood sugar diagnostic (ACCU-CHEK GUIDE TEST STRIPS) Strp 300 each by Misc.(Non-Drug; Combo Route) route 3 (three)  "times daily.    blood-glucose meter kit Accu-chek Marley glucometer check glucose three times daily E11.65    clobetasol 0.05% (TEMOVATE) 0.05 % Oint Apply 1 g topically 2 (two) times daily.    DROPLET PEN NEEDLE 31 gauge x 5/16" Ndle USE AS DIRECTED WITH LANTUS SOLOSTAR PEN AS NEEDED    empagliflozin (JARDIANCE) 10 mg tablet Take 1 tablet (10 mg total) by mouth once daily. (Patient not taking: Reported on 3/26/2024)    lancets (ACCU-CHEK SOFTCLIX LANCETS) Misc New machine Guide Me Accuchek    mycophenolate (CELLCEPT) 250 mg Cap Take 2 capsules (500 mg total) by mouth 2 (two) times daily. (Patient not taking: Reported on 3/26/2024)    olmesartan (BENICAR) 20 MG tablet Take 1 tablet (20 mg total) by mouth once daily. (Patient not taking: Reported on 3/25/2024)    sertraline (ZOLOFT) 25 MG tablet Take 1 tablet (25 mg total) by mouth every evening. (Patient not taking: Reported on 3/25/2024)    triamcinolone acetonide 0.1% (KENALOG) 0.1 % cream AAA bid (Patient taking differently: Apply 1 g topically 2 (two) times daily. AAA bid)     Family History       Problem Relation (Age of Onset)    Cancer Father    Diabetes Mother, Brother    Early death Son, Son    Glaucoma Mother    Heart disease Mother    No Known Problems Daughter          Tobacco Use    Smoking status: Former     Current packs/day: 0.00     Average packs/day: 0.3 packs/day for 15.0 years (3.8 ttl pk-yrs)     Types: Cigarettes     Start date: 3/30/1959     Quit date: 3/30/1974     Years since quittin.0    Smokeless tobacco: Never   Substance and Sexual Activity    Alcohol use: No    Drug use: No    Sexual activity: Never     Review of Systems   Constitutional:  Positive for activity change and unexpected weight change.   Respiratory:  Positive for shortness of breath.    Cardiovascular:  Positive for leg swelling.   All other systems reviewed and are negative.    Objective:     Vital Signs (Most Recent):  Temp: 98.1 °F (36.7 °C) (24 1504)  Pulse: 80 " (03/27/24 0200)  Resp: (!) 22 (03/27/24 0200)  BP: 130/63 (03/27/24 0200)  SpO2: 96 % (03/27/24 0200) Vital Signs (24h Range):  Temp:  [98.1 °F (36.7 °C)] 98.1 °F (36.7 °C)  Pulse:  [76-88] 80  Resp:  [18-27] 22  SpO2:  [95 %-100 %] 96 %  BP: (106-154)/(51-94) 130/63     Weight: 84.8 kg (187 lb)  Body mass index is 29.29 kg/m².     Physical Exam  Vitals and nursing note reviewed.   Constitutional:       Appearance: Normal appearance.   HENT:      Head: Normocephalic and atraumatic.      Mouth/Throat:      Mouth: Mucous membranes are moist.   Eyes:      Extraocular Movements: Extraocular movements intact.      Pupils: Pupils are equal, round, and reactive to light.   Cardiovascular:      Rate and Rhythm: Normal rate. Rhythm irregular.      Pulses: Normal pulses.      Heart sounds: Normal heart sounds.   Pulmonary:      Breath sounds: Normal breath sounds.   Abdominal:      General: Abdomen is flat. Bowel sounds are normal.      Palpations: Abdomen is soft.   Musculoskeletal:         General: Normal range of motion.      Cervical back: Normal range of motion.      Right lower leg: Edema present.      Left lower leg: Edema present.   Skin:     General: Skin is warm.      Capillary Refill: Capillary refill takes less than 2 seconds.   Neurological:      General: No focal deficit present.      Mental Status: She is alert and oriented to person, place, and time.   Psychiatric:         Mood and Affect: Mood normal.         Behavior: Behavior normal.              CRANIAL NERVES     CN III, IV, VI   Pupils are equal, round, and reactive to light.       Significant Labs: All pertinent labs within the past 24 hours have been reviewed.  CBC:   Recent Labs   Lab 03/25/24  1552 03/26/24  1549   WBC 5.21 6.41   HGB 11.3* 11.5*   HCT 37.5 37.1    188     CMP:   Recent Labs   Lab 03/25/24  1552 03/26/24  1549    140   K 3.9 4.3    107   CO2 23 25   * 169*   BUN 26* 29*   CREATININE 1.2 1.3   CALCIUM 9.2 9.1    PROT 6.5 6.7   ALBUMIN 3.6 4.0   BILITOT 2.2* 2.3*   ALKPHOS 111 97   AST 25 22   ALT 33 28   ANIONGAP 9 8     Cardiac Markers:   Recent Labs   Lab 03/26/24  1549   *       Significant Imaging: I have reviewed all pertinent imaging results/findings within the past 24 hours.  I have reviewed and interpreted all pertinent imaging results/findings within the past 24 hours.  Assessment/Plan:     Platelet inhibition due to Plavix  WHY IS SHE ON PLAVIX AND ASPIRIN ??      RISKY AT HER AGE WITH ELIQUIS AND PLAVIX AND ASPIRIN       She has no MI or stents that Im aware of     We should find out before she goes home and make the right changes       A-fib  She's  not on rate control drugs      Keep mag over 2 and K over 4     Cont. Eliquis     Diabetes  With her low A1c at her age does she even need insulin ??    Its risky       I put her on 20 levemir in am and low dose slide scale ACHS     Lets see how she runs and then decide before DC home      REGULAR DIET     No special diets needed for  her at her age     Acute decompensated heart failure  In no distress    Sounds like she has slowly accumulated more fluid over last few months     We need to get her back to a DRY weight and see what that weight is     Then maybe put her on small lasix dose daily or maybe lasix 20 or 40 MWF    and PRN if weight goes up       I talked to her at length and I said at her age , she should not be so strict on eating that food is all bad to taste     The salt added to our food only makes up 10-12% of most peoples salt intake .  So I said dont worry about adding salt to your food.  Just try to avoid the frozen meals and fast food that has several GRAMS of salt per meal .      But I encouraged her to eat Good fresh food and adding salt is not something she needs to worry about     Id rather checks her weights more often and uses PRN lasix       She does not need a a fluid restriction either.        At her age she already has limited  free water reserves     And salt and fluid restrictions , some evidence or opinions suggest can actually worsen her CHF and BP by triggering more Thirst and renin Angiotensin pathways further     PLAN    - continuous pulse ox  - IV lasix 40 mg Q8 for now   - I's and O's and daily weights   - aldactone 25 mg po daily   - KCL 10 meq PO BID  - Mag ox 400 po BID  - get new chemistry and mag in morning labs         VTE Risk Mitigation (From admission, onward)           Ordered     Reason for No Pharmacological VTE Prophylaxis  Once        Question:  Reasons:  Answer:  Already adequately anticoagulated on oral Anticoagulants    03/27/24 0312     IP VTE HIGH RISK PATIENT  Once         03/27/24 0312     Place sequential compression device  Until discontinued         03/27/24 0312                       On 03/27/2024, patient should be placed in hospital observation services under my care.        Pharmacist Renal Adjustment Note    Leslie Tolliver is a 89 y.o. female being treated with Famotidine.    Patient Data:    Vital Signs (Most Recent):  Temp: 98.1 °F (36.7 °C) (03/26/24 1504)  Pulse: 80 (03/27/24 0200)  Resp: (!) 22 (03/27/24 0200)  BP: 130/63 (03/27/24 0200)  SpO2: 96 % (03/27/24 0200) Vital Signs (72h Range):  Temp:  [97.7 °F (36.5 °C)-98.1 °F (36.7 °C)]   Pulse:  [76-88]   Resp:  [18-27]   BP: (106-154)/(51-94)   SpO2:  [95 %-100 %]      Recent Labs   Lab 03/25/24  1552 03/26/24  1549   CREATININE 1.2 1.3     Serum creatinine: 1.3 mg/dL 03/26/24 1549  Estimated creatinine clearance: 32.8 mL/min    Famotidine 20 mg twice a day will be changed to Famotidine 20 mg once daily due to CrCl 10-50 per Renal Dose Adjustment protocol.    Pharmacist's Name: Delmi Chavez  Pharmacist's Extension: [phone]      Thaddeus Lee MD  Department of Hospital Medicine  [facility] - Emergency Dept           Him/He
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
## 2025-05-08 NOTE — PROGRESS NOTES
ASSESSMENT/PLAN:  1. Essential hypertension  -     valsartan (DIOVAN) 160 MG tablet; Take 1 tablet by mouth daily, Disp-90 tablet, R-4Normal        Return in about 4 weeks (around 6/5/2025) for HTN, medication change(s).      SUBJECTIVE/OBJECTIVE:    Chief Complaint   Patient presents with    Hypertension     Taking Valsartan 160 mg slong with HTCZ    Discuss Labs         HPI    Shruthi Talbot is a 59 y.o. female presenting today for    4 weeks  follow up of htn.  Hctz was added to valsartan 320mg.  Pt reports that valsartan 320 and hctz 25 together were too strong and her bp dropped.  Today, she took no meds because it was 118/78.  With valsartan 160 and hctz 25mg, her bp will be controlled but she can only tolerate taking it every 3 days.  If she takes valsartan alone, the bp is not adequately controlled.  She does check bp again in the evening to be sure it is controlled.  Pt has changed offices with her job and feels she has less stress.  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      Patient had labs on 4/23/25.  Labs reviewed in detail with patient       Patient does need medication refills today.      New concerns today: none        Review of Systems   Constitutional: Negative.    HENT: Negative.     Respiratory: Negative.     Cardiovascular: Negative.    All other systems reviewed and are negative.      Physical Exam  Vitals and nursing note reviewed.   Constitutional:       General: She is not in acute distress.     Appearance: Normal appearance.   HENT:      Head: Normocephalic and atraumatic.      Right Ear: External ear normal.      Left Ear: External ear normal.      Nose: Nose normal.      Mouth/Throat:      Mouth: Mucous membranes are moist.   Eyes:      Extraocular Movements: Extraocular movements intact.      Conjunctiva/sclera: Conjunctivae normal.   Cardiovascular:      Rate and Rhythm: Normal rate and regular rhythm.      Heart sounds: No murmur heard.     No friction rub. No  Daily report scanned into EMR  Monitored by Dayana Marie CEP

## (undated) DEVICE — Device

## (undated) DEVICE — SUT ETHILON 3-0 FS-1 30

## (undated) DEVICE — CHLORAPREP 10.5 ML APPLICATOR

## (undated) DEVICE — SYS LABEL CORRECT MED

## (undated) DEVICE — NDL HYPODERMIC BLUNT 18G 1.5IN

## (undated) DEVICE — TAPE MEDIPORE 3 X 10YD

## (undated) DEVICE — TOWEL OR DISP STRL BLUE 4/PK

## (undated) DEVICE — REAMER STEPPED DHS DCS

## (undated) DEVICE — ALCOHOL 70% ANTISEPTIC ISO 4OZ

## (undated) DEVICE — SPONGE BULKEE II ABSRB 6X6.75

## (undated) DEVICE — ALCOHOL 70% ISOP RUBBING 4OZ

## (undated) DEVICE — PACK SIRUS BASIC V SURG STRL

## (undated) DEVICE — DRAPE STERI-DRAPE 1000 17X11IN

## (undated) DEVICE — APPLICATOR CHLORAPREP ORN 26ML

## (undated) DEVICE — BLADE SURG CARBON STEEL #10

## (undated) DEVICE — BANDAGE MATRIX HK LOOP 6IN 5YD

## (undated) DEVICE — DRAPE IOBAN 2 STERI

## (undated) DEVICE — NDL 27G X 1 1/4

## (undated) DEVICE — SYR 10CC LUER LOCK

## (undated) DEVICE — SYR GLASS 5CC LUER LOK

## (undated) DEVICE — TUBING MINIBORE EXTENSION

## (undated) DEVICE — PACK CUSTOM UNIV BASIN SLI

## (undated) DEVICE — SOL NACL IRR 1000ML BTL

## (undated) DEVICE — WIRE GUIDE 3.2MM 400MM
Type: IMPLANTABLE DEVICE | Site: FEMUR | Status: NON-FUNCTIONAL
Removed: 2024-05-23

## (undated) DEVICE — GLOVE SURG ULTRA TOUCH 7.5

## (undated) DEVICE — SYR 3CC LUER LOC

## (undated) DEVICE — STRAP OR TABLE 5IN X 72IN

## (undated) DEVICE — DRAPE STERI U-SHAPED 47X51IN

## (undated) DEVICE — SUT X424H ETHIBOND 0-0

## (undated) DEVICE — BNDG COFLEX FOAM LF2 ST 6X5YD

## (undated) DEVICE — BIT DRILL 4.2MM 3 FLUTD 145MM

## (undated) DEVICE — DRESSING XEROFORM NONADH 1X8IN

## (undated) DEVICE — SLEEVE SCD EXPRESS KNEE MEDIUM

## (undated) DEVICE — SUT 2/0 18IN COATED VICRYL

## (undated) DEVICE — GLOVE SENSICARE PI GRN 8

## (undated) DEVICE — SYR DISP LL 5CC

## (undated) DEVICE — GOWN POLY REINF X-LONG XL

## (undated) DEVICE — NDL TUOHY EPIDURAL 20G X 3.5

## (undated) DEVICE — DRAPE C-ARMOR EQUIPMENT COVER

## (undated) DEVICE — DRAPE C ARM 42 X 120 10/BX

## (undated) DEVICE — GLOVE SURGEONS ULTRA TOUCH 6.5

## (undated) DEVICE — PADDING CAST SPECIALIST 6X4YD

## (undated) DEVICE — LINER SUCTION 3000CC

## (undated) DEVICE — 16MM FLEXIBLE DRILL BIT

## (undated) DEVICE — GOWN POLY REINF BRTH SLV XL

## (undated) DEVICE — ELECTRODE REM PLYHSV RETURN 9

## (undated) DEVICE — YANKAUER OPEN TIP W/O VENT